# Patient Record
Sex: FEMALE | Race: WHITE | NOT HISPANIC OR LATINO | Employment: OTHER | URBAN - METROPOLITAN AREA
[De-identification: names, ages, dates, MRNs, and addresses within clinical notes are randomized per-mention and may not be internally consistent; named-entity substitution may affect disease eponyms.]

---

## 2017-02-08 ENCOUNTER — TELEPHONE (OUTPATIENT)
Dept: CARDIOLOGY | Facility: MEDICAL CENTER | Age: 67
End: 2017-02-08

## 2017-02-08 NOTE — TELEPHONE ENCOUNTER
Received clearance request from [facility] for pt to hold coumadin prior to colonoscopy scheduled 4/3. Pt has not seen JI since 11/2015. Advised pt that she will need to have a f/u with JI prior to clearance. Pt provided number to call and schedule an appt.  Clearance letter in JI's folder.     NEL cleared during office visit. Faxed back clearance to [facility] [phone]

## 2017-02-16 ENCOUNTER — ANTICOAGULATION VISIT (OUTPATIENT)
Dept: VASCULAR LAB | Facility: MEDICAL CENTER | Age: 67
End: 2017-02-16
Attending: INTERNAL MEDICINE
Payer: MEDICARE

## 2017-02-16 DIAGNOSIS — I48.91 ATRIAL FIBRILLATION, UNSPECIFIED TYPE (HCC): ICD-10-CM

## 2017-02-16 LAB — INR PPP: 2.9 (ref 2–3.5)

## 2017-02-16 PROCEDURE — 99211 OFF/OP EST MAY X REQ PHY/QHP: CPT

## 2017-02-16 PROCEDURE — 85610 PROTHROMBIN TIME: CPT

## 2017-02-16 NOTE — PROGRESS NOTES
Anticoagulation Summary as of 2/16/2017     INR goal 2.0-3.0   Selected INR 2.9 (2/16/2017)   Maintenance plan 5 mg (5 mg x 1) on Mon, Wed, Fri; 2.5 mg (5 mg x 0.5) all other days   Weekly total 25 mg   Plan last modified Brice Garcia, EBONI (10/21/2015)   Next INR check 5/11/2017   Target end date Indefinite    Indications   Atrial fibrillation (CMS-HCC) [I48.91]  Atrial fibrillation (CMS-HCC) [I48.91]         Anticoagulation Episode Summary     INR check location [facility]    Preferred lab     Send INR reminders to     Comments       Anticoagulation Care Providers     Provider Role Specialty Phone number    Florian Carnes M.D. Referring Cardiology [phone]    [facility] Responsible  [phone]        Anticoagulation Patient Findings      Rachelle Reina seen in clinic today  INR  therapeutic.    Denies signs/symptoms of bleeding and/or thrombosis.    Denies changes to diet or medications.   Follow up appointment in 12 week(s).      Continue weekly warfarin dose as noted    Brice Garcia, JANELD

## 2017-02-16 NOTE — MR AVS SNAPSHOT
Rachelle Reina   2017 9:30 AM   Anticoagulation Visit   MRN: [id number]    Department:  Vascular Medicine   Dept Phone:  [phone]    Description:  Female : 1950   Provider:  [facility] EXAM 4           Allergies as of 2017     Allergen Noted Reactions    Sulfa Drugs 2015   Rash    2015        You were diagnosed with     Atrial fibrillation, unspecified type (CMS-HCC)   [[id number]]         Vital Signs     Smoking Status                   Never Smoker            Basic Information     Date Of Birth Sex Race Ethnicity Preferred Language    1950 Female White Non- English      Your appointments     2017  2:20 PM   FOLLOW UP with Florian Carnes M.D.   [facility] (--)    [address], [facility] [address]  [place] [address]   [phone]            May 11, 2017  9:30 AM   Established Patient with [facility] EXAM 4   [facility]  (--)    [address]  [place] [address]   [phone]              Problem List              ICD-10-CM Priority Class Noted - Resolved    Benign essential hypertension (Chronic) I10 Low  2011 - Present    History of cardiac catheterization (Chronic) Z98.890   2010 - Present    Long term current use of anticoagulant therapy (Chronic) Z79.01 High  2011 - Present    Diastolic congestive heart failure (Chronic) I50.30 Medium  2011 - Present    History of echocardiogram (Chronic) Z92.89   2011 - Present    Hypercholesteremia (Chronic) E78.00 Low  2011 - Present    Morbid obesity (CMS-HCC) (Chronic) E66.01   10/28/2008 - Present    History of hypothyroidism (Chronic) Z86.39   2008 - Present    Atrial fibrillation (CMS-HCC) I48.91 High  10/25/2012 - Present    Atrial fibrillation (CMS-HCC) I48.91   2015 - Present    Chest pain R07.9   11/10/2015 - Present    Atypical chest pain R07.89 High  2015 - Present      Health  Maintenance        Date Due Completion Dates    DIABETES MONOFILAMENT / LE EXAM 1/8/1951 ---    RETINAL SCREENING 7/8/1968 ---    URINE ACR / MICROALBUMIN 7/8/1968 ---    IMM DTaP/Tdap/Td Vaccine (1 - Tdap) 7/8/1969 ---    PAP SMEAR 7/8/1971 ---    COLONOSCOPY 7/8/2000 ---    IMM ZOSTER VACCINE 7/8/2010 ---    MAMMOGRAM 7/17/2015 7/17/2014, 7/17/2014, 7/17/2014, 7/17/2014, 12/20/2012, 12/19/2011, 8/15/2006, 8/26/2005, 8/16/2005, 8/11/2004    A1C SCREENING 5/10/2016 11/10/2015    IMM INFLUENZA (1) 9/1/2016 ---    IMM PNEUMOCOCCAL 65+ (ADULT) LOW/MEDIUM RISK SERIES (2 of 2 - PPSV23) 11/10/2016 11/10/2015    FASTING LIPID PROFILE 11/11/2016 11/11/2015    SERUM CREATININE 11/11/2016 11/11/2015, 11/10/2015, 7/20/2015, 1/27/2009, 1/27/2009, 10/29/2008    BONE DENSITY 4/24/2017 4/24/2012            Results     POCT Protime      Component    INR    2.9                        Current Immunizations     13-VALENT PCV PREVNAR 11/10/2015  3:44 PM      Below and/or attached are the medications your provider expects you to take. Review all of your home medications and newly ordered medications with your provider and/or pharmacist. Follow medication instructions as directed by your provider and/or pharmacist. Please keep your medication list with you and share with your provider. Update the information when medications are discontinued, doses are changed, or new medications (including over-the-counter products) are added; and carry medication information at all times in the event of emergency situations     Allergies:  SULFA DRUGS - Rash               Medications  Valid as of: February 16, 2017 -  9:33 AM    Generic Name Brand Name Tablet Size Instructions for use    Citalopram Hydrobromide (Tab) CELEXA 20 MG Take 20 mg by mouth every day.        Ergocalciferol (Cap) DRISDOL 24886 UNITS Take 50,000 Units by mouth every 14 days. Wednesdays.        Glucose Blood (Strip) UNISTRIP1 GENERIC          Lancets (Misc) PHARMACIST CHOICE  LANCETS          Levothyroxine Sodium (Tab) SYNTHROID 25 MCG Take 25 mcg by mouth Every morning on an empty stomach. Combine with 200 mcg tabs for total 225 mcg daily.        Levothyroxine Sodium (Tab) SYNTHROID 200 MCG Take 200 mcg by mouth Every morning on an empty stomach. Combine with 25 mcg tabs for total 225 mcg daily        Loratadine (Tab) CLARITIN 10 MG Take 10 mg by mouth every day.        Losartan Potassium-HCTZ (Tab) HYZAAR 100-25 MG Take 1 Tab by mouth every day.          Meloxicam (Tab) MOBIC 7.5 MG Take 7.5 mg by mouth 2 Times a Day.        MetFORMIN HCl (Tab) GLUCOPHAGE 1000 MG Take 1,000 mg by mouth 2 times a day, with meals.        Metoprolol Tartrate (Tab) LOPRESSOR 50 MG Take 50 mg by mouth 2 times a day.        Nitroglycerin (SL Tab) NITROSTAT 0.4 MG Place 1 Tab under tongue as needed for Chest Pain (up to 3 doses (if SBP greater than 90 mmHg)).        Simvastatin (Tab) ZOCOR 40 MG Take 40 mg by mouth every evening.          Temazepam (Cap) RESTORIL 15 MG         TiZANidine HCl (Tab) ZANAFLEX 4 MG Take 1 Tab by mouth 3 times a day.        Warfarin Sodium (Tab) COUMADIN 5 MG Take 0.5 to 1 tab by mouth daily or as directed by coumadin clinic        .                 Medicines prescribed today were sent to:     [id number] - [place] - [address] [place] [address]    Phone: [phone] Fax: [phone]    Open 24 Hours?: No    [facility] - [place] [address]    Phone: [phone] Fax: [phone]    Open 24 Hours?: No    [facility] - [place] [address]    Phone: [phone] Fax: [phone]    Open 24 Hours?: No      Medication refill instructions:       If your prescription bottle indicates you have medication refills left, it is not necessary to call your provider’s office. Please contact your pharmacy and  they will refill your medication.    If your prescription bottle indicates you do not have any refills left, you may request refills at any time through one of the following ways: The online eBuddy system (except Urgent Care), by calling your provider’s office, or by asking your pharmacy to contact your provider’s office with a refill request. Medication refills are processed only during regular business hours and may not be available until the next business day. Your provider may request additional information or to have a follow-up visit with you prior to refilling your medication.   *Please Note: Medication refills are assigned a new Rx number when refilled electronically. Your pharmacy may indicate that no refills were authorized even though a new prescription for the same medication is available at the pharmacy. Please request the medicine by name with the pharmacy before contacting your provider for a refill.        Warfarin Dosing Calendar February 2017 Details    Sun Mon Tue Wed Thu Fri Sat        1               2               3               4                 5               6               7               8               9               10               11                 12               13               14               15               16   2.9   2.5 mg   See details      17      5 mg         18      2.5 mg           19      2.5 mg         20      5 mg         21      2.5 mg         22      5 mg         23      2.5 mg         24      5 mg         25      2.5 mg           26      2.5 mg         27      5 mg         28      2.5 mg              Date Details   02/16 This INR check   INR: 2.9               How to take your warfarin dose     To take:  2.5 mg Take 0.5 of a 5 mg tablet.    To take:  5 mg Take 1 of the 5 mg tablets.           Warfarin Dosing Calendar March 2017 Details    Sun Mon Tue Wed Thu Fri Sat        1      5 mg         2      2.5 mg         3      5 mg         4      2.5 mg              5      2.5 mg         6      5 mg         7      2.5 mg         8      5 mg         9      2.5 mg         10      5 mg         11      2.5 mg           12      2.5 mg         13      5 mg         14      2.5 mg         15      5 mg         16      2.5 mg         17      5 mg         18      2.5 mg           19      2.5 mg         20      5 mg         21      2.5 mg         22      5 mg         23      2.5 mg         24      5 mg         25      2.5 mg           26      2.5 mg         27      5 mg         28      2.5 mg         29      5 mg         30      2.5 mg         31      5 mg           Date Details   No additional details            How to take your warfarin dose     To take:  2.5 mg Take 0.5 of a 5 mg tablet.    To take:  5 mg Take 1 of the 5 mg tablets.           Warfarin Dosing Calendar   April 2017 Details    Sun Mon Tue Wed Thu Fri Sat           1      2.5 mg           2      2.5 mg         3      5 mg         4      2.5 mg         5      5 mg         6      2.5 mg         7      5 mg         8      2.5 mg           9      2.5 mg         10      5 mg         11      2.5 mg         12      5 mg         13      2.5 mg         14      5 mg         15      2.5 mg           16      2.5 mg         17      5 mg         18      2.5 mg         19      5 mg         20      2.5 mg         21      5 mg         22      2.5 mg           23      2.5 mg         24      5 mg         25      2.5 mg         26      5 mg         27      2.5 mg         28      5 mg         29      2.5 mg           30      2.5 mg                Date Details   No additional details            How to take your warfarin dose     To take:  2.5 mg Take 0.5 of a 5 mg tablet.    To take:  5 mg Take 1 of the 5 mg tablets.           Warfarin Dosing Calendar   May 2017 Details    Sun Mon Tue Wed Thu Fri Sat      1      5 mg         2      2.5 mg         3      5 mg         4      2.5 mg         5      5 mg         6      2.5 mg           7      2.5  mg         8      5 mg         9      2.5 mg         10      5 mg         11      2.5 mg         12               13                 14               15               16               17               18               19               20                 21               22               23               24               25               26               27                 28               29               30               31                   Date Details   No additional details    Date of next INR:  5/11/2017         How to take your warfarin dose     To take:  2.5 mg Take 0.5 of a 5 mg tablet.    To take:  5 mg Take 1 of the 5 mg tablets.              ShangPin Access Code: [email or web address]  Expires: 3/12/2017  3:10 PM    ShangPin  A secure, online tool to manage your health information     [employer]® is a secure, online tool that connects you to your personalized health information from the privacy of your home -- day or night - making it very easy for you to manage your healthcare. Once the activation process is completed, you can even access your medical information using the ShangPin jessica, which is available for free in the Apple Jessica store or Google Play store.     ShangPin provides the following levels of access (as shown below):   My Chart Features   [facility] Doctor [facility]  Urgent  Care Non-[facility]  Primary Care  Doctor   Email your healthcare team securely and privately 24/7 X X X    Manage appointments: schedule your next appointment; view details of past/upcoming appointments X      Request prescription refills. X      View recent personal medical records, including lab and immunizations X X X X   View health record, including health history, allergies, medications X X X X   Read reports about your outpatient visits, procedures, consult and ER notes X X X X   See your discharge summary, which is a recap of your hospital and/or ER visit that includes your diagnosis,  lab results, and care plan. X X       How to register for Son[employer]:  1. Go to  [email or web address].  2. Click on the Sign Up Now box, which takes you to the New Member Sign Up page. You will need to provide the following information:  a. Enter your Sonnedix Access Code exactly as it appears at the top of this page. (You will not need to use this code after you’ve completed the sign-up process. If you do not sign up before the expiration date, you must request a new code.)   b. Enter your date of birth.   c. Enter your home email address.   d. Click Submit, and follow the next screen’s instructions.  3. Create a [employer] ID. This will be your [employer] login ID and cannot be changed, so think of one that is secure and easy to remember.  4. Create a [employer] password. You can change your password at any time.  5. Enter your Password Reset Question and Answer. This can be used at a later time if you forget your password.   6. Enter your e-mail address. This allows you to receive e-mail notifications when new information is available in Sonnedix.  7. Click Sign Up. You can now view your health information.    For assistance activating your Sonnedix account, call [phone]

## 2017-02-17 LAB — INR BLD: 2.9 (ref 0.9–1.2)

## 2017-02-21 ENCOUNTER — OFFICE VISIT (OUTPATIENT)
Dept: CARDIOLOGY | Facility: MEDICAL CENTER | Age: 67
End: 2017-02-21
Payer: MEDICARE

## 2017-02-21 ENCOUNTER — TELEPHONE (OUTPATIENT)
Dept: CARDIOLOGY | Facility: MEDICAL CENTER | Age: 67
End: 2017-02-21

## 2017-02-21 VITALS
OXYGEN SATURATION: 92 % | SYSTOLIC BLOOD PRESSURE: 122 MMHG | DIASTOLIC BLOOD PRESSURE: 74 MMHG | HEIGHT: 65 IN | BODY MASS INDEX: 43.82 KG/M2 | HEART RATE: 94 BPM | WEIGHT: 263 LBS

## 2017-02-21 DIAGNOSIS — I10 BENIGN ESSENTIAL HYPERTENSION: Chronic | ICD-10-CM

## 2017-02-21 DIAGNOSIS — I50.30 DIASTOLIC CONGESTIVE HEART FAILURE, UNSPECIFIED CONGESTIVE HEART FAILURE CHRONICITY: Chronic | ICD-10-CM

## 2017-02-21 DIAGNOSIS — E78.5 DYSLIPIDEMIA: ICD-10-CM

## 2017-02-21 DIAGNOSIS — I48.91 ATRIAL FIBRILLATION, UNSPECIFIED TYPE (HCC): ICD-10-CM

## 2017-02-21 DIAGNOSIS — Z01.810 PREOP CARDIOVASCULAR EXAM: ICD-10-CM

## 2017-02-21 DIAGNOSIS — Z79.01 LONG TERM CURRENT USE OF ANTICOAGULANT THERAPY: Chronic | ICD-10-CM

## 2017-02-21 LAB — EKG IMPRESSION: NORMAL

## 2017-02-21 PROCEDURE — 99214 OFFICE O/P EST MOD 30 MIN: CPT | Performed by: INTERNAL MEDICINE

## 2017-02-21 PROCEDURE — 3017F COLORECTAL CA SCREEN DOC REV: CPT | Mod: 8P | Performed by: INTERNAL MEDICINE

## 2017-02-21 PROCEDURE — 1036F TOBACCO NON-USER: CPT | Performed by: INTERNAL MEDICINE

## 2017-02-21 PROCEDURE — 1101F PT FALLS ASSESS-DOCD LE1/YR: CPT | Mod: 8P | Performed by: INTERNAL MEDICINE

## 2017-02-21 PROCEDURE — 93000 ELECTROCARDIOGRAM COMPLETE: CPT | Performed by: INTERNAL MEDICINE

## 2017-02-21 PROCEDURE — G8419 CALC BMI OUT NRM PARAM NOF/U: HCPCS | Performed by: INTERNAL MEDICINE

## 2017-02-21 PROCEDURE — G8432 DEP SCR NOT DOC, RNG: HCPCS | Performed by: INTERNAL MEDICINE

## 2017-02-21 PROCEDURE — 4040F PNEUMOC VAC/ADMIN/RCVD: CPT | Performed by: INTERNAL MEDICINE

## 2017-02-21 PROCEDURE — G8484 FLU IMMUNIZE NO ADMIN: HCPCS | Performed by: INTERNAL MEDICINE

## 2017-02-21 PROCEDURE — 3014F SCREEN MAMMO DOC REV: CPT | Mod: 8P | Performed by: INTERNAL MEDICINE

## 2017-02-21 ASSESSMENT — ENCOUNTER SYMPTOMS
FEVER: 0
SPUTUM PRODUCTION: 1
PND: 0
SHORTNESS OF BREATH: 1
LOSS OF CONSCIOUSNESS: 0
HEADACHES: 0
MYALGIAS: 0
CHILLS: 0
BLURRED VISION: 0
ABDOMINAL PAIN: 0
DIZZINESS: 0
PALPITATIONS: 0
COUGH: 1
INSOMNIA: 0
ORTHOPNEA: 0
BRUISES/BLEEDS EASILY: 1

## 2017-02-21 NOTE — MR AVS SNAPSHOT
"        Rachelle Reina   2017 2:20 PM   Office Visit   MRN: [id number]    Department:  [facility]   Dept Phone:  [phone]    Description:  Female : 1950   Provider:  Florian Carnes M.D.           Reason for Visit     Follow-Up           Allergies as of 2017     Allergen Noted Reactions    Sulfa Drugs 2015   Rash    2015        You were diagnosed with     Preop cardiovascular exam   [250248]       Atrial fibrillation, unspecified type (CMS-HCC)   [3882911]       Long term current use of anticoagulant therapy   [4724930]       Diastolic congestive heart failure, unspecified congestive heart failure chronicity (CMS-HCC)   [3759530]       Benign essential hypertension   [379698]       Dyslipidemia   [794823]         Vital Signs     Blood Pressure Pulse Height Weight Body Mass Index Oxygen Saturation    122/74 mmHg 94 1.651 m (5' 5\") 119.296 kg (263 lb) 43.77 kg/m2 92%    Smoking Status                   Never Smoker            Basic Information     Date Of Birth Sex Race Ethnicity Preferred Language    1950 Female White Non- English      Your appointments     May 11, 2017  9:30 AM   Established Patient with [facility] EXAM 4   [facility]  (--)    [address] [place] [id number]   [phone]              Problem List              ICD-10-CM Priority Class Noted - Resolved    Benign essential hypertension (Chronic) I10 Low  2011 - Present    History of cardiac catheterization (Chronic) Z98.890   2010 - Present    Long term current use of anticoagulant therapy (Chronic) Z79.01 High  2011 - Present    Diastolic congestive heart failure (Chronic) I50.30 Medium  2011 - Present    History of echocardiogram (Chronic) Z92.89   2011 - Present    Morbid obesity (CMS-HCC) (Chronic) E66.01   10/28/2008 - Present    History of hypothyroidism (Chronic) Z86.39   2008 - Present    Atrial fibrillation " (CMS-HCC) I48.91 High  10/25/2012 - Present    Atrial fibrillation (CMS-HCC) I48.91   9/18/2015 - Present    Chest pain R07.9   11/10/2015 - Present    Atypical chest pain R07.89 High  11/17/2015 - Present    Preop cardiovascular exam Z01.810   2/21/2017 - Present    Dyslipidemia E78.5   2/21/2017 - Present      Health Maintenance        Date Due Completion Dates    DIABETES MONOFILAMENT / LE EXAM 1/8/1951 ---    RETINAL SCREENING 7/8/1968 ---    URINE ACR / MICROALBUMIN 7/8/1968 ---    IMM DTaP/Tdap/Td Vaccine (1 - Tdap) 7/8/1969 ---    PAP SMEAR 7/8/1971 ---    COLONOSCOPY 7/8/2000 ---    IMM ZOSTER VACCINE 7/8/2010 ---    MAMMOGRAM 7/17/2015 7/17/2014, 7/17/2014, 7/17/2014, 7/17/2014, 12/20/2012, 12/19/2011, 8/15/2006, 8/26/2005, 8/16/2005, 8/11/2004    A1C SCREENING 5/10/2016 11/10/2015    IMM INFLUENZA (1) 9/1/2016 ---    IMM PNEUMOCOCCAL 65+ (ADULT) LOW/MEDIUM RISK SERIES (2 of 2 - PPSV23) 11/10/2016 11/10/2015    FASTING LIPID PROFILE 11/11/2016 11/11/2015    SERUM CREATININE 11/11/2016 11/11/2015, 11/10/2015, 7/20/2015, 1/27/2009, 1/27/2009, 10/29/2008    BONE DENSITY 4/24/2017 4/24/2012            Results       Current Immunizations     13-VALENT PCV PREVNAR 11/10/2015  3:44 PM      Below and/or attached are the medications your provider expects you to take. Review all of your home medications and newly ordered medications with your provider and/or pharmacist. Follow medication instructions as directed by your provider and/or pharmacist. Please keep your medication list with you and share with your provider. Update the information when medications are discontinued, doses are changed, or new medications (including over-the-counter products) are added; and carry medication information at all times in the event of emergency situations     Allergies:  SULFA DRUGS - Rash               Medications  Valid as of: February 21, 2017 -  3:09 PM    Generic Name Brand Name Tablet Size Instructions for use     Cholecalciferol   Take  by mouth.        Citalopram Hydrobromide (Tab) CELEXA 20 MG Take 20 mg by mouth every day.        Cyanocobalamin   Take 5,000 mg by mouth. Every 5 days        Ergocalciferol (Cap) DRISDOL 95630 UNITS Take 50,000 Units by mouth every 14 days. Wednesdays.        Glucose Blood (Strip) UNISTRIP1 GENERIC          Lancets (Misc) PHARMACIST CHOICE LANCETS          Levothyroxine Sodium (Tab) SYNTHROID 25 MCG Take 25 mcg by mouth Every morning on an empty stomach. Combine with 200 mcg tabs for total 225 mcg daily.        Levothyroxine Sodium (Tab) SYNTHROID 200 MCG Take 200 mcg by mouth Every morning on an empty stomach. Combine with 25 mcg tabs for total 225 mcg daily        Loratadine (Tab) CLARITIN 10 MG Take 10 mg by mouth every day.        Losartan Potassium-HCTZ (Tab) HYZAAR 100-25 MG Take 1 Tab by mouth every day.          Meloxicam (Tab) MOBIC 7.5 MG Take 7.5 mg by mouth 2 Times a Day.        MetFORMIN HCl (Tab) GLUCOPHAGE 1000 MG Take 1,000 mg by mouth 2 times a day, with meals.        Metoprolol Tartrate (Tab) LOPRESSOR 50 MG Take 50 mg by mouth 3 times a day.        Nitroglycerin (SL Tab) NITROSTAT 0.4 MG Place 1 Tab under tongue as needed for Chest Pain (up to 3 doses (if SBP greater than 90 mmHg)).        Probiotic Product   Take  by mouth.        Simvastatin (Tab) ZOCOR 40 MG Take 40 mg by mouth every evening.          Temazepam (Cap) RESTORIL 15 MG         TiZANidine HCl (Tab) ZANAFLEX 4 MG Take 1 Tab by mouth 3 times a day.        Warfarin Sodium (Tab) COUMADIN 5 MG Take 0.5 to 1 tab by mouth daily or as directed by coumadin clinic        .                 Medicines prescribed today were sent to:     [id number] - [place] - [address] [facility] [address]    [address] [address]    Phone: [phone] Fax: [phone]    Open 24 Hours?: No    [facility] - [facility] [place] - [address] [facility]    [address] [place] [address]  [place] [address]    Phone: [phone] Fax: [phone]    Open 24 Hours?: No    [facility] - [place], [place] - [address] [facility]    [address] [place] [address]    Phone: [phone] Fax: [phone]    Open 24 Hours?: No      Medication refill instructions:       If your prescription bottle indicates you have medication refills left, it is not necessary to call your provider’s office. Please contact your pharmacy and they will refill your medication.    If your prescription bottle indicates you do not have any refills left, you may request refills at any time through one of the following ways: The online Wowo system (except Urgent Care), by calling your provider’s office, or by asking your pharmacy to contact your provider’s office with a refill request. Medication refills are processed only during regular business hours and may not be available until the next business day. Your provider may request additional information or to have a follow-up visit with you prior to refilling your medication.   *Please Note: Medication refills are assigned a new Rx number when refilled electronically. Your pharmacy may indicate that no refills were authorized even though a new prescription for the same medication is available at the pharmacy. Please request the medicine by name with the pharmacy before contacting your provider for a refill.           Wowo Access Code: [email or web address]  Expires: 3/12/2017  3:10 PM    Wowo  A secure, online tool to manage your health information     [employer]s Wowo® is a secure, online tool that connects you to your personalized health information from the privacy of your home -- day or night - making it very easy for you to manage your healthcare. Once the activation process is completed, you can even access your medical information using the Wowo jessica, which is available for free in the Apple Jessica store or Google Play store.     Wowo  provides the following levels of access (as shown below):   My Chart Features   [facility] Doctor [facility]  Specialists [facility]  Urgent  Care Non-[facility]  Primary Care  Doctor   Email your healthcare team securely and privately 24/7 X X X    Manage appointments: schedule your next appointment; view details of past/upcoming appointments X      Request prescription refills. X      View recent personal medical records, including lab and immunizations X X X X   View health record, including health history, allergies, medications X X X X   Read reports about your outpatient visits, procedures, consult and ER notes X X X X   See your discharge summary, which is a recap of your hospital and/or ER visit that includes your diagnosis, lab results, and care plan. X X       How to register for [employer]:  1. Go to  [email or web address].  2. Click on the Sign Up Now box, which takes you to the New Member Sign Up page. You will need to provide the following information:  a. Enter your [employer]imecraft Access Code exactly as it appears at the top of this page. (You will not need to use this code after you’ve completed the sign-up process. If you do not sign up before the expiration date, you must request a new code.)   b. Enter your date of birth.   c. Enter your home email address.   d. Click Submit, and follow the next screen’s instructions.  3. Create a Limecraft ID. This will be your Limecraft login ID and cannot be changed, so think of one that is secure and easy to remember.  4. Create a Limecraft password. You can change your password at any time.  5. Enter your Password Reset Question and Answer. This can be used at a later time if you forget your password.   6. Enter your e-mail address. This allows you to receive e-mail notifications when new information is available in Limecraft.  7. Click Sign Up. You can now view your health information.    For assistance activating your [employer]imecraft account, call [phone]

## 2017-02-21 NOTE — PROGRESS NOTES
Subjective:   Rachelle Reina is a 66 y.o. female who presents today for pre-operative cardiovascular examination.    Since the patient's last visit on 11/17/15, she has been doing well clinically. She denies chest pain, shortness of breath, palpitations, nausea/vomiting or diaphoresis. She is pending colonoscope. She has suffered with recurrent bronchitis.    Past Medical History   Diagnosis Date   • A-fib (CMS-HCC)    • Cancer (CMS-HCC)      breast   • Benign essential hypertension 7/1/2011   • History of cardiac catheterization 4/22/2010   • Long term (current) use of anticoagulants 7/1/2011   • Diastolic congestive heart failure (CMS-HCC) 7/1/2011   • History of echocardiogram 4/22/2011   • Hypercholesteremia 7/1/2011   • Morbid obesity (CMS-HCC) 10/28/2008   • History of hypothyroidism 8/29/2008   • Breast cancer (CMS-HCC)    • Diabetes (CMS-HCC)      Past Surgical History   Procedure Laterality Date   • Abdominal hysterectomy total       1987   • Mastectomy  partial   • Cholecystectomy     • Pr radiation therapy plan simple       Family History   Problem Relation Age of Onset   • Heart Disease Mother      HEART VALVE REPLACEMENT.   • Heart Disease Father      CORONARY ARTERY BYPASS GRAFTS.   • Cancer Maternal Aunt      History   Smoking status   • Never Smoker    Smokeless tobacco   • Never Used     Allergies   Allergen Reactions   • Sulfa Drugs Rash     8/2015         Medications reviewed.    Outpatient Encounter Prescriptions as of 2/21/2017   Medication Sig Dispense Refill   • Cholecalciferol (VITAMIN D PO) Take  by mouth.     • Cyanocobalamin (VITAMIN B 12 PO) Take 5,000 mg by mouth. Every 5 days     • Probiotic Product (PROBIOTIC DAILY PO) Take  by mouth.     • warfarin (COUMADIN) 5 MG Tab Take 0.5 to 1 tab by mouth daily or as directed by coumadin clinic 90 Tab 3   • loratadine (CLARITIN) 10 MG Tab Take 10 mg by mouth every day.     • levothyroxine (SYNTHROID) 200 MCG Tab Take 200 mcg by mouth  Every morning on an empty stomach. Combine with 25 mcg tabs for total 225 mcg daily     • metformin (GLUCOPHAGE) 1000 MG tablet Take 1,000 mg by mouth 2 times a day, with meals.     • metoprolol (LOPRESSOR) 50 MG TABS Take 50 mg by mouth 3 times a day.     • citalopram (CELEXA) 20 MG TABS Take 20 mg by mouth every day.     • meloxicam (MOBIC) 7.5 MG TABS Take 7.5 mg by mouth 2 Times a Day.     • losartan-hydrochlorothiazide (HYZAAR) 100-25 MG per tablet Take 1 Tab by mouth every day.       • simvastatin (ZOCOR) 40 MG TABS Take 40 mg by mouth every evening.       • UNISTRIP1 GENERIC strip      • PHARMACIST CHOICE LANCETS Misc      • temazepam (RESTORIL) 15 MG Cap      • tizanidine (ZANAFLEX) 4 MG Tab Take 1 Tab by mouth 3 times a day. 21 Tab 0   • nitroglycerin (NITROSTAT) 0.4 MG SL Tab Place 1 Tab under tongue as needed for Chest Pain (up to 3 doses (if SBP greater than 90 mmHg)). 15 Tab 0   • levothyroxine (SYNTHROID) 25 MCG Tab Take 25 mcg by mouth Every morning on an empty stomach. Combine with 200 mcg tabs for total 225 mcg daily.     • vitamin D, Ergocalciferol, (DRISDOL) 35643 UNIT CAPS capsule Take 50,000 Units by mouth every 14 days. Wednesdays.       No facility-administered encounter medications on file as of 2/21/2017.     Review of Systems   Constitutional: Negative for fever, chills and malaise/fatigue.   HENT: Negative for congestion.    Eyes: Negative for blurred vision.   Respiratory: Positive for cough, sputum production and shortness of breath.    Cardiovascular: Negative for chest pain, palpitations, orthopnea, leg swelling and PND.   Gastrointestinal: Negative for abdominal pain.   Genitourinary: Negative for dysuria.   Musculoskeletal: Negative for myalgias and joint pain.   Skin: Negative for rash.   Neurological: Negative for dizziness, loss of consciousness and headaches.   Endo/Heme/Allergies: Bruises/bleeds easily.   Psychiatric/Behavioral: The patient does not have insomnia.          "Objective:   /74 mmHg  Pulse 94  Ht 1.651 m (5' 5\")  Wt 119.296 kg (263 lb)  BMI 43.77 kg/m2  SpO2 92%    Physical Exam   Constitutional: She is oriented to person, place, and time. She appears well-developed and well-nourished.   HENT:   Head: Normocephalic and atraumatic.   Eyes: Conjunctivae are normal. Pupils are equal, round, and reactive to light.   Neck: Normal range of motion. Neck supple.   Cardiovascular: Normal rate.  An irregularly irregular rhythm present.   Pulmonary/Chest: Effort normal. She has wheezes.   Abdominal: Soft. Bowel sounds are normal.   Musculoskeletal: Normal range of motion. She exhibits edema.   Neurological: She is alert and oriented to person, place, and time.   Skin: Skin is warm and dry.   Psychiatric: She has a normal mood and affect.     CARDIAC STUDIES/PROCEDURES:    CARDIAC CATHETERIZATION (10/30/08)  Cardiac catheterization showing no angiographic evidence of coronary artery disease.    ECHOCARDIOGRAM CONCLUSIONS (11/10/15)  No significant valve disease or flow abnormalities.   Normal left ventricular systolic function.   Trace to small pericardial effusion without evidence of hemodynamic compromise.  No prior study is available for comparison.     ECHOCARDIOGRAM CONCLUSIONS (09/02/08)  Echocardiogram showing normal left ventricular systolic function and mild mitral regurgitation.    EKG was ordered for pre-operative cardiovascular examination, performed on (02/21/17) and reviewed: EKG shows atrial fibrillation.  EKG performed on (11/10/15) was reviewed: EKG shows atrial fibrillation.    Laboratory results of (11/11/15) were reviewed. Cholesterol profile of 118/165/39/46 noted.    MPI CONCLUSIONS (11/11/15)  No reversible defects that would indicate ischemia.     Assessment:     Patient Active Problem List    Diagnosis Date Noted   • Pre-operative cardiovascular examination  11/17/2015     Priority: High   • Atrial fibrillation 10/25/2012     Priority: High   • Long " term current use of anticoagulant therapy 07/01/2011     Priority: High   • Diastolic congestive heart failure 07/01/2011     Priority: Medium   • Benign essential hypertension 07/01/2011     Priority: Low   • Hypercholesteremia 07/01/2011     Priority: Low     Medical Decision Making:  Today's Assessment / Status / Plan:     1. Presurgical evaluation: She is pending colonoscope and is doing well from cardiac standpoint. She may proceed with surgery from cardiac standpoint with moderate risk on beta blockade therapy. She may have her warfarin held as needed.  2. Atrial fibrillation on chronic anticoagulation therapy (warfarin) and unseccessful electrical cardioversion on amiodarone on 01/27/09: She is doing well and the ventricular rate is well controlled.   3. History of diastolic congestive heart failure: The overall volume status is adequate.  4. Hypertension: Blood pressure is well controlled.  5. Hyperlipidemia (managed by Dr. Fernández): She is doing well on statin therapy without myalgia symptoms.  6. Emotional stress: She is undergoing significant emotional stress.    We will follow up the patient in one year.    CC Daniel Santiago

## 2017-02-21 NOTE — TELEPHONE ENCOUNTER
Received cardiac clearance letter from [facility] for pt to hold his warfarin for 5 days prior to colonoscopy. Pt has appt today w/ JI.     Please see prior encounter

## 2017-02-21 NOTE — Clinical Note
[facility]   [address], [facility] [address]  [place] [address]  Phone: [phone]  Fax: [phone]              Rachelle Reina  1950    Encounter Date: 2/21/2017    Florian Carnes M.D.          PROGRESS NOTE:  Subjective:   Rachelle Reina is a 66 y.o. female who presents today for pre-operative cardiovascular examination.    Since the patient's last visit on 11/17/15, she has been doing well clinically. She denies chest pain, shortness of breath, palpitations, nausea/vomiting or diaphoresis. She is pending colonoscope. She has suffered with recurrent bronchitis.    Past Medical History   Diagnosis Date   • A-fib (CMS-HCC)    • Cancer (CMS-HCC)      breast   • Benign essential hypertension 7/1/2011   • History of cardiac catheterization 4/22/2010   • Long term (current) use of anticoagulants 7/1/2011   • Diastolic congestive heart failure (CMS-HCC) 7/1/2011   • History of echocardiogram 4/22/2011   • Hypercholesteremia 7/1/2011   • Morbid obesity (CMS-HCC) 10/28/2008   • History of hypothyroidism 8/29/2008   • Breast cancer (CMS-HCC)    • Diabetes (CMS-HCC)      Past Surgical History   Procedure Laterality Date   • Abdominal hysterectomy total       1987   • Mastectomy  partial   • Cholecystectomy     • Pr radiation therapy plan simple       Family History   Problem Relation Age of Onset   • Heart Disease Mother      HEART VALVE REPLACEMENT.   • Heart Disease Father      CORONARY ARTERY BYPASS GRAFTS.   • Cancer Maternal Aunt      History   Smoking status   • Never Smoker    Smokeless tobacco   • Never Used     Allergies   Allergen Reactions   • Sulfa Drugs Rash     8/2015         Medications reviewed.    Outpatient Encounter Prescriptions as of 2/21/2017   Medication Sig Dispense Refill   • Cholecalciferol (VITAMIN D PO) Take  by mouth.     • Cyanocobalamin (VITAMIN B 12 PO) Take 5,000 mg by mouth. Every 5 days     • Probiotic Product (PROBIOTIC DAILY  PO) Take  by mouth.     • warfarin (COUMADIN) 5 MG Tab Take 0.5 to 1 tab by mouth daily or as directed by coumadin clinic 90 Tab 3   • loratadine (CLARITIN) 10 MG Tab Take 10 mg by mouth every day.     • levothyroxine (SYNTHROID) 200 MCG Tab Take 200 mcg by mouth Every morning on an empty stomach. Combine with 25 mcg tabs for total 225 mcg daily     • metformin (GLUCOPHAGE) 1000 MG tablet Take 1,000 mg by mouth 2 times a day, with meals.     • metoprolol (LOPRESSOR) 50 MG TABS Take 50 mg by mouth 3 times a day.     • citalopram (CELEXA) 20 MG TABS Take 20 mg by mouth every day.     • meloxicam (MOBIC) 7.5 MG TABS Take 7.5 mg by mouth 2 Times a Day.     • losartan-hydrochlorothiazide (HYZAAR) 100-25 MG per tablet Take 1 Tab by mouth every day.       • simvastatin (ZOCOR) 40 MG TABS Take 40 mg by mouth every evening.       • UNISTRIP1 GENERIC strip      • PHARMACIST CHOICE LANCETS Misc      • temazepam (RESTORIL) 15 MG Cap      • tizanidine (ZANAFLEX) 4 MG Tab Take 1 Tab by mouth 3 times a day. 21 Tab 0   • nitroglycerin (NITROSTAT) 0.4 MG SL Tab Place 1 Tab under tongue as needed for Chest Pain (up to 3 doses (if SBP greater than 90 mmHg)). 15 Tab 0   • levothyroxine (SYNTHROID) 25 MCG Tab Take 25 mcg by mouth Every morning on an empty stomach. Combine with 200 mcg tabs for total 225 mcg daily.     • vitamin D, Ergocalciferol, (DRISDOL) 19697 UNIT CAPS capsule Take 50,000 Units by mouth every 14 days. Wednesdays.       No facility-administered encounter medications on file as of 2/21/2017.     Review of Systems   Constitutional: Negative for fever, chills and malaise/fatigue.   HENT: Negative for congestion.    Eyes: Negative for blurred vision.   Respiratory: Positive for cough, sputum production and shortness of breath.    Cardiovascular: Negative for chest pain, palpitations, orthopnea, leg swelling and PND.   Gastrointestinal: Negative for abdominal pain.   Genitourinary: Negative for dysuria.   Musculoskeletal:  "Negative for myalgias and joint pain.   Skin: Negative for rash.   Neurological: Negative for dizziness, loss of consciousness and headaches.   Endo/Heme/Allergies: Bruises/bleeds easily.   Psychiatric/Behavioral: The patient does not have insomnia.         Objective:   /74 mmHg  Pulse 94  Ht 1.651 m (5' 5\")  Wt 119.296 kg (263 lb)  BMI 43.77 kg/m2  SpO2 92%    Physical Exam   Constitutional: She is oriented to person, place, and time. She appears well-developed and well-nourished.   HENT:   Head: Normocephalic and atraumatic.   Eyes: Conjunctivae are normal. Pupils are equal, round, and reactive to light.   Neck: Normal range of motion. Neck supple.   Cardiovascular: Normal rate.  An irregularly irregular rhythm present.   Pulmonary/Chest: Effort normal. She has wheezes.   Abdominal: Soft. Bowel sounds are normal.   Musculoskeletal: Normal range of motion. She exhibits edema.   Neurological: She is alert and oriented to person, place, and time.   Skin: Skin is warm and dry.   Psychiatric: She has a normal mood and affect.     CARDIAC STUDIES/PROCEDURES:    CARDIAC CATHETERIZATION (10/30/08)  Cardiac catheterization showing no angiographic evidence of coronary artery disease.    ECHOCARDIOGRAM CONCLUSIONS (11/10/15)  No significant valve disease or flow abnormalities.   Normal left ventricular systolic function.   Trace to small pericardial effusion without evidence of hemodynamic compromise.  No prior study is available for comparison.     ECHOCARDIOGRAM CONCLUSIONS (09/02/08)  Echocardiogram showing normal left ventricular systolic function and mild mitral regurgitation.    EKG was ordered for pre-operative cardiovascular examination, performed on (02/21/17) and reviewed: EKG shows atrial fibrillation.  EKG performed on (11/10/15) was reviewed: EKG shows atrial fibrillation.    Laboratory results of (11/11/15) were reviewed. Cholesterol profile of 118/165/39/46 noted.    MPI CONCLUSIONS (11/11/15)  No " reversible defects that would indicate ischemia.     Assessment:     Patient Active Problem List    Diagnosis Date Noted   • Pre-operative cardiovascular examination  11/17/2015     Priority: High   • Atrial fibrillation 10/25/2012     Priority: High   • Long term current use of anticoagulant therapy 07/01/2011     Priority: High   • Diastolic congestive heart failure 07/01/2011     Priority: Medium   • Benign essential hypertension 07/01/2011     Priority: Low   • Hypercholesteremia 07/01/2011     Priority: Low     Medical Decision Making:  Today's Assessment / Status / Plan:     1. Presurgical evaluation: She is pending colonoscope and is doing well from cardiac standpoint. She may proceed with surgery from cardiac standpoint with moderate risk on beta blockade therapy. She may have her warfarin held as needed.  2. Atrial fibrillation on chronic anticoagulation therapy (warfarin) and unseccessful electrical cardioversion on amiodarone on 01/27/09: She is doing well and the ventricular rate is well controlled.   3. History of diastolic congestive heart failure: The overall volume status is adequate.  4. Hypertension: Blood pressure is well controlled.  5. Hyperlipidemia (managed by Dr. Fernández): She is doing well on statin therapy without myalgia symptoms.  6. Emotional stress: She is undergoing significant emotional stress.    We will follow up the patient in one year.    CC Daniel Santiago

## 2017-05-11 ENCOUNTER — ANTICOAGULATION VISIT (OUTPATIENT)
Dept: VASCULAR LAB | Facility: MEDICAL CENTER | Age: 67
End: 2017-05-11
Attending: INTERNAL MEDICINE
Payer: MEDICARE

## 2017-05-11 DIAGNOSIS — I48.91 ATRIAL FIBRILLATION, UNSPECIFIED TYPE (HCC): ICD-10-CM

## 2017-05-11 LAB — INR PPP: 3.3 (ref 2–3.5)

## 2017-05-11 PROCEDURE — 85610 PROTHROMBIN TIME: CPT

## 2017-05-11 PROCEDURE — 99212 OFFICE O/P EST SF 10 MIN: CPT

## 2017-05-11 NOTE — MR AVS SNAPSHOT
Rachelle Reina   2017 4:30 PM   Anticoagulation Visit   MRN: [id number]    Department:  Vascular Medicine   Dept Phone:  [phone]    Description:  Female : 1950   Provider:  [facility] EXAM 5           Allergies as of 2017     Allergen Noted Reactions    Sulfa Drugs 2015   Rash    2015        Vital Signs     Smoking Status                   Never Smoker            Basic Information     Date Of Birth Sex Race Ethnicity Preferred Language    1950 Female White Non- English      Your appointments     May 11, 2017  4:30 PM   Established Patient with [facility] EXAM 5   [facility]  (--)    [address]  [place] [address]   [phone]            2017  4:30 PM   Established Patient with [facility] EXAM 4   [facility]  (--)    [address]  [place] [address]   [phone]              Problem List              ICD-10-CM Priority Class Noted - Resolved    Benign essential hypertension (Chronic) I10 Low  2011 - Present    History of cardiac catheterization (Chronic) Z98.890   2010 - Present    Long term current use of anticoagulant therapy (Chronic) Z79.01 High  2011 - Present    Diastolic congestive heart failure (Chronic) I50.30 Medium  2011 - Present    History of echocardiogram (Chronic) Z92.89   2011 - Present    Morbid obesity (CMS-HCC) (Chronic) E66.01   10/28/2008 - Present    History of hypothyroidism (Chronic) Z86.39   2008 - Present    Atrial fibrillation (CMS-HCC) I48.91 High  10/25/2012 - Present    Atrial fibrillation (CMS-[facility]) I48.91   2015 - Present    Chest pain R07.9   11/10/2015 - Present    Atypical chest pain R07.89 High  2015 - Present    Preop cardiovascular exam Z01.810   2017 - Present    Dyslipidemia E78.5   2017 - Present      Health Maintenance        Date Due Completion Dates    DIABETES  MONOFILAMENT / LE EXAM 1/8/1951 ---    RETINAL SCREENING 7/8/1968 ---    URINE ACR / MICROALBUMIN 7/8/1968 ---    IMM DTaP/Tdap/Td Vaccine (1 - Tdap) 7/8/1969 ---    PAP SMEAR 7/8/1971 ---    COLONOSCOPY 7/8/2000 ---    IMM ZOSTER VACCINE 7/8/2010 ---    MAMMOGRAM 7/17/2015 7/17/2014, 12/20/2012, 12/19/2011, 8/15/2006, 8/26/2005, 8/16/2005, 8/11/2004    A1C SCREENING 5/10/2016 11/10/2015    IMM PNEUMOCOCCAL 65+ (ADULT) LOW/MEDIUM RISK SERIES (2 of 2 - PPSV23) 11/10/2016 11/10/2015    FASTING LIPID PROFILE 11/11/2016 11/11/2015    SERUM CREATININE 11/11/2016 11/11/2015, 11/10/2015, 7/20/2015, 1/27/2009, 1/27/2009, 10/29/2008    BONE DENSITY 4/24/2017 4/24/2012            Results     POCT Protime      Component    INR    3.3                        Current Immunizations     13-VALENT PCV PREVNAR 11/10/2015  3:44 PM      Below and/or attached are the medications your provider expects you to take. Review all of your home medications and newly ordered medications with your provider and/or pharmacist. Follow medication instructions as directed by your provider and/or pharmacist. Please keep your medication list with you and share with your provider. Update the information when medications are discontinued, doses are changed, or new medications (including over-the-counter products) are added; and carry medication information at all times in the event of emergency situations     Allergies:  SULFA DRUGS - Rash               Medications  Valid as of: May 11, 2017 -  4:28 PM    Generic Name Brand Name Tablet Size Instructions for use    Cholecalciferol   Take  by mouth.        Citalopram Hydrobromide (Tab) CELEXA 20 MG Take 20 mg by mouth every day.        Cyanocobalamin   Take 5,000 mg by mouth. Every 5 days        Ergocalciferol (Cap) DRISDOL 72460 UNITS Take 50,000 Units by mouth every 14 days. Wednesdays.        Glucose Blood (Strip) UNISTRIP1 GENERIC          Lancets (Misc) PHARMACIST CHOICE LANCETS          Levothyroxine  Sodium (Tab) SYNTHROID 25 MCG Take 25 mcg by mouth Every morning on an empty stomach. Combine with 200 mcg tabs for total 225 mcg daily.        Levothyroxine Sodium (Tab) SYNTHROID 200 MCG Take 200 mcg by mouth Every morning on an empty stomach. Combine with 25 mcg tabs for total 225 mcg daily        Loratadine (Tab) CLARITIN 10 MG Take 10 mg by mouth every day.        Losartan Potassium-HCTZ (Tab) HYZAAR 100-25 MG Take 1 Tab by mouth every day.          Meloxicam (Tab) MOBIC 7.5 MG Take 7.5 mg by mouth 2 Times a Day.        MetFORMIN HCl (Tab) GLUCOPHAGE 1000 MG Take 1,000 mg by mouth 2 times a day, with meals.        Metoprolol Tartrate (Tab) LOPRESSOR 50 MG Take 50 mg by mouth 3 times a day.        Nitroglycerin (SL Tab) NITROSTAT 0.4 MG Place 1 Tab under tongue as needed for Chest Pain (up to 3 doses (if SBP greater than 90 mmHg)).        Probiotic Product   Take  by mouth.        Simvastatin (Tab) ZOCOR 40 MG Take 40 mg by mouth every evening.          Temazepam (Cap) RESTORIL 15 MG         TiZANidine HCl (Tab) ZANAFLEX 4 MG Take 1 Tab by mouth 3 times a day.        Warfarin Sodium (Tab) COUMADIN 5 MG Take 0.5 to 1 tab by mouth daily or as directed by coumadin clinic        .                 Medicines prescribed today were sent to:     [facility] [address] [place] [address] [place], [place] - [address] AT [facility]    [address] [address]    Phone: [phone] Fax: [phone]    Open 24 Hours?: No    [facility] - [place] [address]    Phone: [phone] Fax: [phone]    Open 24 Hours?: No    [facility] - [place] [address]    Phone: [phone] Fax: [phone]    Open 24 Hours?: No      Medication refill instructions:       If your prescription bottle indicates you have medication refills left, it is not necessary to  call your provider’s office. Please contact your pharmacy and they will refill your medication.    If your prescription bottle indicates you do not have any refills left, you may request refills at any time through one of the following ways: The online [employer] system (except Urgent Care), by calling your provider’s office, or by asking your pharmacy to contact your provider’s office with a refill request. Medication refills are processed only during regular business hours and may not be available until the next business day. Your provider may request additional information or to have a follow-up visit with you prior to refilling your medication.   *Please Note: Medication refills are assigned a new Rx number when refilled electronically. Your pharmacy may indicate that no refills were authorized even though a new prescription for the same medication is available at the pharmacy. Please request the medicine by name with the pharmacy before contacting your provider for a refill.        Warfarin Dosing Calendar   May 2017 Details    Sun Mon Tue Wed Thu Fri Sat      1               2               3               4               5               6                 7               8               9               10               11   3.3   Hold   See details      12      5 mg         13      2.5 mg           14      2.5 mg         15      5 mg         16      2.5 mg         17      5 mg         18      2.5 mg         19      5 mg         20      2.5 mg           21      2.5 mg         22      5 mg         23      2.5 mg         24      5 mg         25      2.5 mg         26      5 mg         27      2.5 mg           28      2.5 mg         29      5 mg         30      2.5 mg         31      5 mg             Date Details   05/11 This INR check   INR: 3.3               How to take your warfarin dose     To take:  2.5 mg Take 0.5 of a 5 mg tablet.    To take:  5 mg Take 1 of the 5 mg tablets.    Hold Do not take your warfarin  dose. See the Details table to the right for additional instructions.                Warfarin Dosing Calendar   June 2017 Details    Sun Mon Tue Wed Thu Fri Sat         1      2.5 mg         2      5 mg         3      2.5 mg           4      2.5 mg         5      5 mg         6      2.5 mg         7      5 mg         8      2.5 mg         9      5 mg         10      2.5 mg           11      2.5 mg         12      5 mg         13      2.5 mg         14      5 mg         15      2.5 mg         16      5 mg         17      2.5 mg           18      2.5 mg         19      5 mg         20      2.5 mg         21      5 mg         22      2.5 mg         23      5 mg         24      2.5 mg           25      2.5 mg         26      5 mg         27      2.5 mg         28      5 mg         29      2.5 mg         30      5 mg           Date Details   No additional details            How to take your warfarin dose     To take:  2.5 mg Take 0.5 of a 5 mg tablet.    To take:  5 mg Take 1 of the 5 mg tablets.           Warfarin Dosing Calendar   July 2017 Details    Sun Mon Tue Wed Thu Fri Sat           1      2.5 mg           2      2.5 mg         3      5 mg         4      2.5 mg         5      5 mg         6      2.5 mg         7      5 mg         8      2.5 mg           9      2.5 mg         10      5 mg         11      2.5 mg         12      5 mg         13      2.5 mg         14      5 mg         15      2.5 mg           16      2.5 mg         17      5 mg         18      2.5 mg         19      5 mg         20      2.5 mg         21      5 mg         22      2.5 mg           23      2.5 mg         24      5 mg         25      2.5 mg         26      5 mg         27      2.5 mg         28      5 mg         29      2.5 mg           30      2.5 mg         31      5 mg               Date Details   No additional details            How to take your warfarin dose     To take:  2.5 mg Take 0.5 of a 5 mg tablet.    To take:  5 mg Take 1  of the 5 mg tablets.           Warfarin Dosing Calendar   August 2017 Details    Sun Mon Tue Wed Thu Fri Sat       1      2.5 mg         2      5 mg         3      2.5 mg         4               5                 6               7               8               9               10               11               12                 13               14               15               16               17               18               19                 20               21               22               23               24               25               26                 27               28               29               30               31                  Date Details   No additional details    Date of next INR:  8/3/2017         How to take your warfarin dose     To take:  2.5 mg Take 0.5 of a 5 mg tablet.    To take:  5 mg Take 1 of the 5 mg tablets.              [employer] Access Code: [email or web address]  Expires: 6/10/2017  4:28 PM    [employer]  A secure, online tool to manage your health information     [employer]® is a secure, online tool that connects you to your personalized health information from the privacy of your home -- day or night - making it very easy for you to manage your healthcare. Once the activation process is completed, you can even access your medical information using the [employer] jessica, which is available for free in the Apple Jessica store or Google Play store.     [employer] provides the following levels of access (as shown below):   My Chart Features   [facility] Doctor [facility]  Specialists [facility]  Urgent  Care Non-[facility]  Primary Care  Doctor   Email your healthcare team securely and privately 24/7 X X X    Manage appointments: schedule your next appointment; view details of past/upcoming appointments X      Request prescription refills. X      View recent personal medical records, including lab and immunizations X X X X   View health record, including health history, allergies,  medications X X X X   Read reports about your outpatient visits, procedures, consult and ER notes X X X X   See your discharge summary, which is a recap of your hospital and/or ER visit that includes your diagnosis, lab results, and care plan. X X       How to register for [employer]:  1. Go to  [email or web address].  2. Click on the Sign Up Now box, which takes you to the New Member Sign Up page. You will need to provide the following information:  a. Enter your [employer] Access Code exactly as it appears at the top of this page. (You will not need to use this code after you’ve completed the sign-up process. If you do not sign up before the expiration date, you must request a new code.)   b. Enter your date of birth.   c. Enter your home email address.   d. Click Submit, and follow the next screen’s instructions.  3. Create a [employer] ID. This will be your [employer] login ID and cannot be changed, so think of one that is secure and easy to remember.  4. Create a [employer] password. You can change your password at any time.  5. Enter your Password Reset Question and Answer. This can be used at a later time if you forget your password.   6. Enter your e-mail address. This allows you to receive e-mail notifications when new information is available in [employer].  7. Click Sign Up. You can now view your health information.    For assistance activating your [employer] account, call [phone]

## 2017-05-11 NOTE — PROGRESS NOTES
Anticoagulation Summary as of 5/11/2017     INR goal 2.0-3.0   Selected INR 3.3! (5/11/2017)   Maintenance plan 5 mg (5 mg x 1) on Mon, Wed, Fri; 2.5 mg (5 mg x 0.5) all other days   Weekly total 25 mg   Plan last modified JANEL BrownD (10/21/2015)   Next INR check 6/8/2017   Target end date Indefinite    Indications   Atrial fibrillation (CMS-HCC) [I48.91]  Atrial fibrillation (CMS-HCC) [I48.91]         Anticoagulation Episode Summary     [facility]    Preferred lab     Send INR reminders to     Comments       Anticoagulation Care Providers     Provider Role Specialty Phone number    Florian Carnes M.D. Referring Cardiology [phone]    [facility] Responsible  [phone]        Anticoagulation Patient Findings    Patient's INR was SUPRA therapeutic.   Pt denies any unusual s/s of bleeding, bruising, clotting or any changes to medications.  States she hasn't felt well and appetite has decreased, likely just a phase.   Denies alcohol or cranberry use.  Hold today then Pt is to continue with current warfarin dosing regimen.    Follow up in 4 weeks then consider resuming 12 week interval if therapeutic.     Dennis Oakley, JANELD

## 2017-05-15 LAB — INR BLD: 3.3 (ref 0.9–1.2)

## 2017-06-08 ENCOUNTER — ANTICOAGULATION VISIT (OUTPATIENT)
Dept: VASCULAR LAB | Facility: MEDICAL CENTER | Age: 67
End: 2017-06-08
Attending: INTERNAL MEDICINE
Payer: MEDICARE

## 2017-06-08 DIAGNOSIS — I48.91 ATRIAL FIBRILLATION, UNSPECIFIED TYPE (HCC): ICD-10-CM

## 2017-06-08 LAB — INR PPP: 2.5 (ref 2–3.5)

## 2017-06-08 PROCEDURE — 85610 PROTHROMBIN TIME: CPT

## 2017-06-08 PROCEDURE — 99211 OFF/OP EST MAY X REQ PHY/QHP: CPT

## 2017-06-08 NOTE — PROGRESS NOTES
Anticoagulation Summary as of 6/8/2017     INR goal 2.0-3.0   Selected INR 2.5 (6/8/2017)   Maintenance plan 5 mg (5 mg x 1) on Mon, Wed, Fri; 2.5 mg (5 mg x 0.5) all other days   Weekly total 25 mg   Plan last modified JANEL BrownD (10/21/2015)   Next INR check 7/20/2017   Target end date Indefinite    Indications   Atrial fibrillation (CMS-HCC) [I48.91]  Atrial fibrillation (CMS-HCC) [I48.91]         Anticoagulation Episode Summary     [facility]    Preferred lab     Send INR reminders to     Comments       Anticoagulation Care Providers     Provider Role Specialty Phone number    Florian Carnes M.D. Referring Cardiology [phone]    [facility] Responsible  [phone]        Anticoagulation Patient Findings    Patient's INR was therapeutic today in clinic.    Pt denies any unusual s/s of bleeding, bruising, clotting or any changes to diet or medications.   Confirmed dosing regimen.  Pt is to continue with current warfarin dosing regimen.    Follow up in 6 weeks.    Dennis Oakley, PHARMD

## 2017-06-08 NOTE — MR AVS SNAPSHOT
Rachelle Reina   2017 4:30 PM   Anticoagulation Visit   MRN: [id number]    Department:  Vascular Medicine   Dept Phone:  [phone]    Description:  Female : 1950   Provider:  [facility] EXAM 4           Allergies as of 2017     Allergen Noted Reactions    Sulfa Drugs 2015   Rash    2015        Vital Signs     Smoking Status                   Never Smoker            Basic Information     Date Of Birth Sex Race Ethnicity Preferred Language    1950 Female White Non- English      Your appointments     2017  4:30 PM   Established Patient with IHV EXAM 4   [facility]  (--)    [address]  [place] [address]   [phone]            2017  1:30 PM   Established Patient with IHV EXAM 5   [facility]  (--)    [address]  [place] [address]   [phone]              Problem List              ICD-10-CM Priority Class Noted - Resolved    Benign essential hypertension (Chronic) I10 Low  2011 - Present    History of cardiac catheterization (Chronic) Z98.890   2010 - Present    Long term current use of anticoagulant therapy (Chronic) Z79.01 High  2011 - Present    Diastolic congestive heart failure (Chronic) I50.30 Medium  2011 - Present    History of echocardiogram (Chronic) Z92.89   2011 - Present    Morbid obesity (CMS-HCC) (Chronic) E66.01   10/28/2008 - Present    History of hypothyroidism (Chronic) Z86.39   2008 - Present    Atrial fibrillation (CMS-HCC) I48.91 High  10/25/2012 - Present    Atrial fibrillation (CMS-HCC) I48.91   2015 - Present    Chest pain R07.9   11/10/2015 - Present    Atypical chest pain R07.89 High  2015 - Present    Preop cardiovascular exam Z01.810   2017 - Present    Dyslipidemia E78.5   2017 - Present      Health Maintenance        Date Due Completion Dates    IMM  DTaP/Tdap/Td Vaccine (1 - Tdap) 7/8/1969 ---    PAP SMEAR 7/8/1971 ---    COLONOSCOPY 7/8/2000 ---    IMM ZOSTER VACCINE 7/8/2010 ---    MAMMOGRAM 7/17/2015 7/17/2014, 12/20/2012, 12/19/2011, 8/15/2006, 8/26/2005, 8/16/2005, 8/11/2004    IMM PNEUMOCOCCAL 65+ (ADULT) LOW/MEDIUM RISK SERIES (2 of 2 - PPSV23) 11/10/2016 11/10/2015    BONE DENSITY 4/24/2017 4/24/2012            Results     POCT Protime      Component    INR    2.5                        Current Immunizations     13-VALENT PCV PREVNAR 11/10/2015  3:44 PM      Below and/or attached are the medications your provider expects you to take. Review all of your home medications and newly ordered medications with your provider and/or pharmacist. Follow medication instructions as directed by your provider and/or pharmacist. Please keep your medication list with you and share with your provider. Update the information when medications are discontinued, doses are changed, or new medications (including over-the-counter products) are added; and carry medication information at all times in the event of emergency situations     Allergies:  SULFA DRUGS - Rash               Medications  Valid as of: June 08, 2017 -  4:23 PM    Generic Name Brand Name Tablet Size Instructions for use    Cholecalciferol   Take  by mouth.        Citalopram Hydrobromide (Tab) CELEXA 20 MG Take 20 mg by mouth every day.        Cyanocobalamin   Take 5,000 mg by mouth. Every 5 days        Ergocalciferol (Cap) DRISDOL 84449 UNITS Take 50,000 Units by mouth every 14 days. Wednesdays.        Glucose Blood (Strip) UNISTRIP1 GENERIC          Lancets (Misc) PHARMACIST CHOICE LANCETS          Levothyroxine Sodium (Tab) SYNTHROID 25 MCG Take 25 mcg by mouth Every morning on an empty stomach. Combine with 200 mcg tabs for total 225 mcg daily.        Levothyroxine Sodium (Tab) SYNTHROID 200 MCG Take 200 mcg by mouth Every morning on an empty stomach. Combine with 25 mcg tabs for total 225 mcg daily         Loratadine (Tab) CLARITIN 10 MG Take 10 mg by mouth every day.        Losartan Potassium-HCTZ (Tab) HYZAAR 100-25 MG Take 1 Tab by mouth every day.          Meloxicam (Tab) MOBIC 7.5 MG Take 7.5 mg by mouth 2 Times a Day.        MetFORMIN HCl (Tab) GLUCOPHAGE 1000 MG Take 1,000 mg by mouth 2 times a day, with meals.        Metoprolol Tartrate (Tab) LOPRESSOR 50 MG Take 50 mg by mouth 3 times a day.        Nitroglycerin (SL Tab) NITROSTAT 0.4 MG Place 1 Tab under tongue as needed for Chest Pain (up to 3 doses (if SBP greater than 90 mmHg)).        Probiotic Product   Take  by mouth.        Simvastatin (Tab) ZOCOR 40 MG Take 40 mg by mouth every evening.          Temazepam (Cap) RESTORIL 15 MG         TiZANidine HCl (Tab) ZANAFLEX 4 MG Take 1 Tab by mouth 3 times a day.        Warfarin Sodium (Tab) COUMADIN 5 MG Take 0.5 to 1 tab by mouth daily or as directed by coumadin clinic        .                 Medicines prescribed today were sent to:     "[facility] [address] [place] [address] [place], [place] - [address] AT [facility] [employer] [address]    [address] [place] [address]    Phone: [phone] Fax: [phone]    Open 24 Hours?: No    [facility] - [place] [address]    Phone: [phone] Fax: [phone]    Open 24 Hours?: No    [facility] - [place] [address]    Phone: [phone] Fax: [phone]    Open 24 Hours?: No      Medication refill instructions:       If your prescription bottle indicates you have medication refills left, it is not necessary to call your provider’s office. Please contact your pharmacy and they will refill your medication.    If your prescription bottle indicates you do not have any refills left, you may request refills at any time through one of the following ways: The online Bfly system (except Urgent Care), by calling  your provider’s office, or by asking your pharmacy to contact your provider’s office with a refill request. Medication refills are processed only during regular business hours and may not be available until the next business day. Your provider may request additional information or to have a follow-up visit with you prior to refilling your medication.   *Please Note: Medication refills are assigned a new Rx number when refilled electronically. Your pharmacy may indicate that no refills were authorized even though a new prescription for the same medication is available at the pharmacy. Please request the medicine by name with the pharmacy before contacting your provider for a refill.        Warfarin Dosing Calendar   June 2017 Details    Sun Mon Tue Wed Thu Fri Sat         1               2               3                 4               5               6               7               8   2.5   2.5 mg   See details      9      5 mg         10      2.5 mg           11      2.5 mg         12      5 mg         13      2.5 mg         14      5 mg         15      2.5 mg         16      5 mg         17      2.5 mg           18      2.5 mg         19      5 mg         20      2.5 mg         21      5 mg         22      2.5 mg         23      5 mg         24      2.5 mg           25      2.5 mg         26      5 mg         27      2.5 mg         28      5 mg         29      2.5 mg         30      5 mg           Date Details   06/08 This INR check   INR: 2.5               How to take your warfarin dose     To take:  2.5 mg Take 0.5 of a 5 mg tablet.    To take:  5 mg Take 1 of the 5 mg tablets.           Warfarin Dosing Calendar   July 2017 Details    Sun Mon Tue Wed Thu Fri Sat           1      2.5 mg           2      2.5 mg         3      5 mg         4      2.5 mg         5      5 mg         6      2.5 mg         7      5 mg         8      2.5 mg           9      2.5 mg         10      5 mg         11      2.5 mg         12       5 mg         13      2.5 mg         14      5 mg         15      2.5 mg           16      2.5 mg         17      5 mg         18      2.5 mg         19      5 mg         20      2.5 mg         21               22                 23               24               25               26               27               28               29                 30               31                     Date Details   No additional details    Date of next INR:  7/20/2017         How to take your warfarin dose     To take:  2.5 mg Take 0.5 of a 5 mg tablet.    To take:  5 mg Take 1 of the 5 mg tablets.              Zaask Access Code: [email or web address]  Expires: 6/10/2017  4:28 PM    Zaask  A secure, online tool to manage your health information     [employer]’s Zaask® is a secure, online tool that connects you to your personalized health information from the privacy of your home -- day or night - making it very easy for you to manage your healthcare. Once the activation process is completed, you can even access your medical information using the Zaask jessica, which is available for free in the Apple Jessica store or Google Play store.     Zaask provides the following levels of access (as shown below):   My Chart Features   [facility] Doctor [facility]  Specialists [facility]  Urgent  Care Non-[facility]  Primary Care  Doctor   Email your healthcare team securely and privately 24/7 X X X    Manage appointments: schedule your next appointment; view details of past/upcoming appointments X      Request prescription refills. X      View recent personal medical records, including lab and immunizations X X X X   View health record, including health history, allergies, medications X X X X   Read reports about your outpatient visits, procedures, consult and ER notes X X X X   See your discharge summary, which is a recap of your hospital and/or ER visit that includes your diagnosis, lab results, and care plan. X X       How to register  for Larosco:  1. Go to  [email or web address].  2. Click on the Sign Up Now box, which takes you to the New Member Sign Up page. You will need to provide the following information:  a. Enter your [place] Access Code exactly as it appears at the top of this page. (You will not need to use this code after you’ve completed the sign-up process. If you do not sign up before the expiration date, you must request a new code.)   b. Enter your date of birth.   c. Enter your home email address.   d. Click Submit, and follow the next screen’s instructions.  3. Create a [place] ID. This will be your Larosco login ID and cannot be changed, so think of one that is secure and easy to remember.  4. Create a [place] password. You can change your password at any time.  5. Enter your Password Reset Question and Answer. This can be used at a later time if you forget your password.   6. Enter your e-mail address. This allows you to receive e-mail notifications when new information is available in Larosco.  7. Click Sign Up. You can now view your health information.    For assistance activating your Larosco account, call [phone]

## 2017-06-09 LAB — INR BLD: 2.5 (ref 0.9–1.2)

## 2017-07-20 ENCOUNTER — ANTICOAGULATION VISIT (OUTPATIENT)
Dept: VASCULAR LAB | Facility: MEDICAL CENTER | Age: 67
End: 2017-07-20
Attending: INTERNAL MEDICINE
Payer: MEDICARE

## 2017-07-20 VITALS — SYSTOLIC BLOOD PRESSURE: 109 MMHG | DIASTOLIC BLOOD PRESSURE: 72 MMHG | HEART RATE: 70 BPM

## 2017-07-20 DIAGNOSIS — I48.91 ATRIAL FIBRILLATION, UNSPECIFIED TYPE (HCC): ICD-10-CM

## 2017-07-20 LAB — INR PPP: 4.7 (ref 2–3.5)

## 2017-07-20 PROCEDURE — 85610 PROTHROMBIN TIME: CPT

## 2017-07-20 PROCEDURE — 99212 OFFICE O/P EST SF 10 MIN: CPT

## 2017-07-20 RX ORDER — WARFARIN SODIUM 5 MG/1
TABLET ORAL
Qty: 90 TAB | Refills: 1 | Status: SHIPPED | OUTPATIENT
Start: 2017-07-20 | End: 2018-05-03 | Stop reason: SDUPTHER

## 2017-07-20 NOTE — PROGRESS NOTES
Anticoagulation Summary as of 7/20/2017     INR goal 2.0-3.0   Selected INR 4.7! (7/20/2017)   Maintenance plan 5 mg (5 mg x 1) on Mon, Wed, Fri; 2.5 mg (5 mg x 0.5) all other days   Weekly total 25 mg   Plan last modified JANEL BrownD (10/21/2015)   Next INR check 7/27/2017   Target end date Indefinite    Indications   Atrial fibrillation (CMS-HCC) [I48.91]  Atrial fibrillation (CMS-HCC) [I48.91]         Anticoagulation Episode Summary     [facility]    Preferred lab     Send INR reminders to     Comments       Anticoagulation Care Providers     Provider Role Specialty Phone number    Florian Carnes M.D. Referring Cardiology [phone]    [facility] Responsible  [phone]        Anticoagulation Patient Findings   Positives Antibiotic Use    Negatives Missed Doses, Extra Doses, Medication Changes, Diet Changes, Dental/Other Procedures, Hospitalization, Bleeding Gums, Nose Bleeds, Blood in Urine, Blood in Stool, Any Bruising, Other Complaints        Pt is supra therapeutic today.  She finished a ten day course of Cipro earlier this week, but neglected to tell us.  Will HOLD for two doses, thenr esume current dosing regimen .Pt denies any unusual s/s of bleeding, bruising, clotting or any changes to diet or medications.  Follow up in 1 weeks.    Patrizia Arias, PHARMD

## 2017-07-20 NOTE — MR AVS SNAPSHOT
Rachelle Reina   2017 1:30 PM   Anticoagulation Visit   MRN: [id number]    Department:  Vascular Medicine   Dept Phone:  [phone]    Description:  Female : 1950   Provider:  [facility] EXAM 5           Allergies as of 2017     Allergen Noted Reactions    Sulfa Drugs 2015   Rash    2015        You were diagnosed with     Atrial fibrillation, unspecified type (CMS-[facility])   [7170565]         Vital Signs     Blood Pressure Pulse Smoking Status             109/72 mmHg 70 Never Smoker          Basic Information     Date Of Birth Sex Race Ethnicity Preferred Language    1950 Female White Non- English      Your appointments     2017  2:15 PM   Established Patient with [facility] EXAM 5   [facility]  (--)    [facility] [facility] [address]   [phone]              Problem List              ICD-10-CM Priority Class Noted - Resolved    Benign essential hypertension (Chronic) I10 Low  2011 - Present    History of cardiac catheterization (Chronic) Z98.890   2010 - Present    Long term current use of anticoagulant therapy (Chronic) Z79.01 High  2011 - Present    Diastolic congestive heart failure (Chronic) I50.30 Medium  2011 - Present    History of echocardiogram (Chronic) Z92.89   2011 - Present    Morbid obesity (CMS-HCC) (Chronic) E66.01   10/28/2008 - Present    History of hypothyroidism (Chronic) Z86.39   2008 - Present    Atrial fibrillation (CMS-[facility]) I48.91 High  10/25/2012 - Present    Atrial fibrillation (CMS-[facility]) I48.91   2015 - Present    Chest pain R07.9   11/10/2015 - Present    Atypical chest pain R07.89 High  2015 - Present    Preop cardiovascular exam Z01.810   2017 - Present    Dyslipidemia E78.5   2017 - Present      Health Maintenance        Date Due Completion Dates    IMM DTaP/Tdap/Td Vaccine (1 - Tdap) 1969 ---    PAP SMEAR 1971 ---    COLONOSCOPY 7/8/2000 ---    IMM ZOSTER VACCINE 7/8/2010 ---    MAMMOGRAM 7/17/2015 7/17/2014, 12/20/2012, 12/19/2011, 8/15/2006, 8/26/2005, 8/16/2005, 8/11/2004    IMM PNEUMOCOCCAL 65+ (ADULT) LOW/MEDIUM RISK SERIES (2 of 2 - PPSV23) 11/10/2016 11/10/2015    BONE DENSITY 4/24/2017 4/24/2012    IMM INFLUENZA (1) 9/1/2017 ---            Results     POCT Protime      Component    INR    4.7                        Current Immunizations     13-VALENT PCV PREVNAR 11/10/2015  3:44 PM      Below and/or attached are the medications your provider expects you to take. Review all of your home medications and newly ordered medications with your provider and/or pharmacist. Follow medication instructions as directed by your provider and/or pharmacist. Please keep your medication list with you and share with your provider. Update the information when medications are discontinued, doses are changed, or new medications (including over-the-counter products) are added; and carry medication information at all times in the event of emergency situations     Allergies:  SULFA DRUGS - Rash               Medications  Valid as of: July 20, 2017 -  1:37 PM    Generic Name Brand Name Tablet Size Instructions for use    Cholecalciferol   Take  by mouth.        Citalopram Hydrobromide (Tab) CELEXA 20 MG Take 20 mg by mouth every day.        Cyanocobalamin   Take 5,000 mg by mouth. Every 5 days        Ergocalciferol (Cap) DRISDOL 43957 UNITS Take 50,000 Units by mouth every 14 days. Wednesdays.        Glucose Blood (Strip) UNISTRIP1 GENERIC          Lancets (Misc) PHARMACIST CHOICE LANCETS          Levothyroxine Sodium (Tab) SYNTHROID 25 MCG Take 25 mcg by mouth Every morning on an empty stomach. Combine with 200 mcg tabs for total 225 mcg daily.        Levothyroxine Sodium (Tab) SYNTHROID 200 MCG Take 200 mcg by mouth Every morning on an empty stomach. Combine with 25 mcg tabs for total 225 mcg daily        Loratadine (Tab) CLARITIN 10 MG Take 10  mg by mouth every day.        Losartan Potassium-HCTZ (Tab) HYZAAR 100-25 MG Take 1 Tab by mouth every day.          Meloxicam (Tab) MOBIC 7.5 MG Take 7.5 mg by mouth 2 Times a Day.        MetFORMIN HCl (Tab) GLUCOPHAGE 1000 MG Take 1,000 mg by mouth 2 times a day, with meals.        Metoprolol Tartrate (Tab) LOPRESSOR 50 MG Take 50 mg by mouth 3 times a day.        Nitroglycerin (SL Tab) NITROSTAT 0.4 MG Place 1 Tab under tongue as needed for Chest Pain (up to 3 doses (if SBP greater than 90 mmHg)).        Probiotic Product   Take  by mouth.        Simvastatin (Tab) ZOCOR 40 MG Take 40 mg by mouth every evening.          Temazepam (Cap) RESTORIL 15 MG         TiZANidine HCl (Tab) ZANAFLEX 4 MG Take 1 Tab by mouth 3 times a day.        Warfarin Sodium (Tab) COUMADIN 5 MG Take 0.5 to 1 tab by mouth daily or as directed by coumadin clinic        .                 Medicines prescribed today were sent to:     [facility] [address] [place] [address] [place] - [address] AT [facility] [employer] [address]    [address] [place] [address]    Phone: [phone] Fax: [phone]    Open 24 Hours?: No    [facility] - [place] [address]    Phone: [phone] Fax: [phone]    Open 24 Hours?: No    [facility] - [place] [address]    Phone: [phone] Fax: [phone]    Open 24 Hours?: No      Medication refill instructions:       If your prescription bottle indicates you have medication refills left, it is not necessary to call your provider’s office. Please contact your pharmacy and they will refill your medication.    If your prescription bottle indicates you do not have any refills left, you may request refills at any time through one of the following ways: The online [employer] system (except Urgent Care), by calling your provider’s office, or by asking your  pharmacy to contact your provider’s office with a refill request. Medication refills are processed only during regular business hours and may not be available until the next business day. Your provider may request additional information or to have a follow-up visit with you prior to refilling your medication.   *Please Note: Medication refills are assigned a new Rx number when refilled electronically. Your pharmacy may indicate that no refills were authorized even though a new prescription for the same medication is available at the pharmacy. Please request the medicine by name with the pharmacy before contacting your provider for a refill.        Warfarin Dosing Calendar   July 2017 Details    Sun Mon Tue Wed Thu Fri Sat           1                 2               3               4               5               6               7               8                 9               10               11               12               13               14               15                 16               17               18               19               20   4.7   Hold   See details      21      Hold         22      2.5 mg           23      2.5 mg         24      5 mg         25      2.5 mg         26      5 mg         27      2.5 mg         28               29                 30               31                     Date Details   07/20 This INR check   INR: 4.7       Date of next INR:  7/27/2017         How to take your warfarin dose     To take:  2.5 mg Take 0.5 of a 5 mg tablet.    To take:  5 mg Take 1 of the 5 mg tablets.    Hold Do not take your warfarin dose. See the Details table to the right for additional instructions.                   TerraSky Access Code: [email or web address]  Expires: 8/19/2017  1:37 PM    TerraSky  A secure, online tool to manage your health information     [employer] TerraSky® is a secure, online tool that connects you to your personalized health information from the privacy of your home --  day or night - making it very easy for you to manage your healthcare. Once the activation process is completed, you can even access your medical information using the TRAKLOK jessica, which is available for free in the Apple Jessica store or Google Play store.     TRAKLOK provides the following levels of access (as shown below):   My Chart Features   [facility] Doctor [facility]  Specialists [facility]  Urgent  Care Non-[facility]  Primary Care  Doctor   Email your healthcare team securely and privately 24/7 X X X    Manage appointments: schedule your next appointment; view details of past/upcoming appointments X      Request prescription refills. X      View recent personal medical records, including lab and immunizations X X X X   View health record, including health history, allergies, medications X X X X   Read reports about your outpatient visits, procedures, consult and ER notes X X X X   See your discharge summary, which is a recap of your hospital and/or ER visit that includes your diagnosis, lab results, and care plan. X X       How to register for TRAKLOK:  1. Go to  [email or web address].  2. Click on the Sign Up Now box, which takes you to the New Member Sign Up page. You will need to provide the following information:  a. Enter your TRAKLOK Access Code exactly as it appears at the top of this page. (You will not need to use this code after you’ve completed the sign-up process. If you do not sign up before the expiration date, you must request a new code.)   b. Enter your date of birth.   c. Enter your home email address.   d. Click Submit, and follow the next screen’s instructions.  3. Create a TRAKLOK ID. This will be your TRAKLOK login ID and cannot be changed, so think of one that is secure and easy to remember.  4. Create a TRAKLOK password. You can change your password at any time.  5. Enter your Password Reset Question and Answer. This can be used at a later time if you forget your password.   6. Enter  your e-mail address. This allows you to receive e-mail notifications when new information is available in Lucidux.  7. Click Sign Up. You can now view your health information.    For assistance activating your Lucidux account, call [phone]

## 2017-07-26 LAB — INR BLD: 4.7 (ref 0.9–1.2)

## 2017-07-27 ENCOUNTER — ANTICOAGULATION VISIT (OUTPATIENT)
Dept: VASCULAR LAB | Facility: MEDICAL CENTER | Age: 67
End: 2017-07-27
Attending: INTERNAL MEDICINE
Payer: MEDICARE

## 2017-07-27 DIAGNOSIS — I48.91 ATRIAL FIBRILLATION, UNSPECIFIED TYPE (HCC): ICD-10-CM

## 2017-07-27 LAB — INR PPP: 2.3 (ref 2–3.5)

## 2017-07-27 PROCEDURE — 99211 OFF/OP EST MAY X REQ PHY/QHP: CPT

## 2017-07-27 PROCEDURE — 85610 PROTHROMBIN TIME: CPT

## 2017-07-27 NOTE — PROGRESS NOTES
Anticoagulation Summary as of 7/27/2017     INR goal 2.0-3.0   Selected INR 2.3 (7/27/2017)   Maintenance plan 5 mg (5 mg x 1) on Mon, Wed, Fri; 2.5 mg (5 mg x 0.5) all other days   Weekly total 25 mg   Plan last modified Brice Garcia, PHARMD (10/21/2015)   Next INR check 9/7/2017   Target end date Indefinite    Indications   Atrial fibrillation (CMS-HCC) [I48.91]  Atrial fibrillation (CMS-HCC) [I48.91]         Anticoagulation Episode Summary     [facility]    Preferred lab     Send INR reminders to     Comments       Anticoagulation Care Providers     Provider Role Specialty Phone number    Florian Carnes M.D. Referring Cardiology [phone]    [facility] Responsible  [phone]        Anticoagulation Patient Findings     INR is therapeutic, return to f/u in 6 weeks. No changes to dosing regimen. No s/sx of abnormal bleeding. No other issues or questions reported.    Arthur Matos, PHARMD

## 2017-07-27 NOTE — MR AVS SNAPSHOT
Rachelle Reina   2017 2:15 PM   Anticoagulation Visit   MRN: [id number]    Department:  Vascular Medicine   Dept Phone:  [phone]    Description:  Female : 1950   Provider:  [facility] EXAM 5           Allergies as of 2017     Allergen Noted Reactions    Sulfa Drugs 2015   Rash    2015        You were diagnosed with     Atrial fibrillation, unspecified type (CMS-[facility])   [[id number]]         Vital Signs     Smoking Status                   Never Smoker            Basic Information     Date Of Birth Sex Race Ethnicity Preferred Language    1950 Female White Non- English      Your appointments     2017  2:15 PM   Established Patient with IHV EXAM 5   [facility]  (--)    [address]  [place] [address] [phone]            Sep 07, 2017  2:15 PM   Established Patient with IHV EXAM 5   [facility]  (--)    [address]  [place] [address] [phone]              Problem List              ICD-10-CM Priority Class Noted - Resolved    Benign essential hypertension (Chronic) I10 Low  2011 - Present    History of cardiac catheterization (Chronic) Z98.890   2010 - Present    Long term current use of anticoagulant therapy (Chronic) Z79.01 High  2011 - Present    Diastolic congestive heart failure (Chronic) I50.30 Medium  2011 - Present    History of echocardiogram (Chronic) Z92.89   2011 - Present    Morbid obesity (CMS-HCC) (Chronic) E66.01   10/28/2008 - Present    History of hypothyroidism (Chronic) Z86.39   2008 - Present    Atrial fibrillation (CMS-HCC) I48.91 High  10/25/2012 - Present    Atrial fibrillation (CMS-HCC) I48.91   2015 - Present    Chest pain R07.9   11/10/2015 - Present    Atypical chest pain R07.89 High  2015 - Present    Preop cardiovascular exam Z01.810   2017 - Present    Dyslipidemia  E78.5   2/21/2017 - Present      Health Maintenance        Date Due Completion Dates    IMM DTaP/Tdap/Td Vaccine (1 - Tdap) 7/8/1969 ---    PAP SMEAR 7/8/1971 ---    COLONOSCOPY 7/8/2000 ---    IMM ZOSTER VACCINE 7/8/2010 ---    MAMMOGRAM 7/17/2015 7/17/2014, 12/20/2012, 12/19/2011, 8/15/2006, 8/26/2005, 8/16/2005, 8/11/2004    IMM PNEUMOCOCCAL 65+ (ADULT) LOW/MEDIUM RISK SERIES (2 of 2 - PPSV23) 11/10/2016 11/10/2015    BONE DENSITY 4/24/2017 4/24/2012    IMM INFLUENZA (1) 9/1/2017 ---            Results     POCT Protime      Component    INR    2.3                        Current Immunizations     13-VALENT PCV PREVNAR 11/10/2015  3:44 PM      Below and/or attached are the medications your provider expects you to take. Review all of your home medications and newly ordered medications with your provider and/or pharmacist. Follow medication instructions as directed by your provider and/or pharmacist. Please keep your medication list with you and share with your provider. Update the information when medications are discontinued, doses are changed, or new medications (including over-the-counter products) are added; and carry medication information at all times in the event of emergency situations     Allergies:  SULFA DRUGS - Rash               Medications  Valid as of: July 27, 2017 -  2:07 PM    Generic Name Brand Name Tablet Size Instructions for use    Cholecalciferol   Take  by mouth.        Citalopram Hydrobromide (Tab) CELEXA 20 MG Take 20 mg by mouth every day.        Cyanocobalamin   Take 5,000 mg by mouth. Every 5 days        Ergocalciferol (Cap) DRISDOL 76195 UNITS Take 50,000 Units by mouth every 14 days. Wednesdays.        Glucose Blood (Strip) UNISTRIP1 GENERIC          Lancets (Misc) PHARMACIST CHOICE LANCETS          Levothyroxine Sodium (Tab) SYNTHROID 25 MCG Take 25 mcg by mouth Every morning on an empty stomach. Combine with 200 mcg tabs for total 225 mcg daily.        Levothyroxine Sodium (Tab)  SYNTHROID 200 MCG Take 200 mcg by mouth Every morning on an empty stomach. Combine with 25 mcg tabs for total 225 mcg daily        Loratadine (Tab) CLARITIN 10 MG Take 10 mg by mouth every day.        Losartan Potassium-HCTZ (Tab) HYZAAR 100-25 MG Take 1 Tab by mouth every day.          Meloxicam (Tab) MOBIC 7.5 MG Take 7.5 mg by mouth 2 Times a Day.        MetFORMIN HCl (Tab) GLUCOPHAGE 1000 MG Take 1,000 mg by mouth 2 times a day, with meals.        Metoprolol Tartrate (Tab) LOPRESSOR 50 MG Take 50 mg by mouth 3 times a day.        Nitroglycerin (SL Tab) NITROSTAT 0.4 MG Place 1 Tab under tongue as needed for Chest Pain (up to 3 doses (if SBP greater than 90 mmHg)).        Probiotic Product   Take  by mouth.        Simvastatin (Tab) ZOCOR 40 MG Take 40 mg by mouth every evening.          Temazepam (Cap) RESTORIL 15 MG         TiZANidine HCl (Tab) ZANAFLEX 4 MG Take 1 Tab by mouth 3 times a day.        Warfarin Sodium (Tab) COUMADIN 5 MG Take one-half to one (1/2-1) tablets daily as directed by [facility]        .                 Medicines prescribed today were sent to:     [email or web address] [address] [place] [address] [place] - [address] [facility] [address]    [address] [address]    Phone: [phone] Fax: [phone]    Open 24 Hours?: No    [facility] - [place] [address]    Phone: [phone] Fax: [phone]    Open 24 Hours?: No    [facility] - [place] [address]    Phone: [phone] Fax: [phone]    Open 24 Hours?: No      Medication refill instructions:       If your prescription bottle indicates you have medication refills left, it is not necessary to call your provider’s office. Please contact your pharmacy and they will refill your medication.    If your prescription bottle indicates you do not  have any refills left, you may request refills at any time through one of the following ways: The online Hotreader system (except Urgent Care), by calling your provider’s office, or by asking your pharmacy to contact your provider’s office with a refill request. Medication refills are processed only during regular business hours and may not be available until the next business day. Your provider may request additional information or to have a follow-up visit with you prior to refilling your medication.   *Please Note: Medication refills are assigned a new Rx number when refilled electronically. Your pharmacy may indicate that no refills were authorized even though a new prescription for the same medication is available at the pharmacy. Please request the medicine by name with the pharmacy before contacting your provider for a refill.        Warfarin Dosing Calendar   July 2017 Details    Sun Mon Tue Wed Thu Fri Sat           1                 2               3               4               5               6               7               8                 9               10               11               12               13               14               15                 16               17               18               19               20               21               22                 23               24               25               26               27   2.3   2.5 mg   See details      28      5 mg         29      2.5 mg           30      2.5 mg         31      5 mg               Date Details   07/27 This INR check   INR: 2.3               How to take your warfarin dose     To take:  2.5 mg Take 0.5 of a 5 mg tablet.    To take:  5 mg Take 1 of the 5 mg tablets.           Warfarin Dosing Calendar   August 2017 Details    Sun Mon Tue Wed Thu Fri Sat       1      2.5 mg         2      5 mg         3      2.5 mg         4      5 mg         5      2.5 mg           6      2.5 mg         7      5 mg         8         2.5 mg         9      5 mg         10      2.5 mg         11      5 mg         12      2.5 mg           13      2.5 mg         14      5 mg         15      2.5 mg         16      5 mg         17      2.5 mg         18      5 mg         19      2.5 mg           20      2.5 mg         21      5 mg         22      2.5 mg         23      5 mg         24      2.5 mg         25      5 mg         26      2.5 mg           27      2.5 mg         28      5 mg         29      2.5 mg         30      5 mg         31      2.5 mg            Date Details   No additional details            How to take your warfarin dose     To take:  2.5 mg Take 0.5 of a 5 mg tablet.    To take:  5 mg Take 1 of the 5 mg tablets.           Warfarin Dosing Calendar   September 2017 Details    Sun Mon Tue Wed Thu Fri Sat          1      5 mg         2      2.5 mg           3      2.5 mg         4      5 mg         5      2.5 mg         6      5 mg         7      2.5 mg         8               9                 10               11               12               13               14               15               16                 17               18               19               20               21               22               23                 24               25               26               27               28               29               30                Date Details   No additional details    Date of next INR:  9/7/2017         How to take your warfarin dose     To take:  2.5 mg Take 0.5 of a 5 mg tablet.    To take:  5 mg Take 1 of the 5 mg tablets.              SaySwap Access Code: [email or web address]  Expires: 8/19/2017  1:37 PM    SaySwap  A secure, online tool to manage your health information     [employer] SaySwap® is a secure, online tool that connects you to your personalized health information from the privacy of your home -- day or night - making it very easy for you to manage your healthcare. Once the activation process is  completed, you can even access your medical information using the Sellplex jessica, which is available for free in the Apple Jessica store or Google Play store.     Sellplex provides the following levels of access (as shown below):   My Chart Features   [facility] Primary [facility] Doctor [facility]  Specialists [facility]  Urgent  Care Non-[facility]  Primary Care  Doctor   Email your healthcare team securely and privately 24/7 X X X    Manage appointments: schedule your next appointment; view details of past/upcoming appointments X      Request prescription refills. X      View recent personal medical records, including lab and immunizations X X X X   View health record, including health history, allergies, medications X X X X   Read reports about your outpatient visits, procedures, consult and ER notes X X X X   See your discharge summary, which is a recap of your hospital and/or ER visit that includes your diagnosis, lab results, and care plan. X X       How to register for Sellplex:  1. Go to  [email or web address].  2. Click on the Sign Up Now box, which takes you to the New Member Sign Up page. You will need to provide the following information:  a. Enter your Sellplex Access Code exactly as it appears at the top of this page. (You will not need to use this code after you’ve completed the sign-up process. If you do not sign up before the expiration date, you must request a new code.)   b. Enter your date of birth.   c. Enter your home email address.   d. Click Submit, and follow the next screen’s instructions.  3. Create a Sellplex ID. This will be your Sellplex login ID and cannot be changed, so think of one that is secure and easy to remember.  4. Create a Sellplex password. You can change your password at any time.  5. Enter your Password Reset Question and Answer. This can be used at a later time if you forget your password.   6. Enter your e-mail address. This allows you to receive e-mail notifications when new information is  available in SocialVest.  7. Click Sign Up. You can now view your health information.    For assistance activating your SocialVest account, call [phone]

## 2017-07-28 LAB — INR BLD: 2.3 (ref 0.9–1.2)

## 2017-09-07 ENCOUNTER — ANTICOAGULATION VISIT (OUTPATIENT)
Dept: VASCULAR LAB | Facility: MEDICAL CENTER | Age: 67
End: 2017-09-07
Attending: INTERNAL MEDICINE
Payer: MEDICARE

## 2017-09-07 ENCOUNTER — RX ONLY (OUTPATIENT)
Age: 67
Setting detail: RX ONLY
End: 2017-09-07

## 2017-09-07 VITALS — DIASTOLIC BLOOD PRESSURE: 64 MMHG | HEART RATE: 88 BPM | SYSTOLIC BLOOD PRESSURE: 135 MMHG

## 2017-09-07 DIAGNOSIS — I48.91 ATRIAL FIBRILLATION, UNSPECIFIED TYPE (HCC): ICD-10-CM

## 2017-09-07 LAB — INR PPP: 3.9 (ref 2–3.5)

## 2017-09-07 PROCEDURE — 85610 PROTHROMBIN TIME: CPT

## 2017-09-07 PROCEDURE — 99212 OFFICE O/P EST SF 10 MIN: CPT

## 2017-09-07 NOTE — PROGRESS NOTES
Anticoagulation Summary  As of 9/7/2017    INR goal:   2.0-3.0   TTR:   69.9 % (2.2 y)   Today's INR:   3.9!   Maintenance plan:   5 mg (5 mg x 1) on Mon, Wed, Fri; 2.5 mg (5 mg x 0.5) all other days   Weekly total:   25 mg   Plan last modified:   Brice Garcia, PharmD (10/21/2015)   Next INR check:   9/28/2017   Target end date:   Indefinite    Indications    Atrial fibrillation (CMS-HCC) [I48.91]  Atrial fibrillation (CMS-HCC) [I48.91]             Anticoagulation Episode Summary     INR check location:   [facility]    Preferred lab:       Send INR reminders to:       Comments:         Anticoagulation Care Providers     Provider Role Specialty Phone number    Florian Carnes M.D. Referring Cardiology [phone]    [facility]tico[facility] Responsible  [phone]        Anticoagulation Patient Findings    Rachelle Reina seen in clinic today  INR  supra-therapeutic.    Denies signs/symptoms of bleeding and/or thrombosis.    Denies changes to diet or medications.   Follow up appointment in 3 week(s).    Hold today then continue weekly warfarin dose as noted     Brice Garcia, PharmD

## 2017-09-08 LAB — INR BLD: 3.9 (ref 0.9–1.2)

## 2017-09-13 ENCOUNTER — HOSPITAL ENCOUNTER (OUTPATIENT)
Facility: MEDICAL CENTER | Age: 67
End: 2017-09-13
Attending: UROLOGY
Payer: MEDICARE

## 2017-09-13 LAB
APPEARANCE UR: ABNORMAL
BACTERIA #/AREA URNS HPF: NEGATIVE /HPF
BILIRUB UR QL STRIP.AUTO: NEGATIVE
COLOR UR: YELLOW
EPI CELLS #/AREA URNS HPF: ABNORMAL /HPF
GLUCOSE UR STRIP.AUTO-MCNC: NEGATIVE MG/DL
HYALINE CASTS #/AREA URNS LPF: ABNORMAL /LPF
KETONES UR STRIP.AUTO-MCNC: NEGATIVE MG/DL
LEUKOCYTE ESTERASE UR QL STRIP.AUTO: ABNORMAL
MICRO URNS: ABNORMAL
NITRITE UR QL STRIP.AUTO: NEGATIVE
PH UR STRIP.AUTO: 5 [PH]
PROT UR QL STRIP: NEGATIVE MG/DL
RBC # URNS HPF: ABNORMAL /HPF
RBC UR QL AUTO: ABNORMAL
SP GR UR STRIP.AUTO: 1.02
UROBILINOGEN UR STRIP.AUTO-MCNC: 0.2 MG/DL
WBC #/AREA URNS HPF: ABNORMAL /HPF

## 2017-09-13 PROCEDURE — 81001 URINALYSIS AUTO W/SCOPE: CPT

## 2017-09-13 PROCEDURE — 87086 URINE CULTURE/COLONY COUNT: CPT

## 2017-09-15 LAB
BACTERIA UR CULT: NORMAL
SIGNIFICANT IND 70042: NORMAL
SITE SITE: NORMAL
SOURCE SOURCE: NORMAL

## 2017-09-28 ENCOUNTER — ANTICOAGULATION VISIT (OUTPATIENT)
Dept: VASCULAR LAB | Facility: MEDICAL CENTER | Age: 67
End: 2017-09-28
Attending: INTERNAL MEDICINE
Payer: MEDICARE

## 2017-09-28 VITALS — DIASTOLIC BLOOD PRESSURE: 60 MMHG | SYSTOLIC BLOOD PRESSURE: 112 MMHG | HEART RATE: 65 BPM

## 2017-09-28 DIAGNOSIS — I48.91 ATRIAL FIBRILLATION, UNSPECIFIED TYPE (HCC): ICD-10-CM

## 2017-09-28 LAB
INR BLD: 3.2 (ref 0.9–1.2)
INR PPP: 3.2 (ref 2–3.5)

## 2017-09-28 PROCEDURE — 85610 PROTHROMBIN TIME: CPT

## 2017-09-28 PROCEDURE — 99212 OFFICE O/P EST SF 10 MIN: CPT

## 2017-09-28 NOTE — PROGRESS NOTES
Anticoagulation Summary  As of 9/28/2017    INR goal:   2.0-3.0   TTR:   68.2 % (2.3 y)   Today's INR:   3.2!   Maintenance plan:   5 mg (5 mg x 1) on Mon, Fri; 2.5 mg (5 mg x 0.5) all other days   Weekly total:   22.5 mg   Plan last modified:   Brice Garcia PharmD (9/28/2017)   Next INR check:   10/19/2017   Target end date:   Indefinite    Indications    Atrial fibrillation (CMS-HCC) [I48.91]  Atrial fibrillation (CMS-HCC) [I48.91]             Anticoagulation Episode Summary     INR check location:   [facility]    Preferred lab:       Send INR reminders to:       Comments:         Anticoagulation Care Providers     Provider Role Specialty Phone number    Florian Carnes M.D. Referring Cardiology [phone]    [facility] Anticoagulation Services Responsible  [phone]        Anticoagulation Patient Findings      HPI:  Rachelle Reina seen in clinic today, on anticoagulation therapy with warfarin for afib  Changes to current medical/health status since last appt:   Denies signs/symptoms of bleeding and/or thrombosis since the last appt.    Denies any interval changes to diet  Denies any interval changes to medications since last appt.   Denies any complications or cost restrictions with current therapy.   BP recorded in vitals.    A/P   INR  supra-therapeutic.   Decrease weekly warfarin dose as noted      Follow up appointment in 3 week(s).     Brice Garcia, Cain

## 2017-10-19 ENCOUNTER — ANTICOAGULATION VISIT (OUTPATIENT)
Dept: MEDICAL GROUP | Facility: PHYSICIAN GROUP | Age: 67
End: 2017-10-19
Payer: MEDICARE

## 2017-10-19 VITALS — HEART RATE: 73 BPM | DIASTOLIC BLOOD PRESSURE: 71 MMHG | SYSTOLIC BLOOD PRESSURE: 141 MMHG

## 2017-10-19 DIAGNOSIS — I48.91 ATRIAL FIBRILLATION, UNSPECIFIED TYPE (HCC): ICD-10-CM

## 2017-10-19 LAB — INR PPP: 3.1 (ref 2–3.5)

## 2017-10-19 PROCEDURE — 99211 OFF/OP EST MAY X REQ PHY/QHP: CPT | Performed by: PHYSICIAN ASSISTANT

## 2017-10-19 PROCEDURE — 85610 PROTHROMBIN TIME: CPT | Performed by: PHYSICIAN ASSISTANT

## 2017-10-19 NOTE — PROGRESS NOTES
Anticoagulation Summary  As of 10/19/2017    INR goal:   2.0-3.0   TTR:   66.5 % (2.3 y)   Today's INR:   3.1!   Maintenance plan:   5 mg (5 mg x 1) on Mon; 2.5 mg (5 mg x 0.5) all other days   Weekly total:   20 mg   Plan last modified:   Brice Garcia PharmD (10/19/2017)   Next INR check:   11/9/2017   Target end date:   Indefinite    Indications    Atrial fibrillation (CMS-HCC) [I48.91]  Atrial fibrillation (CMS-HCC) [I48.91]             Anticoagulation Episode Summary     INR check location:   [facility]    Preferred lab:       Send INR reminders to:       Comments:         Anticoagulation Care Providers     Provider Role Specialty Phone number    Florian Carnes M.D. Referring Cardiology [phone]    [facility] Anticoagulation Services Responsible  [phone]        Anticoagulation Patient Findings      HPI:  Rachelle Reina seen in clinic today, on anticoagulation therapy with warfarin for afib  Changes to current medical/health status since last appt:   Denies signs/symptoms of bleeding and/or thrombosis since the last appt.    Denies any interval changes to diet  Denies any interval changes to medications since last appt.   Denies any complications or cost restrictions with current therapy.   BP recorded in vitals.    A/P   INR  supra-therapeutic.   Decrease weekly warfarin dose as noted    Follow up appointment in 3 week(s).     Brice Garcia, OrtegaD

## 2017-11-16 ENCOUNTER — ANTICOAGULATION VISIT (OUTPATIENT)
Dept: MEDICAL GROUP | Facility: PHYSICIAN GROUP | Age: 67
End: 2017-11-16
Payer: MEDICARE

## 2017-11-16 VITALS
DIASTOLIC BLOOD PRESSURE: 85 MMHG | WEIGHT: 240 LBS | BODY MASS INDEX: 39.94 KG/M2 | SYSTOLIC BLOOD PRESSURE: 126 MMHG | HEART RATE: 94 BPM

## 2017-11-16 DIAGNOSIS — I48.91 ATRIAL FIBRILLATION, UNSPECIFIED TYPE (HCC): ICD-10-CM

## 2017-11-16 LAB — INR PPP: 2.5 (ref 2–3.5)

## 2017-11-16 PROCEDURE — 99211 OFF/OP EST MAY X REQ PHY/QHP: CPT | Performed by: PHYSICIAN ASSISTANT

## 2017-11-16 PROCEDURE — 85610 PROTHROMBIN TIME: CPT | Performed by: PHYSICIAN ASSISTANT

## 2017-11-16 NOTE — PROGRESS NOTES
OP Anticoagulation Service Note    Date: 11/16/2017  Vitals:    11/16/17 1027   BP: 126/85   Pulse: 94   Weight: 108.9 kg (240 lb)       Anticoagulation Summary  As of 11/16/2017    INR goal:   2.0-3.0   TTR:   67.0 % (2.4 y)   Today's INR:   2.5   Maintenance plan:   5 mg (5 mg x 1) on Mon; 2.5 mg (5 mg x 0.5) all other days   Weekly total:   20 mg   Plan last modified:   Brice Garcia, PharmD (10/19/2017)   Next INR check:   12/28/2017   Target end date:   Indefinite    Indications    Atrial fibrillation (CMS-HCC) [I48.91]  Atrial fibrillation (CMS-HCC) [I48.91]             Anticoagulation Episode Summary     INR check location:   [facility]    Preferred lab:       Send INR reminders to:       Comments:         Anticoagulation Care Providers     Provider Role Specialty Phone number    Florian Carnes M.D. Referring Cardiology [phone]    [facility] Anticoagulation Services Responsible  [phone]        Anticoagulation Patient Findings      HPI:   Rachelle Middletonnandez seen in clinic today, they are here today for a INR check on anticoagulation therapy with warfarin because they have afib    The reason for today's visit is to prevent morbidity and mortality from a  stroke and to reduce the risk of bleeding while on a anticoagulant.     CHADS-VASC = 5    Stroke risk moderate, stroke risk at least 6.7% as seen below     Additional stroke risks: n/a    [id number]   SCORE PATIENTS (a=2380) ADJUSTED STROKE RATE (% year)   0 1 0%   1 422 1,3%   2 1230 2,2%   3 1730 3,2%   4 1718 4,0%   5 1159 6,7%   6 679 9,8%   7 294 9,6%   8 82 6,7%   9 14 15,2%       Previous INR was 3.1 on 10/19,     Confirmed warfarin dosing regimen  Changes with foods rich in vitamin K:No  Changes in ETOH:  No  Changes in smoking status: No  Changes in medication: No   Cost restriction: No  S/S of bleeding:  No   Bleed risk is: moderate,   (Hypertension)  Signs/symptoms  thrombosis since the last appt: No  3 vitals included with today's  appt:Yes  (BP, HR, weight, ht, RR)     A/P   INR  therapeutic.       Continue weekly warfarin dose as noted      Additional education provided today regarding reducing bleed risk and dietary constraints: Yes      Pt educated to contact our clinic with any changes in medications or s/s of bleeding or thrombosis    Follow up appointment in 6 week(s) per our collaborative practice policy as their last INR was 4 weeks ago    Justify length:    They have a TTR of 67.0 which is not at target (TTR target/goal is 100%) and requires close follow up to prevent a adverse event (the lower the TTR the higher risk of clots, strokes, or bleeding).     CHEST guidelines recommend frequent INR monitoring at regular intervals (a few days up to a max of 12 weeks) to ensure they are on proper dose of warfarin and not having any complications from therapy and tolerating being on warfarin.  INRs can dramatically change over a short time period due to diet, medications, and medical conditions.

## 2017-11-22 ENCOUNTER — TELEPHONE (OUTPATIENT)
Dept: VASCULAR LAB | Facility: MEDICAL CENTER | Age: 67
End: 2017-11-22

## 2017-11-22 ENCOUNTER — ANTICOAGULATION MONITORING (OUTPATIENT)
Dept: VASCULAR LAB | Facility: MEDICAL CENTER | Age: 67
End: 2017-11-22

## 2017-11-22 NOTE — PROGRESS NOTES
Anticoagulation Summary  As of 11/22/2017    INR goal:   2.0-3.0   TTR:   67.0 % (2.4 y)   Today's INR:      Maintenance plan:   5 mg (5 mg x 1) on Mon; 2.5 mg (5 mg x 0.5) all other days   Weekly total:   20 mg   Plan last modified:   Ortega BrownD (10/19/2017)   Next INR check:   11/30/2017   Target end date:   Indefinite    Indications    Atrial fibrillation (CMS-HCC) [I48.91]  Atrial fibrillation (CMS-HCC) [I48.91]             Anticoagulation Episode Summary     INR check location:   [facility]    Preferred lab:       Send INR reminders to:       Comments:         Anticoagulation Care Providers     Provider Role Specialty Phone number    Florian Carnes M.D. Referring Cardiology [phone]    [facility] Anticoagulation Services Responsible  [phone]        Anticoagulation Patient Findings      Spoke with patient.  Pt started on a course of Diflucan on Monday which will be completed on Friday.    Pt denies any s/s of bleeding, bruising, clotting or any changes to diet or medication.    Will have pt start a reduced warfarin dose of 2.5mg daily and then recheck INR in 1 week at [facility].      Cristal Lovell, OrtegaD

## 2017-11-30 ENCOUNTER — ANTICOAGULATION VISIT (OUTPATIENT)
Dept: MEDICAL GROUP | Facility: PHYSICIAN GROUP | Age: 67
End: 2017-11-30
Payer: MEDICARE

## 2017-11-30 VITALS
DIASTOLIC BLOOD PRESSURE: 62 MMHG | WEIGHT: 240 LBS | SYSTOLIC BLOOD PRESSURE: 105 MMHG | HEART RATE: 103 BPM | BODY MASS INDEX: 39.94 KG/M2

## 2017-11-30 DIAGNOSIS — I48.91 ATRIAL FIBRILLATION, UNSPECIFIED TYPE (HCC): ICD-10-CM

## 2017-11-30 LAB — INR PPP: 1.8 (ref 2–3.5)

## 2017-11-30 PROCEDURE — 85610 PROTHROMBIN TIME: CPT | Performed by: PHYSICIAN ASSISTANT

## 2017-11-30 PROCEDURE — 99211 OFF/OP EST MAY X REQ PHY/QHP: CPT | Performed by: PHYSICIAN ASSISTANT

## 2017-11-30 NOTE — PROGRESS NOTES
OP Anticoagulation Service Note    Date: 11/30/2017  Vitals:    11/30/17 1508   BP: 105/62   Pulse: (!) 103   Weight: 108.9 kg (240 lb)       Anticoagulation Summary  As of 11/30/2017    INR goal:   2.0-3.0   TTR:   67.1 % (2.4 y)   Today's INR:   1.8!   Maintenance plan:   5 mg (5 mg x 1) on Mon; 2.5 mg (5 mg x 0.5) all other days   Weekly total:   20 mg   Plan last modified:   Brice Garcia, PharmD (10/19/2017)   Next INR check:   12/28/2017   Target end date:   Indefinite    Indications    Atrial fibrillation (CMS-HCC) [I48.91]  Atrial fibrillation (CMS-HCC) [I48.91]             Anticoagulation Episode Summary     INR check location[facility]   [facility]    Preferred lab:       Send INR reminders to:       Comments:         Anticoagulation Care Providers     Provider Role Specialty Phone number    Florian Carnes M.D. Referring Cardiology [phone]    [facility]ticoagulation [facility] Responsible  [phone]        Anticoagulation Patient Findings  Patient Findings     Positives:   Other complaints    Negatives:   Signs/symptoms of thrombosis, Signs/symptoms of bleeding, Laboratory test error suspected, Change in health, Change in alcohol use, Change in activity, Upcoming invasive procedure, Emergency department visit, Upcoming dental procedure, Missed doses, Extra doses, Change in medications, Change in diet/appetite, Hospital admission, Bruising    Comments:   Keflex for 7 days, off now           HPI:   Rachelle Reina seen in clinic today, they are here today for a INR check on anticoagulation therapy with warfarin because they have afib    The reason for today's visit is to prevent morbidity and mortality from a  stroke and to reduce the risk of bleeding while on a anticoagulant.     CHADS-VASC = 3    Stroke risk low, as seen below        [id number]   SCORE PATIENTS (k=3142) ADJUSTED STROKE RATE (% year)   0 1 0%   1 422 1,3%   2 1230 2,2%   3 1730 3,2%   4 1718 4,0%   5 1159 6,7%   6 679 9,8%   7  294 9,6%   8 82 6,7%   9 14 15,2%       Confirmed warfarin dosing regimen  Interval Changes with foods rich in vitamin K:No  Interval Changes in ETOH:  No  Interval Changes in smoking status: No  Interval Changes in medication: Yes, was on Keflex for 7 days   Cost restriction: No    S/S of bleeding or bruising:  No  Signs/symptoms  thrombosis since the last appt: No  Bleed risk is: low,     3 vitals included with today's appt:Yes  (BP, HR, weight, ht, RR)     A/P   INR  sub-therapeutic.     Possible reason(s) INR is not in range today:   Missed dose due to ABX use     Intervention required today to prevent:    Risk of stroke    1 tab today then continue weekly warfarin dose as noted    Additional education provided today regarding reducing bleed risk and dietary constraints: No    Pt educated to contact our clinic with any changes in medications or s/s of bleeding or thrombosis    Follow up appointment in 4 week(s)    Follow up interval reason: per our collaborative practice policy as their last INR was 2.5  on 11/16    Justify length:   They are at a increased risk of stroke because INR is below goal.    They have a TTR of 67.1 which is not at target (TTR target/goal is 100%) and requires close follow up to prevent a adverse event (the lower the TTR the higher risk of clots, strokes, or bleeding).     CHEST guidelines recommend frequent INR monitoring at regular intervals (a few days up to a max of 12 weeks) to ensure they are on the proper dose of warfarin and not having any complications from therapy.  INRs can dramatically change over a short time period due to diet, medications, and medical conditions.

## 2017-12-28 ENCOUNTER — ANTICOAGULATION VISIT (OUTPATIENT)
Dept: MEDICAL GROUP | Facility: PHYSICIAN GROUP | Age: 67
End: 2017-12-28
Payer: MEDICARE

## 2017-12-28 VITALS
HEART RATE: 80 BPM | BODY MASS INDEX: 39.94 KG/M2 | WEIGHT: 240 LBS | DIASTOLIC BLOOD PRESSURE: 65 MMHG | SYSTOLIC BLOOD PRESSURE: 107 MMHG

## 2017-12-28 DIAGNOSIS — I48.91 ATRIAL FIBRILLATION, UNSPECIFIED TYPE (HCC): ICD-10-CM

## 2017-12-28 LAB — INR PPP: 2.2 (ref 2–3.5)

## 2017-12-28 PROCEDURE — 85610 PROTHROMBIN TIME: CPT | Performed by: PHYSICIAN ASSISTANT

## 2017-12-29 NOTE — PROGRESS NOTES
OP Anticoagulation Service Note    Date: 12/28/2017  Vitals:    12/28/17 1629   BP: 107/65   Pulse: 80   Weight: 108.9 kg (240 lb)       Anticoagulation Summary  As of 12/28/2017    INR goal:   2.0-3.0   TTR:   66.5 % (2.5 y)   Today's INR:   2.2   Maintenance plan:   5 mg (5 mg x 1) on Mon; 2.5 mg (5 mg x 0.5) all other days   Weekly total:   20 mg   Plan last modified:   Brcie Garcia, PharmD (10/19/2017)   Next INR check:   1/18/2018   Target end date:   Indefinite    Indications    Atrial fibrillation (CMS-HCC) [I48.91]  Atrial fibrillation (CMS-HCC) [I48.91]             Anticoagulation Episode Summary     [facility]R check location:   [facility]    Preferred lab:       Send INR reminders to:       Comments:         Anticoagulation Care Providers     Provider Role Specialty Phone number    Florian Carnes M.D. Referring Cardiology [phone]    [facility] Responsible  [phone]        Anticoagulation Patient Findings      HPI:   Rachelle Reina seen in clinic today, they are here today for a INR check on anticoagulation therapy with warfarin because they have a PMH/current  afib    The reason for today's visit is to prevent morbidity and mortality from a  stroke and to reduce the risk of bleeding while on a anticoagulant.     Reason for today's visit (per our collaborative practice policy) is because their last INR was 1.8 on 11/30.  Intervention at the last visit: increased the dose    Additional education provided today regarding reducing bleed risk and dietary constraints: No    Any upcoming  procedures: having a eye procedure, will not need to hold warfarin     Confirmed warfarin dosing regimen  Interval Changes with foods rich in vitamin K:No  Interval Changes in ETOH:  No  Interval Changes in smoking status: No  Interval Changes in medication: No   Cost restriction: No    S/S of bleeding or bruising:  No  Signs/symptoms  thrombosis since the last appt: No  Bleed risk is:  low,     3 vitals included with today's appt:Yes  (BP, HR, weight, ht, RR)     Assessment:   INR  therapeutic.   She will double check with the eye Dr and let me know if they need a INR near the procedure.     Intervention required today to prevent:    No change in dose needed today, they will need to continue with the same dose and diet to prevent adverse events while on a anticoagulant.        Plan:  Continue weekly warfarin dose as noted      Follow up:  Follow up appointment in 4 week(s) because :  They have a TTR of 66.5 which is not at target (TTR target/goal is 100%) and requires close follow up to prevent a adverse event (the lower the TTR the higher risk of clots, strokes, or bleeding).       Other info:  Pt educated to contact our clinic with any changes in medications or s/s of bleeding or thrombosis    CHEST guidelines recommend frequent INR monitoring at regular intervals (a few days up to a max of 12 weeks) to ensure they are on the proper dose of warfarin and not having any complications from therapy.  INRs can dramatically change over a short time period due to diet, medications, and medical conditions.

## 2018-01-05 ENCOUNTER — APPOINTMENT (OUTPATIENT)
Dept: ADMISSIONS | Facility: MEDICAL CENTER | Age: 68
End: 2018-01-05
Attending: OPHTHALMOLOGY
Payer: MEDICARE

## 2018-01-05 DIAGNOSIS — Z01.810 PRE-OPERATIVE CARDIOVASCULAR EXAMINATION: ICD-10-CM

## 2018-01-05 RX ORDER — GEMFIBROZIL 600 MG/1
600 TABLET, FILM COATED ORAL EVERY MORNING
COMMUNITY
End: 2020-08-12 | Stop reason: SDUPTHER

## 2018-01-05 RX ORDER — LEVOTHYROXINE SODIUM 175 UG/1
175 TABLET ORAL
Status: ON HOLD | COMMUNITY
End: 2022-01-01

## 2018-01-05 RX ORDER — SIMVASTATIN 20 MG
20 TABLET ORAL NIGHTLY
COMMUNITY
End: 2018-09-27

## 2018-01-08 ENCOUNTER — HOSPITAL ENCOUNTER (OUTPATIENT)
Facility: MEDICAL CENTER | Age: 68
End: 2018-01-08
Attending: OPHTHALMOLOGY | Admitting: OPHTHALMOLOGY
Payer: MEDICARE

## 2018-01-08 VITALS
BODY MASS INDEX: 39.41 KG/M2 | HEART RATE: 103 BPM | HEIGHT: 65 IN | WEIGHT: 236.55 LBS | TEMPERATURE: 98.2 F | RESPIRATION RATE: 16 BRPM | OXYGEN SATURATION: 96 % | DIASTOLIC BLOOD PRESSURE: 73 MMHG | SYSTOLIC BLOOD PRESSURE: 137 MMHG

## 2018-01-08 LAB — GLUCOSE BLD-MCNC: 120 MG/DL (ref 65–99)

## 2018-01-08 PROCEDURE — 160002 HCHG RECOVERY MINUTES (STAT): Performed by: OPHTHALMOLOGY

## 2018-01-08 PROCEDURE — 502000 HCHG MISC OR IMPLANTS RC 0278: Performed by: OPHTHALMOLOGY

## 2018-01-08 PROCEDURE — 160029 HCHG SURGERY MINUTES - 1ST 30 MINS LEVEL 4: Performed by: OPHTHALMOLOGY

## 2018-01-08 PROCEDURE — 700111 HCHG RX REV CODE 636 W/ 250 OVERRIDE (IP)

## 2018-01-08 PROCEDURE — 500855 HCHG NEEDLE, IRRIG CYSTOTOME 27G: Performed by: OPHTHALMOLOGY

## 2018-01-08 PROCEDURE — 700101 HCHG RX REV CODE 250

## 2018-01-08 PROCEDURE — 502240 HCHG MISC OR SUPPLY RC 0272: Performed by: OPHTHALMOLOGY

## 2018-01-08 PROCEDURE — 501836 HCHG SUTURE EYE: Performed by: OPHTHALMOLOGY

## 2018-01-08 PROCEDURE — 500792 HCHG KNIFE, SLIT 2.75 DUAL BEVEL ANGL: Performed by: OPHTHALMOLOGY

## 2018-01-08 PROCEDURE — 160048 HCHG OR STATISTICAL LEVEL 1-5: Performed by: OPHTHALMOLOGY

## 2018-01-08 PROCEDURE — 82962 GLUCOSE BLOOD TEST: CPT

## 2018-01-08 PROCEDURE — 160035 HCHG PACU - 1ST 60 MINS PHASE I: Performed by: OPHTHALMOLOGY

## 2018-01-08 PROCEDURE — 99152 MOD SED SAME PHYS/QHP 5/>YRS: CPT | Performed by: OPHTHALMOLOGY

## 2018-01-08 PROCEDURE — 501749 HCHG SHELL REV 276: Performed by: OPHTHALMOLOGY

## 2018-01-08 DEVICE — IMPLANTABLE DEVICE: Type: IMPLANTABLE DEVICE | Status: FUNCTIONAL

## 2018-01-08 RX ORDER — CYCLOPENTOLATE HYDROCHLORIDE 10 MG/ML
SOLUTION/ DROPS OPHTHALMIC
Status: COMPLETED
Start: 2018-01-08 | End: 2018-01-08

## 2018-01-08 RX ORDER — MOXIFLOXACIN 5 MG/ML
SOLUTION/ DROPS OPHTHALMIC
Status: DISCONTINUED | OUTPATIENT
Start: 2018-01-08 | End: 2018-01-08 | Stop reason: HOSPADM

## 2018-01-08 RX ORDER — PROPARACAINE HYDROCHLORIDE 5 MG/ML
SOLUTION/ DROPS OPHTHALMIC
Status: COMPLETED
Start: 2018-01-08 | End: 2018-01-08

## 2018-01-08 RX ORDER — PROPARACAINE HYDROCHLORIDE 5 MG/ML
SOLUTION/ DROPS OPHTHALMIC
Status: DISCONTINUED | OUTPATIENT
Start: 2018-01-08 | End: 2018-01-08 | Stop reason: HOSPADM

## 2018-01-08 RX ORDER — TETRACAINE HYDROCHLORIDE 5 MG/ML
SOLUTION OPHTHALMIC
Status: COMPLETED
Start: 2018-01-08 | End: 2018-01-08

## 2018-01-08 RX ORDER — SODIUM CHLORIDE, SODIUM LACTATE, POTASSIUM CHLORIDE, CALCIUM CHLORIDE 600; 310; 30; 20 MG/100ML; MG/100ML; MG/100ML; MG/100ML
INJECTION, SOLUTION INTRAVENOUS CONTINUOUS
Status: DISCONTINUED | OUTPATIENT
Start: 2018-01-08 | End: 2018-01-08 | Stop reason: HOSPADM

## 2018-01-08 RX ORDER — PHENYLEPHRINE HYDROCHLORIDE 25 MG/ML
SOLUTION/ DROPS OPHTHALMIC
Status: COMPLETED
Start: 2018-01-08 | End: 2018-01-08

## 2018-01-08 RX ORDER — LIDOCAINE HYDROCHLORIDE 20 MG/ML
INJECTION, SOLUTION EPIDURAL; INFILTRATION; INTRACAUDAL; PERINEURAL
Status: DISCONTINUED | OUTPATIENT
Start: 2018-01-08 | End: 2018-01-08 | Stop reason: HOSPADM

## 2018-01-08 RX ORDER — MOXIFLOXACIN 5 MG/ML
SOLUTION/ DROPS OPHTHALMIC
Status: DISCONTINUED
Start: 2018-01-08 | End: 2018-01-08 | Stop reason: HOSPADM

## 2018-01-08 RX ADMIN — PROPARACAINE HYDROCHLORIDE 1 DROP: 5 SOLUTION/ DROPS OPHTHALMIC at 09:05

## 2018-01-08 RX ADMIN — SODIUM CHLORIDE, SODIUM LACTATE, POTASSIUM CHLORIDE, CALCIUM CHLORIDE 1000 ML: 600; 310; 30; 20 INJECTION, SOLUTION INTRAVENOUS at 09:25

## 2018-01-08 RX ADMIN — TETRACAINE HYDROCHLORIDE 1 DROP: 5 SOLUTION OPHTHALMIC at 09:05

## 2018-01-08 RX ADMIN — CYCLOPENTOLATE HYDROCHLORIDE 1 DROP: 10 SOLUTION/ DROPS OPHTHALMIC at 09:05

## 2018-01-08 RX ADMIN — PHENYLEPHRINE HYDROCHLORIDE 1 DROP: 2.5 SOLUTION/ DROPS OPHTHALMIC at 09:05

## 2018-01-08 ASSESSMENT — PAIN SCALES - GENERAL
PAINLEVEL_OUTOF10: 0

## 2018-01-08 NOTE — DISCHARGE INSTRUCTIONS
ACTIVITY: Rest and take it easy for the first 24 hours.  A responsible adult is recommended to remain with you during that time.  It is normal to feel sleepy.  We encourage you to not do anything that requires balance, judgment or coordination.    MILD FLU-LIKE SYMPTOMS ARE NORMAL. YOU MAY EXPERIENCE GENERALIZED MUSCLE ACHES, THROAT IRRITATION, HEADACHE AND/OR SOME NAUSEA.    FOR 24 HOURS DO NOT:  Drive, operate machinery or run household appliances.  Drink beer or alcoholic beverages.   Make important decisions or sign legal documents.    SPECIAL INSTRUCTIONS: *See 's instructions. Do NOT rub eye. Take eye drops as directed. **    DIET: To avoid nausea, slowly advance diet as tolerated, avoiding spicy or greasy foods for the first day.  Add more substantial food to your diet according to your physician's instructions.  Babies can be fed formula or breast milk as soon as they are hungry.  INCREASE FLUIDS AND FIBER TO AVOID CONSTIPATION.    SURGICAL DRESSING/BATHING: *May shower tomorrow after follow up appointment**    FOLLOW-UP APPOINTMENT:  A follow-up appointment should be arranged with your doctor in **Tomorrow at 2PM*    You should CALL YOUR PHYSICIAN if you develop:  Fever greater than 101 degrees F.  Pain not relieved by medication, or persistent nausea or vomiting.  Excessive bleeding (blood soaking through dressing) or unexpected drainage from the wound.  Extreme redness or swelling around the incision site, drainage of pus or foul smelling drainage.  Inability to urinate or empty your bladder within 8 hours.  Problems with breathing or chest pain.    You should call 911 if you develop problems with breathing or chest pain.  If you are unable to contact your doctor or surgical center, you should go to the nearest emergency room or urgent care center.  Physician's telephone #: ** [phone]*    If any questions arise, call your doctor.  If your doctor is not available, please feel free to  call the Surgical Center at [phone].  The Center is open Monday through Friday from 7AM to 7PM.  You can also call the HEALTH HOTLINE open 24 hours/day, 7 days/week and speak to a nurse at [phone], or toll free at [phone].    A registered nurse may call you a few days after your surgery to see how you are doing after your procedure.    MEDICATIONS: Resume taking daily medication.  Take prescribed pain medication with food.  If no medication is prescribed, you may take non-aspirin pain medication if needed.  PAIN MEDICATION CAN BE VERY CONSTIPATING.  Take a stool softener or laxative such as senokot, pericolace, or milk of magnesia if needed.      If your physician has prescribed pain medication that includes Acetaminophen (Tylenol), do not take additional Acetaminophen (Tylenol) while taking the prescribed medication.    Depression / Suicide Risk    As you are discharged from this [facility] facility, it is important to learn how to keep safe from harming yourself.    Recognize the warning signs:  · Abrupt changes in personality, positive or negative- including increase in energy   · Giving away possessions  · Change in eating patterns- significant weight changes-  positive or negative  · Change in sleeping patterns- unable to sleep or sleeping all the time   · Unwillingness or inability to communicate  · Depression  · Unusual sadness, discouragement and loneliness  · Talk of wanting to die  · Neglect of personal appearance   · Rebelliousness- reckless behavior  · Withdrawal from people/activities they love  · Confusion- inability to concentrate     If you or a loved one observes any of these behaviors or has concerns about self-harm, here's what you can do:  · Talk about it- your feelings and reasons for harming yourself  · Remove any means that you might use to hurt yourself (examples: pills, rope, extension cords, firearm)  · Get professional help from the community (Mental Health,  Substance Abuse, psychological counseling)  · Do not be alone:Call your Safe Contact- someone whom you trust who will be there for you.  · Call your local CRISIS HOTLINE 124-0719 or 502-946-1676  · Call your local Children's Mobile Crisis Response Team [place] (351) 736-5782 or www.[employer]  · Call the toll free National Suicide Prevention Hotlines   · National Suicide Prevention Lifeline 508-869-KXGY (4342)  · National Hope Line Network 800-SUICIDE (895-7679)

## 2018-01-08 NOTE — OP REPORT
DATE OF SERVICE:  01/08/2018    PROCEDURE:  Phaco IOL, left eye.    PREOPERATIVE DIAGNOSIS:  Cataract, left eye.    POSTOPERATIVE DIAGNOSIS:  Cataract, left eye.    SURGEON:  Santosh Holden MD    ASSISTANT:  None.    COMPLICATIONS:  None.    ESTIMATED BLOOD LOSS:  None.    ANESTHESIA:  Topical with MAC.    ANESTHESIOLOGIST:  Bhavik Griffith MD    PROCEDURE IN DETAIL:  After appropriate consents were obtained, the patient   was brought to the operating room and then prepared and draped in the usual   sterile fashion for ophthalmology.  Lid speculum was placed in the left eye   after which a supersharp was used to make a stab incision at the 4 o'clock   position through which 2% preservative-free Xylocaine and Viscoat was injected   followed by a 2.75 mm stab incision at the 2 o'clock position through which   cystotomcindi and Utrata made a 360 degree capsulorrhexis followed by   hydrodissection and spinning of the nucleus with BSS and a blunt cannula.   Phaco removed the lens.  I and A of the cortex. An ZCB00 5.0 diopter lens was   placed into the posterior capsule with Provisc.  All the viscoelastics were   removed with I and A.  Lens was centered.  Lid speculum was removed under the   microscope after the wounds had been hydrated with BSS on a blunt cannula   noted to be free of leak and a lid speculum in the anterior chamber remained   soft and well formed.       ____________________________________     Santosh Holden MD    [facility] / [facility]    DD:  01/08/2018 10:27:11  DT:  01/08/2018 10:33:39    D#:  [id number]  Job#:  [id number]

## 2018-01-08 NOTE — OR NURSING
1019 Pt arrived from OR, rec'd report from anesthesia. Awake with left eye dilated. No bleeding or swelling noted. No pain or distress noted. Tolerating ginger-ana.   1040 Spouse to bedside. Pt expressed readiness for d/c. Discussed d/c instructions with pt and pt's spouse. Pt taken via wheelchair by volunteer accompanied by spouse.

## 2018-01-18 ENCOUNTER — ANTICOAGULATION VISIT (OUTPATIENT)
Dept: MEDICAL GROUP | Facility: PHYSICIAN GROUP | Age: 68
End: 2018-01-18
Payer: MEDICARE

## 2018-01-18 VITALS
HEART RATE: 102 BPM | WEIGHT: 236 LBS | SYSTOLIC BLOOD PRESSURE: 111 MMHG | DIASTOLIC BLOOD PRESSURE: 69 MMHG | BODY MASS INDEX: 39.27 KG/M2

## 2018-01-18 DIAGNOSIS — I48.91 ATRIAL FIBRILLATION, UNSPECIFIED TYPE (HCC): ICD-10-CM

## 2018-01-18 LAB — INR PPP: 3.3 (ref 2–3.5)

## 2018-01-18 PROCEDURE — 99211 OFF/OP EST MAY X REQ PHY/QHP: CPT | Performed by: PHYSICIAN ASSISTANT

## 2018-01-18 PROCEDURE — 85610 PROTHROMBIN TIME: CPT | Performed by: PHYSICIAN ASSISTANT

## 2018-01-18 NOTE — PROGRESS NOTES
OP Anticoagulation Service Note    Date: 1/18/2018  Vitals:    01/18/18 1126   BP: 111/69   Pulse: (!) 102   Weight: 107 kg (236 lb)       Anticoagulation Summary  As of 1/18/2018    INR goal:   2.0-3.0   TTR:   66.7 % (2.6 y)   Today's INR:   3.3!   Maintenance plan:   2.5 mg (5 mg x 0.5) every day   Weekly total:   17.5 mg   Plan last modified:   Brice Garcia, PharmD (1/18/2018)   Next INR check:   2/8/2018   Target end date:   Indefinite    Indications    Atrial fibrillation (CMS-HCC) [I48.91]  Atrial fibrillation (CMS-HCC) [I48.91]             Anticoagulation Episode Summary     [facility] check [facility]   [facility]    Preferred lab:       Send INR reminders to:       Comments:         Anticoagulation Care Providers     Provider Role Specialty Phone number    Florian Carnes M.D. Referring Cardiology [phone]    [facility] Responsible  [phone]        Anticoagulation Patient Findings      HPI:   Rachelle Royal Nuno seen in clinic today, they are here today for a INR check on anticoagulation therapy with warfarin because they have a PMH of afib    The reason for today's visit is to prevent morbidity and mortality from a  stroke and to reduce the risk of bleeding while on a anticoagulant.     Reason for today's visit (per our collaborative practice policy) is because their last INR was 2.2 on 12/28.  Intervention at the last visit:none needed     Additional education provided today regarding reducing bleed risk and dietary constraints: No    Any upcoming  Procedures: she might have a minor surgery on her eye. Date is not set yet.     Confirmed warfarin dosing regimen  Interval Changes with foods rich in vitamin K:No  Interval Changes in ETOH:  No  Interval Changes in smoking status: No  Interval Changes in medication: Yes, topical eye drops, with Prednisone, Ketoralac, and Ofloxacin,    Cost restriction: No    S/S of bleeding or bruising:  No  Signs/symptoms  thrombosis since the  last appt: No  Bleed risk is: moderate,     3 vitals included with today's appt:Yes  (BP, HR, weight, ht, RR)     Assessment:   INR  supra-therapeutic.     Possible reason(s) INR is not in range today:   Unknown etiology, the eye drops with prednisone might be making her INR increase some. Low risk of a drug interaction but she will continue with the drops until she has the eye surgery. She will get a INR prior to the surgery.     Intervention required today to prevent:    They are at a increased risk of bleeding because INR above goal.    They have a TTR of 66.7 which is not at target (TTR target/goal is 100%) and requires close follow up to prevent a adverse event (the lower the TTR the higher risk of clots, strokes, or bleeding).       Plan:  Decrease weekly warfarin dose as noted    Follow up:  Follow up appointment in 3 week(s)       Other info:  Pt educated to contact our clinic with any changes in medications or s/s of bleeding or thrombosis    CHEST guidelines recommend frequent INR monitoring at regular intervals (a few days up to a max of 12 weeks) to ensure they are on the proper dose of warfarin and not having any complications from therapy.  INRs can dramatically change over a short time period due to diet, medications, and medical conditions.

## 2018-01-22 ENCOUNTER — HOSPITAL ENCOUNTER (OUTPATIENT)
Facility: MEDICAL CENTER | Age: 68
End: 2018-01-22
Attending: OPHTHALMOLOGY | Admitting: OPHTHALMOLOGY
Payer: MEDICARE

## 2018-01-22 VITALS
WEIGHT: 236.77 LBS | HEART RATE: 104 BPM | RESPIRATION RATE: 16 BRPM | BODY MASS INDEX: 39.4 KG/M2 | OXYGEN SATURATION: 95 % | TEMPERATURE: 97.2 F

## 2018-01-22 LAB
EKG IMPRESSION: NORMAL
GLUCOSE BLD-MCNC: 122 MG/DL (ref 65–99)

## 2018-01-22 PROCEDURE — 501749 HCHG SHELL REV 276: Performed by: OPHTHALMOLOGY

## 2018-01-22 PROCEDURE — 700102 HCHG RX REV CODE 250 W/ 637 OVERRIDE(OP)

## 2018-01-22 PROCEDURE — 99152 MOD SED SAME PHYS/QHP 5/>YRS: CPT | Performed by: OPHTHALMOLOGY

## 2018-01-22 PROCEDURE — 500855 HCHG NEEDLE, IRRIG CYSTOTOME 27G: Performed by: OPHTHALMOLOGY

## 2018-01-22 PROCEDURE — 160002 HCHG RECOVERY MINUTES (STAT): Performed by: OPHTHALMOLOGY

## 2018-01-22 PROCEDURE — 82962 GLUCOSE BLOOD TEST: CPT

## 2018-01-22 PROCEDURE — A9270 NON-COVERED ITEM OR SERVICE: HCPCS

## 2018-01-22 PROCEDURE — 502240 HCHG MISC OR SUPPLY RC 0272: Performed by: OPHTHALMOLOGY

## 2018-01-22 PROCEDURE — 160029 HCHG SURGERY MINUTES - 1ST 30 MINS LEVEL 4: Performed by: OPHTHALMOLOGY

## 2018-01-22 PROCEDURE — 501836 HCHG SUTURE EYE: Performed by: OPHTHALMOLOGY

## 2018-01-22 PROCEDURE — 700101 HCHG RX REV CODE 250

## 2018-01-22 PROCEDURE — 700111 HCHG RX REV CODE 636 W/ 250 OVERRIDE (IP)

## 2018-01-22 PROCEDURE — 93010 ELECTROCARDIOGRAM REPORT: CPT | Performed by: INTERNAL MEDICINE

## 2018-01-22 PROCEDURE — 500792 HCHG KNIFE, SLIT 2.75 DUAL BEVEL ANGL: Performed by: OPHTHALMOLOGY

## 2018-01-22 PROCEDURE — 160035 HCHG PACU - 1ST 60 MINS PHASE I: Performed by: OPHTHALMOLOGY

## 2018-01-22 PROCEDURE — 160048 HCHG OR STATISTICAL LEVEL 1-5: Performed by: OPHTHALMOLOGY

## 2018-01-22 PROCEDURE — 93005 ELECTROCARDIOGRAM TRACING: CPT | Performed by: OPHTHALMOLOGY

## 2018-01-22 DEVICE — IMPLANTABLE DEVICE: Type: IMPLANTABLE DEVICE | Status: FUNCTIONAL

## 2018-01-22 RX ORDER — SODIUM CHLORIDE, SODIUM LACTATE, POTASSIUM CHLORIDE, CALCIUM CHLORIDE 600; 310; 30; 20 MG/100ML; MG/100ML; MG/100ML; MG/100ML
1000 INJECTION, SOLUTION INTRAVENOUS
Status: COMPLETED | OUTPATIENT
Start: 2018-01-22 | End: 2018-01-22

## 2018-01-22 RX ORDER — PROPARACAINE HYDROCHLORIDE 5 MG/ML
SOLUTION/ DROPS OPHTHALMIC
Status: COMPLETED
Start: 2018-01-22 | End: 2018-01-22

## 2018-01-22 RX ORDER — PHENYLEPHRINE HYDROCHLORIDE 25 MG/ML
SOLUTION/ DROPS OPHTHALMIC
Status: COMPLETED
Start: 2018-01-22 | End: 2018-01-22

## 2018-01-22 RX ORDER — CYCLOPENTOLATE HYDROCHLORIDE 10 MG/ML
SOLUTION/ DROPS OPHTHALMIC
Status: COMPLETED
Start: 2018-01-22 | End: 2018-01-22

## 2018-01-22 RX ORDER — PILOCARPINE HYDROCHLORIDE 20 MG/ML
SOLUTION/ DROPS OPHTHALMIC
Status: COMPLETED
Start: 2018-01-22 | End: 2018-01-22

## 2018-01-22 RX ADMIN — PHENYLEPHRINE HYDROCHLORIDE 1 DROP: 2.5 SOLUTION/ DROPS OPHTHALMIC at 08:30

## 2018-01-22 RX ADMIN — CYCLOPENTOLATE HYDROCHLORIDE 1 DROP: 10 SOLUTION/ DROPS OPHTHALMIC at 08:30

## 2018-01-22 RX ADMIN — SODIUM CHLORIDE, SODIUM LACTATE, POTASSIUM CHLORIDE, CALCIUM CHLORIDE 1000 ML: 600; 310; 30; 20 INJECTION, SOLUTION INTRAVENOUS at 09:12

## 2018-01-22 RX ADMIN — PROPARACAINE HYDROCHLORIDE 1 DROP: 5 SOLUTION/ DROPS OPHTHALMIC at 08:30

## 2018-01-22 ASSESSMENT — PAIN SCALES - GENERAL
PAINLEVEL_OUTOF10: 0
PAINLEVEL_OUTOF10: 0

## 2018-01-22 NOTE — DISCHARGE INSTRUCTIONS
HOME CARE INSTRUCTIONS FOR CATARACT SURGERY    ACTIVITY: Rest and take it easy for the first 24 hours. We strongly suggest that a responsible adult remain with you during that time. It is normal to feel sleepy. We encourage you to not do anything that requires balance, judgment or coordination. Be extra careful when walking (with a dilated eye, it is easier to trip and fall).     FOR 24 HOURS, DO NOT:       Drive, operate machinery or run household appliances.        Drink beer or alcoholic beverages.        Make important decisions or sign legal documents.     DIET: To avoid nausea, slowly advance diet as tolerated, avoiding spicy or greasy foods for the first meal.     MEDICATIONS: Resume taking daily medication. You may take Tylenol for mild discomfort, if needed.     SURGICAL DRESSING: Eye shield as instructed by your doctor. Dark glasses should be worn while in the sunlight.     Follow your Physician's instruction Sheet. Eye Kit Given. Eye shield in place at all times except when administering eye drops.  Administer eye drops 4 times a day on day of surgery.     A follow-up appointment is scheduled with your doctor tomorrow at _2:30 pm______pm.     You should call 911 if you develop problems with breathing or chest pain.  You should CALL YOUR PHYSICIAN if you develop: Sharp stabbing pain or sudden change in vision in your operative eye. If you are unable to contact your doctor or the surgical center, you should go to the nearest emergency room or urgent care center. Physician's telephone # ___[phone]____________________    If any questions arise, call your doctor. If your doctor is not available, please feel free to call Same Day Surgery at [phone]. You can also call the [employer] Hotline open 24 hours/day, 7 days/week and speak to a nurse at [phone] or [phone].     I acknowledge receipt and understanding of these Home Care Instructions.    ______________________________      _______________________________            Signature of Patient / Responsible Adult                                                       RN Signature    A registered nurse may call you a few days after your surgery to see how you are doing.   You may also receive a survey in the mail within the next two weeks and we ask that you take a few moments to complete and return the survey. Our goal is to provide you with very good care and we value your comments. Thank you for choosing [facility].

## 2018-01-22 NOTE — OR NURSING
1000 Pt arrived from OR with Dr. Jesus.  Pt VSS, PIV infusing without issue.  Pt denies any pain or nausea.    1005 Pt tolerating sips of juice well.   1008  brought to bedside.    1015 Discharge instructions with patient and family.  Answered all questions and concerns.   1020 Pt up to bathroom, tolerated well.  PIV removed.  Pt meets d/c criteria.    1023 Pt left via wheelchair to d/c home.

## 2018-01-27 NOTE — OP REPORT
Date of Service:                 1/22/18  Procedure:                          Phaco IOL, Right Eye  PreOperative Diagnosis:    Cataract Right Eye  PostOperative Diagnosis:  Cataract Right Eye  Surgeon:                             Santosh Holden MD  Assistant:                           None  Complications:                   None  Estimated Blood Loss:      None  Anesthesia:                        Topical with Mac  Anesthesiologist:                [facility]  Procedure in Detail:                After appropriate consents Patient was brought to the OR and prepared and draped in the usual sterile fashion for Ophthalmology.  A lid speculum was placed in the right eye after which a super sharp made a stab incision at the 10 oclock Limbus through which 0.2 cc of1% PF Lidocaine was injected into the anterior chamber followed by viscoat.  A 2.75 mm Keratome made a stab incision at the 8 oclock position through which a cystome made a 360 6mm capsulorrhexis followed by hydrodissection and spinning of the nucleus with bss on a blunt cannula. Phaco divided the lens into three parts and removed each followed by conversion to I/A which removed all remaining cortex.  The posterior capsule was filled with Provisc and a ZCBOO lens was placed followed by further I/A to remove all remaining cortex and Provisc.  All wounds were hydrated with BSS on a blunt cannula and noted to be free of leak with a Wexel.  The anterior chamber remained deep while the lid speculum was removed under the microscope.  Vigamox was placed in the eye and the Patient was followed to recovery where she was noted to be in good condition.

## 2018-02-08 ENCOUNTER — ANTICOAGULATION VISIT (OUTPATIENT)
Dept: MEDICAL GROUP | Facility: PHYSICIAN GROUP | Age: 68
End: 2018-02-08
Payer: MEDICARE

## 2018-02-08 VITALS
DIASTOLIC BLOOD PRESSURE: 70 MMHG | WEIGHT: 236 LBS | SYSTOLIC BLOOD PRESSURE: 112 MMHG | BODY MASS INDEX: 39.27 KG/M2 | HEART RATE: 104 BPM

## 2018-02-08 DIAGNOSIS — I48.91 ATRIAL FIBRILLATION, UNSPECIFIED TYPE (HCC): ICD-10-CM

## 2018-02-08 LAB — INR PPP: 1.6 (ref 2–3.5)

## 2018-02-08 PROCEDURE — 99999 PR NO CHARGE: CPT | Performed by: PHYSICIAN ASSISTANT

## 2018-02-08 PROCEDURE — 85610 PROTHROMBIN TIME: CPT | Performed by: PHYSICIAN ASSISTANT

## 2018-02-08 NOTE — PROGRESS NOTES
OP Anticoagulation Service Note    Date: 2/8/2018  Vitals:    02/08/18 1409   BP: 112/70   Pulse: (!) 104   Weight: 107 kg (236 lb)       Anticoagulation Summary  As of 2/8/2018    INR goal:   2.0-3.0   TTR:   66.5 % (2.6 y)   Today's INR:   1.6!   Maintenance plan:   5 mg (5 mg x 1) on Mon, Thu; 2.5 mg (5 mg x 0.5) all other days   Weekly total:   22.5 mg   Plan last modified:   Brice Garcia, PharmD (2/8/2018)   Next INR check:   2/15/2018   Target end date:   Indefinite    Indications    Atrial fibrillation (CMS-HCC) [I48.91]  Atrial fibrillation (CMS-HCC) [I48.91]             Anticoagulation Episode Summary     [facility]   [facility]    Preferred lab:       Send INR reminders to:       Comments:         Anticoagulation Care Providers     Provider Role Specialty Phone number    Florian Carnes M.D. Referring Cardiology [phone]    [facility] Responsible  [phone]        Anticoagulation Patient Findings      HPI:   Rachelle Reina seen in clinic today, they are here today for a INR check on anticoagulation therapy with warfarin because they have  Atrial fibrillation     The reason for today's visit is to prevent morbidity and mortality from a  stroke  and to reduce the risk of bleeding while on a anticoagulant.     Reason for today's visit (per our collaborative practice policy) is because their last INR was  3.3  on  1/18/2018.   Intervention at the last visit:  Decreased the dose     Additional education provided today regarding reducing bleed risk and dietary constraints:  Yes about diet and eating a consistent amount of greens. She also had questions about the amount of ETOH that she can drink as she has a trip planned     Any upcoming  procedures: none     Confirmed warfarin dosing regimen  Interval Changes with foods rich in vitamin K: No  Interval Changes in ETOH:   No  Interval Changes in smoking status:  No  Interval Changes in medication:  No   Cost  restriction:  No    S/S of bleeding or bruising:  No  Signs/symptoms  thrombosis since the last appt:  No  Bleed risk is:  moderate,     3 vitals included with today's appt :Yes  (BP, HR, weight, ht, RR)     Assessment:   INR  sub-therapeutic.     Possible reason(s) INR is not in range today:   not sure, they have not changed the amount of greens that they have been eating. No missed doses or medication changes    Intervention required today to prevent:    They are at a increased risk of clots because INR is below goal.    They have a TTR of 66.5  which is not at target (TTR target/goal is 100%) and requires close follow up to prevent a adverse event (the lower the TTR the higher risk of clots, strokes, or bleeding).       Plan:  Continue weekly warfarin dose as noted      Follow up:  Follow up appointment in 1 week(s)       Other info:  Pt educated to contact our clinic with any changes in medications or s/s of bleeding or thrombosis    CHEST guidelines recommend frequent INR monitoring at regular intervals (a few days up to a max of 12 weeks) to ensure they are on the proper dose of warfarin and not having any complications from therapy.  INRs can dramatically change over a short time period due to diet, medications, and medical conditions.

## 2018-02-15 ENCOUNTER — ANTICOAGULATION VISIT (OUTPATIENT)
Dept: MEDICAL GROUP | Facility: PHYSICIAN GROUP | Age: 68
End: 2018-02-15
Payer: MEDICARE

## 2018-02-15 DIAGNOSIS — I48.91 ATRIAL FIBRILLATION, UNSPECIFIED TYPE (HCC): ICD-10-CM

## 2018-02-15 LAB — INR PPP: 2.8 (ref 2–3.5)

## 2018-02-15 PROCEDURE — 85610 PROTHROMBIN TIME: CPT | Performed by: PHYSICIAN ASSISTANT

## 2018-02-15 PROCEDURE — 99999 PR NO CHARGE: CPT | Performed by: PHYSICIAN ASSISTANT

## 2018-02-15 NOTE — PROGRESS NOTES
OP Anticoagulation Service Note    Date: 2/15/2018  There were no vitals filed for this visit.    Anticoagulation Summary  As of 2/15/2018    INR goal:   2.0-3.0   TTR:   66.5 % (2.7 y)   Today's INR:   2.8   Maintenance plan:   5 mg (5 mg x 1) on Mon, Thu; 2.5 mg (5 mg x 0.5) all other days   Weekly total:   22.5 mg   Plan last modified:   Brice Garcia, PharmD (2/8/2018)   Next INR check:   3/1/2018   Target end date:   Indefinite    Indications    Atrial fibrillation (CMS-HCC) [I48.91]  Atrial fibrillation (CMS-HCC) [I48.91]             Anticoagulation Episode Summary     [facility]   [facility]    Preferred lab:       Send INR reminders to:       Comments:         Anticoagulation Care Providers     Provider Role Specialty Phone number    Florian Carnes M.D. Referring Cardiology [phone]    [facility] Responsible  [phone]        Anticoagulation Patient Findings      HPI:   Rachelle Reina seen in clinic today, they are here today for a INR check on anticoagulation therapy with warfarin because they have atrial fib    The reason for today's visit is to prevent morbidity and mortality from a stroke  and to reduce the risk of bleeding while on a anticoagulant.     Reason for today's visit (per our collaborative practice policy) is because their last INR was 1.6  on  2/8/2018.   Intervention at the last visit:  Increased the dose     Additional education provided today regarding reducing bleed risk and dietary constraints:  Yes about not drinking too much etoh on her trip    Any upcoming procedures:   none    Confirmed warfarin dosing regimen  Interval Changes with foods rich in vitamin K: No  Interval Changes in ETOH:   No  Interval Changes in smoking status:  No  Interval Changes in medication:  No   Cost restriction:  No    S/S of bleeding or bruising:  No  Signs/symptoms  thrombosis since the last appt:  No  Bleed risk is:  moderate,      3 vitals included  with today's appt :None today   (BP, HR, weight, ht, RR)     Assessment:   INR  therapeutic.     No change in dose needed today, they will need to continue with the same dose and diet to prevent adverse events while on a anticoagulant.     They have a TTR of 66.5  which is not at target (TTR target/goal is 100%) and requires close follow up to prevent a adverse event (the lower the TTR the higher risk of clots, strokes, or bleeding).     Plan:  Continue weekly warfarin dose as noted      Follow up:  Follow up appointment in 2 week(s)       Other info:  Pt educated to contact our clinic with any changes in medications or s/s of bleeding or thrombosis    CHEST guidelines recommend frequent INR monitoring at regular intervals (a few days up to a max of 12 weeks) to ensure they are on the proper dose of warfarin and not having any complications from therapy.  INRs can dramatically change over a short time period due to diet, medications, and medical conditions.

## 2018-03-01 ENCOUNTER — ANTICOAGULATION VISIT (OUTPATIENT)
Dept: MEDICAL GROUP | Facility: PHYSICIAN GROUP | Age: 68
End: 2018-03-01
Payer: MEDICARE

## 2018-03-01 VITALS
HEART RATE: 97 BPM | HEIGHT: 65 IN | DIASTOLIC BLOOD PRESSURE: 85 MMHG | SYSTOLIC BLOOD PRESSURE: 165 MMHG | WEIGHT: 236 LBS | BODY MASS INDEX: 39.32 KG/M2

## 2018-03-01 DIAGNOSIS — I48.91 ATRIAL FIBRILLATION, UNSPECIFIED TYPE (HCC): ICD-10-CM

## 2018-03-01 LAB — INR PPP: 3.4 (ref 2–3.5)

## 2018-03-01 PROCEDURE — 85610 PROTHROMBIN TIME: CPT | Performed by: INTERNAL MEDICINE

## 2018-03-01 PROCEDURE — 99211 OFF/OP EST MAY X REQ PHY/QHP: CPT | Performed by: INTERNAL MEDICINE

## 2018-03-01 NOTE — PROGRESS NOTES
Anticoagulation Summary  As of 3/1/2018    INR goal:   2.0-3.0   TTR:   66.1 % (2.7 y)   Today's INR:   3.4!   Maintenance plan:   5 mg (5 mg x 1) on Mon, Thu; 2.5 mg (5 mg x 0.5) all other days   Weekly total:   22.5 mg   Plan last modified:   Brice Garcia, PharmD (2/8/2018)   Next INR check:   3/15/2018   Target end date:   Indefinite    Indications    Atrial fibrillation (CMS-HCC) [I48.91]  Atrial fibrillation (CMS-HCC) [I48.91]             Anticoagulation Episode Summary     INR check location:   [facility]    Preferred lab:       Send INR reminders to:       Comments:         Anticoagulation Care Providers     Provider Role Specialty Phone number    Florian Carnes M.D. Referring Cardiology [phone]    [facility] Responsible  [phone]        Anticoagulation Patient Findings      HPI:  Rachellechente Reina seen in clinic today, on anticoagulation therapy with warfarin for AF.  Reason for today's visit (per our collaborative practice policy) is because their last INR was 2.8 on 2/15/18. Intervention at the last visit: None.   Changes to current medical/health status since last appt: none  No signs/symptoms of bleeding and/or thrombosis since the last appt.    No interval changes to diet or any interval changes to medications since last appt.   No complications or cost restrictions with current therapy.   BP recorded in vitals.  BP has been elevated, continue to monitor.   Pt forgot her BP medications today.   Confirmed dosing regimen.     A/P   INR  SUPRA-therapeutic.   Possible reason(s) INR is not in range today: Unknown.  Reduce today then Pt is to continue with current warfarin dosing regimen.     Follow up appointment in 2 weeks to reduce risk of adverse events related to this high risk medication, Warfarin.    Purpose of next visit:  They are at a increased risk of bleeding because INR above goal.      Other info:  Pt educated to contact our clinic with any changes in  medications or s/s of bleeding or thrombosis  CHEST guidelines recommend frequent INR monitoring at regular intervals (a few days up to a max of 12 weeks) to ensure they are on the proper dose of warfarin and not having any complications from therapy. INRs can dramatically change over a short time period due to diet, medications, and medical conditions.     Dennis Oakley, PharmD

## 2018-05-01 ENCOUNTER — TELEPHONE (OUTPATIENT)
Dept: CARDIOLOGY | Facility: MEDICAL CENTER | Age: 68
End: 2018-05-01

## 2018-05-01 NOTE — TELEPHONE ENCOUNTER
----- Message from Chacho Agee, Med Ass't sent at 5/1/2018  3:50 PM PDT -----  Regarding: Clearance from Dr. Oj Johnson is requesting (written clearance) for patient Rachelle Reina to discontinue Warfarin three days prior to her surgical extraction of tooth #3. Patient does have appointment scheduled.     Please fax to Romi LLOYD (Patient Coordinator) Fax [phone]

## 2018-05-03 ENCOUNTER — ANTICOAGULATION VISIT (OUTPATIENT)
Dept: MEDICAL GROUP | Facility: PHYSICIAN GROUP | Age: 68
End: 2018-05-03
Payer: MEDICARE

## 2018-05-03 DIAGNOSIS — Z79.01 CHRONIC ANTICOAGULATION: Primary | ICD-10-CM

## 2018-05-03 DIAGNOSIS — I48.91 ATRIAL FIBRILLATION, UNSPECIFIED TYPE (HCC): ICD-10-CM

## 2018-05-03 LAB — INR PPP: 3.9 (ref 2–3.5)

## 2018-05-03 PROCEDURE — 99211 OFF/OP EST MAY X REQ PHY/QHP: CPT | Performed by: INTERNAL MEDICINE

## 2018-05-03 PROCEDURE — 85610 PROTHROMBIN TIME: CPT | Performed by: INTERNAL MEDICINE

## 2018-05-03 PROCEDURE — 99999 PR NO CHARGE: CPT | Performed by: INTERNAL MEDICINE

## 2018-05-03 RX ORDER — WARFARIN SODIUM 5 MG/1
TABLET ORAL
Qty: 90 TAB | Refills: 1 | Status: SHIPPED | OUTPATIENT
Start: 2018-05-03 | End: 2018-09-27 | Stop reason: SDUPTHER

## 2018-05-03 NOTE — PROGRESS NOTES
OP Anticoagulation Service Note    Date: 5/3/2018  There were no vitals filed for this visit.    Anticoagulation Summary  As of 5/3/2018    INR goal:   2.0-3.0   TTR:   62.1 % (2.9 y)   Today's INR:   3.9!   Warfarin maintenance plan:   5 mg (5 mg x 1) on Mon, Thu; 2.5 mg (5 mg x 0.5) all other days   Weekly warfarin total:   22.5 mg   Plan last modified:   Brice Garcia, PharmD (2/8/2018)   Next INR check:   5/24/2018   Target end date:   Indefinite    Indications    Atrial fibrillation (HCC) [I48.91]  Atrial fibrillation (HCC) [I48.91]  Chronic anticoagulation [Z79.01]             Anticoagulation Episode Summary     INR check location:   [facility]    Preferred lab:       Send INR reminders to:       Comments:         Anticoagulation Care Providers     Provider Role Specialty Phone number    Florian Carnes M.D. Referring Cardiology [phone]    [facility] Responsible  [phone]        Anticoagulation Patient Findings      HPI:   Rachelle Paige Nuno seen in clinic today, they are here today for a INR check on anticoagulation therapy with warfarin because they have atrial fib    The reason for today's visit is to prevent morbidity and mortality from a stroke  and to reduce the risk of bleeding while on a anticoagulant.     Reason for today's visit (per our collaborative practice policy) is because their last INR was 3.4  on  3/1.   Intervention at the last visit:  Held a dose then resumed normal dose    Additional education provided today regarding reducing bleed risk and dietary constraints:  About diet    Any upcoming procedures:   none    Confirmed warfarin dosing regimen  Interval Changes with foods rich in vitamin K: No  Interval Changes in ETOH:   No  Interval Changes in smoking status:  No  Interval Changes in medication:  No   Cost restriction:  No    S/S of bleeding or bruising:  No  Signs/symptoms  thrombosis since the last appt:  No  Bleed risk is:  moderate,     3  vitals included with today's appt :No  (BP, HR, weight, ht, RR)     Assessment:   INR  supra-therapeutic.     Possible reason(s) for INR today:  Weekly dose might be too much, if elevated the next time we will decrease the weekly dose.     Intervention required today to prevent:    They are at a increased risk of bleeding because INR above goal.      They have a TTR of 62.1  which is not at target (TTR target/goal is 100%) and requires close follow up to prevent a adverse event (the lower the TTR the higher risk of clots, strokes, or bleeding).       Plan:  Hold today then continue weekly warfarin dose as noted      Follow up:  Follow up appointment in 3 week(s)       Other info:  Pt educated to contact our clinic with any changes in medications or s/s of bleeding or thrombosis    CHEST guidelines recommend frequent INR monitoring at regular intervals (a few days up to a max of 12 weeks) to ensure they are on the proper dose of warfarin and not having any complications from therapy.  INRs can dramatically change over a short time period due to diet, medications, and medical conditions.

## 2018-05-24 ENCOUNTER — ANTICOAGULATION VISIT (OUTPATIENT)
Dept: MEDICAL GROUP | Facility: PHYSICIAN GROUP | Age: 68
End: 2018-05-24
Payer: MEDICARE

## 2018-05-24 DIAGNOSIS — Z79.01 CHRONIC ANTICOAGULATION: ICD-10-CM

## 2018-05-24 LAB — INR PPP: 3.7 (ref 2–3.5)

## 2018-05-24 PROCEDURE — 85610 PROTHROMBIN TIME: CPT | Performed by: PHYSICIAN ASSISTANT

## 2018-05-24 PROCEDURE — 99211 OFF/OP EST MAY X REQ PHY/QHP: CPT | Performed by: PHYSICIAN ASSISTANT

## 2018-05-24 NOTE — PROGRESS NOTES
OP Anticoagulation Service Note    Date: 5/24/2018  There were no vitals filed for this visit.    Anticoagulation Summary  As of 5/24/2018    INR goal:   2.0-3.0   TTR:   60.9 % (2.9 y)   Today's INR:   3.7!   Warfarin maintenance plan:   5 mg (5 mg x 1) on Mon; 2.5 mg (5 mg x 0.5) all other days   Weekly warfarin total:   20 mg   Plan last modified:   Brice Garcia, PharmD (5/24/2018)   Next INR check:   6/14/2018   Target end date:   Indefinite    Indications    Atrial fibrillation (HCC) [I48.91]  Atrial fibrillation (HCC) [I48.91]  Chronic anticoagulation [Z79.01]             Anticoagulation Episode Summary     INR check location:   [facility]    Preferred lab:       Send INR reminders to:       Comments:         Anticoagulation Care Providers     Provider Role Specialty Phone number    Florian Carnes M.D. Referring Cardiology [phone]    [facility] Responsible  [phone]        Anticoagulation Patient Findings      HPI:   Rachelle Royal Nuno seen in clinic today, they are here today for a INR check on anticoagulation therapy with warfarin because they have atrial fib    The reason for today's visit is to prevent morbidity and mortality from a stroke  and to reduce the risk of bleeding while on a anticoagulant.     Reason for today's visit (per our collaborative practice policy) is because their last INR was 3.9  on  5/3/2018.   Intervention at the last visit:  Held a dose then resumed the normal dose     Additional education provided today regarding reducing bleed risk and dietary constraints:  About diet    Any upcoming procedures:   none    Confirmed warfarin dosing regimen  Interval Changes with foods rich in vitamin K: No  Interval Changes in ETOH:   No  Interval Changes in smoking status:  No  Interval Changes in medication:  No   Cost restriction:  No    S/S of bleeding or bruising:  No  Signs/symptoms  thrombosis since the last appt:  No  Bleed risk is:  moderate,      3 vitals included with today's appt :Yes  (BP, HR, weight, ht, RR)     Assessment:   INR  supra-therapeutic.     Possible reason(s) for INR today:   weekly dose is too high     Intervention required today to prevent:   They are at a increased risk of bleeding because INR above goal.      They have a TTR of 60.9  which is not at target (TTR target/goal is 100%) and requires close follow up to prevent a adverse event (the lower the TTR the higher risk of clots, strokes, or bleeding).       Plan:  Decrease weekly warfarin dose as noted        Follow up:  Follow up appointment in 3 week(s)       Other info:  Pt educated to contact our clinic with any changes in medications or s/s of bleeding or thrombosis    CHEST guidelines recommend frequent INR monitoring at regular intervals (a few days up to a max of 12 weeks) to ensure they are on the proper dose of warfarin and not having any complications from therapy.  INRs can dramatically change over a short time period due to diet, medications, and medical conditions.

## 2018-06-14 ENCOUNTER — ANTICOAGULATION VISIT (OUTPATIENT)
Dept: MEDICAL GROUP | Facility: PHYSICIAN GROUP | Age: 68
End: 2018-06-14
Payer: MEDICARE

## 2018-06-14 DIAGNOSIS — Z79.01 CHRONIC ANTICOAGULATION: ICD-10-CM

## 2018-06-14 LAB — INR PPP: 3.7 (ref 2–3.5)

## 2018-06-14 PROCEDURE — 99211 OFF/OP EST MAY X REQ PHY/QHP: CPT | Performed by: INTERNAL MEDICINE

## 2018-06-14 PROCEDURE — 85610 PROTHROMBIN TIME: CPT | Performed by: INTERNAL MEDICINE

## 2018-06-14 NOTE — PROGRESS NOTES
OP Anticoagulation Service Note    Date: 6/14/2018  There were no vitals filed for this visit.    Anticoagulation Summary  As of 6/14/2018    INR goal:   2.0-3.0   TTR:   59.7 % (3 y)   Today's INR:   3.7!   Warfarin maintenance plan:   2.5 mg (5 mg x 0.5) every day   Weekly warfarin total:   17.5 mg   Plan last modified:   Brice Garcia, PharmD (6/14/2018)   Next INR check:   7/5/2018   Target end date:   Indefinite    Indications    Atrial fibrillation (HCC) [I48.91]  Atrial fibrillation (HCC) [I48.91]  Chronic anticoagulation [Z79.01]             Anticoagulation Episode Summary     INR check location:   [facility]    Preferred lab:       Send INR reminders to:       Comments:         Anticoagulation Care Providers     Provider Role Specialty Phone number    Florian Carnes M.D. Referring Cardiology [phone]    [facility] Responsible  [phone]        Anticoagulation Patient Findings      HPI:   Rachelle Middletonnandez seen in clinic today, they are here today for a INR check on anticoagulation therapy with warfarin because they have atrial fib    The reason for today's visit is to prevent morbidity and mortality from a stroke  and to reduce the risk of bleeding while on a anticoagulant.     Reason for today's visit (per our collaborative practice policy) is because their last INR was 3.7  on  5/24.   Intervention at the last visit:  Decreased the dose     Additional education provided today regarding reducing bleed risk and dietary constraints:  About diet    Any upcoming procedures:   none    Confirmed warfarin dosing regimen  Interval Changes with foods rich in vitamin K: No  Interval Changes in ETOH:   No  Interval Changes in smoking status:  No  Interval Changes in medication:  No   Cost restriction:  No    S/S of bleeding or bruising:  No  Signs/symptoms  thrombosis since the last appt:  No  Bleed risk is:  moderate,     3 vitals included with today's appt :No  (BP, HR,  weight, ht, RR)     Assessment:   INR  supra-therapeutic.     Possible reason(s) for INR today:   weekly dose is too high     Intervention required today to prevent:    They are at a increased risk of bleeding because INR above goal.      They have a TTR of 59.7  which is not at target (TTR target/goal is 100%) and requires close follow up to prevent a adverse event (the lower the TTR the higher risk of clots, strokes, or bleeding).       Plan:  Hold today then decrease weekly warfarin dose as noted      Follow up:  Follow up appointment in 3 week(s)       Other info:  Pt educated to contact our clinic with any changes in medications or s/s of bleeding or thrombosis    CHEST guidelines recommend frequent INR monitoring at regular intervals (a few days up to a max of 12 weeks) to ensure they are on the proper dose of warfarin and not having any complications from therapy.  INRs can dramatically change over a short time period due to diet, medications, and medical conditions.

## 2018-06-29 ENCOUNTER — HOSPITAL ENCOUNTER (EMERGENCY)
Facility: MEDICAL CENTER | Age: 68
End: 2018-06-29
Attending: EMERGENCY MEDICINE
Payer: MEDICARE

## 2018-06-29 VITALS
TEMPERATURE: 98 F | OXYGEN SATURATION: 97 % | BODY MASS INDEX: 40.82 KG/M2 | DIASTOLIC BLOOD PRESSURE: 60 MMHG | WEIGHT: 245 LBS | HEIGHT: 65 IN | RESPIRATION RATE: 16 BRPM | SYSTOLIC BLOOD PRESSURE: 148 MMHG | HEART RATE: 88 BPM

## 2018-06-29 DIAGNOSIS — M72.2 PLANTAR FASCIITIS, BILATERAL: ICD-10-CM

## 2018-06-29 LAB — GLUCOSE BLD-MCNC: 174 MG/DL (ref 65–99)

## 2018-06-29 PROCEDURE — 82962 GLUCOSE BLOOD TEST: CPT

## 2018-06-29 PROCEDURE — 99283 EMERGENCY DEPT VISIT LOW MDM: CPT

## 2018-06-29 RX ORDER — HYDROCODONE BITARTRATE AND ACETAMINOPHEN 5; 325 MG/1; MG/1
1-2 TABLET ORAL EVERY 4 HOURS PRN
Qty: 20 TAB | Refills: 0 | Status: SHIPPED | OUTPATIENT
Start: 2018-06-29 | End: 2018-07-04

## 2018-06-29 NOTE — ED TRIAGE NOTES
".  Chief Complaint   Patient presents with   • Foot Pain     bilateral foot pain and since Tuesday     ./64   Pulse 90   Temp 36.4 °C (97.5 °F)   Resp 17   Ht 1.651 m (5' 5\")   Wt 111.1 kg (245 lb)   LMP 01/05/1987 (Approximate)   SpO2 96%   BMI 40.77 kg/m²     Patient to triage via wheelchair with above complaint, educated on triage process, placed in lobby, told to inform staff of any changes in condition.   "

## 2018-06-29 NOTE — ED PROVIDER NOTES
ED Provider Note    Scribed for Mgaan Rosenberg M.D. by Dennis Mcleod. 6/29/2018, 8:02 AM.    Primary care provider: Daniel Fernández M.D.  Means of arrival: Walk in  History obtained from: Patient  History limited by: None    CHIEF COMPLAINT  Chief Complaint   Patient presents with   • Foot Pain     bilateral foot pain and since Tuesday       HPI  Rachelle Reina is a 67 y.o. female who presents to the Emergency Department complaining of bilateral foot pain, right greater than left, onset 3 days ago. Patient states the pain is to the bottom of her feet. She denies any recent known traumatic injuries to her feet. She reports not wanting to stand due to exacerbation of the pain when standing. Patient mentions she did water exercises for 1 hour 4 days ago and is unsure if these exercises over-exerted or irritated her feet. Patient states she was suggested to take anti-inflammatory medication, however, she has been unable to due to taking warfarin. Patient states she wears shoes that provide good arch support. She otherwise does not report any other associated symptoms at this time. She does not report any recent fevers. Patient has a history of diabetes.       REVIEW OF SYSTEMS  Pertinent positives include bilateral foot pain (right greater than left).   Pertinent negatives include no recent fevers.    E     PAST MEDICAL HISTORY   has a past medical history of A-fib ([facility]); Arthritis; Benign essential hypertension (7/1/2011); Bowel habit changes; Breast cancer ([facility]); Bronchitis (2015); Cancer ([facility]) (1987, 2000); Cataract; Diabetes ([facility]); Diastolic congestive heart failure ([facility]) (7/1/2011); Disorder of thyroid; High cholesterol; History of cardiac catheterization (4/22/2010); History of echocardiogram (4/22/2011); History of hypothyroidism (8/29/2008); Hypercholesteremia (7/1/2011); Long term (current) use of anticoagulants (7/1/2011); Morbid obesity ([facility]) (10/28/2008); Sleep apnea; Snoring; and Urinary  incontinence.    SURGICAL HISTORY   has a past surgical history that includes abdominal hysterectomy total; mastectomy (partial); radiation therapy plan simple; cholecystectomy (2001); other orthopedic surgery (Right, 2013); cataract phaco with iol (Left, 1/8/2018); and cataract phaco with iol (Right, 1/22/2018).    SOCIAL HISTORY  Social History   Substance Use Topics   • Smoking status: Never Smoker   • Smokeless tobacco: Never Used   • Alcohol use Yes      Comment: 1 or 2 drinks a month      History   Drug Use No       FAMILY HISTORY  Family History   Problem Relation Age of Onset   • Heart Disease Mother      HEART VALVE REPLACEMENT.   • Heart Disease Father      CORONARY ARTERY BYPASS GRAFTS.   • Cancer Maternal Aunt        CURRENT MEDICATIONS  No current facility-administered medications on file prior to encounter.      Current Outpatient Prescriptions on File Prior to Encounter   Medication Sig Dispense Refill   • warfarin (COUMADIN) 5 MG Tab Take one-half to one (1/2-1) tablets daily as directed by [facility] 90 Tab 1   • levothyroxine (SYNTHROID) 175 MCG Tab Take 175 mcg by mouth Every morning on an empty stomach.     • simvastatin (ZOCOR) 20 MG Tab Take 20 mg by mouth every evening.     • gemfibrozil (LOPID) 600 MG Tab Take 600 mg by mouth 2 times a day.     • Cholecalciferol (VITAMIN D PO) Take 5,000 Units by mouth every day.     • Cyanocobalamin (VITAMIN B 12 PO) Take 5,000 mg by mouth. Every 5 days     • temazepam (RESTORIL) 15 MG Cap Take 15 mg by mouth at bedtime as needed.     • loratadine (CLARITIN) 10 MG Tab Take 10 mg by mouth every day.     • metformin (GLUCOPHAGE) 1000 MG tablet Take 1,000 mg by mouth 2 times a day, with meals.     • metoprolol (LOPRESSOR) 50 MG TABS Take 50 mg by mouth 3 times a day.     • citalopram (CELEXA) 20 MG TABS Take 20 mg by mouth every day.     • meloxicam (MOBIC) 7.5 MG TABS Take 7.5 mg by mouth 2 Times a Day.     • losartan-hydrochlorothiazide  "(HYZAAR) 100-25 MG per tablet Take 1 Tab by mouth every day.          ALLERGIES  Allergies   Allergen Reactions   • Sulfa Drugs Rash     8/2015         PHYSICAL EXAM  VITAL SIGNS: /64   Pulse 90   Temp 36.4 °C (97.5 °F)   Resp 17   Ht 1.651 m (5' 5\")   Wt 111.1 kg (245 lb)   LMP 01/05/1987 (Approximate)   SpO2 96%   BMI 40.77 kg/m²   Nursing note and vitals reviewed.  Constitutional: No distress. Chronically ill appearing.   HENT: Head is atraumatic. Oropharynx is moist.   Eyes: Conjunctivae are normal. Pupils are equal, round, and reactive to light.   Cardiovascular: Normal peripheral perfusion  Respiratory: No respiratory distress.   Musculoskeletal: Normal range of motion. Tenderness along the right plantar surface of bilateral feet, right greater than left, no erythema or evidence of infection.   Neurological: Alert. No focal deficits noted.    Skin: No rash.   Psych: Appropriate for clinical situation     DIAGNOSTIC STUDIES / PROCEDURES    LABS  Results for orders placed or performed during the hospital encounter of 06/29/18   ACCU-CHEK GLUCOSE   Result Value Ref Range    Glucose - Accu-Ck 174 (H) 65 - 99 mg/dL      All labs reviewed by me.      COURSE & MEDICAL DECISION MAKING  Nursing notes, VS, PMSFHx reviewed in chart.    Review of past medical records shows the patient was in the hospital on January 2018 for cataract surgery.      8:02 AM - Patient seen and examined at bedside. Informed patient I believe she has plantar fasciitis. Recommended for patient to go to [facility]. Will prescribe pain medication to help with the discomfort in the meantime. She understands and agrees to plan. Ordered accu-chek glucose to evaluate her symptoms.       No evidence of ischemia, infection, or DVT.  Pain is localized to the plantar surface of the foot only.    DISPOSITION:  Patient will be discharged home in stable condition.    FOLLOW UP:  [facility], Emergency Dept  [address] [facility] [id number] [phone]    If symptoms worsen    Chacho Carvajal D.P.M.  [address]  [place] [address]  [phone]    Schedule an appointment as soon as possible for a visit          Today  [facility]      OUTPATIENT MEDICATIONS:  New Prescriptions    HYDROCODONE-ACETAMINOPHEN (NORCO) 5-325 MG TAB PER TABLET    Take 1-2 Tabs by mouth every four hours as needed for up to 5 days.       The patient was discharged home with an information sheet on plantar fasciitis and told to return immediately for any signs or symptoms listed.  The patient agreed to the discharge precautions and follow-up plan which is documented in EPIC.    FINAL IMPRESSION  1. Plantar fasciitis, bilateral          Dennis CUEVAS (Scribe), am scribing for, and in the presence of, Magan Rosenberg M.D..    Electronically signed by: Dennis Mcleod (Scribe), 6/29/2018    Magan CUEVAS M.D. personally performed the services described in this documentation, as scribed by Dennis Mcleod in my presence, and it is both accurate and complete.    The note accurately reflects work and decisions made by me.  Magan Rosenberg  6/29/2018  11:38 AM

## 2018-06-29 NOTE — DISCHARGE INSTRUCTIONS
Plantar Fasciitis  Plantar fasciitis is a painful foot condition that affects the heel. It occurs when the band of tissue that connects the toes to the heel bone (plantar fascia) becomes irritated. This can happen after exercising too much or doing other repetitive activities (overuse injury). The pain from plantar fasciitis can range from mild irritation to severe pain that makes it difficult for you to walk or move. The pain is usually worse in the morning or after you have been sitting or lying down for a while.  CAUSES  This condition may be caused by:  · Standing for long periods of time.  · Wearing shoes that do not fit.  · Doing high-impact activities, including running, aerobics, and ballet.  · Being overweight.  · Having an abnormal way of walking (gait).  · Having tight calf muscles.  · Having high arches in your feet.  · Starting a new athletic activity.  SYMPTOMS  The main symptom of this condition is heel pain. Other symptoms include:  · Pain that gets worse after activity or exercise.  · Pain that is worse in the morning or after resting.  · Pain that goes away after you walk for a few minutes.  DIAGNOSIS  This condition may be diagnosed based on your signs and symptoms. Your health care provider will also do a physical exam to check for:  · A tender area on the bottom of your foot.  · A high arch in your foot.  · Pain when you move your foot.  · Difficulty moving your foot.  You may also need to have imaging studies to confirm the diagnosis. These can include:  · X-rays.  · Ultrasound.  · MRI.  TREATMENT   Treatment for plantar fasciitis depends on the severity of the condition. Your treatment may include:  · Rest, ice, and over-the-counter pain medicines to manage your pain.  · Exercises to stretch your calves and your plantar fascia.  · A splint that holds your foot in a stretched, upward position while you sleep (night splint).  · Physical therapy to relieve symptoms and prevent problems in the  future.  · Cortisone injections to relieve severe pain.  · Extracorporeal shock wave therapy (ESWT) to stimulate damaged plantar fascia with electrical impulses. It is often used as a last resort before surgery.  · Surgery, if other treatments have not worked after 12 months.  HOME CARE INSTRUCTIONS  · Take medicines only as directed by your health care provider.  · Avoid activities that cause pain.  · Roll the bottom of your foot over a bag of ice or a bottle of cold water. Do this for 20 minutes, 3-4 times a day.  · Perform simple stretches as directed by your health care provider.  · Try wearing athletic shoes with air-sole or gel-sole cushions or soft shoe inserts.  · Wear a night splint while sleeping, if directed by your health care provider.  · Keep all follow-up appointments with your health care provider.  PREVENTION   · Do not perform exercises or activities that cause heel pain.  · Consider finding low-impact activities if you continue to have problems.  · Lose weight if you need to.  The best way to prevent plantar fasciitis is to avoid the activities that aggravate your plantar fascia.  SEEK MEDICAL CARE IF:  · Your symptoms do not go away after treatment with home care measures.  · Your pain gets worse.  · Your pain affects your ability to move or do your daily activities.  This information is not intended to replace advice given to you by your health care provider. Make sure you discuss any questions you have with your health care provider.  Document Released: 09/12/2002 Document Revised: 04/10/2017 Document Reviewed: 10/28/2015  [employer] Patient Education © 2017 [employer]

## 2018-06-29 NOTE — ED NOTES
[place] fall assessment completed. Pt is high risk for fall.  Personal needs and safety assessed. Interventions completed. Pt educated about use of the call light and safe ambulation.     Pt placed in yellow non-slip socks, wrist band placed on pt, green sign on door. Bed locked in low position, call light in place. Personal possessions accessible.

## 2018-06-29 NOTE — ED NOTES
Discharge orders received from [facility]. New prescriptions received x 1, narcotic contract discussed and signed. Provided education and patient demonstrated understanding. Pt off unit by richa. All personal belonging with patient.

## 2018-07-05 ENCOUNTER — ANTICOAGULATION VISIT (OUTPATIENT)
Dept: MEDICAL GROUP | Facility: PHYSICIAN GROUP | Age: 68
End: 2018-07-05
Payer: MEDICARE

## 2018-07-05 DIAGNOSIS — Z79.01 CHRONIC ANTICOAGULATION: ICD-10-CM

## 2018-07-05 LAB — INR PPP: 2.4 (ref 2–3.5)

## 2018-07-05 PROCEDURE — 99999 PR NO CHARGE: CPT | Performed by: INTERNAL MEDICINE

## 2018-07-05 PROCEDURE — 85610 PROTHROMBIN TIME: CPT | Performed by: INTERNAL MEDICINE

## 2018-07-05 NOTE — PROGRESS NOTES
OP Anticoagulation Service Note    Date: 7/5/2018  There were no vitals filed for this visit.    Anticoagulation Summary  As of 7/5/2018    INR goal:   2.0-3.0   TTR:   59.4 % (3 y)   Today's INR:   2.4   Warfarin maintenance plan:   2.5 mg (5 mg x 0.5) every day   Weekly warfarin total:   17.5 mg   Plan last modified:   Brice Garcia, PharmD (6/14/2018)   Next INR check:   8/9/2018   Target end date:   Indefinite    Indications    Atrial fibrillation (HCC) [I48.91]  Atrial fibrillation (HCC) [I48.91]  Chronic anticoagulation [Z79.01]             Anticoagulation Episode Summary     INR check location:   [facility]    Preferred lab:       Send INR reminders to:       Comments:         Anticoagulation Care Providers     Provider Role Specialty Phone number    Florian Carnes M.D. Referring Cardiology [phone]    [facility] Responsible  [phone]        Anticoagulation Patient Findings      HPI:   Rachelle Middletonnandez seen in clinic today, they are here today for a INR check on anticoagulation therapy with warfarin because they have atrial fib    The reason for today's visit is to prevent morbidity and mortality from a stroke  and to reduce the risk of bleeding while on a anticoagulant.     Additional education provided today regarding reducing bleed risk and dietary constraints:  About how vitamin K and foods work with warfarin and the bleeding risk on a anticoagulant     Any upcoming procedures:   none    Confirmed warfarin dosing regimen  Interval Changes with foods rich in vitamin K: No  Interval Changes in ETOH:   No  Interval Changes in smoking status:  No  Interval Changes in medication:  No   Cost restriction:  No    S/S of bleeding or bruising:  No  Signs/symptoms  thrombosis since the last appt:  No  Bleed risk is:  moderate,     3 vitals included with today's appt :  (BP, HR, weight, ht, RR)     Assessment:   INR  therapeutic.        no change is needed today because INR  is in range.      They have a TTR of 59.4  which is not at target (TTR target/goal is 100%) and requires close follow up to prevent a adverse event (the lower the TTR the higher risk of clots, strokes, or bleeding).       Plan:  Continue weekly warfarin dose as noted      Follow up:  Follow up appointment in 5 week(s)       Other info:  Pt educated to contact our clinic with any changes in medications or s/s of bleeding or thrombosis    CHEST guidelines recommend frequent INR monitoring at regular intervals (a few days up to a max of 12 weeks) to ensure they are on the proper dose of warfarin and not having any complications from therapy.  INRs can dramatically change over a short time period due to diet, medications, and medical conditions.

## 2018-08-09 ENCOUNTER — ANTICOAGULATION VISIT (OUTPATIENT)
Dept: MEDICAL GROUP | Facility: PHYSICIAN GROUP | Age: 68
End: 2018-08-09
Payer: MEDICARE

## 2018-08-09 VITALS
DIASTOLIC BLOOD PRESSURE: 68 MMHG | BODY MASS INDEX: 40.77 KG/M2 | HEART RATE: 104 BPM | SYSTOLIC BLOOD PRESSURE: 116 MMHG | WEIGHT: 245 LBS

## 2018-08-09 DIAGNOSIS — Z79.01 CHRONIC ANTICOAGULATION: ICD-10-CM

## 2018-08-09 LAB — INR PPP: 2.5 (ref 2–3.5)

## 2018-08-09 PROCEDURE — 85610 PROTHROMBIN TIME: CPT | Performed by: FAMILY MEDICINE

## 2018-08-09 PROCEDURE — 99999 PR NO CHARGE: CPT | Performed by: FAMILY MEDICINE

## 2018-08-09 NOTE — PROGRESS NOTES
OP Anticoagulation Service Note    Date: 8/9/2018  Vitals:    08/09/18 0906   BP: 116/68   Pulse: (!) 104   Weight: 111.1 kg (245 lb)       Anticoagulation Summary  As of 8/9/2018    INR goal:   2.0-3.0   TTR:   60.7 % (3.1 y)   Today's INR:   2.5   Warfarin maintenance plan:   2.5 mg (5 mg x 0.5) every day   Weekly warfarin total:   17.5 mg   Plan last modified:   Brice Garcia, PharmD (6/14/2018)   Next INR check:   9/27/2018   Target end date:   Indefinite    Indications    Atrial fibrillation (HCC) [I48.91]  Atrial fibrillation (HCC) [I48.91]  Chronic anticoagulation [Z79.01]             Anticoagulation Episode Summary     INR check location:   [facility]    Preferred lab:       Send INR reminders to:       Comments:         Anticoagulation Care Providers     Provider Role Specialty Phone number    Florian Carnes M.D. Referring Cardiology [phone]    [facility] Responsible  [phone]        Anticoagulation Patient Findings      HPI:   Rachelle Middletonnandez seen in clinic today, they are here today for a INR check on anticoagulation therapy with warfarin because they have atrial fib    The reason for today's visit is to prevent morbidity and mortality from a stroke  and to reduce the risk of bleeding while on a anticoagulant.     Additional education provided today regarding reducing bleed risk and dietary constraints:  About how vitamin K and foods work with warfarin and the bleeding risk on a anticoagulant     Any upcoming procedures:   none    Confirmed warfarin dosing regimen  Interval Changes with foods rich in vitamin K: No  Interval Changes in ETOH:   No  Interval Changes in smoking status:  No  Interval Changes in medication:  Was on a medrol dp about two weeks ago  Cost restriction:  No    S/S of bleeding or bruising:  No  Signs/symptoms  thrombosis since the last appt:  No  Bleed risk is:  moderate,     3 vitals included with today's appt :  (BP, HR, weight, ht, RR)      Assessment:   INR  therapeutic.      no change is needed today because INR is in range.  Last week was therapeutic also    They have a TTR of 60.7  which is not at target (TTR target/goal is 100%) and requires close follow up to prevent a adverse event (the lower the TTR the higher risk of clots, strokes, or bleeding).     I sent in a new referral     Plan:  Continue weekly warfarin dose as noted      Follow up:  Follow up appointment in 7 week(s)       Other info:  Pt educated to contact our clinic with any changes in medications or s/s of bleeding or thrombosis    CHEST guidelines recommend frequent INR monitoring at regular intervals (a few days up to a max of 12 weeks) to ensure they are on the proper dose of warfarin and not having any complications from therapy.  INRs can dramatically change over a short time period due to diet, medications, and medical conditions.

## 2018-09-27 ENCOUNTER — OFFICE VISIT (OUTPATIENT)
Dept: CARDIOLOGY | Facility: MEDICAL CENTER | Age: 68
End: 2018-09-27
Payer: MEDICARE

## 2018-09-27 ENCOUNTER — TELEPHONE (OUTPATIENT)
Dept: CARDIOLOGY | Facility: MEDICAL CENTER | Age: 68
End: 2018-09-27

## 2018-09-27 ENCOUNTER — ANTICOAGULATION VISIT (OUTPATIENT)
Dept: MEDICAL GROUP | Facility: PHYSICIAN GROUP | Age: 68
End: 2018-09-27
Payer: MEDICARE

## 2018-09-27 VITALS
HEART RATE: 100 BPM | WEIGHT: 249.4 LBS | BODY MASS INDEX: 41.55 KG/M2 | DIASTOLIC BLOOD PRESSURE: 82 MMHG | SYSTOLIC BLOOD PRESSURE: 112 MMHG | OXYGEN SATURATION: 94 % | HEIGHT: 65 IN

## 2018-09-27 DIAGNOSIS — I10 BENIGN ESSENTIAL HYPERTENSION: Chronic | ICD-10-CM

## 2018-09-27 DIAGNOSIS — Z79.01 CHRONIC ANTICOAGULATION: ICD-10-CM

## 2018-09-27 DIAGNOSIS — E78.5 DYSLIPIDEMIA: ICD-10-CM

## 2018-09-27 DIAGNOSIS — I48.20 CHRONIC ATRIAL FIBRILLATION (HCC): ICD-10-CM

## 2018-09-27 DIAGNOSIS — I50.30 DIASTOLIC CONGESTIVE HEART FAILURE, UNSPECIFIED HF CHRONICITY (HCC): Chronic | ICD-10-CM

## 2018-09-27 DIAGNOSIS — I48.91 ATRIAL FIBRILLATION, UNSPECIFIED TYPE (HCC): ICD-10-CM

## 2018-09-27 LAB — INR PPP: 1.9 (ref 2–3.5)

## 2018-09-27 PROCEDURE — 99214 OFFICE O/P EST MOD 30 MIN: CPT | Performed by: INTERNAL MEDICINE

## 2018-09-27 PROCEDURE — 99211 OFF/OP EST MAY X REQ PHY/QHP: CPT | Performed by: INTERNAL MEDICINE

## 2018-09-27 PROCEDURE — 85610 PROTHROMBIN TIME: CPT | Performed by: INTERNAL MEDICINE

## 2018-09-27 RX ORDER — WARFARIN SODIUM 5 MG/1
TABLET ORAL
Qty: 90 TAB | Refills: 1 | Status: SHIPPED | OUTPATIENT
Start: 2018-09-27 | End: 2019-07-29 | Stop reason: SDUPTHER

## 2018-09-27 RX ORDER — GLIPIZIDE 5 MG/1
2.5 TABLET ORAL 2 TIMES DAILY
Refills: 1 | COMMUNITY
Start: 2018-08-13 | End: 2020-10-27

## 2018-09-27 RX ORDER — LEVOTHYROXINE SODIUM 200 MCG
200 TABLET ORAL DAILY
Refills: 2 | Status: ON HOLD | COMMUNITY
Start: 2018-09-10 | End: 2020-05-06

## 2018-09-27 RX ORDER — ROSUVASTATIN CALCIUM 20 MG/1
20 TABLET, COATED ORAL EVERY EVENING
Refills: 1 | COMMUNITY
Start: 2018-08-13 | End: 2020-08-12 | Stop reason: SDUPTHER

## 2018-09-27 ASSESSMENT — ENCOUNTER SYMPTOMS
PALPITATIONS: 0
MYALGIAS: 0
LOSS OF CONSCIOUSNESS: 0
SHORTNESS OF BREATH: 0
WEAKNESS: 0
PND: 0
BLURRED VISION: 0
CHILLS: 0
ABDOMINAL PAIN: 0
DIZZINESS: 0
FEVER: 0
INSOMNIA: 0
ORTHOPNEA: 0

## 2018-09-27 NOTE — LETTER
[facility]   [address] [place] [address]  [place] [address]  Phone: [phone]  Fax: [phone]              aRchelle Reina  1950    Encounter Date: 9/27/2018    Florian Carnes M.D.          PROGRESS NOTE:  Chief Complaint   Patient presents with   • Atrial Fibrillation     follow up        Subjective:   Rachelle Reina is a 68 y.o. female who presents today for annual follow up of atrial fibrillation on chronic anticoagulation therapy and congestive heart failure.    Since the patient's last visit on 02/21/17, she has been doing well clinically. She denies chest pain, shortness of breath, palpitations, nausea/vomiting or diaphoresis.    Past Medical History:   Diagnosis Date   • A-fib (HCC)    • Arthritis     knees   • Benign essential hypertension 7/1/2011   • Bowel habit changes     diarrhea   • Breast cancer (HCC)    • Bronchitis 2015    history of   • Cancer (HCC) 1987, 2000    breast   • Cataract    • Diabetes (HCC)     oral medication   • Diastolic congestive heart failure (HCC) 7/1/2011    patient denies   • Disorder of thyroid    • High cholesterol    • History of cardiac catheterization 4/22/2010   • History of echocardiogram 4/22/2011   • History of hypothyroidism 8/29/2008   • Hypercholesteremia 7/1/2011   • Long term (current) use of anticoagulants 7/1/2011   • Morbid obesity (HCC) 10/28/2008   • Sleep apnea     pt does not use cpap   • Snoring     sleep study done   • Urinary incontinence      Past Surgical History:   Procedure Laterality Date   • CATARACT PHACO WITH IOL Right 1/22/2018    Procedure: CATARACT PHACO WITH IOL;  Surgeon: Santosh Holden M.D.;  Location: SURGERY SAME DAY [facility];  Service: Ophthalmology   • CATARACT PHACO WITH IOL Left 1/8/2018    Procedure: CATARACT PHACO WITH IOL;  Surgeon: Santosh Holden M.D.;  Location: SURGERY SAME DAY [facility];  Service: Ophthalmology   • OTHER ORTHOPEDIC SURGERY Right 2013      hip arthroplasty   • CHOLECYSTECTOMY  2001   • ABDOMINAL HYSTERECTOMY TOTAL      1987   • MASTECTOMY  partial   • PB RADIATION THERAPY PLAN SIMPLE       Family History   Problem Relation Age of Onset   • Heart Disease Mother         HEART VALVE REPLACEMENT.   • Heart Disease Father         CORONARY ARTERY BYPASS GRAFTS.   • Cancer Maternal Aunt      Social History     Social History   • Marital status:      Spouse name: N/A   • Number of children: N/A   • Years of education: N/A     Occupational History   • Not on file.     Social History Main Topics   • Smoking status: Never Smoker   • Smokeless tobacco: Never Used   • Alcohol use Yes      Comment: 1 or 2 drinks a month   • Drug use: No   • Sexual activity: Not on file     Other Topics Concern   • Not on file     Social History Narrative   • No narrative on file     Allergies   Allergen Reactions   • Sulfa Drugs Rash     8/2015       Medications reviewed.    Outpatient Encounter Prescriptions as of 9/27/2018   Medication Sig Dispense Refill   • warfarin (COUMADIN) 5 MG Tab Take one-half to one (1/2-1) tablets daily as directed by [facility] 90 Tab 1   • Cholecalciferol (VITAMIN D PO) Take 5,000 Units by mouth every day.     • Cyanocobalamin (VITAMIN B 12 PO) Take 5,000 mg by mouth. Every 5 days     • citalopram (CELEXA) 20 MG TABS Take 20 mg by mouth every day.     • rosuvastatin (CRESTOR) 20 MG Tab TK 1 T PO ONCE D IN THE EVENING  1   • levothyroxine (SYNTHROID) 175 MCG Tab Take 175 mcg by mouth Every morning on an empty stomach.     • simvastatin (ZOCOR) 20 MG Tab Take 20 mg by mouth every evening.     • gemfibrozil (LOPID) 600 MG Tab Take 600 mg by mouth 2 times a day.     • temazepam (RESTORIL) 15 MG Cap Take 15 mg by mouth at bedtime as needed.     • loratadine (CLARITIN) 10 MG Tab Take 10 mg by mouth every day.     • metformin (GLUCOPHAGE) 1000 MG tablet Take 1,000 mg by mouth 2 times a day, with meals.     • metoprolol  "(LOPRESSOR) 50 MG TABS Take 50 mg by mouth 3 times a day.     • meloxicam (MOBIC) 7.5 MG TABS Take 7.5 mg by mouth 2 Times a Day.     • losartan-hydrochlorothiazide (HYZAAR) 100-25 MG per tablet Take 1 Tab by mouth every day.         No facility-administered encounter medications on file as of 9/27/2018.      Review of Systems   Constitutional: Negative for chills and fever.   HENT: Negative for congestion.    Eyes: Negative for blurred vision.   Respiratory: Negative for shortness of breath.    Cardiovascular: Negative for chest pain, palpitations, orthopnea, leg swelling and PND.   Gastrointestinal: Negative for abdominal pain.   Genitourinary: Negative for dysuria.   Musculoskeletal: Negative for joint pain and myalgias.   Skin: Negative for rash.   Neurological: Negative for dizziness and loss of consciousness.   Psychiatric/Behavioral: The patient does not have insomnia.         Objective:   /82 (BP Location: Other (Comment), Patient Position: Sitting, BP Cuff Size: Adult)   Pulse 100   Ht 1.65 m (5' 4.96\")   Wt 113.1 kg (249 lb 6.4 oz)   LMP 01/05/1987 (Approximate)   SpO2 94%   BMI 41.55 kg/m²      Physical Exam   Constitutional: She is oriented to person, place, and time. She appears well-developed and well-nourished.   HENT:   Head: Normocephalic and atraumatic.   Eyes: Pupils are equal, round, and reactive to light. Conjunctivae are normal.   Neck: Normal range of motion. Neck supple.   Cardiovascular: Normal rate.  An irregularly irregular rhythm present.   Pulmonary/Chest: Effort normal and breath sounds normal.   Abdominal: Soft. Bowel sounds are normal.   Musculoskeletal: Normal range of motion.   Neurological: She is alert and oriented to person, place, and time.   Skin: Skin is warm and dry.   Psychiatric: She has a normal mood and affect.     CARDIAC STUDIES/PROCEDURES:     CARDIAC CATHETERIZATION (10/30/08)  Cardiac catheterization showing no angiographic evidence of coronary artery " disease.     ECHOCARDIOGRAM CONCLUSIONS (11/10/15)  No significant valve disease or flow abnormalities.   Normal left ventricular systolic function.   Trace to small pericardial effusion without evidence of hemodynamic compromise.  No prior study is available for comparison.      ECHOCARDIOGRAM CONCLUSIONS (09/02/08)  Echocardiogram showing normal left ventricular systolic function and mild mitral regurgitation.     EKG performed on (01/02/22) was reviewed: EKG shows atrial fibrillation.  EKG performed on (02/21/17) EKG shows atrial fibrillation.  EKG performed on (11/10/15) EKG shows atrial fibrillation.    Laboratory results of (11/11/15) Cholesterol profile of 118/165/39/46 noted.     MPI CONCLUSIONS (11/11/15)  No reversible defects that would indicate ischemia.      Assessment:     1. Chronic atrial fibrillation (HCC)     2. Chronic anticoagulation     3. Diastolic congestive heart failure, unspecified HF chronicity (HCC)     4. Benign essential hypertension     5. Dyslipidemia         Medical Decision Making:  Today's Assessment / Status / Plan:     1. Presurgical evaluation: She is pending surgery and is doing well from cardiac standpoint.  2. Chronic atrial fibrillation on chronic anticoagulation therapy (warfarin) and unseccessful electrical cardioversion on amiodarone on 01/27/09: She is doing well and the ventricular rate is well controlled.   3. History of diastolic congestive heart failure: The overall volume status is adequate.  4. Hypertension: Blood pressure is well controlled.  5. Hyperlipidemia (managed by Dr. Fernández): She is doing well on statin therapy without myalgia symptoms.  6. Emotional stress: She is undergoing significant emotional stress.     We will follow up the patient in one year.     CC Daniel Santiago Recipients

## 2018-09-27 NOTE — TELEPHONE ENCOUNTER
Dr. Carnes cleared pt to have vein procedure with [facility] during office visit appt. Clearance form signed by NEL and faxed back to [facility] at [phone]. A copy was given to the patient as well.

## 2018-09-27 NOTE — PROGRESS NOTES
OP Anticoagulation Service Note    Date: 9/27/2018  There were no vitals filed for this visit.    Anticoagulation Summary  As of 9/27/2018    INR goal:   2.0-3.0   TTR:   61.6 % (3.3 y)   Today's INR:   1.9!   Warfarin maintenance plan:   2.5 mg (5 mg x 0.5) every day   Weekly warfarin total:   17.5 mg   Plan last modified:   Brice Garcia, PharmD (6/14/2018)   Next INR check:   11/8/2018   Target end date:   Indefinite    Indications    Atrial fibrillation (HCC) [I48.91]  Atrial fibrillation (HCC) (Resolved) [I48.91]  Chronic anticoagulation [Z79.01]             Anticoagulation Episode Summary     INR check location:   [facility]    Preferred lab:       Send INR reminders to:       Comments:         Anticoagulation Care Providers     Provider Role Specialty Phone number    Florian Carnes M.D. Referring Cardiology [phone]    [facility] Responsible  [phone]        Anticoagulation Patient Findings      HPI:   Rachelle Reina seen in clinic today, they are here today for a INR check on anticoagulation therapy with warfarin because they have atrial fib    The reason for today's visit is to prevent morbidity and mortality from a stroke  and to reduce the risk of bleeding while on a anticoagulant.     Additional education provided today regarding reducing bleed risk and dietary constraints:  About how vitamin K and foods work with warfarin and the bleeding risk on a anticoagulant     Any upcoming procedures:   none    Confirmed warfarin dosing regimen  Interval Changes with foods rich in vitamin K: No  Interval Changes in ETOH:   No  Interval Changes in smoking status:  No  Interval Changes in medication:  No   Cost restriction:  No    S/S of bleeding or bruising:  No  Signs/symptoms  thrombosis since the last appt:  No  Bleed risk is:  moderate,     3 vitals included with today's appt : declined she already got them today   (BP, HR, weight, ht, RR)     Assessment:   INR   sub-therapeutic.      a change is needed today because INR is out of  range.      They have a TTR of 60.7  which is not at target (TTR target/goal is 100%) and requires close follow up to prevent a adverse event (the lower the TTR the higher risk of clots, strokes, or bleeding).       Plan:  5mg today then continue weekly warfarin dose as noted      Follow up:  Follow up appointment in 6 week(s) per pt      Other info:  Pt educated to contact our clinic with any changes in medications or s/s of bleeding or thrombosis    CHEST guidelines recommend frequent INR monitoring at regular intervals (a few days up to a max of 12 weeks) to ensure they are on the proper dose of warfarin and not having any complications from therapy.  INRs can dramatically change over a short time period due to diet, medications, and medical conditions.

## 2018-09-27 NOTE — PROGRESS NOTES
Chief Complaint   Patient presents with   • Atrial Fibrillation     follow up        Subjective:   Rachelle Reina is a 68 y.o. female who presents today for annual follow up of atrial fibrillation on chronic anticoagulation therapy and congestive heart failure.    Since the patient's last visit on 02/21/17, she has been doing well clinically. She denies chest pain, shortness of breath, palpitations, nausea/vomiting or diaphoresis. She has been suffering with lower extremity neuropathy and she is pending vein stripping.    Past Medical History:   Diagnosis Date   • A-fib (HCC)    • Arthritis     knees   • Benign essential hypertension 7/1/2011   • Bowel habit changes     diarrhea   • Breast cancer (HCC)    • Bronchitis 2015    history of   • Cancer (HCC) 1987, 2000    breast   • Cataract    • Diabetes (HCC)     oral medication   • Diastolic congestive heart failure (HCC) 7/1/2011    patient denies   • Disorder of thyroid    • High cholesterol    • History of cardiac catheterization 4/22/2010   • History of echocardiogram 4/22/2011   • History of hypothyroidism 8/29/2008   • Hypercholesteremia 7/1/2011   • Long term (current) use of anticoagulants 7/1/2011   • Morbid obesity (HCC) 10/28/2008   • Sleep apnea     pt does not use cpap   • Snoring     sleep study done   • Urinary incontinence      Past Surgical History:   Procedure Laterality Date   • CATARACT PHACO WITH IOL Right 1/22/2018    Procedure: CATARACT PHACO WITH IOL;  Surgeon: Santosh Holden M.D.;  Location: SURGERY SAME DAY [facility];  Service: Ophthalmology   • CATARACT PHACO WITH IOL Left 1/8/2018    Procedure: CATARACT PHACO WITH IOL;  Surgeon: Santosh Holden M.D.;  Location: SURGERY SAME DAY [facility];  Service: Ophthalmology   • OTHER ORTHOPEDIC SURGERY Right 2013    hip arthroplasty   • CHOLECYSTECTOMY  2001   • ABDOMINAL HYSTERECTOMY TOTAL      1987   • MASTECTOMY  partial   • PB RADIATION THERAPY PLAN SIMPLE       Family History    Problem Relation Age of Onset   • Heart Disease Mother         HEART VALVE REPLACEMENT.   • Heart Disease Father         CORONARY ARTERY BYPASS GRAFTS.   • Cancer Maternal Aunt      Social History     Social History   • Marital status:      Spouse name: N/A   • Number of children: N/A   • Years of education: N/A     Occupational History   • Not on file.     Social History Main Topics   • Smoking status: Never Smoker   • Smokeless tobacco: Never Used   • Alcohol use Yes      Comment: 1 or 2 drinks a month   • Drug use: No   • Sexual activity: Not on file     Other Topics Concern   • Not on file     Social History Narrative   • No narrative on file     Allergies   Allergen Reactions   • Sulfa Drugs Rash     8/2015       Medications reviewed.    Outpatient Encounter Prescriptions as of 9/27/2018   Medication Sig Dispense Refill   • warfarin (COUMADIN) 5 MG Tab Take one-half to one (1/2-1) tablets daily as directed by [facility] 90 Tab 1   • Cholecalciferol (VITAMIN D PO) Take 5,000 Units by mouth every day.     • Cyanocobalamin (VITAMIN B 12 PO) Take 5,000 mg by mouth. Every 5 days     • citalopram (CELEXA) 20 MG TABS Take 20 mg by mouth every day.     • rosuvastatin (CRESTOR) 20 MG Tab TK 1 T PO ONCE D IN THE EVENING  1   • levothyroxine (SYNTHROID) 175 MCG Tab Take 175 mcg by mouth Every morning on an empty stomach.     • simvastatin (ZOCOR) 20 MG Tab Take 20 mg by mouth every evening.     • gemfibrozil (LOPID) 600 MG Tab Take 600 mg by mouth 2 times a day.     • temazepam (RESTORIL) 15 MG Cap Take 15 mg by mouth at bedtime as needed.     • loratadine (CLARITIN) 10 MG Tab Take 10 mg by mouth every day.     • metformin (GLUCOPHAGE) 1000 MG tablet Take 1,000 mg by mouth 2 times a day, with meals.     • metoprolol (LOPRESSOR) 50 MG TABS Take 50 mg by mouth 3 times a day.     • meloxicam (MOBIC) 7.5 MG TABS Take 7.5 mg by mouth 2 Times a Day.     • losartan-hydrochlorothiazide (HYZAAR)  "100-25 MG per tablet Take 1 Tab by mouth every day.         No facility-administered encounter medications on file as of 9/27/2018.      Review of Systems   Constitutional: Positive for malaise/fatigue. Negative for chills and fever.   HENT: Negative for congestion.    Eyes: Negative for blurred vision.   Respiratory: Negative for shortness of breath.    Cardiovascular: Negative for chest pain, palpitations, orthopnea, leg swelling and PND.   Gastrointestinal: Negative for abdominal pain.   Genitourinary: Positive for urgency. Negative for dysuria.   Musculoskeletal: Negative for joint pain and myalgias.   Skin: Negative for rash.   Neurological: Negative for dizziness, loss of consciousness and weakness.        Neuropathy of her bilateral feet.   Psychiatric/Behavioral: The patient does not have insomnia.         Objective:   /82 (BP Location: Other (Comment), Patient Position: Sitting, BP Cuff Size: Adult)   Pulse 100   Ht 1.65 m (5' 4.96\")   Wt 113.1 kg (249 lb 6.4 oz)   LMP 01/05/1987 (Approximate)   SpO2 94%   BMI 41.55 kg/m²     Physical Exam   Constitutional: She is oriented to person, place, and time. She appears well-developed and well-nourished.   HENT:   Head: Normocephalic and atraumatic.   Eyes: Pupils are equal, round, and reactive to light. Conjunctivae are normal.   Neck: Normal range of motion. Neck supple.   Cardiovascular: Normal rate.  An irregularly irregular rhythm present.   Pulmonary/Chest: Effort normal. She has wheezes.   Abdominal: Soft. Bowel sounds are normal.   Musculoskeletal: Normal range of motion.   Neurological: She is alert and oriented to person, place, and time.   Skin: Skin is warm and dry.   Psychiatric: She has a normal mood and affect.     CARDIAC STUDIES/PROCEDURES:     CARDIAC CATHETERIZATION (10/30/08)  Cardiac catheterization showing no angiographic evidence of coronary artery disease.     ECHOCARDIOGRAM CONCLUSIONS (11/10/15)  No significant valve disease or " flow abnormalities.   Normal left ventricular systolic function.   Trace to small pericardial effusion without evidence of hemodynamic compromise.  No prior study is available for comparison.      ECHOCARDIOGRAM CONCLUSIONS (09/02/08)  Echocardiogram showing normal left ventricular systolic function and mild mitral regurgitation.     EKG performed on (01/02/22) was reviewed: EKG shows atrial fibrillation.  EKG performed on (02/21/17) EKG shows atrial fibrillation.  EKG performed on (11/10/15) EKG shows atrial fibrillation.    Laboratory results of (11/11/15) Cholesterol profile of 118/165/39/46 noted.     MPI CONCLUSIONS (11/11/15)  No reversible defects that would indicate ischemia.      Assessment:     1. Chronic atrial fibrillation (HCC)     2. Chronic anticoagulation     3. Diastolic congestive heart failure, unspecified HF chronicity (HCC)     4. Benign essential hypertension     5. Dyslipidemia       Medical Decision Making:  Today's Assessment / Status / Plan:     1. Presurgical evaluation: She is pending vein stripping surgery this year and is doing well from cardiac standpoint.  2. Chronic atrial fibrillation on chronic anticoagulation therapy (warfarin) and unseccessful electrical cardioversion on amiodarone on 01/27/09: She is doing well and the ventricular rate is well controlled. We will continue with current medical therapy.   3. History of diastolic congestive heart failure: The overall volume status is adequate on mild HCTZ only.  4. Hypertension: Blood pressure is well controlled.  5. Hyperlipidemia (managed by Dr. Fernández): She is doing well on statin therapy without myalgia symptoms.     We will follow up the patient in one year.     CC Daniel Santiago

## 2018-11-08 ENCOUNTER — ANTICOAGULATION VISIT (OUTPATIENT)
Dept: MEDICAL GROUP | Facility: PHYSICIAN GROUP | Age: 68
End: 2018-11-08
Payer: MEDICARE

## 2018-11-08 VITALS
HEART RATE: 102 BPM | BODY MASS INDEX: 41.49 KG/M2 | SYSTOLIC BLOOD PRESSURE: 122 MMHG | WEIGHT: 249 LBS | DIASTOLIC BLOOD PRESSURE: 79 MMHG

## 2018-11-08 DIAGNOSIS — Z79.01 CHRONIC ANTICOAGULATION: ICD-10-CM

## 2018-11-08 LAB — INR PPP: 2.2 (ref 2–3.5)

## 2018-11-08 PROCEDURE — 99999 PR NO CHARGE: CPT | Performed by: INTERNAL MEDICINE

## 2018-11-08 PROCEDURE — 85610 PROTHROMBIN TIME: CPT | Performed by: INTERNAL MEDICINE

## 2018-11-08 NOTE — PROGRESS NOTES
OP Anticoagulation Service Note    Date: 11/8/2018  Vitals:    11/08/18 0909   BP: 122/79   Pulse: (!) 102   Weight: 112.9 kg (249 lb)       Anticoagulation Summary  As of 11/8/2018    INR goal:   2.0-3.0   TTR:   61.8 % (3.4 y)   Today's INR:   2.2   Warfarin maintenance plan:   2.5 mg (5 mg x 0.5) every day   Weekly warfarin total:   17.5 mg   Plan last modified:   Brice Garcia, PharmD (6/14/2018)   Next INR check:   1/3/2019   Target end date:   Indefinite    Indications    Atrial fibrillation (HCC) [I48.91]  Atrial fibrillation (HCC) (Resolved) [I48.91]  Chronic anticoagulation [Z79.01]             Anticoagulation Episode Summary     INR check location:   [facility]    Preferred lab:       Send INR reminders to:       Comments:         Anticoagulation Care Providers     Provider Role Specialty Phone number    Florian Carnes M.D. Referring Cardiology [phone]    [facility] Responsible  [phone]        Anticoagulation Patient Findings      HPI:   Rachelle Reina seen in clinic today, they are here today for a INR check on anticoagulation therapy with warfarin because they have afib    The reason for today's visit is to prevent morbidity and mortality from a stroke  and to reduce the risk of bleeding while on a anticoagulant.     Additional education provided today regarding reducing bleed risk and dietary constraints:  About how vitamin K and foods work with warfarin and the bleeding risk on a anticoagulant     Any upcoming procedures:   none    Confirmed warfarin dosing regimen  Interval Changes with foods rich in vitamin K: No  Interval Changes in ETOH:   No  Interval Changes in smoking status:  No  Interval Changes in medication:  No   Cost restriction:  No    S/S of bleeding or bruising:  No  Signs/symptoms  thrombosis since the last appt:  No  Bleed risk is:  moderate,     3 vitals included with today's appt :  (BP, HR, weight, ht, RR)     Assessment:   INR   therapeutic.        no change is needed today because INR is in range.      They have a TTR of 61.8  which is not at target (TTR target/goal is 100%) and requires close follow up to prevent a adverse event (the lower the TTR the higher risk of clots, strokes, or bleeding).       Plan:  Continue weekly warfarin dose as noted      Follow up:  Follow up appointment in 8 week(s)       Other info:  Pt educated to contact our clinic with any changes in medications or s/s of bleeding or thrombosis    CHEST guidelines recommend frequent INR monitoring at regular intervals (a few days up to a max of 12 weeks) to ensure they are on the proper dose of warfarin and not having any complications from therapy.  INRs can dramatically change over a short time period due to diet, medications, and medical conditions.

## 2018-12-19 ENCOUNTER — TELEPHONE (OUTPATIENT)
Dept: CARDIOLOGY | Facility: MEDICAL CENTER | Age: 68
End: 2018-12-19

## 2018-12-19 NOTE — TELEPHONE ENCOUNTER
CT score has mild disease in circumflex only. Her last stress test was in '15, recommend repeat stress imaging with cardiac PET for CAD obstructive risk. Continue statin for cholesterol management, no ASA with coumadin. FU as planned. SC

## 2018-12-20 NOTE — TELEPHONE ENCOUNTER
Discussed results and recommendations with pt. Her primary care provider, Chelsea Gamez with Dr. Fernández's office, ordered the test. She will discuss results and our recommendations with the ordering provider and go from there. She has a vein procedure coming up in 2 weeks (which she is already cleared for) and then may need a knee replacement sometime next year. Advised pt that she may need a stress test before clearance for this procedure and to keep this in mind. Pt appreciated recommendations.

## 2019-02-07 ENCOUNTER — TELEPHONE (OUTPATIENT)
Dept: CARDIOLOGY | Facility: MEDICAL CENTER | Age: 69
End: 2019-02-07

## 2019-02-08 NOTE — TELEPHONE ENCOUNTER
Abnormal Stress Imaging   Received: Today   Message Contents   NICOLE Wolff R.N.             Abnormal stress imaging in LAD region with prior CT scoring showing elevated score in circ. Concern for ischemia noted on stress imaging. Unless patient is clearly symptomatic, can wait until apt with NEL within 1 month please before surgery. SC        following up on a fax   Received: Today   Message Contents   Paulina Tay R.N.   Phone Number: [phone]             NEL/tito Sanchez from [facility] for attention JI on behalf of Chelsea TAYLOR.  Following up on EKG?  CLAUDIA was concerned with Stress test & wants pt to be called to make an appt.     Please call Laura at  and if she is gone, just leave a msg.  Laura leaves at 5:30pm      S/w Chelsea TAYLOR's office and informed them that we did receive the stress test results.  Explained to Laura that as long as pt is not symptomatic ok to just arrange follow up with Dr. Carnes to discuss plan of care. Pt is not symptomatic as far as she knew.     Called pt to discuss symptoms and arrange but unable to reach her. Left a detailed message and to call back to arrange follow up. NEL has availability within 1 week.

## 2019-02-11 NOTE — TELEPHONE ENCOUNTER
S/w pt about test results and arranging follow up.     Pt was aware of the results. She denies any symptoms: no chest discomfort, LENNON, nausea etc. She did mention that she just attended her younger brothers  service who just  of a sudden heart attack at the age of 66 so has significant family hx.     Pt arranged for follow up with Dr. Carnes this Friday, 2/15 to discuss plan of care. Pt advised to monitor her symptoms and to refrain from any strenuous activity at this time. ER precautions given.  Pt states understanding.

## 2019-02-19 ENCOUNTER — OFFICE VISIT (OUTPATIENT)
Dept: CARDIOLOGY | Facility: MEDICAL CENTER | Age: 69
End: 2019-02-19
Payer: MEDICARE

## 2019-02-19 ENCOUNTER — TELEPHONE (OUTPATIENT)
Dept: CARDIOLOGY | Facility: MEDICAL CENTER | Age: 69
End: 2019-02-19

## 2019-02-19 VITALS
SYSTOLIC BLOOD PRESSURE: 106 MMHG | WEIGHT: 254 LBS | BODY MASS INDEX: 42.32 KG/M2 | DIASTOLIC BLOOD PRESSURE: 62 MMHG | HEIGHT: 65 IN | HEART RATE: 80 BPM | OXYGEN SATURATION: 96 %

## 2019-02-19 DIAGNOSIS — I48.91 ATRIAL FIBRILLATION, UNSPECIFIED TYPE (HCC): ICD-10-CM

## 2019-02-19 DIAGNOSIS — Z79.01 CHRONIC ANTICOAGULATION: ICD-10-CM

## 2019-02-19 DIAGNOSIS — E78.5 DYSLIPIDEMIA: ICD-10-CM

## 2019-02-19 DIAGNOSIS — I10 BENIGN ESSENTIAL HYPERTENSION: Chronic | ICD-10-CM

## 2019-02-19 DIAGNOSIS — I48.20 CHRONIC ATRIAL FIBRILLATION (HCC): ICD-10-CM

## 2019-02-19 DIAGNOSIS — I50.32 CHRONIC DIASTOLIC CONGESTIVE HEART FAILURE (HCC): Chronic | ICD-10-CM

## 2019-02-19 DIAGNOSIS — R94.39 ABNORMAL MYOCARDIAL PERFUSION STUDY: ICD-10-CM

## 2019-02-19 DIAGNOSIS — Z01.810 PREOP CARDIOVASCULAR EXAM: ICD-10-CM

## 2019-02-19 DIAGNOSIS — I25.10 CORONARY ARTERY DISEASE INVOLVING NATIVE CORONARY ARTERY OF NATIVE HEART WITHOUT ANGINA PECTORIS: ICD-10-CM

## 2019-02-19 PROCEDURE — 99215 OFFICE O/P EST HI 40 MIN: CPT | Performed by: INTERNAL MEDICINE

## 2019-02-19 ASSESSMENT — ENCOUNTER SYMPTOMS
HEADACHES: 0
NAUSEA: 0
DIZZINESS: 0
WEIGHT LOSS: 0
FEVER: 0
BRUISES/BLEEDS EASILY: 0
BLURRED VISION: 0
MYALGIAS: 0
SHORTNESS OF BREATH: 0
DEPRESSION: 0
PSYCHIATRIC NEGATIVE: 1
FOCAL WEAKNESS: 0
WEAKNESS: 0
CLAUDICATION: 0
NEUROLOGICAL NEGATIVE: 1
CHILLS: 0
COUGH: 0
CONSTITUTIONAL NEGATIVE: 1
PALPITATIONS: 0
RESPIRATORY NEGATIVE: 1
GASTROINTESTINAL NEGATIVE: 1
ABDOMINAL PAIN: 0
EYES NEGATIVE: 1
VOMITING: 0
DOUBLE VISION: 0
NERVOUS/ANXIOUS: 0

## 2019-02-19 NOTE — LETTER
[facility]   [address], [address]  [place] [address]  Phone: [phone]  Fax: [phone]              Rachelle Reina  1950    Encounter Date: 2/19/2019    Florian Carnes M.D.          PROGRESS NOTE:  Chief Complaint   Patient presents with   • Atrial Fibrillation     FV       Subjective:   Rachelle Reina is a 68 y.o. female who presents today for follow up of preoperative cardiovascular examination and abnormal myocardial perfusion scan.    Since the patient's last visit on 09/02/18, she has been doing well clinically. She denies chest pain, shortness of breath, palpitations, nausea/vomiting or diaphoresis. She has been suffering with lower extremity neuropathy and she is pending vein stripping. She underwent cardiac studies ordered by her primary care physician that were abnormal .    Past Medical History:   Diagnosis Date   • A-fib (HCC)    • Arthritis     knees   • Atrial fibrillation (HCC)    • Benign essential hypertension 7/1/2011   • Bowel habit changes     diarrhea   • Breast cancer (HCC)    • Bronchitis 2015    history of   • Cancer (HCC) 1987, 2000    breast   • Cataract    • Diabetes (HCC)     oral medication   • Diastolic congestive heart failure (HCC) 7/1/2011    patient denies   • Disorder of thyroid    • High cholesterol    • History of cardiac catheterization 4/22/2010   • History of echocardiogram 4/22/2011   • History of hypothyroidism 8/29/2008   • Hypercholesteremia 7/1/2011   • Long term (current) use of anticoagulants 7/1/2011   • Morbid obesity (HCC) 10/28/2008   • Sleep apnea     pt does not use cpap   • Snoring     sleep study done   • Urinary incontinence      Past Surgical History:   Procedure Laterality Date   • CATARACT PHACO WITH IOL Right 1/22/2018    Procedure: CATARACT PHACO WITH IOL;  Surgeon: Santosh Holden M.D.;  Location: SURGERY SAME DAY [facility];  Service: Ophthalmology   • CATARACT PHACO WITH IOL  Left 1/8/2018    Procedure: CATARACT PHACO WITH IOL;  Surgeon: Santosh Holden M.D.;  Location: SURGERY SAME DAY [facility];  Service: Ophthalmology   • OTHER ORTHOPEDIC SURGERY Right 2013    hip arthroplasty   • CHOLECYSTECTOMY  2001   • ABDOMINAL HYSTERECTOMY TOTAL      1987   • MASTECTOMY  partial   • PB RADIATION THERAPY PLAN SIMPLE       Family History   Problem Relation Age of Onset   • Heart Disease Mother         HEART VALVE REPLACEMENT.   • Heart Disease Father         CORONARY ARTERY BYPASS GRAFTS.   • Cancer Maternal Aunt      Social History     Social History   • Marital status:      Spouse name: N/A   • Number of children: N/A   • Years of education: N/A     Occupational History   • Not on file.     Social History Main Topics   • Smoking status: Never Smoker   • Smokeless tobacco: Never Used   • Alcohol use Yes      Comment: 1 or 2 drinks a month   • Drug use: No   • Sexual activity: Not on file     Other Topics Concern   • Not on file     Social History Narrative   • No narrative on file     Allergies   Allergen Reactions   • Sulfa Drugs Rash     8/2015       Medications reviewed.    Outpatient Encounter Prescriptions as of 2/19/2019   Medication Sig Dispense Refill   • rosuvastatin (CRESTOR) 20 MG Tab TK 1 T PO ONCE D IN THE EVENING  1   • SYNTHROID 200 MCG Tab TK 1 T PO QOD  2   • glipiZIDE (GLUCOTROL) 5 MG Tab TK 1/2 T PO BID  1   • Insulin Detemir (LEVEMIR FLEXPEN SC) Inject 26 Units as instructed.     • warfarin (COUMADIN) 5 MG Tab Take one-half to one (1/2-1) tablets daily as directed by [facility] 90 Tab 1   • levothyroxine (SYNTHROID) 175 MCG Tab Take 175 mcg by mouth Every morning on an empty stomach.     • gemfibrozil (LOPID) 600 MG Tab Take 600 mg by mouth every day.     • Cholecalciferol (VITAMIN D PO) Take 5,000 Units by mouth every day.     • Cyanocobalamin (VITAMIN B 12 PO) Take 5,000 mg by mouth. Every 5 days     • loratadine (CLARITIN) 10 MG Tab Take 10 mg  "by mouth every day.     • metoprolol (LOPRESSOR) 50 MG TABS Take 50 mg by mouth 3 times a day.     • citalopram (CELEXA) 20 MG TABS Take 20 mg by mouth every day.     • losartan-hydrochlorothiazide (HYZAAR) 100-25 MG per tablet Take 1 Tab by mouth every day.       • meloxicam (MOBIC) 7.5 MG TABS Take 7.5 mg by mouth 2 Times a Day.       No facility-administered encounter medications on file as of 2/19/2019.      Review of Systems   Constitutional: Negative.  Negative for chills, fever, malaise/fatigue and weight loss.   HENT: Negative.  Negative for hearing loss.    Eyes: Negative.  Negative for blurred vision and double vision.   Respiratory: Negative.  Negative for cough and shortness of breath.    Cardiovascular: Negative.  Negative for chest pain, palpitations, claudication and leg swelling.   Gastrointestinal: Negative.  Negative for abdominal pain, nausea and vomiting.   Genitourinary: Negative.  Negative for dysuria and urgency.   Musculoskeletal: Negative.  Negative for joint pain and myalgias.   Skin: Negative.  Negative for itching and rash.   Neurological: Negative.  Negative for dizziness, focal weakness, weakness and headaches.   Endo/Heme/Allergies: Negative.  Does not bruise/bleed easily.   Psychiatric/Behavioral: Negative.  Negative for depression. The patient is not nervous/anxious.         Objective:   /62 (BP Location: Left arm, Patient Position: Sitting, BP Cuff Size: Adult)   Pulse 80   Ht 1.65 m (5' 4.96\")   Wt 115.2 kg (254 lb)   LMP 01/05/1987 (Approximate)   SpO2 96%   BMI 42.32 kg/m²      Physical Exam   Constitutional: She is oriented to person, place, and time. She appears well-developed and well-nourished.   HENT:   Head: Normocephalic and atraumatic.   Eyes: Pupils are equal, round, and reactive to light. Conjunctivae are normal.   Neck: Normal range of motion. Neck supple.   Cardiovascular: Normal rate and regular rhythm.    Pulmonary/Chest: Effort normal and breath sounds " normal.   Abdominal: Soft. Bowel sounds are normal.   Musculoskeletal: Normal range of motion.   Neurological: She is alert and oriented to person, place, and time.   Skin: Skin is warm and dry.   Psychiatric: She has a normal mood and affect.     CARDIAC STUDIES/PROCEDURES:     CARDIAC CATHETERIZATION (10/30/08)  Cardiac catheterization showing no angiographic evidence of coronary artery disease.    CALCIUM SCORING CT AT [facility] (12/18/18)  Mild evidence of plaque in the left circumflex artery.  (study result reviewed)     ECHOCARDIOGRAM CONCLUSIONS (11/10/15)  No significant valve disease or flow abnormalities.   Normal left ventricular systolic function.   Trace to small pericardial effusion without evidence of hemodynamic compromise.  No prior study is available for comparison.      ECHOCARDIOGRAM CONCLUSIONS (09/02/08)  Echocardiogram showing normal left ventricular systolic function and mild mitral regurgitation.     EKG performed on (01/02/22) was reviewed: EKG shows atrial fibrillation.  EKG performed on (02/21/17) EKG shows atrial fibrillation.  EKG performed on (11/10/15) EKG shows atrial fibrillation.     Laboratory results of (11/11/15) Cholesterol profile of 118/165/39/46 noted.    MYOCARDIAL PERFUSION STUDY CONCLUSIONS at [facility] (02/06/19)  Reversible defect of the anterior wall and apex. Finding concerning for left anterior descending artery ischemia.  (study result reviewed)     MPI CONCLUSIONS (11/11/15)  No reversible defects that would indicate ischemia.     Assessment:     1. Preop cardiovascular exam     2. Abnormal myocardial perfusion study     3. Coronary artery disease involving native coronary artery of native heart without angina pectoris     4. Chronic diastolic congestive heart failure (HCC)     5. Atrial fibrillation, unspecified type (HCC)     6. Chronic anticoagulation     7. Benign essential hypertension     8. Dyslipidemia         Medical Decision  Making:  Today's Assessment / Status / Plan:     1. Presurgical evaluation with abnormal myocardial perfusion scan and coronary calcium score: She is pending vein stripping surgery this year and is doing well from cardiac standpoint.  2. Chronic atrial fibrillation on chronic anticoagulation therapy (warfarin) and unseccessful electrical cardioversion on amiodarone on 01/27/09: She is doing well and the ventricular rate is well controlled. We will continue with current medical therapy.   3. History of diastolic congestive heart failure: The overall volume status is adequate on mild HCTZ only.  4. Hypertension: Blood pressure is well controlled.  5. Hyperlipidemia (managed by Dr. Fernández): She is doing well on statin therapy without myalgia symptoms.     We will follow up the patient in one year.     CC Daniel Santiago

## 2019-02-19 NOTE — LETTER
[facility]   [address], [facility] [address]  [place] [address]  Phone: [phone]  Fax: [phone]              Rachelle Reina  1950    Encounter Date: 2/19/2019    Florian Carnes M.D.          PROGRESS NOTE:  Chief Complaint   Patient presents with   • Atrial Fibrillation     FV       Subjective:   Rachelle Reina is a 68 y.o. female who presents today for follow up of preoperative cardiovascular examination and abnormal myocardial perfusion scan.    Since the patient's last visit on 09/02/18, she has been doing well clinically. She denies chest pain, shortness of breath, palpitations, nausea/vomiting or diaphoresis. She has been suffering with lower knee pain. She underwent cardiac studies ordered by her primary care physician that were abnormal .    Past Medical History:   Diagnosis Date   • A-fib (HCC)    • Arthritis     knees   • Atrial fibrillation (HCC)    • Benign essential hypertension 7/1/2011   • Bowel habit changes     diarrhea   • Breast cancer (HCC)    • Bronchitis 2015    history of   • Cancer (HCC) 1987, 2000    breast   • Cataract    • Diabetes (HCC)     oral medication   • Diastolic congestive heart failure (HCC) 7/1/2011    patient denies   • Disorder of thyroid    • High cholesterol    • History of cardiac catheterization 4/22/2010   • History of echocardiogram 4/22/2011   • History of hypothyroidism 8/29/2008   • Hypercholesteremia 7/1/2011   • Long term (current) use of anticoagulants 7/1/2011   • Morbid obesity (HCC) 10/28/2008   • Sleep apnea     pt does not use cpap   • Snoring     sleep study done   • Urinary incontinence      Past Surgical History:   Procedure Laterality Date   • CATARACT PHACO WITH IOL Right 1/22/2018    Procedure: CATARACT PHACO WITH IOL;  Surgeon: Santosh Holden M.D.;  Location: SURGERY SAME DAY [facility];  Service: Ophthalmology   • CATARACT PHACO WITH IOL Left 1/8/2018    Procedure: CATARACT PHACO  WITH IOL;  Surgeon: Santosh Holden M.D.;  Location: SURGERY SAME DAY [facility];  Service: Ophthalmology   • OTHER ORTHOPEDIC SURGERY Right 2013    hip arthroplasty   • CHOLECYSTECTOMY  2001   • ABDOMINAL HYSTERECTOMY TOTAL      1987   • MASTECTOMY  partial   • PB RADIATION THERAPY PLAN SIMPLE       Family History   Problem Relation Age of Onset   • Heart Disease Mother         HEART VALVE REPLACEMENT.   • Heart Disease Father         CORONARY ARTERY BYPASS GRAFTS.   • Cancer Maternal Aunt      Social History     Social History   • Marital status:      Spouse name: N/A   • Number of children: N/A   • Years of education: N/A     Occupational History   • Not on file.     Social History Main Topics   • Smoking status: Never Smoker   • Smokeless tobacco: Never Used   • Alcohol use Yes      Comment: 1 or 2 drinks a month   • Drug use: No   • Sexual activity: Not on file     Other Topics Concern   • Not on file     Social History Narrative   • No narrative on file     Allergies   Allergen Reactions   • Sulfa Drugs Rash     8/2015       Medications reviewed.    Outpatient Encounter Prescriptions as of 2/19/2019   Medication Sig Dispense Refill   • rosuvastatin (CRESTOR) 20 MG Tab TK 1 T PO ONCE D IN THE EVENING  1   • SYNTHROID 200 MCG Tab TK 1 T PO QOD  2   • glipiZIDE (GLUCOTROL) 5 MG Tab TK 1/2 T PO BID  1   • Insulin Detemir (LEVEMIR FLEXPEN SC) Inject 26 Units as instructed.     • warfarin (COUMADIN) 5 MG Tab Take one-half to one (1/2-1) tablets daily as directed by [facility] 90 Tab 1   • levothyroxine (SYNTHROID) 175 MCG Tab Take 175 mcg by mouth Every morning on an empty stomach.     • gemfibrozil (LOPID) 600 MG Tab Take 600 mg by mouth every day.     • Cholecalciferol (VITAMIN D PO) Take 5,000 Units by mouth every day.     • Cyanocobalamin (VITAMIN B 12 PO) Take 5,000 mg by mouth. Every 5 days     • loratadine (CLARITIN) 10 MG Tab Take 10 mg by mouth every day.     • metoprolol  "(LOPRESSOR) 50 MG TABS Take 50 mg by mouth 3 times a day.     • citalopram (CELEXA) 20 MG TABS Take 20 mg by mouth every day.     • losartan-hydrochlorothiazide (HYZAAR) 100-25 MG per tablet Take 1 Tab by mouth every day.       • meloxicam (MOBIC) 7.5 MG TABS Take 7.5 mg by mouth 2 Times a Day.       No facility-administered encounter medications on file as of 2/19/2019.      Review of Systems   Constitutional: Negative.  Negative for chills, fever, malaise/fatigue and weight loss.   HENT: Negative.  Negative for hearing loss.    Eyes: Negative.  Negative for blurred vision and double vision.   Respiratory: Negative.  Negative for cough and shortness of breath.    Cardiovascular: Positive for leg swelling. Negative for chest pain, palpitations and claudication.   Gastrointestinal: Negative.  Negative for abdominal pain, nausea and vomiting.   Genitourinary: Negative.  Negative for dysuria and urgency.   Musculoskeletal: Positive for joint pain. Negative for myalgias.   Skin: Negative.  Negative for itching and rash.   Neurological: Negative.  Negative for dizziness, focal weakness, weakness and headaches.   Endo/Heme/Allergies: Negative.  Does not bruise/bleed easily.   Psychiatric/Behavioral: Negative.  Negative for depression. The patient is not nervous/anxious.         Objective:   /62 (BP Location: Left arm, Patient Position: Sitting, BP Cuff Size: Adult)   Pulse 80   Ht 1.65 m (5' 4.96\")   Wt 115.2 kg (254 lb)   LMP 01/05/1987 (Approximate)   SpO2 96%   BMI 42.32 kg/m²      Physical Exam   Constitutional: She is oriented to person, place, and time. She appears well-developed and well-nourished.   HENT:   Head: Normocephalic and atraumatic.   Eyes: Pupils are equal, round, and reactive to light. Conjunctivae are normal.   Neck: Normal range of motion. Neck supple.   Cardiovascular: Normal rate.  An irregularly irregular rhythm present.   Pulmonary/Chest: Effort normal and breath sounds normal.   "   Abdominal: Soft. Bowel sounds are normal.   Musculoskeletal: Normal range of motion.   Neurological: She is alert and oriented to person, place, and time.   Skin: Skin is warm and dry.   Psychiatric: She has a normal mood and affect.     CARDIAC STUDIES/PROCEDURES:     CARDIAC CATHETERIZATION (10/30/08)  Cardiac catheterization showing no angiographic evidence of coronary artery disease.    CALCIUM SCORING CT AT [facility] (12/18/18)  Mild evidence of plaque in the left circumflex artery.  (study result reviewed)     ECHOCARDIOGRAM CONCLUSIONS (11/10/15)  No significant valve disease or flow abnormalities.   Normal left ventricular systolic function.   Trace to small pericardial effusion without evidence of hemodynamic compromise.  No prior study is available for comparison.      ECHOCARDIOGRAM CONCLUSIONS (09/02/08)  Echocardiogram showing normal left ventricular systolic function and mild mitral regurgitation.     EKG performed on (01/22/18) was reviewed: EKG shows atrial fibrillation.  EKG performed on (02/21/17) EKG shows atrial fibrillation.  EKG performed on (11/10/15) EKG shows atrial fibrillation.     Laboratory results of (11/11/15) Cholesterol profile of 118/165/39/46 noted.    MYOCARDIAL PERFUSION STUDY CONCLUSIONS at [facility] (02/06/19)  Reversible defect of the anterior wall and apex. Finding concerning for left anterior descending artery ischemia.  (study result reviewed)     MPI CONCLUSIONS (11/11/15)  No reversible defects that would indicate ischemia.     Assessment:     1. Preop cardiovascular exam     2. Abnormal myocardial perfusion study     3. Coronary artery disease involving native coronary artery of native heart without angina pectoris     4. Chronic diastolic congestive heart failure (HCC)     5. Atrial fibrillation, unspecified type (HCC)     6. Chronic anticoagulation     7. Benign essential hypertension     8. Dyslipidemia       Medical Decision Making:   Today's Assessment / Status / Plan:     1. Presurgical evaluation with abnormal myocardial perfusion scan and coronary calcium score: She is pending right knee surgery this year and is doing well from cardiac standpoint, however, she underwent abnormal tests as above. We will perform cardiac catheterization. She understands the risks and benefits and agrees with plan.  2. Chronic atrial fibrillation on chronic anticoagulation therapy (warfarin) and unseccessful electrical cardioversion on amiodarone on 01/27/09: She is doing well and the ventricular rate is well controlled. We will continue with current medical therapy.   3. History of diastolic congestive heart failure: The overall volume status is adequate on mild HCTZ only.  4. Hypertension: Blood pressure is well controlled.  5. Hyperlipidemia (managed by Dr. Fernández): She is doing well on statin therapy without myalgia symptoms.    The risks, benefits, and alternatives to coronary angiography with IV sedation were discussed in great detail. Specific risks mentioned include bleeding, infection, kidney damage, allergic reaction, cardiac perforation with possible tamponade requiring rohan-cardiocentesis or possible open heart surgery. In addition, we discussed that 10% of patients will experience small to moderate bruising at the side of the arterial puncture. Lastly the risks of heart attack, stroke, and death were discussed; the risks of major complications such as heart attack or stroke caused by the angiogram is less than 1%; the risk of death is approximately 1 in 1000. The patient verbalized understanding of these potential complications and wishes to proceed with this procedure.     We will follow up the patient in one year.     CC Daniel Santiago

## 2019-02-19 NOTE — PROGRESS NOTES
Chief Complaint   Patient presents with   • Atrial Fibrillation     FV       Subjective:   Rachelle Reina is a 68 y.o. female who presents today for follow up of preoperative cardiovascular examination and abnormal myocardial perfusion scan.    Since the patient's last visit on 09/02/18, she has been doing well clinically. She denies chest pain, shortness of breath, palpitations, nausea/vomiting or diaphoresis. She has been suffering with lower knee pain. She underwent cardiac studies ordered by her primary care physician that were abnormal .    Past Medical History:   Diagnosis Date   • A-fib (HCC)    • Arthritis     knees   • Atrial fibrillation (HCC)    • Benign essential hypertension 7/1/2011   • Bowel habit changes     diarrhea   • Breast cancer (HCC)    • Bronchitis 2015    history of   • Cancer (HCC) 1987, 2000    breast   • Cataract    • Diabetes (HCC)     oral medication   • Diastolic congestive heart failure (HCC) 7/1/2011    patient denies   • Disorder of thyroid    • High cholesterol    • History of cardiac catheterization 4/22/2010   • History of echocardiogram 4/22/2011   • History of hypothyroidism 8/29/2008   • Hypercholesteremia 7/1/2011   • Long term (current) use of anticoagulants 7/1/2011   • Morbid obesity (HCC) 10/28/2008   • Sleep apnea     pt does not use cpap   • Snoring     sleep study done   • Urinary incontinence      Past Surgical History:   Procedure Laterality Date   • CATARACT PHACO WITH IOL Right 1/22/2018    Procedure: CATARACT PHACO WITH IOL;  Surgeon: Santosh Holden M.D.;  Location: SURGERY SAME DAY [facility];  Service: Ophthalmology   • CATARACT PHACO WITH IOL Left 1/8/2018    Procedure: CATARACT PHACO WITH IOL;  Surgeon: Santosh Holden M.D.;  Location: SURGERY SAME DAY [facility];  Service: Ophthalmology   • OTHER ORTHOPEDIC SURGERY Right 2013    hip arthroplasty   • CHOLECYSTECTOMY  2001   • ABDOMINAL HYSTERECTOMY TOTAL      1987   • MASTECTOMY  partial   • PB  RADIATION THERAPY PLAN SIMPLE       Family History   Problem Relation Age of Onset   • Heart Disease Mother         HEART VALVE REPLACEMENT.   • Heart Disease Father         CORONARY ARTERY BYPASS GRAFTS.   • Cancer Maternal Aunt      Social History     Social History   • Marital status:      Spouse name: N/A   • Number of children: N/A   • Years of education: N/A     Occupational History   • Not on file.     Social History Main Topics   • Smoking status: Never Smoker   • Smokeless tobacco: Never Used   • Alcohol use Yes      Comment: 1 or 2 drinks a month   • Drug use: No   • Sexual activity: Not on file     Other Topics Concern   • Not on file     Social History Narrative   • No narrative on file     Allergies   Allergen Reactions   • Sulfa Drugs Rash     8/2015       Medications reviewed.    Outpatient Encounter Prescriptions as of 2/19/2019   Medication Sig Dispense Refill   • rosuvastatin (CRESTOR) 20 MG Tab TK 1 T PO ONCE D IN THE EVENING  1   • SYNTHROID 200 MCG Tab TK 1 T PO QOD  2   • glipiZIDE (GLUCOTROL) 5 MG Tab TK 1/2 T PO BID  1   • Insulin Detemir (LEVEMIR FLEXPEN SC) Inject 26 Units as instructed.     • warfarin (COUMADIN) 5 MG Tab Take one-half to one (1/2-1) tablets daily as directed by [facility] 90 Tab 1   • levothyroxine (SYNTHROID) 175 MCG Tab Take 175 mcg by mouth Every morning on an empty stomach.     • gemfibrozil (LOPID) 600 MG Tab Take 600 mg by mouth every day.     • Cholecalciferol (VITAMIN D PO) Take 5,000 Units by mouth every day.     • Cyanocobalamin (VITAMIN B 12 PO) Take 5,000 mg by mouth. Every 5 days     • loratadine (CLARITIN) 10 MG Tab Take 10 mg by mouth every day.     • metoprolol (LOPRESSOR) 50 MG TABS Take 50 mg by mouth 3 times a day.     • citalopram (CELEXA) 20 MG TABS Take 20 mg by mouth every day.     • losartan-hydrochlorothiazide (HYZAAR) 100-25 MG per tablet Take 1 Tab by mouth every day.       • meloxicam (MOBIC) 7.5 MG TABS Take 7.5 mg  "by mouth 2 Times a Day.       No facility-administered encounter medications on file as of 2/19/2019.      Review of Systems   Constitutional: Negative.  Negative for chills, fever, malaise/fatigue and weight loss.   HENT: Negative.  Negative for hearing loss.    Eyes: Negative.  Negative for blurred vision and double vision.   Respiratory: Negative.  Negative for cough and shortness of breath.    Cardiovascular: Positive for leg swelling. Negative for chest pain, palpitations and claudication.   Gastrointestinal: Negative.  Negative for abdominal pain, nausea and vomiting.   Genitourinary: Negative.  Negative for dysuria and urgency.   Musculoskeletal: Positive for joint pain. Negative for myalgias.   Skin: Negative.  Negative for itching and rash.   Neurological: Negative.  Negative for dizziness, focal weakness, weakness and headaches.   Endo/Heme/Allergies: Negative.  Does not bruise/bleed easily.   Psychiatric/Behavioral: Negative.  Negative for depression. The patient is not nervous/anxious.         Objective:   /62 (BP Location: Left arm, Patient Position: Sitting, BP Cuff Size: Adult)   Pulse 80   Ht 1.65 m (5' 4.96\")   Wt 115.2 kg (254 lb)   LMP 01/05/1987 (Approximate)   SpO2 96%   BMI 42.32 kg/m²     Physical Exam   Constitutional: She is oriented to person, place, and time. She appears well-developed and well-nourished.   HENT:   Head: Normocephalic and atraumatic.   Eyes: Pupils are equal, round, and reactive to light. Conjunctivae are normal.   Neck: Normal range of motion. Neck supple.   Cardiovascular: Normal rate.  An irregularly irregular rhythm present.   Pulmonary/Chest: Effort normal and breath sounds normal.   Abdominal: Soft. Bowel sounds are normal.   Musculoskeletal: Normal range of motion.   Neurological: She is alert and oriented to person, place, and time.   Skin: Skin is warm and dry.   Psychiatric: She has a normal mood and affect.     CARDIAC STUDIES/PROCEDURES:     CARDIAC " CATHETERIZATION (10/30/08)  Cardiac catheterization showing no angiographic evidence of coronary artery disease.    CALCIUM SCORING CT AT [facility] (12/18/18)  Mild evidence of plaque in the left circumflex artery.  (study result reviewed)     ECHOCARDIOGRAM CONCLUSIONS (11/10/15)  No significant valve disease or flow abnormalities.   Normal left ventricular systolic function.   Trace to small pericardial effusion without evidence of hemodynamic compromise.  No prior study is available for comparison.      ECHOCARDIOGRAM CONCLUSIONS (09/02/08)  Echocardiogram showing normal left ventricular systolic function and mild mitral regurgitation.     EKG performed on (01/22/18) was reviewed: EKG shows atrial fibrillation.  EKG performed on (02/21/17) EKG shows atrial fibrillation.  EKG performed on (11/10/15) EKG shows atrial fibrillation.     Laboratory results of (11/11/15) Cholesterol profile of 118/165/39/46 noted.    MYOCARDIAL PERFUSION STUDY CONCLUSIONS at [facility] (02/06/19)  Reversible defect of the anterior wall and apex. Finding concerning for left anterior descending artery ischemia.  (study result reviewed)     MPI CONCLUSIONS (11/11/15)  No reversible defects that would indicate ischemia.     Assessment:     1. Preop cardiovascular exam     2. Abnormal myocardial perfusion study     3. Coronary artery disease involving native coronary artery of native heart without angina pectoris     4. Chronic diastolic congestive heart failure (HCC)     5. Atrial fibrillation, unspecified type (HCC)     6. Chronic anticoagulation     7. Benign essential hypertension     8. Dyslipidemia       Medical Decision Making:  Today's Assessment / Status / Plan:     1. Presurgical evaluation with abnormal myocardial perfusion scan and coronary calcium score: She is pending right knee surgery this year and is doing well from cardiac standpoint, however, she underwent abnormal tests as above. We will perform  cardiac catheterization. She understands the risks and benefits and agrees with plan.  2. Chronic atrial fibrillation on chronic anticoagulation therapy (warfarin) and unseccessful electrical cardioversion on amiodarone on 01/27/09: She is doing well and the ventricular rate is well controlled. We will continue with current medical therapy.   3. History of diastolic congestive heart failure: The overall volume status is adequate on mild HCTZ only.  4. Hypertension: Blood pressure is well controlled.  5. Hyperlipidemia (managed by Dr. Fernández): She is doing well on statin therapy without myalgia symptoms.    The risks, benefits, and alternatives to coronary angiography with IV sedation were discussed in great detail. Specific risks mentioned include bleeding, infection, kidney damage, allergic reaction, cardiac perforation with possible tamponade requiring rohan-cardiocentesis or possible open heart surgery. In addition, we discussed that 10% of patients will experience small to moderate bruising at the side of the arterial puncture. Lastly the risks of heart attack, stroke, and death were discussed; the risks of major complications such as heart attack or stroke caused by the angiogram is less than 1%; the risk of death is approximately 1 in 1000. The patient verbalized understanding of these potential complications and wishes to proceed with this procedure.     We will follow up the patient in one year.     CC Daniel Santiago

## 2019-02-19 NOTE — TELEPHONE ENCOUNTER
Patient is scheduled on 2-27-19 for a [facility] w/poss with Dr. Carnes. Patient was told to hold coumadin for 5 days prior, to hold glipizide am day of procedure and to cut insuling in half night before and hold am day of procedure. Patient to check in at 9:30 for an 11:30 procedure. H&P was done on 2-19-19 by Dr. Carnes. Pre admit is scheduled on 2-26-19 at 12:45.

## 2019-02-21 ENCOUNTER — ANTICOAGULATION VISIT (OUTPATIENT)
Dept: MEDICAL GROUP | Facility: PHYSICIAN GROUP | Age: 69
End: 2019-02-21
Payer: MEDICARE

## 2019-02-21 DIAGNOSIS — Z79.01 CHRONIC ANTICOAGULATION: Primary | ICD-10-CM

## 2019-02-21 LAB — INR PPP: 1.5 (ref 2–3.5)

## 2019-02-21 PROCEDURE — 85610 PROTHROMBIN TIME: CPT | Performed by: INTERNAL MEDICINE

## 2019-02-21 PROCEDURE — 99211 OFF/OP EST MAY X REQ PHY/QHP: CPT | Performed by: INTERNAL MEDICINE

## 2019-02-21 NOTE — PROGRESS NOTES
Anticoagulation Summary  As of 2019    INR goal:   2.0-3.0   TTR:   59.2 % (3.7 y)   INR used for dosin.5! (2019)   Warfarin maintenance plan:   2.5 mg (5 mg x 0.5) every day   Weekly warfarin total:   17.5 mg   Plan last modified:   Brice Garcia, PharmD (2018)   Next INR check:   3/7/2019   Target end date:   Indefinite    Indications    Atrial fibrillation (HCC) (Resolved) [I48.91]  Atrial fibrillation (HCC) (Resolved) [I48.91]  Chronic anticoagulation [Z79.01]             Anticoagulation Episode Summary     INR check location:   [facility]    Preferred lab:       Send INR reminders to:       Comments:         Anticoagulation Care Providers     Provider Role Specialty Phone number    Florian Carnes M.D. Referring Cardiology [phone]    [facility] Responsible  [phone]        Anticoagulation Patient Findings      HPI:  Rachelle Reina seen in clinic today, on anticoagulation therapy with warfarin for Afib  Reason for today's visit (per our collaborative practice policy) is because their last INR was 2.2 on 18. Intervention at the last visit: continued with same warfarin dosing  Changes to current medical/health status since last appt: pt is going off warfarin tomorrow for cath procedure per Dr. Garvin  No - signs/symptoms of bleeding and/or thrombosis since the last appt.    No - interval changes to diet or any interval changes to medications since last appt.   No - complications or cost restrictions with current therapy.   BP declined    A/P   INR  is sub-therapeutic.   Possible reason(s) INR is not in range today: not clear  As pt is to start holding warfarin tomorrow in preperation for 19 cath procedure with Dr. Garvin, will have pt take her regular dose today (if she hasn't already removed it from her pillbox, she is unsure) and then HOLD warfarin -. Pt to resume warfarin as per Dr. Garvin, will resume with a boost dose of  5mg x1 and then resume her normal dosing of 2.5mg daily.     Follow up appointment in 1 week post resumption to reduce risk of adverse events related to this high risk medication, Warfarin.    Purpose of next visit:  They are at a increased risk of clots because INR is below goal.  They are at a increased risk of stroke because INR is below goal. .     Other info:  Pt educated to contact our clinic with any changes in medications or s/s of bleeding or thrombosis  CHEST guidelines recommend frequent INR monitoring at regular intervals (a few days up to a max of 12 weeks) to ensure they are on the proper dose of warfarin and not having any complications from therapy. INRs can dramatically change over a short time period due to diet, medications, and medical conditions.     Cristal Lovell, PharmD

## 2019-02-26 ENCOUNTER — APPOINTMENT (OUTPATIENT)
Dept: ADMISSIONS | Facility: MEDICAL CENTER | Age: 69
End: 2019-02-26
Attending: INTERNAL MEDICINE
Payer: MEDICARE

## 2019-02-26 DIAGNOSIS — Z01.812 PRE-OPERATIVE LABORATORY EXAMINATION: ICD-10-CM

## 2019-02-26 DIAGNOSIS — Z01.810 PRE-OPERATIVE CARDIOVASCULAR EXAMINATION: ICD-10-CM

## 2019-02-26 LAB
APTT PPP: 34.8 SEC (ref 24.7–36)
EKG IMPRESSION: NORMAL
ERYTHROCYTE [DISTWIDTH] IN BLOOD BY AUTOMATED COUNT: 54.2 FL (ref 35.9–50)
HCT VFR BLD AUTO: 39.1 % (ref 37–47)
HGB BLD-MCNC: 11.5 G/DL (ref 12–16)
INR PPP: 1.17 (ref 0.87–1.13)
MCH RBC QN AUTO: 24.1 PG (ref 27–33)
MCHC RBC AUTO-ENTMCNC: 29.4 G/DL (ref 33.6–35)
MCV RBC AUTO: 82 FL (ref 81.4–97.8)
PLATELET # BLD AUTO: 283 K/UL (ref 164–446)
PMV BLD AUTO: 10.1 FL (ref 9–12.9)
PROTHROMBIN TIME: 15.1 SEC (ref 12–14.6)
RBC # BLD AUTO: 4.77 M/UL (ref 4.2–5.4)
WBC # BLD AUTO: 6.7 K/UL (ref 4.8–10.8)

## 2019-02-26 PROCEDURE — 36415 COLL VENOUS BLD VENIPUNCTURE: CPT

## 2019-02-26 PROCEDURE — 85610 PROTHROMBIN TIME: CPT

## 2019-02-26 PROCEDURE — 85730 THROMBOPLASTIN TIME PARTIAL: CPT

## 2019-02-26 PROCEDURE — 85027 COMPLETE CBC AUTOMATED: CPT

## 2019-02-26 RX ORDER — PIOGLITAZONEHYDROCHLORIDE 15 MG/1
15 TABLET ORAL DAILY
Status: ON HOLD | COMMUNITY
End: 2020-01-09

## 2019-02-26 RX ORDER — ALLOPURINOL 300 MG/1
150 TABLET ORAL DAILY
COMMUNITY
End: 2019-11-13

## 2019-02-26 RX ORDER — OXYBUTYNIN CHLORIDE 10 MG/1
10 TABLET, EXTENDED RELEASE ORAL DAILY
COMMUNITY
End: 2019-12-31

## 2019-02-27 ENCOUNTER — HOSPITAL ENCOUNTER (OUTPATIENT)
Facility: MEDICAL CENTER | Age: 69
End: 2019-02-27
Attending: INTERNAL MEDICINE | Admitting: INTERNAL MEDICINE
Payer: MEDICARE

## 2019-02-27 VITALS
SYSTOLIC BLOOD PRESSURE: 117 MMHG | TEMPERATURE: 96.8 F | BODY MASS INDEX: 42.79 KG/M2 | DIASTOLIC BLOOD PRESSURE: 65 MMHG | OXYGEN SATURATION: 94 % | RESPIRATION RATE: 17 BRPM | HEART RATE: 65 BPM | HEIGHT: 65 IN | WEIGHT: 256.84 LBS

## 2019-02-27 LAB
ALBUMIN SERPL BCP-MCNC: 3.8 G/DL (ref 3.2–4.9)
ALBUMIN/GLOB SERPL: 1.2 G/DL
ALP SERPL-CCNC: 56 U/L (ref 30–99)
ALT SERPL-CCNC: 11 U/L (ref 2–50)
ANION GAP SERPL CALC-SCNC: 10 MMOL/L (ref 0–11.9)
APTT PPP: 30.4 SEC (ref 24.7–36)
AST SERPL-CCNC: 18 U/L (ref 12–45)
BILIRUB SERPL-MCNC: 0.6 MG/DL (ref 0.1–1.5)
BUN SERPL-MCNC: 32 MG/DL (ref 8–22)
CALCIUM SERPL-MCNC: 9.8 MG/DL (ref 8.5–10.5)
CHLORIDE SERPL-SCNC: 103 MMOL/L (ref 96–112)
CO2 SERPL-SCNC: 26 MMOL/L (ref 20–33)
CREAT SERPL-MCNC: 0.95 MG/DL (ref 0.5–1.4)
EKG IMPRESSION: NORMAL
ERYTHROCYTE [DISTWIDTH] IN BLOOD BY AUTOMATED COUNT: 54.3 FL (ref 35.9–50)
GLOBULIN SER CALC-MCNC: 3.1 G/DL (ref 1.9–3.5)
GLUCOSE SERPL-MCNC: 94 MG/DL (ref 65–99)
HCT VFR BLD AUTO: 41.1 % (ref 37–47)
HGB BLD-MCNC: 12.3 G/DL (ref 12–16)
INR PPP: 1.09 (ref 0.87–1.13)
MCH RBC QN AUTO: 24.4 PG (ref 27–33)
MCHC RBC AUTO-ENTMCNC: 29.9 G/DL (ref 33.6–35)
MCV RBC AUTO: 81.4 FL (ref 81.4–97.8)
PLATELET # BLD AUTO: 265 K/UL (ref 164–446)
PMV BLD AUTO: 9.9 FL (ref 9–12.9)
POTASSIUM SERPL-SCNC: 3.6 MMOL/L (ref 3.6–5.5)
PROT SERPL-MCNC: 6.9 G/DL (ref 6–8.2)
PROTHROMBIN TIME: 14.2 SEC (ref 12–14.6)
RBC # BLD AUTO: 5.05 M/UL (ref 4.2–5.4)
SODIUM SERPL-SCNC: 139 MMOL/L (ref 135–145)
WBC # BLD AUTO: 6.3 K/UL (ref 4.8–10.8)

## 2019-02-27 PROCEDURE — 93010 ELECTROCARDIOGRAM REPORT: CPT | Performed by: INTERNAL MEDICINE

## 2019-02-27 PROCEDURE — 99152 MOD SED SAME PHYS/QHP 5/>YRS: CPT | Performed by: INTERNAL MEDICINE

## 2019-02-27 PROCEDURE — 85610 PROTHROMBIN TIME: CPT

## 2019-02-27 PROCEDURE — 93458 L HRT ARTERY/VENTRICLE ANGIO: CPT | Mod: 26 | Performed by: INTERNAL MEDICINE

## 2019-02-27 PROCEDURE — C1894 INTRO/SHEATH, NON-LASER: HCPCS

## 2019-02-27 PROCEDURE — 700111 HCHG RX REV CODE 636 W/ 250 OVERRIDE (IP)

## 2019-02-27 PROCEDURE — C1769 GUIDE WIRE: HCPCS

## 2019-02-27 PROCEDURE — 700101 HCHG RX REV CODE 250

## 2019-02-27 PROCEDURE — 80053 COMPREHEN METABOLIC PANEL: CPT

## 2019-02-27 PROCEDURE — 360979 HCHG DIAGNOSTIC CATH

## 2019-02-27 PROCEDURE — 85027 COMPLETE CBC AUTOMATED: CPT

## 2019-02-27 PROCEDURE — 85730 THROMBOPLASTIN TIME PARTIAL: CPT

## 2019-02-27 PROCEDURE — 93005 ELECTROCARDIOGRAM TRACING: CPT | Performed by: INTERNAL MEDICINE

## 2019-02-27 PROCEDURE — 99153 MOD SED SAME PHYS/QHP EA: CPT

## 2019-02-27 PROCEDURE — 160002 HCHG RECOVERY MINUTES (STAT)

## 2019-02-27 PROCEDURE — 700117 HCHG RX CONTRAST REV CODE 255: Performed by: INTERNAL MEDICINE

## 2019-02-27 PROCEDURE — 99152 MOD SED SAME PHYS/QHP 5/>YRS: CPT

## 2019-02-27 PROCEDURE — 93458 L HRT ARTERY/VENTRICLE ANGIO: CPT

## 2019-02-27 PROCEDURE — 307093 HCHG TR BAND RADIAL

## 2019-02-27 RX ORDER — MIDAZOLAM HYDROCHLORIDE 1 MG/ML
INJECTION INTRAMUSCULAR; INTRAVENOUS
Status: COMPLETED
Start: 2019-02-27 | End: 2019-02-27

## 2019-02-27 RX ORDER — VERAPAMIL HYDROCHLORIDE 2.5 MG/ML
INJECTION, SOLUTION INTRAVENOUS
Status: COMPLETED
Start: 2019-02-27 | End: 2019-02-27

## 2019-02-27 RX ORDER — SODIUM CHLORIDE 9 MG/ML
INJECTION, SOLUTION INTRAVENOUS
Status: DISCONTINUED | OUTPATIENT
Start: 2019-02-27 | End: 2019-02-27 | Stop reason: HOSPADM

## 2019-02-27 RX ORDER — LIDOCAINE HYDROCHLORIDE 20 MG/ML
INJECTION, SOLUTION INFILTRATION; PERINEURAL
Status: COMPLETED
Start: 2019-02-27 | End: 2019-02-27

## 2019-02-27 RX ORDER — HEPARIN SODIUM,PORCINE 1000/ML
VIAL (ML) INJECTION
Status: COMPLETED
Start: 2019-02-27 | End: 2019-02-27

## 2019-02-27 RX ADMIN — NITROGLYCERIN 10 ML: 20 INJECTION INTRAVENOUS at 15:24

## 2019-02-27 RX ADMIN — SODIUM CHLORIDE: 9 INJECTION, SOLUTION INTRAVENOUS at 10:15

## 2019-02-27 RX ADMIN — LIDOCAINE HYDROCHLORIDE: 20 INJECTION, SOLUTION INFILTRATION; PERINEURAL at 15:23

## 2019-02-27 RX ADMIN — HEPARIN SODIUM: 1000 INJECTION, SOLUTION INTRAVENOUS; SUBCUTANEOUS at 15:24

## 2019-02-27 RX ADMIN — IOHEXOL 80 ML: 350 INJECTION, SOLUTION INTRAVENOUS at 15:30

## 2019-02-27 RX ADMIN — VERAPAMIL HYDROCHLORIDE 2.5 MG: 2.5 INJECTION, SOLUTION INTRAVENOUS at 15:24

## 2019-02-27 RX ADMIN — MIDAZOLAM HYDROCHLORIDE 1 MG: 1 INJECTION, SOLUTION INTRAMUSCULAR; INTRAVENOUS at 15:30

## 2019-02-27 RX ADMIN — MIDAZOLAM HYDROCHLORIDE 2 MG: 1 INJECTION, SOLUTION INTRAMUSCULAR; INTRAVENOUS at 15:25

## 2019-02-27 RX ADMIN — FENTANYL CITRATE 100 MCG: 50 INJECTION INTRAMUSCULAR; INTRAVENOUS at 15:25

## 2019-02-27 RX ADMIN — HEPARIN SODIUM 2000 UNITS: 1000 INJECTION, SOLUTION INTRAVENOUS; SUBCUTANEOUS at 15:00

## 2019-02-27 NOTE — PROCEDURES
DATE OF SERVICE:  02/27/2019    REFERRING PHYSICIAN:  Florian Carnes MD and Daniel Santiago    PROCEDURES:  1.  Left heart catheterization.  2.  Coronary angiography.  3.  Left ventriculogram.  4.  Monitor of conscious sedation.    PREPROCEDURE DIAGNOSIS:    1.  Abnormal myocardial perfusion study.    POSTPROCEDURE DIAGNOSES:  1.  No angiographic evidence of coronary artery disease.  2.  Normal left ventricular systolic function with ejection fraction of 70%.  3.  Elevated left ventricular end-diastolic pressure.    INDICATION:  Patient is a 68-year-old female with past medical history   significant for atrial fibrillation, on chronic anticoagulation therapy.  She   underwent an abnormal myocardial perfusion study for preoperative evaluation   and preoperative abnormal EKG.  The nuclear stress test showed anterior apical   ischemia.  She was scheduled for cardiac catheterization.    DESCRIPTION OF PROCEDURE:  After informed consent was signed by the patient,   the patient was brought to the cardiac catheterization laboratory.  She was   prepped and draped in usual sterile manner.  The right wrist area was   anesthetized with 2% Xylocaine.  A 6-English sheath was inserted into the right   radial artery using modified Seldinger technique.  Intra-arterial verapamil   and nitroglycerin were given.  IV heparin was given.  A 4-English pigtail   catheter was positioned into the left ventricle.  Left angiography was   performed.  This was exchanged for a 4-English JL4 catheter, which was   positioned into the left main coronary artery.  Coronary angiography was   performed.  This was exchanged for a JR4 catheter, which was positioned into   the right coronary artery.  Coronary angiography was performed.  The patient   tolerated the procedure well.  At the end of procedure, all catheters and   sheaths were removed.  Hemoband was placed in the right wrist.  She was then   transferred to PPU in stable condition.    HEMODYNAMIC  DATA:  Hemodynamic data shows aortic pressures of 140/70 with mean   of 100 mmHg and /10 with LVEDP of 30 mmHg.    AORTIC VALVE:  There was no significant gradient noted.    LEFT VENTRICULOGRAM:  A 10 mL of contrast was delivered for 3 seconds.    Ejection fraction was estimated to be 30%.  There was no segmental wall motion   abnormalities noted.    ANGIOGRAM:  Left main coronary artery:  Left main coronary artery is a short   moderate-caliber vessel free of disease.  Left anterior descending artery:  Left anterior descending artery is a long   moderate-caliber vessel, which tapers to a small caliber vessel.  There is   moderate tortuosity.  There are small caliber diagonal branches noted.  Left   anterior descending artery and its branches are free of disease.  Left circumflex artery:  Left circumflex artery is a nondominant   moderate-caliber vessel with moderate caliber tortuous first obtuse marginal   branch, moderate-to-large caliber bifurcating second obtuse marginal branch,   and a very small posterior lateral branch following a small AV groove.  The   left circumflex artery and its branches are free of disease.  Right coronary artery:  Right coronary artery is a dominant moderate-caliber   vessel with small posterior descending artery and posterior lateral branches.    Right coronary artery and its branches are free of disease.    IMPRESSION:  1.  No angiographic evidence of coronary artery disease.  2.  Normal left ventricular systolic function with ejection fraction of 70%.  3.  Elevated left ventricular end-diastolic pressure.    RECOMMENDATIONS:  Recommend to continue medical therapy and proceed with   surgery.    SEDATION: The patient's sedation was managed by myself with continuous   face to face time with the patient for 15 minutes from 02:56 to 03:11.       ____________________________________     MD REAGAN ALEJANDRE / JEFF    DD:  02/27/2019 15:34:48  DT:  02/27/2019 15:49:35    D#:   [id number]  Job#:  [id number]

## 2019-02-28 NOTE — DISCHARGE INSTRUCTIONS
ACTIVITY: Rest and take it easy for the first 24 hours.  A responsible adult is recommended to remain with you during that time.  It is normal to feel sleepy.  We encourage you to not do anything that requires balance, judgment or coordination.    MILD FLU-LIKE SYMPTOMS ARE NORMAL. YOU MAY EXPERIENCE GENERALIZED MUSCLE ACHES, THROAT IRRITATION, HEADACHE AND/OR SOME NAUSEA.    FOR 24 HOURS DO NOT:  Drive, operate machinery or run household appliances.  Drink beer or alcoholic beverages.   Make important decisions or sign legal documents.    DIET: To avoid nausea, slowly advance diet as tolerated, avoiding spicy or greasy foods for the first day.  Add more substantial food to your diet according to your physician's instructions.  Babies can be fed formula or breast milk as soon as they are hungry.  INCREASE FLUIDS AND FIBER TO AVOID CONSTIPATION.    SURGICAL DRESSING/BATHING: Keep dressing dry for 24 hours. May remove dressing and shower after 7pm on 2/28, do not need to replace dressing. Do not submerge in water or bath for 7 days.     FOLLOW-UP APPOINTMENT:  A follow-up appointment should be arranged with your doctor; call to schedule.    You should CALL YOUR PHYSICIAN if you develop:  Fever greater than 101 degrees F.  Pain not relieved by medication, or persistent nausea or vomiting.  Excessive bleeding (blood soaking through dressing) or unexpected drainage from the wound.  Extreme redness or swelling around the incision site, drainage of pus or foul smelling drainage.  Inability to urinate or empty your bladder within 8 hours.  Problems with breathing or chest pain.    You should call 911 if you develop problems with breathing or chest pain.  If you are unable to contact your doctor or surgical center, you should go to the nearest emergency room or urgent care center.  Physician's telephone #: [phone]    If any questions arise, call your doctor.  If your doctor is not available, please feel free to call the  Surgical Center at [phone].  The Center is open Monday through Friday from 7AM to 7PM.  You can also call the HEALTH HOTLINE open 24 hours/day, 7 days/week and speak to a nurse at [phone], or toll free at [phone].    A registered nurse may call you a few days after your surgery to see how you are doing after your procedure.    MEDICATIONS: Resume taking daily medication.  Take prescribed pain medication with food.  If no medication is prescribed, you may take non-aspirin pain medication if needed.  PAIN MEDICATION CAN BE VERY CONSTIPATING.  Take a stool softener or laxative such as senokot, pericolace, or milk of magnesia if needed.    If your physician has prescribed pain medication that includes Acetaminophen (Tylenol), do not take additional Acetaminophen (Tylenol) while taking the prescribed medication.    Depression / Suicide Risk    As you are discharged from this [facility] facility, it is important to learn how to keep safe from harming yourself.    Recognize the warning signs:  · Abrupt changes in personality, positive or negative- including increase in energy   · Giving away possessions  · Change in eating patterns- significant weight changes-  positive or negative  · Change in sleeping patterns- unable to sleep or sleeping all the time   · Unwillingness or inability to communicate  · Depression  · Unusual sadness, discouragement and loneliness  · Talk of wanting to die  · Neglect of personal appearance   · Rebelliousness- reckless behavior  · Withdrawal from people/activities they love  · Confusion- inability to concentrate     If you or a loved one observes any of these behaviors or has concerns about self-harm, here's what you can do:  · Talk about it- your feelings and reasons for harming yourself  · Remove any means that you might use to hurt yourself (examples: pills, rope, extension cords, firearm)  · Get professional help from the community (Mental Health, Substance Abuse,  psychological counseling)  · Do not be alone:Call your Safe Contact- someone whom you trust who will be there for you.  · Call your local CRISIS HOTLINE 113-4777 or 346-022-0917  · Call your local Children's Mobile Crisis Response Team [place] (773) 745-0465 or www.[place]  · Call the toll free National Suicide Prevention Hotlines   · National Suicide Prevention Lifeline 240-070-DERI (0825)  · [place] E-Sign Line Network 800-SUICIDE (385-4062)    Radial Catherization Discharge Instructions      · Do not subject hand/arm to any forceful movements for 24 hours    i.e. supporting weight when rising from the chair or bed.   · Do not drive a car for 24 hours  · You may remove the dressing tomorrow  · You may shower on the day following your procedure.  Do not take a tub bath or submerge the puncture site in water for 3 days following the procedure.  · No Lifting more than 3-5 pounds with affected wrist for 5 days  · Follow up with Dr. Carnes  2-4 weeks.  · Increase fluids for 2 days post procedure.  · Continue all previous medications unless otherwise instructed.    If bleeding should occur following discharge:  · Sit down and apply firm pressure to site with your fingers for 10 minutes  · If the bleeding stops, continue to sit quietly, keeping your wrist straight for 2 hours.  Notify physician as soon as possible ( [phone])  · If bleeding does not stop after 10 minutes, or if there is a large amount of bleeding or spurting, call 911 immediately.  Do not drive yourself to the hospital.

## 2019-02-28 NOTE — PROGRESS NOTES
4592   PATIENT RECEIVED FROM CATH LAB,  S/P CLEAN LEFT HEART CATH VIA RIGHT WRIST.  PATIENT IS A/A/OX4.  DENIES ANY PAIN.  TR BAND INTACT.  DR DESAI SPOKE TO .  REPORT GIVEN TO BLOSSOM MOREIRA WHO'S TAKING OVER PATIENT'S CARE @ THIS TIME.

## 2019-02-28 NOTE — OR NURSING
1600 Received report from LILLIE Martino. Pt AAOx4. VSS. Denies pain and nausea. Right TR Band CDI. CMS intact. Pt denies numbness/tingling. Belongings returned to pt bedside.  brought to bedside and given POC update.     1700 2cc air removed from TR band. CDI with no bleeding.   1715 2cc air removed from TR band. CDI with no bleeding.   1730 3cc air removed from TR band. CDI with no bleeding.   1745 3cc air removed from TR band. CDI with no bleeding.   1800 3cc air removed from TR band. CDI with no bleeding. TR band removed and replaced with tegaderm, gauze and coban wrap. CMS intact. Pt denies numbness/tinging.     1812 Pt given discharge instructions. Discussed diet, activity, follow-up, symptoms and management, and prescriptions provided. IV d/c'd. All questions answered.    1848 Pt discharged to family via wheelchair with PPU RN. All belongings with pt.

## 2019-03-07 ENCOUNTER — ANTICOAGULATION VISIT (OUTPATIENT)
Dept: MEDICAL GROUP | Facility: PHYSICIAN GROUP | Age: 69
End: 2019-03-07
Payer: MEDICARE

## 2019-03-07 DIAGNOSIS — Z79.01 CHRONIC ANTICOAGULATION: ICD-10-CM

## 2019-03-07 LAB — INR PPP: 1.4 (ref 2–3.5)

## 2019-03-07 PROCEDURE — 85610 PROTHROMBIN TIME: CPT | Performed by: PHYSICIAN ASSISTANT

## 2019-03-07 PROCEDURE — 99211 OFF/OP EST MAY X REQ PHY/QHP: CPT | Performed by: INTERNAL MEDICINE

## 2019-03-07 NOTE — PROGRESS NOTES
OP Anticoagulation Service Note    Date: 3/7/2019  There were no vitals filed for this visit.    Anticoagulation Summary  As of 3/7/2019    INR goal:   2.0-3.0   TTR:   58.5 % (3.7 y)   INR used for dosin.4! (3/7/2019)   Warfarin maintenance plan:   5 mg (5 mg x 1) every Mon, Thu; 2.5 mg (5 mg x 0.5) all other days   Weekly warfarin total:   22.5 mg   Plan last modified:   Brice Garcia, PharmD (3/7/2019)   Next INR check:   3/14/2019   Target end date:   Indefinite    Indications    Atrial fibrillation (HCC) (Resolved) [I48.91]  Atrial fibrillation (HCC) (Resolved) [I48.91]  Chronic anticoagulation [Z79.01]             Anticoagulation Episode Summary     [facility] check location[facility]   [facility]    Preferred lab:       Send INR reminders to:       Comments:         Anticoagulation Care Providers     Provider Role Specialty Phone number    Florian Carnes M.D. Referring Cardiology [phone]    [facility] Responsible  [phone]        Anticoagulation Patient Findings      HPI:   Rachelle Reina seen in clinic today, they are here today for a INR check on anticoagulation therapy with warfarin because they have afib    The reason for today's visit is to prevent morbidity and mortality from a stroke  and to reduce the risk of bleeding while on a anticoagulant.     Additional education provided today regarding reducing bleed risk and dietary constraints:  About how vitamin K and foods work with warfarin and the bleeding risk on a anticoagulant     Any upcoming procedures:   none    Confirmed warfarin dosing regimen  Interval Changes with foods rich in vitamin K: No  Interval Changes in ETOH:   No  Interval Changes in smoking status:  No  Interval Changes in medication: She has been on allopurinol and probenecid for the past few weeks generally this would increase INR if anything.  Cost restriction:  No    S/S of bleeding or bruising:  No  Signs/symptoms  thrombosis since the last  appt:  No  Bleed risk is:  moderate,     3 vitals included with today's appt :  (BP, HR, weight, ht, RR)     Assessment:   INR  sub-therapeutic.     a change is needed today because INR is out of range.      They have a TTR of 58.5  which is not at target (TTR target/goal is 100%) and requires close follow up to prevent a adverse event (the lower the TTR the higher risk of clots, strokes, or bleeding).       Plan:  Increase weekly warfarin dose as noted      Follow up:  Follow up appointment in 1 week(s)       Other info:  Pt educated to contact our clinic with any changes in medications or s/s of bleeding or thrombosis    CHEST guidelines recommend frequent INR monitoring at regular intervals (a few days up to a max of 12 weeks) to ensure they are on the proper dose of warfarin and not having any complications from therapy.  INRs can dramatically change over a short time period due to diet, medications, and medical conditions.

## 2019-03-14 ENCOUNTER — ANTICOAGULATION VISIT (OUTPATIENT)
Dept: MEDICAL GROUP | Facility: PHYSICIAN GROUP | Age: 69
End: 2019-03-14
Payer: MEDICARE

## 2019-03-14 DIAGNOSIS — Z79.01 CHRONIC ANTICOAGULATION: ICD-10-CM

## 2019-03-14 LAB — INR PPP: 1.9 (ref 2–3.5)

## 2019-03-14 PROCEDURE — 99211 OFF/OP EST MAY X REQ PHY/QHP: CPT | Performed by: INTERNAL MEDICINE

## 2019-03-14 PROCEDURE — 85610 PROTHROMBIN TIME: CPT | Performed by: INTERNAL MEDICINE

## 2019-03-14 NOTE — PROGRESS NOTES
OP Anticoagulation Service Note    Date: 3/14/2019  There were no vitals filed for this visit.    Anticoagulation Summary  As of 3/14/2019    INR goal:   2.0-3.0   TTR:   58.3 % (3.7 y)   INR used for dosin.9! (3/14/2019)   Warfarin maintenance plan:   5 mg (5 mg x 1) every Mon, Wed, Fri; 2.5 mg (5 mg x 0.5) all other days   Weekly warfarin total:   25 mg   Plan last modified:   Brice Garcia, PharmD (3/14/2019)   Next INR check:   3/21/2019   Target end date:   Indefinite    Indications    Atrial fibrillation (HCC) (Resolved) [I48.91]  Atrial fibrillation (HCC) (Resolved) [I48.91]  Chronic anticoagulation [Z79.01]             Anticoagulation Episode Summary     [facility]   [facility]    Preferred lab:       Send INR reminders to:       Comments:         Anticoagulation Care Providers     Provider Role Specialty Phone number    Florian Carnes M.D. Referring Cardiology [phone]    [facility] Responsible  [phone]        Anticoagulation Patient Findings      HPI:   Rachelle Reina seen in clinic today, they are here today for a INR check on anticoagulation therapy with warfarin because they have afib    The reason for today's visit is to prevent morbidity and mortality from a stroke  and to reduce the risk of bleeding while on a anticoagulant.     Additional education provided today regarding reducing bleed risk and dietary constraints:  About how vitamin K and foods work with warfarin and the bleeding risk on a anticoagulant     Any upcoming procedures:   none    Confirmed warfarin dosing regimen  Interval Changes with foods rich in vitamin K: No  Interval Changes in ETOH:   No  Interval Changes in smoking status:  No  Interval Changes in medication:  No   Cost restriction:  No    S/S of bleeding or bruising:  No  Signs/symptoms  thrombosis since the last appt:  No  Bleed risk is:  moderate,     3 vitals included with today's appt :  (BP, HR, weight, ht, RR)      Assessment:   INR  sub-therapeutic.      a change is needed today because INR is out of range.      They have a TTR of 58.3  which is not at target (TTR target/goal is 100%) and requires close follow up to prevent a adverse event (the lower the TTR the higher risk of clots, strokes, or bleeding).       Plan:  Increase weekly warfarin dose as noted      Follow up:  Follow up appointment in 1 week(s)       Other info:  Pt educated to contact our clinic with any changes in medications or s/s of bleeding or thrombosis    CHEST guidelines recommend frequent INR monitoring at regular intervals (a few days up to a max of 12 weeks) to ensure they are on the proper dose of warfarin and not having any complications from therapy.  INRs can dramatically change over a short time period due to diet, medications, and medical conditions.

## 2019-03-21 ENCOUNTER — ANTICOAGULATION VISIT (OUTPATIENT)
Dept: MEDICAL GROUP | Facility: PHYSICIAN GROUP | Age: 69
End: 2019-03-21
Payer: MEDICARE

## 2019-03-21 DIAGNOSIS — Z79.01 CHRONIC ANTICOAGULATION: Primary | ICD-10-CM

## 2019-03-21 LAB — INR PPP: 1.8 (ref 2–3.5)

## 2019-03-21 PROCEDURE — 85610 PROTHROMBIN TIME: CPT | Performed by: INTERNAL MEDICINE

## 2019-03-21 PROCEDURE — 99211 OFF/OP EST MAY X REQ PHY/QHP: CPT | Performed by: INTERNAL MEDICINE

## 2019-03-21 NOTE — PROGRESS NOTES
OP Anticoagulation Service Note    Date: 3/21/2019  There were no vitals filed for this visit.    Anticoagulation Summary  As of 3/21/2019    INR goal:   2.0-3.0   TTR:   58.0 % (3.8 y)   INR used for dosin.8! (3/21/2019)   Warfarin maintenance plan:   2.5 mg (5 mg x 0.5) every Mon, Wed, Fri; 5 mg (5 mg x 1) all other days   Weekly warfarin total:   27.5 mg   Plan last modified:   Brice Garcia, PharmD (3/21/2019)   Next INR check:   2019   Target end date:   Indefinite    Indications    Atrial fibrillation (HCC) (Resolved) [I48.91]  Atrial fibrillation (HCC) (Resolved) [I48.91]  Chronic anticoagulation [Z79.01]             Anticoagulation Episode Summary     [facility]   [facility]    Preferred lab:       Send INR reminders to:       Comments:         Anticoagulation Care Providers     Provider Role Specialty Phone number    Florian Carnes M.D. Referring Cardiology [phone]    [facility] Anticoagulation Services Responsible  [phone]        Anticoagulation Patient Findings      HPI:   Rachelle Reina seen in clinic today, they are here today for a INR check on anticoagulation therapy with warfarin because they have afib    The reason for today's visit is to prevent morbidity and mortality from a stroke  and to reduce the risk of bleeding while on a anticoagulant.     Additional education provided today regarding reducing bleed risk and dietary constraints:  About how vitamin K and foods work with warfarin and the bleeding risk on a anticoagulant     Any upcoming procedures:   none    Confirmed warfarin dosing regimen  Interval Changes with foods rich in vitamin K: No  Interval Changes in ETOH:   No  Interval Changes in smoking status:  No  Interval Changes in medication:  No   Cost restriction:  No    S/S of bleeding or bruising:  No  Signs/symptoms  thrombosis since the last appt:  No  Bleed risk is:  moderate,     3 vitals included with today's appt :  (BP, HR, weight, ht,  RR)     Assessment:   INR  sub-therapeutic.     a change is needed today because INR is out of range.      They have a TTR of 58  which is not at target (TTR target/goal is 100%) and requires close follow up to prevent a adverse event (the lower the TTR the higher risk of clots, strokes, or bleeding).       Plan:  Increase weekly warfarin dose as noted      Follow up:  Follow up appointment in 2 week(s) per pt      Other info:  Pt educated to contact our clinic with any changes in medications or s/s of bleeding or thrombosis    CHEST guidelines recommend frequent INR monitoring at regular intervals (a few days up to a max of 12 weeks) to ensure they are on the proper dose of warfarin and not having any complications from therapy.  INRs can dramatically change over a short time period due to diet, medications, and medical conditions.

## 2019-05-09 ENCOUNTER — ANTICOAGULATION VISIT (OUTPATIENT)
Dept: MEDICAL GROUP | Facility: PHYSICIAN GROUP | Age: 69
End: 2019-05-09
Payer: MEDICARE

## 2019-05-09 DIAGNOSIS — Z79.01 CHRONIC ANTICOAGULATION: ICD-10-CM

## 2019-05-09 LAB — INR PPP: 3 (ref 2–3.5)

## 2019-05-09 PROCEDURE — 93793 ANTICOAG MGMT PT WARFARIN: CPT | Performed by: INTERNAL MEDICINE

## 2019-05-09 PROCEDURE — 85610 PROTHROMBIN TIME: CPT | Performed by: INTERNAL MEDICINE

## 2019-05-09 RX ORDER — TRAZODONE HYDROCHLORIDE 100 MG/1
50 TABLET ORAL NIGHTLY
Status: ON HOLD | COMMUNITY
End: 2022-01-01

## 2019-05-09 NOTE — PROGRESS NOTES
OP Anticoagulation Service Note    Date: 5/9/2019  There were no vitals filed for this visit.    Anticoagulation Summary  As of 5/9/2019    INR goal:   2.0-3.0   TTR:   58.8 % (3.9 y)   INR used for dosing:   3.00 (5/9/2019)   Warfarin maintenance plan:   2.5 mg (5 mg x 0.5) every Mon, Wed, Fri; 5 mg (5 mg x 1) all other days   Weekly warfarin total:   27.5 mg   Plan last modified:   Brice Garcia, PharmD (3/21/2019)   Next INR check:   5/31/2019   Target end date:   Indefinite    Indications    Atrial fibrillation (HCC) (Resolved) [I48.91]  Atrial fibrillation (HCC) (Resolved) [I48.91]  Chronic anticoagulation [Z79.01]             Anticoagulation Episode Summary     [facility]   [facility]    Preferred lab:       Send INR reminders to:       Comments:         Anticoagulation Care Providers     Provider Role Specialty Phone number    Florian Carnes M.D. Referring Cardiology [phone]    [facility] Responsible  [phone]        Anticoagulation Patient Findings      HPI:   Rachelle Reina seen in clinic today, they are here today for a INR check on anticoagulation therapy with warfarin because they have afib    The reason for today's visit is to prevent morbidity and mortality from a stroke  and to reduce the risk of bleeding while on a anticoagulant.     Dose the patient in the medical group have a [facility] primary care provider and proper insurance:  Daniel Fernández M.D.  [address]  [place] [address]      Additional education provided today regarding reducing bleed risk and dietary constraints:  About how vitamin K and foods work with warfarin and the bleeding risk on a anticoagulant     Any upcoming procedures:   none    Confirmed warfarin dosing regimen  Interval Changes with foods rich in vitamin K: No  Interval Changes in ETOH:   No  Interval Changes in smoking status:  No  Interval Changes in medication:  No   Cost restriction:  No    S/S of  bleeding or bruising:  No  Signs/symptoms  thrombosis since the last appt:  No  Bleed risk is:  moderate,     3 vitals included with today's appt :  (BP, HR, weight, ht, RR)     Assessment:   INR  therapeutic.      no change is needed today because INR is in range.      They have a TTR of 58.8  which is not at target (TTR target/goal is 100%) and requires close follow up to prevent a adverse event (the lower the TTR the higher risk of clots, strokes, or bleeding).       Plan:  Continue weekly warfarin dose as noted      Follow up:  Follow up appointment in 4 week(s)       Other info:  Pt educated to contact our clinic with any changes in medications or s/s of bleeding or thrombosis    CHEST guidelines recommend frequent INR monitoring at regular intervals (a few days up to a max of 12 weeks) to ensure they are on the proper dose of warfarin and not having any complications from therapy.  INRs can dramatically change over a short time period due to diet, medications, and medical conditions.

## 2019-05-31 ENCOUNTER — ANTICOAGULATION VISIT (OUTPATIENT)
Dept: VASCULAR LAB | Facility: MEDICAL CENTER | Age: 69
End: 2019-05-31
Attending: INTERNAL MEDICINE
Payer: MEDICARE

## 2019-05-31 DIAGNOSIS — Z79.01 CHRONIC ANTICOAGULATION: ICD-10-CM

## 2019-05-31 LAB
INR BLD: 2.5 (ref 0.9–1.2)
INR PPP: 2.5 (ref 2–3.5)

## 2019-05-31 PROCEDURE — 99211 OFF/OP EST MAY X REQ PHY/QHP: CPT

## 2019-05-31 PROCEDURE — 85610 PROTHROMBIN TIME: CPT

## 2019-05-31 NOTE — PROGRESS NOTES
Anticoagulation Summary  As of 2019    INR goal:   2.0-3.0   TTR:   59.4 % (3.9 y)   INR used for dosin.50 (2019)   Warfarin maintenance plan:   2.5 mg (5 mg x 0.5) every Mon, Wed, Fri; 5 mg (5 mg x 1) all other days   Weekly warfarin total:   27.5 mg   No change documented:   Karen Jiménez   Plan last modified:   Brice Garcia, PharmD (3/21/2019)   Next INR check:   2019   Target end date:   Indefinite    Indications    Atrial fibrillation (HCC) (Resolved) [I48.91]  Atrial fibrillation (HCC) (Resolved) [I48.91]  Chronic anticoagulation [Z79.01]             Anticoagulation Episode Summary     INR check location:   [facility]    Preferred lab:       Send INR reminders to:       Comments:         Anticoagulation Care Providers     Provider Role Specialty Phone number    Florian Carnes M.D. Referring Cardiology [phone]    [facility] Responsible  [phone]        Anticoagulation Patient Findings  Patient Findings     Negatives:   Signs/symptoms of thrombosis, Signs/symptoms of bleeding, Laboratory test error suspected, Change in health, Change in alcohol use, Change in activity, Upcoming invasive procedure, Emergency department visit, Upcoming dental procedure, Missed doses, Extra doses, Change in medications, Change in diet/appetite, Hospital admission, Bruising, Other complaints          HPI:  Rachelle Middletonnandez seen in clinic today, on anticoagulation therapy with warfarin for AF.  Changes to current medical/health status since last appt: none  Denies signs/symptoms of bleeding and/or thrombosis since the last appt.    Denies any interval changes to diet  Denies any interval changes to medications since last appt.   Denies any complications or cost restrictions with current therapy.   BP declined today.  Confirmed current dosing regimen.     Patient's previous INR was therapeutic at 3.0 on 19, at which time patient was instructed to continue  with current warfarin regimen. She returns to clinic today to recheck INR to ensure it is therapeutic and thus preventing possible clotting and/or bleeding/bruising complications.    A/P   INR is therapeutic today at 2.5.  Patient instructed to continue with the current warfarin dosing regimen, and asked to follow up again in 4 weeks.    Next appt: Friday, June 28, 2019  11:45am    Lavern Jiménez PharmD

## 2019-06-05 ENCOUNTER — OFFICE VISIT (OUTPATIENT)
Dept: URGENT CARE | Facility: PHYSICIAN GROUP | Age: 69
End: 2019-06-05
Payer: MEDICARE

## 2019-06-05 VITALS
HEIGHT: 65 IN | WEIGHT: 254 LBS | HEART RATE: 94 BPM | OXYGEN SATURATION: 95 % | DIASTOLIC BLOOD PRESSURE: 68 MMHG | TEMPERATURE: 99 F | SYSTOLIC BLOOD PRESSURE: 142 MMHG | BODY MASS INDEX: 42.32 KG/M2

## 2019-06-05 DIAGNOSIS — L08.9 SKIN INFECTION: ICD-10-CM

## 2019-06-05 DIAGNOSIS — I87.2 VENOUS STASIS DERMATITIS OF LEFT LOWER EXTREMITY: ICD-10-CM

## 2019-06-05 PROCEDURE — 99214 OFFICE O/P EST MOD 30 MIN: CPT | Performed by: PHYSICIAN ASSISTANT

## 2019-06-05 RX ORDER — CEPHALEXIN 500 MG/1
500 CAPSULE ORAL 4 TIMES DAILY
Qty: 28 CAP | Refills: 0 | Status: SHIPPED | OUTPATIENT
Start: 2019-06-05 | End: 2019-06-12

## 2019-06-05 ASSESSMENT — ENCOUNTER SYMPTOMS
NEUROLOGICAL NEGATIVE: 1
MUSCULOSKELETAL NEGATIVE: 1
RESPIRATORY NEGATIVE: 1
LEG SWELLING: 1
NUMBNESS: 0
JOINT SWELLING: 0
ABDOMINAL PAIN: 0
PALPITATIONS: 0
FEVER: 0
FATIGUE: 0
CHILLS: 0
COUGH: 0
NAUSEA: 0
VOMITING: 0
WEAKNESS: 0

## 2019-06-05 NOTE — PROGRESS NOTES
Subjective:      Rachelle Reina is a 68 y.o. female who presents with Wound Infection (Possible infection on left shin.  Abrasion 3 weeks ago still oozing.)            Chronic leg swelling with venous stasis.  She is developed an ulcer on left lower leg with some surrounding erythema.  Oozing a clear fluid.  Denies fever, chills, chest pain, shortness of breath      Leg Swelling   This is a chronic problem. The current episode started in the past 7 days. The problem occurs constantly. The problem has been unchanged. Pertinent negatives include no abdominal pain, chest pain, chills, coughing, fatigue, fever, joint swelling, nausea, numbness, urinary symptoms, vomiting or weakness. Nothing aggravates the symptoms. She has tried nothing for the symptoms. The treatment provided no relief.         PMH:  has a past medical history of A-fib ([facility]); Arthritis; Atrial fibrillation ([facility]); Benign essential hypertension (7/1/2011); Bowel habit changes; Breast cancer ([facility]); Bronchitis (2015); CAD (coronary artery disease); Cancer ([facility]) (1987, 2000); Cataract; Diabetes ([facility]); Diastolic congestive heart failure ([facility]) (7/1/2011); Disorder of thyroid; High cholesterol; History of cardiac catheterization (4/22/2010); History of echocardiogram (4/22/2011); History of hypothyroidism (8/29/2008); Hypercholesteremia (7/1/2011); Long term (current) use of anticoagulants (7/1/2011); Morbid obesity ([facility]) (10/28/2008); Sleep apnea; Snoring; and Urinary incontinence.  MEDS:   Current Outpatient Prescriptions:   •  cephALEXin (KEFLEX) 500 MG Cap, Take 1 Cap by mouth 4 times a day for 7 days., Disp: 28 Cap, Rfl: 0  •  traZODone (DESYREL) 50 MG Tab, Take 50 mg by mouth every evening., Disp: , Rfl:   •  oxybutynin SR (DITROPAN-XL) 10 MG CR tablet, Take 5 mg by mouth every day., Disp: , Rfl:   •  allopurinol (ZYLOPRIM) 300 MG Tab, Take 300 mg by mouth every day., Disp: , Rfl:   •  Acetaminophen (TYLENOL ARTHRITIS PAIN PO), Take  by mouth as  needed., Disp: , Rfl:   •  pioglitazone (ACTOS) 15 MG Tab, Take 15 mg by mouth every day., Disp: , Rfl:   •  rosuvastatin (CRESTOR) 20 MG Tab, TK 1 T PO ONCE D IN THE EVENING, Disp: , Rfl: 1  •  SYNTHROID 200 MCG Tab, TK 1 T PO QOD, Disp: , Rfl: 2  •  glipiZIDE (GLUCOTROL) 5 MG Tab, TK 1/2 T PO BID, Disp: , Rfl: 1  •  Insulin Detemir (LEVEMIR FLEXPEN SC), Inject 29 Units as instructed., Disp: , Rfl:   •  warfarin (COUMADIN) 5 MG Tab, Take one-half to one (1/2-1) tablets daily as directed by [facility], Disp: 90 Tab, Rfl: 1  •  levothyroxine (SYNTHROID) 175 MCG Tab, Take 175 mcg by mouth every 48 hours., Disp: , Rfl:   •  gemfibrozil (LOPID) 600 MG Tab, Take 600 mg by mouth every day., Disp: , Rfl:   •  Cholecalciferol (VITAMIN D PO), Take 5,000 Units by mouth every 48 hours., Disp: , Rfl:   •  loratadine (CLARITIN) 10 MG Tab, Take 10 mg by mouth every day., Disp: , Rfl:   •  metoprolol (LOPRESSOR) 50 MG TABS, Take 50 mg by mouth 3 times a day., Disp: , Rfl:   •  citalopram (CELEXA) 20 MG TABS, Take 20 mg by mouth every day., Disp: , Rfl:   •  losartan-hydrochlorothiazide (HYZAAR) 100-25 MG per tablet, Take 1 Tab by mouth every day.  , Disp: , Rfl:   ALLERGIES:   Allergies   Allergen Reactions   • Sulfa Drugs Rash     8/2015       SURGHX:   Past Surgical History:   Procedure Laterality Date   • CATARACT PHACO WITH IOL Right 1/22/2018    Procedure: CATARACT PHACO WITH IOL;  Surgeon: Santosh Holden M.D.;  Location: SURGERY SAME DAY [facility];  Service: Ophthalmology   • CATARACT PHACO WITH IOL Left 1/8/2018    Procedure: CATARACT PHACO WITH IOL;  Surgeon: Santosh Holden M.D.;  Location: SURGERY SAME DAY [facility];  Service: Ophthalmology   • OTHER ORTHOPEDIC SURGERY Right 2013    hip arthroplasty   • CHOLECYSTECTOMY  2001   • ABDOMINAL HYSTERECTOMY TOTAL      1987   • MASTECTOMY  partial   • PB RADIATION THERAPY PLAN SIMPLE       SOCHX:  reports that she has never smoked. She has never used  "smokeless tobacco. She reports that she drinks alcohol. She reports that she does not use drugs.  FH: family history includes Cancer in her maternal aunt; Heart Disease in her father and mother.    Review of Systems   Constitutional: Negative for chills, fatigue and fever.   Respiratory: Negative.  Negative for cough.    Cardiovascular: Positive for leg swelling. Negative for chest pain and palpitations.   Gastrointestinal: Negative for abdominal pain, nausea and vomiting.   Musculoskeletal: Negative.  Negative for joint swelling.   Neurological: Negative.  Negative for weakness and numbness.       Medications, Allergies, and current problem list reviewed today in Epic     Objective:     /68   Pulse 94   Temp 37.2 °C (99 °F) (Temporal)   Ht 1.651 m (5' 5\")   Wt 115.2 kg (254 lb)   LMP 01/05/1987 (Approximate)   SpO2 95%   BMI 42.27 kg/m²      Physical Exam   Constitutional: She is oriented to person, place, and time. She appears well-developed and well-nourished. No distress.   HENT:   Head: Normocephalic and atraumatic.   Eyes: Conjunctivae and EOM are normal.   Neck: Normal range of motion. Neck supple.   Cardiovascular: Normal rate, regular rhythm and normal heart sounds.    Pulmonary/Chest: Effort normal and breath sounds normal. No respiratory distress. She has no wheezes.   Neurological: She is alert and oriented to person, place, and time.   Skin: Skin is warm and dry. She is not diaphoretic.        Chronic lower extremity pitting edema.  Venous stasis of both legs noted left worse than right.  She does have 2 small open lesions on the left lower extremity oozing a clear fluid.  Some surrounding erythema and tenderness.  Range of motion and surrounding joints at baseline   Psychiatric: She has a normal mood and affect. Her behavior is normal. Judgment and thought content normal.   Nursing note and vitals reviewed.              Assessment/Plan:     1. Venous stasis dermatitis of left lower " extremity     2. Skin infection  cephALEXin (KEFLEX) 500 MG Cap     Skin infection secondary to chronic conditions.  Follow-up with vascular medicine  OTC meds and conservative measures as discussed  Return to clinic or go to ED if symptoms worsen or persist. Indications for ED discussed at length. Patient voices understanding. Follow-up with your primary care provider in 3-5 days. Red flags discussed. All side effects of medication discussed including allergic response, GI upset, tendon injury, etc.    Please note that this dictation was created using voice recognition software. I have made every reasonable attempt to correct obvious errors, but I expect that there are errors of grammar and possibly content that I did not discover before finalizing the note.

## 2019-06-19 ENCOUNTER — OFFICE VISIT (OUTPATIENT)
Dept: CARDIOLOGY | Facility: MEDICAL CENTER | Age: 69
End: 2019-06-19
Payer: MEDICARE

## 2019-06-19 VITALS
OXYGEN SATURATION: 92 % | HEIGHT: 65 IN | DIASTOLIC BLOOD PRESSURE: 56 MMHG | HEART RATE: 78 BPM | WEIGHT: 285 LBS | SYSTOLIC BLOOD PRESSURE: 92 MMHG | BODY MASS INDEX: 47.48 KG/M2

## 2019-06-19 DIAGNOSIS — R60.0 LOCALIZED EDEMA: ICD-10-CM

## 2019-06-19 DIAGNOSIS — I87.2 CHRONIC VENOUS INSUFFICIENCY: ICD-10-CM

## 2019-06-19 DIAGNOSIS — I50.32 CHRONIC DIASTOLIC CONGESTIVE HEART FAILURE (HCC): Chronic | ICD-10-CM

## 2019-06-19 DIAGNOSIS — Z98.890 HISTORY OF CARDIAC CATHETERIZATION: Chronic | ICD-10-CM

## 2019-06-19 DIAGNOSIS — I48.20 CHRONIC ATRIAL FIBRILLATION (HCC): ICD-10-CM

## 2019-06-19 PROCEDURE — 99214 OFFICE O/P EST MOD 30 MIN: CPT | Performed by: NURSE PRACTITIONER

## 2019-06-19 RX ORDER — METOPROLOL TARTRATE 50 MG/1
50 TABLET, FILM COATED ORAL 2 TIMES DAILY
Qty: 180 TAB | Refills: 3 | Status: SHIPPED | OUTPATIENT
Start: 2019-06-19 | End: 2019-11-13

## 2019-06-19 ASSESSMENT — ENCOUNTER SYMPTOMS
COUGH: 0
MYALGIAS: 0
WEAKNESS: 0
DIZZINESS: 0
PND: 0
CLAUDICATION: 0
ORTHOPNEA: 0
ABDOMINAL PAIN: 0
SHORTNESS OF BREATH: 0
PALPITATIONS: 0

## 2019-06-19 NOTE — LETTER
[facility]   [address] [place] [address]  [place] [address]  Phone: [phone]  Fax: [phone]              Rachelle Reina  1950    Encounter Date: 6/19/2019    NICK Clarke          PROGRESS NOTE:  Chief Complaint   Patient presents with   • Edema     follow up angio results       Subjective:   Rachelle Reina is a 68 y.o. female who presents today with her , Antione, to follow-up on ankle edema and shortness of breath.    She complains of ankle edema that has been chronic.  What she is concerned about today is that there is some breaks in the skin and she has been using clear liquid.  She also has noticed that the left side of her abdomen is larger than the right.  She did not notice this previously.  She has been seen by [facility] for these problems and they are evaluating her with a CAT scan.    She complains that she feels like she cannot get air into the right side of her chest.  She denies any true shortness of breath but just feels like the air is not getting into her right upper lung.  She denies any chest tightness, heaviness or pressure.  No palpitations.  She is able to sleep with the bed flat.    She has a history of chronic diastolic heart failure but has not needed any diuretics with exception of hydrochlorothiazide in combination with her losartan.  She also has paroxysmal atrial fibrillation.  She is in need of a knee replacement but is unable to have the surgery until her BMI is less than 40.  She did undergo cardiac catheterization in February as a preop.  She had no obstructive coronary artery disease.      Past Medical History:   Diagnosis Date   • A-fib (HCC)    • Arthritis     knees   • Atrial fibrillation (HCC)    • Benign essential hypertension 7/1/2011   • Bowel habit changes     diarrhea   • Breast cancer (HCC)    • Bronchitis 2015    history of   • CAD (coronary artery disease)    • Cancer (HCC) 1987,  2000    breast   • Cataract    • Diabetes (HCC)     oral medication, insulin   • Diastolic congestive heart failure (HCC) 7/1/2011    patient denies   • Disorder of thyroid    • High cholesterol    • History of cardiac catheterization 4/22/2010   • History of echocardiogram 4/22/2011   • History of hypothyroidism 8/29/2008   • Hypercholesteremia 7/1/2011   • Long term (current) use of anticoagulants 7/1/2011   • Morbid obesity (HCC) 10/28/2008   • Sleep apnea     pt does not use cpap   • Snoring     sleep study done   • Urinary incontinence      Past Surgical History:   Procedure Laterality Date   • ZZZ CARDIAC CATH  02/27/2019    normal coronaries   • CATARACT PHACO WITH IOL Right 1/22/2018    Procedure: CATARACT PHACO WITH IOL;  Surgeon: Santosh Holden M.D.;  Location: SURGERY SAME DAY [facility];  Service: Ophthalmology   • CATARACT PHACO WITH IOL Left 1/8/2018    Procedure: CATARACT PHACO WITH IOL;  Surgeon: Santosh Holden M.D.;  Location: SURGERY SAME DAY [facility];  Service: Ophthalmology   • OTHER ORTHOPEDIC SURGERY Right 2013    hip arthroplasty   • CHOLECYSTECTOMY  2001   • ABDOMINAL HYSTERECTOMY TOTAL      1987   • MASTECTOMY  partial   • PB RADIATION THERAPY PLAN SIMPLE       Family History   Problem Relation Age of Onset   • Heart Disease Mother         HEART VALVE REPLACEMENT.   • Heart Disease Father         CORONARY ARTERY BYPASS GRAFTS.   • Cancer Maternal Aunt      Social History     Social History   • Marital status:      Spouse name: N/A   • Number of children: N/A   • Years of education: N/A     Occupational History   • Not on file.     Social History Main Topics   • Smoking status: Never Smoker   • Smokeless tobacco: Never Used   • Alcohol use Yes      Comment: 1 or 2 drinks a month   • Drug use: No   • Sexual activity: Not on file     Other Topics Concern   • Not on file     Social History Narrative   • No narrative on file     Allergies   Allergen Reactions   • Sulfa Drugs Rash        8/2015       Outpatient Encounter Prescriptions as of 6/19/2019   Medication Sig Dispense Refill   • metoprolol (LOPRESSOR) 50 MG Tab Take 1 Tab by mouth 2 times a day. 180 Tab 3   • traZODone (DESYREL) 50 MG Tab Take 50 mg by mouth every evening.     • oxybutynin SR (DITROPAN-XL) 10 MG CR tablet Take 10 mg by mouth every day.     • allopurinol (ZYLOPRIM) 300 MG Tab Take 150 mg by mouth every day.     • pioglitazone (ACTOS) 15 MG Tab Take 15 mg by mouth every day.     • rosuvastatin (CRESTOR) 20 MG Tab TK 1 T PO ONCE D IN THE EVENING  1   • SYNTHROID 200 MCG Tab TK 1 T PO QOD  2   • glipiZIDE (GLUCOTROL) 5 MG Tab TK 1/2 T PO BID  1   • Insulin Detemir (LEVEMIR FLEXPEN SC) Inject 16 Units as instructed.     • warfarin (COUMADIN) 5 MG Tab Take one-half to one (1/2-1) tablets daily as directed by [facility] 90 Tab 1   • levothyroxine (SYNTHROID) 175 MCG Tab Take 175 mcg by mouth every 48 hours.     • gemfibrozil (LOPID) 600 MG Tab Take 600 mg by mouth every day.     • Cholecalciferol (VITAMIN D PO) Take 5,000 Units by mouth every 48 hours.     • loratadine (CLARITIN) 10 MG Tab Take 10 mg by mouth every day.     • citalopram (CELEXA) 20 MG TABS Take 20 mg by mouth every day.     • losartan-hydrochlorothiazide (HYZAAR) 100-25 MG per tablet Take 1 Tab by mouth every day.       • Acetaminophen (TYLENOL ARTHRITIS PAIN PO) Take  by mouth as needed.     • [DISCONTINUED] metoprolol (LOPRESSOR) 50 MG TABS Take 50 mg by mouth 3 times a day.       No facility-administered encounter medications on file as of 6/19/2019.      Review of Systems   Constitutional: Negative for malaise/fatigue.   Respiratory: Negative for cough and shortness of breath.         Feeling of not getting air into the right upper lung.   Cardiovascular: Positive for leg swelling (Chronic with some oozing from her left ankle.). Negative for chest pain, palpitations, orthopnea, claudication and PND.   Gastrointestinal: Negative for  "abdominal pain.        Left side of her abdominal pannus larger than left.  No pain.   Musculoskeletal: Negative for myalgias.   Neurological: Negative for dizziness and weakness.        Objective:   BP (!) 92/56 (BP Location: Left arm, Patient Position: Sitting)   Pulse 78   Ht 1.651 m (5' 5\")   Wt (!) 129.3 kg (285 lb)   LMP 01/05/1987 (Approximate)   SpO2 92%   BMI 47.43 kg/m²      Physical Exam   Constitutional: She is oriented to person, place, and time. She appears well-developed and well-nourished.   Obese female in no acute distress.   HENT:   Head: Normocephalic.   Eyes: EOM are normal.   Neck: No JVD present.   Cardiovascular: Normal rate and normal heart sounds.  An irregularly irregular rhythm present.   Pulmonary/Chest: Effort normal and breath sounds normal.   Abdominal: Soft. Bowel sounds are normal.   Her abdominal pannus is larger on the left.  There is no pitting edema although it feels slightly more firm than the right side.   Musculoskeletal: She exhibits edema (1-2+ bilateral pitting edema to pretibial area.  There is some minor nicks in the skin of the left shin area that are oozing a small amount of clear fluid.).   Neurological: She is alert and oriented to person, place, and time.   Skin: Skin is warm and dry.   Psychiatric: She has a normal mood and affect.     February 27, 2019: Cardiac catheterization:  POSTPROCEDURE DIAGNOSES:  1.  No angiographic evidence of coronary artery disease.  2.  Normal left ventricular systolic function with ejection fraction of 70%.  3.  Elevated left ventricular end-diastolic pressure.    Results for RAUL RODRIGUES (MRN [id number])    Ref. Range 2/27/2019 10:28   Sodium Latest Ref Range: 135 - 145 mmol/L 139   Potassium Latest Ref Range: 3.6 - 5.5 mmol/L 3.6   Chloride Latest Ref Range: 96 - 112 mmol/L 103   Co2 Latest Ref Range: 20 - 33 mmol/L 26   Anion Gap Latest Ref Range: 0.0 - 11.9  10.0   Glucose Latest Ref Range: 65 - 99 mg/dL 94   Bun " Latest Ref Range: 8 - 22 mg/dL 32 (H)   Creatinine Latest Ref Range: 0.50 - 1.40 mg/dL 0.95   GFR If  Latest Ref Range: >60 mL/min/1.73 m 2 >60   GFR If Non  Latest Ref Range: >60 mL/min/1.73 m 2 58 (A)   Calcium Latest Ref Range: 8.5 - 10.5 mg/dL 9.8   AST(SGOT) Latest Ref Range: 12 - 45 U/L 18   ALT(SGPT) Latest Ref Range: 2 - 50 U/L 11   Alkaline Phosphatase Latest Ref Range: 30 - 99 U/L 56   Total Bilirubin Latest Ref Range: 0.1 - 1.5 mg/dL 0.6   Albumin Latest Ref Range: 3.2 - 4.9 g/dL 3.8   Total Protein Latest Ref Range: 6.0 - 8.2 g/dL 6.9   Globulin Latest Ref Range: 1.9 - 3.5 g/dL 3.1   A-G Ratio Latest Units: g/dL 1.2       Assessment:     1. Localized edema     2. Chronic venous insufficiency     3. Chronic diastolic congestive heart failure (HCC)     4. Chronic atrial fibrillation (HCC)  metoprolol (LOPRESSOR) 50 MG Tab   5. History of cardiac catheterization         Medical Decision Making:  Today's Assessment / Status / Plan:   Edema: She has bilateral ankle edema with some oozing of her left lower leg.  Her blood pressure is too low for me to give her a diuretic therefore I will have her reduce fluids and sodium.  Also would like her to elevate her ankles and wear compression stockings if possible.    Chronic venous insufficiency: She is being followed by [facility].  She clearly has some chronic problems as she has hemosiderin staining by    Congestive heart failure, diastolic: Her lungs are clear.  She does have lower extremity edema which I think is venous insufficiency.  She has the sensation of not getting air into her right lung without shortness of breath.  Not sure of the cause but her lungs are clear.  She is reassured.    Atrial fibrillation: Good rate control.  However her blood pressure is very low therefore I will reduce metoprolol to 50 mg twice a day instead of 3 times a day.    Cardiac catheterization: No coronary artery disease.  She may undergo her  knee surgery when approved by other providers.    She will follow-up with Dr Carnes in 1 year for her cardiac problems.  She will follow-up with other providers for her venous insufficiency and abdominal swelling.    Collaborating Provider: Dr. Ruiz.    Please note that this dictation was created using voice recognition software. I have made every reasonable attempt to correct obvious errors, but it is possible there are errors of grammar and possibly content that I did not discover before finalizing the note.        No Recipients

## 2019-06-19 NOTE — PROGRESS NOTES
Chief Complaint   Patient presents with   • Edema     follow up angio results       Subjective:   Rachelle Reina is a 68 y.o. female who presents today with her , Antione, to follow-up on ankle edema and shortness of breath.    She complains of ankle edema that has been chronic.  What she is concerned about today is that there is some breaks in the skin and she has been using clear liquid.  She also has noticed that the left side of her abdomen is larger than the right.  She did not notice this previously.  She has been seen by [facility] [place] for these problems and they are evaluating her with a CAT scan.    She complains that she feels like she cannot get air into the right side of her chest.  She denies any true shortness of breath but just feels like the air is not getting into her right upper lung.  She denies any chest tightness, heaviness or pressure.  No palpitations.  She is able to sleep with the bed flat.    She has a history of chronic diastolic heart failure but has not needed any diuretics with exception of hydrochlorothiazide in combination with her losartan.  She also has paroxysmal atrial fibrillation.  She is in need of a knee replacement but is unable to have the surgery until her BMI is less than 40.  She did undergo cardiac catheterization in February as a preop.  She had no obstructive coronary artery disease.      Past Medical History:   Diagnosis Date   • A-fib (HCC)    • Arthritis     knees   • Atrial fibrillation (HCC)    • Benign essential hypertension 7/1/2011   • Bowel habit changes     diarrhea   • Breast cancer (HCC)    • Bronchitis 2015    history of   • CAD (coronary artery disease)    • Cancer (HCC) 1987, 2000    breast   • Cataract    • Diabetes (HCC)     oral medication, insulin   • Diastolic congestive heart failure (HCC) 7/1/2011    patient denies   • Disorder of thyroid    • High cholesterol    • History of cardiac catheterization 4/22/2010   • History of echocardiogram  4/22/2011   • History of hypothyroidism 8/29/2008   • Hypercholesteremia 7/1/2011   • Long term (current) use of anticoagulants 7/1/2011   • Morbid obesity (HCC) 10/28/2008   • Sleep apnea     pt does not use cpap   • Snoring     sleep study done   • Urinary incontinence      Past Surgical History:   Procedure Laterality Date   • ZZZ CARDIAC CATH  02/27/2019    normal coronaries   • CATARACT PHACO WITH IOL Right 1/22/2018    Procedure: CATARACT PHACO WITH IOL;  Surgeon: Santosh Holden M.D.;  Location: SURGERY SAME DAY [facility];  Service: Ophthalmology   • CATARACT PHACO WITH IOL Left 1/8/2018    Procedure: CATARACT PHACO WITH IOL;  Surgeon: Santosh Holden M.D.;  Location: SURGERY SAME DAY [facility];  Service: Ophthalmology   • OTHER ORTHOPEDIC SURGERY Right 2013    hip arthroplasty   • CHOLECYSTECTOMY  2001   • ABDOMINAL HYSTERECTOMY TOTAL      1987   • MASTECTOMY  partial   • PB RADIATION THERAPY PLAN SIMPLE       Family History   Problem Relation Age of Onset   • Heart Disease Mother         HEART VALVE REPLACEMENT.   • Heart Disease Father         CORONARY ARTERY BYPASS GRAFTS.   • Cancer Maternal Aunt      Social History     Social History   • Marital status:      Spouse name: N/A   • Number of children: N/A   • Years of education: N/A     Occupational History   • Not on file.     Social History Main Topics   • Smoking status: Never Smoker   • Smokeless tobacco: Never Used   • Alcohol use Yes      Comment: 1 or 2 drinks a month   • Drug use: No   • Sexual activity: Not on file     Other Topics Concern   • Not on file     Social History Narrative   • No narrative on file     Allergies   Allergen Reactions   • Sulfa Drugs Rash     8/2015       Outpatient Encounter Prescriptions as of 6/19/2019   Medication Sig Dispense Refill   • metoprolol (LOPRESSOR) 50 MG Tab Take 1 Tab by mouth 2 times a day. 180 Tab 3   • traZODone (DESYREL) 50 MG Tab Take 50 mg by mouth every evening.     • oxybutynin SR  (DITROPAN-XL) 10 MG CR tablet Take 10 mg by mouth every day.     • allopurinol (ZYLOPRIM) 300 MG Tab Take 150 mg by mouth every day.     • pioglitazone (ACTOS) 15 MG Tab Take 15 mg by mouth every day.     • rosuvastatin (CRESTOR) 20 MG Tab TK 1 T PO ONCE D IN THE EVENING  1   • SYNTHROID 200 MCG Tab TK 1 T PO QOD  2   • glipiZIDE (GLUCOTROL) 5 MG Tab TK 1/2 T PO BID  1   • Insulin Detemir (LEVEMIR FLEXPEN SC) Inject 16 Units as instructed.     • warfarin (COUMADIN) 5 MG Tab Take one-half to one (1/2-1) tablets daily as directed by [facility] 90 Tab 1   • levothyroxine (SYNTHROID) 175 MCG Tab Take 175 mcg by mouth every 48 hours.     • gemfibrozil (LOPID) 600 MG Tab Take 600 mg by mouth every day.     • Cholecalciferol (VITAMIN D PO) Take 5,000 Units by mouth every 48 hours.     • loratadine (CLARITIN) 10 MG Tab Take 10 mg by mouth every day.     • citalopram (CELEXA) 20 MG TABS Take 20 mg by mouth every day.     • losartan-hydrochlorothiazide (HYZAAR) 100-25 MG per tablet Take 1 Tab by mouth every day.       • Acetaminophen (TYLENOL ARTHRITIS PAIN PO) Take  by mouth as needed.     • [DISCONTINUED] metoprolol (LOPRESSOR) 50 MG TABS Take 50 mg by mouth 3 times a day.       No facility-administered encounter medications on file as of 6/19/2019.      Review of Systems   Constitutional: Negative for malaise/fatigue.   Respiratory: Negative for cough and shortness of breath.         Feeling of not getting air into the right upper lung.   Cardiovascular: Positive for leg swelling (Chronic with some oozing from her left ankle.). Negative for chest pain, palpitations, orthopnea, claudication and PND.   Gastrointestinal: Negative for abdominal pain.        Left side of her abdominal pannus larger than left.  No pain.   Musculoskeletal: Negative for myalgias.   Neurological: Negative for dizziness and weakness.        Objective:   BP (!) 92/56 (BP Location: Left arm, Patient Position: Sitting)   Pulse 78   " Ht 1.651 m (5' 5\")   Wt (!) 129.3 kg (285 lb)   LMP 01/05/1987 (Approximate)   SpO2 92%   BMI 47.43 kg/m²     Physical Exam   Constitutional: She is oriented to person, place, and time. She appears well-developed and well-nourished.   Obese female in no acute distress.   HENT:   Head: Normocephalic.   Eyes: EOM are normal.   Neck: No JVD present.   Cardiovascular: Normal rate and normal heart sounds.  An irregularly irregular rhythm present.   Pulmonary/Chest: Effort normal and breath sounds normal.   Abdominal: Soft. Bowel sounds are normal.   Her abdominal pannus is larger on the left.  There is no pitting edema although it feels slightly more firm than the right side.   Musculoskeletal: She exhibits edema (1-2+ bilateral pitting edema to pretibial area.  There is some minor nicks in the skin of the left shin area that are oozing a small amount of clear fluid.).   Neurological: She is alert and oriented to person, place, and time.   Skin: Skin is warm and dry.   Psychiatric: She has a normal mood and affect.     February 27, 2019: Cardiac catheterization:  POSTPROCEDURE DIAGNOSES:  1.  No angiographic evidence of coronary artery disease.  2.  Normal left ventricular systolic function with ejection fraction of 70%.  3.  Elevated left ventricular end-diastolic pressure.    Results for RAUL RODRIGUES (MRN [id number])    Ref. Range 2/27/2019 10:28   Sodium Latest Ref Range: 135 - 145 mmol/L 139   Potassium Latest Ref Range: 3.6 - 5.5 mmol/L 3.6   Chloride Latest Ref Range: 96 - 112 mmol/L 103   Co2 Latest Ref Range: 20 - 33 mmol/L 26   Anion Gap Latest Ref Range: 0.0 - 11.9  10.0   Glucose Latest Ref Range: 65 - 99 mg/dL 94   Bun Latest Ref Range: 8 - 22 mg/dL 32 (H)   Creatinine Latest Ref Range: 0.50 - 1.40 mg/dL 0.95   GFR If  Latest Ref Range: >60 mL/min/1.73 m 2 >60   GFR If Non  Latest Ref Range: >60 mL/min/1.73 m 2 58 (A)   Calcium Latest Ref Range: 8.5 - 10.5 mg/dL " 9.8   AST(SGOT) Latest Ref Range: 12 - 45 U/L 18   ALT(SGPT) Latest Ref Range: 2 - 50 U/L 11   Alkaline Phosphatase Latest Ref Range: 30 - 99 U/L 56   Total Bilirubin Latest Ref Range: 0.1 - 1.5 mg/dL 0.6   Albumin Latest Ref Range: 3.2 - 4.9 g/dL 3.8   Total Protein Latest Ref Range: 6.0 - 8.2 g/dL 6.9   Globulin Latest Ref Range: 1.9 - 3.5 g/dL 3.1   A-G Ratio Latest Units: g/dL 1.2       Assessment:     1. Localized edema     2. Chronic venous insufficiency     3. Chronic diastolic congestive heart failure (HCC)     4. Chronic atrial fibrillation (HCC)  metoprolol (LOPRESSOR) 50 MG Tab   5. History of cardiac catheterization         Medical Decision Making:  Today's Assessment / Status / Plan:   Edema: She has bilateral ankle edema with some oozing of her left lower leg.  Her blood pressure is too low for me to give her a diuretic therefore I will have her reduce fluids and sodium.  Also would like her to elevate her ankles and wear compression stockings if possible.    Chronic venous insufficiency: She is being followed by [facility].  She clearly has some chronic problems as she has hemosiderin staining by    Congestive heart failure, diastolic: Her lungs are clear.  She does have lower extremity edema which I think is venous insufficiency.  She has the sensation of not getting air into her right lung without shortness of breath.  Not sure of the cause but her lungs are clear.  She is reassured.    Atrial fibrillation: Good rate control.  However her blood pressure is very low therefore I will reduce metoprolol to 50 mg twice a day instead of 3 times a day.    Cardiac catheterization: No coronary artery disease.  She may undergo her knee surgery when approved by other providers.    She will follow-up with Dr Carnes in 1 year for her cardiac problems.  She will follow-up with other providers for her venous insufficiency and abdominal swelling.    Collaborating Provider: Dr. Ruiz.    Please note that this  dictation was created using voice recognition software. I have made every reasonable attempt to correct obvious errors, but it is possible there are errors of grammar and possibly content that I did not discover before finalizing the note.

## 2019-06-28 ENCOUNTER — ANTICOAGULATION VISIT (OUTPATIENT)
Dept: VASCULAR LAB | Facility: MEDICAL CENTER | Age: 69
End: 2019-06-28
Attending: INTERNAL MEDICINE
Payer: MEDICARE

## 2019-06-28 VITALS — DIASTOLIC BLOOD PRESSURE: 62 MMHG | SYSTOLIC BLOOD PRESSURE: 118 MMHG | HEART RATE: 92 BPM

## 2019-06-28 DIAGNOSIS — Z79.01 CHRONIC ANTICOAGULATION: ICD-10-CM

## 2019-06-28 LAB
INR BLD: 3.1 (ref 0.9–1.2)
INR PPP: 3.1 (ref 2–3.5)

## 2019-06-28 PROCEDURE — 85610 PROTHROMBIN TIME: CPT

## 2019-06-28 PROCEDURE — 99212 OFFICE O/P EST SF 10 MIN: CPT

## 2019-06-28 NOTE — PROGRESS NOTES
Anticoagulation Summary  As of 6/28/2019    INR goal:   2.0-3.0   TTR:   59.9 % (4 y)   INR used for dosing:   3.10! (6/28/2019)   Warfarin maintenance plan:   2.5 mg (5 mg x 0.5) every Mon, Wed, Fri; 5 mg (5 mg x 1) all other days   Weekly warfarin total:   27.5 mg   Plan last modified:   Ortega BrownD (3/21/2019)   Next INR check:   7/26/2019   Target end date:   Indefinite    Indications    Atrial fibrillation (HCC) (Resolved) [I48.91]  Atrial fibrillation (HCC) (Resolved) [I48.91]  Chronic anticoagulation [Z79.01]             Anticoagulation Episode Summary     INR check location:   [facility]    Preferred lab:       Send INR reminders to:       Comments:         Anticoagulation Care Providers     Provider Role Specialty Phone number    Florian Carnes M.D. Referring Cardiology [phone]    [facility] Responsible  [phone]        Anticoagulation Patient Findings      HPI:  Rachelle Reina seen in clinic today for follow up on anticoagulation therapy in the presence of Afib.   Denies any changes to current medical/health status since last appointment.   Denies any medication or diet changes.   No current symptoms of bleeding or thrombosis reported.    A/P:   INR is supra-therapeutic at 3.1.   Continue current regimen, pt to increase servings of vit K with dinner tonight   BP recorded in vitals.    Follow up appointment in 4 week(s).    Next Appointment: 07/26/2019, at 11.30am     Ortega Osuna.D

## 2019-07-24 ENCOUNTER — HOSPITAL ENCOUNTER (OUTPATIENT)
Dept: LAB | Facility: MEDICAL CENTER | Age: 69
End: 2019-07-24
Attending: NURSE PRACTITIONER
Payer: MEDICARE

## 2019-07-24 LAB
BUN SERPL-MCNC: 33 MG/DL (ref 8–22)
CREAT SERPL-MCNC: 1.07 MG/DL (ref 0.5–1.4)

## 2019-07-24 PROCEDURE — 84520 ASSAY OF UREA NITROGEN: CPT

## 2019-07-24 PROCEDURE — 36415 COLL VENOUS BLD VENIPUNCTURE: CPT

## 2019-07-24 PROCEDURE — 82565 ASSAY OF CREATININE: CPT

## 2019-07-26 ENCOUNTER — APPOINTMENT (OUTPATIENT)
Dept: VASCULAR LAB | Facility: MEDICAL CENTER | Age: 69
End: 2019-07-26
Payer: MEDICARE

## 2019-07-29 ENCOUNTER — ANTICOAGULATION VISIT (OUTPATIENT)
Dept: VASCULAR LAB | Facility: MEDICAL CENTER | Age: 69
End: 2019-07-29
Attending: INTERNAL MEDICINE
Payer: MEDICARE

## 2019-07-29 ENCOUNTER — HOSPITAL ENCOUNTER (OUTPATIENT)
Dept: RADIOLOGY | Facility: MEDICAL CENTER | Age: 69
End: 2019-07-29
Attending: RADIOLOGY
Payer: MEDICARE

## 2019-07-29 DIAGNOSIS — Z79.01 CHRONIC ANTICOAGULATION: ICD-10-CM

## 2019-07-29 DIAGNOSIS — L97.221 VARICOSE VEINS OF LEFT LOWER EXTREMITY WITH ULCER OF CALF LIMITED TO BREAKDOWN OF SKIN (HCC): ICD-10-CM

## 2019-07-29 DIAGNOSIS — I48.91 ATRIAL FIBRILLATION, UNSPECIFIED TYPE (HCC): ICD-10-CM

## 2019-07-29 DIAGNOSIS — I83.022 VARICOSE VEINS OF LEFT LOWER EXTREMITY WITH ULCER OF CALF LIMITED TO BREAKDOWN OF SKIN (HCC): ICD-10-CM

## 2019-07-29 LAB
INR BLD: 3 (ref 0.9–1.2)
INR PPP: 3 (ref 2–3.5)

## 2019-07-29 PROCEDURE — 99211 OFF/OP EST MAY X REQ PHY/QHP: CPT | Mod: 25 | Performed by: INTERNAL MEDICINE

## 2019-07-29 PROCEDURE — 700117 HCHG RX CONTRAST REV CODE 255: Performed by: RADIOLOGY

## 2019-07-29 PROCEDURE — 85610 PROTHROMBIN TIME: CPT

## 2019-07-29 PROCEDURE — C8920 MRA W/O FOL W/CONT, PELVIS: HCPCS

## 2019-07-29 RX ORDER — WARFARIN SODIUM 5 MG/1
TABLET ORAL
Qty: 90 TAB | Refills: 1 | Status: SHIPPED | OUTPATIENT
Start: 2019-07-29 | End: 2019-11-13

## 2019-07-29 RX ADMIN — GADOTERIDOL 20 ML: 279.3 INJECTION, SOLUTION INTRAVENOUS at 16:46

## 2019-07-29 NOTE — PROGRESS NOTES
Anticoagulation Summary  As of 7/29/2019    INR goal:   2.0-3.0   TTR:   58.7 % (4.1 y)   INR used for dosing:   3.00 (7/29/2019)   Warfarin maintenance plan:   2.5 mg (5 mg x 0.5) every Mon, Wed, Fri; 5 mg (5 mg x 1) all other days   Weekly warfarin total:   27.5 mg   Plan last modified:   Brice Garcia, PharmD (3/21/2019)   Next INR check:   9/4/2019   Target end date:   Indefinite    Indications    Atrial fibrillation (HCC) (Resolved) [I48.91]  Atrial fibrillation (HCC) (Resolved) [I48.91]  Chronic anticoagulation [Z79.01]             Anticoagulation Episode Summary     INR check location:   [facility]    Preferred lab:       Send INR reminders to:       Comments:         Anticoagulation Care Providers     Provider Role Specialty Phone number    Florian Carnes M.D. Referring Cardiology [phone]    [facility] Responsible  [phone]          Anticoagulation Patient Findings  Patient Findings     Positives:   Change in medications (oxybutinin dose increased to 15 mg. KCl and furosemide added on 7/26/19)    Negatives:   Signs/symptoms of thrombosis, Signs/symptoms of bleeding, Laboratory test error suspected, Change in health, Change in alcohol use, Change in activity, Upcoming invasive procedure, Emergency department visit, Upcoming dental procedure, Missed doses, Extra doses, Change in diet/appetite, Hospital admission, Bruising, Other complaints          HPI:  Rachelle Reina seen in clinic today, on anticoagulation therapy with warfarin for atrial fibrilliation  Changes to current medical/health status since last appt: none  Denies signs/symptoms of bleeding and/or thrombosis since the last appt.    Denies any interval changes to diet  Oxybutinin dose increased to 15 mg, KCl, and furosemide added ib 7/26/19    Denies any complications or cost restrictions with current therapy.   BP denied by patient      A/P   INR is therapeutic at 3.0  No changes made to regimen.  Instructed patient to continue current regimen. Patient confirmed plan.     Follow up appointment in 5 weeks.    Ger Fitzpatrick, OrtegaD

## 2019-08-06 DIAGNOSIS — Z79.01 CHRONIC ANTICOAGULATION: ICD-10-CM

## 2019-09-03 ENCOUNTER — OFFICE VISIT (OUTPATIENT)
Dept: URGENT CARE | Facility: PHYSICIAN GROUP | Age: 69
End: 2019-09-03
Payer: MEDICARE

## 2019-09-03 VITALS
HEART RATE: 74 BPM | TEMPERATURE: 98.6 F | RESPIRATION RATE: 16 BRPM | DIASTOLIC BLOOD PRESSURE: 72 MMHG | SYSTOLIC BLOOD PRESSURE: 128 MMHG | OXYGEN SATURATION: 95 %

## 2019-09-03 DIAGNOSIS — H61.21 IMPACTED CERUMEN OF RIGHT EAR: ICD-10-CM

## 2019-09-03 PROCEDURE — 69209 REMOVE IMPACTED EAR WAX UNI: CPT | Performed by: PHYSICIAN ASSISTANT

## 2019-09-03 PROCEDURE — 99213 OFFICE O/P EST LOW 20 MIN: CPT | Mod: 25 | Performed by: PHYSICIAN ASSISTANT

## 2019-09-03 ASSESSMENT — ENCOUNTER SYMPTOMS
EYE DISCHARGE: 0
NAUSEA: 0
SORE THROAT: 0
HEADACHES: 0
EYE REDNESS: 0
COUGH: 0
SHORTNESS OF BREATH: 0
FEVER: 0
MYALGIAS: 0
VOMITING: 0

## 2019-09-03 NOTE — PROGRESS NOTES
Subjective:      Rachelle Reina is a 69 y.o. female who presents with Ear Fullness (Rt ear feels plugged, feels sore, X 1 week )        Ear Fullness   This is a new problem. Episode onset: x 1 week. The problem occurs constantly. The problem has been gradually improving. Pertinent negatives include no chest pain, congestion, coughing, fever, headaches, myalgias, nausea, rash, sore throat or vomiting. Nothing aggravates the symptoms. She has tried nothing for the symptoms.     The patient presents to clinic c/o ear fullness to her right ear x 1 week. The patient reports itchiness to her right ear canal last week. The patient states she was itching her ear when she may have scratched her ear canal with her finger nail. The patient reports associated bleeding from the ear canal, now resolved. The patient states she then developed fullness to her right ear. She also reports mild soreness to the right ear. No fever. No drainage from ear       PMH:  has a past medical history of A-fib ([facility]), Arthritis, Atrial fibrillation ([facility]), Benign essential hypertension (7/1/2011), Bowel habit changes, Breast cancer ([facility]), Bronchitis (2015), CAD (coronary artery disease), Cancer (HCC) (1987, 2000), Cataract, Diabetes ([facility]), Diastolic congestive heart failure ([facility]) (7/1/2011), Disorder of thyroid, High cholesterol, History of cardiac catheterization (4/22/2010), History of echocardiogram (4/22/2011), History of hypothyroidism (8/29/2008), Hypercholesteremia (7/1/2011), Long term (current) use of anticoagulants (7/1/2011), Morbid obesity (HCC) (10/28/2008), Sleep apnea, Snoring, and Urinary incontinence.  MEDS:   Current Outpatient Medications:   •  warfarin (COUMADIN) 5 MG Tab, Take one-half to one (1/2-1) tablets daily as directed by [facility], Disp: 90 Tab, Rfl: 1  •  metoprolol (LOPRESSOR) 50 MG Tab, Take 1 Tab by mouth 2 times a day., Disp: 180 Tab, Rfl: 3  •  traZODone (DESYREL) 50 MG Tab, Take 50 mg  by mouth every evening., Disp: , Rfl:   •  oxybutynin SR (DITROPAN-XL) 10 MG CR tablet, Take 10 mg by mouth every day., Disp: , Rfl:   •  allopurinol (ZYLOPRIM) 300 MG Tab, Take 150 mg by mouth every day., Disp: , Rfl:   •  Acetaminophen (TYLENOL ARTHRITIS PAIN PO), Take  by mouth as needed., Disp: , Rfl:   •  pioglitazone (ACTOS) 15 MG Tab, Take 15 mg by mouth every day., Disp: , Rfl:   •  rosuvastatin (CRESTOR) 20 MG Tab, TK 1 T PO ONCE D IN THE EVENING, Disp: , Rfl: 1  •  SYNTHROID 200 MCG Tab, TK 1 T PO QOD, Disp: , Rfl: 2  •  glipiZIDE (GLUCOTROL) 5 MG Tab, TK 1/2 T PO BID, Disp: , Rfl: 1  •  Insulin Detemir (LEVEMIR FLEXPEN SC), Inject 16 Units as instructed., Disp: , Rfl:   •  levothyroxine (SYNTHROID) 175 MCG Tab, Take 175 mcg by mouth every 48 hours., Disp: , Rfl:   •  gemfibrozil (LOPID) 600 MG Tab, Take 600 mg by mouth every day., Disp: , Rfl:   •  Cholecalciferol (VITAMIN D PO), Take 5,000 Units by mouth every 48 hours., Disp: , Rfl:   •  loratadine (CLARITIN) 10 MG Tab, Take 10 mg by mouth every day., Disp: , Rfl:   •  citalopram (CELEXA) 20 MG TABS, Take 20 mg by mouth every day., Disp: , Rfl:   •  losartan-hydrochlorothiazide (HYZAAR) 100-25 MG per tablet, Take 1 Tab by mouth every day.  , Disp: , Rfl:   ALLERGIES:   Allergies   Allergen Reactions   • Sulfa Drugs Rash     8/2015       SURGHX:   Past Surgical History:   Procedure Laterality Date   • ITZEL CARDIAC CATH  02/27/2019    normal coronaries   • CATARACT PHACO WITH IOL Right 1/22/2018    Procedure: CATARACT PHACO WITH IOL;  Surgeon: Santosh Holden M.D.;  Location: SURGERY SAME DAY [facility];  Service: Ophthalmology   • CATARACT PHACO WITH IOL Left 1/8/2018    Procedure: CATARACT PHACO WITH IOL;  Surgeon: Santosh Holden M.D.;  Location: SURGERY SAME DAY [facility];  Service: Ophthalmology   • OTHER ORTHOPEDIC SURGERY Right 2013    hip arthroplasty   • CHOLECYSTECTOMY  2001   • ABDOMINAL HYSTERECTOMY TOTAL      1987   • MASTECTOMY  partial    • PB RADIATION THERAPY PLAN SIMPLE       SOCHX:  reports that she has never smoked. She has never used smokeless tobacco. She reports that she drinks alcohol. She reports that she does not use drugs.  FH: Family history was reviewed, no pertinent findings to report      Review of Systems   Constitutional: Negative for fever.   HENT: Positive for ear pain (right ear). Negative for congestion and sore throat.    Eyes: Negative for discharge and redness.   Respiratory: Negative for cough and shortness of breath.    Cardiovascular: Negative for chest pain and leg swelling.   Gastrointestinal: Negative for nausea and vomiting.   Musculoskeletal: Negative for myalgias.   Skin: Negative for rash.   Neurological: Negative for headaches.          Objective:     /72   Pulse 74   Temp 37 °C (98.6 °F)   Resp 16   LMP 01/05/1987 (Approximate)   SpO2 95%      Physical Exam   Constitutional: She is oriented to person, place, and time. She appears well-developed and well-nourished. No distress.   HENT:   Head: Normocephalic and atraumatic.   Right Ear: External ear and ear canal normal.   Left Ear: Tympanic membrane, external ear and ear canal normal.   Nose: Nose normal.   Right TM:  Unable to visualize the right TM secondary to cerumen obstruction and/or dried blood.   Eyes: Conjunctivae and EOM are normal.   Neck: Normal range of motion. Neck supple.   Cardiovascular: Normal rate.   Pulmonary/Chest: Effort normal.   Musculoskeletal: Normal range of motion.   Moves all 4 extremities.   Neurological: She is alert and oriented to person, place, and time.   Skin: Skin is warm and dry.          Progress:  Ear Lavage:  Partial removal of cerumen via lavage from the patient's right ear canal. On re-examination, the patient has continued cerumen obstruction of the right ear canal. Unable to visualize the right TM. The patient reports improvement of her symptoms after partial removal of cerumen.       Assessment/Plan:     1.  Impacted cerumen of right ear    Differential diagnoses, supportive care, and indications for immediate follow-up discussed with patient.   Instructed to return to clinic or nearest emergency department for any change in condition, further concerns, or worsening of symptoms.    OTC Tylenol or Motrin for fever/discomfort.  OTC ear wax removal drops  Follow-up with ENT  Follow-up with PCP   Return to clinic or go to the ED if symptoms worsen or fail to improve, or if the patient should develop worsening/increasing right ear pain, drainage from the affected ear, ear fullness, cough, congestion, sore throat, shortness of breath, chest pain, fever/chills, and/or any concerning symptoms.     Discussed plan with the patient, and she agrees to the above.

## 2019-09-04 ENCOUNTER — ANTICOAGULATION VISIT (OUTPATIENT)
Dept: VASCULAR LAB | Facility: MEDICAL CENTER | Age: 69
End: 2019-09-04
Attending: INTERNAL MEDICINE
Payer: MEDICARE

## 2019-09-04 VITALS — HEART RATE: 99 BPM | DIASTOLIC BLOOD PRESSURE: 46 MMHG | SYSTOLIC BLOOD PRESSURE: 88 MMHG

## 2019-09-04 DIAGNOSIS — Z79.01 CHRONIC ANTICOAGULATION: ICD-10-CM

## 2019-09-04 LAB
INR BLD: 5 (ref 0.9–1.2)
INR PPP: 5 (ref 2–3.5)

## 2019-09-04 PROCEDURE — 99212 OFFICE O/P EST SF 10 MIN: CPT | Performed by: NURSE PRACTITIONER

## 2019-09-04 PROCEDURE — 85610 PROTHROMBIN TIME: CPT

## 2019-09-04 RX ORDER — LOSARTAN POTASSIUM 50 MG/1
50 TABLET ORAL DAILY
COMMUNITY
End: 2020-08-12 | Stop reason: SDUPTHER

## 2019-09-04 RX ORDER — HYDROCHLOROTHIAZIDE 25 MG/1
25 TABLET ORAL EVERY MORNING
COMMUNITY
End: 2020-03-11

## 2019-09-04 NOTE — PROGRESS NOTES
Anticoagulation Summary  As of 2019    INR goal:   2.0-3.0   TTR:   57.2 % (4.2 y)   INR used for dosin.00! (2019)   Warfarin maintenance plan:   5 mg (5 mg x 1) every Mon, Wed, Fri; 2.5 mg (5 mg x 0.5) all other days   Weekly warfarin total:   25 mg   Plan last modified:   Sherri Nieto A.P.NShanthi (2019)   Next INR check:   2019   Target end date:   Indefinite    Indications    Chronic anticoagulation [Z79.01]  Atrial fibrillation (HCC) (Resolved) [I48.91]             Anticoagulation Episode Summary     INR check location:   [facility]    Preferred lab:       Send INR reminders to:       Comments:         Anticoagulation Care Providers     Provider Role Specialty Phone number    Florian Carnes M.D. Referring Cardiology [phone]    [facility] Responsible  [phone]        Anticoagulation Patient Findings      HPI:  Rachelle Reina seen in clinic today for follow up on anticoagulation therapy in the presence of AF.   Denies any changes to current medical/health status since last appointment.   Losartan recently decreased for low BPs. Med rec updated.   She is eating less greens than normal.    No current symptoms of bleeding or thrombosis reported.    A/P:   INR supratherapeutic. Instructed to HOLD two doses then began reduced regimen. She knows to avoid falls and monitor closely for prolonged bleeding.  BP recorded in vitals. BP low in clinic. Feels slightly lightheaded. She will recheck BP today and follow up with MD. Storm going to the ER. Instructed to be extra cautious about falls as her BP is low and INR is high. She verbalizes understanding.    Follow up appointment in 5 days.    Next Appointment:  at 3:45 pm.    Sherri GARCIA

## 2019-09-09 ENCOUNTER — APPOINTMENT (OUTPATIENT)
Dept: VASCULAR LAB | Facility: MEDICAL CENTER | Age: 69
End: 2019-09-09
Attending: INTERNAL MEDICINE
Payer: MEDICARE

## 2019-09-12 ENCOUNTER — APPOINTMENT (OUTPATIENT)
Dept: VASCULAR LAB | Facility: MEDICAL CENTER | Age: 69
End: 2019-09-12
Attending: INTERNAL MEDICINE
Payer: MEDICARE

## 2019-09-13 ENCOUNTER — ANTICOAGULATION VISIT (OUTPATIENT)
Dept: VASCULAR LAB | Facility: MEDICAL CENTER | Age: 69
End: 2019-09-13
Attending: INTERNAL MEDICINE
Payer: MEDICARE

## 2019-09-13 DIAGNOSIS — Z79.01 CHRONIC ANTICOAGULATION: ICD-10-CM

## 2019-09-13 LAB — INR PPP: 4.7 (ref 2–3.5)

## 2019-09-13 PROCEDURE — 85610 PROTHROMBIN TIME: CPT

## 2019-09-13 PROCEDURE — 99212 OFFICE O/P EST SF 10 MIN: CPT

## 2019-09-13 NOTE — PROGRESS NOTES
Anticoagulation Summary  As of 2019    INR goal:   2.0-3.0   TTR:   56.9 % (4.2 y)   INR used for dosin.70! (2019)   Warfarin maintenance plan:   5 mg (5 mg x 1) every Fri; 2.5 mg (5 mg x 0.5) all other days   Weekly warfarin total:   20 mg   Plan last modified:   Nakita Benitez PharmD (2019)   Next INR check:   2019   Target end date:   Indefinite    Indications    Chronic anticoagulation [Z79.01]  Atrial fibrillation (HCC) (Resolved) [I48.91]             Anticoagulation Episode Summary     INR check location:   [facility] Clinic    Preferred lab:       Send INR reminders to:       Comments:         Anticoagulation Care Providers     Provider Role Specialty Phone number    Florian Carnes M.D. Referring Cardiology [phone]    [facility] Responsible  [phone]        Anticoagulation Patient Findings      HPI:  Rachelle Reina seen in clinic today for follow up on anticoagulation therapy in the presence of Afib.   Denies any changes to current medical/health status since last appointment.   Denies any medication or diet changes.   No current symptoms of bleeding or thrombosis reported.    A/P:   INR is supra-therapeutic at 4.7 despite recent dose reduction.   Pt admits to an increase in Levothyroxine    Pt to hold warfarin for 2 days, then begin 20% decreased regimen   Follow up appointment in 1 week(s).    Next Appointment: 2019    Ortega Osuna.D

## 2019-09-16 LAB — INR BLD: 4.7 (ref 0.9–1.2)

## 2019-09-20 ENCOUNTER — ANTICOAGULATION VISIT (OUTPATIENT)
Dept: VASCULAR LAB | Facility: MEDICAL CENTER | Age: 69
End: 2019-09-20
Attending: INTERNAL MEDICINE
Payer: MEDICARE

## 2019-09-20 DIAGNOSIS — Z79.01 CHRONIC ANTICOAGULATION: ICD-10-CM

## 2019-09-20 LAB
INR BLD: 2.6 (ref 0.9–1.2)
INR PPP: 2.6 (ref 2–3.5)

## 2019-09-20 PROCEDURE — 85610 PROTHROMBIN TIME: CPT

## 2019-09-20 PROCEDURE — 99211 OFF/OP EST MAY X REQ PHY/QHP: CPT

## 2019-09-20 NOTE — PROGRESS NOTES
Anticoagulation Summary  As of 9/20/2019    INR goal:   2.0-3.0   TTR:   56.9 % (4.2 y)   INR used for dosing:      Plan last modified:   Nakita Benitez, PharmD (9/13/2019)   Next INR check:      Target end date:   Indefinite    Indications    Chronic anticoagulation [Z79.01]  Atrial fibrillation (HCC) (Resolved) [I48.91]             Anticoagulation Episode Summary     INR check location:   Anticoagulation Clinic    Preferred lab:       Send INR reminders to:       Comments:         Anticoagulation Care Providers     Provider Role Specialty Phone number    Florian Carnes M.D. Referring Cardiology [phone]    [facility] Responsible  [phone]        Anticoagulation Patient Findings    History of Present Illness: follow up appointment for chronic anticoagulation with the high risk medication, warfarin for atrial fibrillation    Last INR was out of range, dosage adjusted: pt is now at goal. Continue current dosing regimen.    Follow up in 2 weeks, to reduce the risk of adverse events related to this high risk medication, warfarin.      Pt declines vitals today  Patrizia Arias Clinical Pharmacist

## 2019-10-04 ENCOUNTER — APPOINTMENT (OUTPATIENT)
Dept: VASCULAR LAB | Facility: MEDICAL CENTER | Age: 69
End: 2019-10-04
Payer: MEDICARE

## 2019-10-09 ENCOUNTER — APPOINTMENT (OUTPATIENT)
Dept: VASCULAR LAB | Facility: MEDICAL CENTER | Age: 69
End: 2019-10-09
Attending: INTERNAL MEDICINE
Payer: MEDICARE

## 2019-10-14 ENCOUNTER — ANTICOAGULATION VISIT (OUTPATIENT)
Dept: VASCULAR LAB | Facility: MEDICAL CENTER | Age: 69
End: 2019-10-14
Attending: INTERNAL MEDICINE
Payer: MEDICARE

## 2019-10-14 DIAGNOSIS — Z79.01 CHRONIC ANTICOAGULATION: ICD-10-CM

## 2019-10-14 LAB
INR BLD: 2.1 (ref 0.9–1.2)
INR PPP: 2.1 (ref 2–3.5)

## 2019-10-14 PROCEDURE — 85610 PROTHROMBIN TIME: CPT

## 2019-10-14 PROCEDURE — 99211 OFF/OP EST MAY X REQ PHY/QHP: CPT

## 2019-10-14 NOTE — PROGRESS NOTES
OP Anticoagulation Service Note    Date: 10/14/2019  There were no vitals filed for this visit.   pt declined vitals    Anticoagulation Summary  As of 10/14/2019    INR goal:   2.0-3.0   TTR:   57.4 % (4.3 y)   INR used for dosin.10 (10/14/2019)   Warfarin maintenance plan:   5 mg (5 mg x 1) every Fri; 2.5 mg (5 mg x 0.5) all other days   Weekly warfarin total:   20 mg   Plan last modified:   Patrizia Arias (2019)   Next INR check:   2019   Target end date:   Indefinite    Indications    Chronic anticoagulation [Z79.01]  Atrial fibrillation (HCC) (Resolved) [I48.91]             Anticoagulation Episode Summary     INR check location:   Anticoagulation Clinic    Preferred lab:       Send INR reminders to:       Comments:         Anticoagulation Care Providers     Provider Role Specialty Phone number    Florian Carnes M.D. Referring Cardiology [phone]    [facility] Responsible  [phone]        Anticoagulation Patient Findings      HPI:   Rachelle Reina seen in clinic today, on anticoagulation therapy with warfarin (a high risk medication) for atrial fibrillation      Pt is here today to evaluate anticoagulation therapy      Confirmed warfarin dosing regimen, denies missed or extra doses of coumadin.   Diet has been consistent with foods rich in vitamin K: Yes  Changes in ETOH:  No  Changes in smoking status: No  Changes in medication: No   Cost restriction: No  S/s of bleeding:  No  Signs/symptoms  thrombosis since the last appt: No    A/P   INR  therapeutic today  Continue current warfarin regimen        Pt educated to contact our clinic with any changes in medications or s/s of bleeding or thrombosis    Follow up appointment in 3 week(s) to reduce risk of adverse events from warfarin   Anita Alvarez, OrtegaD

## 2019-10-24 ENCOUNTER — HOSPITAL ENCOUNTER (OUTPATIENT)
Dept: LAB | Facility: MEDICAL CENTER | Age: 69
End: 2019-10-24
Attending: NURSE PRACTITIONER
Payer: MEDICARE

## 2019-10-24 LAB
25(OH)D3 SERPL-MCNC: 37 NG/ML (ref 30–100)
ALBUMIN SERPL BCP-MCNC: 3.8 G/DL (ref 3.2–4.9)
ALBUMIN/GLOB SERPL: 1.2 G/DL
ALP SERPL-CCNC: 57 U/L (ref 30–99)
ALT SERPL-CCNC: 11 U/L (ref 2–50)
ANION GAP SERPL CALC-SCNC: 10 MMOL/L (ref 0–11.9)
APPEARANCE UR: ABNORMAL
AST SERPL-CCNC: 15 U/L (ref 12–45)
BACTERIA #/AREA URNS HPF: ABNORMAL /HPF
BILIRUB SERPL-MCNC: 0.4 MG/DL (ref 0.1–1.5)
BILIRUB UR QL STRIP.AUTO: NEGATIVE
BUN SERPL-MCNC: 28 MG/DL (ref 8–22)
CALCIUM SERPL-MCNC: 9.5 MG/DL (ref 8.5–10.5)
CHLORIDE SERPL-SCNC: 103 MMOL/L (ref 96–112)
CHOLEST SERPL-MCNC: 110 MG/DL (ref 100–199)
CO2 SERPL-SCNC: 28 MMOL/L (ref 20–33)
COLOR UR: YELLOW
CREAT SERPL-MCNC: 1.12 MG/DL (ref 0.5–1.4)
EPI CELLS #/AREA URNS HPF: ABNORMAL /HPF
EST. AVERAGE GLUCOSE BLD GHB EST-MCNC: 134 MG/DL
FASTING STATUS PATIENT QL REPORTED: NORMAL
GLOBULIN SER CALC-MCNC: 3.1 G/DL (ref 1.9–3.5)
GLUCOSE SERPL-MCNC: 95 MG/DL (ref 65–99)
GLUCOSE UR STRIP.AUTO-MCNC: NEGATIVE MG/DL
HBA1C MFR BLD: 6.3 % (ref 0–5.6)
HDLC SERPL-MCNC: 47 MG/DL
HYALINE CASTS #/AREA URNS LPF: ABNORMAL /LPF
KETONES UR STRIP.AUTO-MCNC: NEGATIVE MG/DL
LDLC SERPL CALC-MCNC: 42 MG/DL
LEUKOCYTE ESTERASE UR QL STRIP.AUTO: ABNORMAL
MICRO URNS: ABNORMAL
NITRITE UR QL STRIP.AUTO: NEGATIVE
NT-PROBNP SERPL IA-MCNC: 1072 PG/ML (ref 0–125)
PH UR STRIP.AUTO: 5.5 [PH] (ref 5–8)
POTASSIUM SERPL-SCNC: 3.6 MMOL/L (ref 3.6–5.5)
PROT SERPL-MCNC: 6.9 G/DL (ref 6–8.2)
PROT UR QL STRIP: 30 MG/DL
RBC # URNS HPF: ABNORMAL /HPF
RBC UR QL AUTO: ABNORMAL
SODIUM SERPL-SCNC: 141 MMOL/L (ref 135–145)
SP GR UR STRIP.AUTO: 1.03
T3FREE SERPL-MCNC: 2.51 PG/ML (ref 2.4–4.2)
TRIGL SERPL-MCNC: 105 MG/DL (ref 0–149)
TSH SERPL DL<=0.005 MIU/L-ACNC: 3.75 UIU/ML (ref 0.38–5.33)
UROBILINOGEN UR STRIP.AUTO-MCNC: 0.2 MG/DL
WBC #/AREA URNS HPF: ABNORMAL /HPF

## 2019-10-24 PROCEDURE — 82306 VITAMIN D 25 HYDROXY: CPT

## 2019-10-24 PROCEDURE — 83036 HEMOGLOBIN GLYCOSYLATED A1C: CPT | Mod: GA

## 2019-10-24 PROCEDURE — 36415 COLL VENOUS BLD VENIPUNCTURE: CPT

## 2019-10-24 PROCEDURE — 80061 LIPID PANEL: CPT

## 2019-10-24 PROCEDURE — 83880 ASSAY OF NATRIURETIC PEPTIDE: CPT

## 2019-10-24 PROCEDURE — 84443 ASSAY THYROID STIM HORMONE: CPT

## 2019-10-24 PROCEDURE — 84481 FREE ASSAY (FT-3): CPT

## 2019-10-24 PROCEDURE — 84439 ASSAY OF FREE THYROXINE: CPT

## 2019-10-24 PROCEDURE — 87086 URINE CULTURE/COLONY COUNT: CPT

## 2019-10-24 PROCEDURE — 81001 URINALYSIS AUTO W/SCOPE: CPT

## 2019-10-24 PROCEDURE — 80053 COMPREHEN METABOLIC PANEL: CPT

## 2019-10-24 PROCEDURE — 82570 ASSAY OF URINE CREATININE: CPT

## 2019-10-24 PROCEDURE — 82043 UR ALBUMIN QUANTITATIVE: CPT

## 2019-10-26 LAB
BACTERIA UR CULT: NORMAL
CREAT UR-MCNC: 131.8 MG/DL
MICROALBUMIN UR-MCNC: 5.2 MG/DL
MICROALBUMIN/CREAT UR: 39 MG/G (ref 0–30)
SIGNIFICANT IND 70042: NORMAL
SITE SITE: NORMAL
SOURCE SOURCE: NORMAL

## 2019-10-28 LAB — T4 FREE SERPL DIALY-MCNC: 2 NG/DL (ref 1.1–2.4)

## 2019-11-04 ENCOUNTER — ANTICOAGULATION VISIT (OUTPATIENT)
Dept: VASCULAR LAB | Facility: MEDICAL CENTER | Age: 69
End: 2019-11-04
Attending: INTERNAL MEDICINE
Payer: MEDICARE

## 2019-11-04 DIAGNOSIS — Z79.01 CHRONIC ANTICOAGULATION: ICD-10-CM

## 2019-11-04 LAB
INR BLD: 1.5 (ref 0.9–1.2)
INR PPP: 1.5 (ref 2–3.5)

## 2019-11-04 PROCEDURE — 85610 PROTHROMBIN TIME: CPT

## 2019-11-04 PROCEDURE — 99212 OFFICE O/P EST SF 10 MIN: CPT

## 2019-11-04 NOTE — PROGRESS NOTES
Anticoagulation Summary  As of 2019    INR goal:   2.0-3.0   TTR:   56.9 % (4.4 y)   INR used for dosin.50! (2019)   Warfarin maintenance plan:   2.5 mg (5 mg x 0.5) every day   Weekly warfarin total:   17.5 mg   Plan last modified:   Ger Fitzpatrick, PharmD (2019)   Next INR check:   2019   Target end date:   Indefinite    Indications    Chronic anticoagulation [Z79.01]  Atrial fibrillation (HCC) (Resolved) [I48.91]             Anticoagulation Episode Summary     INR check location:   Anticoagulation Clinic    Preferred lab:       Send INR reminders to:       Comments:         Anticoagulation Care Providers     Provider Role Specialty Phone number    Florian Carnes M.D. Referring Cardiology [phone]    [facility] Responsible  [phone]                Anticoagulation Patient Findings  Patient Findings     Positives:   Change in medications (increase in total levothyroxine dose), Change in diet/appetite (significant increase in salads and green leafy vegetables)    Negatives:   Signs/symptoms of thrombosis, Signs/symptoms of bleeding, Laboratory test error suspected, Change in health, Change in alcohol use, Change in activity, Upcoming invasive procedure, Emergency department visit, Upcoming dental procedure, Missed doses, Extra doses, Hospital admission, Bruising, Other complaints          HPI:  Rachelle Reina seen in clinic today, on anticoagulation therapy with warfarin for atrial fibrillation.  Changes to current medical/health status since last appt: none  Denies signs/symptoms of bleeding and/or thrombosis since the last appt.    Denies any complications or cost restrictions with current therapy.   BP denied by patient  Patient confirmed current dosing regimen    A/P   INR is SUB-therapeutic at 1.5.   Patient reports an increase in levothyroxine dose and missed Saturday's dose  Pt reports that she has been taking 2.5 mg every day even though chart  has 5 mg on Friday and 2.5 mg on all other days.   Pt also reports a significant increase in salad and green leafy vegetable consumption to improve overall diet.      Instructed patient to load x 2 (5 mg) and continue with 2.5 mg daily. D/t diet change may need to increase overall regimen at next visit.    Follow up appointment in 1 week.    Ger Fitzpatrick, PharmD

## 2019-11-13 ENCOUNTER — APPOINTMENT (OUTPATIENT)
Dept: RADIOLOGY | Facility: MEDICAL CENTER | Age: 69
DRG: 292 | End: 2019-11-13
Attending: EMERGENCY MEDICINE
Payer: MEDICARE

## 2019-11-13 ENCOUNTER — TELEPHONE (OUTPATIENT)
Dept: VASCULAR LAB | Facility: MEDICAL CENTER | Age: 69
End: 2019-11-13

## 2019-11-13 ENCOUNTER — HOSPITAL ENCOUNTER (INPATIENT)
Facility: MEDICAL CENTER | Age: 69
LOS: 3 days | DRG: 292 | End: 2019-11-16
Attending: EMERGENCY MEDICINE | Admitting: INTERNAL MEDICINE
Payer: MEDICARE

## 2019-11-13 ENCOUNTER — APPOINTMENT (OUTPATIENT)
Dept: VASCULAR LAB | Facility: MEDICAL CENTER | Age: 69
End: 2019-11-13
Attending: INTERNAL MEDICINE
Payer: MEDICARE

## 2019-11-13 DIAGNOSIS — R06.02 SOB (SHORTNESS OF BREATH): ICD-10-CM

## 2019-11-13 DIAGNOSIS — R79.89 ELEVATED TROPONIN: ICD-10-CM

## 2019-11-13 PROBLEM — J81.0 ACUTE PULMONARY EDEMA (HCC): Status: ACTIVE | Noted: 2019-11-13

## 2019-11-13 LAB
ALBUMIN SERPL BCP-MCNC: 4.1 G/DL (ref 3.2–4.9)
ALBUMIN/GLOB SERPL: 1.6 G/DL
ALP SERPL-CCNC: 65 U/L (ref 30–99)
ALT SERPL-CCNC: 10 U/L (ref 2–50)
ANION GAP SERPL CALC-SCNC: 8 MMOL/L (ref 0–11.9)
ANISOCYTOSIS BLD QL SMEAR: ABNORMAL
AST SERPL-CCNC: 15 U/L (ref 12–45)
BASOPHILS # BLD AUTO: 0.9 % (ref 0–1.8)
BASOPHILS # BLD: 0.05 K/UL (ref 0–0.12)
BILIRUB SERPL-MCNC: 0.4 MG/DL (ref 0.1–1.5)
BUN SERPL-MCNC: 28 MG/DL (ref 8–22)
CALCIUM SERPL-MCNC: 9.6 MG/DL (ref 8.5–10.5)
CHLORIDE SERPL-SCNC: 107 MMOL/L (ref 96–112)
CO2 SERPL-SCNC: 27 MMOL/L (ref 20–33)
COMMENT 1642: NORMAL
CREAT SERPL-MCNC: 0.96 MG/DL (ref 0.5–1.4)
EKG IMPRESSION: NORMAL
EOSINOPHIL # BLD AUTO: 0.17 K/UL (ref 0–0.51)
EOSINOPHIL NFR BLD: 3.1 % (ref 0–6.9)
ERYTHROCYTE [DISTWIDTH] IN BLOOD BY AUTOMATED COUNT: 56 FL (ref 35.9–50)
GLOBULIN SER CALC-MCNC: 2.6 G/DL (ref 1.9–3.5)
GLUCOSE BLD-MCNC: 80 MG/DL (ref 65–99)
GLUCOSE SERPL-MCNC: 87 MG/DL (ref 65–99)
HCT VFR BLD AUTO: 39.9 % (ref 37–47)
HGB BLD-MCNC: 11.9 G/DL (ref 12–16)
IMM GRANULOCYTES # BLD AUTO: 0.02 K/UL (ref 0–0.11)
IMM GRANULOCYTES NFR BLD AUTO: 0.4 % (ref 0–0.9)
LYMPHOCYTES # BLD AUTO: 1.61 K/UL (ref 1–4.8)
LYMPHOCYTES NFR BLD: 28.9 % (ref 22–41)
MCH RBC QN AUTO: 25.1 PG (ref 27–33)
MCHC RBC AUTO-ENTMCNC: 29.8 G/DL (ref 33.6–35)
MCV RBC AUTO: 84 FL (ref 81.4–97.8)
MONOCYTES # BLD AUTO: 0.49 K/UL (ref 0–0.85)
MONOCYTES NFR BLD AUTO: 8.8 % (ref 0–13.4)
MORPHOLOGY BLD-IMP: NORMAL
NEUTROPHILS # BLD AUTO: 3.23 K/UL (ref 2–7.15)
NEUTROPHILS NFR BLD: 57.9 % (ref 44–72)
NRBC # BLD AUTO: 0 K/UL
NRBC BLD-RTO: 0 /100 WBC
NT-PROBNP SERPL IA-MCNC: 943 PG/ML (ref 0–125)
OVALOCYTES BLD QL SMEAR: NORMAL
PLATELET # BLD AUTO: 245 K/UL (ref 164–446)
PLATELET BLD QL SMEAR: NORMAL
PMV BLD AUTO: 9.7 FL (ref 9–12.9)
POIKILOCYTOSIS BLD QL SMEAR: NORMAL
POLYCHROMASIA BLD QL SMEAR: NORMAL
POTASSIUM SERPL-SCNC: 3.9 MMOL/L (ref 3.6–5.5)
PROT SERPL-MCNC: 6.7 G/DL (ref 6–8.2)
RBC # BLD AUTO: 4.75 M/UL (ref 4.2–5.4)
RBC BLD AUTO: PRESENT
SODIUM SERPL-SCNC: 142 MMOL/L (ref 135–145)
TROPONIN T SERPL-MCNC: 35 NG/L (ref 6–19)
WBC # BLD AUTO: 5.6 K/UL (ref 4.8–10.8)

## 2019-11-13 PROCEDURE — 93005 ELECTROCARDIOGRAM TRACING: CPT | Performed by: EMERGENCY MEDICINE

## 2019-11-13 PROCEDURE — A9270 NON-COVERED ITEM OR SERVICE: HCPCS | Performed by: INTERNAL MEDICINE

## 2019-11-13 PROCEDURE — 36415 COLL VENOUS BLD VENIPUNCTURE: CPT

## 2019-11-13 PROCEDURE — 80053 COMPREHEN METABOLIC PANEL: CPT

## 2019-11-13 PROCEDURE — 82962 GLUCOSE BLOOD TEST: CPT

## 2019-11-13 PROCEDURE — 93005 ELECTROCARDIOGRAM TRACING: CPT

## 2019-11-13 PROCEDURE — 71045 X-RAY EXAM CHEST 1 VIEW: CPT

## 2019-11-13 PROCEDURE — 96374 THER/PROPH/DIAG INJ IV PUSH: CPT

## 2019-11-13 PROCEDURE — 99285 EMERGENCY DEPT VISIT HI MDM: CPT

## 2019-11-13 PROCEDURE — 83880 ASSAY OF NATRIURETIC PEPTIDE: CPT

## 2019-11-13 PROCEDURE — 700102 HCHG RX REV CODE 250 W/ 637 OVERRIDE(OP): Performed by: INTERNAL MEDICINE

## 2019-11-13 PROCEDURE — 700111 HCHG RX REV CODE 636 W/ 250 OVERRIDE (IP): Performed by: EMERGENCY MEDICINE

## 2019-11-13 PROCEDURE — 85025 COMPLETE CBC W/AUTO DIFF WBC: CPT

## 2019-11-13 PROCEDURE — 770020 HCHG ROOM/CARE - TELE (206)

## 2019-11-13 PROCEDURE — 84484 ASSAY OF TROPONIN QUANT: CPT

## 2019-11-13 PROCEDURE — 99223 1ST HOSP IP/OBS HIGH 75: CPT | Mod: AI | Performed by: INTERNAL MEDICINE

## 2019-11-13 RX ORDER — OXYBUTYNIN CHLORIDE 10 MG/1
10 TABLET, EXTENDED RELEASE ORAL DAILY
Status: DISCONTINUED | OUTPATIENT
Start: 2019-11-14 | End: 2019-11-16 | Stop reason: HOSPADM

## 2019-11-13 RX ORDER — ACETAMINOPHEN 325 MG/1
650 TABLET ORAL EVERY 6 HOURS PRN
Status: DISCONTINUED | OUTPATIENT
Start: 2019-11-13 | End: 2019-11-16 | Stop reason: HOSPADM

## 2019-11-13 RX ORDER — HYDROCHLOROTHIAZIDE 25 MG/1
25 TABLET ORAL DAILY
Status: DISCONTINUED | OUTPATIENT
Start: 2019-11-14 | End: 2019-11-15

## 2019-11-13 RX ORDER — AMOXICILLIN 250 MG
2 CAPSULE ORAL 2 TIMES DAILY
Status: DISCONTINUED | OUTPATIENT
Start: 2019-11-13 | End: 2019-11-16 | Stop reason: HOSPADM

## 2019-11-13 RX ORDER — LOSARTAN POTASSIUM 50 MG/1
50 TABLET ORAL DAILY
Status: DISCONTINUED | OUTPATIENT
Start: 2019-11-14 | End: 2019-11-16 | Stop reason: HOSPADM

## 2019-11-13 RX ORDER — BISACODYL 10 MG
10 SUPPOSITORY, RECTAL RECTAL
Status: DISCONTINUED | OUTPATIENT
Start: 2019-11-13 | End: 2019-11-16 | Stop reason: HOSPADM

## 2019-11-13 RX ORDER — FUROSEMIDE 10 MG/ML
80 INJECTION INTRAMUSCULAR; INTRAVENOUS ONCE
Status: COMPLETED | OUTPATIENT
Start: 2019-11-13 | End: 2019-11-13

## 2019-11-13 RX ORDER — WARFARIN SODIUM 2.5 MG/1
2.5 TABLET ORAL
Status: COMPLETED | OUTPATIENT
Start: 2019-11-13 | End: 2019-11-13

## 2019-11-13 RX ORDER — METOPROLOL TARTRATE 50 MG/1
50 TABLET, FILM COATED ORAL 2 TIMES DAILY
COMMUNITY
End: 2020-07-02

## 2019-11-13 RX ORDER — SENNOSIDES 8.6 MG
1300 CAPSULE ORAL
COMMUNITY

## 2019-11-13 RX ORDER — GEMFIBROZIL 600 MG/1
600 TABLET, FILM COATED ORAL DAILY
Status: DISCONTINUED | OUTPATIENT
Start: 2019-11-14 | End: 2019-11-16 | Stop reason: HOSPADM

## 2019-11-13 RX ORDER — POTASSIUM CHLORIDE 20 MEQ/1
40 TABLET, EXTENDED RELEASE ORAL DAILY
Status: DISCONTINUED | OUTPATIENT
Start: 2019-11-13 | End: 2019-11-16 | Stop reason: HOSPADM

## 2019-11-13 RX ORDER — LORATADINE 10 MG/1
10 TABLET ORAL DAILY
Status: DISCONTINUED | OUTPATIENT
Start: 2019-11-14 | End: 2019-11-16 | Stop reason: HOSPADM

## 2019-11-13 RX ORDER — POLYETHYLENE GLYCOL 3350 17 G/17G
1 POWDER, FOR SOLUTION ORAL
Status: DISCONTINUED | OUTPATIENT
Start: 2019-11-13 | End: 2019-11-16 | Stop reason: HOSPADM

## 2019-11-13 RX ORDER — FUROSEMIDE 10 MG/ML
20 INJECTION INTRAMUSCULAR; INTRAVENOUS
Status: DISCONTINUED | OUTPATIENT
Start: 2019-11-14 | End: 2019-11-15

## 2019-11-13 RX ORDER — WARFARIN SODIUM 5 MG/1
2.5 TABLET ORAL EVERY EVENING
Status: ON HOLD | COMMUNITY
End: 2019-11-16 | Stop reason: SDUPTHER

## 2019-11-13 RX ORDER — CITALOPRAM 20 MG/1
20 TABLET ORAL DAILY
Status: DISCONTINUED | OUTPATIENT
Start: 2019-11-14 | End: 2019-11-16 | Stop reason: HOSPADM

## 2019-11-13 RX ORDER — TRAZODONE HYDROCHLORIDE 50 MG/1
50 TABLET ORAL NIGHTLY
Status: DISCONTINUED | OUTPATIENT
Start: 2019-11-13 | End: 2019-11-16 | Stop reason: HOSPADM

## 2019-11-13 RX ORDER — ALLOPURINOL 100 MG/1
100 TABLET ORAL
Refills: 2 | COMMUNITY
Start: 2019-10-31

## 2019-11-13 RX ORDER — ROSUVASTATIN CALCIUM 20 MG/1
40 TABLET, COATED ORAL EVERY EVENING
Status: DISCONTINUED | OUTPATIENT
Start: 2019-11-13 | End: 2019-11-16 | Stop reason: HOSPADM

## 2019-11-13 RX ORDER — ALLOPURINOL 100 MG/1
100 TABLET ORAL
Status: DISCONTINUED | OUTPATIENT
Start: 2019-11-13 | End: 2019-11-16 | Stop reason: HOSPADM

## 2019-11-13 RX ADMIN — FUROSEMIDE 80 MG: 10 INJECTION, SOLUTION INTRAMUSCULAR; INTRAVENOUS at 17:27

## 2019-11-13 RX ADMIN — METOPROLOL TARTRATE 25 MG: 25 TABLET, FILM COATED ORAL at 20:05

## 2019-11-13 RX ADMIN — WARFARIN SODIUM 2.5 MG: 2.5 TABLET ORAL at 21:40

## 2019-11-13 RX ADMIN — POTASSIUM CHLORIDE 40 MEQ: 1500 TABLET, EXTENDED RELEASE ORAL at 17:27

## 2019-11-13 RX ADMIN — ALLOPURINOL 100 MG: 100 TABLET ORAL at 20:05

## 2019-11-13 RX ADMIN — ROSUVASTATIN CALCIUM 40 MG: 20 TABLET, FILM COATED ORAL at 20:05

## 2019-11-13 RX ADMIN — TRAZODONE HYDROCHLORIDE 50 MG: 50 TABLET ORAL at 20:05

## 2019-11-13 ASSESSMENT — LIFESTYLE VARIABLES
TOTAL SCORE: 0
EVER FELT BAD OR GUILTY ABOUT YOUR DRINKING: NO
HOW MANY TIMES IN THE PAST YEAR HAVE YOU HAD 5 OR MORE DRINKS IN A DAY: 0
EVER_SMOKED: NEVER
HAVE YOU EVER FELT YOU SHOULD CUT DOWN ON YOUR DRINKING: NO
DOES PATIENT WANT TO STOP DRINKING: NO
ON A TYPICAL DAY WHEN YOU DRINK ALCOHOL HOW MANY DRINKS DO YOU HAVE: 1
CONSUMPTION TOTAL: NEGATIVE
HAVE PEOPLE ANNOYED YOU BY CRITICIZING YOUR DRINKING: NO
EVER HAD A DRINK FIRST THING IN THE MORNING TO STEADY YOUR NERVES TO GET RID OF A HANGOVER: NO
ALCOHOL_USE: YES
TOTAL SCORE: 0
TOTAL SCORE: 0
AVERAGE NUMBER OF DAYS PER WEEK YOU HAVE A DRINK CONTAINING ALCOHOL: 0

## 2019-11-13 ASSESSMENT — COGNITIVE AND FUNCTIONAL STATUS - GENERAL
HELP NEEDED FOR BATHING: A LITTLE
WALKING IN HOSPITAL ROOM: A LITTLE
MOBILITY SCORE: 20
SUGGESTED CMS G CODE MODIFIER DAILY ACTIVITY: CJ
CLIMB 3 TO 5 STEPS WITH RAILING: A LOT
DRESSING REGULAR UPPER BODY CLOTHING: A LITTLE
MOVING TO AND FROM BED TO CHAIR: A LITTLE
DRESSING REGULAR LOWER BODY CLOTHING: A LITTLE
CLIMB 3 TO 5 STEPS WITH RAILING: A LOT
DAILY ACTIVITIY SCORE: 21
MOBILITY SCORE: 21
WALKING IN HOSPITAL ROOM: A LITTLE
HELP NEEDED FOR BATHING: A LITTLE
SUGGESTED CMS G CODE MODIFIER MOBILITY: CJ
DRESSING REGULAR UPPER BODY CLOTHING: A LITTLE
SUGGESTED CMS G CODE MODIFIER MOBILITY: CJ
DRESSING REGULAR LOWER BODY CLOTHING: A LITTLE

## 2019-11-13 ASSESSMENT — ENCOUNTER SYMPTOMS
HEADACHES: 0
SPUTUM PRODUCTION: 0
MYALGIAS: 0
NECK PAIN: 0
ABDOMINAL PAIN: 0
CHILLS: 0
EYE DISCHARGE: 0
STRIDOR: 0
FOCAL WEAKNESS: 0
EYE REDNESS: 0
ORTHOPNEA: 0
HEARTBURN: 0
INSOMNIA: 0
BLURRED VISION: 0
WEIGHT LOSS: 0
DIZZINESS: 0
NAUSEA: 0
PALPITATIONS: 0
PND: 1
FEVER: 0
SEIZURES: 0
VOMITING: 0
DIARRHEA: 0
COUGH: 0
SHORTNESS OF BREATH: 1
EYE PAIN: 0
BACK PAIN: 0
NERVOUS/ANXIOUS: 0
DEPRESSION: 0

## 2019-11-13 ASSESSMENT — PATIENT HEALTH QUESTIONNAIRE - PHQ9
1. LITTLE INTEREST OR PLEASURE IN DOING THINGS: NOT AT ALL
SUM OF ALL RESPONSES TO PHQ9 QUESTIONS 1 AND 2: 0
2. FEELING DOWN, DEPRESSED, IRRITABLE, OR HOPELESS: NOT AT ALL

## 2019-11-13 ASSESSMENT — CHA2DS2 SCORE
VASCULAR DISEASE: NO
PRIOR STROKE OR TIA OR THROMBOEMBOLISM: NO
AGE 75 OR GREATER: NO
AGE 65 TO 74: YES
CHF OR LEFT VENTRICULAR DYSFUNCTION: YES
SEX: FEMALE
DIABETES: YES
HYPERTENSION: YES
CHA2DS2 VASC SCORE: 5

## 2019-11-13 NOTE — ED PROVIDER NOTES
ED Provider Note    CHIEF COMPLAINT  Chief Complaint   Patient presents with   • Shortness of Breath   • Dizziness       HPI  Rachelle Reina is a 69 y.o. female with a history of A. fib, CHF, diabetes, coronary artery disease, dyslipidemia, and hypertension who presents complaining of shortness of breath, dizziness, and leg swelling.    Patient reports her shortness of breath has gradually been getting worse over the last 2 months.  She states it increases with bending over and laying down.  She sleeps in a bed that she is able to adjust to the incline on.  Patient states she has been on diuretics for some time and has had some changes in her dosing including being on both HCTZ and furosemide.  She takes these intermittently as she has things to do and does not want to take them on days she is unable to be at home due to increased urination.  Patient denies chest pain, cough, fever, chills, history of PE/DVT, calf pain, recent car or plane rides, and current home O2 therapy.      ALLERGIES  Allergies   Allergen Reactions   • Sulfa Drugs Rash     8/2015         CURRENT MEDICATIONS  Home Medications     Reviewed by Cosmo Crawford (Pharmacy Tech) on 11/13/19 at 1625  Med List Status: Complete   Medication Last Dose Status   acetaminophen (TYLENOL 8 HOUR ARTHRITIS PAIN) 650 MG CR tablet 11/13/2019 Active   allopurinol (ZYLOPRIM) 100 MG Tab 11/12/2019 Active   Cholecalciferol (VITAMIN D-3) 5000 units Tab 11/13/2019 Active   citalopram (CELEXA) 20 MG TABS 11/13/2019 Active   gemfibrozil (LOPID) 600 MG Tab 11/13/2019 Active   glipiZIDE (GLUCOTROL) 5 MG Tab 11/12/2019 Active   hydroCHLOROthiazide (HYDRODIURIL) 25 MG Tab 11/13/2019 Active   Insulin Detemir (LEVEMIR FLEXPEN SC) 11/13/2019 Active   levothyroxine (SYNTHROID) 175 MCG Tab 11/10/2019 Active   loratadine (CLARITIN) 10 MG Tab 11/13/2019 Active   losartan (COZAAR) 50 MG Tab 11/13/2019 Active   metoprolol (LOPRESSOR) 50 MG Tab 11/13/2019 Active   Multiple  Vitamins-Minerals (MULTIVITAMIN PO) 11/13/2019 Active   oxybutynin SR (DITROPAN-XL) 10 MG CR tablet 11/13/2019 Active   pioglitazone (ACTOS) 15 MG Tab 11/13/2019 Active   rosuvastatin (CRESTOR) 20 MG Tab 11/12/2019 Active   SYNTHROID 200 MCG Tab 11/13/2019 Active   traZODone (DESYREL) 50 MG Tab 11/12/2019 Active   warfarin (COUMADIN) 5 MG Tab 11/12/2019 Active                PAST MEDICAL HISTORY   has a past medical history of A-fib (HCC), Arthritis, Atrial fibrillation (HCC), Benign essential hypertension (7/1/2011), Bowel habit changes, Breast cancer (HCC), Bronchitis (2015), CAD (coronary artery disease), Cancer (HCC) (1987, 2000), Cataract, Diabetes (HCC), Diastolic congestive heart failure (HCC) (7/1/2011), Disorder of thyroid, High cholesterol, History of cardiac catheterization (4/22/2010), History of echocardiogram (4/22/2011), History of hypothyroidism (8/29/2008), Hypercholesteremia (7/1/2011), Long term (current) use of anticoagulants (7/1/2011), Morbid obesity (HCC) (10/28/2008), Sleep apnea, Snoring, and Urinary incontinence.    SURGICAL HISTORY   has a past surgical history that includes abdominal hysterectomy total; mastectomy (partial); radiation therapy plan simple; cholecystectomy (2001); cataract phaco with iol (Left, 1/8/2018); cataract phaco with iol (Right, 1/22/2018); other orthopedic surgery (Right, 2013); and z cardiac cath (02/27/2019).    SOCIAL HISTORY  Social History     Tobacco Use   • Smoking status: Never Smoker   • Smokeless tobacco: Never Used   Substance and Sexual Activity   • Alcohol use: Yes     Comment: 1 or 2 drinks a month   • Drug use: No   • Sexual activity: Not on file     REVIEW OF SYSTEMS  See HPI for further details.  All other systems are negative except as above in HPI.    PHYSICAL EXAM  VITAL SIGNS: BP (!) 161/86   Pulse 82   Temp 36.8 °C (98.2 °F) (Temporal)   Resp (!) 37   Wt (!) 136.1 kg (300 lb)   LMP 01/05/1987 (Approximate)   SpO2 95%   BMI 49.92 kg/m²      General:  WD overweight, nontoxic appearing in NAD; A+Ox3; V/S as above; on 2 L of nasal cannula  Skin: warm and dry; pale color; no rash  HEENT: NCAT; EOMs intact; PERRL; no scleral icterus   Neck: FROM; soft, trachea midline  Cardiovascular: Regular heart rate and irregular rhythm.  No murmurs, rubs, or gallops; pulses 2+ bilaterally radially and DP areas  Lungs: Clear to auscultation with decreased air movement bilaterally.  No wheezes, rhonchi, or rales.   Abdomen: BS present; soft; NTND; no rebound, guarding, or rigidity.  No organomegaly or pulsatile mass  Extremities: ROSARIO x 4; no e/o trauma; bilateral pitting edema; neg Diomedes's  Neurologic: CNs III-XII grossly intact; speech clear; distal sensation intact; strength 5/5 UE/LEs  Psychiatric: Appropriate affect, normal mood    LABS  Results for orders placed or performed during the hospital encounter of 11/13/19   CBC with Differential   Result Value Ref Range    WBC 5.6 4.8 - 10.8 K/uL    RBC 4.75 4.20 - 5.40 M/uL    Hemoglobin 11.9 (L) 12.0 - 16.0 g/dL    Hematocrit 39.9 37.0 - 47.0 %    MCV 84.0 81.4 - 97.8 fL    MCH 25.1 (L) 27.0 - 33.0 pg    MCHC 29.8 (L) 33.6 - 35.0 g/dL    RDW 56.0 (H) 35.9 - 50.0 fL    Platelet Count 245 164 - 446 K/uL    MPV 9.7 9.0 - 12.9 fL    Neutrophils-Polys 57.90 44.00 - 72.00 %    Lymphocytes 28.90 22.00 - 41.00 %    Monocytes 8.80 0.00 - 13.40 %    Eosinophils 3.10 0.00 - 6.90 %    Basophils 0.90 0.00 - 1.80 %    Immature Granulocytes 0.40 0.00 - 0.90 %    Nucleated RBC 0.00 /100 WBC    Neutrophils (Absolute) 3.23 2.00 - 7.15 K/uL    Lymphs (Absolute) 1.61 1.00 - 4.80 K/uL    Monos (Absolute) 0.49 0.00 - 0.85 K/uL    Eos (Absolute) 0.17 0.00 - 0.51 K/uL    Baso (Absolute) 0.05 0.00 - 0.12 K/uL    Immature Granulocytes (abs) 0.02 0.00 - 0.11 K/uL    NRBC (Absolute) 0.00 K/uL    Anisocytosis 1+    Complete Metabolic Panel (CMP)   Result Value Ref Range    Sodium 142 135 - 145 mmol/L    Potassium 3.9 3.6 - 5.5 mmol/L     Chloride 107 96 - 112 mmol/L    Co2 27 20 - 33 mmol/L    Anion Gap 8.0 0.0 - 11.9    Glucose 87 65 - 99 mg/dL    Bun 28 (H) 8 - 22 mg/dL    Creatinine 0.96 0.50 - 1.40 mg/dL    Calcium 9.6 8.5 - 10.5 mg/dL    AST(SGOT) 15 12 - 45 U/L    ALT(SGPT) 10 2 - 50 U/L    Alkaline Phosphatase 65 30 - 99 U/L    Total Bilirubin 0.4 0.1 - 1.5 mg/dL    Albumin 4.1 3.2 - 4.9 g/dL    Total Protein 6.7 6.0 - 8.2 g/dL    Globulin 2.6 1.9 - 3.5 g/dL    A-G Ratio 1.6 g/dL   Troponin   Result Value Ref Range    Troponin T 35 (H) 6 - 19 ng/L   proBrain Natriuretic Peptide, NT   Result Value Ref Range    NT-proBNP 943 (H) 0 - 125 pg/mL   ESTIMATED GFR   Result Value Ref Range    GFR If African American >60 >60 mL/min/1.73 m 2    GFR If Non African American 58 (A) >60 mL/min/1.73 m 2   PERIPHERAL SMEAR REVIEW   Result Value Ref Range    Peripheral Smear Review see below    PLATELET ESTIMATE   Result Value Ref Range    Plt Estimation Normal    MORPHOLOGY   Result Value Ref Range    RBC Morphology Present     Polychromia 1+     Poikilocytosis 1+     Ovalocytes 1+    DIFFERENTIAL COMMENT   Result Value Ref Range    Comments-Diff see below    ACCU-CHEK GLUCOSE   Result Value Ref Range    Glucose - Accu-Ck 80 65 - 99 mg/dL   EKG (NOW)   Result Value Ref Range    Report       [facility] Emergency Dept.    Test Date:  2019  Pt Name:    RAUL RODRIGUES             Department: ER  MRN:        [id number]                      Room:  Gender:     Female                       Technician: [id number]  :        [id number]                   Requested By:ER TRIAGE PROTOCOL  Order #:    [id number]                    Reading MD: ELIDA VALENTINE MD    Measurements  Intervals                                Axis  Rate:       80                           P:  ME:                                      QRS:        99  QRSD:       94                           T:          22  QT:         362  QTc:        418    Interpretive Statements  Atrial  fibrillation  Right axis deviation  Low voltage, extremity and precordial leads  Probable anteroseptal infarct, old  Compared to ECG 02/27/2019 10:30:24  Right-axis deviation now present  Low QRS voltage now present  Electronically Signed On 11- 15:54:30 PST by JO CHUN MD           IMAGING  DX-CHEST-PORTABLE (1 VIEW)   Final Result      1.  Marked cardiac silhouette enlargement.   2.  Possible mild central vascular congestion.   3.  Study limitations as above.          MEDICAL RECORD  I have reviewed patient's medical record and pertinent results are listed below.      COURSE & MEDICAL DECISION MAKING  I have reviewed any medical record information, laboratory studies and radiographic results as noted.    Rachelle Reina is a 69 y.o. female who presents complaining of shortness of breath.  Symptoms appear to be consistent with CHF/pulmonary edema given patient's orthopnea.    EKG demonstrates A. fib without RVR.    Chest x-ray demonstrates market cardiac silhouette enlargement and possible congestion.    Patient's labs demonstrate a BUN of 28, normal creatinine, no anemia, BNP elevated at 943 and troponin elevated at 35.    4:19 PM  discussed with Napoleon from the hospitalist service who agrees to admit the patient.  He feels we can wait on echo to evaluate cardiomegaly.  Lasix 80 mg IV ordered.    FINAL IMPRESSION  1. SOB (shortness of breath)     2. Elevated troponin         Electronically signed by: Jo Chun, 11/13/2019 3:55 PM

## 2019-11-13 NOTE — ED TRIAGE NOTES
WC to triage w/ c/o sob, BLE swelling & dizziness x 2 months, getting progressively worse. Speaking full sentences . Denies pain.

## 2019-11-14 ENCOUNTER — PATIENT OUTREACH (OUTPATIENT)
Dept: HEALTH INFORMATION MANAGEMENT | Facility: OTHER | Age: 69
End: 2019-11-14

## 2019-11-14 ENCOUNTER — APPOINTMENT (OUTPATIENT)
Dept: CARDIOLOGY | Facility: MEDICAL CENTER | Age: 69
DRG: 292 | End: 2019-11-14
Attending: INTERNAL MEDICINE
Payer: MEDICARE

## 2019-11-14 LAB
ALBUMIN SERPL BCP-MCNC: 4 G/DL (ref 3.2–4.9)
ALBUMIN/GLOB SERPL: 1.5 G/DL
ALP SERPL-CCNC: 62 U/L (ref 30–99)
ALT SERPL-CCNC: 10 U/L (ref 2–50)
ANION GAP SERPL CALC-SCNC: 11 MMOL/L (ref 0–11.9)
AST SERPL-CCNC: 14 U/L (ref 12–45)
BASOPHILS # BLD AUTO: 1 % (ref 0–1.8)
BASOPHILS # BLD: 0.06 K/UL (ref 0–0.12)
BILIRUB SERPL-MCNC: 0.5 MG/DL (ref 0.1–1.5)
BUN SERPL-MCNC: 28 MG/DL (ref 8–22)
CALCIUM SERPL-MCNC: 9.8 MG/DL (ref 8.5–10.5)
CHLORIDE SERPL-SCNC: 104 MMOL/L (ref 96–112)
CO2 SERPL-SCNC: 28 MMOL/L (ref 20–33)
CREAT SERPL-MCNC: 0.89 MG/DL (ref 0.5–1.4)
EOSINOPHIL # BLD AUTO: 0.18 K/UL (ref 0–0.51)
EOSINOPHIL NFR BLD: 2.9 % (ref 0–6.9)
ERYTHROCYTE [DISTWIDTH] IN BLOOD BY AUTOMATED COUNT: 54.4 FL (ref 35.9–50)
GLOBULIN SER CALC-MCNC: 2.6 G/DL (ref 1.9–3.5)
GLUCOSE BLD-MCNC: 181 MG/DL (ref 65–99)
GLUCOSE BLD-MCNC: 98 MG/DL (ref 65–99)
GLUCOSE SERPL-MCNC: 97 MG/DL (ref 65–99)
HCT VFR BLD AUTO: 38.7 % (ref 37–47)
HGB BLD-MCNC: 11.7 G/DL (ref 12–16)
IMM GRANULOCYTES # BLD AUTO: 0.03 K/UL (ref 0–0.11)
IMM GRANULOCYTES NFR BLD AUTO: 0.5 % (ref 0–0.9)
INR PPP: 1.61 (ref 0.87–1.13)
LV EJECT FRACT  99904: 60
LYMPHOCYTES # BLD AUTO: 1.53 K/UL (ref 1–4.8)
LYMPHOCYTES NFR BLD: 24.6 % (ref 22–41)
MCH RBC QN AUTO: 25.1 PG (ref 27–33)
MCHC RBC AUTO-ENTMCNC: 30.2 G/DL (ref 33.6–35)
MCV RBC AUTO: 82.9 FL (ref 81.4–97.8)
MONOCYTES # BLD AUTO: 0.52 K/UL (ref 0–0.85)
MONOCYTES NFR BLD AUTO: 8.3 % (ref 0–13.4)
NEUTROPHILS # BLD AUTO: 3.91 K/UL (ref 2–7.15)
NEUTROPHILS NFR BLD: 62.7 % (ref 44–72)
NRBC # BLD AUTO: 0 K/UL
NRBC BLD-RTO: 0 /100 WBC
NT-PROBNP SERPL IA-MCNC: 938 PG/ML (ref 0–125)
PLATELET # BLD AUTO: 246 K/UL (ref 164–446)
PMV BLD AUTO: 9.8 FL (ref 9–12.9)
POTASSIUM SERPL-SCNC: 3.8 MMOL/L (ref 3.6–5.5)
PROT SERPL-MCNC: 6.6 G/DL (ref 6–8.2)
PROTHROMBIN TIME: 19.6 SEC (ref 12–14.6)
RBC # BLD AUTO: 4.67 M/UL (ref 4.2–5.4)
SODIUM SERPL-SCNC: 143 MMOL/L (ref 135–145)
WBC # BLD AUTO: 6.2 K/UL (ref 4.8–10.8)

## 2019-11-14 PROCEDURE — 85025 COMPLETE CBC W/AUTO DIFF WBC: CPT

## 2019-11-14 PROCEDURE — 83880 ASSAY OF NATRIURETIC PEPTIDE: CPT

## 2019-11-14 PROCEDURE — 93306 TTE W/DOPPLER COMPLETE: CPT

## 2019-11-14 PROCEDURE — A9270 NON-COVERED ITEM OR SERVICE: HCPCS | Performed by: INTERNAL MEDICINE

## 2019-11-14 PROCEDURE — 82962 GLUCOSE BLOOD TEST: CPT

## 2019-11-14 PROCEDURE — 85610 PROTHROMBIN TIME: CPT

## 2019-11-14 PROCEDURE — 93306 TTE W/DOPPLER COMPLETE: CPT | Mod: 26 | Performed by: INTERNAL MEDICINE

## 2019-11-14 PROCEDURE — 770020 HCHG ROOM/CARE - TELE (206)

## 2019-11-14 PROCEDURE — 700102 HCHG RX REV CODE 250 W/ 637 OVERRIDE(OP): Performed by: INTERNAL MEDICINE

## 2019-11-14 PROCEDURE — 700111 HCHG RX REV CODE 636 W/ 250 OVERRIDE (IP): Performed by: INTERNAL MEDICINE

## 2019-11-14 PROCEDURE — 36415 COLL VENOUS BLD VENIPUNCTURE: CPT

## 2019-11-14 PROCEDURE — 700117 HCHG RX CONTRAST REV CODE 255: Performed by: INTERNAL MEDICINE

## 2019-11-14 PROCEDURE — 99233 SBSQ HOSP IP/OBS HIGH 50: CPT | Performed by: INTERNAL MEDICINE

## 2019-11-14 PROCEDURE — 80053 COMPREHEN METABOLIC PANEL: CPT

## 2019-11-14 RX ORDER — WARFARIN SODIUM 5 MG/1
5 TABLET ORAL DAILY
Status: COMPLETED | OUTPATIENT
Start: 2019-11-14 | End: 2019-11-14

## 2019-11-14 RX ADMIN — FUROSEMIDE 20 MG: 10 INJECTION, SOLUTION INTRAMUSCULAR; INTRAVENOUS at 06:13

## 2019-11-14 RX ADMIN — HYDROCHLOROTHIAZIDE 25 MG: 25 TABLET ORAL at 06:12

## 2019-11-14 RX ADMIN — OXYBUTYNIN CHLORIDE 10 MG: 10 TABLET, EXTENDED RELEASE ORAL at 06:13

## 2019-11-14 RX ADMIN — GEMFIBROZIL 600 MG: 600 TABLET ORAL at 06:12

## 2019-11-14 RX ADMIN — ALLOPURINOL 100 MG: 100 TABLET ORAL at 21:21

## 2019-11-14 RX ADMIN — INSULIN HUMAN 2 UNITS: 100 INJECTION, SOLUTION PARENTERAL at 21:26

## 2019-11-14 RX ADMIN — FUROSEMIDE 20 MG: 10 INJECTION, SOLUTION INTRAMUSCULAR; INTRAVENOUS at 16:41

## 2019-11-14 RX ADMIN — WARFARIN SODIUM 5 MG: 5 TABLET ORAL at 17:12

## 2019-11-14 RX ADMIN — LOSARTAN POTASSIUM 50 MG: 50 TABLET, FILM COATED ORAL at 06:12

## 2019-11-14 RX ADMIN — METOPROLOL TARTRATE 25 MG: 25 TABLET, FILM COATED ORAL at 06:12

## 2019-11-14 RX ADMIN — POTASSIUM CHLORIDE 40 MEQ: 1500 TABLET, EXTENDED RELEASE ORAL at 06:12

## 2019-11-14 RX ADMIN — LORATADINE 10 MG: 10 TABLET ORAL at 06:12

## 2019-11-14 RX ADMIN — ACETAMINOPHEN 650 MG: 325 TABLET, FILM COATED ORAL at 21:21

## 2019-11-14 RX ADMIN — CITALOPRAM HYDROBROMIDE 20 MG: 20 TABLET ORAL at 06:13

## 2019-11-14 RX ADMIN — METOPROLOL TARTRATE 25 MG: 25 TABLET, FILM COATED ORAL at 17:11

## 2019-11-14 RX ADMIN — ACETAMINOPHEN 650 MG: 325 TABLET, FILM COATED ORAL at 00:47

## 2019-11-14 RX ADMIN — ROSUVASTATIN CALCIUM 40 MG: 20 TABLET, FILM COATED ORAL at 17:11

## 2019-11-14 RX ADMIN — HUMAN ALBUMIN MICROSPHERES AND PERFLUTREN 3 ML: 10; .22 INJECTION, SOLUTION INTRAVENOUS at 15:51

## 2019-11-14 RX ADMIN — TRAZODONE HYDROCHLORIDE 50 MG: 50 TABLET ORAL at 18:08

## 2019-11-14 ASSESSMENT — ENCOUNTER SYMPTOMS
FLANK PAIN: 0
SENSORY CHANGE: 0
FEVER: 0
MYALGIAS: 0
SPEECH CHANGE: 0
ABDOMINAL PAIN: 0
HEADACHES: 0
DIAPHORESIS: 0
CHILLS: 0
SHORTNESS OF BREATH: 1
PALPITATIONS: 0
DEPRESSION: 0
NAUSEA: 0
BACK PAIN: 0
DIZZINESS: 0
VOMITING: 0
NERVOUS/ANXIOUS: 0
COUGH: 0
MEMORY LOSS: 0
WEAKNESS: 1
FOCAL WEAKNESS: 0

## 2019-11-14 NOTE — TELEPHONE ENCOUNTER
Pt's  notified us that his wife is in the ER and will not make it to her anticoag visit. Will reschedule pt in 1 week with her .    Sherri GARCIA

## 2019-11-14 NOTE — PROGRESS NOTES
Inpatient Anticoagulation Service Note    Date: 2019    Reason for Anticoagulation: Atrial Fibrillation   Target INR: 2.0 to 3.0  [id number] VASc Score: 5  HAS-BLED Score: 3   Hemoglobin Value: (!) 11.7  Hematocrit Value: 38.7    INR from last 7 days     Date/Time INR Value    19 0207  (!) 1.61        Dose from last 7 days     Date/Time Dose (mg)    19 1306  5    19 1800  2.5        Average Dose (mg): (5 mg  and 2.5 mg all other days)  Significant Interactions: Statin, Thyroid Medications  Bridge Therapy: No    Reversal Agent Administered: Not Applicable  Comments: INR subtherapeutic, increase dose tonight to spur to therapeutic range. Historically about 60% TTR, may need weekly dose increase given recent subtherapeutic trend. Work up for CHF on going. Daily INR for now.     Plan:  5mg  Education Material Provided?: No  Pharmacist suggested discharge dosinmg Monday, Thursday; 2.5mg the rest of the week. INR within 5 days of discharge.      Arthur Matos, PharmD

## 2019-11-14 NOTE — PROGRESS NOTES
[facility] Medicine Daily Progress Note    Date of Service  11/14/2019    Chief Complaint  69 y.o. female admitted 11/13/2019 with history of chronic atrial fibrillation on Coumadin presents with subtherapeutic INR and increasing shortness of breath.  She has been admitted for acute pulmonary edema and has been started on Lasix diuresis.    Hospital Course    69 y.o. female admitted 11/13/2019 with history of chronic atrial fibrillation on Coumadin presents with subtherapeutic INR and increasing shortness of breath.  She has been admitted for acute pulmonary edema and has been started on Lasix diuresis.      Interval Problem Update  Reports improved shortness of breath, bilateral lower extremity edema  Feels better    Consultants/Specialty  None    Code Status  Full    Disposition  TBD    Review of Systems  Review of Systems   Constitutional: Negative for chills, diaphoresis, fever and malaise/fatigue.   HENT: Negative for congestion and hearing loss.    Respiratory: Positive for shortness of breath. Negative for cough.    Cardiovascular: Negative for chest pain, palpitations and leg swelling.   Gastrointestinal: Negative for abdominal pain, nausea and vomiting.   Genitourinary: Negative for dysuria and flank pain.   Musculoskeletal: Negative for back pain, joint pain and myalgias.   Neurological: Positive for weakness. Negative for dizziness, sensory change, speech change, focal weakness and headaches.   Psychiatric/Behavioral: Negative for depression and memory loss. The patient is not nervous/anxious.         Physical Exam  Temp:  [35.9 °C (96.7 °F)-36.8 °C (98.2 °F)] 36.1 °C (97 °F)  Pulse:  [] 101  Resp:  [18-48] 18  BP: (124-161)/(72-99) 137/94  SpO2:  [92 %-100 %] 96 %    Physical Exam  Vitals signs and nursing note reviewed.   Constitutional:       General: She is not in acute distress.     Appearance: She is not diaphoretic.      Comments: Morbid obesity   HENT:      Head: Normocephalic and atraumatic.       Nose: Nose normal. No congestion or rhinorrhea.   Eyes:      General:         Right eye: No discharge.         Left eye: No discharge.      Pupils: Pupils are equal, round, and reactive to light.   Neck:      Musculoskeletal: Neck supple.      Thyroid: No thyromegaly.      Vascular: No JVD.   Cardiovascular:      Rate and Rhythm: Normal rate.      Heart sounds: No murmur.   Pulmonary:      Effort: Pulmonary effort is normal. No respiratory distress.      Breath sounds: No stridor. Rales present. No wheezing or rhonchi.   Abdominal:      General: Bowel sounds are normal. There is no distension.      Palpations: Abdomen is soft.      Tenderness: There is no tenderness.   Musculoskeletal:         General: Swelling present. No tenderness or signs of injury.      Right lower leg: Edema present.      Left lower leg: Edema present.   Skin:     General: Skin is warm and dry.      Coloration: Skin is not pale.      Findings: No erythema or rash.   Neurological:      Mental Status: She is alert and oriented to person, place, and time.      Cranial Nerves: No cranial nerve deficit.   Psychiatric:         Behavior: Behavior normal.         Thought Content: Thought content normal.         Fluids    Intake/Output Summary (Last 24 hours) at 11/14/2019 1143  Last data filed at 11/14/2019 0400  Gross per 24 hour   Intake 240 ml   Output 3700 ml   Net -3460 ml       Laboratory  Recent Labs     11/13/19  1325 11/14/19  0207   WBC 5.6 6.2   RBC 4.75 4.67   HEMOGLOBIN 11.9* 11.7*   HEMATOCRIT 39.9 38.7   MCV 84.0 82.9   MCH 25.1* 25.1*   MCHC 29.8* 30.2*   RDW 56.0* 54.4*   PLATELETCT 245 246   MPV 9.7 9.8     Recent Labs     11/13/19  1325 11/14/19  0207   SODIUM 142 143   POTASSIUM 3.9 3.8   CHLORIDE 107 104   CO2 27 28   GLUCOSE 87 97   BUN 28* 28*   CREATININE 0.96 0.89   CALCIUM 9.6 9.8     Recent Labs     11/14/19  0207   INR 1.61*               Imaging  DX-CHEST-PORTABLE (1 VIEW)   Final Result      1.  Marked cardiac  silhouette enlargement.   2.  Possible mild central vascular congestion.   3.  Study limitations as above.      EC-ECHOCARDIOGRAM COMPLETE W/O CONT    (Results Pending)        Assessment/Plan  Acute pulmonary edema (HCC)- (present on admission)  Assessment & Plan  11/13 I started the patient on IV Lasix 20 mg twice daily  I ordered echocardiogram  She is already on losartan and metoprolol  Adjust medication as needed  2 g salt diet  Daily weight  Strict intake and output    11/14 continue Lasix diuresis, monitor ins and outs  Currently on room air, 96%  Follow-up echocardiogram  Chest x-ray consistent with pulmonary edema    Insulin dependent diabetes mellitus (HCC)- (present on admission)  Assessment & Plan  On sliding scale insulin    Chronic atrial fibrillation- (present on admission)  Assessment & Plan  Rate controlled, on metoprolol  Continue warfarin    Morbid obesity (HCC)- (present on admission)  Assessment & Plan  Diet and exercise education    Benign essential hypertension- (present on admission)  Assessment & Plan  Continue outpatient medications       VTE prophylaxis: Coumadin

## 2019-11-14 NOTE — DIETARY
NUTRITION SERVICES: BMI - Pt with BMI >40 (=Body mass index is 49.97 kg/m².), morbid obesity. Weight loss counseling not appropriate in acute care setting. RECOMMEND - Referral to outpatient nutrition services for weight management after D/C.

## 2019-11-14 NOTE — ASSESSMENT & PLAN NOTE
Improving with Lasix, echo shows mild left ear, mild pulmonary hypertension  I suspect due to KEN and OHS  Continue losartan and metoprolol  2 g salt diet  Daily weight  Strict intake and output  Needs outpatient sleep study

## 2019-11-14 NOTE — PROGRESS NOTES
Received bedside report from RN, pt care assumed, VSS, pt assessment complete. Pt AAOx4, chronic c/o  pain at this time. No signs of acute distress noted at this time. POC discussed with pt and verbalizes no questions. Pt denies any additional needs at this time. Bed in lowest position, bed alarm off, pt educated on fall risk and verbalized understanding, call light within reach, hourly rounding initiated. In an atrial fib.

## 2019-11-14 NOTE — ASSESSMENT & PLAN NOTE
Continue outpatient metoprolol, Cozaar  Hold HCTZ as some pressures have been low normal and she complains of dizziness

## 2019-11-14 NOTE — PROGRESS NOTES
Inpatient Anticoagulation Service Note    Date: 11/13/2019    Reason for Anticoagulation: Atrial Fibrillation   Target INR: 2.0 to 3.0  [id number] VASc Score: 5  HAS-BLED Score: 3   Hemoglobin Value: (!) 11.9  Hematocrit Value: 39.9    Last INR check was in the outpatient clinic at 1.5 on Monday 11/4    INR from last 7 days     None        Dose from last 7 days     Date/Time Dose (mg)    11/13/19 1800  2.5        Average Dose (mg): (5 mg Fridays and 2.5 mg all other days)  Significant Interactions: Statin, Thyroid Medications  Bridge Therapy: No     Reversal Agent Administered: Not Applicable    Comments: Patient presented to the ED with SOB and is to be continued on warfarin for atrial fibrillation. H/H stable and no documented overt bleeding.  Drug interactions noted, and no new interactions. Plan to restart home dosing and adjust as needed. Will CTM    Plan:  Warfarin 2.5 mg and check INR in the AM  Education Material Provided?: No, long term patient seen in the [facility] Anticoagulation clinic    Pharmacist suggested discharge dosing:   Reasonable to return to the patient's home dosing and follow up with the outpatient clinic within 72 hours upon discharge.  Warfarin 5 mg on Fridays  Warfarin 2.5 mg all other days     Chelsea Mancilla, PharmD

## 2019-11-14 NOTE — H&P
Hospital Medicine History & Physical Note    Date of Service  11/13/2019    Primary Care Physician  Daniel Fernández M.D.    Consultants  none    Code Status  full    Chief Complaint  SOB    History of Presenting Illness  69 y.o. female with a past medical history of essential hypertension, chronic atrial fibrillation on warfarin, insulin-dependent diabetes mellitus, dyslipidemia, morbid obesity who presented 11/13/2019 with worsening shortness of breath for the past 2 months.  She stated that she went on vacation for a few weeks during that time.  Associated with dyspnea on exertion, orthopnea and lower extremity edema.  She finally decided to come to ER.  In the ER she was found to have acute pulmonary edema with BNP over 900.  She was given IV Lasix in ER.  She will be admitted to telemetry floor for further evaluation and management.    Review of Systems  Review of Systems   Constitutional: Negative for chills, fever and weight loss.   HENT: Negative for congestion and nosebleeds.    Eyes: Negative for blurred vision, pain, discharge and redness.   Respiratory: Positive for shortness of breath. Negative for cough, sputum production and stridor.    Cardiovascular: Positive for leg swelling and PND. Negative for chest pain, palpitations and orthopnea.   Gastrointestinal: Negative for abdominal pain, diarrhea, heartburn, nausea and vomiting.   Genitourinary: Negative for dysuria, frequency and urgency.   Musculoskeletal: Negative for back pain, myalgias and neck pain.   Skin: Negative for itching and rash.   Neurological: Negative for dizziness, focal weakness, seizures and headaches.   Psychiatric/Behavioral: Negative for depression. The patient is not nervous/anxious and does not have insomnia.        Past Medical History   has a past medical history of A-fib (HCC), Arthritis, Atrial fibrillation (HCC), Benign essential hypertension (7/1/2011), Bowel habit changes, Breast cancer (HCC), Bronchitis (2015), CAD  (coronary artery disease), Cancer (HCC) (1987, 2000), Cataract, Diabetes (HCC), Diastolic congestive heart failure (HCC) (7/1/2011), Disorder of thyroid, High cholesterol, History of cardiac catheterization (4/22/2010), History of echocardiogram (4/22/2011), History of hypothyroidism (8/29/2008), Hypercholesteremia (7/1/2011), Long term (current) use of anticoagulants (7/1/2011), Morbid obesity (HCC) (10/28/2008), Sleep apnea, Snoring, and Urinary incontinence.    Surgical History   has a past surgical history that includes abdominal hysterectomy total; mastectomy (partial); pr radiation therapy plan simple; cholecystectomy (2001); cataract phaco with iol (Left, 1/8/2018); cataract phaco with iol (Right, 1/22/2018); other orthopedic surgery (Right, 2013); and zzz cardiac cath (02/27/2019).     Family History  family history includes Cancer in her maternal aunt; Heart Disease in her father and mother.     Social History   reports that she has never smoked. She has never used smokeless tobacco. She reports current alcohol use. She reports that she does not use drugs.    Allergies  Allergies   Allergen Reactions   • Sulfa Drugs Rash     8/2015         Medications  Prior to Admission Medications   Prescriptions Last Dose Informant Patient Reported? Taking?   Cholecalciferol (VITAMIN D-3) 5000 units Tab 11/13/2019 at 0830 Patient Yes Yes   Sig: Take 5,000 Units by mouth 3 times a week. Monday, Wednesday & Friday   Insulin Detemir (LEVEMIR FLEXPEN SC) 11/13/2019 at 0830 Patient Yes No   Sig: Inject 8-10 Units as instructed 2 Times a Day.   Multiple Vitamins-Minerals (MULTIVITAMIN PO) 11/13/2019 at 0830 Patient Yes Yes   Sig: Take 1 Tab by mouth every morning.   SYNTHROID 200 MCG Tab 11/13/2019 at 0830 Patient Yes No   Sig: Take 200 mcg by mouth every day. Monday thru Saturday   acetaminophen (TYLENOL 8 HOUR ARTHRITIS PAIN) 650 MG CR tablet 11/13/2019 at 0830 Patient Yes Yes   Sig: Take 1,300 mg by mouth 2 Times a Day.    allopurinol (ZYLOPRIM) 100 MG Tab 11/12/2019 at hs Patient Yes No   Sig: Take 100 mg by mouth every bedtime.   citalopram (CELEXA) 20 MG TABS 11/13/2019 at 0830 Patient Yes No   Sig: Take 20 mg by mouth every day.   gemfibrozil (LOPID) 600 MG Tab 11/13/2019 at 0830 Patient Yes No   Sig: Take 600 mg by mouth every day.   glipiZIDE (GLUCOTROL) 5 MG Tab 11/12/2019 at pm Patient Yes No   Sig: Take 10 mg by mouth every evening.   hydroCHLOROthiazide (HYDRODIURIL) 25 MG Tab 11/13/2019 at 0830 Patient Yes No   Sig: Take 25 mg by mouth every day.   levothyroxine (SYNTHROID) 175 MCG Tab 11/10/2019 at am Patient Yes No   Sig: Take 175 mcg by mouth every 7 days. Sundays  *Brand Only*   loratadine (CLARITIN) 10 MG Tab 11/13/2019 at 0830 Patient Yes No   Sig: Take 10 mg by mouth every day.   losartan (COZAAR) 50 MG Tab 11/13/2019 at 0830 Patient Yes No   Sig: Take 50 mg by mouth every day.   metoprolol (LOPRESSOR) 50 MG Tab 11/13/2019 at 0830 Patient Yes Yes   Sig: Take 50 mg by mouth 2 times a day.   oxybutynin SR (DITROPAN-XL) 10 MG CR tablet 11/13/2019 at 0830 Patient Yes No   Sig: Take 10 mg by mouth every day.   pioglitazone (ACTOS) 15 MG Tab 11/13/2019 at 0830 Patient Yes No   Sig: Take 15 mg by mouth every day.   rosuvastatin (CRESTOR) 20 MG Tab 11/12/2019 at pm Patient Yes No   Sig: Take 40 mg by mouth every evening.   traZODone (DESYREL) 50 MG Tab 11/12/2019 at pm Patient Yes No   Sig: Take 50 mg by mouth every evening.   warfarin (COUMADIN) 5 MG Tab 11/12/2019 at pm Patient Yes Yes   Sig: Take 2.5 mg by mouth every evening.      Facility-Administered Medications: None       Physical Exam  Temp:  [36.8 °C (98.2 °F)] 36.8 °C (98.2 °F)  Pulse:  [69-93] 73  Resp:  [18-48] 18  BP: (127-161)/(72-86) 127/86  SpO2:  [92 %-100 %] 92 %    Physical Exam  Constitutional:       General: She is not in acute distress.  HENT:      Head: Normocephalic and atraumatic.      Nose: No congestion or rhinorrhea.   Eyes:      Extraocular  Movements: Extraocular movements intact.      Pupils: Pupils are equal, round, and reactive to light.   Neck:      Musculoskeletal: Normal range of motion and neck supple.   Cardiovascular:      Rate and Rhythm: Normal rate and regular rhythm.      Pulses: Normal pulses.   Pulmonary:      Effort: Pulmonary effort is normal. No respiratory distress.      Breath sounds: Rales present.   Abdominal:      General: Bowel sounds are normal. There is no distension.      Palpations: Abdomen is soft.      Tenderness: There is no tenderness.   Musculoskeletal:         General: Swelling present. No tenderness.   Skin:     General: Skin is warm.      Findings: No erythema.   Neurological:      General: No focal deficit present.      Mental Status: She is alert and oriented to person, place, and time.         Laboratory:  Recent Labs     11/13/19  1325   WBC 5.6   RBC 4.75   HEMOGLOBIN 11.9*   HEMATOCRIT 39.9   MCV 84.0   MCH 25.1*   MCHC 29.8*   RDW 56.0*   PLATELETCT 245   MPV 9.7     Recent Labs     11/13/19  1325   SODIUM 142   POTASSIUM 3.9   CHLORIDE 107   CO2 27   GLUCOSE 87   BUN 28*   CREATININE 0.96   CALCIUM 9.6     Recent Labs     11/13/19  1325   ALTSGPT 10   ASTSGOT 15   ALKPHOSPHAT 65   TBILIRUBIN 0.4   GLUCOSE 87         Recent Labs     11/13/19  1325   NTPROBNP 943*         Recent Labs     11/13/19  1325   TROPONINT 35*       Urinalysis:    No results found     Imaging:  DX-CHEST-PORTABLE (1 VIEW)   Final Result      1.  Marked cardiac silhouette enlargement.   2.  Possible mild central vascular congestion.   3.  Study limitations as above.      EC-ECHOCARDIOGRAM COMPLETE W/O CONT    (Results Pending)         Assessment/Plan:  I anticipate this patient will require at least two midnights for appropriate medical management, necessitating inpatient admission.    Acute pulmonary edema (HCC)- (present on admission)  Assessment & Plan  I started the patient on IV Lasix 20 mg twice daily  I ordered echocardiogram  She  is already on losartan and metoprolol  Adjust medication as needed  2 g salt diet  Daily weight  Strict intake and output    Insulin dependent diabetes mellitus (HCC)- (present on admission)  Assessment & Plan  On sliding scale insulin    Chronic atrial fibrillation- (present on admission)  Assessment & Plan  Rate control on metoprolol  Continue warfarin    Morbid obesity (HCC)- (present on admission)  Assessment & Plan  Diet and exercise education    Benign essential hypertension- (present on admission)  Assessment & Plan  Continue outpatient medications      VTE prophylaxis: warfarin

## 2019-11-14 NOTE — ED NOTES
Medication reconciliation updated and complete per pt at bedside  Allergies have been verified and updated   No oral ABX within the last 14 days  Pt home [facility]

## 2019-11-15 ENCOUNTER — PATIENT OUTREACH (OUTPATIENT)
Dept: HEALTH INFORMATION MANAGEMENT | Facility: OTHER | Age: 69
End: 2019-11-15

## 2019-11-15 LAB
GLUCOSE BLD-MCNC: 104 MG/DL (ref 65–99)
GLUCOSE BLD-MCNC: 121 MG/DL (ref 65–99)
GLUCOSE BLD-MCNC: 129 MG/DL (ref 65–99)
GLUCOSE BLD-MCNC: 134 MG/DL (ref 65–99)
GLUCOSE BLD-MCNC: 162 MG/DL (ref 65–99)
INR PPP: 1.7 (ref 0.87–1.13)
PROTHROMBIN TIME: 20.5 SEC (ref 12–14.6)

## 2019-11-15 PROCEDURE — 85610 PROTHROMBIN TIME: CPT

## 2019-11-15 PROCEDURE — 700111 HCHG RX REV CODE 636 W/ 250 OVERRIDE (IP): Performed by: INTERNAL MEDICINE

## 2019-11-15 PROCEDURE — 99232 SBSQ HOSP IP/OBS MODERATE 35: CPT | Performed by: INTERNAL MEDICINE

## 2019-11-15 PROCEDURE — 770020 HCHG ROOM/CARE - TELE (206)

## 2019-11-15 PROCEDURE — 700102 HCHG RX REV CODE 250 W/ 637 OVERRIDE(OP): Performed by: INTERNAL MEDICINE

## 2019-11-15 PROCEDURE — A9270 NON-COVERED ITEM OR SERVICE: HCPCS | Performed by: INTERNAL MEDICINE

## 2019-11-15 PROCEDURE — 82962 GLUCOSE BLOOD TEST: CPT | Mod: 91

## 2019-11-15 PROCEDURE — 36415 COLL VENOUS BLD VENIPUNCTURE: CPT

## 2019-11-15 RX ORDER — WARFARIN SODIUM 4 MG/1
4 TABLET ORAL DAILY
Status: DISCONTINUED | OUTPATIENT
Start: 2019-11-15 | End: 2019-11-16 | Stop reason: HOSPADM

## 2019-11-15 RX ORDER — LEVOTHYROXINE SODIUM 0.1 MG/1
200 TABLET ORAL
Status: DISCONTINUED | OUTPATIENT
Start: 2019-11-16 | End: 2019-11-16 | Stop reason: HOSPADM

## 2019-11-15 RX ORDER — FUROSEMIDE 20 MG/1
20 TABLET ORAL
Status: DISCONTINUED | OUTPATIENT
Start: 2019-11-15 | End: 2019-11-16 | Stop reason: HOSPADM

## 2019-11-15 RX ADMIN — LOSARTAN POTASSIUM 50 MG: 50 TABLET, FILM COATED ORAL at 06:05

## 2019-11-15 RX ADMIN — CITALOPRAM HYDROBROMIDE 20 MG: 20 TABLET ORAL at 06:06

## 2019-11-15 RX ADMIN — ACETAMINOPHEN 650 MG: 325 TABLET, FILM COATED ORAL at 18:31

## 2019-11-15 RX ADMIN — LORATADINE 10 MG: 10 TABLET ORAL at 06:05

## 2019-11-15 RX ADMIN — METOPROLOL TARTRATE 25 MG: 25 TABLET, FILM COATED ORAL at 18:31

## 2019-11-15 RX ADMIN — METOPROLOL TARTRATE 25 MG: 25 TABLET, FILM COATED ORAL at 06:06

## 2019-11-15 RX ADMIN — OXYBUTYNIN CHLORIDE 10 MG: 10 TABLET, EXTENDED RELEASE ORAL at 06:05

## 2019-11-15 RX ADMIN — INSULIN HUMAN 2 UNITS: 100 INJECTION, SOLUTION PARENTERAL at 21:29

## 2019-11-15 RX ADMIN — WARFARIN SODIUM 4 MG: 4 TABLET ORAL at 18:31

## 2019-11-15 RX ADMIN — POTASSIUM CHLORIDE 40 MEQ: 1500 TABLET, EXTENDED RELEASE ORAL at 06:05

## 2019-11-15 RX ADMIN — FUROSEMIDE 20 MG: 10 INJECTION, SOLUTION INTRAMUSCULAR; INTRAVENOUS at 06:06

## 2019-11-15 RX ADMIN — TRAZODONE HYDROCHLORIDE 50 MG: 50 TABLET ORAL at 18:31

## 2019-11-15 RX ADMIN — HYDROCHLOROTHIAZIDE 25 MG: 25 TABLET ORAL at 06:06

## 2019-11-15 RX ADMIN — GEMFIBROZIL 600 MG: 600 TABLET ORAL at 06:06

## 2019-11-15 RX ADMIN — FUROSEMIDE 20 MG: 20 TABLET ORAL at 13:27

## 2019-11-15 RX ADMIN — ROSUVASTATIN CALCIUM 40 MG: 20 TABLET, FILM COATED ORAL at 18:32

## 2019-11-15 RX ADMIN — ALLOPURINOL 100 MG: 100 TABLET ORAL at 21:27

## 2019-11-15 RX ADMIN — ACETAMINOPHEN 650 MG: 325 TABLET, FILM COATED ORAL at 06:37

## 2019-11-15 ASSESSMENT — ENCOUNTER SYMPTOMS
NAUSEA: 0
FEVER: 0
COUGH: 0
HEARTBURN: 0
SHORTNESS OF BREATH: 1
HALLUCINATIONS: 0
BLOOD IN STOOL: 0
DIZZINESS: 1
WEAKNESS: 0
PALPITATIONS: 0
FOCAL WEAKNESS: 0
BACK PAIN: 0
DEPRESSION: 0
SORE THROAT: 0
ABDOMINAL PAIN: 0
DIARRHEA: 0
MYALGIAS: 0
CHILLS: 0
HEADACHES: 0
VOMITING: 0

## 2019-11-15 NOTE — PROGRESS NOTES
Bedside report received and patient care assumed. Pt is sitting at edge of bed, A&O4, with no complaints of pain, and is on RA. Tele box on. All fall precautions are in place, belongings at bedside table.  Pt was updated on POC, no questions or concerns. Pt educated on use of call light for assistance. Will continue to monitor.

## 2019-11-15 NOTE — PROGRESS NOTES
Hospital Medicine Daily Progress Note    Date of Service  11/15/2019    Chief Complaint  69 y.o. female admitted 11/13/2019 with dizziness, headache, shortness of breath    Hospital Course    History of morbid obesity, hypertension, atrial fibrillation on Coumadin admitted for shortness of breath found to have acute pulmonary edema.  Echocardiogram revealed normal EF, elevated RVSP at 30, mild AS      Interval Problem Update  11/15: Still with lower extremely swelling, improving with Lasix.  Blood pressure low normal HCTZ held.  Sugars are at goal    Consultants/Specialty  None    Code Status  Full    Disposition  Home, PT OT pending    Review of Systems  Review of Systems   Constitutional: Positive for malaise/fatigue. Negative for chills and fever.   HENT: Negative for sore throat.    Respiratory: Positive for shortness of breath (Improved). Negative for cough.    Cardiovascular: Positive for leg swelling. Negative for chest pain and palpitations.   Gastrointestinal: Negative for abdominal pain, blood in stool, diarrhea, heartburn, nausea and vomiting.   Genitourinary: Negative for dysuria and frequency.   Musculoskeletal: Negative for back pain and myalgias.   Neurological: Positive for dizziness. Negative for focal weakness, weakness and headaches.   Psychiatric/Behavioral: Negative for depression and hallucinations.   All other systems reviewed and are negative.       Physical Exam  Temp:  [36.2 °C (97.2 °F)-37.1 °C (98.7 °F)] 36.2 °C (97.2 °F)  Pulse:  [] 98  Resp:  [16-20] 20  BP: (104-127)/(58-86) 104/58  SpO2:  [92 %-97 %] 95 %    Physical Exam  Constitutional:       General: She is not in acute distress.     Appearance: She is obese.   HENT:      Nose: Nose normal.      Mouth/Throat:      Mouth: Mucous membranes are dry.   Neck:      Musculoskeletal: No muscular tenderness.   Cardiovascular:      Rate and Rhythm: Normal rate and regular rhythm.      Pulses: Normal pulses.      Comments: Distant heart  sounds  Pulmonary:      Effort: Pulmonary effort is normal.      Breath sounds: Normal breath sounds.   Abdominal:      General: There is no distension.      Palpations: Abdomen is soft.   Musculoskeletal:         General: Swelling present.   Lymphadenopathy:      Cervical: No cervical adenopathy.   Skin:     General: Skin is warm and dry.      Findings: No rash.   Neurological:      General: No focal deficit present.      Mental Status: She is alert and oriented to person, place, and time.      Motor: Weakness present.   Psychiatric:         Mood and Affect: Mood normal.         Thought Content: Thought content normal.         Fluids    Intake/Output Summary (Last 24 hours) at 11/15/2019 1245  Last data filed at 11/15/2019 0900  Gross per 24 hour   Intake 240 ml   Output 3490 ml   Net -3250 ml       Laboratory  Recent Labs     11/13/19  1325 11/14/19  0207   WBC 5.6 6.2   RBC 4.75 4.67   HEMOGLOBIN 11.9* 11.7*   HEMATOCRIT 39.9 38.7   MCV 84.0 82.9   MCH 25.1* 25.1*   MCHC 29.8* 30.2*   RDW 56.0* 54.4*   PLATELETCT 245 246   MPV 9.7 9.8     Recent Labs     11/13/19  1325 11/14/19  0207   SODIUM 142 143   POTASSIUM 3.9 3.8   CHLORIDE 107 104   CO2 27 28   GLUCOSE 87 97   BUN 28* 28*   CREATININE 0.96 0.89   CALCIUM 9.6 9.8     Recent Labs     11/14/19  0207 11/15/19  0348   INR 1.61* 1.70*               Imaging  EC-ECHOCARDIOGRAM COMPLETE W/ CONT   Final Result      DX-CHEST-PORTABLE (1 VIEW)   Final Result      1.  Marked cardiac silhouette enlargement.   2.  Possible mild central vascular congestion.   3.  Study limitations as above.           Assessment/Plan  Acute pulmonary edema (HCC)- (present on admission)  Assessment & Plan  Improving with Lasix, echo shows mild left ear, mild pulmonary hypertension  I suspect due to KEN and OHS  Continue losartan and metoprolol  2 g salt diet  Daily weight  Strict intake and output  Needs outpatient sleep study    Insulin dependent diabetes mellitus (HCC)- (present on  admission)  Assessment & Plan  On sliding scale insulin  Recent A1c was controlled    Chronic atrial fibrillation- (present on admission)  Assessment & Plan  rate controlled, on metoprolol  Continue warfarin  Subtherapeutic, adjust INR    Morbid obesity (HCC)- (present on admission)  Assessment & Plan  Diet and exercise education  She does not move around easily  Order PT and OT    Benign essential hypertension- (present on admission)  Assessment & Plan  Continue outpatient metoprolol, Cozaar  Hold HCTZ as some pressures have been low normal and she complains of dizziness         VTE prophylaxis: Coumadin

## 2019-11-15 NOTE — PROGRESS NOTES
Inpatient Anticoagulation Service Note    Date: 11/15/2019    Reason for Anticoagulation: Atrial Fibrillation   Target INR: 2.0 to 3.0  [id number] VASc Score: 5  HAS-BLED Score: 3   Hemoglobin Value: (!) 11.7  Hematocrit Value: 38.7    INR from last 7 days     Date/Time INR Value    11/15/19 0348  (!) 1.7    11/14/19 0207  (!) 1.61        Dose from last 7 days     Date/Time Dose (mg)    11/15/19 0800  4    11/14/19 1306  5    11/13/19 1800  2.5        Average Dose (mg): (5 mg Fridays and 2.5 mg all other days)  Significant Interactions: Statin, Thyroid Medications  Bridge Therapy: No     Reversal Agent Administered: Not Applicable    Comments:   Pt admitted on 11/13/19 for pulmonary edema. PMHx significant for AFib, HTN, CAD,CHF. Warfarin for Afib on lopressor, INR subtherapeutic on admit. DDI noted. H/H low , stable over interval. No documented signs of overt bleeding noted. INR currently below goal , increased over interval.    Plan:  Warfarin 4 mg po daily , INR in AM.   Education Material Provided?: No  Pharmacist suggested discharge dosing: Warfarin 3-4 mg po daily , follow up with anticoagulation clinic after discharge.      Nba Otero PharmD BCPS

## 2019-11-15 NOTE — CARE PLAN
Problem: Communication  Goal: The ability to communicate needs accurately and effectively will improve  Outcome: PROGRESSING AS EXPECTED     Problem: Safety  Goal: Will remain free from injury  Outcome: PROGRESSING AS EXPECTED  Goal: Will remain free from falls  Outcome: PROGRESSING AS EXPECTED     Problem: Infection  Goal: Will remain free from infection  Outcome: PROGRESSING AS EXPECTED     Problem: Venous Thromboembolism (VTW)/Deep Vein Thrombosis (DVT) Prevention:  Goal: Patient will participate in Venous Thrombosis (VTE)/Deep Vein Thrombosis (DVT)Prevention Measures  Outcome: PROGRESSING AS EXPECTED     Problem: Bowel/Gastric:  Goal: Normal bowel function is maintained or improved  Outcome: PROGRESSING AS EXPECTED  Goal: Will not experience complications related to bowel motility  Outcome: PROGRESSING AS EXPECTED     Problem: Knowledge Deficit  Goal: Knowledge of disease process/condition, treatment plan, diagnostic tests, and medications will improve  Outcome: PROGRESSING AS EXPECTED  Goal: Knowledge of the prescribed therapeutic regimen will improve  Outcome: PROGRESSING AS EXPECTED     Problem: Discharge Barriers/Planning  Goal: Patient's continuum of care needs will be met  Outcome: PROGRESSING AS EXPECTED     Problem: Respiratory:  Goal: Respiratory status will improve  Outcome: PROGRESSING AS EXPECTED     Problem: Fluid Volume:  Goal: Will maintain balanced intake and output  Outcome: PROGRESSING AS EXPECTED     Problem: Skin Integrity  Goal: Risk for impaired skin integrity will decrease  Outcome: PROGRESSING AS EXPECTED     Problem: Urinary Elimination:  Goal: Ability to reestablish a normal urinary elimination pattern will improve  Outcome: PROGRESSING AS EXPECTED     Problem: Pain Management  Goal: Pain level will decrease to patient's comfort goal  Outcome: PROGRESSING AS EXPECTED

## 2019-11-15 NOTE — CARE PLAN
Problem: Communication  Goal: The ability to communicate needs accurately and effectively will improve  Outcome: PROGRESSING AS EXPECTED  Note:   Pt actively participates in POC. Pt and board updated, all questions and concerns answered.       Problem: Safety  Goal: Will remain free from injury  Outcome: PROGRESSING AS EXPECTED  Note:   Safety and fall education given. All fall precautions are in place with belongings at bedside table. Needs are tended to. Educated to use call light for assistance. Pt verbalized understanding.       Problem: Venous Thromboembolism (VTW)/Deep Vein Thrombosis (DVT) Prevention:  Goal: Patient will participate in Venous Thrombosis (VTE)/Deep Vein Thrombosis (DVT)Prevention Measures  Outcome: PROGRESSING AS EXPECTED  Flowsheets (Taken 11/14/2019 2100)  Pharmacologic Prophylaxis Used: Warfarin (Coumadin)  Note:   Pt has history of chronic a.fib and is continuing with her warfarin.      Problem: Bowel/Gastric:  Goal: Normal bowel function is maintained or improved  Outcome: PROGRESSING AS EXPECTED  Note:   Pt reports frequency with urination/from taking Lasix.

## 2019-11-16 VITALS
TEMPERATURE: 96.9 F | HEIGHT: 65 IN | SYSTOLIC BLOOD PRESSURE: 119 MMHG | HEART RATE: 105 BPM | BODY MASS INDEX: 47.75 KG/M2 | WEIGHT: 286.6 LBS | DIASTOLIC BLOOD PRESSURE: 63 MMHG | OXYGEN SATURATION: 95 % | RESPIRATION RATE: 18 BRPM

## 2019-11-16 LAB
ALBUMIN SERPL BCP-MCNC: 3.9 G/DL (ref 3.2–4.9)
BUN SERPL-MCNC: 38 MG/DL (ref 8–22)
CALCIUM SERPL-MCNC: 9.6 MG/DL (ref 8.5–10.5)
CHLORIDE SERPL-SCNC: 94 MMOL/L (ref 96–112)
CO2 SERPL-SCNC: 31 MMOL/L (ref 20–33)
CREAT SERPL-MCNC: 1.22 MG/DL (ref 0.5–1.4)
ERYTHROCYTE [DISTWIDTH] IN BLOOD BY AUTOMATED COUNT: 53.1 FL (ref 35.9–50)
GLUCOSE BLD-MCNC: 119 MG/DL (ref 65–99)
GLUCOSE BLD-MCNC: 147 MG/DL (ref 65–99)
GLUCOSE SERPL-MCNC: 228 MG/DL (ref 65–99)
HCT VFR BLD AUTO: 41.4 % (ref 37–47)
HGB BLD-MCNC: 12.5 G/DL (ref 12–16)
INR PPP: 1.98 (ref 0.87–1.13)
MCH RBC QN AUTO: 24.7 PG (ref 27–33)
MCHC RBC AUTO-ENTMCNC: 30.2 G/DL (ref 33.6–35)
MCV RBC AUTO: 81.8 FL (ref 81.4–97.8)
PHOSPHATE SERPL-MCNC: 3.6 MG/DL (ref 2.5–4.5)
PLATELET # BLD AUTO: 280 K/UL (ref 164–446)
PMV BLD AUTO: 9.9 FL (ref 9–12.9)
POTASSIUM SERPL-SCNC: 3.3 MMOL/L (ref 3.6–5.5)
PROTHROMBIN TIME: 23.1 SEC (ref 12–14.6)
RBC # BLD AUTO: 5.06 M/UL (ref 4.2–5.4)
SODIUM SERPL-SCNC: 136 MMOL/L (ref 135–145)
WBC # BLD AUTO: 7.1 K/UL (ref 4.8–10.8)

## 2019-11-16 PROCEDURE — A9270 NON-COVERED ITEM OR SERVICE: HCPCS | Performed by: INTERNAL MEDICINE

## 2019-11-16 PROCEDURE — 700102 HCHG RX REV CODE 250 W/ 637 OVERRIDE(OP): Performed by: INTERNAL MEDICINE

## 2019-11-16 PROCEDURE — 99239 HOSP IP/OBS DSCHRG MGMT >30: CPT | Performed by: INTERNAL MEDICINE

## 2019-11-16 PROCEDURE — 82962 GLUCOSE BLOOD TEST: CPT

## 2019-11-16 PROCEDURE — 36415 COLL VENOUS BLD VENIPUNCTURE: CPT

## 2019-11-16 PROCEDURE — 85610 PROTHROMBIN TIME: CPT

## 2019-11-16 PROCEDURE — 85027 COMPLETE CBC AUTOMATED: CPT

## 2019-11-16 PROCEDURE — 80069 RENAL FUNCTION PANEL: CPT

## 2019-11-16 RX ORDER — DILTIAZEM HYDROCHLORIDE 5 MG/ML
20 INJECTION INTRAVENOUS
Status: DISCONTINUED | OUTPATIENT
Start: 2019-11-16 | End: 2019-11-16 | Stop reason: HOSPADM

## 2019-11-16 RX ORDER — POTASSIUM CHLORIDE 20 MEQ/1
40 TABLET, EXTENDED RELEASE ORAL DAILY
Qty: 60 TAB | Refills: 11 | Status: SHIPPED | OUTPATIENT
Start: 2019-11-17 | End: 2019-12-31

## 2019-11-16 RX ORDER — WARFARIN SODIUM 5 MG/1
2.5-5 TABLET ORAL EVERY EVENING
Qty: 30 TAB | Refills: 3 | Status: SHIPPED | OUTPATIENT
Start: 2019-11-16 | End: 2019-12-31

## 2019-11-16 RX ORDER — METOPROLOL TARTRATE 50 MG/1
50 TABLET, FILM COATED ORAL 2 TIMES DAILY
Status: DISCONTINUED | OUTPATIENT
Start: 2019-11-16 | End: 2019-11-16

## 2019-11-16 RX ORDER — FUROSEMIDE 20 MG/1
20 TABLET ORAL 2 TIMES DAILY
Qty: 60 TAB | Refills: 1 | Status: SHIPPED | OUTPATIENT
Start: 2019-11-16 | End: 2020-03-11

## 2019-11-16 RX ORDER — METOPROLOL TARTRATE 50 MG/1
50 TABLET, FILM COATED ORAL 2 TIMES DAILY
Status: DISCONTINUED | OUTPATIENT
Start: 2019-11-16 | End: 2019-11-16 | Stop reason: HOSPADM

## 2019-11-16 RX ADMIN — POTASSIUM CHLORIDE 40 MEQ: 1500 TABLET, EXTENDED RELEASE ORAL at 05:48

## 2019-11-16 RX ADMIN — CITALOPRAM HYDROBROMIDE 20 MG: 20 TABLET ORAL at 05:48

## 2019-11-16 RX ADMIN — ACETAMINOPHEN 650 MG: 325 TABLET, FILM COATED ORAL at 03:30

## 2019-11-16 RX ADMIN — FUROSEMIDE 20 MG: 20 TABLET ORAL at 05:48

## 2019-11-16 RX ADMIN — GEMFIBROZIL 600 MG: 600 TABLET ORAL at 05:46

## 2019-11-16 RX ADMIN — LORATADINE 10 MG: 10 TABLET ORAL at 05:48

## 2019-11-16 RX ADMIN — OXYBUTYNIN CHLORIDE 10 MG: 10 TABLET, EXTENDED RELEASE ORAL at 05:45

## 2019-11-16 RX ADMIN — LEVOTHYROXINE SODIUM 200 MCG: 100 TABLET ORAL at 05:47

## 2019-11-16 RX ADMIN — METOPROLOL TARTRATE 25 MG: 25 TABLET, FILM COATED ORAL at 05:47

## 2019-11-16 RX ADMIN — LOSARTAN POTASSIUM 50 MG: 50 TABLET, FILM COATED ORAL at 05:48

## 2019-11-16 NOTE — DISCHARGE SUMMARY
Discharge Summary    CHIEF COMPLAINT ON ADMISSION  Chief Complaint   Patient presents with   • Shortness of Breath   • Dizziness       Reason for Admission  Shortness of Breath     Admission Date  11/13/2019    CODE STATUS  Full Code    HPI & HOSPITAL COURSE  This is a 69 y.o. female with a history of of morbid obesity, hypertension, atrial fibrillation on Coumadin admitted for shortness of breath found to have acute pulmonary edema.  Echocardiogram revealed normal EF, elevated RVSP at 30, mild AS.  Due to her body habitus there was a strong suspicion for sleep apnea.  This was discussed with her in detail.  She was started on IV Lasix and slowly had improvement of her respiratory status.  She is weaned to room air and transition to oral Lasix.  She will continue this and potassium supplementation as an outpatient.  She was advised to get an outpatient sleep study and discussed the possibility of wearing a CPAP mask to control her sleep apnea.  Weight loss was also advised.    Of note, her INR was subtherapeutic upon her arrival.  Her Coumadin dose was increased during her hospital stay and on discharge she will alternate 2.5 and 5 mg dosing rather than taking only 2.5 mg daily.  She will follow-up with INR clinic as scheduled on Wednesday 11/20    Therefore, she is discharged in good and stable condition to home with close outpatient follow-up.    The patient met 2-midnight criteria for an inpatient stay at the time of discharge.    Discharge Date  11/16/2019    FOLLOW UP ITEMS POST DISCHARGE  Follow-up with outpatient PCP for sleep study  Get repeat labs in 1 week while on diuretic to ensure dose is appropriate  Follow-up with Coumadin clinic as scheduled    DISCHARGE DIAGNOSES  Active Problems:    Acute pulmonary edema (HCC) POA: Yes    Morbid obesity (HCC) (Chronic) POA: Yes    Chronic atrial fibrillation POA: Yes    Insulin dependent diabetes mellitus (HCC) POA: Yes    Benign essential hypertension (Chronic)  POA: Yes  Resolved Problems:    * No resolved hospital problems. *      FOLLOW UP  Future Appointments   Date Time Provider Department Center   11/20/2019  2:15 PM [facility] EXAM 5 VMED None     Daniel Fernández M.D.  [address]  [address]  [place] [address]  [phone]      [facility]  left a voicemail for the office to give you a call back to schedule your hospital follow up. If you do not hear anything, please call to schedule your appointment. Thank you.        MEDICATIONS ON DISCHARGE     Medication List      START taking these medications      Instructions   furosemide 20 MG Tabs  Commonly known as:  LASIX   Take 1 Tab by mouth 2 Times a Day.  Dose:  20 mg     potassium chloride SA 20 MEQ Tbcr  Start taking on:  November 17, 2019  Commonly known as:  Kdur   Take 2 Tabs by mouth every day.  Dose:  40 mEq        CHANGE how you take these medications      Instructions   warfarin 5 MG Tabs  What changed:  how much to take  Commonly known as:  COUMADIN   Doctor's comments:  Take 5mg daily for two days, then alternate 2.5 and 5 until seen by INR clinic for more adjustments  Take 0.5-1 Tabs by mouth every evening.  Dose:  2.5-5 mg        CONTINUE taking these medications      Instructions   allopurinol 100 MG Tabs  Commonly known as:  ZYLOPRIM   Take 100 mg by mouth every bedtime.  Dose:  100 mg     citalopram 20 MG Tabs  Commonly known as:  CELEXA   Take 20 mg by mouth every day.  Dose:  20 mg     gemfibrozil 600 MG Tabs  Commonly known as:  LOPID   Take 600 mg by mouth every day.  Dose:  600 mg     glipiZIDE 5 MG Tabs  Commonly known as:  GLUCOTROL   Take 10 mg by mouth every evening.  Dose:  10 mg     hydroCHLOROthiazide 25 MG Tabs  Commonly known as:  HYDRODIURIL   Take 25 mg by mouth every day.  Dose:  25 mg     LEVEMIR FLEXPEN SC   Inject 8-10 Units as instructed 2 Times a Day.  Dose:  8-10 Units     * levothyroxine 175 MCG Tabs  Commonly known as:  SYNTHROID   Take 175 mcg by mouth every 7 days.  Sundays  *Brand Only*  Dose:  175 mcg     * SYNTHROID 200 MCG Tabs  Generic drug:  levothyroxine   Take 200 mcg by mouth every day. Monday thru Saturday  Dose:  200 mcg     loratadine 10 MG Tabs  Commonly known as:  CLARITIN   Take 10 mg by mouth every day.  Dose:  10 mg     losartan 50 MG Tabs  Commonly known as:  COZAAR   Take 50 mg by mouth every day.  Dose:  50 mg     metoprolol 50 MG Tabs  Commonly known as:  LOPRESSOR   Take 50 mg by mouth 2 times a day.  Dose:  50 mg     MULTIVITAMIN PO   Take 1 Tab by mouth every morning.  Dose:  1 Tab     oxybutynin SR 10 MG CR tablet  Commonly known as:  DITROPAN-XL   Take 10 mg by mouth every day.  Dose:  10 mg     pioglitazone 15 MG Tabs  Commonly known as:  ACTOS   Take 15 mg by mouth every day.  Dose:  15 mg     rosuvastatin 20 MG Tabs  Commonly known as:  CRESTOR   Take 40 mg by mouth every evening.  Dose:  40 mg     traZODone 50 MG Tabs  Commonly known as:  DESYREL   Take 50 mg by mouth every evening.  Dose:  50 mg     TYLENOL 8 HOUR ARTHRITIS PAIN 650 MG CR tablet  Generic drug:  acetaminophen   Take 1,300 mg by mouth 2 Times a Day.  Dose:  1,300 mg     Vitamin D-3 5000 units Tabs   Take 5,000 Units by mouth 3 times a week. Monday, Wednesday & Friday  Dose:  5,000 Units         * This list has 2 medication(s) that are the same as other medications prescribed for you. Read the directions carefully, and ask your doctor or other care provider to review them with you.                Allergies  Allergies   Allergen Reactions   • Sulfa Drugs Rash     8/2015         DIET  Orders Placed This Encounter   Procedures   • Diet Order 2 Gram Sodium, Cardiac     Standing Status:   Standing     Number of Occurrences:   1     Order Specific Question:   Diet:     Answer:   2 Gram Sodium [7]     Order Specific Question:   Diet:     Answer:   Cardiac [6]       ACTIVITY  As tolerated.  Weight bearing as tolerated    CONSULTATIONS  None    PROCEDURES  None    LABORATORY  Lab Results    Component Value Date    SODIUM 143 11/14/2019    POTASSIUM 3.8 11/14/2019    CHLORIDE 104 11/14/2019    CO2 28 11/14/2019    GLUCOSE 97 11/14/2019    BUN 28 (H) 11/14/2019    CREATININE 0.89 11/14/2019    CREATININE 0.6 01/27/2009        Lab Results   Component Value Date    WBC 7.1 11/16/2019    HEMOGLOBIN 12.5 11/16/2019    HEMATOCRIT 41.4 11/16/2019    PLATELETCT 280 11/16/2019        Total time of the discharge process exceeds 38 minutes.

## 2019-11-16 NOTE — CARE PLAN
Problem: Communication  Goal: The ability to communicate needs accurately and effectively will improve  Outcome: PROGRESSING AS EXPECTED  Note:   Pt actively participates in POC. Pt and board updated, all questions and concerns answered.       Problem: Safety  Goal: Will remain free from injury  Outcome: PROGRESSING AS EXPECTED  Note:   Safety and fall education given. All fall precautions are in place with belongings at bedside table. Needs are tended to. Educated to use call light for assistance. Pt verbalized understanding.       Problem: Discharge Barriers/Planning  Goal: Patient's continuum of care needs will be met  Outcome: PROGRESSING AS EXPECTED

## 2019-11-16 NOTE — PROGRESS NOTES
Inpatient Anticoagulation Service Note    Date: 11/16/2019    Reason for Anticoagulation: Atrial Fibrillation   Target INR: 2.0 to 3.0  [id number] VASc Score: 5  HAS-BLED Score: 3   Hemoglobin Value: 12.5  Hematocrit Value: 41.4    INR from last 7 days     Date/Time INR Value    11/16/19 0300  (!) 1.98    11/15/19 0348  (!) 1.7    11/14/19 0207  (!) 1.61        Dose from last 7 days     Date/Time Dose (mg)    11/15/19 0800  4    11/14/19 1306  5    11/13/19 1800  2.5        Average Dose (mg): (5 mg Fridays and 2.5 mg all other days)  Significant Interactions: Statin, Thyroid Medications  Bridge Therapy: No     Reversal Agent Administered: Not Applicable    Comments:   Pt admitted on 11/13/19 for pulmonary edema. PMHx significant for AFib, HTN, CAD,CHF. Warfarin for Afib on lopressor, INR subtherapeutic on admit. DDI noted. H/H increased over interval. No documented signs of overt bleeding noted. INR currently below goal , increased over interval.     Plan:  Continue warfarin 4 mg po daily for now , INR in AM.   Education Material Provided?: No  Pharmacist suggested discharge dosing: Warfarin 3-4 mg po daily , follow up with anticoagulation clinic after discharge.      Nba VivasD BCPS

## 2019-11-16 NOTE — PROGRESS NOTES
MD Manzanares paged because patients rate has continued to increase over course of stay. Average rate over night in 130s, occasionally sustaining in the 160s, up to 180s. New orders received. Will continue to monitor.

## 2019-11-16 NOTE — DISCHARGE INSTRUCTIONS
Pulmonary Edema  Pulmonary edema is abnormal fluid buildup in the lungs that can make it hard to breathe.  Follow these instructions at home:  · Talk to your doctor about an exercise program.  · Eat a healthy diet:  ¨ Eat fresh fruits, vegetables, and lean meats.  ¨ Limit high fat and salty foods.  ¨ Avoid processed, canned, or fried foods.  ¨ Avoid fast food.  · Follow your doctor's advice about taking medicine and recording the medicine you take.  · Follow your doctor's advice about keeping a record of your weight.  · Talk to your doctor about keeping track of your blood pressure.  · Do not smoke.  · Do not use nicotine patches or nicotine gum.  · Make a follow-up appointment with your doctor.  · Ask your doctor for a copy of your latest heart tracing (ECG) and keep a copy with you at all times.  Get help right away if:  · You have chest pain. THIS IS AN EMERGENCY. Do not wait to see if the pain will go away. Call for local emergency medical help. Do not drive yourself to the hospital.  · You have sweating, feel sick to your stomach (nauseous), or are experiencing shortness of breath.  · Your weight increases more than your doctor tells you it should.  · You start to have shortness of breath.  · You notice more swelling in your hands, feet, ankles, or belly.  · You have dizziness, blurred vision, headache, or unsteadiness that does not go away.  · You cough up bloody spit.  · You have a cough that does not go away.  · You are unable to sleep because it is hard to breathe.  · You begin to feel a “jumping” or “fluttering” sensation (palpitations) in the chest that is unusual for you.  This information is not intended to replace advice given to you by your health care provider. Make sure you discuss any questions you have with your health care provider.  Document Released: 12/06/2010 Document Revised: 05/25/2017 Document Reviewed: 08/25/2014  [employer] Patient Education © 2017 [employer]  Discharge  Instructions    Discharged to home by car with relative. Discharged via wheelchair, hospital escort: Yes.  Special equipment needed: Not Applicable    Be sure to schedule a follow-up appointment with your primary care doctor or any specialists as instructed.     Discharge Plan:   Influenza Vaccine Indication: Not indicated: Previously immunized this influenza season and > 8 years of age    I understand that a diet low in cholesterol, fat, and sodium is recommended for good health. Unless I have been given specific instructions below for another diet, I accept this instruction as my diet prescription.   Other diet: 2 gram sodium    Special Instructions: none    · Is patient discharged on Warfarin / Coumadin?   Yes    You are receiving the drug warfarin. Please understand the importance of monitoring warfarin with scheduled PT/INR blood draws.  Follow-up with a call to your personal Doctor's office in 3 days to schedule a PT/INR. .    IMPORTANT: HOW TO USE THIS INFORMATION:  This is a summary and does NOT have all possible information about this product. This information does not assure that this product is safe, effective, or appropriate for you. This information is not individual medical advice and does not substitute for the advice of your health care professional. Always ask your health care professional for complete information about this product and your specific health needs.      WARFARIN - ORAL (WARF-uh-rin)      COMMON BRAND NAME(S): Coumadin      WARNING:  Warfarin can cause very serious (possibly fatal) bleeding. This is more likely to occur when you first start taking this medication or if you take too much warfarin. To decrease your risk for bleeding, your doctor or other health care provider will monitor you closely and check your lab results (INR test) to make sure you are not taking too much warfarin. Keep all medical and laboratory appointments. Tell your doctor right away if you notice any signs of  "serious bleeding. See also Side Effects section.      USES:  This medication is used to treat blood clots (such as in deep vein thrombosis-DVT or pulmonary embolus-PE) and/or to prevent new clots from forming in your body. Preventing harmful blood clots helps to reduce the risk of a stroke or heart attack. Conditions that increase your risk of developing blood clots include a certain type of irregular heart rhythm (atrial fibrillation), heart valve replacement, recent heart attack, and certain surgeries (such as hip/knee replacement). Warfarin is commonly called a \"blood thinner,\" but the more correct term is \"anticoagulant.\" It helps to keep blood flowing smoothly in your body by decreasing the amount of certain substances (clotting proteins) in your blood.      HOW TO USE:  Read the Medication Guide provided by your pharmacist before you start taking warfarin and each time you get a refill. If you have any questions, ask your doctor or pharmacist. Take this medication by mouth with or without food as directed by your doctor or other health care professional, usually once a day. It is very important to take it exactly as directed. Do not increase the dose, take it more frequently, or stop using it unless directed by your doctor. Dosage is based on your medical condition, laboratory tests (such as INR), and response to treatment. Your doctor or other health care provider will monitor you closely while you are taking this medication to determine the right dose for you. Use this medication regularly to get the most benefit from it. To help you remember, take it at the same time each day. It is important to eat a balanced, consistent diet while taking warfarin. Some foods can affect how warfarin works in your body and may affect your treatment and dose. Avoid sudden large increases or decreases in your intake of foods high in vitamin K (such as broccoli, cauliflower, cabbage, brussels sprouts, kale, spinach, and other " green leafy vegetables, liver, green tea, certain vitamin supplements). If you are trying to lose weight, check with your doctor before you try to go on a diet. Cranberry products may also affect how your warfarin works. Limit the amount of cranberry juice (16 ounces/480 milliliters a day) or other cranberry products you may drink or eat.      SIDE EFFECTS:  Nausea, loss of appetite, or stomach/abdominal pain may occur. If any of these effects persist or worsen, tell your doctor or pharmacist promptly. Remember that your doctor has prescribed this medication because he or she has judged that the benefit to you is greater than the risk of side effects. Many people using this medication do not have serious side effects. This medication can cause serious bleeding if it affects your blood clotting proteins too much (shown by unusually high INR lab results). Even if your doctor stops your medication, this risk of bleeding can continue for up to a week. Tell your doctor right away if you have any signs of serious bleeding, including: unusual pain/swelling/discomfort, unusual/easy bruising, prolonged bleeding from cuts or gums, persistent/frequent nosebleeds, unusually heavy/prolonged menstrual flow, pink/dark urine, coughing up blood, vomit that is bloody or looks like coffee grounds, severe headache, dizziness/fainting, unusual or persistent tiredness/weakness, bloody/black/tarry stools, chest pain, shortness of breath, difficulty swallowing. Tell your doctor right away if any of these unlikely but serious side effects occur: persistent nausea/vomiting, severe stomach/abdominal pain, yellowing eyes/skin. This drug rarely has caused very serious (possibly fatal) problems if its effects lead to small blood clots (usually at the beginning of treatment). This can lead to severe skin/tissue damage that may require surgery or amputation if left untreated. Patients with certain blood conditions (protein C or S deficiency) may  be at greater risk. Get medical help right away if any of these rare but serious side effects occur: painful/red/purplish patches on the skin (such as on the toe, breast, abdomen), change in the amount of urine, vision changes, confusion, slurred speech, weakness on one side of the body. A very serious allergic reaction to this drug is rare. However, get medical help right away if you notice any symptoms of a serious allergic reaction, including: rash, itching/swelling (especially of the face/tongue/throat), severe dizziness, trouble breathing. This is not a complete list of possible side effects. If you notice other effects not listed above, contact your doctor or pharmacist. In the [place] - Call your doctor for medical advice about side effects. You may report side effects to FDA at 8-331-UEO-8460. In [place] - Call your doctor for medical advice about side effects. You may report side effects to [facility] [place] at 1-471.320.4681.      PRECAUTIONS:  Before taking warfarin, tell your doctor or pharmacist if you are allergic to it; or if you have any other allergies. This product may contain inactive ingredients, which can cause allergic reactions or other problems. Talk to your pharmacist for more details. Before using this medication, tell your doctor or pharmacist your medical history, especially of: blood disorders (such as anemia, hemophilia), bleeding problems (such as bleeding of the stomach/intestines, bleeding in the brain), blood vessel disorders (such as aneurysms), recent major injury/surgery, liver disease, alcohol use, mental/mood disorders (including memory problems), frequent falls/injuries. It is important that all your doctors and dentists know that you take warfarin. Before having surgery or any medical/dental procedures, tell your doctor or dentist that you are taking this medication and about all the products you use (including prescription drugs, nonprescription drugs, and herbal products). Avoid  getting injections into the muscles. If you must have an injection into a muscle (for example, a flu shot), it should be given in the arm. This way, it will be easier to check for bleeding and/or apply pressure bandages. This medication may cause stomach bleeding. Daily use of alcohol while using this medicine will increase your risk for stomach bleeding and may also affect how this medication works. Limit or avoid alcoholic beverages. If you have not been eating well, if you have an illness or infection that causes fever, vomiting, or diarrhea for more than 2 days, or if you start using any antibiotic medications, contact your doctor or pharmacist immediately because these conditions can affect how warfarin works. This medication can cause heavy bleeding. To lower the chance of getting cut, bruised, or injured, use great caution with sharp objects like safety razors and nail cutters. Use an electric razor when shaving and a soft toothbrush when brushing your teeth. Avoid activities such as contact sports. If you fall or injure yourself, especially if you hit your head, call your doctor immediately. Your doctor may need to check you. The Food & Drug Administration has stated that generic warfarin products are interchangeable. However, consult your doctor or pharmacist before switching warfarin products. Be careful not to take more than one medication that contains warfarin unless specifically directed by the doctor or health care provider who is monitoring your warfarin treatment. Older adults may be at greater risk for bleeding while using this drug. This medication is not recommended for use during pregnancy because of serious (possibly fatal) harm to an unborn baby. Discuss the use of reliable forms of birth control with your doctor. If you become pregnant or think you may be pregnant, tell your doctor immediately. If you are planning pregnancy, discuss a plan for managing your condition with your doctor before  "you become pregnant. Your doctor may switch the type of medication you use during pregnancy. Very small amounts of this medication may pass into breast milk but is unlikely to harm a nursing infant. Consult your doctor before breast-feeding.      DRUG INTERACTIONS:  Drug interactions may change how your medications work or increase your risk for serious side effects. This document does not contain all possible drug interactions. Keep a list of all the products you use (including prescription/nonprescription drugs and herbal products) and share it with your doctor and pharmacist. Do not start, stop, or change the dosage of any medicines without your doctor's approval. Warfarin interacts with many prescription, nonprescription, vitamin, and herbal products. This includes medications that are applied to the skin or inside the vagina or rectum. The interactions with warfarin usually result in an increase or decrease in the \"blood-thinning\" (anticoagulant) effect. Your doctor or other health care professional should closely monitor you to prevent serious bleeding or clotting problems. While taking warfarin, it is very important to tell your doctor or pharmacist of any changes in medications, vitamins, or herbal products that you are taking. Some products that may interact with this drug include: capecitabine, imatinib, mifepristone. Aspirin, aspirin-like drugs (salicylates), and nonsteroidal anti-inflammatory drugs (NSAIDs such as ibuprofen, naproxen, celecoxib) may have effects similar to warfarin. These drugs may increase the risk of bleeding problems if taken during treatment with warfarin. Carefully check all prescription/nonprescription product labels (including drugs applied to the skin such as pain-relieving creams) since the products may contain NSAIDs or salicylates. Talk to your doctor about using a different medication (such as acetaminophen) to treat pain/fever. Low-dose aspirin and related drugs (such as " clopidogrel, ticlopidine) should be continued if prescribed by your doctor for specific medical reasons such as heart attack or stroke prevention. Consult your doctor or pharmacist for more details. Many herbal products interact with warfarin. Tell your doctor before taking any herbal products, especially bromelains, coenzyme Q10, cranberry, danshen, dong quai, fenugreek, garlic, ginkgo biloba, ginseng, and Lakesha's wort, among others. This medication may interfere with a certain laboratory test to measure theophylline levels, possibly causing false test results. Make sure laboratory personnel and all your doctors know you use this drug.      OVERDOSE:  If overdose is suspected, contact a poison control center or emergency room immediately. [place] residents can call the [place] National Poison Hotline at 1-128.739.8144. [place] residents can call a provincial poison control center. Symptoms of overdose may include: bloody/black/tarry stools, pink/dark urine, unusual/prolonged bleeding.      NOTES:  Do not share this medication with others. Laboratory and/or medical tests (such as INR, complete blood count) must be performed periodically to monitor your progress or check for side effects. Consult your doctor for more details.      MISSED DOSE:  For the best possible benefit, do not miss any doses. If you do miss a dose and remember on the same day, take it as soon as you remember. If you remember on the next day, skip the missed dose and resume your usual dosing schedule. Do not double the dose to catch up because this could increase your risk for bleeding. Keep a record of missed doses to give to your doctor or pharmacist. Contact your doctor or pharmacist if you miss 2 or more doses in a row.      STORAGE:  Store at room temperature away from light and moisture. Do not store in the bathroom. Keep all medications away from children and pets. Do not flush medications down the toilet or pour them into a drain unless  instructed to do so. Properly discard this product when it is  or no longer needed. Consult your pharmacist or local waste disposal company for more details about how to safely discard your product.      MEDICAL ALERT:  Your condition and medication can cause complications in a medical emergency. For information about enrolling in MedicAlert, call 1-118.993.3113 ([place]) or 1-771.448.1652 ([place]).      Information last revised 2010 Copyright(c)  [employer]             Depression / Suicide Risk    As you are discharged from this [facility] facility, it is important to learn how to keep safe from harming yourself.    Recognize the warning signs:  · Abrupt changes in personality, positive or negative- including increase in energy   · Giving away possessions  · Change in eating patterns- significant weight changes-  positive or negative  · Change in sleeping patterns- unable to sleep or sleeping all the time   · Unwillingness or inability to communicate  · Depression  · Unusual sadness, discouragement and loneliness  · Talk of wanting to die  · Neglect of personal appearance   · Rebelliousness- reckless behavior  · Withdrawal from people/activities they love  · Confusion- inability to concentrate     If you or a loved one observes any of these behaviors or has concerns about self-harm, here's what you can do:  · Talk about it- your feelings and reasons for harming yourself  · Remove any means that you might use to hurt yourself (examples: pills, rope, extension cords, firearm)  · Get professional help from the community (Mental Health, Substance Abuse, psychological counseling)  · Do not be alone:Call your Safe Contact- someone whom you trust who will be there for you.  · Call your local CRISIS HOTLINE 926-5582 or 179-256-9209  · Call your local Children's Mobile Crisis Response Team [place] (028) 362-1072 or www.[employer]  · Call the toll free National Suicide Prevention  Hotlines   · National Suicide Prevention Lifeline 979-398-MFNU (3273)  · National Hope Line Network 800-SUICIDE (885-7556)      Discharge Instructions per Mily May D.O.    Please see her primary care physician within 1 week and have metabolic panel checked at this appointment  Follow-up as scheduled with the INR clinic on Wednesday for Coumadin dosing adjustments as needed  Discussed with outpatient primary care physician about getting a sleep study  Take coumadin 5mg nightly [id number], then alternate 2.5 and 5mg until seen by INR clinic    DIET: Low-sodium, low calorie    ACTIVITY: As tolerated    DIAGNOSIS: Mild pulmonary hypertension, pulmonary edema, suspected sleep apnea    Return to ER if worsening shortness of breath, dizziness, loss of consciousness

## 2019-11-17 NOTE — PROGRESS NOTES
Patient with orders to discharge home. Patient aware of plan and in agreement. Discharge instructions given and patient verbalized understanding of all. Discharge teaching done on discharge meds including coumadin. Patient aware of appointment after discharge. Patient's IV removed. Telemetry removed. Patient awaiting transportation to home. She is alert and oriented and ambulatory.

## 2019-11-20 ENCOUNTER — ANTICOAGULATION VISIT (OUTPATIENT)
Dept: VASCULAR LAB | Facility: MEDICAL CENTER | Age: 69
End: 2019-11-20
Attending: INTERNAL MEDICINE
Payer: MEDICARE

## 2019-11-20 DIAGNOSIS — Z79.01 CHRONIC ANTICOAGULATION: ICD-10-CM

## 2019-11-20 LAB — INR PPP: 2.6 (ref 2–3.5)

## 2019-11-20 PROCEDURE — 85610 PROTHROMBIN TIME: CPT

## 2019-11-20 PROCEDURE — 99212 OFFICE O/P EST SF 10 MIN: CPT

## 2019-11-20 NOTE — PROGRESS NOTES
Anticoagulation Summary  As of 11/20/2019    INR goal:   2.0-3.0   TTR:   56.9 % (4.4 y)   INR used for dosing:      Warfarin maintenance plan:   2.5 mg (5 mg x 0.5) every day   Weekly warfarin total:   17.5 mg   Plan last modified:   Ger Fitzpatrick, PharmD (11/4/2019)   Next INR check:      Target end date:   Indefinite    Indications    Chronic anticoagulation [Z79.01]  Atrial fibrillation (HCC) (Resolved) [I48.91]             Anticoagulation Episode Summary     INR check location:   Anticoagulation Clinic    Preferred lab:       Send INR reminders to:       Comments:         Anticoagulation Care Providers     Provider Role Specialty Phone number    Florian Carnes M.D. Referring Cardiology [phone]    [facility] Responsible  [phone]        Anticoagulation Patient Findings          History of Present Illness: follow up appointment for chronic anticoagulation with the high risk medication, warfarin for atrial fibrillation    Last INR was out of range, dosage adjusted: pt is now at goal.   Pt was recently hospitalized for exacerbation of CHF.  Last week pt had 26mg of warfarin.  Pt reports no change in diet or appetite.  Pt lost ~12 lbs of water weight once she was diuresed.    Will increase weekly dose as above.  Follow up in 1 weeks, to reduce the risk of adverse events related to this high risk medication, warfarin.    Pt declines vitals today  Patrizia Arias, Clinical Pharmacist

## 2019-11-21 LAB — INR BLD: 2.6 (ref 0.9–1.2)

## 2019-11-26 NOTE — DOCUMENTATION QUERY
[facility]                                                                       Query Response Note      PATIENT:               RAUL RODRIGUES  ACCT #:                  [id number]  MRN:                     [id number]  :                      1950  ADMIT DATE:       2019 2:10 PM  DISCH DATE:        2019 6:45 PM  RESPONDING  PROVIDER #:        [id number]           QUERY TEXT:    Pt is admitted with Acute pulmonary edema with a history of Diastolic Congestive Heart Failure documented in the Medical Record. Please document the type and acuity (includes probable or suspected).     NOTE:  If an appropriate response is not listed below, please respond with a new note.    The patient's Clinical Indicators include:  H&P: Assessment & plan-Acute pulmonary edema. PMH- Diastolic CHF.    Lab results:   Treatment: Lasix, Echo, Losartan, Metoprolol, 2 g salt diet, Daily wt, Strict I&O  Risk Factors: Chronic atrial fibrillation, Morbid obesity, HTN, Diastolic CHF, Dyspnea on exertion, Othopnea, LE edema  Options provided:   -- Acute Diastolic heart failure   -- Chronic Diastolic heart failure   -- Acute on Chronic Diastolic heart failure   -- Unable to determine      Query created by: Félix Cuello on 2019 8:00 AM    RESPONSE TEXT:    Unable to determine          Electronically signed by:  GE TAYLOR 2019 10:29 AM

## 2019-12-06 ENCOUNTER — ANTICOAGULATION VISIT (OUTPATIENT)
Dept: VASCULAR LAB | Facility: MEDICAL CENTER | Age: 69
End: 2019-12-06
Attending: INTERNAL MEDICINE
Payer: MEDICARE

## 2019-12-06 DIAGNOSIS — Z79.01 CHRONIC ANTICOAGULATION: ICD-10-CM

## 2019-12-06 LAB
INR BLD: 3.3 (ref 0.9–1.2)
INR PPP: 3.1 (ref 2–3.5)

## 2019-12-06 PROCEDURE — 99211 OFF/OP EST MAY X REQ PHY/QHP: CPT

## 2019-12-06 PROCEDURE — 85610 PROTHROMBIN TIME: CPT

## 2019-12-07 NOTE — PROGRESS NOTES
Anticoagulation Summary  As of 12/6/2019    INR goal:   2.0-3.0   TTR:   57.1 % (4.4 y)   INR used for dosing:   3.10! (12/6/2019)   Warfarin maintenance plan:   5 mg (5 mg x 1) every Fri; 2.5 mg (5 mg x 0.5) all other days   Weekly warfarin total:   20 mg   Plan last modified:   Patrizia M Filter (11/20/2019)   Next INR check:      Target end date:   Indefinite    Indications    Chronic anticoagulation [Z79.01]  Atrial fibrillation (HCC) (Resolved) [I48.91]             Anticoagulation Episode Summary     INR check location:   Anticoagulation Clinic    Preferred lab:       Send INR reminders to:       Comments:         Anticoagulation Care Providers     Provider Role Specialty Phone number    Florian Carnes M.D. Referring Cardiology [phone]    [facility] Responsible  [phone]                Anticoagulation Patient Findings      HPI:  Rachelle Reina seen in clinic today, on anticoagulation therapy with warfarin for AFib  Changes to current medical/health status since last appt: none  Denies signs/symptoms of bleeding and/or thrombosis since the last appt.    Denies any interval changes to diet  Denies any interval changes to medications since last appt.   Denies any complications or cost restrictions with current therapy.   BP denied by patient       A/P   INR  supra-therapeutic.   INR slightly supratherapeutic, patient attributes this to the cranberries she had over the holidays. Patient has been stable before so patient is instructed to stay on the same dosing regimen and to follow up in a month.     Follow up appointment in 4 week(s).    Magan Snyder, PharmD

## 2019-12-31 ENCOUNTER — HOSPITAL ENCOUNTER (INPATIENT)
Facility: MEDICAL CENTER | Age: 69
LOS: 8 days | DRG: 291 | End: 2020-01-09
Attending: EMERGENCY MEDICINE | Admitting: INTERNAL MEDICINE
Payer: MEDICARE

## 2019-12-31 ENCOUNTER — APPOINTMENT (OUTPATIENT)
Dept: RADIOLOGY | Facility: MEDICAL CENTER | Age: 69
DRG: 291 | End: 2019-12-31
Attending: EMERGENCY MEDICINE
Payer: MEDICARE

## 2019-12-31 DIAGNOSIS — I50.33 ACUTE ON CHRONIC DIASTOLIC CONGESTIVE HEART FAILURE (HCC): ICD-10-CM

## 2019-12-31 DIAGNOSIS — H66.90 ACUTE OTITIS MEDIA, UNSPECIFIED OTITIS MEDIA TYPE: ICD-10-CM

## 2019-12-31 DIAGNOSIS — R79.89 ELEVATED TROPONIN: ICD-10-CM

## 2019-12-31 DIAGNOSIS — L03.116 CELLULITIS OF LEFT LOWER EXTREMITY: ICD-10-CM

## 2019-12-31 DIAGNOSIS — L03.116 LEFT LEG CELLULITIS: ICD-10-CM

## 2019-12-31 DIAGNOSIS — R06.02 SOB (SHORTNESS OF BREATH): ICD-10-CM

## 2019-12-31 DIAGNOSIS — E87.70 HYPERVOLEMIA, UNSPECIFIED HYPERVOLEMIA TYPE: ICD-10-CM

## 2019-12-31 PROBLEM — E87.6 HYPOKALEMIA: Status: ACTIVE | Noted: 2019-12-31

## 2019-12-31 PROBLEM — D64.9 ANEMIA: Status: ACTIVE | Noted: 2019-12-31

## 2019-12-31 PROBLEM — J96.01 ACUTE HYPOXEMIC RESPIRATORY FAILURE (HCC): Status: ACTIVE | Noted: 2019-12-31

## 2019-12-31 PROBLEM — I50.9 CHF EXACERBATION (HCC): Status: ACTIVE | Noted: 2019-12-31

## 2019-12-31 LAB
ALBUMIN SERPL BCP-MCNC: 3.7 G/DL (ref 3.2–4.9)
ALBUMIN/GLOB SERPL: 1.1 G/DL
ALP SERPL-CCNC: 54 U/L (ref 30–99)
ALT SERPL-CCNC: 11 U/L (ref 2–50)
ANION GAP SERPL CALC-SCNC: 10 MMOL/L (ref 0–11.9)
ANISOCYTOSIS BLD QL SMEAR: ABNORMAL
AST SERPL-CCNC: 17 U/L (ref 12–45)
BASO STIPL BLD QL SMEAR: NORMAL
BASOPHILS # BLD AUTO: 0.8 % (ref 0–1.8)
BASOPHILS # BLD: 0.05 K/UL (ref 0–0.12)
BILIRUB SERPL-MCNC: 0.3 MG/DL (ref 0.1–1.5)
BUN SERPL-MCNC: 31 MG/DL (ref 8–22)
CALCIUM SERPL-MCNC: 9.6 MG/DL (ref 8.5–10.5)
CHLORIDE SERPL-SCNC: 104 MMOL/L (ref 96–112)
CO2 SERPL-SCNC: 28 MMOL/L (ref 20–33)
COMMENT 1642: NORMAL
CREAT SERPL-MCNC: 0.95 MG/DL (ref 0.5–1.4)
EKG IMPRESSION: NORMAL
EOSINOPHIL # BLD AUTO: 0.15 K/UL (ref 0–0.51)
EOSINOPHIL NFR BLD: 2.5 % (ref 0–6.9)
ERYTHROCYTE [DISTWIDTH] IN BLOOD BY AUTOMATED COUNT: 57.1 FL (ref 35.9–50)
FERRITIN SERPL-MCNC: 23.1 NG/ML (ref 10–291)
FOLATE SERPL-MCNC: 17.3 NG/ML
GLOBULIN SER CALC-MCNC: 3.3 G/DL (ref 1.9–3.5)
GLUCOSE BLD-MCNC: 110 MG/DL (ref 65–99)
GLUCOSE BLD-MCNC: 119 MG/DL (ref 65–99)
GLUCOSE BLD-MCNC: 122 MG/DL (ref 65–99)
GLUCOSE BLD-MCNC: 205 MG/DL (ref 65–99)
GLUCOSE SERPL-MCNC: 132 MG/DL (ref 65–99)
HCT VFR BLD AUTO: 39.3 % (ref 37–47)
HGB BLD-MCNC: 11.5 G/DL (ref 12–16)
HGB RETIC QN AUTO: 25.5 PG/CELL (ref 29–35)
HYPOCHROMIA BLD QL SMEAR: ABNORMAL
IMM GRANULOCYTES # BLD AUTO: 0.06 K/UL (ref 0–0.11)
IMM GRANULOCYTES NFR BLD AUTO: 1 % (ref 0–0.9)
IMM RETICS NFR: 38.2 % (ref 9.3–17.4)
INR PPP: 1.77 (ref 0.87–1.13)
IRON SATN MFR SERPL: 7 % (ref 15–55)
IRON SERPL-MCNC: 30 UG/DL (ref 40–170)
LACTATE BLD-SCNC: 1.5 MMOL/L (ref 0.5–2)
LACTATE BLD-SCNC: 1.8 MMOL/L (ref 0.5–2)
LYMPHOCYTES # BLD AUTO: 1.51 K/UL (ref 1–4.8)
LYMPHOCYTES NFR BLD: 25.1 % (ref 22–41)
MACROCYTES BLD QL SMEAR: ABNORMAL
MCH RBC QN AUTO: 24.6 PG (ref 27–33)
MCHC RBC AUTO-ENTMCNC: 29.3 G/DL (ref 33.6–35)
MCV RBC AUTO: 84 FL (ref 81.4–97.8)
MICROCYTES BLD QL SMEAR: ABNORMAL
MONOCYTES # BLD AUTO: 0.43 K/UL (ref 0–0.85)
MONOCYTES NFR BLD AUTO: 7.2 % (ref 0–13.4)
MORPHOLOGY BLD-IMP: NORMAL
NEUTROPHILS # BLD AUTO: 3.81 K/UL (ref 2–7.15)
NEUTROPHILS NFR BLD: 63.4 % (ref 44–72)
NRBC # BLD AUTO: 0 K/UL
NRBC BLD-RTO: 0 /100 WBC
NT-PROBNP SERPL IA-MCNC: 1701 PG/ML (ref 0–125)
PLATELET # BLD AUTO: 266 K/UL (ref 164–446)
PLATELET BLD QL SMEAR: NORMAL
PMV BLD AUTO: 9.2 FL (ref 9–12.9)
POLYCHROMASIA BLD QL SMEAR: NORMAL
POTASSIUM SERPL-SCNC: 3.5 MMOL/L (ref 3.6–5.5)
PROCALCITONIN SERPL-MCNC: <0.05 NG/ML
PROT SERPL-MCNC: 7 G/DL (ref 6–8.2)
PROTHROMBIN TIME: 21.2 SEC (ref 12–14.6)
RBC # BLD AUTO: 4.68 M/UL (ref 4.2–5.4)
RBC BLD AUTO: PRESENT
RETICS # AUTO: 0.13 M/UL (ref 0.04–0.06)
RETICS/RBC NFR: 2.8 % (ref 0.8–2.1)
SODIUM SERPL-SCNC: 142 MMOL/L (ref 135–145)
T4 FREE SERPL-MCNC: 1.06 NG/DL (ref 0.53–1.43)
TIBC SERPL-MCNC: 445 UG/DL (ref 250–450)
TROPONIN T SERPL-MCNC: 37 NG/L (ref 6–19)
TSH SERPL DL<=0.005 MIU/L-ACNC: 6.7 UIU/ML (ref 0.38–5.33)
VIT B12 SERPL-MCNC: 336 PG/ML (ref 211–911)
WBC # BLD AUTO: 6 K/UL (ref 4.8–10.8)

## 2019-12-31 PROCEDURE — 700111 HCHG RX REV CODE 636 W/ 250 OVERRIDE (IP): Performed by: EMERGENCY MEDICINE

## 2019-12-31 PROCEDURE — 93005 ELECTROCARDIOGRAM TRACING: CPT | Performed by: EMERGENCY MEDICINE

## 2019-12-31 PROCEDURE — 96367 TX/PROPH/DG ADDL SEQ IV INF: CPT

## 2019-12-31 PROCEDURE — 80053 COMPREHEN METABOLIC PANEL: CPT

## 2019-12-31 PROCEDURE — 83880 ASSAY OF NATRIURETIC PEPTIDE: CPT

## 2019-12-31 PROCEDURE — 700101 HCHG RX REV CODE 250: Performed by: HOSPITALIST

## 2019-12-31 PROCEDURE — 82728 ASSAY OF FERRITIN: CPT

## 2019-12-31 PROCEDURE — 83540 ASSAY OF IRON: CPT

## 2019-12-31 PROCEDURE — 82607 VITAMIN B-12: CPT

## 2019-12-31 PROCEDURE — A9270 NON-COVERED ITEM OR SERVICE: HCPCS | Performed by: HOSPITALIST

## 2019-12-31 PROCEDURE — 84145 PROCALCITONIN (PCT): CPT

## 2019-12-31 PROCEDURE — G0378 HOSPITAL OBSERVATION PER HR: HCPCS

## 2019-12-31 PROCEDURE — 71045 X-RAY EXAM CHEST 1 VIEW: CPT

## 2019-12-31 PROCEDURE — 700102 HCHG RX REV CODE 250 W/ 637 OVERRIDE(OP): Performed by: HOSPITALIST

## 2019-12-31 PROCEDURE — 84439 ASSAY OF FREE THYROXINE: CPT

## 2019-12-31 PROCEDURE — 85025 COMPLETE CBC W/AUTO DIFF WBC: CPT

## 2019-12-31 PROCEDURE — 82962 GLUCOSE BLOOD TEST: CPT

## 2019-12-31 PROCEDURE — 96372 THER/PROPH/DIAG INJ SC/IM: CPT

## 2019-12-31 PROCEDURE — 83605 ASSAY OF LACTIC ACID: CPT

## 2019-12-31 PROCEDURE — 93005 ELECTROCARDIOGRAM TRACING: CPT

## 2019-12-31 PROCEDURE — 85046 RETICYTE/HGB CONCENTRATE: CPT

## 2019-12-31 PROCEDURE — 84443 ASSAY THYROID STIM HORMONE: CPT

## 2019-12-31 PROCEDURE — 700105 HCHG RX REV CODE 258: Performed by: HOSPITALIST

## 2019-12-31 PROCEDURE — 85610 PROTHROMBIN TIME: CPT

## 2019-12-31 PROCEDURE — 700111 HCHG RX REV CODE 636 W/ 250 OVERRIDE (IP): Performed by: HOSPITALIST

## 2019-12-31 PROCEDURE — 96365 THER/PROPH/DIAG IV INF INIT: CPT

## 2019-12-31 PROCEDURE — 96375 TX/PRO/DX INJ NEW DRUG ADDON: CPT

## 2019-12-31 PROCEDURE — 99285 EMERGENCY DEPT VISIT HI MDM: CPT

## 2019-12-31 PROCEDURE — 87040 BLOOD CULTURE FOR BACTERIA: CPT

## 2019-12-31 PROCEDURE — 83550 IRON BINDING TEST: CPT

## 2019-12-31 PROCEDURE — 82746 ASSAY OF FOLIC ACID SERUM: CPT

## 2019-12-31 PROCEDURE — 84484 ASSAY OF TROPONIN QUANT: CPT

## 2019-12-31 PROCEDURE — 99220 PR INITIAL OBSERVATION CARE,LEVL III: CPT | Performed by: HOSPITALIST

## 2019-12-31 RX ORDER — POTASSIUM CHLORIDE 20 MEQ/1
20 TABLET, EXTENDED RELEASE ORAL EVERY EVENING
COMMUNITY
End: 2020-08-12 | Stop reason: SDUPTHER

## 2019-12-31 RX ORDER — WARFARIN SODIUM 5 MG/1
2.5-5 TABLET ORAL EVERY EVENING
COMMUNITY
End: 2020-08-12 | Stop reason: SDUPTHER

## 2019-12-31 RX ORDER — TRAZODONE HYDROCHLORIDE 50 MG/1
50 TABLET ORAL NIGHTLY
Status: DISCONTINUED | OUTPATIENT
Start: 2019-12-31 | End: 2019-12-31

## 2019-12-31 RX ORDER — LEVOTHYROXINE SODIUM 175 UG/1
175 TABLET ORAL
Status: DISCONTINUED | OUTPATIENT
Start: 2019-12-31 | End: 2019-12-31

## 2019-12-31 RX ORDER — METOPROLOL TARTRATE 50 MG/1
50 TABLET, FILM COATED ORAL 2 TIMES DAILY
Status: DISCONTINUED | OUTPATIENT
Start: 2019-12-31 | End: 2020-01-09 | Stop reason: HOSPADM

## 2019-12-31 RX ORDER — OXYBUTYNIN CHLORIDE 15 MG/1
15 TABLET, EXTENDED RELEASE ORAL EVERY MORNING
Refills: 2 | Status: ON HOLD | COMMUNITY
Start: 2019-11-25 | End: 2022-01-01

## 2019-12-31 RX ORDER — ONDANSETRON 2 MG/ML
4 INJECTION INTRAMUSCULAR; INTRAVENOUS EVERY 4 HOURS PRN
Status: DISCONTINUED | OUTPATIENT
Start: 2019-12-31 | End: 2020-01-09 | Stop reason: HOSPADM

## 2019-12-31 RX ORDER — WARFARIN SODIUM 5 MG/1
5 TABLET ORAL
Status: COMPLETED | OUTPATIENT
Start: 2019-12-31 | End: 2019-12-31

## 2019-12-31 RX ORDER — ROSUVASTATIN CALCIUM 20 MG/1
40 TABLET, COATED ORAL EVERY EVENING
Status: DISCONTINUED | OUTPATIENT
Start: 2019-12-31 | End: 2020-01-09 | Stop reason: HOSPADM

## 2019-12-31 RX ORDER — BISACODYL 10 MG
10 SUPPOSITORY, RECTAL RECTAL
Status: DISCONTINUED | OUTPATIENT
Start: 2019-12-31 | End: 2020-01-09 | Stop reason: HOSPADM

## 2019-12-31 RX ORDER — ONDANSETRON 4 MG/1
4 TABLET, ORALLY DISINTEGRATING ORAL EVERY 4 HOURS PRN
Status: DISCONTINUED | OUTPATIENT
Start: 2019-12-31 | End: 2020-01-09 | Stop reason: HOSPADM

## 2019-12-31 RX ORDER — FUROSEMIDE 10 MG/ML
40 INJECTION INTRAMUSCULAR; INTRAVENOUS
Status: DISCONTINUED | OUTPATIENT
Start: 2019-12-31 | End: 2020-01-04

## 2019-12-31 RX ORDER — AMOXICILLIN 250 MG
2 CAPSULE ORAL 2 TIMES DAILY
Status: DISCONTINUED | OUTPATIENT
Start: 2019-12-31 | End: 2020-01-09 | Stop reason: HOSPADM

## 2019-12-31 RX ORDER — POLYETHYLENE GLYCOL 3350 17 G/17G
1 POWDER, FOR SOLUTION ORAL
Status: DISCONTINUED | OUTPATIENT
Start: 2019-12-31 | End: 2020-01-09 | Stop reason: HOSPADM

## 2019-12-31 RX ORDER — POTASSIUM CHLORIDE 20 MEQ/1
20 TABLET, EXTENDED RELEASE ORAL 2 TIMES DAILY
Status: DISCONTINUED | OUTPATIENT
Start: 2019-12-31 | End: 2020-01-03

## 2019-12-31 RX ORDER — HYDROCHLOROTHIAZIDE 25 MG/1
25 TABLET ORAL DAILY
Status: DISCONTINUED | OUTPATIENT
Start: 2019-12-31 | End: 2020-01-04

## 2019-12-31 RX ORDER — LOSARTAN POTASSIUM 50 MG/1
50 TABLET ORAL DAILY
Status: DISCONTINUED | OUTPATIENT
Start: 2019-12-31 | End: 2020-01-09 | Stop reason: HOSPADM

## 2019-12-31 RX ORDER — GEMFIBROZIL 600 MG/1
600 TABLET, FILM COATED ORAL DAILY
Status: DISCONTINUED | OUTPATIENT
Start: 2019-12-31 | End: 2020-01-09 | Stop reason: HOSPADM

## 2019-12-31 RX ORDER — CITALOPRAM 20 MG/1
20 TABLET ORAL DAILY
Status: DISCONTINUED | OUTPATIENT
Start: 2019-12-31 | End: 2020-01-09 | Stop reason: HOSPADM

## 2019-12-31 RX ORDER — LEVOTHYROXINE SODIUM 0.1 MG/1
200 TABLET ORAL
Status: DISCONTINUED | OUTPATIENT
Start: 2019-12-31 | End: 2020-01-09 | Stop reason: HOSPADM

## 2019-12-31 RX ORDER — DOXYCYCLINE 100 MG/1
100 TABLET ORAL EVERY 12 HOURS
Status: COMPLETED | OUTPATIENT
Start: 2019-12-31 | End: 2020-01-04

## 2019-12-31 RX ORDER — FUROSEMIDE 10 MG/ML
40 INJECTION INTRAMUSCULAR; INTRAVENOUS ONCE
Status: COMPLETED | OUTPATIENT
Start: 2019-12-31 | End: 2019-12-31

## 2019-12-31 RX ORDER — OXYBUTYNIN CHLORIDE 10 MG/1
10 TABLET, EXTENDED RELEASE ORAL DAILY
Status: DISCONTINUED | OUTPATIENT
Start: 2019-12-31 | End: 2019-12-31

## 2019-12-31 RX ADMIN — GEMFIBROZIL 600 MG: 600 TABLET ORAL at 08:12

## 2019-12-31 RX ADMIN — DOXYCYCLINE 100 MG: 100 TABLET, FILM COATED ORAL at 16:50

## 2019-12-31 RX ADMIN — DOXYCYCLINE 100 MG: 100 INJECTION, POWDER, LYOPHILIZED, FOR SOLUTION INTRAVENOUS at 08:13

## 2019-12-31 RX ADMIN — METOPROLOL TARTRATE 50 MG: 50 TABLET, FILM COATED ORAL at 05:55

## 2019-12-31 RX ADMIN — CEFTRIAXONE SODIUM 2 G: 2 INJECTION, POWDER, FOR SOLUTION INTRAMUSCULAR; INTRAVENOUS at 06:35

## 2019-12-31 RX ADMIN — HYDROCHLOROTHIAZIDE 25 MG: 25 TABLET ORAL at 06:00

## 2019-12-31 RX ADMIN — POTASSIUM CHLORIDE 20 MEQ: 1500 TABLET, EXTENDED RELEASE ORAL at 06:36

## 2019-12-31 RX ADMIN — LEVOTHYROXINE SODIUM 200 MCG: 100 TABLET ORAL at 06:36

## 2019-12-31 RX ADMIN — FUROSEMIDE 40 MG: 10 INJECTION, SOLUTION INTRAMUSCULAR; INTRAVENOUS at 16:50

## 2019-12-31 RX ADMIN — INSULIN HUMAN 3 UNITS: 100 INJECTION, SOLUTION PARENTERAL at 20:32

## 2019-12-31 RX ADMIN — FUROSEMIDE 40 MG: 10 INJECTION, SOLUTION INTRAMUSCULAR; INTRAVENOUS at 05:54

## 2019-12-31 RX ADMIN — ROSUVASTATIN CALCIUM 40 MG: 20 TABLET, FILM COATED ORAL at 16:50

## 2019-12-31 RX ADMIN — POTASSIUM CHLORIDE 20 MEQ: 1500 TABLET, EXTENDED RELEASE ORAL at 16:50

## 2019-12-31 RX ADMIN — METOPROLOL TARTRATE 50 MG: 50 TABLET, FILM COATED ORAL at 16:50

## 2019-12-31 RX ADMIN — WARFARIN SODIUM 5 MG: 5 TABLET ORAL at 16:51

## 2019-12-31 RX ADMIN — CITALOPRAM HYDROBROMIDE 20 MG: 20 TABLET ORAL at 06:37

## 2019-12-31 RX ADMIN — LOSARTAN POTASSIUM 50 MG: 50 TABLET, FILM COATED ORAL at 06:00

## 2019-12-31 ASSESSMENT — ENCOUNTER SYMPTOMS
COUGH: 0
SENSORY CHANGE: 0
VOMITING: 0
ORTHOPNEA: 1
ABDOMINAL PAIN: 0
BLURRED VISION: 0
WEAKNESS: 0
HALLUCINATIONS: 0
PALPITATIONS: 0
FEVER: 0
BRUISES/BLEEDS EASILY: 0
DOUBLE VISION: 0
DIZZINESS: 0
DEPRESSION: 0
SPEECH CHANGE: 0
EYE DISCHARGE: 0
FLANK PAIN: 0
HEMOPTYSIS: 0
HEARTBURN: 0
CHILLS: 0
FOCAL WEAKNESS: 0
NAUSEA: 0
MYALGIAS: 0
SHORTNESS OF BREATH: 1

## 2019-12-31 ASSESSMENT — LIFESTYLE VARIABLES
SUBSTANCE_ABUSE: 0
EVER_SMOKED: NEVER

## 2019-12-31 ASSESSMENT — COPD QUESTIONNAIRES
COPD SCREENING SCORE: 2
DO YOU EVER COUGH UP ANY MUCUS OR PHLEGM?: NO/ONLY WITH OCCASIONAL COLDS OR INFECTIONS
HAVE YOU SMOKED AT LEAST 100 CIGARETTES IN YOUR ENTIRE LIFE: NO/DON'T KNOW
DURING THE PAST 4 WEEKS HOW MUCH DID YOU FEEL SHORT OF BREATH: NONE/LITTLE OF THE TIME

## 2019-12-31 ASSESSMENT — CHA2DS2 SCORE
CHF OR LEFT VENTRICULAR DYSFUNCTION: YES
HYPERTENSION: YES
SEX: FEMALE
VASCULAR DISEASE: NO
AGE 75 OR GREATER: NO
PRIOR STROKE OR TIA OR THROMBOEMBOLISM: NO
AGE 65 TO 74: YES
DIABETES: NO
CHA2DS2 VASC SCORE: 4

## 2019-12-31 NOTE — PROGRESS NOTES
Attending Hospitalist today is Dr Samuels starting at 0700. Please call this Physician for orders, updates and questions.

## 2019-12-31 NOTE — ED TRIAGE NOTES
Chief Complaint   Patient presents with   • Shortness of Breath     Pt states her SOB restarted tongiht, currently does not use any oxygen at home.  Treated for pulmonary edema in 11/2019.       PT reports she has only been taking 20 mg lasix daily, although she is prescribed to take 20 mg PO BID.      Pt became very SOB with transfer from  to [facility] for EKG.  Noted to have oxygen saturation of 87% on RA.  Placed on 2 L NC for transport to room.

## 2019-12-31 NOTE — ED NOTES
Med Rec complete per Pt at bedside  Allergies Reviewed    Pt started courses of CEFDINIR and FLAGYL on 12/25  Pt received medications from [employer] she was on and is unsure of strengths.    Pt takes WARFARIN   5 mg every Friday  2.5 mg all other days of the week

## 2019-12-31 NOTE — H&P
Hospital Medicine History & Physical Note    Date of Service  12/31/2019    Primary Care Physician  Daniel Fernández M.D.    Consultants  none    Code Status  full    Chief Complaint  Shortness of breath     History of Presenting Illness  69 y.o. female who presented 12/31/2019 with past medical history of atrial fibrillation on anticoagulation, hypertension, hyperlipidemia, coronary artery disease, CHF, hypothyroidism who presents with shortness of breath.  This patient has had worsening shortness of breath over the last week.  She states that she has been on a cruise ship.  She has had poor diet at that time.  She noticed reduced urinary output as well and orthopnea as well as lower extremity edema.  No chest pain.  She does have open wound on the left shin that she was getting IV antibiotics on the cruciate 4.  She is currently taking cefdinir and Flagyl.  Otherwise no known alleviating or exacerbating factors to her symptoms.  She appears to be volume overloaded will be admitted to the hospital for IV Lasix and further management of her symptoms.    Review of Systems  Review of Systems   Constitutional: Positive for malaise/fatigue. Negative for chills and fever.   HENT: Negative for congestion, hearing loss and tinnitus.    Eyes: Negative for blurred vision, double vision and discharge.   Respiratory: Positive for shortness of breath. Negative for cough and hemoptysis.    Cardiovascular: Positive for orthopnea and leg swelling. Negative for chest pain and palpitations.   Gastrointestinal: Negative for abdominal pain, heartburn, nausea and vomiting.   Genitourinary: Negative for dysuria and flank pain.   Musculoskeletal: Negative for joint pain and myalgias.   Skin: Positive for rash.   Neurological: Negative for dizziness, sensory change, speech change, focal weakness and weakness.   Endo/Heme/Allergies: Negative for environmental allergies. Does not bruise/bleed easily.   Psychiatric/Behavioral: Negative for  depression, hallucinations and substance abuse.       Past Medical History   has a past medical history of A-fib (HCC), Arthritis, Atrial fibrillation (HCC), Benign essential hypertension (7/1/2011), Bowel habit changes, Breast cancer (HCC), Bronchitis (2015), CAD (coronary artery disease), Cancer (HCC) (1987, 2000), Cataract, Diabetes (HCC), Diastolic congestive heart failure (HCC) (7/1/2011), Disorder of thyroid, High cholesterol, History of cardiac catheterization (4/22/2010), History of echocardiogram (4/22/2011), History of hypothyroidism (8/29/2008), Hypercholesteremia (7/1/2011), Long term (current) use of anticoagulants (7/1/2011), Morbid obesity (HCC) (10/28/2008), Sleep apnea, Snoring, and Urinary incontinence.    Surgical History   has a past surgical history that includes abdominal hysterectomy total; mastectomy (partial); pr radiation therapy plan simple; cholecystectomy (2001); cataract phaco with iol (Left, 1/8/2018); cataract phaco with iol (Right, 1/22/2018); other orthopedic surgery (Right, 2013); and zzz cardiac cath (02/27/2019).     Family History  family history includes Cancer in her maternal aunt; Heart Disease in her father and mother.     Social History   reports that she has never smoked. She has never used smokeless tobacco. She reports current alcohol use. She reports that she does not use drugs.    Allergies  Allergies   Allergen Reactions   • Sulfa Drugs Rash     8/2015         Medications  Prior to Admission Medications   Prescriptions Last Dose Informant Patient Reported? Taking?   Cholecalciferol (VITAMIN D-3) 5000 units Tab  Patient Yes No   Sig: Take 5,000 Units by mouth 3 times a week. Monday, Wednesday & Friday   Insulin Detemir (LEVEMIR FLEXPEN SC)  Patient Yes No   Sig: Inject 8-10 Units as instructed 2 Times a Day.   Multiple Vitamins-Minerals (MULTIVITAMIN PO)  Patient Yes No   Sig: Take 1 Tab by mouth every morning.   SYNTHROID 200 MCG Tab  Patient Yes No   Sig: Take 200  mcg by mouth every day. Monday thru Saturday   acetaminophen (TYLENOL 8 HOUR ARTHRITIS PAIN) 650 MG CR tablet  Patient Yes No   Sig: Take 1,300 mg by mouth 2 Times a Day.   allopurinol (ZYLOPRIM) 100 MG Tab  Patient Yes No   Sig: Take 100 mg by mouth every bedtime.   citalopram (CELEXA) 20 MG TABS  Patient Yes No   Sig: Take 20 mg by mouth every day.   furosemide (LASIX) 20 MG Tab 12/30/2019 at am  No No   Sig: Take 1 Tab by mouth 2 Times a Day.   gemfibrozil (LOPID) 600 MG Tab  Patient Yes No   Sig: Take 600 mg by mouth every day.   glipiZIDE (GLUCOTROL) 5 MG Tab  Patient Yes No   Sig: Take 10 mg by mouth every evening.   hydroCHLOROthiazide (HYDRODIURIL) 25 MG Tab 12/30/2019 at Unknown time Patient Yes No   Sig: Take 25 mg by mouth every day.   levothyroxine (SYNTHROID) 175 MCG Tab  Patient Yes No   Sig: Take 175 mcg by mouth every 7 days. Sundays  *Brand Only*   loratadine (CLARITIN) 10 MG Tab  Patient Yes No   Sig: Take 10 mg by mouth every day.   losartan (COZAAR) 50 MG Tab  Patient Yes No   Sig: Take 50 mg by mouth every day.   metoprolol (LOPRESSOR) 50 MG Tab  Patient Yes No   Sig: Take 50 mg by mouth 2 times a day.   oxybutynin SR (DITROPAN-XL) 10 MG CR tablet  Patient Yes No   Sig: Take 10 mg by mouth every day.   pioglitazone (ACTOS) 15 MG Tab  Patient Yes No   Sig: Take 15 mg by mouth every day.   potassium chloride SA (KDUR) 20 MEQ Tab CR   No No   Sig: Take 2 Tabs by mouth every day.   rosuvastatin (CRESTOR) 20 MG Tab  Patient Yes No   Sig: Take 40 mg by mouth every evening.   traZODone (DESYREL) 50 MG Tab  Patient Yes No   Sig: Take 50 mg by mouth every evening.   warfarin (COUMADIN) 5 MG Tab   No No   Sig: Take 0.5-1 Tabs by mouth every evening.      Facility-Administered Medications: None       Physical Exam  Temp:  [36 °C (96.8 °F)] 36 °C (96.8 °F)  Pulse:  [74-88] 88  Resp:  [16-18] 16  BP: (132-140)/(63-68) 132/63  SpO2:  [94 %-99 %] 99 %    Physical Exam  Vitals signs reviewed.   Constitutional:        Appearance: Normal appearance. She is ill-appearing.   HENT:      Head: Normocephalic and atraumatic.      Nose: No congestion.   Eyes:      Extraocular Movements: Extraocular movements intact.      Pupils: Pupils are equal, round, and reactive to light.   Neck:      Musculoskeletal: Normal range of motion and neck supple. No muscular tenderness.   Cardiovascular:      Rate and Rhythm: Normal rate and regular rhythm.      Pulses: Normal pulses.      Heart sounds: Normal heart sounds. No murmur.   Pulmonary:      Effort: No respiratory distress.      Comments: Diminishes bilaterally   Abdominal:      General: Bowel sounds are normal. There is no distension.      Palpations: Abdomen is soft.      Tenderness: There is no tenderness.   Musculoskeletal:         General: Swelling present. No deformity.      Right lower leg: Edema present.      Left lower leg: Edema present.      Comments: Left leg open wound anterior shin with surrounding erythema., right leg with erythema but no wound    Skin:     General: Skin is warm and dry.      Capillary Refill: Capillary refill takes less than 2 seconds.      Findings: Erythema present.   Neurological:      General: No focal deficit present.      Mental Status: She is alert and oriented to person, place, and time.   Psychiatric:         Mood and Affect: Mood normal.         Behavior: Behavior normal.         Thought Content: Thought content normal.         Judgment: Judgment normal.         Laboratory:      Recent Labs     12/31/19  0330   SODIUM 142   POTASSIUM 3.5*   CHLORIDE 104   CO2 28   GLUCOSE 132*   BUN 31*   CREATININE 0.95   CALCIUM 9.6     Recent Labs     12/31/19  0330   ALTSGPT 11   ASTSGOT 17   ALKPHOSPHAT 54   TBILIRUBIN 0.3   GLUCOSE 132*     Recent Labs     12/31/19  0330   INR 1.77*     Recent Labs     12/31/19  0330   NTPROBNP 1701*         Recent Labs     12/31/19  0330   TROPONINT 37*       Urinalysis:    No results found     Imaging:  DX-CHEST-PORTABLE (1  VIEW)   Final Result      1.  Marked cardiac enlargement is not significantly changed.            Assessment/Plan:  I anticipate this patient is appropriate for observation status at this time.    * CHF exacerbation (HCC)  Assessment & Plan  Due to medication noncompliance as well as, poor diet   Cont with IV lasix 40 BID   Resume home arb, bb, hctz  Serial troponin   Daily weight   I/o   Cardiac diet   Wean 02 as tolerated    Anemia  Assessment & Plan  No evidence of bleeding   Lab workup ordered    Hypokalemia  Assessment & Plan  Replace and recheck labs    Elevated troponin  Assessment & Plan  Due to demand ischemia from volume overload  Serial torponin   Cardiac montioring     Acute hypoxemic respiratory failure (HCC)  Assessment & Plan  Due to volume overload   Wean 02 as tolerated       Left leg cellulitis  Assessment & Plan  Left leg, cont with empic IV abx   Check procal/esr/crp      Insulin dependent diabetes mellitus (HCC)- (present on admission)  Assessment & Plan  Hold home medications   SSI Ordered  accu checks    Chronic atrial fibrillation- (present on admission)  Assessment & Plan  Resume home BB and coumadin     History of hypothyroidism- (present on admission)  Assessment & Plan  Resume home levothyroxine, tsh mildly elevated consider increasing and rechecking in 4 weeks    Morbid obesity (HCC)- (present on admission)  Assessment & Plan  Encourage weight loss    Benign essential hypertension- (present on admission)  Assessment & Plan  Resume home anti hypertensive medications       VTE prophylaxis: coumadin

## 2019-12-31 NOTE — ASSESSMENT & PLAN NOTE
She says she received antibiotics while at the cruise   Started as a blister with purulent drainage  Improved and completed course of ceftriaxone and doxycycline  Has chronic venous stasis changes in BLE

## 2019-12-31 NOTE — ASSESSMENT & PLAN NOTE
Resume home BB and coumadin   Rate controlled  Monitor on telemetry  Metop PRN sustained heart rate greater than 130

## 2019-12-31 NOTE — ASSESSMENT & PLAN NOTE
Resume home levothyroxine, tsh mildly elevated consider increasing and rechecking in 4 weeks  T4 is normal  T3 is normal

## 2019-12-31 NOTE — PROGRESS NOTES
Inpatient Anticoagulation Service Note    Date: 12/31/2019    Reason for Anticoagulation: Atrial Fibrillation   Target INR: 2.0 to 3.0  [id number] VASc Score: 4  HAS-BLED Score: 1   Hemoglobin Value: (!) 11.5  Hematocrit Value: 39.3  Lab Platelet Value: 266    INR from last 7 days     Date/Time INR Value    12/31/19 0330  (!) 1.77        Dose from last 7 days     Date/Time Dose (mg)    12/31/19 0934  5        Average Dose (mg): (Home dose: 5mg Friday, 2.5mg AOD)  Significant Interactions: Antibiotics, Statin, Thyroid Medications, gemfibrozil  Bridge Therapy: No     Reversal Agent Administered: Not Applicable  Comments: Patient continues home warfarin for history of atrial firbillation. INR sub-therapeutic, last dose 12/29 per med rec. Hgb/Hct and plt stable, no active bleeding. DDI with antibiotics (ceftriaxone, doxycycline) and home thyroid, statin, and gemfibrozil. Cardiac diet ordered. Home warfarin dose of 5mg Friday, 2.5mg all other days. INR sub-therapeutic today. Will give 5mg x1 today, given missed dose last night and sub-therapeutic INR, and tentatively plan on resuming home dose tomorrow. INR tomorrow.     Plan:  5mg x1 today, then home dose of 5mg Friday, 2.5mg all other days  Education Material Provided?: No (warfarin PTA)  Pharmacist suggested discharge dosing: tentatively plan on home dose of 5mg Friday, 2.5mg AOD with follow-up INR within 48-72 hours.     Barb Maravilla, PharmD

## 2019-12-31 NOTE — ED PROVIDER NOTES
"ED Provider Note    Scribed for Mirza Oakley M.D. by Josefina Castano. 12/31/2019  3:36 AM    Primary care provider: Daniel Fernández M.D.  Means of arrival: Walk in   History obtained from: Patient   History limited by: None     CHIEF COMPLAINT  Chief Complaint   Patient presents with   • Shortness of Breath     Pt states her SOB restarted tongiht, currently does not use any oxygen at home.  Treated for pulmonary edema in 11/2019.         HPI  Rachelle Reina is a 69 y.o. female who presents to the Emergency Department for evaluation of worsening shortness of breath onset tonight. Patient reports she feels as though \"she can't feel the air.\" She states that she has been having shortness of breath issues since August. Her episodes last approximately 30 seconds. She was on a Cruise ship where she had an episode. She received CTX and Flagyl on the cruise ship. Patient was also treated on the Cruise Ship for an open wound on left shin, where it was drained.  Presents associated symptoms of bilateral leg swelling, hot flashes, chest tightness. Denies headache. Patient is currently taking Cefdinir and Flagyl. Patient not compliant with Lasix. Patient does not use oxygen at baseline. History of Pulmonary Edema 11/2019 and Diabetes    REVIEW OF SYSTEMS  Pertinent positives include: shortness of breath, bilateral leg swelling, hot flashes, chest tightness, open wound on left shin. Pertinent negatives include: headache. See history of present illness. All other systems are negative.     PAST MEDICAL HISTORY   has a past medical history of A-fib (HCC), Arthritis, Atrial fibrillation (HCC), Benign essential hypertension (7/1/2011), Bowel habit changes, Breast cancer (HCC), Bronchitis (2015), CAD (coronary artery disease), Cancer (HCC) (1987, 2000), Cataract, Diabetes (HCC), Diastolic congestive heart failure (HCC) (7/1/2011), Disorder of thyroid, High cholesterol, History of cardiac catheterization (4/22/2010), History " of echocardiogram (4/22/2011), History of hypothyroidism (8/29/2008), Hypercholesteremia (7/1/2011), Long term (current) use of anticoagulants (7/1/2011), Morbid obesity (HCC) (10/28/2008), Sleep apnea, Snoring, and Urinary incontinence.    SURGICAL HISTORY   has a past surgical history that includes abdominal hysterectomy total; mastectomy (partial); radiation therapy plan simple; cholecystectomy (2001); cataract phaco with iol (Left, 1/8/2018); cataract phaco with iol (Right, 1/22/2018); other orthopedic surgery (Right, 2013); and z cardiac cath (02/27/2019).    SOCIAL HISTORY  Social History     Tobacco Use   • Smoking status: Never Smoker   • Smokeless tobacco: Never Used   Substance Use Topics   • Alcohol use: Yes     Comment: 1 or 2 drinks a month   • Drug use: No      Social History     Substance and Sexual Activity   Drug Use No       FAMILY HISTORY  Family History   Problem Relation Age of Onset   • Heart Disease Mother         HEART VALVE REPLACEMENT.   • Heart Disease Father         CORONARY ARTERY BYPASS GRAFTS.   • Cancer Maternal Aunt        CURRENT MEDICATIONS  No current facility-administered medications on file prior to encounter.      Current Outpatient Medications on File Prior to Encounter   Medication Sig Dispense Refill   • warfarin (COUMADIN) 5 MG Tab Take 0.5-1 Tabs by mouth every evening. 30 Tab 3   • furosemide (LASIX) 20 MG Tab Take 1 Tab by mouth 2 Times a Day. 60 Tab 1   • potassium chloride SA (KDUR) 20 MEQ Tab CR Take 2 Tabs by mouth every day. 60 Tab 11   • allopurinol (ZYLOPRIM) 100 MG Tab Take 100 mg by mouth every bedtime.  2   • acetaminophen (TYLENOL 8 HOUR ARTHRITIS PAIN) 650 MG CR tablet Take 1,300 mg by mouth 2 Times a Day.     • Cholecalciferol (VITAMIN D-3) 5000 units Tab Take 5,000 Units by mouth 3 times a week. Monday, Wednesday & Friday     • metoprolol (LOPRESSOR) 50 MG Tab Take 50 mg by mouth 2 times a day.     • Multiple Vitamins-Minerals (MULTIVITAMIN PO) Take 1 Tab  "by mouth every morning.     • losartan (COZAAR) 50 MG Tab Take 50 mg by mouth every day.     • hydroCHLOROthiazide (HYDRODIURIL) 25 MG Tab Take 25 mg by mouth every day.     • traZODone (DESYREL) 50 MG Tab Take 50 mg by mouth every evening.     • oxybutynin SR (DITROPAN-XL) 10 MG CR tablet Take 10 mg by mouth every day.     • pioglitazone (ACTOS) 15 MG Tab Take 15 mg by mouth every day.     • rosuvastatin (CRESTOR) 20 MG Tab Take 40 mg by mouth every evening.  1   • SYNTHROID 200 MCG Tab Take 200 mcg by mouth every day. Monday thru Saturday  2   • glipiZIDE (GLUCOTROL) 5 MG Tab Take 10 mg by mouth every evening.  1   • Insulin Detemir (LEVEMIR FLEXPEN SC) Inject 8-10 Units as instructed 2 Times a Day.     • levothyroxine (SYNTHROID) 175 MCG Tab Take 175 mcg by mouth every 7 days. Sundays  *Brand Only*     • gemfibrozil (LOPID) 600 MG Tab Take 600 mg by mouth every day.     • loratadine (CLARITIN) 10 MG Tab Take 10 mg by mouth every day.     • citalopram (CELEXA) 20 MG TABS Take 20 mg by mouth every day.         ALLERGIES  Allergies   Allergen Reactions   • Sulfa Drugs Rash     8/2015         PHYSICAL EXAM  VITAL SIGNS: /68   Pulse 74   Temp 36 °C (96.8 °F) (Oral)   Resp 18   Ht 1.651 m (5' 5\")   Wt (!) 130 kg (286 lb 9.6 oz)   LMP 01/05/1987 (Approximate)   SpO2 94%   BMI 47.69 kg/m²     Constitutional: Alert in no apparent distress.  HENT: No signs of trauma, Bilateral external ears normal, Nose normal. Uvula midline. Opacified right TM  Eyes: Pupils are equal and reactive, Conjunctiva normal, Non-icteric.   Neck: Normal range of motion, No tenderness, Supple, No stridor.   Lymphatic: No lymphadenopathy noted.   Cardiovascular: Regular rate and rhythm, no murmurs.   Thorax & Lungs: Normal breath sounds, No respiratory distress, No wheezing, No chest tenderness.   Abdomen:  Soft, No tenderness, No peritoneal signs, No masses, No pulsatile masses.   Skin: Warm, Dry, No erythema, No rash.   Back: No bony " "tenderness, No CVA tenderness.   Extremities: 3+ pitting edema on bilateral extremities with erythema   Draining wound on left shin.   Musculoskeletal: Good range of motion in all major joints. No tenderness to palpation or major deformities noted.   Neurologic: Alert , Normal motor function, Normal sensory function, No focal deficits noted.   Psychiatric: Affect normal, Judgment normal, Mood normal.     DIAGNOSTIC STUDIES / PROCEDURES    LABS  Labs Reviewed   COMP METABOLIC PANEL - Abnormal; Notable for the following components:       Result Value    Potassium 3.5 (*)     Glucose 132 (*)     Bun 31 (*)     All other components within normal limits   PROBRAIN NATRIURETIC PEPTIDE, NT - Abnormal; Notable for the following components:    NT-proBNP 1701 (*)     All other components within normal limits   TROPONIN - Abnormal; Notable for the following components:    Troponin T 37 (*)     All other components within normal limits   PROTHROMBIN TIME - Abnormal; Notable for the following components:    PT 21.2 (*)     INR 1.77 (*)     All other components within normal limits    Narrative:     Indicate which anticoagulants the patient is on:->COUMADIN   ESTIMATED GFR - Abnormal; Notable for the following components:    GFR If Non  58 (*)     All other components within normal limits   CBC WITH DIFFERENTIAL   LACTIC ACID   TSH   BLOOD CULTURE    Narrative:     Per Hospital Policy: Only change Specimen Src: to \"Line\" if  specified by physician order.   BLOOD CULTURE    Narrative:     Per Hospital Policy: Only change Specimen Src: to \"Line\" if  specified by physician order.   FREE THYROXINE   LACTIC ACID      All labs reviewed by me.    EKG  12 Lead EKG interpreted by me to show:  Indication: Shortness of breath   Normal sinus rhythm  Rate 82  Axis: Normal  Intervals: Normal  Normal T waves  Normal ST segments  My impression of this EKG: No STEMI.       RADIOLOGY  DX-CHEST-PORTABLE (1 VIEW)   Final Result    "   1.  Marked cardiac enlargement is not significantly changed.        The radiologist's interpretation of all radiological studies have been reviewed by me.    COURSE & MEDICAL DECISION MAKING  Nursing notes, VS, PMSFHx reviewed in chart.    69 y.o. female p/w chief complaint of worsening shortness of breath onset tonight.    3:36 AM Patient seen and examined at bedside. Ordered DX-chest, Lactic acid, CBC without diff., CMP, troponin, INR, EKG for further evaluation. Discussed plans of admit. Patient understands and agrees with plan of care.     4:21 AM  Paged hospitalist.     The differential diagnoses include but are not limited to:     Hx and physical exam consistent with volume overload  Pt given initial dose of lasix in ED  Patient with elevated troponin likely in the setting of volume overload.  No STEMI on EKG.    No significant electrolyte abnormalities, besides those noted above  No significant anemia     Patient with history of cellulitis, likely secondary to volume overload in left lower extremity and has been being treated with cefdinir and Flagyl as outpatient would consider continuing as inpatient.    4:24 AM  - I discussed the patient's case and the above findings with Dr. Gomez (hospitalist) who agrees to admit patient to care.       DISPOSITION   Patient will be hospitalized to Dr. Gomez in guarded condition.      FINAL IMPRESSION  1. Elevated troponin    2. SOB (shortness of breath)    3. Hypervolemia, unspecified hypervolemia type          I, Josefina Castano (Scribe), am scribing for, and in the presence of, Mirza Oakley M.D..    Electronically signed by: Josefina Castano (Irineoibe), 12/31/2019    IMirza M.D. personally performed the services described in this documentation, as scribed by Josefina Castano in my presence, and it is both accurate and complete.    C    The note accurately reflects work and decisions made by me.  Mirza Oakley  12/31/2019  4:34 AM

## 2019-12-31 NOTE — ASSESSMENT & PLAN NOTE
EF 60% on recent TTE   due to medication noncompliance as well as, poor diet   Resume home arb, bb, hctz  Cardiac diet   Off O2 this am, doing fine.  Her edema and shortness of breath is improved, at baseline  Had rising creatinine with IV Lasix.  Transitioned to oral Lasix and creatinine is stable, continue.  Monitor BMP

## 2020-01-01 ENCOUNTER — APPOINTMENT (OUTPATIENT)
Dept: RADIOLOGY | Facility: MEDICAL CENTER | Age: 70
DRG: 291 | End: 2020-01-01
Attending: INTERNAL MEDICINE
Payer: MEDICARE

## 2020-01-01 PROBLEM — M25.562 LEFT KNEE PAIN: Status: ACTIVE | Noted: 2020-01-01

## 2020-01-01 LAB
ANION GAP SERPL CALC-SCNC: 9 MMOL/L (ref 0–11.9)
BASOPHILS # BLD AUTO: 0.8 % (ref 0–1.8)
BASOPHILS # BLD: 0.05 K/UL (ref 0–0.12)
BUN SERPL-MCNC: 31 MG/DL (ref 8–22)
CALCIUM SERPL-MCNC: 9 MG/DL (ref 8.5–10.5)
CHLORIDE SERPL-SCNC: 103 MMOL/L (ref 96–112)
CO2 SERPL-SCNC: 29 MMOL/L (ref 20–33)
CREAT SERPL-MCNC: 1.09 MG/DL (ref 0.5–1.4)
EOSINOPHIL # BLD AUTO: 0.21 K/UL (ref 0–0.51)
EOSINOPHIL NFR BLD: 3.4 % (ref 0–6.9)
ERYTHROCYTE [DISTWIDTH] IN BLOOD BY AUTOMATED COUNT: 57.9 FL (ref 35.9–50)
GLUCOSE BLD-MCNC: 140 MG/DL (ref 65–99)
GLUCOSE BLD-MCNC: 145 MG/DL (ref 65–99)
GLUCOSE BLD-MCNC: 150 MG/DL (ref 65–99)
GLUCOSE BLD-MCNC: 240 MG/DL (ref 65–99)
GLUCOSE SERPL-MCNC: 115 MG/DL (ref 65–99)
HCT VFR BLD AUTO: 37.7 % (ref 37–47)
HGB BLD-MCNC: 10.9 G/DL (ref 12–16)
IMM GRANULOCYTES # BLD AUTO: 0.04 K/UL (ref 0–0.11)
IMM GRANULOCYTES NFR BLD AUTO: 0.7 % (ref 0–0.9)
INR PPP: 1.93 (ref 0.87–1.13)
LYMPHOCYTES # BLD AUTO: 1.48 K/UL (ref 1–4.8)
LYMPHOCYTES NFR BLD: 24.2 % (ref 22–41)
MCH RBC QN AUTO: 24.7 PG (ref 27–33)
MCHC RBC AUTO-ENTMCNC: 28.9 G/DL (ref 33.6–35)
MCV RBC AUTO: 85.3 FL (ref 81.4–97.8)
MONOCYTES # BLD AUTO: 0.5 K/UL (ref 0–0.85)
MONOCYTES NFR BLD AUTO: 8.2 % (ref 0–13.4)
MORPHOLOGY BLD-IMP: NORMAL
NEUTROPHILS # BLD AUTO: 3.84 K/UL (ref 2–7.15)
NEUTROPHILS NFR BLD: 62.7 % (ref 44–72)
NRBC # BLD AUTO: 0 K/UL
NRBC BLD-RTO: 0 /100 WBC
NT-PROBNP SERPL IA-MCNC: 1287 PG/ML (ref 0–125)
PLATELET # BLD AUTO: 240 K/UL (ref 164–446)
PMV BLD AUTO: 9.4 FL (ref 9–12.9)
POTASSIUM SERPL-SCNC: 3.5 MMOL/L (ref 3.6–5.5)
PROTHROMBIN TIME: 22.7 SEC (ref 12–14.6)
RBC # BLD AUTO: 4.42 M/UL (ref 4.2–5.4)
SODIUM SERPL-SCNC: 141 MMOL/L (ref 135–145)
T3FREE SERPL-MCNC: 2.6 PG/ML (ref 2.4–4.2)
TROPONIN T SERPL-MCNC: 35 NG/L (ref 6–19)
WBC # BLD AUTO: 6.1 K/UL (ref 4.8–10.8)

## 2020-01-01 PROCEDURE — 84484 ASSAY OF TROPONIN QUANT: CPT

## 2020-01-01 PROCEDURE — 93971 EXTREMITY STUDY: CPT | Mod: 26,LT | Performed by: INTERNAL MEDICINE

## 2020-01-01 PROCEDURE — 36415 COLL VENOUS BLD VENIPUNCTURE: CPT

## 2020-01-01 PROCEDURE — 85025 COMPLETE CBC W/AUTO DIFF WBC: CPT

## 2020-01-01 PROCEDURE — A9270 NON-COVERED ITEM OR SERVICE: HCPCS | Performed by: HOSPITALIST

## 2020-01-01 PROCEDURE — 700102 HCHG RX REV CODE 250 W/ 637 OVERRIDE(OP): Performed by: INTERNAL MEDICINE

## 2020-01-01 PROCEDURE — 700105 HCHG RX REV CODE 258

## 2020-01-01 PROCEDURE — 700105 HCHG RX REV CODE 258: Performed by: HOSPITALIST

## 2020-01-01 PROCEDURE — 83880 ASSAY OF NATRIURETIC PEPTIDE: CPT

## 2020-01-01 PROCEDURE — 99233 SBSQ HOSP IP/OBS HIGH 50: CPT | Performed by: INTERNAL MEDICINE

## 2020-01-01 PROCEDURE — 700111 HCHG RX REV CODE 636 W/ 250 OVERRIDE (IP): Performed by: HOSPITALIST

## 2020-01-01 PROCEDURE — A9270 NON-COVERED ITEM OR SERVICE: HCPCS | Performed by: INTERNAL MEDICINE

## 2020-01-01 PROCEDURE — 700102 HCHG RX REV CODE 250 W/ 637 OVERRIDE(OP): Performed by: HOSPITALIST

## 2020-01-01 PROCEDURE — 80048 BASIC METABOLIC PNL TOTAL CA: CPT

## 2020-01-01 PROCEDURE — 85610 PROTHROMBIN TIME: CPT

## 2020-01-01 PROCEDURE — 700111 HCHG RX REV CODE 636 W/ 250 OVERRIDE (IP): Performed by: INTERNAL MEDICINE

## 2020-01-01 PROCEDURE — 93971 EXTREMITY STUDY: CPT | Mod: LT

## 2020-01-01 PROCEDURE — 82962 GLUCOSE BLOOD TEST: CPT

## 2020-01-01 PROCEDURE — 84481 FREE ASSAY (FT-3): CPT

## 2020-01-01 PROCEDURE — 96376 TX/PRO/DX INJ SAME DRUG ADON: CPT

## 2020-01-01 PROCEDURE — 770020 HCHG ROOM/CARE - TELE (206)

## 2020-01-01 RX ORDER — OXYCODONE HYDROCHLORIDE 5 MG/1
2.5-5 TABLET ORAL EVERY 4 HOURS PRN
Status: DISCONTINUED | OUTPATIENT
Start: 2020-01-01 | End: 2020-01-03

## 2020-01-01 RX ORDER — WARFARIN SODIUM 5 MG/1
5 TABLET ORAL
Status: DISCONTINUED | OUTPATIENT
Start: 2020-01-03 | End: 2020-01-08

## 2020-01-01 RX ORDER — WARFARIN SODIUM 2.5 MG/1
2.5 TABLET ORAL
Status: DISCONTINUED | OUTPATIENT
Start: 2020-01-01 | End: 2020-01-08

## 2020-01-01 RX ORDER — MORPHINE SULFATE 4 MG/ML
2 INJECTION, SOLUTION INTRAMUSCULAR; INTRAVENOUS ONCE
Status: COMPLETED | OUTPATIENT
Start: 2020-01-01 | End: 2020-01-01

## 2020-01-01 RX ORDER — SODIUM CHLORIDE 9 MG/ML
INJECTION, SOLUTION INTRAVENOUS
Status: COMPLETED
Start: 2020-01-01 | End: 2020-01-01

## 2020-01-01 RX ORDER — FERROUS SULFATE 325(65) MG
325 TABLET ORAL
Status: DISCONTINUED | OUTPATIENT
Start: 2020-01-02 | End: 2020-01-09 | Stop reason: HOSPADM

## 2020-01-01 RX ORDER — CYCLOBENZAPRINE HCL 10 MG
10 TABLET ORAL 3 TIMES DAILY PRN
Status: DISCONTINUED | OUTPATIENT
Start: 2020-01-01 | End: 2020-01-03

## 2020-01-01 RX ADMIN — POTASSIUM CHLORIDE 20 MEQ: 1500 TABLET, EXTENDED RELEASE ORAL at 05:07

## 2020-01-01 RX ADMIN — CEFTRIAXONE SODIUM 2 G: 2 INJECTION, POWDER, FOR SOLUTION INTRAMUSCULAR; INTRAVENOUS at 05:08

## 2020-01-01 RX ADMIN — ROSUVASTATIN CALCIUM 40 MG: 20 TABLET, FILM COATED ORAL at 17:08

## 2020-01-01 RX ADMIN — MORPHINE SULFATE 2 MG: 4 INJECTION INTRAVENOUS at 09:49

## 2020-01-01 RX ADMIN — SODIUM CHLORIDE 500 ML: 9 INJECTION, SOLUTION INTRAVENOUS at 05:08

## 2020-01-01 RX ADMIN — METOPROLOL TARTRATE 50 MG: 50 TABLET, FILM COATED ORAL at 17:08

## 2020-01-01 RX ADMIN — POTASSIUM CHLORIDE 20 MEQ: 1500 TABLET, EXTENDED RELEASE ORAL at 17:08

## 2020-01-01 RX ADMIN — DOXYCYCLINE 100 MG: 100 TABLET, FILM COATED ORAL at 17:08

## 2020-01-01 RX ADMIN — OXYCODONE HYDROCHLORIDE 5 MG: 5 TABLET ORAL at 17:07

## 2020-01-01 RX ADMIN — DOXYCYCLINE 100 MG: 100 TABLET, FILM COATED ORAL at 05:07

## 2020-01-01 RX ADMIN — WARFARIN SODIUM 2.5 MG: 2.5 TABLET ORAL at 17:07

## 2020-01-01 RX ADMIN — CYCLOBENZAPRINE 10 MG: 10 TABLET, FILM COATED ORAL at 23:07

## 2020-01-01 RX ADMIN — FUROSEMIDE 40 MG: 10 INJECTION, SOLUTION INTRAMUSCULAR; INTRAVENOUS at 05:07

## 2020-01-01 RX ADMIN — HYDROCHLOROTHIAZIDE 25 MG: 25 TABLET ORAL at 05:07

## 2020-01-01 RX ADMIN — METOPROLOL TARTRATE 50 MG: 50 TABLET, FILM COATED ORAL at 05:07

## 2020-01-01 RX ADMIN — LOSARTAN POTASSIUM 50 MG: 50 TABLET, FILM COATED ORAL at 05:07

## 2020-01-01 RX ADMIN — FUROSEMIDE 40 MG: 10 INJECTION, SOLUTION INTRAMUSCULAR; INTRAVENOUS at 17:09

## 2020-01-01 RX ADMIN — INSULIN HUMAN 3 UNITS: 100 INJECTION, SOLUTION PARENTERAL at 20:12

## 2020-01-01 RX ADMIN — LEVOTHYROXINE SODIUM 200 MCG: 100 TABLET ORAL at 05:07

## 2020-01-01 RX ADMIN — GEMFIBROZIL 600 MG: 600 TABLET ORAL at 05:06

## 2020-01-01 RX ADMIN — CITALOPRAM HYDROBROMIDE 20 MG: 20 TABLET ORAL at 05:07

## 2020-01-01 RX ADMIN — CYCLOBENZAPRINE 10 MG: 10 TABLET, FILM COATED ORAL at 12:03

## 2020-01-01 ASSESSMENT — ENCOUNTER SYMPTOMS
FEVER: 0
ORTHOPNEA: 1
DIZZINESS: 0
COUGH: 0
VOMITING: 0
ABDOMINAL PAIN: 0
DIARRHEA: 0
SHORTNESS OF BREATH: 1

## 2020-01-01 NOTE — CARE PLAN
Problem: Communication  Goal: The ability to communicate needs accurately and effectively will improve  Outcome: PROGRESSING AS EXPECTED     Problem: Safety  Goal: Will remain free from injury  Outcome: PROGRESSING AS EXPECTED  Goal: Will remain free from falls  Outcome: PROGRESSING AS EXPECTED     Problem: Knowledge Deficit  Goal: Knowledge of disease process/condition, treatment plan, diagnostic tests, and medications will improve  Outcome: PROGRESSING AS EXPECTED     Problem: Fluid Volume:  Goal: Will maintain balanced intake and output  Outcome: PROGRESSING AS EXPECTED   Patient is still receiving lasix for CHF exacerbation, but patient has good urine output.

## 2020-01-01 NOTE — PROGRESS NOTES
Handoff report received from day shift nurse. Pt care assumed. Pt is currently resting in bed. POC discussed with Pt and Pt verbalizes no questions at this time. Pt is AAOx4, on 2L NC, on Tele monitoring, and VSS. Call light and belongings within reach, bed in lowest and locked position, and Pt educated on use of call light. Will continue to monitor.

## 2020-01-01 NOTE — DIETARY
NUTRITION SERVICES: BMI - Pt with BMI >40 (=Body mass index is 51.36 kg/m².), morbid obesity. Weight loss counseling not appropriate in acute care setting. RECOMMEND - Referral to outpatient nutrition services for weight management after D/C.

## 2020-01-01 NOTE — PROGRESS NOTES
Admitted by colleague this morning.  We will continue IV Lasix diuresis.  Titrate O2 as tolerated.  Replete low K.  Monitor electrolytes, renal function, urine output.  Continue empiric antibiotics for leg cellulitis.  I assessed patient and discussed findings, plan of care.

## 2020-01-01 NOTE — PROGRESS NOTES
Hospital Medicine Daily Progress Note    Date of Service  1/1/2020    Chief Complaint  69 y.o. female admitted 12/31/2019 with SOB    Hospital Course    PMH A. fib, HTN, CAD, DM, CHF EF 60%, HLD, KEN who presented with shortness and orthopnea worse in the last week.  She had been lax on her diet while on a cruise and not taking Lasix.  BNP was elevated and chest x-ray showed cardiomegaly.  She was admitted for CHF exacerbation.       Interval Problem Update  A. fib,   UOP 2.6L  Patient says last night she was not able to move out of bed so her left knee started getting achy and numb and stiff.  She has a difficult time moving it today.  She continues to have orthopnea and shortness of breath.  She denies chest pain.    Consultants/Specialty  None    Code Status  Full    Disposition  Home pending diuresis    Review of Systems  Review of Systems   Constitutional: Positive for malaise/fatigue. Negative for fever.   HENT: Negative for congestion.    Respiratory: Positive for shortness of breath. Negative for cough.    Cardiovascular: Positive for orthopnea and leg swelling. Negative for chest pain.   Gastrointestinal: Negative for abdominal pain, diarrhea and vomiting.   Genitourinary: Positive for frequency. Negative for dysuria.   Musculoskeletal: Positive for joint pain.   Skin: Positive for rash. Negative for itching.   Neurological: Negative for dizziness.   All other systems reviewed and are negative.       Physical Exam  Temp:  [36 °C (96.8 °F)-36.5 °C (97.7 °F)] 36.5 °C (97.7 °F)  Pulse:  [] 101  Resp:  [14-20] 20  BP: (109-134)/(67-82) 132/75  SpO2:  [89 %-100 %] 96 %    Physical Exam  Vitals signs and nursing note reviewed.   Constitutional:       General: She is not in acute distress.     Appearance: She is obese. She is not toxic-appearing.      Comments: Appears in pain   HENT:      Head: Normocephalic.      Mouth/Throat:      Mouth: Mucous membranes are moist.   Eyes:      General:          Right eye: No discharge.         Left eye: No discharge.   Cardiovascular:      Rate and Rhythm: Normal rate.      Comments: Irregular  Pulmonary:      Effort: Pulmonary effort is normal.      Breath sounds: No wheezing or rales.   Abdominal:      General: There is distension.      Palpations: Abdomen is soft.      Tenderness: There is no tenderness. There is no guarding or rebound.   Musculoskeletal:         General: Swelling present.      Comments: Mild effusion to right and left knees, left knee is tender to palpation and with minimal range of motion   Skin:     General: Skin is warm and dry.      Comments: Left lower extremity with evidence of prior blistering, erythema, tenderness, edema.  No drainage currently.   Neurological:      Mental Status: She is alert and oriented to person, place, and time.         Fluids    Intake/Output Summary (Last 24 hours) at 1/1/2020 1130  Last data filed at 1/1/2020 1000  Gross per 24 hour   Intake 600 ml   Output 2425 ml   Net -1825 ml       Laboratory  Recent Labs     12/31/19  0330 01/01/20  0322   WBC 6.0 6.1   RBC 4.68 4.42   HEMOGLOBIN 11.5* 10.9*   HEMATOCRIT 39.3 37.7   MCV 84.0 85.3   MCH 24.6* 24.7*   MCHC 29.3* 28.9*   RDW 57.1* 57.9*   PLATELETCT 266 240   MPV 9.2 9.4     Recent Labs     12/31/19  0330 01/01/20  0322   SODIUM 142 141   POTASSIUM 3.5* 3.5*   CHLORIDE 104 103   CO2 28 29   GLUCOSE 132* 115*   BUN 31* 31*   CREATININE 0.95 1.09   CALCIUM 9.6 9.0     Recent Labs     12/31/19  0330   INR 1.77*               Imaging  DX-CHEST-PORTABLE (1 VIEW)   Final Result      1.  Marked cardiac enlargement is not significantly changed.      US-EXTREMITY VENOUS LOWER UNILAT LEFT    (Results Pending)        Assessment/Plan  * CHF exacerbation (HCC)  Assessment & Plan  EF 60% on recent TTE   due to medication noncompliance as well as, poor diet   Cont with IV lasix 40 BID.  Monitor daily weight and I's and O's.  Goal 1 to 2 L net negative daily  Resume home arb, bb,  hctz  Cardiac diet   Wean 02 as tolerated    Left knee pain  Assessment & Plan  History of arthritis and takes Tylenol 3 at home  Has increased pain from immobility  Ordered for multimodal pain control  Out of bed as much as tolerated with chair and ambulation  Doppler for DVT given her edema    Anemia  Assessment & Plan  No evidence of bleeding   Iron is low, supplement ordered    Hypokalemia  Assessment & Plan  Due to Lasix   replace and recheck labs    Elevated troponin  Assessment & Plan  Due to demand ischemia from volume overload  She denies chest pain  Trend troponin  Cardiac montioring     Acute hypoxemic respiratory failure (HCC)  Assessment & Plan  Due to volume overload   Continue diuresis  Wean 02 as tolerated       Left leg cellulitis  Assessment & Plan  She says she received antibiotics while at the cruise   Started as a blister with purulent drainage  Continue ceftriaxone and doxycycline      Insulin dependent diabetes mellitus (HCC)- (present on admission)  Assessment & Plan  Hold home medications   SSI Ordered  accu checks    Chronic atrial fibrillation- (present on admission)  Assessment & Plan  Resume home BB and coumadin   Rate controlled    History of hypothyroidism- (present on admission)  Assessment & Plan  Resume home levothyroxine, tsh mildly elevated consider increasing and rechecking in 4 weeks  T4 is normal  Check T3    Morbid obesity (HCC)- (present on admission)  Assessment & Plan  Encourage weight loss, follow-up outpatient    Benign essential hypertension- (present on admission)  Assessment & Plan  Blood pressures controlled on HCTZ, losartan, metoprolol, Lasix       VTE prophylaxis: warfarin

## 2020-01-01 NOTE — ASSESSMENT & PLAN NOTE
History of arthritis and takes Tylenol 3 at home  X-ray shows tricompartmental arthritis, small effusion  Has increased pain from immobility  Ordered for multimodal pain control with Tylenol, oxycodone, lidocaine, Robaxin, will give dose of Toradol  Out of bed as much as tolerated with chair and ambulation.  PT OT  Doppler negative for DVT  Improving

## 2020-01-02 PROBLEM — E83.42 HYPOMAGNESEMIA: Status: ACTIVE | Noted: 2020-01-02

## 2020-01-02 LAB
ANION GAP SERPL CALC-SCNC: 8 MMOL/L (ref 0–11.9)
BASOPHILS # BLD AUTO: 0.5 % (ref 0–1.8)
BASOPHILS # BLD: 0.04 K/UL (ref 0–0.12)
BUN SERPL-MCNC: 32 MG/DL (ref 8–22)
CALCIUM SERPL-MCNC: 9.1 MG/DL (ref 8.5–10.5)
CHLORIDE SERPL-SCNC: 97 MMOL/L (ref 96–112)
CO2 SERPL-SCNC: 33 MMOL/L (ref 20–33)
CREAT SERPL-MCNC: 1.03 MG/DL (ref 0.5–1.4)
EOSINOPHIL # BLD AUTO: 0.09 K/UL (ref 0–0.51)
EOSINOPHIL NFR BLD: 1.1 % (ref 0–6.9)
ERYTHROCYTE [DISTWIDTH] IN BLOOD BY AUTOMATED COUNT: 56.2 FL (ref 35.9–50)
GLUCOSE BLD-MCNC: 135 MG/DL (ref 65–99)
GLUCOSE BLD-MCNC: 141 MG/DL (ref 65–99)
GLUCOSE BLD-MCNC: 150 MG/DL (ref 65–99)
GLUCOSE BLD-MCNC: 185 MG/DL (ref 65–99)
GLUCOSE SERPL-MCNC: 143 MG/DL (ref 65–99)
HCT VFR BLD AUTO: 37.2 % (ref 37–47)
HGB BLD-MCNC: 10.9 G/DL (ref 12–16)
IMM GRANULOCYTES # BLD AUTO: 0.05 K/UL (ref 0–0.11)
IMM GRANULOCYTES NFR BLD AUTO: 0.6 % (ref 0–0.9)
INR PPP: 2.06 (ref 0.87–1.13)
LYMPHOCYTES # BLD AUTO: 1.91 K/UL (ref 1–4.8)
LYMPHOCYTES NFR BLD: 23.3 % (ref 22–41)
MAGNESIUM SERPL-MCNC: 1.3 MG/DL (ref 1.5–2.5)
MCH RBC QN AUTO: 24.4 PG (ref 27–33)
MCHC RBC AUTO-ENTMCNC: 29.3 G/DL (ref 33.6–35)
MCV RBC AUTO: 83.4 FL (ref 81.4–97.8)
MONOCYTES # BLD AUTO: 0.93 K/UL (ref 0–0.85)
MONOCYTES NFR BLD AUTO: 11.3 % (ref 0–13.4)
NEUTROPHILS # BLD AUTO: 5.19 K/UL (ref 2–7.15)
NEUTROPHILS NFR BLD: 63.2 % (ref 44–72)
NRBC # BLD AUTO: 0 K/UL
NRBC BLD-RTO: 0 /100 WBC
PLATELET # BLD AUTO: 227 K/UL (ref 164–446)
PMV BLD AUTO: 9.1 FL (ref 9–12.9)
POTASSIUM SERPL-SCNC: 3.3 MMOL/L (ref 3.6–5.5)
PROTHROMBIN TIME: 23.9 SEC (ref 12–14.6)
RBC # BLD AUTO: 4.46 M/UL (ref 4.2–5.4)
SODIUM SERPL-SCNC: 138 MMOL/L (ref 135–145)
WBC # BLD AUTO: 8.2 K/UL (ref 4.8–10.8)

## 2020-01-02 PROCEDURE — 700111 HCHG RX REV CODE 636 W/ 250 OVERRIDE (IP): Performed by: HOSPITALIST

## 2020-01-02 PROCEDURE — 82962 GLUCOSE BLOOD TEST: CPT | Mod: 91

## 2020-01-02 PROCEDURE — 700102 HCHG RX REV CODE 250 W/ 637 OVERRIDE(OP): Performed by: HOSPITALIST

## 2020-01-02 PROCEDURE — 99233 SBSQ HOSP IP/OBS HIGH 50: CPT | Performed by: INTERNAL MEDICINE

## 2020-01-02 PROCEDURE — 85610 PROTHROMBIN TIME: CPT

## 2020-01-02 PROCEDURE — 700105 HCHG RX REV CODE 258: Performed by: HOSPITALIST

## 2020-01-02 PROCEDURE — A9270 NON-COVERED ITEM OR SERVICE: HCPCS | Performed by: HOSPITALIST

## 2020-01-02 PROCEDURE — 700111 HCHG RX REV CODE 636 W/ 250 OVERRIDE (IP): Performed by: INTERNAL MEDICINE

## 2020-01-02 PROCEDURE — 85025 COMPLETE CBC W/AUTO DIFF WBC: CPT

## 2020-01-02 PROCEDURE — 83735 ASSAY OF MAGNESIUM: CPT

## 2020-01-02 PROCEDURE — 36415 COLL VENOUS BLD VENIPUNCTURE: CPT

## 2020-01-02 PROCEDURE — 80048 BASIC METABOLIC PNL TOTAL CA: CPT

## 2020-01-02 PROCEDURE — 700102 HCHG RX REV CODE 250 W/ 637 OVERRIDE(OP): Performed by: INTERNAL MEDICINE

## 2020-01-02 PROCEDURE — 770020 HCHG ROOM/CARE - TELE (206)

## 2020-01-02 PROCEDURE — A9270 NON-COVERED ITEM OR SERVICE: HCPCS | Performed by: INTERNAL MEDICINE

## 2020-01-02 RX ORDER — MAGNESIUM SULFATE HEPTAHYDRATE 40 MG/ML
2 INJECTION, SOLUTION INTRAVENOUS ONCE
Status: COMPLETED | OUTPATIENT
Start: 2020-01-02 | End: 2020-01-02

## 2020-01-02 RX ADMIN — GEMFIBROZIL 600 MG: 600 TABLET ORAL at 05:24

## 2020-01-02 RX ADMIN — CITALOPRAM HYDROBROMIDE 20 MG: 20 TABLET ORAL at 05:24

## 2020-01-02 RX ADMIN — OXYCODONE HYDROCHLORIDE 2.5 MG: 5 TABLET ORAL at 16:28

## 2020-01-02 RX ADMIN — FUROSEMIDE 40 MG: 10 INJECTION, SOLUTION INTRAMUSCULAR; INTRAVENOUS at 16:25

## 2020-01-02 RX ADMIN — FERROUS SULFATE TAB 325 MG (65 MG ELEMENTAL FE) 325 MG: 325 (65 FE) TAB at 08:53

## 2020-01-02 RX ADMIN — LOSARTAN POTASSIUM 50 MG: 50 TABLET, FILM COATED ORAL at 05:24

## 2020-01-02 RX ADMIN — DOXYCYCLINE 100 MG: 100 TABLET, FILM COATED ORAL at 16:25

## 2020-01-02 RX ADMIN — CYCLOBENZAPRINE 10 MG: 10 TABLET, FILM COATED ORAL at 08:53

## 2020-01-02 RX ADMIN — FUROSEMIDE 40 MG: 10 INJECTION, SOLUTION INTRAMUSCULAR; INTRAVENOUS at 05:24

## 2020-01-02 RX ADMIN — CEFTRIAXONE SODIUM 2 G: 2 INJECTION, POWDER, FOR SOLUTION INTRAMUSCULAR; INTRAVENOUS at 05:24

## 2020-01-02 RX ADMIN — HYDROCHLOROTHIAZIDE 25 MG: 25 TABLET ORAL at 05:24

## 2020-01-02 RX ADMIN — LEVOTHYROXINE SODIUM 200 MCG: 100 TABLET ORAL at 05:24

## 2020-01-02 RX ADMIN — ROSUVASTATIN CALCIUM 40 MG: 20 TABLET, FILM COATED ORAL at 16:26

## 2020-01-02 RX ADMIN — OXYCODONE HYDROCHLORIDE 5 MG: 5 TABLET ORAL at 08:53

## 2020-01-02 RX ADMIN — OXYCODONE HYDROCHLORIDE 5 MG: 5 TABLET ORAL at 21:32

## 2020-01-02 RX ADMIN — WARFARIN SODIUM 2.5 MG: 2.5 TABLET ORAL at 16:26

## 2020-01-02 RX ADMIN — DOXYCYCLINE 100 MG: 100 TABLET, FILM COATED ORAL at 05:24

## 2020-01-02 RX ADMIN — POTASSIUM CHLORIDE 20 MEQ: 1500 TABLET, EXTENDED RELEASE ORAL at 16:25

## 2020-01-02 RX ADMIN — METOPROLOL TARTRATE 50 MG: 50 TABLET, FILM COATED ORAL at 05:24

## 2020-01-02 RX ADMIN — METOPROLOL TARTRATE 50 MG: 50 TABLET, FILM COATED ORAL at 16:27

## 2020-01-02 RX ADMIN — CYCLOBENZAPRINE 10 MG: 10 TABLET, FILM COATED ORAL at 17:45

## 2020-01-02 RX ADMIN — POTASSIUM CHLORIDE 20 MEQ: 1500 TABLET, EXTENDED RELEASE ORAL at 05:24

## 2020-01-02 RX ADMIN — MAGNESIUM SULFATE 2 G: 2 INJECTION INTRAVENOUS at 13:15

## 2020-01-02 RX ADMIN — INSULIN HUMAN 2 UNITS: 100 INJECTION, SOLUTION PARENTERAL at 21:37

## 2020-01-02 ASSESSMENT — ENCOUNTER SYMPTOMS
DIZZINESS: 0
ABDOMINAL PAIN: 0
DIARRHEA: 0
ORTHOPNEA: 1
SHORTNESS OF BREATH: 1
COUGH: 0
VOMITING: 0

## 2020-01-02 ASSESSMENT — COGNITIVE AND FUNCTIONAL STATUS - GENERAL
EATING MEALS: A LITTLE
TOILETING: A LOT
HELP NEEDED FOR BATHING: A LOT
DRESSING REGULAR LOWER BODY CLOTHING: A LOT
MOVING TO AND FROM BED TO CHAIR: UNABLE
DRESSING REGULAR UPPER BODY CLOTHING: A LOT
TURNING FROM BACK TO SIDE WHILE IN FLAT BAD: A LOT
MOVING FROM LYING ON BACK TO SITTING ON SIDE OF FLAT BED: UNABLE
PERSONAL GROOMING: A LITTLE
STANDING UP FROM CHAIR USING ARMS: A LOT
SUGGESTED CMS G CODE MODIFIER MOBILITY: CL
MOBILITY SCORE: 10
CLIMB 3 TO 5 STEPS WITH RAILING: A LOT
DAILY ACTIVITIY SCORE: 14
SUGGESTED CMS G CODE MODIFIER DAILY ACTIVITY: CK
WALKING IN HOSPITAL ROOM: A LOT

## 2020-01-02 ASSESSMENT — LIFESTYLE VARIABLES
HOW MANY TIMES IN THE PAST YEAR HAVE YOU HAD 5 OR MORE DRINKS IN A DAY: 0
AVERAGE NUMBER OF DAYS PER WEEK YOU HAVE A DRINK CONTAINING ALCOHOL: 0
TOTAL SCORE: 0
EVER HAD A DRINK FIRST THING IN THE MORNING TO STEADY YOUR NERVES TO GET RID OF A HANGOVER: NO
ON A TYPICAL DAY WHEN YOU DRINK ALCOHOL HOW MANY DRINKS DO YOU HAVE: 0
HAVE YOU EVER FELT YOU SHOULD CUT DOWN ON YOUR DRINKING: NO
ALCOHOL_USE: NO
CONSUMPTION TOTAL: NEGATIVE
EVER FELT BAD OR GUILTY ABOUT YOUR DRINKING: NO
TOTAL SCORE: 0
TOTAL SCORE: 0
HAVE PEOPLE ANNOYED YOU BY CRITICIZING YOUR DRINKING: NO

## 2020-01-02 ASSESSMENT — PATIENT HEALTH QUESTIONNAIRE - PHQ9
1. LITTLE INTEREST OR PLEASURE IN DOING THINGS: NOT AT ALL
2. FEELING DOWN, DEPRESSED, IRRITABLE, OR HOPELESS: NOT AT ALL
SUM OF ALL RESPONSES TO PHQ9 QUESTIONS 1 AND 2: 0

## 2020-01-02 NOTE — PROGRESS NOTES
Bedside report received from LILLIE Coffey. Assumed care of pt. Pt awake, laying in bed. A/Ox4, VSS. No concerns, complaints or distress. Pt educated to call before getting out of bed. POC reviewed and white board updated. Tele box on. Afib 101 on the monitor. Call light in reach. Bed locked in lowest position with 2 upper bed rails up. Bed alarm on.

## 2020-01-02 NOTE — PROGRESS NOTES
Hospital Medicine Daily Progress Note    Date of Service  1/2/2020    Chief Complaint  69 y.o. female admitted 12/31/2019 with SOB    Hospital Course    PMH A. fib, HTN, CAD, DM, CHF EF 60%, HLD, KEN who presented with shortness and orthopnea worse in the last week.  She had been lax on her diet while on a cruise and not taking Lasix.  BNP was elevated and chest x-ray showed cardiomegaly.  She was admitted for CHF exacerbation. She also had LLE cellulitis and started on antibiotics. Doppler was neg for left DVT.       Interval Problem Update  A. Fib rate controlled  UOP 2.9L.  She says her shortness of breath is better.  She also says her legs feel less tight.  HypoK, continue repletion  She still has some knee pain but pain is better controlled.    Consultants/Specialty  None    Code Status  Full    Disposition  PT eval  Wean off oxygen    Review of Systems  Review of Systems   Constitutional: Positive for malaise/fatigue.   HENT: Negative for congestion.    Respiratory: Positive for shortness of breath. Negative for cough.    Cardiovascular: Positive for orthopnea and leg swelling. Negative for chest pain.   Gastrointestinal: Negative for abdominal pain, diarrhea and vomiting.   Genitourinary: Positive for frequency. Negative for dysuria.   Musculoskeletal: Positive for joint pain.   Skin: Positive for rash. Negative for itching.   Neurological: Negative for dizziness.   All other systems reviewed and are negative.       Physical Exam  Temp:  [35.9 °C (96.6 °F)-37.1 °C (98.8 °F)] 36.2 °C (97.2 °F)  Pulse:  [] 88  Resp:  [17-18] 17  BP: (106-122)/(69-77) 115/73  SpO2:  [94 %-96 %] 95 %    Physical Exam  Vitals signs and nursing note reviewed.   Constitutional:       General: She is not in acute distress.     Appearance: She is obese. She is not toxic-appearing.      Comments: Appears in pain   HENT:      Head: Normocephalic.      Mouth/Throat:      Mouth: Mucous membranes are moist.   Eyes:      General:          Right eye: No discharge.         Left eye: No discharge.   Cardiovascular:      Rate and Rhythm: Normal rate.      Comments: Irregular  Pulmonary:      Effort: Pulmonary effort is normal.      Breath sounds: No wheezing or rales.   Abdominal:      General: There is distension.      Palpations: Abdomen is soft.      Tenderness: There is no tenderness. There is no guarding or rebound.   Musculoskeletal:         General: Swelling (Decreased) present.      Comments: Mild effusion to right and left knees, left knee is tender to palpation and with minimal range of motion   Skin:     General: Skin is warm and dry.      Comments: Left lower extremity with evidence of prior blistering, erythema, tenderness, edema.  No drainage currently.   Neurological:      Mental Status: She is alert and oriented to person, place, and time.         Fluids    Intake/Output Summary (Last 24 hours) at 1/2/2020 1101  Last data filed at 1/2/2020 0900  Gross per 24 hour   Intake 840 ml   Output 3850 ml   Net -3010 ml       Laboratory  Recent Labs     12/31/19 0330 01/01/20 0322 01/02/20  0322   WBC 6.0 6.1 8.2   RBC 4.68 4.42 4.46   HEMOGLOBIN 11.5* 10.9* 10.9*   HEMATOCRIT 39.3 37.7 37.2   MCV 84.0 85.3 83.4   MCH 24.6* 24.7* 24.4*   MCHC 29.3* 28.9* 29.3*   RDW 57.1* 57.9* 56.2*   PLATELETCT 266 240 227   MPV 9.2 9.4 9.1     Recent Labs     12/31/19 0330 01/01/20 0322 01/02/20  0322   SODIUM 142 141 138   POTASSIUM 3.5* 3.5* 3.3*   CHLORIDE 104 103 97   CO2 28 29 33   GLUCOSE 132* 115* 143*   BUN 31* 31* 32*   CREATININE 0.95 1.09 1.03   CALCIUM 9.6 9.0 9.1     Recent Labs     12/31/19 0330 01/01/20  0322 01/02/20  0322   INR 1.77* 1.93* 2.06*               Imaging  US-EXTREMITY VENOUS LOWER UNILAT LEFT   Final Result      DX-CHEST-PORTABLE (1 VIEW)   Final Result      1.  Marked cardiac enlargement is not significantly changed.           Assessment/Plan  * CHF exacerbation (HCC)  Assessment & Plan  EF 60% on recent TTE   due to  medication noncompliance as well as, poor diet   Cont with IV lasix 40 BID.  Monitor daily weight and I's and O's.  Goal 1 to 2 L net negative daily  She says her previous weight was 272 lbs  Resume home arb, bb, hctz  Cardiac diet   Wean 02 as tolerated    Hypomagnesemia  Assessment & Plan  Due to diuresis and poor oral intake  Replete with IV mag and recheck tomorrow    Left knee pain  Assessment & Plan  History of arthritis and takes Tylenol 3 at home  Has increased pain from immobility  Ordered for multimodal pain control  Out of bed as much as tolerated with chair and ambulation.  PT evaluation.  Doppler negative for DVT    Anemia  Assessment & Plan  No evidence of bleeding   Iron is low, supplement ordered    Hypokalemia  Assessment & Plan  Due to Lasix   replace and recheck labs    Elevated troponin  Assessment & Plan  Due to demand ischemia from volume overload  She denies chest pain  Monitor on telemetry    Acute hypoxemic respiratory failure (HCC)  Assessment & Plan  Due to volume overload   Continue diuresis  Wean off 02 as tolerated       Left leg cellulitis  Assessment & Plan  She says she received antibiotics while at the cruise   Started as a blister with purulent drainage  Continue ceftriaxone and doxycycline      Insulin dependent diabetes mellitus (HCC)- (present on admission)  Assessment & Plan  Hold home medications   SSI Ordered  accu checks    Chronic atrial fibrillation- (present on admission)  Assessment & Plan  Resume home BB and coumadin   Rate controlled    History of hypothyroidism- (present on admission)  Assessment & Plan  Resume home levothyroxine, tsh mildly elevated consider increasing and rechecking in 4 weeks  T4 is normal  T3 is normal    Morbid obesity (HCC)- (present on admission)  Assessment & Plan  Encourage weight loss, follow-up outpatient    Benign essential hypertension- (present on admission)  Assessment & Plan  Blood pressures controlled on HCTZ, losartan, metoprolol,  Lasix       VTE prophylaxis: warfarin

## 2020-01-02 NOTE — PROGRESS NOTES
Inpatient Anticoagulation Service Note    Date: 2020    Reason for Anticoagulation: Atrial Fibrillation   Target INR: 2.0 to 3.0  [id number] VASc Score: 4  HAS-BLED Score: 1   Hemoglobin Value: (!) 10.9  Hematocrit Value: 37.2  Lab Platelet Value: 227    INR from last 7 days     Date/Time INR Value    20 0322  (!) 2.06    20 0322  (!) 1.93    19 0330  (!) 1.77        Dose from last 7 days     Date/Time Dose (mg)    20 0905  2.5    20 1603  2.5    19 0934  5        Average Dose (mg): Home dose: 5 mg Friday and 2.5 mg AOD  Significant Interactions: Antibiotics, Statin, Thyroid Medications  Bridge Therapy: No  Reversal Agent Administered: Not Applicable    Comments: INR now in therapeutic range. H/H remain low but stable from prior values with no bleeding noted. Abx are only new DDI since admission and wouldn't expect much effect on INR. Will continue with home dosing for now    Plan:  Warfarin 2.5 mg tonight and INR tomorrow AM  Education Material Provided?: No (Chronic warfarin therapy)  Pharmacist suggested discharge dosin mg on Fri, 2.5 mg all other days, and INR within 48-72 hours of discharge     Suzie Flood, OrtegaD, BCPS

## 2020-01-02 NOTE — PROGRESS NOTES
Handoff report received from day shift nurse. Pt care assumed. Pt is currently resting in bed. POC discussed with Pt and Pt verbalizes no questions at this time. Pt is AAOx4, on 1L Nc, on Tele monitoring, and VSS. Call light and belongings within reach, bed in lowest and locked position, and Pt educated on use of call light. Will continue to monitor.

## 2020-01-02 NOTE — RESPIRATORY CARE
COPD EDUCATION by COPD CLINICAL EDUCATOR  1/2/2020 at 6:28 AM by Juanjose Thorpe     Patient reviewed by COPD education team. Patient does not have a history or diagnosis of COPD and is a non-smoker, therefore does not qualify for the COPD program.

## 2020-01-02 NOTE — PROGRESS NOTES
Inpatient Anticoagulation Service Note    Date: 1/1/2020    Reason for Anticoagulation: Atrial Fibrillation   Target INR: 2.0 to 3.0  [id number] VASc Score: 4  HAS-BLED Score: 1   Hemoglobin Value: (!) 10.9  Hematocrit Value: 37.7  Lab Platelet Value: 240    INR from last 7 days     Date/Time INR Value    01/01/20 0322  (!) 1.93    12/31/19 0330  (!) 1.77        Dose from last 7 days     Date/Time Dose (mg)    01/01/20 1603  2.5    12/31/19 0934  5        Average Dose (mg): (Home dose: 5 mg Friday and 2.5 mg AOD)  Significant Interactions: Antibiotics, Statin, Thyroid Medications  Bridge Therapy: No    Comments: INR just below goal range, but uptrending. Will start home dose and trend INR for a couple more days while in house. No bleeding noted. No new DDI.    Education Material Provided?: No (Chronic warfarin therapy)  Pharmacist suggested discharge dosing: Would resume home dose of Warfarin 5 mg PO Fridays and  2.5 mg all other days of week with follow up INR within 96 hours of discharge.     Dao Small, PharmD, BCPS

## 2020-01-02 NOTE — CARE PLAN
Problem: Knowledge Deficit  Goal: Knowledge of disease process/condition, treatment plan, diagnostic tests, and medications will improve  Outcome: PROGRESSING AS EXPECTED   Pt updated on POC, including medications, treatment and discharge planning. Questions answered as needed, pt verbalized understanding. Encouraged to voice further questions/concerns.   Problem: Psychosocial Needs:  Goal: Level of anxiety will decrease  Outcome: PROGRESSING AS EXPECTED    Pt encouraged to verbalized her feelings. Patient is helped to identify factors for stress and anxiety. Patient is taught to deep breathe and other forms of stress/anxiety management. Patient verbalizes understanding. Will continue to monitor.

## 2020-01-02 NOTE — CARE PLAN
Problem: Communication  Goal: The ability to communicate needs accurately and effectively will improve  Outcome: PROGRESSING AS EXPECTED     Problem: Safety  Goal: Will remain free from injury  Outcome: PROGRESSING AS EXPECTED  Goal: Will remain free from falls  Outcome: PROGRESSING AS EXPECTED     Problem: Venous Thromboembolism (VTW)/Deep Vein Thrombosis (DVT) Prevention:  Goal: Patient will participate in Venous Thrombosis (VTE)/Deep Vein Thrombosis (DVT)Prevention Measures  Outcome: PROGRESSING SLOWER THAN EXPECTED  Patient is not able to wear SCDs due to patient having cellulitis and pain on lower legs

## 2020-01-03 ENCOUNTER — APPOINTMENT (OUTPATIENT)
Dept: RADIOLOGY | Facility: MEDICAL CENTER | Age: 70
DRG: 291 | End: 2020-01-03
Attending: INTERNAL MEDICINE
Payer: MEDICARE

## 2020-01-03 ENCOUNTER — TELEPHONE (OUTPATIENT)
Dept: VASCULAR LAB | Facility: MEDICAL CENTER | Age: 70
End: 2020-01-03

## 2020-01-03 ENCOUNTER — PATIENT OUTREACH (OUTPATIENT)
Dept: HEALTH INFORMATION MANAGEMENT | Facility: OTHER | Age: 70
End: 2020-01-03

## 2020-01-03 PROBLEM — M54.2 NECK PAIN: Status: ACTIVE | Noted: 2020-01-03

## 2020-01-03 PROBLEM — H66.90 ACUTE OTITIS MEDIA: Status: ACTIVE | Noted: 2020-01-03

## 2020-01-03 LAB
ANION GAP SERPL CALC-SCNC: 11 MMOL/L (ref 0–11.9)
BASOPHILS # BLD AUTO: 0.4 % (ref 0–1.8)
BASOPHILS # BLD: 0.04 K/UL (ref 0–0.12)
BUN SERPL-MCNC: 35 MG/DL (ref 8–22)
CALCIUM SERPL-MCNC: 9.2 MG/DL (ref 8.5–10.5)
CHLORIDE SERPL-SCNC: 93 MMOL/L (ref 96–112)
CO2 SERPL-SCNC: 33 MMOL/L (ref 20–33)
CREAT SERPL-MCNC: 0.99 MG/DL (ref 0.5–1.4)
EOSINOPHIL # BLD AUTO: 0.08 K/UL (ref 0–0.51)
EOSINOPHIL NFR BLD: 0.8 % (ref 0–6.9)
ERYTHROCYTE [DISTWIDTH] IN BLOOD BY AUTOMATED COUNT: 56 FL (ref 35.9–50)
GLUCOSE BLD-MCNC: 114 MG/DL (ref 65–99)
GLUCOSE BLD-MCNC: 149 MG/DL (ref 65–99)
GLUCOSE BLD-MCNC: 184 MG/DL (ref 65–99)
GLUCOSE SERPL-MCNC: 138 MG/DL (ref 65–99)
HCT VFR BLD AUTO: 37 % (ref 37–47)
HGB BLD-MCNC: 11.1 G/DL (ref 12–16)
IMM GRANULOCYTES # BLD AUTO: 0.05 K/UL (ref 0–0.11)
IMM GRANULOCYTES NFR BLD AUTO: 0.5 % (ref 0–0.9)
INR PPP: 1.96 (ref 0.87–1.13)
LYMPHOCYTES # BLD AUTO: 2.05 K/UL (ref 1–4.8)
LYMPHOCYTES NFR BLD: 21.3 % (ref 22–41)
MAGNESIUM SERPL-MCNC: 1.5 MG/DL (ref 1.5–2.5)
MCH RBC QN AUTO: 24.8 PG (ref 27–33)
MCHC RBC AUTO-ENTMCNC: 30 G/DL (ref 33.6–35)
MCV RBC AUTO: 82.6 FL (ref 81.4–97.8)
MONOCYTES # BLD AUTO: 1.15 K/UL (ref 0–0.85)
MONOCYTES NFR BLD AUTO: 11.9 % (ref 0–13.4)
NEUTROPHILS # BLD AUTO: 6.27 K/UL (ref 2–7.15)
NEUTROPHILS NFR BLD: 65.1 % (ref 44–72)
NRBC # BLD AUTO: 0 K/UL
NRBC BLD-RTO: 0 /100 WBC
PLATELET # BLD AUTO: 227 K/UL (ref 164–446)
PMV BLD AUTO: 9 FL (ref 9–12.9)
POTASSIUM SERPL-SCNC: 3.4 MMOL/L (ref 3.6–5.5)
PROTHROMBIN TIME: 22.9 SEC (ref 12–14.6)
RBC # BLD AUTO: 4.48 M/UL (ref 4.2–5.4)
SODIUM SERPL-SCNC: 137 MMOL/L (ref 135–145)
WBC # BLD AUTO: 9.6 K/UL (ref 4.8–10.8)

## 2020-01-03 PROCEDURE — 700102 HCHG RX REV CODE 250 W/ 637 OVERRIDE(OP): Performed by: HOSPITALIST

## 2020-01-03 PROCEDURE — 36415 COLL VENOUS BLD VENIPUNCTURE: CPT

## 2020-01-03 PROCEDURE — 97535 SELF CARE MNGMENT TRAINING: CPT

## 2020-01-03 PROCEDURE — 73560 X-RAY EXAM OF KNEE 1 OR 2: CPT | Mod: LT

## 2020-01-03 PROCEDURE — 700105 HCHG RX REV CODE 258: Performed by: HOSPITALIST

## 2020-01-03 PROCEDURE — A9270 NON-COVERED ITEM OR SERVICE: HCPCS | Performed by: INTERNAL MEDICINE

## 2020-01-03 PROCEDURE — 83735 ASSAY OF MAGNESIUM: CPT

## 2020-01-03 PROCEDURE — A9270 NON-COVERED ITEM OR SERVICE: HCPCS | Performed by: HOSPITALIST

## 2020-01-03 PROCEDURE — 700101 HCHG RX REV CODE 250: Performed by: INTERNAL MEDICINE

## 2020-01-03 PROCEDURE — 700111 HCHG RX REV CODE 636 W/ 250 OVERRIDE (IP): Performed by: INTERNAL MEDICINE

## 2020-01-03 PROCEDURE — 770020 HCHG ROOM/CARE - TELE (206)

## 2020-01-03 PROCEDURE — 97162 PT EVAL MOD COMPLEX 30 MIN: CPT

## 2020-01-03 PROCEDURE — 700111 HCHG RX REV CODE 636 W/ 250 OVERRIDE (IP): Performed by: HOSPITALIST

## 2020-01-03 PROCEDURE — 700102 HCHG RX REV CODE 250 W/ 637 OVERRIDE(OP): Performed by: INTERNAL MEDICINE

## 2020-01-03 PROCEDURE — 72040 X-RAY EXAM NECK SPINE 2-3 VW: CPT

## 2020-01-03 PROCEDURE — 85610 PROTHROMBIN TIME: CPT

## 2020-01-03 PROCEDURE — 85025 COMPLETE CBC W/AUTO DIFF WBC: CPT

## 2020-01-03 PROCEDURE — 99233 SBSQ HOSP IP/OBS HIGH 50: CPT | Performed by: INTERNAL MEDICINE

## 2020-01-03 PROCEDURE — 80048 BASIC METABOLIC PNL TOTAL CA: CPT

## 2020-01-03 PROCEDURE — 82962 GLUCOSE BLOOD TEST: CPT | Mod: 91

## 2020-01-03 RX ORDER — POTASSIUM CHLORIDE 20 MEQ/1
40 TABLET, EXTENDED RELEASE ORAL 2 TIMES DAILY
Status: DISCONTINUED | OUTPATIENT
Start: 2020-01-03 | End: 2020-01-04

## 2020-01-03 RX ORDER — FLUTICASONE PROPIONATE 50 MCG
2 SPRAY, SUSPENSION (ML) NASAL DAILY
Status: COMPLETED | OUTPATIENT
Start: 2020-01-03 | End: 2020-01-09

## 2020-01-03 RX ORDER — METHOCARBAMOL 500 MG/1
1000 TABLET, FILM COATED ORAL 3 TIMES DAILY
Status: COMPLETED | OUTPATIENT
Start: 2020-01-03 | End: 2020-01-05

## 2020-01-03 RX ORDER — KETOROLAC TROMETHAMINE 30 MG/ML
15 INJECTION, SOLUTION INTRAMUSCULAR; INTRAVENOUS ONCE
Status: COMPLETED | OUTPATIENT
Start: 2020-01-03 | End: 2020-01-03

## 2020-01-03 RX ORDER — MAGNESIUM SULFATE HEPTAHYDRATE 40 MG/ML
2 INJECTION, SOLUTION INTRAVENOUS ONCE
Status: COMPLETED | OUTPATIENT
Start: 2020-01-03 | End: 2020-01-03

## 2020-01-03 RX ORDER — METOPROLOL TARTRATE 1 MG/ML
5 INJECTION, SOLUTION INTRAVENOUS EVERY 6 HOURS PRN
Status: DISCONTINUED | OUTPATIENT
Start: 2020-01-03 | End: 2020-01-09 | Stop reason: HOSPADM

## 2020-01-03 RX ORDER — ACETAMINOPHEN 325 MG/1
650 TABLET ORAL EVERY 4 HOURS PRN
Status: DISCONTINUED | OUTPATIENT
Start: 2020-01-03 | End: 2020-01-04

## 2020-01-03 RX ORDER — OXYCODONE HYDROCHLORIDE 5 MG/1
5-10 TABLET ORAL EVERY 4 HOURS PRN
Status: DISCONTINUED | OUTPATIENT
Start: 2020-01-03 | End: 2020-01-09 | Stop reason: HOSPADM

## 2020-01-03 RX ORDER — LIDOCAINE AND PRILOCAINE 25; 25 MG/G; MG/G
CREAM TOPICAL 2 TIMES DAILY
Status: COMPLETED | OUTPATIENT
Start: 2020-01-03 | End: 2020-01-05

## 2020-01-03 RX ADMIN — WARFARIN SODIUM 5 MG: 5 TABLET ORAL at 18:25

## 2020-01-03 RX ADMIN — DOXYCYCLINE 100 MG: 100 TABLET, FILM COATED ORAL at 05:37

## 2020-01-03 RX ADMIN — MAGNESIUM SULFATE IN WATER 2 G: 40 INJECTION, SOLUTION INTRAVENOUS at 18:25

## 2020-01-03 RX ADMIN — GEMFIBROZIL 600 MG: 600 TABLET ORAL at 05:37

## 2020-01-03 RX ADMIN — ROSUVASTATIN CALCIUM 40 MG: 20 TABLET, FILM COATED ORAL at 18:25

## 2020-01-03 RX ADMIN — CEFTRIAXONE SODIUM 2 G: 2 INJECTION, POWDER, FOR SOLUTION INTRAMUSCULAR; INTRAVENOUS at 05:50

## 2020-01-03 RX ADMIN — METHOCARBAMOL TABLETS 1000 MG: 500 TABLET, COATED ORAL at 09:27

## 2020-01-03 RX ADMIN — HYDROCHLOROTHIAZIDE 25 MG: 25 TABLET ORAL at 05:36

## 2020-01-03 RX ADMIN — LEVOTHYROXINE SODIUM 200 MCG: 100 TABLET ORAL at 05:37

## 2020-01-03 RX ADMIN — POTASSIUM CHLORIDE 20 MEQ: 1500 TABLET, EXTENDED RELEASE ORAL at 05:37

## 2020-01-03 RX ADMIN — OXYCODONE HYDROCHLORIDE 5 MG: 5 TABLET ORAL at 13:19

## 2020-01-03 RX ADMIN — KETOROLAC TROMETHAMINE 15 MG: 30 INJECTION, SOLUTION INTRAMUSCULAR at 09:28

## 2020-01-03 RX ADMIN — FERROUS SULFATE TAB 325 MG (65 MG ELEMENTAL FE) 325 MG: 325 (65 FE) TAB at 09:27

## 2020-01-03 RX ADMIN — FUROSEMIDE 40 MG: 10 INJECTION, SOLUTION INTRAMUSCULAR; INTRAVENOUS at 06:00

## 2020-01-03 RX ADMIN — FLUTICASONE PROPIONATE 100 MCG: 50 SPRAY, METERED NASAL at 13:20

## 2020-01-03 RX ADMIN — SENNOSIDES AND DOCUSATE SODIUM 2 TABLET: 8.6; 5 TABLET ORAL at 05:37

## 2020-01-03 RX ADMIN — METOPROLOL TARTRATE 50 MG: 50 TABLET, FILM COATED ORAL at 05:36

## 2020-01-03 RX ADMIN — CITALOPRAM HYDROBROMIDE 20 MG: 20 TABLET ORAL at 05:36

## 2020-01-03 RX ADMIN — LOSARTAN POTASSIUM 50 MG: 50 TABLET, FILM COATED ORAL at 05:37

## 2020-01-03 RX ADMIN — LIDOCAINE AND PRILOCAINE 5 G: 25; 25 CREAM TOPICAL at 20:54

## 2020-01-03 RX ADMIN — METHOCARBAMOL TABLETS 1000 MG: 500 TABLET, COATED ORAL at 13:19

## 2020-01-03 RX ADMIN — POTASSIUM CHLORIDE 40 MEQ: 1500 TABLET, EXTENDED RELEASE ORAL at 18:25

## 2020-01-03 RX ADMIN — INSULIN HUMAN 2 UNITS: 100 INJECTION, SOLUTION PARENTERAL at 20:55

## 2020-01-03 RX ADMIN — METHOCARBAMOL TABLETS 1000 MG: 500 TABLET, COATED ORAL at 18:25

## 2020-01-03 RX ADMIN — SENNOSIDES AND DOCUSATE SODIUM 2 TABLET: 8.6; 5 TABLET ORAL at 18:25

## 2020-01-03 RX ADMIN — METOPROLOL TARTRATE 50 MG: 50 TABLET, FILM COATED ORAL at 18:25

## 2020-01-03 RX ADMIN — FUROSEMIDE 40 MG: 10 INJECTION, SOLUTION INTRAMUSCULAR; INTRAVENOUS at 18:24

## 2020-01-03 RX ADMIN — OXYCODONE HYDROCHLORIDE 5 MG: 5 TABLET ORAL at 05:50

## 2020-01-03 RX ADMIN — CYCLOBENZAPRINE 10 MG: 10 TABLET, FILM COATED ORAL at 05:36

## 2020-01-03 RX ADMIN — DOXYCYCLINE 100 MG: 100 TABLET, FILM COATED ORAL at 18:25

## 2020-01-03 ASSESSMENT — COGNITIVE AND FUNCTIONAL STATUS - GENERAL
MOBILITY SCORE: 7
STANDING UP FROM CHAIR USING ARMS: A LOT
TURNING FROM BACK TO SIDE WHILE IN FLAT BAD: UNABLE
SUGGESTED CMS G CODE MODIFIER MOBILITY: CM
MOVING TO AND FROM BED TO CHAIR: UNABLE
WALKING IN HOSPITAL ROOM: TOTAL
CLIMB 3 TO 5 STEPS WITH RAILING: TOTAL
MOVING FROM LYING ON BACK TO SITTING ON SIDE OF FLAT BED: UNABLE

## 2020-01-03 ASSESSMENT — ENCOUNTER SYMPTOMS
SHORTNESS OF BREATH: 0
ABDOMINAL PAIN: 0
VOMITING: 0
DIZZINESS: 0
DIARRHEA: 0
NECK PAIN: 1
COUGH: 0
ORTHOPNEA: 1

## 2020-01-03 ASSESSMENT — GAIT ASSESSMENTS
ASSISTIVE DEVICE: FRONT WHEEL WALKER
GAIT LEVEL OF ASSIST: UNABLE TO PARTICIPATE

## 2020-01-03 NOTE — THERAPY
"Physical Therapy Evaluation completed.   Bed Mobility:   Maximum assistance  Transfers:  Maximum assistance  Gait: Unable to participate  with Front-Wheel Walker       Plan of Care: Will benefit from Physical Therapy 4 times per week  Discharge Recommendations: Equipment: Will Continue to Assess for Equipment Needs. Post-acute therapy Recommend post-acute placement for continued physical therapy services prior to discharge home.     Ms. Reina is a 70 y/o female who presents to acute secondary to CHF exacerbation and L knee pain. Upon entry to room patient states \"I can't use my neck or my legs!\" Pt's anxiety and behaviors are her primary deficits and limiting factor. Claiming to not be able to move when therapist attempting to cue through movement. Brought patient to EOB and she was able to sit without assist. Able to move B LEs against gravity. Educated pt that in fact both of her legs worked. Very high anxiety, continually screaming \"I'm fat I can't! I'm going to scream!\" and then apologizing for behavior. Pt atively resisted first stand. Prolonged education regarding importance of mobilizing and that patient cannot remain bed bound or her debility and pain will progress.  Limited participation when coming to stand, however once up was able to maintain position without assist, no buckling. Began crying again when brought up ambulation and refused (after eval CNA reported he did ambulate patient despite her reports she can't). Pt reports her spouse cannot assist physically. Based on her current demonstration of mobility not safe to discharge home. recommend post acute placement. Acute PT to continue to follow while in house. Pt is to at minimum eat all meals on edge of bed as she is quite capable and her prolonged mobility is furthering her pain. Pt's participation with mobility with ALL STAFF will dictate how quickly she progress.     Strongly recommend OT SHUN    See \"Rehab Therapy-Acute\" Patient Summary " Report for complete documentation.

## 2020-01-03 NOTE — PROGRESS NOTES
Hospital Medicine Daily Progress Note    Date of Service  1/3/2020    Chief Complaint  69 y.o. female admitted 12/31/2019 with SOB    Hospital Course    PMH A. fib, HTN, CAD, DM, CHF EF 60%, HLD, KEN who presented with shortness and orthopnea worse in the last week.  She had been lax on her diet while on a cruise and not taking Lasix.  BNP was elevated and chest x-ray showed cardiomegaly.  She was admitted for CHF exacerbation. She also had LLE cellulitis and started on antibiotics. Doppler was neg for left DVT.       Interval Problem Update  UOP 2L  Complains of right earache, neck stiffness.  Her left knee still hurts.  He says her breathing has been fine, denies shortness of breath    Consultants/Specialty  None    Code Status  Full    Disposition  PT OT  Wean off oxygen    Review of Systems  Review of Systems   Constitutional: Positive for malaise/fatigue.   HENT: Positive for congestion and ear pain. Negative for ear discharge.    Respiratory: Negative for cough and shortness of breath.    Cardiovascular: Positive for orthopnea and leg swelling. Negative for chest pain.   Gastrointestinal: Negative for abdominal pain, diarrhea and vomiting.   Genitourinary: Positive for frequency. Negative for dysuria.   Musculoskeletal: Positive for joint pain and neck pain.   Skin: Positive for rash. Negative for itching.   Neurological: Negative for dizziness.   All other systems reviewed and are negative.       Physical Exam  Temp:  [36.1 °C (97 °F)-36.8 °C (98.3 °F)] 36.8 °C (98.3 °F)  Pulse:  [] 107  Resp:  [18-19] 18  BP: (103-116)/(63-70) 104/63  SpO2:  [94 %-97 %] 97 %    Physical Exam  Vitals signs and nursing note reviewed.   Constitutional:       General: She is not in acute distress.     Appearance: She is obese. She is not toxic-appearing.      Comments: Appears in pain   HENT:      Head: Normocephalic.      Left Ear: Tympanic membrane normal.      Ears:      Comments: Right TM is opaque and bulging      Mouth/Throat:      Mouth: Mucous membranes are moist.      Pharynx: No oropharyngeal exudate or posterior oropharyngeal erythema.   Eyes:      General:         Right eye: No discharge.         Left eye: No discharge.   Neck:      Comments: Range of motion limited by pain  Cardiovascular:      Rate and Rhythm: Normal rate.      Comments: Irregular  Pulmonary:      Effort: Pulmonary effort is normal.      Breath sounds: No wheezing or rales.   Abdominal:      General: There is distension.      Palpations: Abdomen is soft.      Tenderness: There is no tenderness. There is no guarding or rebound.   Musculoskeletal:         General: Swelling (Decreased) present.      Comments: Mild effusion to right and left knees, both knees are tender to palpation and range of motion limited by pain.  No calf or thigh tenderness   Lymphadenopathy:      Cervical: No cervical adenopathy.   Skin:     General: Skin is warm and dry.      Comments: Left lower extremity with evidence of prior blistering, erythema is improving, no drainage   Neurological:      Mental Status: She is alert and oriented to person, place, and time.         Fluids    Intake/Output Summary (Last 24 hours) at 1/3/2020 1606  Last data filed at 1/3/2020 0900  Gross per 24 hour   Intake 600 ml   Output 1150 ml   Net -550 ml       Laboratory  Recent Labs     01/01/20 0322 01/02/20 0322 01/03/20  0233   WBC 6.1 8.2 9.6   RBC 4.42 4.46 4.48   HEMOGLOBIN 10.9* 10.9* 11.1*   HEMATOCRIT 37.7 37.2 37.0   MCV 85.3 83.4 82.6   MCH 24.7* 24.4* 24.8*   MCHC 28.9* 29.3* 30.0*   RDW 57.9* 56.2* 56.0*   PLATELETCT 240 227 227   MPV 9.4 9.1 9.0     Recent Labs     01/01/20 0322 01/02/20  0322 01/03/20  0233   SODIUM 141 138 137   POTASSIUM 3.5* 3.3* 3.4*   CHLORIDE 103 97 93*   CO2 29 33 33   GLUCOSE 115* 143* 138*   BUN 31* 32* 35*   CREATININE 1.09 1.03 0.99   CALCIUM 9.0 9.1 9.2     Recent Labs     01/01/20  0322 01/02/20  0322 01/03/20  0233   INR 1.93* 2.06* 1.96*                Imaging  DX-KNEE 2- LEFT   Final Result      1.  There is a small joint effusion.   2.  There is severe tricompartmental osteoarthritis.      DX-CERVICAL SPINE-2 OR 3 VIEWS   Final Result      1.  There is degenerative disc disease and arthropathy throughout the mid and lower cervical spine.   2.  There is a trace of degenerative anterolisthesis at the 4 level.      US-EXTREMITY VENOUS LOWER UNILAT LEFT   Final Result      DX-CHEST-PORTABLE (1 VIEW)   Final Result      1.  Marked cardiac enlargement is not significantly changed.           Assessment/Plan  * CHF exacerbation (HCC)  Assessment & Plan  EF 60% on recent TTE   due to medication noncompliance as well as, poor diet   Cont with IV lasix 40 BID.  Monitor daily weight and I's and O's.  Goal 1 to 2 L net negative daily  She says her previous weight was 272 lbs  Resume home arb, bb, hctz  Cardiac diet   Wean 02 as tolerated    Neck pain  Assessment & Plan  Became stiff and painful overnight  Cervical x-ray showed degenerative disease  Multimodal pain control with Tylenol, oxycodone, lidocaine, Robaxin    Acute otitis media  Assessment & Plan  Left ear  On antibiotics    Hypomagnesemia  Assessment & Plan  Due to diuresis and poor oral intake  Replete with IV mag and recheck tomorrow    Left knee pain  Assessment & Plan  History of arthritis and takes Tylenol 3 at home  X-ray shows tricompartmental arthritis, small effusion  Has increased pain from immobility  Ordered for multimodal pain control with Tylenol, oxycodone, lidocaine, Robaxin, will give dose of Toradol  Out of bed as much as tolerated with chair and ambulation.  PT OT  Doppler negative for DVT    Anemia  Assessment & Plan  No evidence of bleeding   Iron is low, supplement ordered    Hypokalemia  Assessment & Plan  Due to Lasix   replace and recheck labs    Elevated troponin  Assessment & Plan  Due to demand ischemia from volume overload  She denies chest pain  Monitor on telemetry    Acute  hypoxemic respiratory failure (HCC)  Assessment & Plan  Due to volume overload   Continue diuresis  Wean off 02 as tolerated       Left leg cellulitis  Assessment & Plan  She says she received antibiotics while at the cruise   Started as a blister with purulent drainage  Continue ceftriaxone and doxycycline      Insulin dependent diabetes mellitus (HCC)- (present on admission)  Assessment & Plan  Hold home medications   SSI Ordered  accu checks    Chronic atrial fibrillation- (present on admission)  Assessment & Plan  Resume home BB and coumadin   Occasionally tachycardic  Monitor on telemetry  Metop PRN sustained heart rate greater than 130    History of hypothyroidism- (present on admission)  Assessment & Plan  Resume home levothyroxine, tsh mildly elevated consider increasing and rechecking in 4 weeks  T4 is normal  T3 is normal    Morbid obesity (HCC)- (present on admission)  Assessment & Plan  Encourage weight loss, follow-up outpatient    Benign essential hypertension- (present on admission)  Assessment & Plan  Blood pressures controlled on HCTZ, losartan, metoprolol, Lasix       VTE prophylaxis: warfarin

## 2020-01-03 NOTE — CARE PLAN
Problem: Infection  Goal: Will remain free from infection  Outcome: PROGRESSING AS EXPECTED      Standard precautions in place. Hand hygiene performed before and after pt contact.      Problem: Knowledge Deficit  Goal: Knowledge of disease process/condition, treatment plan, diagnostic tests, and medications will improve  Outcome: PROGRESSING AS EXPECTED  Encourage pt to voice feelings and concerns regarding treatment plan, diagnostic tests/results, procedures, medications and discharge planning. Answer accordingly.

## 2020-01-03 NOTE — PROGRESS NOTES
Handoff report received. Assumed patient care. Patient resting in bed. Patient not in distress, AAOX 4 and on 1L NC O2. Safety precautions in place. Call light and personal belongings within reach. Bed is locked and in lowest position. Educated to call for assistance if needed. Pt verbalized understanding.

## 2020-01-03 NOTE — TELEPHONE ENCOUNTER
[facility]    Pt did not make their appointment today, will forward to our MA to contact the pt to reschedule.    Dennis Oakley, PharmD    CC  Alona Hanson MA

## 2020-01-03 NOTE — DISCHARGE PLANNING
CM reviewed chart and met with pt at bedside. Pt states that she feels she will need SNF at d/c. CM provided pt with SNF choice form and she will review today and then make a decision this weekend.   Plan: F/U for SNF choice.   Care Transition Team Assessment    Information Source  Orientation : Oriented x 4  Information Given By: Patient  Informant's Name: Rachelle  Who is responsible for making decisions for patient? : Patient         Elopement Risk  Legal Hold: No  Ambulatory or Self Mobile in Wheelchair: No-Not an Elopement Risk  Disoriented: No  Psychiatric Symptoms: None  History of Wandering: No  Elopement this Admit: No  Vocalizing Wanting to Leave: No  Displays Behaviors, Body Language Wanting to Leave: No-Not at Risk for Elopement  Elopement Risk: Not at Risk for Elopement    Interdisciplinary Discharge Planning  Primary Care Physician: Dr. Fernández  Lives with - Patient's Self Care Capacity: Spouse  Patient or legal guardian wants to designate a caregiver (see row info): No  Support Systems: Spouse / Significant Other  Housing / Facility: 1 Story House  Able to Return to Previous ADL's: Future Time w/Therapy  Mobility Issues: Yes  Prior Services: None  Patient Expects to be Discharged to:: SNF  Assistance Needed: Yes  Durable Medical Equipment: Walker, Other - Specify(Cane)    Discharge Preparedness  What is your plan after discharge?: Skilled nursing facility  What are your discharge supports?: Spouse  Prior Functional Level: Ambulatory, Independent with Activities of Daily Living, Independent with Medication Management, Uses Walker    Functional Assesment  Prior Functional Level: Ambulatory, Independent with Activities of Daily Living, Independent with Medication Management, Uses Walker    Finances  Financial Barriers to Discharge: No  Prescription Coverage: Yes         Values / Beliefs / Concerns  Values / Beliefs Concerns : No         Domestic Abuse  Have you ever been the victim of abuse or violence?:  No  Physical Abuse or Sexual Abuse: No  Verbal Abuse or Emotional Abuse: No  Possible Abuse Reported to:: Not Applicable              Anticipated Discharge Information  Anticipated discharge disposition: SNF

## 2020-01-04 PROBLEM — R21 RASH: Status: ACTIVE | Noted: 2020-01-04

## 2020-01-04 LAB
ANION GAP SERPL CALC-SCNC: 14 MMOL/L (ref 0–11.9)
BASOPHILS # BLD AUTO: 0.7 % (ref 0–1.8)
BASOPHILS # BLD: 0.06 K/UL (ref 0–0.12)
BUN SERPL-MCNC: 46 MG/DL (ref 8–22)
CALCIUM SERPL-MCNC: 9.1 MG/DL (ref 8.5–10.5)
CHLORIDE SERPL-SCNC: 93 MMOL/L (ref 96–112)
CO2 SERPL-SCNC: 32 MMOL/L (ref 20–33)
CREAT SERPL-MCNC: 1.32 MG/DL (ref 0.5–1.4)
EOSINOPHIL # BLD AUTO: 0.12 K/UL (ref 0–0.51)
EOSINOPHIL NFR BLD: 1.4 % (ref 0–6.9)
ERYTHROCYTE [DISTWIDTH] IN BLOOD BY AUTOMATED COUNT: 55.1 FL (ref 35.9–50)
GLUCOSE BLD-MCNC: 115 MG/DL (ref 65–99)
GLUCOSE BLD-MCNC: 130 MG/DL (ref 65–99)
GLUCOSE BLD-MCNC: 145 MG/DL (ref 65–99)
GLUCOSE BLD-MCNC: 182 MG/DL (ref 65–99)
GLUCOSE BLD-MCNC: 97 MG/DL (ref 65–99)
GLUCOSE SERPL-MCNC: 132 MG/DL (ref 65–99)
HCT VFR BLD AUTO: 35.3 % (ref 37–47)
HGB BLD-MCNC: 10.4 G/DL (ref 12–16)
IMM GRANULOCYTES # BLD AUTO: 0.06 K/UL (ref 0–0.11)
IMM GRANULOCYTES NFR BLD AUTO: 0.7 % (ref 0–0.9)
INR PPP: 2.12 (ref 0.87–1.13)
LYMPHOCYTES # BLD AUTO: 1.58 K/UL (ref 1–4.8)
LYMPHOCYTES NFR BLD: 19 % (ref 22–41)
MAGNESIUM SERPL-MCNC: 2 MG/DL (ref 1.5–2.5)
MCH RBC QN AUTO: 24.6 PG (ref 27–33)
MCHC RBC AUTO-ENTMCNC: 29.5 G/DL (ref 33.6–35)
MCV RBC AUTO: 83.5 FL (ref 81.4–97.8)
MONOCYTES # BLD AUTO: 0.87 K/UL (ref 0–0.85)
MONOCYTES NFR BLD AUTO: 10.5 % (ref 0–13.4)
NEUTROPHILS # BLD AUTO: 5.63 K/UL (ref 2–7.15)
NEUTROPHILS NFR BLD: 67.7 % (ref 44–72)
NRBC # BLD AUTO: 0 K/UL
NRBC BLD-RTO: 0 /100 WBC
PLATELET # BLD AUTO: 231 K/UL (ref 164–446)
PMV BLD AUTO: 9.2 FL (ref 9–12.9)
POTASSIUM SERPL-SCNC: 3.6 MMOL/L (ref 3.6–5.5)
PROTHROMBIN TIME: 24.4 SEC (ref 12–14.6)
RBC # BLD AUTO: 4.23 M/UL (ref 4.2–5.4)
SODIUM SERPL-SCNC: 139 MMOL/L (ref 135–145)
WBC # BLD AUTO: 8.3 K/UL (ref 4.8–10.8)

## 2020-01-04 PROCEDURE — 80048 BASIC METABOLIC PNL TOTAL CA: CPT

## 2020-01-04 PROCEDURE — 700105 HCHG RX REV CODE 258: Performed by: HOSPITALIST

## 2020-01-04 PROCEDURE — 770020 HCHG ROOM/CARE - TELE (206)

## 2020-01-04 PROCEDURE — 83735 ASSAY OF MAGNESIUM: CPT

## 2020-01-04 PROCEDURE — 700102 HCHG RX REV CODE 250 W/ 637 OVERRIDE(OP): Performed by: INTERNAL MEDICINE

## 2020-01-04 PROCEDURE — 82962 GLUCOSE BLOOD TEST: CPT | Mod: 91

## 2020-01-04 PROCEDURE — 36415 COLL VENOUS BLD VENIPUNCTURE: CPT

## 2020-01-04 PROCEDURE — 85610 PROTHROMBIN TIME: CPT

## 2020-01-04 PROCEDURE — 700102 HCHG RX REV CODE 250 W/ 637 OVERRIDE(OP): Performed by: HOSPITALIST

## 2020-01-04 PROCEDURE — 85025 COMPLETE CBC W/AUTO DIFF WBC: CPT

## 2020-01-04 PROCEDURE — 700111 HCHG RX REV CODE 636 W/ 250 OVERRIDE (IP): Performed by: HOSPITALIST

## 2020-01-04 PROCEDURE — A9270 NON-COVERED ITEM OR SERVICE: HCPCS | Performed by: INTERNAL MEDICINE

## 2020-01-04 PROCEDURE — A9270 NON-COVERED ITEM OR SERVICE: HCPCS | Performed by: HOSPITALIST

## 2020-01-04 PROCEDURE — 99232 SBSQ HOSP IP/OBS MODERATE 35: CPT | Performed by: INTERNAL MEDICINE

## 2020-01-04 RX ORDER — ACETAMINOPHEN 500 MG
1000 TABLET ORAL 3 TIMES DAILY
Status: DISPENSED | OUTPATIENT
Start: 2020-01-04 | End: 2020-01-07

## 2020-01-04 RX ORDER — NYSTATIN 100000 [USP'U]/G
POWDER TOPICAL 2 TIMES DAILY
Status: DISCONTINUED | OUTPATIENT
Start: 2020-01-04 | End: 2020-01-09 | Stop reason: HOSPADM

## 2020-01-04 RX ADMIN — METOPROLOL TARTRATE 50 MG: 50 TABLET, FILM COATED ORAL at 17:41

## 2020-01-04 RX ADMIN — DOXYCYCLINE 100 MG: 100 TABLET, FILM COATED ORAL at 06:14

## 2020-01-04 RX ADMIN — ACETAMINOPHEN 1000 MG: 500 TABLET ORAL at 17:29

## 2020-01-04 RX ADMIN — FLUTICASONE PROPIONATE 100 MCG: 50 SPRAY, METERED NASAL at 06:13

## 2020-01-04 RX ADMIN — HYDROCHLOROTHIAZIDE 25 MG: 25 TABLET ORAL at 06:14

## 2020-01-04 RX ADMIN — CEFTRIAXONE SODIUM 2 G: 2 INJECTION, POWDER, FOR SOLUTION INTRAMUSCULAR; INTRAVENOUS at 06:14

## 2020-01-04 RX ADMIN — GEMFIBROZIL 600 MG: 600 TABLET ORAL at 06:14

## 2020-01-04 RX ADMIN — DOXYCYCLINE 100 MG: 100 TABLET, FILM COATED ORAL at 17:29

## 2020-01-04 RX ADMIN — LIDOCAINE AND PRILOCAINE 2.5 %: 25; 25 CREAM TOPICAL at 17:30

## 2020-01-04 RX ADMIN — FERROUS SULFATE TAB 325 MG (65 MG ELEMENTAL FE) 325 MG: 325 (65 FE) TAB at 08:41

## 2020-01-04 RX ADMIN — METOPROLOL TARTRATE 50 MG: 50 TABLET, FILM COATED ORAL at 06:14

## 2020-01-04 RX ADMIN — FUROSEMIDE 40 MG: 10 INJECTION, SOLUTION INTRAMUSCULAR; INTRAVENOUS at 06:13

## 2020-01-04 RX ADMIN — METHOCARBAMOL TABLETS 1000 MG: 500 TABLET, COATED ORAL at 17:29

## 2020-01-04 RX ADMIN — SENNOSIDES AND DOCUSATE SODIUM 2 TABLET: 8.6; 5 TABLET ORAL at 06:14

## 2020-01-04 RX ADMIN — ACETAMINOPHEN 1000 MG: 500 TABLET ORAL at 08:41

## 2020-01-04 RX ADMIN — NYSTATIN: 100000 POWDER TOPICAL at 17:29

## 2020-01-04 RX ADMIN — CITALOPRAM HYDROBROMIDE 20 MG: 20 TABLET ORAL at 06:14

## 2020-01-04 RX ADMIN — ACETAMINOPHEN 1000 MG: 500 TABLET ORAL at 13:34

## 2020-01-04 RX ADMIN — LEVOTHYROXINE SODIUM 200 MCG: 100 TABLET ORAL at 06:14

## 2020-01-04 RX ADMIN — WARFARIN SODIUM 2.5 MG: 2.5 TABLET ORAL at 17:29

## 2020-01-04 RX ADMIN — METHOCARBAMOL TABLETS 1000 MG: 500 TABLET, COATED ORAL at 06:14

## 2020-01-04 RX ADMIN — POTASSIUM CHLORIDE 40 MEQ: 1500 TABLET, EXTENDED RELEASE ORAL at 06:14

## 2020-01-04 RX ADMIN — OXYCODONE HYDROCHLORIDE 5 MG: 5 TABLET ORAL at 17:33

## 2020-01-04 RX ADMIN — LIDOCAINE AND PRILOCAINE 5 MG: 25; 25 CREAM TOPICAL at 06:14

## 2020-01-04 RX ADMIN — INSULIN HUMAN 2 UNITS: 100 INJECTION, SOLUTION PARENTERAL at 22:00

## 2020-01-04 RX ADMIN — LOSARTAN POTASSIUM 50 MG: 50 TABLET, FILM COATED ORAL at 06:14

## 2020-01-04 RX ADMIN — METHOCARBAMOL TABLETS 1000 MG: 500 TABLET, COATED ORAL at 13:35

## 2020-01-04 RX ADMIN — ROSUVASTATIN CALCIUM 40 MG: 20 TABLET, FILM COATED ORAL at 17:29

## 2020-01-04 ASSESSMENT — ENCOUNTER SYMPTOMS
ORTHOPNEA: 1
VOMITING: 0
COUGH: 0
SHORTNESS OF BREATH: 0
CONSTIPATION: 0
ABDOMINAL PAIN: 0
DIZZINESS: 0
NECK PAIN: 1
DIARRHEA: 0

## 2020-01-04 NOTE — CARE PLAN
Problem: Safety  Goal: Will remain free from injury  Outcome: PROGRESSING AS EXPECTED   Safety precautions and fall prevention interventions in place. Fall prevention education provided, pt verbalized understanding. Bed in low/locked position, treaded socks on pt, call bell is within reach. Appropriate devices provided with ambulation assistance. Pt calls appropriately for help.      Problem: Knowledge Deficit  Goal: Knowledge of disease process/condition, treatment plan, diagnostic tests, and medications will improve  Outcome: PROGRESSING AS EXPECTED  Pt and family updated on POC, including medications, treatment and discharge planning. Questions answered as needed, pt and family verbalized understanding. Encouraged to voice further questions/concerns.

## 2020-01-04 NOTE — PROGRESS NOTES
"Inpatient Anticoagulation Service Note    Date: 1/3/2020    Reason for Anticoagulation: Atrial Fibrillation   Target INR: 2.0 to 3.0  [id number] VASc Score: 4  HAS-BLED Score: 1   Hemoglobin Value: (!) 11.1  Hematocrit Value: 37  Lab Platelet Value: 227    INR from last 7 days     Date/Time INR Value    20 0233  (!) 1.96    20 0322  (!) 2.06    20 0322  (!) 1.93    19 0330  (!) 1.77        Dose from last 7 days     Date/Time Dose (mg)    20 1625  5    20 0905  2.5    20 1603  2.5    19 0934  5        Average Dose (mg): Home dose: 5 mg Friday and 2.5 mg AOD  Significant Interactions: Antibiotics, Statin, Thyroid Medications  Bridge Therapy: No   Reversal Agent Administered: Not Applicable    Comments: INR dropped to slightly below therapeutic range today. H/H are low but stable with no bleeding noted. Will continue with home regimen, as tonight pt should get her weekly \"bolus\" dose    Plan:  Warfarin 5 mg tonight and INR tomorrow AM  Education Material Provided?: No (Chronic warfarin therapy)  Pharmacist suggested discharge dosin mg on Fri, 2.5 mg all other days, and INR within 48-72 hours of discharge     Suzie Flood, PharmD, BCPS  "

## 2020-01-04 NOTE — CARE PLAN
Problem: Pain Management  Goal: Pain level will decrease to patient's comfort goal  Outcome: PROGRESSING AS EXPECTED   Patient educated on pain rating scale, pain control management, nonpharmacologic methods of pain management and reaching a tolerable level. Pain medication provided PRN in addition to nonpharmacologic methods. Patient verbalizes understanding of teaching and has no additional questions at this time.     Problem: Safety  Goal: Will remain free from injury  1/4/2020 0939 by Janice Card R.N.  Outcome: PROGRESSING AS EXPECTED  Pt educated on safety precautions and precautions in place. Treaded socks on, bed locked and in lowest position, belongings and call light within reach.

## 2020-01-04 NOTE — PROGRESS NOTES
Inpatient Anticoagulation Service Note    Date: 2020    Reason for Anticoagulation: Atrial Fibrillation   Target INR: 2.0 to 3.0  [id number] VASc Score: 4  HAS-BLED Score: 1   Hemoglobin Value: (!) 10.4  Hematocrit Value: (!) 35.3  Lab Platelet Value: 231    INR from last 7 days     Date/Time INR Value    20 0441  (!) 2.12    20 0233  (!) 1.96    20 0322  (!) 2.06    20 0322  (!) 1.93    19 0330  (!) 1.77        Dose from last 7 days     Date/Time Dose (mg)    20 0847  2.5    20 1625  5    20 0905  2.5    20 1603  2.5    19 0934  5        Average Dose (mg): Home dose: 5 mg Friday and 2.5 mg AOD  Significant Interactions: Antibiotics, Statin, Thyroid Medications  Bridge Therapy: No   Reversal Agent Administered: Not Applicable    Comments: INR back in therapeutic range today. H/H remain low but stable. No bleeding noted overnight. Will continue with home regimen    Plan:  Warfarin 2.5 mg tonight and INR tomorrow AM   Education Material Provided?: No (chronic warfarin pt)  Pharmacist suggested discharge dosin mg on Fri, 2.5 mg all other days, and INR within 48-72 hours of discharge     Suzie Flood, OrtegaD, BCPS

## 2020-01-04 NOTE — ASSESSMENT & PLAN NOTE
Cervical x-ray showed degenerative disease  Multimodal pain control with Tylenol, oxycodone, lidocaine, Robaxin  Pain is improved

## 2020-01-04 NOTE — ASSESSMENT & PLAN NOTE
Macular erythematous rash with slight itching  She denies history of shingles.  Exam today shows no e/o shingles.

## 2020-01-04 NOTE — PROGRESS NOTES
Bedside report received from LILLIE Headley. Assumed care of pt. Pt awake, laying in bed. A/Ox4, VSS. No concerns, complaints or distress. Pt educated to call before getting out of bed. POC reviewed and white board updated. Tele box on. Afib 93 on the monitor. Call light in reach. Bed locked in lowest position with 2 upper bed rails up. Bed alarm on.

## 2020-01-04 NOTE — PROGRESS NOTES
Hospital Medicine Daily Progress Note    Date of Service  1/4/2020    Chief Complaint  69 y.o. female admitted 12/31/2019 with SOB    Hospital Course    PMH A. fib, HTN, CAD, DM, CHF EF 60%, HLD, KEN who presented with shortness and orthopnea worse in the last week.  She had been lax on her diet while on a cruise and not taking Lasix.  BNP was elevated and chest x-ray showed cardiomegaly.  She was admitted for CHF exacerbation. She also had LLE cellulitis and started on antibiotics. Doppler was neg for left DVT.       Interval Problem Update  Creatinine increased, hold Lasix  She says her edema feels about the same  She says her knee pain is improved, her neck pain is slowly getting better  She is no longer constipated, had a BM    Consultants/Specialty  None    Code Status  Full    Disposition  PT OT  Wean off oxygen    Review of Systems  Review of Systems   Constitutional: Positive for malaise/fatigue.   HENT: Positive for congestion and ear pain. Negative for ear discharge.    Respiratory: Negative for cough and shortness of breath.    Cardiovascular: Positive for orthopnea and leg swelling. Negative for chest pain.   Gastrointestinal: Negative for abdominal pain, constipation, diarrhea and vomiting.   Genitourinary: Positive for frequency. Negative for dysuria.   Musculoskeletal: Positive for joint pain (Improved) and neck pain.   Skin: Positive for rash. Negative for itching.   Neurological: Negative for dizziness.   All other systems reviewed and are negative.       Physical Exam  Temp:  [35.8 °C (96.5 °F)-36.8 °C (98.3 °F)] 35.8 °C (96.5 °F)  Pulse:  [] 80  Resp:  [16-19] 16  BP: ()/(52-71) 107/59  SpO2:  [93 %-96 %] 96 %    Physical Exam  Vitals signs and nursing note reviewed.   Constitutional:       General: She is not in acute distress.     Appearance: She is obese. She is not ill-appearing, toxic-appearing or diaphoretic.   HENT:      Head: Normocephalic.      Mouth/Throat:      Mouth: Mucous  membranes are moist.      Pharynx: No oropharyngeal exudate or posterior oropharyngeal erythema.   Eyes:      General:         Right eye: No discharge.         Left eye: No discharge.   Neck:      Comments: Range of motion limited by pain  Cardiovascular:      Rate and Rhythm: Normal rate.      Comments: Irregular  Pulmonary:      Effort: Pulmonary effort is normal.      Breath sounds: No wheezing or rales.   Abdominal:      General: There is distension.      Palpations: Abdomen is soft.      Tenderness: There is no tenderness. There is no guarding or rebound.   Musculoskeletal:         General: Swelling (Decreased) present.      Comments: Mild effusion to right and left knees, both knees are tender to palpation and range of motion limited by pain.  No calf or thigh tenderness   Lymphadenopathy:      Cervical: No cervical adenopathy.   Skin:     General: Skin is warm and dry.      Comments: Left lower extremity with evidence of prior blistering, erythema is improving, no drainage  Right lateral buttock with erythematous macules with very small blistering, no scaling, no tenderness   Neurological:      Mental Status: She is alert and oriented to person, place, and time.         Fluids    Intake/Output Summary (Last 24 hours) at 1/4/2020 1445  Last data filed at 1/4/2020 0900  Gross per 24 hour   Intake 440 ml   Output 1750 ml   Net -1310 ml       Laboratory  Recent Labs     01/02/20  0322 01/03/20  0233 01/04/20  0441   WBC 8.2 9.6 8.3   RBC 4.46 4.48 4.23   HEMOGLOBIN 10.9* 11.1* 10.4*   HEMATOCRIT 37.2 37.0 35.3*   MCV 83.4 82.6 83.5   MCH 24.4* 24.8* 24.6*   MCHC 29.3* 30.0* 29.5*   RDW 56.2* 56.0* 55.1*   PLATELETCT 227 227 231   MPV 9.1 9.0 9.2     Recent Labs     01/02/20  0322 01/03/20  0233 01/04/20  0441   SODIUM 138 137 139   POTASSIUM 3.3* 3.4* 3.6   CHLORIDE 97 93* 93*   CO2 33 33 32   GLUCOSE 143* 138* 132*   BUN 32* 35* 46*   CREATININE 1.03 0.99 1.32   CALCIUM 9.1 9.2 9.1     Recent Labs      01/02/20  0322 01/03/20  0233 01/04/20  0441   INR 2.06* 1.96* 2.12*               Imaging  DX-KNEE 2- LEFT   Final Result      1.  There is a small joint effusion.   2.  There is severe tricompartmental osteoarthritis.      DX-CERVICAL SPINE-2 OR 3 VIEWS   Final Result      1.  There is degenerative disc disease and arthropathy throughout the mid and lower cervical spine.   2.  There is a trace of degenerative anterolisthesis at the 4 level.      US-EXTREMITY VENOUS LOWER UNILAT LEFT   Final Result      DX-CHEST-PORTABLE (1 VIEW)   Final Result      1.  Marked cardiac enlargement is not significantly changed.           Assessment/Plan  * CHF exacerbation (HCC)  Assessment & Plan  EF 60% on recent TTE   due to medication noncompliance as well as, poor diet   She says her previous weight was 272 lbs  Resume home arb, bb, hctz  Cardiac diet   Wean 02 as tolerated  Hold Lasix today because of rising creatinine, reassess tomorrow    Rash  Assessment & Plan  Macular erythematous rash with early signs of blistering to right butt cheek.  Itching, patient denies pain.  She denies history of shingles.  We will monitor with serial exams, if she develops signs of shingles can start on antiviral    Neck pain  Assessment & Plan  Became stiff and painful overnight  Cervical x-ray showed degenerative disease  Multimodal pain control with Tylenol, oxycodone, lidocaine, Robaxin    Acute otitis media  Assessment & Plan  Left ear  On antibiotics    Hypomagnesemia  Assessment & Plan  Due to diuresis and poor oral intake  Replete with IV mag and recheck tomorrow    Left knee pain  Assessment & Plan  History of arthritis and takes Tylenol 3 at home  X-ray shows tricompartmental arthritis, small effusion  Has increased pain from immobility  Ordered for multimodal pain control with Tylenol, oxycodone, lidocaine, Robaxin, will give dose of Toradol  Out of bed as much as tolerated with chair and ambulation.  PT OT  Doppler negative for  DVT    Anemia  Assessment & Plan  No evidence of bleeding   Iron is low, supplement ordered    Hypokalemia  Assessment & Plan  Due to Lasix   replace and recheck labs    Elevated troponin  Assessment & Plan  Due to demand ischemia from volume overload  She denies chest pain  Monitor on telemetry    Acute hypoxemic respiratory failure (HCC)  Assessment & Plan  Due to volume overload   Wean off 02 as tolerated       Left leg cellulitis  Assessment & Plan  She says she received antibiotics while at the cruise   Started as a blister with purulent drainage  Continue ceftriaxone and doxycycline      Insulin dependent diabetes mellitus (HCC)- (present on admission)  Assessment & Plan  Hold home medications   SSI Ordered  accu checks    Chronic atrial fibrillation- (present on admission)  Assessment & Plan  Resume home BB and coumadin   Occasionally tachycardic  Monitor on telemetry  Metop PRN sustained heart rate greater than 130    History of hypothyroidism- (present on admission)  Assessment & Plan  Resume home levothyroxine, tsh mildly elevated consider increasing and rechecking in 4 weeks  T4 is normal  T3 is normal    Morbid obesity (HCC)- (present on admission)  Assessment & Plan  Encourage weight loss, follow-up outpatient    Benign essential hypertension- (present on admission)  Assessment & Plan  Blood pressures controlled on HCTZ, losartan, metoprolol, Lasix       VTE prophylaxis: warfarin

## 2020-01-04 NOTE — PROGRESS NOTES
Bedside report received from LILLIE Headley. Assumed care of pt. Pt awake, laying in bed. A/Ox4, VSS. No concerns, complaints or distress. Pt educated to call before getting out of bed. POC reviewed and white board updated. Tele box on. Afib 103 on the monitor. Call light in reach. Bed locked in lowest position with 2 upper bed rails up. Bed alarm on.

## 2020-01-04 NOTE — CARE PLAN
Problem: Infection  Goal: Will remain free from infection  Outcome: PROGRESSING AS EXPECTED     Standard precautions in place. Hand hygiene performed before and after pt contact.       Problem: Knowledge Deficit  Goal: Knowledge of disease process/condition, treatment plan, diagnostic tests, and medications will improve  Outcome: PROGRESSING AS EXPECTED  Encourage pt to voice feelings and concerns regarding treatment plan, diagnostic tests, medications and procedures. Answer accordingly.

## 2020-01-05 LAB
ANION GAP SERPL CALC-SCNC: 9 MMOL/L (ref 0–11.9)
BACTERIA BLD CULT: NORMAL
BACTERIA BLD CULT: NORMAL
BUN SERPL-MCNC: 52 MG/DL (ref 8–22)
CALCIUM SERPL-MCNC: 9.3 MG/DL (ref 8.5–10.5)
CHLORIDE SERPL-SCNC: 93 MMOL/L (ref 96–112)
CO2 SERPL-SCNC: 33 MMOL/L (ref 20–33)
CREAT SERPL-MCNC: 1.1 MG/DL (ref 0.5–1.4)
GLUCOSE BLD-MCNC: 122 MG/DL (ref 65–99)
GLUCOSE BLD-MCNC: 126 MG/DL (ref 65–99)
GLUCOSE BLD-MCNC: 128 MG/DL (ref 65–99)
GLUCOSE BLD-MCNC: 168 MG/DL (ref 65–99)
GLUCOSE SERPL-MCNC: 127 MG/DL (ref 65–99)
INR PPP: 2.98 (ref 0.87–1.13)
MAGNESIUM SERPL-MCNC: 1.9 MG/DL (ref 1.5–2.5)
POTASSIUM SERPL-SCNC: 3.5 MMOL/L (ref 3.6–5.5)
PROTHROMBIN TIME: 32.1 SEC (ref 12–14.6)
SIGNIFICANT IND 70042: NORMAL
SIGNIFICANT IND 70042: NORMAL
SITE SITE: NORMAL
SITE SITE: NORMAL
SODIUM SERPL-SCNC: 135 MMOL/L (ref 135–145)
SOURCE SOURCE: NORMAL
SOURCE SOURCE: NORMAL

## 2020-01-05 PROCEDURE — 700102 HCHG RX REV CODE 250 W/ 637 OVERRIDE(OP): Performed by: HOSPITALIST

## 2020-01-05 PROCEDURE — 83735 ASSAY OF MAGNESIUM: CPT

## 2020-01-05 PROCEDURE — 80048 BASIC METABOLIC PNL TOTAL CA: CPT

## 2020-01-05 PROCEDURE — 85610 PROTHROMBIN TIME: CPT

## 2020-01-05 PROCEDURE — 36415 COLL VENOUS BLD VENIPUNCTURE: CPT

## 2020-01-05 PROCEDURE — A9270 NON-COVERED ITEM OR SERVICE: HCPCS | Performed by: INTERNAL MEDICINE

## 2020-01-05 PROCEDURE — 82962 GLUCOSE BLOOD TEST: CPT

## 2020-01-05 PROCEDURE — 99232 SBSQ HOSP IP/OBS MODERATE 35: CPT | Performed by: INTERNAL MEDICINE

## 2020-01-05 PROCEDURE — 700102 HCHG RX REV CODE 250 W/ 637 OVERRIDE(OP): Performed by: INTERNAL MEDICINE

## 2020-01-05 PROCEDURE — A9270 NON-COVERED ITEM OR SERVICE: HCPCS | Performed by: HOSPITALIST

## 2020-01-05 PROCEDURE — 770020 HCHG ROOM/CARE - TELE (206)

## 2020-01-05 PROCEDURE — 97166 OT EVAL MOD COMPLEX 45 MIN: CPT

## 2020-01-05 RX ORDER — FUROSEMIDE 20 MG/1
20 TABLET ORAL ONCE
Status: COMPLETED | OUTPATIENT
Start: 2020-01-05 | End: 2020-01-05

## 2020-01-05 RX ORDER — POTASSIUM CHLORIDE 20 MEQ/1
40 TABLET, EXTENDED RELEASE ORAL ONCE
Status: COMPLETED | OUTPATIENT
Start: 2020-01-05 | End: 2020-01-05

## 2020-01-05 RX ORDER — AMOXICILLIN AND CLAVULANATE POTASSIUM 875; 125 MG/1; MG/1
1 TABLET, FILM COATED ORAL EVERY 12 HOURS
Status: COMPLETED | OUTPATIENT
Start: 2020-01-05 | End: 2020-01-07

## 2020-01-05 RX ORDER — LORATADINE 10 MG/1
10 TABLET ORAL DAILY
Status: DISCONTINUED | OUTPATIENT
Start: 2020-01-05 | End: 2020-01-09 | Stop reason: HOSPADM

## 2020-01-05 RX ADMIN — AMOXICILLIN AND CLAVULANATE POTASSIUM 1 TABLET: 875; 125 TABLET, FILM COATED ORAL at 13:06

## 2020-01-05 RX ADMIN — POTASSIUM CHLORIDE 40 MEQ: 1500 TABLET, EXTENDED RELEASE ORAL at 08:31

## 2020-01-05 RX ADMIN — CITALOPRAM HYDROBROMIDE 20 MG: 20 TABLET ORAL at 06:17

## 2020-01-05 RX ADMIN — AMOXICILLIN AND CLAVULANATE POTASSIUM 1 TABLET: 875; 125 TABLET, FILM COATED ORAL at 17:51

## 2020-01-05 RX ADMIN — FUROSEMIDE 20 MG: 20 TABLET ORAL at 13:09

## 2020-01-05 RX ADMIN — LORATADINE 10 MG: 10 TABLET ORAL at 18:00

## 2020-01-05 RX ADMIN — FLUTICASONE PROPIONATE 100 MCG: 50 SPRAY, METERED NASAL at 06:17

## 2020-01-05 RX ADMIN — METHOCARBAMOL TABLETS 1000 MG: 500 TABLET, COATED ORAL at 06:16

## 2020-01-05 RX ADMIN — METHOCARBAMOL TABLETS 1000 MG: 500 TABLET, COATED ORAL at 13:06

## 2020-01-05 RX ADMIN — METHOCARBAMOL TABLETS 1000 MG: 500 TABLET, COATED ORAL at 17:52

## 2020-01-05 RX ADMIN — METOPROLOL TARTRATE 50 MG: 50 TABLET, FILM COATED ORAL at 17:53

## 2020-01-05 RX ADMIN — ACETAMINOPHEN 1000 MG: 500 TABLET ORAL at 06:17

## 2020-01-05 RX ADMIN — OXYCODONE HYDROCHLORIDE 5 MG: 5 TABLET ORAL at 18:00

## 2020-01-05 RX ADMIN — METOPROLOL TARTRATE 50 MG: 50 TABLET, FILM COATED ORAL at 06:17

## 2020-01-05 RX ADMIN — OXYCODONE HYDROCHLORIDE 5 MG: 5 TABLET ORAL at 23:49

## 2020-01-05 RX ADMIN — GEMFIBROZIL 600 MG: 600 TABLET ORAL at 06:16

## 2020-01-05 RX ADMIN — NYSTATIN: 100000 POWDER TOPICAL at 06:15

## 2020-01-05 RX ADMIN — LOSARTAN POTASSIUM 50 MG: 50 TABLET, FILM COATED ORAL at 06:17

## 2020-01-05 RX ADMIN — ROSUVASTATIN CALCIUM 40 MG: 20 TABLET, FILM COATED ORAL at 17:51

## 2020-01-05 RX ADMIN — WARFARIN SODIUM 2.5 MG: 2.5 TABLET ORAL at 17:53

## 2020-01-05 RX ADMIN — LIDOCAINE AND PRILOCAINE 5 MG: 25; 25 CREAM TOPICAL at 06:16

## 2020-01-05 RX ADMIN — INSULIN HUMAN 2 UNITS: 100 INJECTION, SOLUTION PARENTERAL at 22:01

## 2020-01-05 RX ADMIN — FERROUS SULFATE TAB 325 MG (65 MG ELEMENTAL FE) 325 MG: 325 (65 FE) TAB at 08:31

## 2020-01-05 RX ADMIN — OXYCODONE HYDROCHLORIDE 5 MG: 5 TABLET ORAL at 00:56

## 2020-01-05 ASSESSMENT — ENCOUNTER SYMPTOMS
NECK PAIN: 1
ORTHOPNEA: 1
CONSTIPATION: 0
COUGH: 0
DIARRHEA: 0
SHORTNESS OF BREATH: 0
DIZZINESS: 0
ABDOMINAL PAIN: 0
VOMITING: 0

## 2020-01-05 ASSESSMENT — COGNITIVE AND FUNCTIONAL STATUS - GENERAL
DRESSING REGULAR LOWER BODY CLOTHING: A LOT
HELP NEEDED FOR BATHING: A LITTLE
PERSONAL GROOMING: A LITTLE
SUGGESTED CMS G CODE MODIFIER DAILY ACTIVITY: CK
TOILETING: A LOT
DRESSING REGULAR UPPER BODY CLOTHING: A LITTLE
DAILY ACTIVITIY SCORE: 17

## 2020-01-05 ASSESSMENT — ACTIVITIES OF DAILY LIVING (ADL): TOILETING: INDEPENDENT

## 2020-01-05 NOTE — PROGRESS NOTES
Received report from Adria MOREIRA. Assumed care of patient at 0700. Patient A&Ox4, speaking in full sentences, follows commands and responds appropriately to questions. On 1L NC, no signs of respiratory distress. Respirations are even and unlabored. Patient denies pain at this time, requesting to rest more. POC discussed and agreed upon with patient. Call light and belongings within reach. Bed in lowest locked position. Upper side rails raised. Bed alarm on. Fall risk precautions in place. Hourly rounding. Will continue to monitor.

## 2020-01-05 NOTE — PROGRESS NOTES
Hospital Medicine Daily Progress Note    Date of Service  1/5/2020    Chief Complaint  69 y.o. female admitted 12/31/2019 with SOB    Hospital Course    PMH A. fib, HTN, CAD, DM, CHF EF 60%, HLD, KEN who presented with shortness and orthopnea worse in the last week.  She had been lax on her diet while on a cruise and not taking Lasix.  BNP was elevated and chest x-ray showed cardiomegaly.  She was admitted for CHF exacerbation. She also had LLE cellulitis and started on antibiotics. Doppler was neg for left DVT.       Interval Problem Update  UOP1.1L  Creat decreased  She says her shortness of breath is resolved and her edema is better.  Her knee pain and neck pain is better  She has a mild itch to her buttock  She complains of ongoing right ear pain and congestion    Consultants/Specialty  None    Code Status  Full    Disposition  PT OT  Wean off oxygen    Review of Systems  Review of Systems   Constitutional: Positive for malaise/fatigue.   HENT: Positive for congestion and ear pain. Negative for ear discharge.    Respiratory: Negative for cough and shortness of breath.    Cardiovascular: Positive for orthopnea and leg swelling. Negative for chest pain.   Gastrointestinal: Negative for abdominal pain, constipation, diarrhea and vomiting.   Genitourinary: Positive for frequency. Negative for dysuria.   Musculoskeletal: Positive for joint pain (Improved) and neck pain (Improved).   Skin: Positive for rash. Negative for itching.   Neurological: Negative for dizziness.   All other systems reviewed and are negative.       Physical Exam  Temp:  [36 °C (96.8 °F)-37.2 °C (99 °F)] 36.7 °C (98 °F)  Pulse:  [] 100  Resp:  [17-20] 18  BP: (100-133)/(45-93) 119/45  SpO2:  [94 %-96 %] 96 %    Physical Exam  Vitals signs and nursing note reviewed.   Constitutional:       General: She is not in acute distress.     Appearance: She is obese. She is not ill-appearing, toxic-appearing or diaphoretic.   HENT:      Head:  Normocephalic.      Left Ear: Tympanic membrane normal.      Ears:      Comments: Right TM cloudy but intact with small amount of blood in ear canal     Mouth/Throat:      Mouth: Mucous membranes are moist.      Pharynx: No oropharyngeal exudate or posterior oropharyngeal erythema.   Eyes:      General:         Right eye: No discharge.         Left eye: No discharge.   Neck:      Comments: Range of motion limited by pain  Cardiovascular:      Rate and Rhythm: Normal rate.      Comments: Irregular  Pulmonary:      Effort: Pulmonary effort is normal.      Breath sounds: No wheezing or rales.   Abdominal:      General: There is distension.      Palpations: Abdomen is soft.      Tenderness: There is no tenderness. There is no guarding or rebound.   Musculoskeletal:         General: Swelling (Decreased) present.      Comments: Mild effusion to right and left knees, both knees are tender to palpation and range of motion limited by pain.  No calf or thigh tenderness   Lymphadenopathy:      Cervical: Cervical adenopathy (Right side, tender) present.   Skin:     General: Skin is warm and dry.      Comments: Left lower extremity erythema is improved, no drainage  Right lateral buttock with erythematous macules, no blistering or scaling or tenderness or edema   Neurological:      Mental Status: She is alert and oriented to person, place, and time.         Fluids    Intake/Output Summary (Last 24 hours) at 1/5/2020 1324  Last data filed at 1/5/2020 0530  Gross per 24 hour   Intake 200 ml   Output 1100 ml   Net -900 ml       Laboratory  Recent Labs     01/03/20 0233 01/04/20  0441   WBC 9.6 8.3   RBC 4.48 4.23   HEMOGLOBIN 11.1* 10.4*   HEMATOCRIT 37.0 35.3*   MCV 82.6 83.5   MCH 24.8* 24.6*   MCHC 30.0* 29.5*   RDW 56.0* 55.1*   PLATELETCT 227 231   MPV 9.0 9.2     Recent Labs     01/03/20  0233 01/04/20  0441 01/05/20  0313   SODIUM 137 139 135   POTASSIUM 3.4* 3.6 3.5*   CHLORIDE 93* 93* 93*   CO2 33 32 33   GLUCOSE 138*  132* 127*   BUN 35* 46* 52*   CREATININE 0.99 1.32 1.10   CALCIUM 9.2 9.1 9.3     Recent Labs     01/03/20  0233 01/04/20  0441 01/05/20  0313   INR 1.96* 2.12* 2.98*               Imaging  DX-KNEE 2- LEFT   Final Result      1.  There is a small joint effusion.   2.  There is severe tricompartmental osteoarthritis.      DX-CERVICAL SPINE-2 OR 3 VIEWS   Final Result      1.  There is degenerative disc disease and arthropathy throughout the mid and lower cervical spine.   2.  There is a trace of degenerative anterolisthesis at the 4 level.      US-EXTREMITY VENOUS LOWER UNILAT LEFT   Final Result      DX-CHEST-PORTABLE (1 VIEW)   Final Result      1.  Marked cardiac enlargement is not significantly changed.           Assessment/Plan  * CHF exacerbation (HCC)  Assessment & Plan  EF 60% on recent TTE   due to medication noncompliance as well as, poor diet   She says her previous weight was 272 lbs  Resume home arb, bb, hctz  Cardiac diet   Wean 02 as tolerated  Her edema and shortness of breath is improved but still remains edematous and dependent on oxygen  1 dose of p.o. Lasix today    Rash  Assessment & Plan  Macular erythematous rash with slight itching  She denies history of shingles.  We will monitor with serial exams, if she develops signs of shingles can start on antiviral    Neck pain  Assessment & Plan  Cervical x-ray showed degenerative disease  Multimodal pain control with Tylenol, oxycodone, lidocaine, Robaxin  Pain is improved    Acute otitis media  Assessment & Plan  Left ear  Continue on Augmentin  Ordered for Claritin, Flomax    Hypomagnesemia  Assessment & Plan  Due to diuresis and poor oral intake  Replete with IV mag and recheck tomorrow    Left knee pain  Assessment & Plan  History of arthritis and takes Tylenol 3 at home  X-ray shows tricompartmental arthritis, small effusion  Has increased pain from immobility  Ordered for multimodal pain control with Tylenol, oxycodone, lidocaine, Robaxin, will  give dose of Toradol  Out of bed as much as tolerated with chair and ambulation.  PT OT  Doppler negative for DVT  Improving    Anemia  Assessment & Plan  No evidence of bleeding   Iron is low, supplement ordered    Hypokalemia  Assessment & Plan  Due to Lasix   replace and recheck labs    Elevated troponin  Assessment & Plan  Due to demand ischemia from volume overload  She denies chest pain  Monitor on telemetry    Acute hypoxemic respiratory failure (HCC)  Assessment & Plan  Due to volume overload   Mobilize and encourage I-S  Wean off 02 as tolerated       Left leg cellulitis  Assessment & Plan  She says she received antibiotics while at the cruise   Started as a blister with purulent drainage  Improved and completed course of ceftriaxone and doxycycline      Insulin dependent diabetes mellitus (HCC)- (present on admission)  Assessment & Plan  Hold home medications   SSI Ordered  accu checks    Chronic atrial fibrillation- (present on admission)  Assessment & Plan  Resume home BB and coumadin   Occasionally tachycardic  Monitor on telemetry  Metop PRN sustained heart rate greater than 130    History of hypothyroidism- (present on admission)  Assessment & Plan  Resume home levothyroxine, tsh mildly elevated consider increasing and rechecking in 4 weeks  T4 is normal  T3 is normal    Morbid obesity (HCC)- (present on admission)  Assessment & Plan  Encourage weight loss, follow-up outpatient    Benign essential hypertension- (present on admission)  Assessment & Plan  Blood pressures controlled on HCTZ, losartan, metoprolol, Lasix       VTE prophylaxis: warfarin

## 2020-01-05 NOTE — CARE PLAN
Problem: Safety  Goal: Will remain free from falls  Outcome: PROGRESSING AS EXPECTED   Educate pt to call for assistance if needed. Pt verbalizes understanding. Safety precautions in place: call light within reach, bed is locked and in lowest position, and hourly rounds in place.       Problem: Infection  Goal: Will remain free from infection  Outcome: PROGRESSING AS EXPECTED  Standard precautions in place. Hand hygiene performed before and after pt contact.

## 2020-01-05 NOTE — PROGRESS NOTES
Handoff report received. Assumed patient care. Patient resting in bed. Patient not in distress, AAOX 4 and on 1L NC O2. Safety precautions in place. Call light and personal belongings within reach. Bed is locked and in lowest position. Educated to call for assistance if needed. Pt verbalizes understanding.

## 2020-01-05 NOTE — CARE PLAN
Problem: Infection  Goal: Will remain free from infection  Intervention: Implement standard precautions and perform hand washing before and after patient contact  Note:   Patient educated on hand hygiene and importance of cleanliness. Patient verbalizes understanding. RN performs appropriate hand hygiene and maintains aseptic technique / standard precautions when in contact with patient.      Problem: Knowledge Deficit  Goal: Knowledge of disease process/condition, treatment plan, diagnostic tests, and medications will improve  Intervention: Explain information regarding disease process/condition, treatment plan, diagnostic tests, and medications and document in education  Note:   Patient educated on POC, treatment plan, diagnostics tests, medications, diet, safety, activity, and encouraged to ask questions regarding care. Patient verbalizes understanding, and participates in care. Reoriented to call light for assistance. Hourly rounding in place.

## 2020-01-05 NOTE — PROGRESS NOTES
Inpatient Anticoagulation Service Note    Date: 2020    Reason for Anticoagulation: Atrial Fibrillation   Target INR: 2.0 to 3.0  [id number] VASc Score: 4  HAS-BLED Score: 1   Hemoglobin Value: (!) 10.4  Hematocrit Value: (!) 35.3  Lab Platelet Value: 231    INR from last 7 days     Date/Time INR Value    20 0313  (!) 2.98    20 0441  (!) 2.12    20 0233  (!) 1.96    20 0322  (!) 2.06    20 0322  (!) 1.93    19 0330  (!) 1.77        Dose from last 7 days     Date/Time Dose (mg)    20 1508  2.5    20 0847  2.5    20 1625  5    20 0905  2.5    20 1603  2.5    19 0934  5        Average Dose (mg): Home dose: 5 mg Friday and 2.5 mg AOD  Significant Interactions: Antibiotics, Statin, Thyroid Medications  Bridge Therapy: No   Reversal Agent Administered: Not Applicable    Comments: INR remains in therapeutic range today, although at the high end - certainly a result of the 5 mg bolus given 2 days ago. No new CBC, but no bleeding noted overnight. Will continue home regimen    Plan:  Warfarin 2.5 mg tonight and INR tomorrow AM  Education Material Provided?: No (chronic warfarin pt)  Pharmacist suggested discharge dosin mg on Fri, 2.5 mg all other days, and INR within 48-72 hours of discharge     Suzie Flood, OrtegaD, BCPS

## 2020-01-06 PROBLEM — R53.1 WEAKNESS: Status: ACTIVE | Noted: 2020-01-06

## 2020-01-06 LAB
ANION GAP SERPL CALC-SCNC: 6 MMOL/L (ref 0–11.9)
ANISOCYTOSIS BLD QL SMEAR: ABNORMAL
BASOPHILS # BLD AUTO: 0.7 % (ref 0–1.8)
BASOPHILS # BLD: 0.05 K/UL (ref 0–0.12)
BUN SERPL-MCNC: 49 MG/DL (ref 8–22)
CALCIUM SERPL-MCNC: 9.3 MG/DL (ref 8.5–10.5)
CHLORIDE SERPL-SCNC: 94 MMOL/L (ref 96–112)
CO2 SERPL-SCNC: 36 MMOL/L (ref 20–33)
COMMENT 1642: NORMAL
CREAT SERPL-MCNC: 0.97 MG/DL (ref 0.5–1.4)
EOSINOPHIL # BLD AUTO: 0.3 K/UL (ref 0–0.51)
EOSINOPHIL NFR BLD: 4.5 % (ref 0–6.9)
ERYTHROCYTE [DISTWIDTH] IN BLOOD BY AUTOMATED COUNT: 55 FL (ref 35.9–50)
GLUCOSE BLD-MCNC: 110 MG/DL (ref 65–99)
GLUCOSE BLD-MCNC: 111 MG/DL (ref 65–99)
GLUCOSE BLD-MCNC: 141 MG/DL (ref 65–99)
GLUCOSE BLD-MCNC: 156 MG/DL (ref 65–99)
GLUCOSE SERPL-MCNC: 117 MG/DL (ref 65–99)
HCT VFR BLD AUTO: 35.3 % (ref 37–47)
HGB BLD-MCNC: 10.4 G/DL (ref 12–16)
HYPOCHROMIA BLD QL SMEAR: ABNORMAL
IMM GRANULOCYTES # BLD AUTO: 0.04 K/UL (ref 0–0.11)
IMM GRANULOCYTES NFR BLD AUTO: 0.6 % (ref 0–0.9)
INR PPP: 2.48 (ref 0.87–1.13)
LYMPHOCYTES # BLD AUTO: 1.7 K/UL (ref 1–4.8)
LYMPHOCYTES NFR BLD: 25.4 % (ref 22–41)
MCH RBC QN AUTO: 24.5 PG (ref 27–33)
MCHC RBC AUTO-ENTMCNC: 29.5 G/DL (ref 33.6–35)
MCV RBC AUTO: 83.1 FL (ref 81.4–97.8)
MONOCYTES # BLD AUTO: 0.69 K/UL (ref 0–0.85)
MONOCYTES NFR BLD AUTO: 10.3 % (ref 0–13.4)
MORPHOLOGY BLD-IMP: NORMAL
NEUTROPHILS # BLD AUTO: 3.91 K/UL (ref 2–7.15)
NEUTROPHILS NFR BLD: 58.5 % (ref 44–72)
NRBC # BLD AUTO: 0 K/UL
NRBC BLD-RTO: 0 /100 WBC
PLATELET # BLD AUTO: 259 K/UL (ref 164–446)
PLATELET BLD QL SMEAR: NORMAL
PMV BLD AUTO: 9.3 FL (ref 9–12.9)
POLYCHROMASIA BLD QL SMEAR: NORMAL
POTASSIUM SERPL-SCNC: 3.8 MMOL/L (ref 3.6–5.5)
PROTHROMBIN TIME: 27.7 SEC (ref 12–14.6)
RBC # BLD AUTO: 4.25 M/UL (ref 4.2–5.4)
RBC BLD AUTO: PRESENT
SODIUM SERPL-SCNC: 136 MMOL/L (ref 135–145)
WBC # BLD AUTO: 6.7 K/UL (ref 4.8–10.8)

## 2020-01-06 PROCEDURE — A9270 NON-COVERED ITEM OR SERVICE: HCPCS | Performed by: HOSPITALIST

## 2020-01-06 PROCEDURE — 85610 PROTHROMBIN TIME: CPT

## 2020-01-06 PROCEDURE — 97530 THERAPEUTIC ACTIVITIES: CPT

## 2020-01-06 PROCEDURE — 97116 GAIT TRAINING THERAPY: CPT

## 2020-01-06 PROCEDURE — 99232 SBSQ HOSP IP/OBS MODERATE 35: CPT | Performed by: INTERNAL MEDICINE

## 2020-01-06 PROCEDURE — 85025 COMPLETE CBC W/AUTO DIFF WBC: CPT

## 2020-01-06 PROCEDURE — 36415 COLL VENOUS BLD VENIPUNCTURE: CPT

## 2020-01-06 PROCEDURE — 770020 HCHG ROOM/CARE - TELE (206)

## 2020-01-06 PROCEDURE — 700102 HCHG RX REV CODE 250 W/ 637 OVERRIDE(OP): Performed by: INTERNAL MEDICINE

## 2020-01-06 PROCEDURE — 700102 HCHG RX REV CODE 250 W/ 637 OVERRIDE(OP): Performed by: HOSPITALIST

## 2020-01-06 PROCEDURE — A9270 NON-COVERED ITEM OR SERVICE: HCPCS | Performed by: INTERNAL MEDICINE

## 2020-01-06 PROCEDURE — 80048 BASIC METABOLIC PNL TOTAL CA: CPT

## 2020-01-06 PROCEDURE — 82962 GLUCOSE BLOOD TEST: CPT | Mod: 91

## 2020-01-06 RX ORDER — FUROSEMIDE 20 MG/1
20 TABLET ORAL 2 TIMES DAILY
Status: DISCONTINUED | OUTPATIENT
Start: 2020-01-06 | End: 2020-01-09 | Stop reason: HOSPADM

## 2020-01-06 RX ADMIN — WARFARIN SODIUM 2.5 MG: 2.5 TABLET ORAL at 16:59

## 2020-01-06 RX ADMIN — FLUTICASONE PROPIONATE 100 MCG: 50 SPRAY, METERED NASAL at 05:20

## 2020-01-06 RX ADMIN — ACETAMINOPHEN 1000 MG: 500 TABLET ORAL at 05:18

## 2020-01-06 RX ADMIN — LOSARTAN POTASSIUM 50 MG: 50 TABLET, FILM COATED ORAL at 05:19

## 2020-01-06 RX ADMIN — FERROUS SULFATE TAB 325 MG (65 MG ELEMENTAL FE) 325 MG: 325 (65 FE) TAB at 05:19

## 2020-01-06 RX ADMIN — OXYCODONE HYDROCHLORIDE 5 MG: 5 TABLET ORAL at 22:26

## 2020-01-06 RX ADMIN — AMOXICILLIN AND CLAVULANATE POTASSIUM 1 TABLET: 875; 125 TABLET, FILM COATED ORAL at 17:00

## 2020-01-06 RX ADMIN — NYSTATIN: 100000 POWDER TOPICAL at 06:00

## 2020-01-06 RX ADMIN — ACETAMINOPHEN 1000 MG: 500 TABLET ORAL at 17:00

## 2020-01-06 RX ADMIN — CITALOPRAM HYDROBROMIDE 20 MG: 20 TABLET ORAL at 05:19

## 2020-01-06 RX ADMIN — FUROSEMIDE 20 MG: 20 TABLET ORAL at 08:57

## 2020-01-06 RX ADMIN — METOPROLOL TARTRATE 50 MG: 50 TABLET, FILM COATED ORAL at 16:59

## 2020-01-06 RX ADMIN — FUROSEMIDE 20 MG: 20 TABLET ORAL at 17:00

## 2020-01-06 RX ADMIN — GEMFIBROZIL 600 MG: 600 TABLET ORAL at 05:19

## 2020-01-06 RX ADMIN — AMOXICILLIN AND CLAVULANATE POTASSIUM 1 TABLET: 875; 125 TABLET, FILM COATED ORAL at 05:19

## 2020-01-06 RX ADMIN — ROSUVASTATIN CALCIUM 40 MG: 20 TABLET, FILM COATED ORAL at 17:00

## 2020-01-06 RX ADMIN — METOPROLOL TARTRATE 50 MG: 50 TABLET, FILM COATED ORAL at 05:20

## 2020-01-06 RX ADMIN — ACETAMINOPHEN 1000 MG: 500 TABLET ORAL at 11:09

## 2020-01-06 RX ADMIN — LEVOTHYROXINE SODIUM 200 MCG: 100 TABLET ORAL at 05:19

## 2020-01-06 RX ADMIN — LORATADINE 10 MG: 10 TABLET ORAL at 05:19

## 2020-01-06 RX ADMIN — NYSTATIN: 100000 POWDER TOPICAL at 17:02

## 2020-01-06 ASSESSMENT — ENCOUNTER SYMPTOMS
SHORTNESS OF BREATH: 0
DIARRHEA: 0
COUGH: 0
ORTHOPNEA: 1
NECK PAIN: 1
ABDOMINAL PAIN: 0
CONSTIPATION: 0
VOMITING: 0
DIZZINESS: 0

## 2020-01-06 ASSESSMENT — GAIT ASSESSMENTS
GAIT LEVEL OF ASSIST: MINIMAL ASSIST
ASSISTIVE DEVICE: FRONT WHEEL WALKER
DISTANCE (FEET): 14
DEVIATION: SHUFFLED GAIT;BRADYKINETIC

## 2020-01-06 ASSESSMENT — COGNITIVE AND FUNCTIONAL STATUS - GENERAL
CLIMB 3 TO 5 STEPS WITH RAILING: TOTAL
WALKING IN HOSPITAL ROOM: A LOT
MOVING TO AND FROM BED TO CHAIR: A LITTLE
MOBILITY SCORE: 13
SUGGESTED CMS G CODE MODIFIER MOBILITY: CL
MOVING FROM LYING ON BACK TO SITTING ON SIDE OF FLAT BED: UNABLE
TURNING FROM BACK TO SIDE WHILE IN FLAT BAD: A LITTLE
STANDING UP FROM CHAIR USING ARMS: A LITTLE

## 2020-01-06 NOTE — DISCHARGE PLANNING
Received Choice form at 1400  Agency/Facility Name: [facility]  Referral sent per Choice form @ 1400

## 2020-01-06 NOTE — DISCHARGE PLANNING
Anticipated Discharge Disposition: SNF    Action: CM followed up with pt for SNF choice. She states that she would like to choose [facility]. CM completed SNF choice form and sent to [facility].     Barriers to Discharge:     Plan: Monitor for acceptance to SNF.

## 2020-01-06 NOTE — PROGRESS NOTES
Hospital Medicine Daily Progress Note    Date of Service  1/6/2020    Chief Complaint  69 y.o. female admitted 12/31/2019 with SOB    Hospital Course    PMH A. fib, HTN, CAD, DM, CHF EF 60%, HLD, KEN who presented with shortness and orthopnea worse in the last week.  She had been lax on her diet while on a cruise and not taking Lasix.  BNP was elevated and chest x-ray showed cardiomegaly.  She was admitted for CHF exacerbation. She also had LLE cellulitis and received a course of ceftriaxone and doxycycline. Doppler was neg for left DVT.  She is also treated for right otitis media.  She diuresed well and was transitioned to oral Lasix.  She was evaluated by PT OT who recommended SNF.       Interval Problem Update  Creat normal, restart on home oral Lasix  She says her joint pains are well controlled, her ear pain is better.  Discussed SNF with her, she is agreeable    Consultants/Specialty  None    Code Status  Full    Disposition  SNF pending    Review of Systems  Review of Systems   Constitutional: Positive for malaise/fatigue.   HENT: Positive for congestion (Improved) and ear pain (Improved). Negative for ear discharge.    Respiratory: Negative for cough and shortness of breath.    Cardiovascular: Positive for orthopnea and leg swelling (Improved). Negative for chest pain.   Gastrointestinal: Negative for abdominal pain, constipation, diarrhea and vomiting.   Genitourinary: Positive for frequency. Negative for dysuria.   Musculoskeletal: Positive for joint pain (Improved) and neck pain (Improved).   Skin: Positive for rash (Buttock). Negative for itching.   Neurological: Negative for dizziness.   All other systems reviewed and are negative.       Physical Exam  Temp:  [36 °C (96.8 °F)-36.7 °C (98 °F)] 36.4 °C (97.5 °F)  Pulse:  [] 100  Resp:  [16-18] 16  BP: (107-125)/(45-85) 119/45  SpO2:  [95 %-99 %] 99 %    Physical Exam  Vitals signs and nursing note reviewed.   Constitutional:       General: She is  not in acute distress.     Appearance: She is obese. She is not ill-appearing, toxic-appearing or diaphoretic.   HENT:      Head: Normocephalic.      Left Ear: Tympanic membrane normal.      Ears:      Comments: Right TM cloudy but intact with small amount of blood in ear canal     Mouth/Throat:      Mouth: Mucous membranes are moist.      Pharynx: No oropharyngeal exudate or posterior oropharyngeal erythema.   Eyes:      General:         Right eye: No discharge.         Left eye: No discharge.   Neck:      Musculoskeletal: Neck supple.   Cardiovascular:      Rate and Rhythm: Normal rate.      Comments: Irregular  Pulmonary:      Effort: Pulmonary effort is normal.      Breath sounds: No wheezing or rales.   Abdominal:      General: There is distension.      Palpations: Abdomen is soft.      Tenderness: There is no tenderness. There is no guarding or rebound.   Musculoskeletal:         General: Swelling (Decreased) present.   Lymphadenopathy:      Cervical: Cervical adenopathy (Right side decreased) present.   Skin:     General: Skin is warm and dry.      Comments: Left lower extremity erythema is improved, no drainage  Right lateral buttock with erythematous macules, no blistering or scaling or tenderness or edema   Neurological:      Mental Status: She is alert and oriented to person, place, and time.         Fluids    Intake/Output Summary (Last 24 hours) at 1/6/2020 1147  Last data filed at 1/6/2020 1000  Gross per 24 hour   Intake 480 ml   Output 1000 ml   Net -520 ml       Laboratory  Recent Labs     01/04/20  0441 01/06/20  0326   WBC 8.3 6.7   RBC 4.23 4.25   HEMOGLOBIN 10.4* 10.4*   HEMATOCRIT 35.3* 35.3*   MCV 83.5 83.1   MCH 24.6* 24.5*   MCHC 29.5* 29.5*   RDW 55.1* 55.0*   PLATELETCT 231 259   MPV 9.2 9.3     Recent Labs     01/04/20  0441 01/05/20  0313 01/06/20  0326   SODIUM 139 135 136   POTASSIUM 3.6 3.5* 3.8   CHLORIDE 93* 93* 94*   CO2 32 33 36*   GLUCOSE 132* 127* 117*   BUN 46* 52* 49*    CREATININE 1.32 1.10 0.97   CALCIUM 9.1 9.3 9.3     Recent Labs     01/04/20  0441 01/05/20  0313 01/06/20  0326   INR 2.12* 2.98* 2.48*               Imaging  DX-KNEE 2- LEFT   Final Result      1.  There is a small joint effusion.   2.  There is severe tricompartmental osteoarthritis.      DX-CERVICAL SPINE-2 OR 3 VIEWS   Final Result      1.  There is degenerative disc disease and arthropathy throughout the mid and lower cervical spine.   2.  There is a trace of degenerative anterolisthesis at the 4 level.      US-EXTREMITY VENOUS LOWER UNILAT LEFT   Final Result      DX-CHEST-PORTABLE (1 VIEW)   Final Result      1.  Marked cardiac enlargement is not significantly changed.           Assessment/Plan  * CHF exacerbation (HCC)  Assessment & Plan  EF 60% on recent TTE   due to medication noncompliance as well as, poor diet   Resume home arb, bb, hctz  Cardiac diet   Wean 02 as tolerated  Her edema and shortness of breath is improved but still remains edematous and dependent on oxygen  Had rising creatinine with IV Lasix.  Transitioned to oral Lasix and creatinine is stable, continue.  Monitor BMP    Weakness  Assessment & Plan  PT OT recommends SNF, she is agreeable    Rash  Assessment & Plan  Macular erythematous rash with slight itching  She denies history of shingles.  We will monitor with serial exams, if she develops signs of shingles can start on antiviral    Neck pain  Assessment & Plan  Cervical x-ray showed degenerative disease  Multimodal pain control with Tylenol, oxycodone, lidocaine, Robaxin  Pain is improved    Acute otitis media  Assessment & Plan  Right ear  Continue on Augmentin  Ordered for Claritin, Flonase    Hypomagnesemia  Assessment & Plan  Replete and monitor    Left knee pain  Assessment & Plan  History of arthritis and takes Tylenol 3 at home  X-ray shows tricompartmental arthritis, small effusion  Has increased pain from immobility  Ordered for multimodal pain control with Tylenol,  oxycodone, lidocaine, Robaxin, will give dose of Toradol  Out of bed as much as tolerated with chair and ambulation.  PT OT  Doppler negative for DVT  Improving    Anemia  Assessment & Plan  No evidence of bleeding   Iron is low, supplement ordered    Hypokalemia  Assessment & Plan  Due to Lasix   replace and recheck labs    Elevated troponin  Assessment & Plan  Due to demand ischemia from volume overload  She denies chest pain  Monitor on telemetry    Acute hypoxemic respiratory failure (HCC)  Assessment & Plan  Due to volume overload   Mobilize and encourage I-S  Wean off 02 as tolerated       Left leg cellulitis  Assessment & Plan  She says she received antibiotics while at the cruise   Started as a blister with purulent drainage  Improved and completed course of ceftriaxone and doxycycline      Insulin dependent diabetes mellitus (HCC)- (present on admission)  Assessment & Plan  Hold home medications   SSI Ordered  accu checks    Chronic atrial fibrillation- (present on admission)  Assessment & Plan  Resume home BB and coumadin   Occasionally tachycardic  Monitor on telemetry  Metop PRN sustained heart rate greater than 130    History of hypothyroidism- (present on admission)  Assessment & Plan  Resume home levothyroxine, tsh mildly elevated consider increasing and rechecking in 4 weeks  T4 is normal  T3 is normal    Morbid obesity (HCC)- (present on admission)  Assessment & Plan  Encourage weight loss, follow-up outpatient    Benign essential hypertension- (present on admission)  Assessment & Plan  Blood pressures controlled on HCTZ, losartan, metoprolol, Lasix       VTE prophylaxis: warfarin

## 2020-01-06 NOTE — PROGRESS NOTES
Inpatient Anticoagulation Service Note    Date: 2020    Reason for Anticoagulation: Atrial Fibrillation   Target INR: 2.0 to 3.0  [id number] VASc Score: 4  HAS-BLED Score: 1   Hemoglobin Value: (!) 10.4  Hematocrit Value: (!) 35.3  Lab Platelet Value: 259    INR from last 7 days     Date/Time INR Value    20 0326  (!) 2.48    20 0313  (!) 2.98    20 0441  (!) 2.12    20 0233  (!) 1.96    20 0322  (!) 2.06    20 0322  (!) 1.93    19 0330  (!) 1.77        Dose from last 7 days     Date/Time Dose (mg)    20 0911  2.5    20 1508  2.5    20 0847  2.5    20 1625  5    20 0905  2.5    20 1603  2.5    19 0934  5        Average Dose (mg): Home dose: 5 mg Friday and 2.5 mg AOD  Significant Interactions: Antibiotics, Statin, Thyroid Medications  Bridge Therapy: No  Reversal Agent Administered: Not Applicable    Comments: INR therapeutic again today. H/H are low but stable from prior with no bleeding noted. Continue home regimen and check INR qMon/Wed/Fri since pt has been stable    Plan:  Warfarin 2.5 mg tonight. INR on   Education Material Provided?: No (chronic warfarin pt)  Pharmacist suggested discharge dosin mg on Fri, 2.5 mg all other days, and INR within 1 wk of discharge     Suzie Flood, PharmD, BCPS

## 2020-01-06 NOTE — THERAPY
"Occupational Therapy Evaluation completed.   Functional Status:  Min A supine to sit w/HOB raised.  Max A to don socks.  Pt stood with min A and was able to take a few steps with FWW with min A.  Pt requesting to stay EOB at end of session.  Plan of Care: Will benefit from Occupational Therapy 3 times per week  Discharge Recommendations:  Equipment: Will Continue to Assess for Equipment Needs. .Recommend post-acute placement for additional occupational therapy services prior to discharge home. Patient can tolerate post-acute therapies at a 5x/week frequency.    Pt is 69 y/o F seen for OT evaluation.  Pt admitted with CHF exacerbation and LLE cellulitis.  Pt with hx of afib, HTN, and CAD.  Pt motivated for OOB activity today.  Pt continues to have limited activity tolerance and strength; impacting self-care and functional mobility.  Pt will continue to benefit from acute OT services.  Pt would also benefit from post acute placement.     See \"Rehab Therapy-Acute\" Patient Summary Report for complete documentation.    "

## 2020-01-06 NOTE — HEART FAILURE PROGRAM
"Acutely decompensated HFpEF. Precipitant of exacerbation was patient was on a cruise and was \"lax\" with her diet and didn't take her furosemide. Patient should be educated that with every hospital admission for HF decompensation her mortality risk increases.    Unfortunately, this is also a 30 day heart failure readmission.     Please see below for measures that must be addressed prior to discharge.     Daily Nurse: please begin to fill out the HF checklist (pink sheet in hard chart) and use it to guide your daily care.    Discharge Nurse: please ensure completeness of the HF checklist (pink sheet in hard chart) and have it co-signed by the charge RN before the patient leaves the hospital.    Thank you, Britta, Cardiovascular Nurse Navigator, RN, [facility] [phone]    HF Measures:  1. Documentation of LV systolic function (echo or cath) PTA, during this hospitalization, or plan to assess post discharge or reason for not assessing documented.  2. ACE-I, ARNI or ARB prescribed on discharge for LVEF <40%  3. For HF patients with LVEF less than or equal to 40% evidence based beta blocker must be prescribed upon discharge one of the following: carvedilol, bisoprolol, Toprol XL  4. For EF less than or equal to 35% aldosterone blockade prescribed upon discharge  5. Nutrition consult for diet education  6. HF education documented daily  7. Screening for and administering immunizations as long as no contraindications: Pneumonia and Influenza  8. Written discharge instructions include:  ? Daily weights  ? Record weight on tracker  ? Bring tracker to appointments  ? Call MD for weight gain of 3lb /day or 5lb/week  ? HF medication teaching  ? Low sodium diet  ? Follow up appointment within seven calendar days of d/c must include: date, time and location  ? Activity  ? Worsening symptoms  What if any of the above HF measures are contraindicated?  ? Request that the discharging provider document the medication/intervention and the " contraindication specifically in a progress note  ? For example: “no CHF meds due to hypotension” is not enough. It needs to say: “No ACE-I, ARNI, ARB due to hypotension”; “No Beta Blockade due to bradycardia”…

## 2020-01-06 NOTE — CARE PLAN
Problem: Safety  Goal: Will remain free from injury  Note:   Patient educated on importance of calling for assistance before ambulating, patient verbalized understanding. Call light within patient's reach. Hourly rounding in place. Bed alarm on. Bed in low/locked position.      Problem: Knowledge Deficit  Goal: Knowledge of disease process/condition, treatment plan, diagnostic tests, and medications will improve  Note:   Patient aware of plan of care and unit routines.

## 2020-01-07 PROBLEM — J96.01 ACUTE HYPOXEMIC RESPIRATORY FAILURE (HCC): Status: RESOLVED | Noted: 2019-12-31 | Resolved: 2020-01-07

## 2020-01-07 PROBLEM — E83.42 HYPOMAGNESEMIA: Status: RESOLVED | Noted: 2020-01-02 | Resolved: 2020-01-07

## 2020-01-07 PROBLEM — E87.6 HYPOKALEMIA: Status: RESOLVED | Noted: 2019-12-31 | Resolved: 2020-01-07

## 2020-01-07 PROBLEM — M54.2 NECK PAIN: Status: RESOLVED | Noted: 2020-01-03 | Resolved: 2020-01-07

## 2020-01-07 LAB
ANION GAP SERPL CALC-SCNC: 10 MMOL/L (ref 0–11.9)
BUN SERPL-MCNC: 37 MG/DL (ref 8–22)
CALCIUM SERPL-MCNC: 9.2 MG/DL (ref 8.5–10.5)
CHLORIDE SERPL-SCNC: 95 MMOL/L (ref 96–112)
CO2 SERPL-SCNC: 35 MMOL/L (ref 20–33)
CREAT SERPL-MCNC: 0.86 MG/DL (ref 0.5–1.4)
GLUCOSE BLD-MCNC: 110 MG/DL (ref 65–99)
GLUCOSE BLD-MCNC: 112 MG/DL (ref 65–99)
GLUCOSE BLD-MCNC: 149 MG/DL (ref 65–99)
GLUCOSE SERPL-MCNC: 116 MG/DL (ref 65–99)
MAGNESIUM SERPL-MCNC: 1.8 MG/DL (ref 1.5–2.5)
POTASSIUM SERPL-SCNC: 3.8 MMOL/L (ref 3.6–5.5)
SODIUM SERPL-SCNC: 140 MMOL/L (ref 135–145)

## 2020-01-07 PROCEDURE — 80048 BASIC METABOLIC PNL TOTAL CA: CPT

## 2020-01-07 PROCEDURE — A9270 NON-COVERED ITEM OR SERVICE: HCPCS | Performed by: HOSPITALIST

## 2020-01-07 PROCEDURE — 700102 HCHG RX REV CODE 250 W/ 637 OVERRIDE(OP): Performed by: INTERNAL MEDICINE

## 2020-01-07 PROCEDURE — 82962 GLUCOSE BLOOD TEST: CPT | Mod: 91

## 2020-01-07 PROCEDURE — 700102 HCHG RX REV CODE 250 W/ 637 OVERRIDE(OP): Performed by: HOSPITALIST

## 2020-01-07 PROCEDURE — 36415 COLL VENOUS BLD VENIPUNCTURE: CPT

## 2020-01-07 PROCEDURE — A9270 NON-COVERED ITEM OR SERVICE: HCPCS | Performed by: INTERNAL MEDICINE

## 2020-01-07 PROCEDURE — 83735 ASSAY OF MAGNESIUM: CPT

## 2020-01-07 PROCEDURE — 770020 HCHG ROOM/CARE - TELE (206)

## 2020-01-07 PROCEDURE — 99233 SBSQ HOSP IP/OBS HIGH 50: CPT | Performed by: INTERNAL MEDICINE

## 2020-01-07 RX ADMIN — LORATADINE 10 MG: 10 TABLET ORAL at 05:38

## 2020-01-07 RX ADMIN — LEVOTHYROXINE SODIUM 200 MCG: 100 TABLET ORAL at 05:38

## 2020-01-07 RX ADMIN — NYSTATIN: 100000 POWDER TOPICAL at 05:39

## 2020-01-07 RX ADMIN — OXYCODONE HYDROCHLORIDE 5 MG: 5 TABLET ORAL at 23:25

## 2020-01-07 RX ADMIN — LOSARTAN POTASSIUM 50 MG: 50 TABLET, FILM COATED ORAL at 05:38

## 2020-01-07 RX ADMIN — ROSUVASTATIN CALCIUM 40 MG: 20 TABLET, FILM COATED ORAL at 17:42

## 2020-01-07 RX ADMIN — AMOXICILLIN AND CLAVULANATE POTASSIUM 1 TABLET: 875; 125 TABLET, FILM COATED ORAL at 17:44

## 2020-01-07 RX ADMIN — INSULIN HUMAN 2 UNITS: 100 INJECTION, SOLUTION PARENTERAL at 20:42

## 2020-01-07 RX ADMIN — FLUTICASONE PROPIONATE 100 MCG: 50 SPRAY, METERED NASAL at 05:37

## 2020-01-07 RX ADMIN — AMOXICILLIN AND CLAVULANATE POTASSIUM 1 TABLET: 875; 125 TABLET, FILM COATED ORAL at 05:37

## 2020-01-07 RX ADMIN — METOPROLOL TARTRATE 50 MG: 50 TABLET, FILM COATED ORAL at 05:38

## 2020-01-07 RX ADMIN — WARFARIN SODIUM 2.5 MG: 2.5 TABLET ORAL at 17:45

## 2020-01-07 RX ADMIN — NYSTATIN: 100000 POWDER TOPICAL at 17:45

## 2020-01-07 RX ADMIN — CITALOPRAM HYDROBROMIDE 20 MG: 20 TABLET ORAL at 05:38

## 2020-01-07 RX ADMIN — FUROSEMIDE 20 MG: 20 TABLET ORAL at 05:38

## 2020-01-07 RX ADMIN — METOPROLOL TARTRATE 50 MG: 50 TABLET, FILM COATED ORAL at 17:44

## 2020-01-07 RX ADMIN — FUROSEMIDE 20 MG: 20 TABLET ORAL at 17:44

## 2020-01-07 RX ADMIN — FERROUS SULFATE TAB 325 MG (65 MG ELEMENTAL FE) 325 MG: 325 (65 FE) TAB at 07:28

## 2020-01-07 RX ADMIN — GEMFIBROZIL 600 MG: 600 TABLET ORAL at 05:37

## 2020-01-07 ASSESSMENT — ENCOUNTER SYMPTOMS
DIARRHEA: 0
SHORTNESS OF BREATH: 0
COUGH: 0
VOMITING: 0
NECK PAIN: 1
CONSTIPATION: 0
ABDOMINAL PAIN: 0
DIZZINESS: 0
ORTHOPNEA: 0

## 2020-01-07 NOTE — PROGRESS NOTES
Bedside report received, pt care assumed, tele box on.  Pt denies any additional needs at this time. Bed in lowest position, bed alarm on, pt educated on fall risk and verbalized understanding, call light within reach.

## 2020-01-07 NOTE — CARE PLAN
Problem: Communication  Goal: The ability to communicate needs accurately and effectively will improve  Intervention: Educate patient and significant other/support system about the plan of care, procedures, treatments, medications and allow for questions  Note:   Patient educated on plan of care and education provided on current disease process.  Patient demonstrates understanding.  Will continue to re-inforce education when required.       Problem: Safety  Goal: Will remain free from falls  Intervention: Implement fall precautions  Note:   Bed in lowest position, call light in reach, bed locked, alarm activated.  Hourly rounding on patient to ensure safety is maintained.  Patient educated on safety concerns at this time.

## 2020-01-07 NOTE — DISCHARGE PLANNING
@1115  Agency/Facility Name: [facility]  Spoke To: Rut  Outcome: Reviewing referral.    @1000  Agency/Facility Name: [facility]  Spoke To: Izabella  Outcome: Accepted pending bed.  SW informed.    @0940  Agency/Facility Name: [facility]  Outcome: Left message, awaiting call back.    Agency/Facility Name: [facility]  Outcome: left message, awaiting call back.

## 2020-01-07 NOTE — THERAPY
"Physical Therapy Treatment completed.   Bed Mobility:  Supine to Sit: Supervised  Transfers: Sit to Stand: Minimal Assist  Gait: Level Of Assist: Minimal Assist with Front-Wheel Walker       Plan of Care: Will benefit from Physical Therapy 4 times per week  Discharge Recommendations: Equipment: Will Continue to Assess for Equipment Needs. Post-acute therapy Recommend post-acute placement for continued physical therapy services prior to discharge home. Patient can tolerate post-acute therapies at a 5x/week frequency.       Pt very motivated to participate, states she is feeling much better than last week. Significant improvement in mobility, able to get self to EOB without assist. Remains very fearful of falling, utilized therapist plus tech with wheelchair follow to calm patient. Able to ambulate to door and back but fearful of going further. Only mild steadying assist provided. Encouraged pt to sit up in bariatric w/c to increase out of bed time. Practiced sit to stand transfer from chair as it is lower surface. Again only steadying assist required. Pt very motivated and eager to improve. Acute PT to continue to follow, continue to recommend post acute placement as pt's spouse cannot physically assist.     See \"Rehab Therapy-Acute\" Patient Summary Report for complete documentation.       "

## 2020-01-07 NOTE — PROGRESS NOTES
Hospital Medicine Daily Progress Note    Date of Service  1/7/2020    Chief Complaint  69 y.o. female admitted 12/31/2019 with SOB    Hospital Course    PMH A. fib, HTN, CAD, DM, CHF EF 60%, HLD, KEN who presented with shortness and orthopnea worse in the last week.  She had been lax on her diet while on a cruise and not taking Lasix.  BNP was elevated and chest x-ray showed cardiomegaly.  She was admitted for CHF exacerbation. She also had LLE cellulitis and received a course of ceftriaxone and doxycycline. Doppler was neg for left DVT.  She is also treated for right otitis media.  She diuresed well and was transitioned to oral Lasix.  She was evaluated by PT OT who recommended SNF.       Interval Problem Update  Creat normal, restart on home oral Lasix  She says her joint pains are well controlled, her ear pain is better.  Discussed SNF with her, she is agreeable    Consultants/Specialty  None    Code Status  Full    Disposition  SNF pending    Review of Systems  Review of Systems   Constitutional: Positive for malaise/fatigue.   HENT: Positive for congestion (Improved) and ear pain (Improved). Negative for ear discharge.    Respiratory: Negative for cough and shortness of breath.    Cardiovascular: Positive for leg swelling (Improved). Negative for chest pain and orthopnea.   Gastrointestinal: Negative for abdominal pain, constipation, diarrhea and vomiting.   Genitourinary: Negative for dysuria and frequency.   Musculoskeletal: Positive for joint pain (Improved) and neck pain (Improved).   Skin: Positive for rash (Buttock). Negative for itching.   Neurological: Negative for dizziness.   All other systems reviewed and are negative.       Physical Exam  Temp:  [36.1 °C (97 °F)-37.2 °C (99 °F)] 36.1 °C (97 °F)  Pulse:  [76-99] 76  Resp:  [18] 18  BP: (107-146)/(45-87) 107/73  SpO2:  [94 %-98 %] 98 %    Physical Exam  Vitals signs and nursing note reviewed.   Constitutional:       General: She is not in acute  distress.     Appearance: She is obese. She is not ill-appearing, toxic-appearing or diaphoretic.   HENT:      Head: Normocephalic.      Left Ear: Tympanic membrane normal.      Ears:      Comments: Right TM cloudy but intact with small amount of blood in ear canal     Mouth/Throat:      Mouth: Mucous membranes are moist.      Pharynx: No oropharyngeal exudate or posterior oropharyngeal erythema.   Eyes:      General:         Right eye: No discharge.         Left eye: No discharge.      Extraocular Movements: Extraocular movements intact.   Neck:      Musculoskeletal: Neck supple.   Cardiovascular:      Rate and Rhythm: Normal rate. Rhythm irregular.      Comments: Irregular  Pulmonary:      Effort: Pulmonary effort is normal.      Breath sounds: Normal breath sounds. No wheezing or rales.   Abdominal:      General: There is no distension.      Palpations: Abdomen is soft.      Tenderness: There is no tenderness. There is no guarding or rebound.   Musculoskeletal:         General: Swelling (Decreased) present.      Right lower leg: Edema present.      Left lower leg: Edema present.      Comments: 2+ edema ble, improved per patient   Lymphadenopathy:      Cervical: Cervical adenopathy (Right side decreased) present.   Skin:     General: Skin is warm and dry.      Comments: Left lower extremity erythema is improved, no drainage  Right lateral buttock with erythematous macules, no blistering or scaling or tenderness or edema   Neurological:      General: No focal deficit present.      Mental Status: She is alert and oriented to person, place, and time.         Fluids    Intake/Output Summary (Last 24 hours) at 1/7/2020 1011  Last data filed at 1/7/2020 0800  Gross per 24 hour   Intake 200 ml   Output 900 ml   Net -700 ml       Laboratory  Recent Labs     01/06/20  0326   WBC 6.7   RBC 4.25   HEMOGLOBIN 10.4*   HEMATOCRIT 35.3*   MCV 83.1   MCH 24.5*   MCHC 29.5*   RDW 55.0*   PLATELETCT 259   MPV 9.3     Recent Labs      01/05/20  0313 01/06/20  0326 01/07/20  0400   SODIUM 135 136 140   POTASSIUM 3.5* 3.8 3.8   CHLORIDE 93* 94* 95*   CO2 33 36* 35*   GLUCOSE 127* 117* 116*   BUN 52* 49* 37*   CREATININE 1.10 0.97 0.86   CALCIUM 9.3 9.3 9.2     Recent Labs     01/05/20  0313 01/06/20  0326   INR 2.98* 2.48*               Imaging  DX-KNEE 2- LEFT   Final Result      1.  There is a small joint effusion.   2.  There is severe tricompartmental osteoarthritis.      DX-CERVICAL SPINE-2 OR 3 VIEWS   Final Result      1.  There is degenerative disc disease and arthropathy throughout the mid and lower cervical spine.   2.  There is a trace of degenerative anterolisthesis at the 4 level.      US-EXTREMITY VENOUS LOWER UNILAT LEFT   Final Result      DX-CHEST-PORTABLE (1 VIEW)   Final Result      1.  Marked cardiac enlargement is not significantly changed.           Assessment/Plan  * CHF exacerbation (HCC)- (present on admission)  Assessment & Plan  EF 60% on recent TTE   due to medication noncompliance as well as, poor diet   Resume home arb, bb, hctz  Cardiac diet   Wean 02 as tolerated  Her edema and shortness of breath is improved, near baseline today  Had rising creatinine with IV Lasix.  Transitioned to oral Lasix and creatinine is stable, continue.  Monitor BMP    Elevated troponin- (present on admission)  Assessment & Plan  Due to demand ischemia from volume overload  She denies chest pain  Monitor on telemetry    Left leg cellulitis- (present on admission)  Assessment & Plan  She says she received antibiotics while at the cruise   Started as a blister with purulent drainage  Improved and completed course of ceftriaxone and doxycycline      Insulin dependent diabetes mellitus (HCC)- (present on admission)  Assessment & Plan  Hold home medications; stable glucose   SSI Ordered  accu checks    Chronic atrial fibrillation- (present on admission)  Assessment & Plan  Resume home BB and coumadin   Rate controlled  Monitor on  telemetry  Metop PRN sustained heart rate greater than 130    Weakness- (present on admission)  Assessment & Plan  PT OT recommends SNF, she is agreeable, await acceptance    Rash- (present on admission)  Assessment & Plan  Macular erythematous rash with slight itching  She denies history of shingles.  We will monitor with serial exams, if she develops signs of shingles can start on antiviral    Acute otitis media- (present on admission)  Assessment & Plan  Right ear, improving but not resolved sx yet.  Continue on Augmentin  Ordered for Claritin, Flonase    Left knee pain- (present on admission)  Assessment & Plan  History of arthritis and takes Tylenol 3 at home  X-ray shows tricompartmental arthritis, small effusion  Has increased pain from immobility  Ordered for multimodal pain control with Tylenol, oxycodone, lidocaine, Robaxin, will give dose of Toradol  Out of bed as much as tolerated with chair and ambulation.  PT OT  Doppler negative for DVT  Improving    Anemia- (present on admission)  Assessment & Plan  No evidence of bleeding   Iron is low, supplement ordered    History of hypothyroidism- (present on admission)  Assessment & Plan  Resume home levothyroxine, tsh mildly elevated consider increasing and rechecking in 4 weeks  T4 is normal  T3 is normal    Morbid obesity (HCC)- (present on admission)  Assessment & Plan  Encourage weight loss, follow-up outpatient    Benign essential hypertension- (present on admission)  Assessment & Plan  Blood pressures controlled on HCTZ, losartan, metoprolol, Lasix       VTE prophylaxis: warfarin

## 2020-01-07 NOTE — CARE PLAN
Problem: Communication  Goal: The ability to communicate needs accurately and effectively will improve  Outcome: PROGRESSING AS EXPECTED   Patient educated to utilize call light. Patient oriented to hospital room. Patient encouraged to ask questions about plan of care. Patient effectively uses call light and is involved in POC.    Problem: Safety  Goal: Will remain free from injury  Outcome: PROGRESSING AS EXPECTED   Patient's risk for injury and falls assessed. Appropriate safety precautions in place. Patient educated to utilize call light for needs. Patient verbalizes understanding.

## 2020-01-08 LAB
ANION GAP SERPL CALC-SCNC: 11 MMOL/L (ref 0–11.9)
BASOPHILS # BLD AUTO: 0.7 % (ref 0–1.8)
BASOPHILS # BLD: 0.04 K/UL (ref 0–0.12)
BUN SERPL-MCNC: 30 MG/DL (ref 8–22)
CALCIUM SERPL-MCNC: 9.1 MG/DL (ref 8.5–10.5)
CHLORIDE SERPL-SCNC: 96 MMOL/L (ref 96–112)
CO2 SERPL-SCNC: 33 MMOL/L (ref 20–33)
CREAT SERPL-MCNC: 0.79 MG/DL (ref 0.5–1.4)
EOSINOPHIL # BLD AUTO: 0.28 K/UL (ref 0–0.51)
EOSINOPHIL NFR BLD: 4.9 % (ref 0–6.9)
ERYTHROCYTE [DISTWIDTH] IN BLOOD BY AUTOMATED COUNT: 54.6 FL (ref 35.9–50)
GLUCOSE BLD-MCNC: 106 MG/DL (ref 65–99)
GLUCOSE BLD-MCNC: 113 MG/DL (ref 65–99)
GLUCOSE BLD-MCNC: 123 MG/DL (ref 65–99)
GLUCOSE BLD-MCNC: 155 MG/DL (ref 65–99)
GLUCOSE BLD-MCNC: 186 MG/DL (ref 65–99)
GLUCOSE SERPL-MCNC: 114 MG/DL (ref 65–99)
HCT VFR BLD AUTO: 37.7 % (ref 37–47)
HGB BLD-MCNC: 11 G/DL (ref 12–16)
IMM GRANULOCYTES # BLD AUTO: 0.05 K/UL (ref 0–0.11)
IMM GRANULOCYTES NFR BLD AUTO: 0.9 % (ref 0–0.9)
INR PPP: 1.74 (ref 0.87–1.13)
LYMPHOCYTES # BLD AUTO: 1.84 K/UL (ref 1–4.8)
LYMPHOCYTES NFR BLD: 32.2 % (ref 22–41)
MCH RBC QN AUTO: 24.5 PG (ref 27–33)
MCHC RBC AUTO-ENTMCNC: 29.2 G/DL (ref 33.6–35)
MCV RBC AUTO: 84 FL (ref 81.4–97.8)
MONOCYTES # BLD AUTO: 0.47 K/UL (ref 0–0.85)
MONOCYTES NFR BLD AUTO: 8.2 % (ref 0–13.4)
NEUTROPHILS # BLD AUTO: 3.03 K/UL (ref 2–7.15)
NEUTROPHILS NFR BLD: 53.1 % (ref 44–72)
NRBC # BLD AUTO: 0 K/UL
NRBC BLD-RTO: 0 /100 WBC
PLATELET # BLD AUTO: 264 K/UL (ref 164–446)
PMV BLD AUTO: 9 FL (ref 9–12.9)
POTASSIUM SERPL-SCNC: 3.5 MMOL/L (ref 3.6–5.5)
PROTHROMBIN TIME: 20.8 SEC (ref 12–14.6)
RBC # BLD AUTO: 4.49 M/UL (ref 4.2–5.4)
SODIUM SERPL-SCNC: 140 MMOL/L (ref 135–145)
WBC # BLD AUTO: 5.7 K/UL (ref 4.8–10.8)

## 2020-01-08 PROCEDURE — A9270 NON-COVERED ITEM OR SERVICE: HCPCS | Performed by: HOSPITALIST

## 2020-01-08 PROCEDURE — 85025 COMPLETE CBC W/AUTO DIFF WBC: CPT

## 2020-01-08 PROCEDURE — A9270 NON-COVERED ITEM OR SERVICE: HCPCS | Performed by: INTERNAL MEDICINE

## 2020-01-08 PROCEDURE — 700102 HCHG RX REV CODE 250 W/ 637 OVERRIDE(OP): Performed by: INTERNAL MEDICINE

## 2020-01-08 PROCEDURE — 99232 SBSQ HOSP IP/OBS MODERATE 35: CPT | Performed by: INTERNAL MEDICINE

## 2020-01-08 PROCEDURE — 770020 HCHG ROOM/CARE - TELE (206)

## 2020-01-08 PROCEDURE — 85610 PROTHROMBIN TIME: CPT

## 2020-01-08 PROCEDURE — 97535 SELF CARE MNGMENT TRAINING: CPT

## 2020-01-08 PROCEDURE — 700102 HCHG RX REV CODE 250 W/ 637 OVERRIDE(OP): Performed by: HOSPITALIST

## 2020-01-08 PROCEDURE — 80048 BASIC METABOLIC PNL TOTAL CA: CPT

## 2020-01-08 PROCEDURE — 82962 GLUCOSE BLOOD TEST: CPT

## 2020-01-08 PROCEDURE — 36415 COLL VENOUS BLD VENIPUNCTURE: CPT

## 2020-01-08 RX ORDER — POTASSIUM CHLORIDE 20 MEQ/1
20 TABLET, EXTENDED RELEASE ORAL DAILY
Status: DISCONTINUED | OUTPATIENT
Start: 2020-01-08 | End: 2020-01-09 | Stop reason: HOSPADM

## 2020-01-08 RX ORDER — WARFARIN SODIUM 5 MG/1
5 TABLET ORAL
Status: COMPLETED | OUTPATIENT
Start: 2020-01-08 | End: 2020-01-08

## 2020-01-08 RX ADMIN — LEVOTHYROXINE SODIUM 200 MCG: 100 TABLET ORAL at 05:11

## 2020-01-08 RX ADMIN — INSULIN HUMAN 2 UNITS: 100 INJECTION, SOLUTION PARENTERAL at 21:01

## 2020-01-08 RX ADMIN — LORATADINE 10 MG: 10 TABLET ORAL at 05:11

## 2020-01-08 RX ADMIN — METOPROLOL TARTRATE 50 MG: 50 TABLET, FILM COATED ORAL at 17:54

## 2020-01-08 RX ADMIN — FUROSEMIDE 20 MG: 20 TABLET ORAL at 05:14

## 2020-01-08 RX ADMIN — OXYCODONE HYDROCHLORIDE 10 MG: 5 TABLET ORAL at 09:03

## 2020-01-08 RX ADMIN — POTASSIUM CHLORIDE 20 MEQ: 1500 TABLET, EXTENDED RELEASE ORAL at 09:02

## 2020-01-08 RX ADMIN — FERROUS SULFATE TAB 325 MG (65 MG ELEMENTAL FE) 325 MG: 325 (65 FE) TAB at 09:02

## 2020-01-08 RX ADMIN — ROSUVASTATIN CALCIUM 40 MG: 20 TABLET, FILM COATED ORAL at 17:54

## 2020-01-08 RX ADMIN — NYSTATIN: 100000 POWDER TOPICAL at 05:13

## 2020-01-08 RX ADMIN — GEMFIBROZIL 600 MG: 600 TABLET ORAL at 05:11

## 2020-01-08 RX ADMIN — METOPROLOL TARTRATE 50 MG: 50 TABLET, FILM COATED ORAL at 05:11

## 2020-01-08 RX ADMIN — CITALOPRAM HYDROBROMIDE 20 MG: 20 TABLET ORAL at 05:13

## 2020-01-08 RX ADMIN — LOSARTAN POTASSIUM 50 MG: 50 TABLET, FILM COATED ORAL at 05:13

## 2020-01-08 RX ADMIN — WARFARIN SODIUM 5 MG: 5 TABLET ORAL at 18:30

## 2020-01-08 RX ADMIN — FLUTICASONE PROPIONATE 100 MCG: 50 SPRAY, METERED NASAL at 05:13

## 2020-01-08 RX ADMIN — FUROSEMIDE 20 MG: 20 TABLET ORAL at 17:54

## 2020-01-08 ASSESSMENT — ENCOUNTER SYMPTOMS
SHORTNESS OF BREATH: 0
NECK PAIN: 1
ORTHOPNEA: 0
VOMITING: 0
COUGH: 0
DIZZINESS: 0
CONSTIPATION: 0
ABDOMINAL PAIN: 0
DIARRHEA: 0

## 2020-01-08 ASSESSMENT — COGNITIVE AND FUNCTIONAL STATUS - GENERAL
TOILETING: A LOT
SUGGESTED CMS G CODE MODIFIER DAILY ACTIVITY: CK
DRESSING REGULAR UPPER BODY CLOTHING: A LITTLE
DRESSING REGULAR LOWER BODY CLOTHING: A LOT
HELP NEEDED FOR BATHING: A LOT
DAILY ACTIVITIY SCORE: 17

## 2020-01-08 NOTE — PROGRESS NOTES
Inpatient Anticoagulation Service Note    Date: 2020    Reason for Anticoagulation: Atrial Fibrillation   Target INR: 2.0 to 3.0  [id number] VASc Score: 4  HAS-BLED Score: 1   Hemoglobin Value: (!) 10.4  Hematocrit Value: (!) 35.3  Lab Platelet Value: 259    INR from last 7 days     Date/Time INR Value    20 0326  (!) 2.48    20 0313  (!) 2.98    20 0441  (!) 2.12    20 0233  (!) 1.96    20 0322  (!) 2.06    20 0322  (!) 1.93        Dose from last 7 days     Date/Time Dose (mg)    20 1705  2.5    20 0911  2.5    20 1508  2.5    20 0847  2.5    20 1625  5    20 0905  2.5    20 1603  2.5        Average Dose (mg): Home dose: 5 mg Friday and 2.5 mg AOD  Significant Interactions: Antibiotics, Statin, Thyroid Medications  Bridge Therapy: No  Reversal Agent Administered: Not Applicable    Comments: No new labs to evaluate today. No bleeding noted overnight. Will continue with patient's home regimen and check INR qMon/Wed/Fri    Plan:  Warfarin 2.5 mg tonight and INR tomorrow AM  Education Material Provided?: No (chronic warfarin pt)  Pharmacist suggested discharge dosin mg on Fri, 2.5 mg all other days, and INR within 1 wk of discharge     Suzie Flood, OrtegaD, BCPS

## 2020-01-08 NOTE — THERAPY
"Occupational Therapy Evaluation completed.   Functional Status:  Spv w/use of bed rails for bed mobility, spv sit>stand, mod A w/LB dressing, walked in room w/fww and min A, initially stood at sink for grooming, but d/t fatigue had to complete seated. After rest break walked to [facility] in room, max A w/rohan care, then BTB w/spv. Pleasant and cooperative   Plan of Care: Will benefit from Occupational Therapy 3 times per week  Discharge Recommendations:  Equipment: Will Continue to Assess for Equipment Needs. Post-acute therapy Recommend post-acute placement for additional occupational therapy services prior to discharge home. Patient can tolerate post-acute therapies at a 5x/week frequency.      See \"Rehab Therapy-Acute\" Patient Summary Report for complete documentation.      Pt seen for OT tx pt is making steady progress, however remains w/poor activity tolerance requiring frequent rest breaks and decreased ADL participation. Continue to recommend post acute placement prior to d/c home   "

## 2020-01-08 NOTE — PROGRESS NOTES
Assumed care this am from night shift RN. Patient is sitting up in chair next to bed  . Call bell and personal items within reach, bed locked in low position. Bed exit alarm off. Hourly rounding in place.

## 2020-01-08 NOTE — WOUND TEAM
Wound consult placed on 1/4/0220 regarding multiple areas of concern. This RN discussed with bedside RN, Linda. This RN in to pt room T706/00. Pt up in wheelchair at bedside, agreeable to assessment. Dr. Teresa in to also assess skin. Pt was assisted to a standing position with FWW. Sacrococcygeal area and backside fully assessed. Pt has a small red keron to her right lower back that is blanching. Skin folds are slightly pink/red but intact. Bilateral lower extremities with hemosiderin staining and some edema. Left anterior lower leg has a small scabbed wound that was left open to air. No further advanced wound care indicated. No further follow up unless consulted.

## 2020-01-08 NOTE — DISCHARGE PLANNING
[facility] Name[facility]  Spoke To: Izabella  Outcome: No beds today, maybe tomorrow.

## 2020-01-08 NOTE — PROGRESS NOTES
Received bedside report from RN, pt care assumed, VSS, pt assessment complete. Pt AAOx4, no c/o of pain at this time. No signs of acute distress noted at this time. POC discussed with pt and verbalizes no questions. Pt denies any additional needs at this time. Bed in lowest position, bed alarm on, pt educated on fall risk and verbalized understanding, call light within reach, hourly rounding initiated.

## 2020-01-08 NOTE — CARE PLAN
Problem: Communication  Goal: The ability to communicate needs accurately and effectively will improve  Outcome: PROGRESSING AS EXPECTED   Patient educated to utilize call light. Patient and family oriented to hospital room. Patient encouraged to ask questions about plan of care. Patient effectively uses call light and is involved in POC.    Problem: Safety  Goal: Will remain free from injury  Outcome: PROGRESSING AS EXPECTED   Patient educated to use call light for assistance. Fall precautions in place. Staff will assist with mobilization. Hourly rounding in place.

## 2020-01-08 NOTE — CARE PLAN
Problem: Safety  Goal: Will remain free from injury  Outcome: PROGRESSING AS EXPECTED  Goal: Will remain free from falls  Outcome: PROGRESSING AS EXPECTED  Pt educated to use call light.  Pt. Is in room close to nursing station.  Hourly rounding in place.  Pt. Is encouraged to trasnfer on stronger side.  PT worked with pt earlier this day.  Pt. Given medication to help aleviate pain so that she may perform ambulation as tolerated.

## 2020-01-08 NOTE — PROGRESS NOTES
Hospital Medicine Daily Progress Note    Date of Service  1/8/2020    Chief Complaint  69 y.o. female admitted 12/31/2019 with SOB    Hospital Course    PMH A. fib, HTN, CAD, DM, CHF EF 60%, HLD, KEN who presented with shortness and orthopnea worse in the last week.  She had been lax on her diet while on a cruise and not taking Lasix.  BNP was elevated and chest x-ray showed cardiomegaly.  She was admitted for CHF exacerbation. She also had LLE cellulitis and received a course of ceftriaxone and doxycycline. Doppler was neg for left DVT.  She is also treated for right otitis media.  She diuresed well and was transitioned to oral Lasix.  She was evaluated by PT OT who recommended SNF.       Interval Problem Update  Breathing at baseline, now on RA; C/o mild sinus pain/HA but R ear feels better; has gotten up with assist to BR.  Consultants/Specialty  None    Code Status  Full    Disposition  SNF pending    Review of Systems  Review of Systems   Constitutional: Positive for malaise/fatigue.   HENT: Positive for congestion (Improved). Negative for ear discharge and ear pain (Improved).    Respiratory: Negative for cough and shortness of breath.    Cardiovascular: Positive for leg swelling (Improved). Negative for chest pain and orthopnea.   Gastrointestinal: Negative for abdominal pain, constipation, diarrhea and vomiting.   Genitourinary: Negative for dysuria and frequency.   Musculoskeletal: Positive for joint pain (Improved) and neck pain (Improved).   Skin: Positive for rash (Buttock). Negative for itching.   Neurological: Negative for dizziness.   All other systems reviewed and are negative.       Physical Exam  Temp:  [36 °C (96.8 °F)-37.1 °C (98.7 °F)] 36 °C (96.8 °F)  Pulse:  [] 91  Resp:  [17-18] 18  BP: (119-131)/(65-88) 121/65  SpO2:  [91 %-97 %] 91 %    Physical Exam  Vitals signs and nursing note reviewed.   Constitutional:       General: She is not in acute distress.     Appearance: She is obese.  She is not ill-appearing, toxic-appearing or diaphoretic.   HENT:      Head: Normocephalic.      Left Ear: Tympanic membrane normal.      Ears:      Comments: Right TM cloudy but intact with small amount of blood in ear canal     Mouth/Throat:      Mouth: Mucous membranes are moist.      Pharynx: No oropharyngeal exudate or posterior oropharyngeal erythema.   Eyes:      General:         Right eye: No discharge.         Left eye: No discharge.      Extraocular Movements: Extraocular movements intact.   Neck:      Musculoskeletal: Neck supple.   Cardiovascular:      Rate and Rhythm: Normal rate. Rhythm irregular.      Comments: Irregular  Pulmonary:      Effort: Pulmonary effort is normal.      Breath sounds: Normal breath sounds. No wheezing or rales.   Abdominal:      General: Bowel sounds are normal. There is no distension.      Palpations: Abdomen is soft.      Tenderness: There is no tenderness. There is no guarding or rebound.   Musculoskeletal:         General: Swelling (Decreased) present.      Right lower leg: Edema present.      Left lower leg: Edema present.      Comments: 2+ edema ble, improved per patient   Lymphadenopathy:      Cervical: Cervical adenopathy (Right side decreased) present.   Skin:     General: Skin is warm and dry.      Comments: Left lower extremity erythema is improved, no drainage  Right lateral buttock with erythematous macules, no blistering or scaling or tenderness or edema   Neurological:      General: No focal deficit present.      Mental Status: She is alert and oriented to person, place, and time.         Fluids    Intake/Output Summary (Last 24 hours) at 1/8/2020 0852  Last data filed at 1/8/2020 0600  Gross per 24 hour   Intake 1550 ml   Output 1150 ml   Net 400 ml       Laboratory  Recent Labs     01/06/20  0326 01/08/20  0311   WBC 6.7 5.7   RBC 4.25 4.49   HEMOGLOBIN 10.4* 11.0*   HEMATOCRIT 35.3* 37.7   MCV 83.1 84.0   MCH 24.5* 24.5*   MCHC 29.5* 29.2*   RDW 55.0* 54.6*    PLATELETCT 259 264   MPV 9.3 9.0     Recent Labs     01/06/20  0326 01/07/20  0400 01/08/20  0311   SODIUM 136 140 140   POTASSIUM 3.8 3.8 3.5*   CHLORIDE 94* 95* 96   CO2 36* 35* 33   GLUCOSE 117* 116* 114*   BUN 49* 37* 30*   CREATININE 0.97 0.86 0.79   CALCIUM 9.3 9.2 9.1     Recent Labs     01/06/20  0326 01/08/20  0311   INR 2.48* 1.74*               Imaging  DX-KNEE 2- LEFT   Final Result      1.  There is a small joint effusion.   2.  There is severe tricompartmental osteoarthritis.      DX-CERVICAL SPINE-2 OR 3 VIEWS   Final Result      1.  There is degenerative disc disease and arthropathy throughout the mid and lower cervical spine.   2.  There is a trace of degenerative anterolisthesis at the 4 level.      US-EXTREMITY VENOUS LOWER UNILAT LEFT   Final Result      DX-CHEST-PORTABLE (1 VIEW)   Final Result      1.  Marked cardiac enlargement is not significantly changed.           Assessment/Plan  * CHF exacerbation (HCC)- (present on admission)  Assessment & Plan  EF 60% on recent TTE   due to medication noncompliance as well as, poor diet   Resume home arb, bb, hctz  Cardiac diet   Off O2 this am, doing fine.  Her edema and shortness of breath is improved, at baseline  Had rising creatinine with IV Lasix.  Transitioned to oral Lasix and creatinine is stable, continue.  Monitor BMP    Elevated troponin- (present on admission)  Assessment & Plan  Due to demand ischemia from volume overload  She denies chest pain  Monitor on telemetry    Left leg cellulitis- (present on admission)  Assessment & Plan  She says she received antibiotics while at the cruise   Started as a blister with purulent drainage  Improved and completed course of ceftriaxone and doxycycline  Has chronic venous stasis changes in BLE      Insulin dependent diabetes mellitus (HCC)- (present on admission)  Assessment & Plan  Hold home medications; stable glucose   SSI Ordered  accu checks    Chronic atrial fibrillation- (present on  admission)  Assessment & Plan  Resume home BB and coumadin   Rate controlled  Monitor on telemetry  Metop PRN sustained heart rate greater than 130    Weakness- (present on admission)  Assessment & Plan  PT OT recommends SNF, she is agreeable, await acceptance    Rash- (present on admission)  Assessment & Plan  Macular erythematous rash with slight itching  She denies history of shingles.  Exam today shows no e/o shingles.     Acute otitis media- (present on admission)  Assessment & Plan  Right ear, improving..  Continue on Augmentin  Ordered for Claritin, Flonase    Left knee pain- (present on admission)  Assessment & Plan  History of arthritis and takes Tylenol 3 at home  X-ray shows tricompartmental arthritis, small effusion  Has increased pain from immobility  Ordered for multimodal pain control with Tylenol, oxycodone, lidocaine, Robaxin, will give dose of Toradol  Out of bed as much as tolerated with chair and ambulation.  PT OT  Doppler negative for DVT  Improving    Anemia- (present on admission)  Assessment & Plan  No evidence of bleeding   Iron is low, supplement ordered    History of hypothyroidism- (present on admission)  Assessment & Plan  Resume home levothyroxine, tsh mildly elevated consider increasing and rechecking in 4 weeks  T4 is normal  T3 is normal    Morbid obesity (HCC)- (present on admission)  Assessment & Plan  Encourage weight loss, follow-up outpatient    Benign essential hypertension- (present on admission)  Assessment & Plan  Blood pressures controlled on HCTZ, losartan, metoprolol, Lasix       VTE prophylaxis: warfarin

## 2020-01-09 VITALS
HEIGHT: 65 IN | SYSTOLIC BLOOD PRESSURE: 133 MMHG | RESPIRATION RATE: 16 BRPM | DIASTOLIC BLOOD PRESSURE: 80 MMHG | OXYGEN SATURATION: 95 % | BODY MASS INDEX: 46.76 KG/M2 | WEIGHT: 280.65 LBS | HEART RATE: 98 BPM | TEMPERATURE: 96.5 F

## 2020-01-09 LAB
GLUCOSE BLD-MCNC: 120 MG/DL (ref 65–99)
GLUCOSE BLD-MCNC: 126 MG/DL (ref 65–99)
INR PPP: 1.51 (ref 0.87–1.13)
PROTHROMBIN TIME: 18.7 SEC (ref 12–14.6)

## 2020-01-09 PROCEDURE — 82962 GLUCOSE BLOOD TEST: CPT | Mod: 91

## 2020-01-09 PROCEDURE — 700102 HCHG RX REV CODE 250 W/ 637 OVERRIDE(OP): Performed by: INTERNAL MEDICINE

## 2020-01-09 PROCEDURE — A9270 NON-COVERED ITEM OR SERVICE: HCPCS | Performed by: INTERNAL MEDICINE

## 2020-01-09 PROCEDURE — A9270 NON-COVERED ITEM OR SERVICE: HCPCS | Performed by: HOSPITALIST

## 2020-01-09 PROCEDURE — 36415 COLL VENOUS BLD VENIPUNCTURE: CPT

## 2020-01-09 PROCEDURE — 700102 HCHG RX REV CODE 250 W/ 637 OVERRIDE(OP): Performed by: HOSPITALIST

## 2020-01-09 PROCEDURE — 99239 HOSP IP/OBS DSCHRG MGMT >30: CPT | Performed by: INTERNAL MEDICINE

## 2020-01-09 PROCEDURE — 85610 PROTHROMBIN TIME: CPT

## 2020-01-09 RX ORDER — AMOXICILLIN 250 MG
2 CAPSULE ORAL 2 TIMES DAILY
Qty: 30 TAB | Refills: 0 | Status: SHIPPED | OUTPATIENT
Start: 2020-01-09 | End: 2020-04-02

## 2020-01-09 RX ORDER — NYSTATIN 100000 [USP'U]/G
POWDER TOPICAL
Qty: 15 G | Status: ON HOLD
Start: 2020-01-09 | End: 2020-05-06

## 2020-01-09 RX ORDER — FERROUS SULFATE 325(65) MG
325 TABLET ORAL
Qty: 30 TAB
Start: 2020-01-10 | End: 2020-04-02

## 2020-01-09 RX ORDER — WARFARIN SODIUM 5 MG/1
5 TABLET ORAL
Status: DISCONTINUED | OUTPATIENT
Start: 2020-01-09 | End: 2020-01-09 | Stop reason: HOSPADM

## 2020-01-09 RX ADMIN — LOSARTAN POTASSIUM 50 MG: 50 TABLET, FILM COATED ORAL at 05:38

## 2020-01-09 RX ADMIN — LEVOTHYROXINE SODIUM 200 MCG: 100 TABLET ORAL at 05:38

## 2020-01-09 RX ADMIN — LORATADINE 10 MG: 10 TABLET ORAL at 05:38

## 2020-01-09 RX ADMIN — NYSTATIN: 100000 POWDER TOPICAL at 05:43

## 2020-01-09 RX ADMIN — METOPROLOL TARTRATE 50 MG: 50 TABLET, FILM COATED ORAL at 05:39

## 2020-01-09 RX ADMIN — FUROSEMIDE 20 MG: 20 TABLET ORAL at 05:39

## 2020-01-09 RX ADMIN — CITALOPRAM HYDROBROMIDE 20 MG: 20 TABLET ORAL at 05:38

## 2020-01-09 RX ADMIN — POTASSIUM CHLORIDE 20 MEQ: 1500 TABLET, EXTENDED RELEASE ORAL at 05:38

## 2020-01-09 RX ADMIN — FERROUS SULFATE TAB 325 MG (65 MG ELEMENTAL FE) 325 MG: 325 (65 FE) TAB at 08:51

## 2020-01-09 RX ADMIN — FLUTICASONE PROPIONATE 100 MCG: 50 SPRAY, METERED NASAL at 05:38

## 2020-01-09 RX ADMIN — GEMFIBROZIL 600 MG: 600 TABLET ORAL at 05:39

## 2020-01-09 NOTE — PROGRESS NOTES
Assumed care this am from night shift RN. Patient is asleep and laying in bed. Call bell and personal items within reach, bed locked in low position. Bed exit alarm on. Hourly rounding in place.

## 2020-01-09 NOTE — PROGRESS NOTES
Inpatient Anticoagulation Service Note    Date: 2020    Reason for Anticoagulation: Atrial Fibrillation   Target INR: 2.0 to 3.0  [id number] VASc Score: 4  HAS-BLED Score: 1   Hemoglobin Value: (!) 11  Hematocrit Value: 37.7  Lab Platelet Value: 264    INR from last 7 days     Date/Time INR Value    20 0311  (!) 1.74    20 0326  (!) 2.48    20 0313  (!) 2.98    20 0441  (!) 2.12    20 0233  (!) 1.96    20 0322  (!) 2.06        Dose from last 7 days     Date/Time Dose (mg)    20 1613  5    20 1705  2.5    20 0911  2.5    20 1508  2.5    20 0847  2.5    20 1625  5    20 0905  2.5        Average Dose (mg): (Home dose: 5 mg Friday and 2.5 mg AOD)  Significant Interactions: Antibiotics, Statin, Thyroid Medications  Bridge Therapy: No     Reversal Agent Administered: Not Applicable  Comments: INR subtherapeutic, trending down. Increase dose today, likely needs 5mg three times per week. Continue home regiment tomorrow. Daily INR for now.     Plan:  5mg  Education Material Provided?: No(chronic warfarin pt)  Pharmacist suggested discharge dosin.5mg four times per week and 5 mg three times per week. INR within 3-5 days of discharge.      Arthur Matos, PharmD

## 2020-01-09 NOTE — PROGRESS NOTES
Inpatient Anticoagulation Service Note    Date: 2020    Reason for Anticoagulation: Atrial Fibrillation   Target INR: 2.0 to 3.0  [id number] VASc Score: 4  HAS-BLED Score: 1   Hemoglobin Value: (!) 11  Hematocrit Value: 37.7  Lab Platelet Value: 264    INR from last 7 days     Date/Time INR Value    20 0951  (!) 1.51    20 0311  (!) 1.74    20 0326  (!) 2.48    20 0313  (!) 2.98    20 0441  (!) 2.12    20 0233  (!) 1.96        Dose from last 7 days     Date/Time Dose (mg)    20 1128  5    20 1613  5    20 1705  2.5    20 0911  2.5    20 1508  2.5    20 0847  2.5    20 1625  5        Average Dose (mg): (Home dose: 5 mg Friday and 2.5 mg AOD)  Significant Interactions: Statin, Thyroid Medications  Bridge Therapy: No    Reversal Agent Administered: Not Applicable  Comments: INR trending down, subtherapeutic. Continue 5mg dose today until trending up. No bridge indicated currently. No bleeding currently. Daily INR for now.     Plan:  5mg  Education Material Provided?: No(chronic warfarin pt)  Pharmacist suggested discharge dosinmg MWF and 2.5mg T/Th/Sat/[place]. INR within 3-5 days of discharge.      Arthur Matos, PharmD

## 2020-01-09 NOTE — PROGRESS NOTES
Received bedside report from LILLIE Mixon, pt care assumed, VSS, pt assessment complete. Pt AAOx4, no c/o pain at this time. No signs of acute distress noted at this time. POC discussed with pt and verbalizes no questions. Pt denies any additional needs at this time. Bed in lowest position, bed alarm refused, patient calls appropriately, pt educated on fall risk and verbalized understanding, call light within reach, hourly rounding initiated.

## 2020-01-09 NOTE — DISCHARGE INSTRUCTIONS
Discharge Instructions    Discharged to [facility] home by medical transportation with escort. Discharged via wheelchair, hospital escort: Yes.  Special equipment needed: Oxygen    Be sure to schedule a follow-up appointment with your primary care doctor or any specialists as instructed.     Discharge Plan:   Diet Plan: Discussed  Activity Level: Discussed  Confirmed Follow up Appointment: Appointment Scheduled  Confirmed Symptoms Management: Discussed  Medication Reconciliation Updated: Yes  Influenza Vaccine Indication: Not indicated: Previously immunized this influenza season and > 8 years of age    I understand that a diet low in cholesterol, fat, and sodium is recommended for good health. Unless I have been given specific instructions below for another diet, I accept this instruction as my diet prescription.   Other diet: cardiac/ diabetic    Special Instructions:   HF Patient Discharge Instructions  Monitor your weight daily, and maintain a weight chart, to track your weight changes.   Activity as tolerated, unless your Doctor has ordered otherwise. Other activity order: .  Follow a low fat, low cholesterol, low salt diet unless instructed otherwise by your Doctor. Read the labels on the back of food products and track your intake of fat, cholesterol and salt.   Fluid Restriction No. If a Fluid Restriction has been ordered by your Doctor, measure fluids with a measuring cup to ensure that you are not exceeding the restriction.   No smoking.  Oxygen Yes. If your Doctor has ordered that you wear Oxygen at home, it is important to wear it as ordered.  Did you receive an explanation from staff on the importance of taking each of your medications and why it is necessary to keep taking them unless your doctor says to stop? Yes  Were all of your questions answered about how to manage your heart failure and what to do if you have increased signs and symptoms after you go home? Yes  Do you feel like your heart failure care  team involved you in the care treatment plan and allowed you to make decisions regarding your care while in the hospital and addressed any discharge needs you might have? Yes    See the educational handout provided at discharge for more information on monitoring your daily weight, activity and diet. This also explains more about Heart Failure, symptoms of a flare-up and some of the tests that you have undergone.     Warning Signs of a Flare-Up include:  Swelling in the ankles or lower legs.  Shortness of breath, while at rest, or while doing normal activities.   Shortness of breath at night when in bed, or coughing in bed.   Requiring more pillows to sleep at night, or needing to sit up at night to sleep.  Feeling weak, dizzy or fatigued.     When to call your Doctor:  Call [facility] [employer] seven days a week from 8:00 a.m. to 8:00 p.m. for medical questions [phone].  Call your Primary Care Physician or Cardiologist if:   You experience any pain radiating to your jaw or neck.  You have any difficulty breathing.  You experience weight gain of 3 lbs in a day or 5 lbs in a week.   You feel any palpitations or irregular heartbeats.  You become dizzy or lose consciousness.   If you have had an angiogram or had a pacemaker or AICD placed, and experience:  Bleeding, drainage or swelling at the surgical / puncture site.  Fever greater than 100.0 F  Shock from internal defibrillator.  Cool and / or numb extremities.      Is patient discharged on Warfarin / Coumadin?   Yes    You are receiving the drug warfarin. Please understand the importance of monitoring warfarin with scheduled PT/INR blood draws.  Follow-up with the [facility] in one week for INR lab..    IMPORTANT: HOW TO USE THIS INFORMATION:  This is a summary and does NOT have all possible information about this product. This information does not assure that this product is safe, effective, or appropriate for you. This information is not individual  "medical advice and does not substitute for the advice of your health care professional. Always ask your health care professional for complete information about this product and your specific health needs.      WARFARIN - ORAL (WARF-uh-rin)      COMMON BRAND NAME(S): Coumadin      WARNING:  Warfarin can cause very serious (possibly fatal) bleeding. This is more likely to occur when you first start taking this medication or if you take too much warfarin. To decrease your risk for bleeding, your doctor or other health care provider will monitor you closely and check your lab results (INR test) to make sure you are not taking too much warfarin. Keep all medical and laboratory appointments. Tell your doctor right away if you notice any signs of serious bleeding. See also Side Effects section.      USES:  This medication is used to treat blood clots (such as in deep vein thrombosis-DVT or pulmonary embolus-PE) and/or to prevent new clots from forming in your body. Preventing harmful blood clots helps to reduce the risk of a stroke or heart attack. Conditions that increase your risk of developing blood clots include a certain type of irregular heart rhythm (atrial fibrillation), heart valve replacement, recent heart attack, and certain surgeries (such as hip/knee replacement). Warfarin is commonly called a \"blood thinner,\" but the more correct term is \"anticoagulant.\" It helps to keep blood flowing smoothly in your body by decreasing the amount of certain substances (clotting proteins) in your blood.      HOW TO USE:  Read the Medication Guide provided by your pharmacist before you start taking warfarin and each time you get a refill. If you have any questions, ask your doctor or pharmacist. Take this medication by mouth with or without food as directed by your doctor or other health care professional, usually once a day. It is very important to take it exactly as directed. Do not increase the dose, take it more " frequently, or stop using it unless directed by your doctor. Dosage is based on your medical condition, laboratory tests (such as INR), and response to treatment. Your doctor or other health care provider will monitor you closely while you are taking this medication to determine the right dose for you. Use this medication regularly to get the most benefit from it. To help you remember, take it at the same time each day. It is important to eat a balanced, consistent diet while taking warfarin. Some foods can affect how warfarin works in your body and may affect your treatment and dose. Avoid sudden large increases or decreases in your intake of foods high in vitamin K (such as broccoli, cauliflower, cabbage, brussels sprouts, kale, spinach, and other green leafy vegetables, liver, green tea, certain vitamin supplements). If you are trying to lose weight, check with your doctor before you try to go on a diet. Cranberry products may also affect how your warfarin works. Limit the amount of cranberry juice (16 ounces/480 milliliters a day) or other cranberry products you may drink or eat.      SIDE EFFECTS:  Nausea, loss of appetite, or stomach/abdominal pain may occur. If any of these effects persist or worsen, tell your doctor or pharmacist promptly. Remember that your doctor has prescribed this medication because he or she has judged that the benefit to you is greater than the risk of side effects. Many people using this medication do not have serious side effects. This medication can cause serious bleeding if it affects your blood clotting proteins too much (shown by unusually high INR lab results). Even if your doctor stops your medication, this risk of bleeding can continue for up to a week. Tell your doctor right away if you have any signs of serious bleeding, including: unusual pain/swelling/discomfort, unusual/easy bruising, prolonged bleeding from cuts or gums, persistent/frequent nosebleeds, unusually  heavy/prolonged menstrual flow, pink/dark urine, coughing up blood, vomit that is bloody or looks like coffee grounds, severe headache, dizziness/fainting, unusual or persistent tiredness/weakness, bloody/black/tarry stools, chest pain, shortness of breath, difficulty swallowing. Tell your doctor right away if any of these unlikely but serious side effects occur: persistent nausea/vomiting, severe stomach/abdominal pain, yellowing eyes/skin. This drug rarely has caused very serious (possibly fatal) problems if its effects lead to small blood clots (usually at the beginning of treatment). This can lead to severe skin/tissue damage that may require surgery or amputation if left untreated. Patients with certain blood conditions (protein C or S deficiency) may be at greater risk. Get medical help right away if any of these rare but serious side effects occur: painful/red/purplish patches on the skin (such as on the toe, breast, abdomen), change in the amount of urine, vision changes, confusion, slurred speech, weakness on one side of the body. A very serious allergic reaction to this drug is rare. However, get medical help right away if you notice any symptoms of a serious allergic reaction, including: rash, itching/swelling (especially of the face/tongue/throat), severe dizziness, trouble breathing. This is not a complete list of possible side effects. If you notice other effects not listed above, contact your doctor or pharmacist. In the [place] - Call your doctor for medical advice about side effects. You may report side effects to FDA at 7-118-HFK-4830. In [place] - Call your doctor for medical advice about side effects. You may report side effects to [facility] [place] at 1-426.520.3039.      PRECAUTIONS:  Before taking warfarin, tell your doctor or pharmacist if you are allergic to it; or if you have any other allergies. This product may contain inactive ingredients, which can cause allergic reactions or other problems. Talk  to your pharmacist for more details. Before using this medication, tell your doctor or pharmacist your medical history, especially of: blood disorders (such as anemia, hemophilia), bleeding problems (such as bleeding of the stomach/intestines, bleeding in the brain), blood vessel disorders (such as aneurysms), recent major injury/surgery, liver disease, alcohol use, mental/mood disorders (including memory problems), frequent falls/injuries. It is important that all your doctors and dentists know that you take warfarin. Before having surgery or any medical/dental procedures, tell your doctor or dentist that you are taking this medication and about all the products you use (including prescription drugs, nonprescription drugs, and herbal products). Avoid getting injections into the muscles. If you must have an injection into a muscle (for example, a flu shot), it should be given in the arm. This way, it will be easier to check for bleeding and/or apply pressure bandages. This medication may cause stomach bleeding. Daily use of alcohol while using this medicine will increase your risk for stomach bleeding and may also affect how this medication works. Limit or avoid alcoholic beverages. If you have not been eating well, if you have an illness or infection that causes fever, vomiting, or diarrhea for more than 2 days, or if you start using any antibiotic medications, contact your doctor or pharmacist immediately because these conditions can affect how warfarin works. This medication can cause heavy bleeding. To lower the chance of getting cut, bruised, or injured, use great caution with sharp objects like safety razors and nail cutters. Use an electric razor when shaving and a soft toothbrush when brushing your teeth. Avoid activities such as contact sports. If you fall or injure yourself, especially if you hit your head, call your doctor immediately. Your doctor may need to check you. The Food & Drug Administration has  "stated that generic warfarin products are interchangeable. However, consult your doctor or pharmacist before switching warfarin products. Be careful not to take more than one medication that contains warfarin unless specifically directed by the doctor or health care provider who is monitoring your warfarin treatment. Older adults may be at greater risk for bleeding while using this drug. This medication is not recommended for use during pregnancy because of serious (possibly fatal) harm to an unborn baby. Discuss the use of reliable forms of birth control with your doctor. If you become pregnant or think you may be pregnant, tell your doctor immediately. If you are planning pregnancy, discuss a plan for managing your condition with your doctor before you become pregnant. Your doctor may switch the type of medication you use during pregnancy. Very small amounts of this medication may pass into breast milk but is unlikely to harm a nursing infant. Consult your doctor before breast-feeding.      DRUG INTERACTIONS:  Drug interactions may change how your medications work or increase your risk for serious side effects. This document does not contain all possible drug interactions. Keep a list of all the products you use (including prescription/nonprescription drugs and herbal products) and share it with your doctor and pharmacist. Do not start, stop, or change the dosage of any medicines without your doctor's approval. Warfarin interacts with many prescription, nonprescription, vitamin, and herbal products. This includes medications that are applied to the skin or inside the vagina or rectum. The interactions with warfarin usually result in an increase or decrease in the \"blood-thinning\" (anticoagulant) effect. Your doctor or other health care professional should closely monitor you to prevent serious bleeding or clotting problems. While taking warfarin, it is very important to tell your doctor or pharmacist of any " changes in medications, vitamins, or herbal products that you are taking. Some products that may interact with this drug include: capecitabine, imatinib, mifepristone. Aspirin, aspirin-like drugs (salicylates), and nonsteroidal anti-inflammatory drugs (NSAIDs such as ibuprofen, naproxen, celecoxib) may have effects similar to warfarin. These drugs may increase the risk of bleeding problems if taken during treatment with warfarin. Carefully check all prescription/nonprescription product labels (including drugs applied to the skin such as pain-relieving creams) since the products may contain NSAIDs or salicylates. Talk to your doctor about using a different medication (such as acetaminophen) to treat pain/fever. Low-dose aspirin and related drugs (such as clopidogrel, ticlopidine) should be continued if prescribed by your doctor for specific medical reasons such as heart attack or stroke prevention. Consult your doctor or pharmacist for more details. Many herbal products interact with warfarin. Tell your doctor before taking any herbal products, especially bromelains, coenzyme Q10, cranberry, danshen, dong quai, fenugreek, garlic, ginkgo biloba, ginseng, and Lakesha's wort, among others. This medication may interfere with a certain laboratory test to measure theophylline levels, possibly causing false test results. Make sure laboratory personnel and all your doctors know you use this drug.      OVERDOSE:  If overdose is suspected, contact a poison control center or emergency room immediately. [place] residents can call the [place] National Poison Hotline at 1-348.359.1862. [place] residents can call a provincial poison control center. Symptoms of overdose may include: bloody/black/tarry stools, pink/dark urine, unusual/prolonged bleeding.      NOTES:  Do not share this medication with others. Laboratory and/or medical tests (such as INR, complete blood count) must be performed periodically to monitor your progress or check for  side effects. Consult your doctor for more details.      MISSED DOSE:  For the best possible benefit, do not miss any doses. If you do miss a dose and remember on the same day, take it as soon as you remember. If you remember on the next day, skip the missed dose and resume your usual dosing schedule. Do not double the dose to catch up because this could increase your risk for bleeding. Keep a record of missed doses to give to your doctor or pharmacist. Contact your doctor or pharmacist if you miss 2 or more doses in a row.      STORAGE:  Store at room temperature away from light and moisture. Do not store in the bathroom. Keep all medications away from children and pets. Do not flush medications down the toilet or pour them into a drain unless instructed to do so. Properly discard this product when it is  or no longer needed. Consult your pharmacist or local waste disposal company for more details about how to safely discard your product.      MEDICAL ALERT:  Your condition and medication can cause complications in a medical emergency. For information about enrolling in MedicAlert, call 1-896.994.8813 ([place]) or 1-942.558.5357 ([place]).      Information last revised 2010 Copyright(c) 2010 [employer]             Depression / Suicide Risk    As you are discharged from this [facility] facility, it is important to learn how to keep safe from harming yourself.    Recognize the warning signs:  Abrupt changes in personality, positive or negative- including increase in energy   Giving away possessions  Change in eating patterns- significant weight changes-  positive or negative  Change in sleeping patterns- unable to sleep or sleeping all the time   Unwillingness or inability to communicate  Depression  Unusual sadness, discouragement and loneliness  Talk of wanting to die  Neglect of personal appearance   Rebelliousness- reckless behavior  Withdrawal from people/activities they love  Confusion-  inability to concentrate     If you or a loved one observes any of these behaviors or has concerns about self-harm, here's what you can do:  Talk about it- your feelings and reasons for harming yourself  Remove any means that you might use to hurt yourself (examples: pills, rope, extension cords, firearm)  Get professional help from the community (Mental Health, Substance Abuse, psychological counseling)  Do not be alone:Call your Safe Contact- someone whom you trust who will be there for you.  Call your local CRISIS HOTLINE 710-5932 or 762-680-2194  Call your local Children's Mobile Crisis Response Team [place] (582) 164-0719 or www.[address]  Call the toll free National Suicide Prevention Hotlines   National Suicide Prevention Lifeline 036-230-FWRI (0041)  [place] [employer] Line Network 800-SUICIDE (698-0936)    Atrial Fibrillation  Introduction  Atrial fibrillation is a type of heartbeat that is irregular or fast (rapid). If you have this condition, your heart keeps quivering in a weird (chaotic) way. This condition can make it so your heart cannot pump blood normally. Having this condition gives a person more risk for stroke, heart failure, and other heart problems. There are different types of atrial fibrillation. Talk with your doctor to learn about the type that you have.  Follow these instructions at home:  · Take over-the-counter and prescription medicines only as told by your doctor.  · If your doctor prescribed a blood-thinning medicine, take it exactly as told. Taking too much of it can cause bleeding. If you do not take enough of it, you will not have the protection that you need against stroke and other problems.  · Do not use any tobacco products. These include cigarettes, chewing tobacco, and e-cigarettes. If you need help quitting, ask your doctor.  · If you have apnea (obstructive sleep apnea), manage it as told by your doctor.  · Do not drink alcohol.  · Do not drink beverages that have  caffeine. These include coffee, soda, and tea.  · Maintain a healthy weight. Do not use diet pills unless your doctor says they are safe for you. Diet pills may make heart problems worse.  · Follow diet instructions as told by your doctor.  · Exercise regularly as told by your doctor.  · Keep all follow-up visits as told by your doctor. This is important.  Contact a doctor if:  · You notice a change in the speed, rhythm, or strength of your heartbeat.  · You are taking a blood-thinning medicine and you notice more bruising.  · You get tired more easily when you move or exercise.  Get help right away if:  · You have pain in your chest or your belly (abdomen).  · You have sweating or weakness.  · You feel sick to your stomach (nauseous).  · You notice blood in your throw up (vomit), poop (stool), or pee (urine).  · You are short of breath.  · You suddenly have swollen feet and ankles.  · You feel dizzy.  · Your suddenly get weak or numb in your face, arms, or legs, especially if it happens on one side of your body.  · You have trouble talking, trouble understanding, or both.  · Your face or your eyelid droops on one side.  These symptoms may be an emergency. Do not wait to see if the symptoms will go away. Get medical help right away. Call your local emergency services (911 in the [place] Do not drive yourself to the hospital.   This information is not intended to replace advice given to you by your health care provider. Make sure you discuss any questions you have with your health care provider.  Document Released: 09/26/2009 Document Revised: 05/25/2017 Document Reviewed: 04/13/2016  © 2017 [employer]

## 2020-01-09 NOTE — DISCHARGE SUMMARY
Discharge Summary    CHIEF COMPLAINT ON ADMISSION  Chief Complaint   Patient presents with   • Shortness of Breath     Pt states her SOB restarted tongiht, currently does not use any oxygen at home.  Treated for pulmonary edema in 11/2019.         Reason for Admission  Shortness of breath      Admission Date  12/31/2019    CODE STATUS  Full Code    HPI & HOSPITAL COURSE  This is a 69 y.o. female here with     PMH A. fib, HTN, CAD, DM, CHF EF 60%, HLD, KEN who presented with shortness and orthopnea worse in the last week.  She had been lax on her diet while on a cruise and not taking Lasix.  BNP was elevated and chest x-ray showed cardiomegaly.  She was admitted for CHF exacerbation. She also had LLE cellulitis and received a course of ceftriaxone and doxycycline. Doppler was neg for left DVT.  She is also treated for right otitis media.  She diuresed well and was transitioned to oral Lasix.  She was evaluated by PT OT who recommended SNF.     Patient remains stable at time of transfer with compensated CHF and weaned off O2.  Her leg cellulitis has resolved although she has chronic venous stasis skin changes in legs.  Patient will transfer to SNF for PT/OT due to debility.    Therefore, she is discharged in good and stable condition to skilled nursing facility.    The patient met 2-midnight criteria for an inpatient stay at the time of discharge.    Discharge Date  1/9/20    FOLLOW UP ITEMS POST DISCHARGE  F/u with pcp post snf discharge    DISCHARGE DIAGNOSES  Principal Problem:    CHF exacerbation (HCC) POA: Yes  Active Problems:    Chronic atrial fibrillation POA: Yes    Insulin dependent diabetes mellitus (HCC) POA: Yes    Left leg cellulitis POA: Yes    Elevated troponin POA: Yes    Morbid obesity (HCC) (Chronic) POA: Yes    History of hypothyroidism (Chronic) POA: Yes    Anemia POA: Yes    Left knee pain POA: Yes    Acute otitis media POA: Yes    Rash POA: Yes    Weakness POA: Yes    Benign essential  hypertension (Chronic) POA: Yes  Resolved Problems:    Acute hypoxemic respiratory failure (HCC) POA: Unknown    Hypokalemia POA: Unknown    Hypomagnesemia POA: Unknown    Neck pain POA: Unknown      FOLLOW UP  Future Appointments   Date Time Provider Department Center   2/7/2020 10:45 AM Ash Jeffrey M.D. [facility] None     Daniel Fernández M.D.  [address]  [address]  [place] [address]  [phone]      [facility]  left a voicemail for the office to give you a call to schedule your hospital follow up. If you do not hear from them within two business days, please call to schedule your appointment. Thank you.    [facility]  [address] [place] [address]  [phone]        Daniel Fernández M.D.  [address]  [address]  [place] [address]  [phone]    In 2 weeks        MEDICATIONS ON DISCHARGE     Medication List      START taking these medications      Instructions   ferrous sulfate 325 (65 Fe) MG tablet  Start taking on:  January 10, 2020   Take 1 Tab by mouth every morning with breakfast.  Dose:  325 mg     insulin regular 100 Unit/mL Soln  Commonly known as:  HUMULIN R   Inject 2-9 Units as instructed 4 Times a Day,Before Meals and at Bedtime.  Dose:  2-9 Units     nystatin powder  Commonly known as:  MYCOSTATIN   Apply to lower abdomen/groin rash until resolution     senna-docusate 8.6-50 MG Tabs  Commonly known as:  PERICOLACE or SENOKOT S   Take 2 Tabs by mouth 2 Times a Day.  Dose:  2 Tab        CONTINUE taking these medications      Instructions   allopurinol 100 MG Tabs  Commonly known as:  ZYLOPRIM   Take 100 mg by mouth every bedtime.  Dose:  100 mg     citalopram 20 MG Tabs  Commonly known as:  CELEXA   Take 20 mg by mouth every day.  Dose:  20 mg     furosemide 20 MG Tabs  Commonly known as:  LASIX   Take 1 Tab by mouth 2 Times a Day.  Dose:  20 mg     gemfibrozil 600 MG Tabs  Commonly known as:  LOPID   Take 600 mg by mouth every morning.  Dose:   600 mg     glipiZIDE 5 MG Tabs  Commonly known as:  GLUCOTROL   Take 2.5 mg by mouth 2 times a day.  Dose:  2.5 mg     hydroCHLOROthiazide 25 MG Tabs  Commonly known as:  HYDRODIURIL   Take 25 mg by mouth every morning.  Dose:  25 mg     * levothyroxine 175 MCG Tabs  Commonly known as:  SYNTHROID   Take 175 mcg by mouth every Sunday. Sundays  *Brand Only*  Dose:  175 mcg     * SYNTHROID 200 MCG Tabs  Generic drug:  levothyroxine   Take 200 mcg by mouth every day. Monday thru Saturday  Dose:  200 mcg     loratadine 10 MG Tabs  Commonly known as:  CLARITIN   Take 10 mg by mouth every day.  Dose:  10 mg     losartan 50 MG Tabs  Commonly known as:  COZAAR   Take 50 mg by mouth every day.  Dose:  50 mg     metoprolol 50 MG Tabs  Commonly known as:  LOPRESSOR   Take 50 mg by mouth 2 times a day.  Dose:  50 mg     MULTIVITAMIN PO   Take 1 Tab by mouth every morning.  Dose:  1 Tab     oxybutynin 15 MG CR tablet  Commonly known as:  DITROPAN XL   Take 15 mg by mouth every morning.  Dose:  15 mg     potassium chloride SA 20 MEQ Tbcr  Commonly known as:  Kdur   Take 20 mEq by mouth every morning.  Dose:  20 mEq     rosuvastatin 20 MG Tabs  Commonly known as:  CRESTOR   Take 20 mg by mouth every evening.  Dose:  20 mg     traZODone 100 MG Tabs  Commonly known as:  DESYREL   Take 100 mg by mouth every evening.  Dose:  100 mg     TYLENOL 8 HOUR ARTHRITIS PAIN 650 MG CR tablet  Generic drug:  acetaminophen   Take 1,300 mg by mouth 2 Times a Day.  Dose:  1,300 mg     Vitamin D-3 5000 units Tabs   Take 5,000 Units by mouth 3 times a week. Monday, Wednesday & Friday  Dose:  5,000 Units     warfarin 5 MG Tabs  Commonly known as:  COUMADIN   Take 2.5-5 mg by mouth every evening. 5 mg every Friday  2.5 mg all other days of the week  Dose:  2.5-5 mg         * This list has 2 medication(s) that are the same as other medications prescribed for you. Read the directions carefully, and ask your doctor or other care provider to review them  with you.            STOP taking these medications    LEVEMIR FLEXPEN SC     pioglitazone 15 MG Tabs  Commonly known as:  ACTOS            Allergies  Allergies   Allergen Reactions   • Sulfa Drugs Rash     8/2015         DIET  Orders Placed This Encounter   Procedures   • Diet Order Cardiac     Standing Status:   Standing     Number of Occurrences:   1     Order Specific Question:   Diet:     Answer:   Cardiac [6]     Order Specific Question:   Consistency/Fluid modifications:     Answer:   2000 ml Fluid Restriction [11]       ACTIVITY  As tolerated.  Weight bearing as tolerated    CONSULTATIONS  none    PROCEDURES  none    LABORATORY  Lab Results   Component Value Date    SODIUM 140 01/08/2020    POTASSIUM 3.5 (L) 01/08/2020    CHLORIDE 96 01/08/2020    CO2 33 01/08/2020    GLUCOSE 114 (H) 01/08/2020    BUN 30 (H) 01/08/2020    CREATININE 0.79 01/08/2020    CREATININE 0.6 01/27/2009        Lab Results   Component Value Date    WBC 5.7 01/08/2020    HEMOGLOBIN 11.0 (L) 01/08/2020    HEMATOCRIT 37.7 01/08/2020    PLATELETCT 264 01/08/2020        Total time of the discharge process exceeds 35 minutes.

## 2020-01-09 NOTE — PROGRESS NOTES
Handoff report received. Assumed patient care. Patient resting in bed. Patient not in distress, AAOX 4 and on O2, 0.5L NC. Safety precautions in place. Call light and personal belongings within reach. Bed is locked and in lowest position. Educated to call for assistance if needed. Pt verbalized understanding.

## 2020-01-09 NOTE — CARE PLAN
Problem: Communication  Goal: The ability to communicate needs accurately and effectively will improve  Outcome: PROGRESSING AS EXPECTED     Problem: Infection  Goal: Will remain free from infection  Outcome: PROGRESSING AS EXPECTED  Note:   Proper hand hygiene before and after patient care to ensure patient will remain free from infection.    Intervention: Implement standard precautions and perform hand washing before and after patient contact  Note:   Proper hand hygiene before and after patient care to ensure patient will remain free from infection.       Problem: Safety  Goal: Will remain free from falls  Note:   Patient educated to use call light for assistance. Fall precautions in place. Staff will assist with mobilization. Hourly rounding in place.

## 2020-01-09 NOTE — DISCHARGE PLANNING
Agency/Facility Name: [facility]  Spoke To: Chantelle  Outcome: Informed of transport time.    Agency/Facility Name: [facility]  Spoke To: Transport  Outcome: Transport set up for 1600.  RN CM informed.    @1345  Agency/Facility Name: [facility]  Spoke To: Chantelle  Outcome: Accepted, need [facility] to set up transport and needs [facility].  RN CM informed.    Received Choice form at 1315  Agency/Facility Name: [facility]  Referral sent per Choice form @ 1315     @1050  [facility] Name: [facility]  Spoke To: Izabella  Outcome: No beds today.    @0940  Agency/Facility Name: [facility]  Outcome: Left message, awaiting call back.

## 2020-01-09 NOTE — CARE PLAN
Problem: Communication  Goal: The ability to communicate needs accurately and effectively will improve  Outcome: PROGRESSING AS EXPECTED     Problem: Discharge Barriers/Planning  Goal: Patient's continuum of care needs will be met  Outcome: PROGRESSING AS EXPECTED     Problem: Communication  Goal: The ability to communicate needs accurately and effectively will improve  Outcome: PROGRESSING AS EXPECTED     Problem: Discharge Barriers/Planning  Goal: Patient's continuum of care needs will be met  Outcome: PROGRESSING AS EXPECTED   Pt. Encouragd to voice feelings.  Explaied the plan of care for the day.  Asked pt. If she had any questions regarding plan for the day.  Pt. States she understands the plan moving forward.  Will keep pt. Up to date on any status change or change in care.

## 2020-01-10 NOTE — PROGRESS NOTES
Discharge Instructions    Discharged to [facility] home by medical transportation with escort. Discharged via wheelchair, hospital escort: Yes.  Special equipment needed: Oxygen    Be sure to schedule a follow-up appointment with your primary care doctor or any specialists as instructed.     Discharge Plan:   Diet Plan: Discussed  Activity Level: Discussed  Confirmed Follow up Appointment: Appointment Scheduled  Confirmed Symptoms Management: Discussed  Medication Reconciliation Updated: Yes  Influenza Vaccine Indication: Not indicated: Previously immunized this influenza season and > 8 years of age    I understand that a diet low in cholesterol, fat, and sodium is recommended for good health. Unless I have been given specific instructions below for another diet, I accept this instruction as my diet prescription.   Other diet: cardiac/diabetic  Special Instructions:   HF Patient Discharge Instructions  · Monitor your weight daily, and maintain a weight chart, to track your weight changes.   · Activity as tolerated, unless your Doctor has ordered otherwise. Other activity order: .  · Follow a low fat, low cholesterol, low salt diet unless instructed otherwise by your Doctor. Read the labels on the back of food products and track your intake of fat, cholesterol and salt.   · Fluid Restriction No. If a Fluid Restriction has been ordered by your Doctor, measure fluids with a measuring cup to ensure that you are not exceeding the restriction.   · No smoking.  · Oxygen Yes. If your Doctor has ordered that you wear Oxygen at home, it is important to wear it as ordered.  · Did you receive an explanation from staff on the importance of taking each of your medications and why it is necessary to keep taking them unless your doctor says to stop? Yes  · Were all of your questions answered about how to manage your heart failure and what to do if you have increased signs and symptoms after you go home? Yes  · Do you feel like your  heart failure care team involved you in the care treatment plan and allowed you to make decisions regarding your care while in the hospital and addressed any discharge needs you might have? Yes    See the educational handout provided at discharge for more information on monitoring your daily weight, activity and diet. This also explains more about Heart Failure, symptoms of a flare-up and some of the tests that you have undergone.     Warning Signs of a Flare-Up include:  · Swelling in the ankles or lower legs.  · Shortness of breath, while at rest, or while doing normal activities.   · Shortness of breath at night when in bed, or coughing in bed.   · Requiring more pillows to sleep at night, or needing to sit up at night to sleep.  · Feeling weak, dizzy or fatigued.     When to call your Doctor:  · Call [facility] seven days a week from 8:00 a.m. to 8:00 p.m. for medical questions [phone].  · Call your Primary Care Physician or Cardiologist if:   1. You experience any pain radiating to your jaw or neck.  2. You have any difficulty breathing.  3. You experience weight gain of 3 lbs in a day or 5 lbs in a week.   4. You feel any palpitations or irregular heartbeats.  5. You become dizzy or lose consciousness.   If you have had an angiogram or had a pacemaker or AICD placed, and experience:  1. Bleeding, drainage or swelling at the surgical / puncture site.  2. Fever greater than 100.0 F  3. Shock from internal defibrillator.  4. Cool and / or numb extremities.      · Is patient discharged on Warfarin / Coumadin?   Yes    You are receiving the drug warfarin. Please understand the importance of monitoring warfarin with scheduled PT/INR blood draws.  Follow-up with the Coumadin Clinic in one week for INR lab..    IMPORTANT: HOW TO USE THIS INFORMATION:  This is a summary and does NOT have all possible information about this product. This information does not assure that this product is safe, effective,  "or appropriate for you. This information is not individual medical advice and does not substitute for the advice of your health care professional. Always ask your health care professional for complete information about this product and your specific health needs.      WARFARIN - ORAL (WARF-uh-rin)      COMMON BRAND NAME(S): Coumadin      WARNING:  Warfarin can cause very serious (possibly fatal) bleeding. This is more likely to occur when you first start taking this medication or if you take too much warfarin. To decrease your risk for bleeding, your doctor or other health care provider will monitor you closely and check your lab results (INR test) to make sure you are not taking too much warfarin. Keep all medical and laboratory appointments. Tell your doctor right away if you notice any signs of serious bleeding. See also Side Effects section.      USES:  This medication is used to treat blood clots (such as in deep vein thrombosis-DVT or pulmonary embolus-PE) and/or to prevent new clots from forming in your body. Preventing harmful blood clots helps to reduce the risk of a stroke or heart attack. Conditions that increase your risk of developing blood clots include a certain type of irregular heart rhythm (atrial fibrillation), heart valve replacement, recent heart attack, and certain surgeries (such as hip/knee replacement). Warfarin is commonly called a \"blood thinner,\" but the more correct term is \"anticoagulant.\" It helps to keep blood flowing smoothly in your body by decreasing the amount of certain substances (clotting proteins) in your blood.      HOW TO USE:  Read the Medication Guide provided by your pharmacist before you start taking warfarin and each time you get a refill. If you have any questions, ask your doctor or pharmacist. Take this medication by mouth with or without food as directed by your doctor or other health care professional, usually once a day. It is very important to take it exactly as " directed. Do not increase the dose, take it more frequently, or stop using it unless directed by your doctor. Dosage is based on your medical condition, laboratory tests (such as INR), and response to treatment. Your doctor or other health care provider will monitor you closely while you are taking this medication to determine the right dose for you. Use this medication regularly to get the most benefit from it. To help you remember, take it at the same time each day. It is important to eat a balanced, consistent diet while taking warfarin. Some foods can affect how warfarin works in your body and may affect your treatment and dose. Avoid sudden large increases or decreases in your intake of foods high in vitamin K (such as broccoli, cauliflower, cabbage, brussels sprouts, kale, spinach, and other green leafy vegetables, liver, green tea, certain vitamin supplements). If you are trying to lose weight, check with your doctor before you try to go on a diet. Cranberry products may also affect how your warfarin works. Limit the amount of cranberry juice (16 ounces/480 milliliters a day) or other cranberry products you may drink or eat.      SIDE EFFECTS:  Nausea, loss of appetite, or stomach/abdominal pain may occur. If any of these effects persist or worsen, tell your doctor or pharmacist promptly. Remember that your doctor has prescribed this medication because he or she has judged that the benefit to you is greater than the risk of side effects. Many people using this medication do not have serious side effects. This medication can cause serious bleeding if it affects your blood clotting proteins too much (shown by unusually high INR lab results). Even if your doctor stops your medication, this risk of bleeding can continue for up to a week. Tell your doctor right away if you have any signs of serious bleeding, including: unusual pain/swelling/discomfort, unusual/easy bruising, prolonged bleeding from cuts or gums,  persistent/frequent nosebleeds, unusually heavy/prolonged menstrual flow, pink/dark urine, coughing up blood, vomit that is bloody or looks like coffee grounds, severe headache, dizziness/fainting, unusual or persistent tiredness/weakness, bloody/black/tarry stools, chest pain, shortness of breath, difficulty swallowing. Tell your doctor right away if any of these unlikely but serious side effects occur: persistent nausea/vomiting, severe stomach/abdominal pain, yellowing eyes/skin. This drug rarely has caused very serious (possibly fatal) problems if its effects lead to small blood clots (usually at the beginning of treatment). This can lead to severe skin/tissue damage that may require surgery or amputation if left untreated. Patients with certain blood conditions (protein C or S deficiency) may be at greater risk. Get medical help right away if any of these rare but serious side effects occur: painful/red/purplish patches on the skin (such as on the toe, breast, abdomen), change in the amount of urine, vision changes, confusion, slurred speech, weakness on one side of the body. A very serious allergic reaction to this drug is rare. However, get medical help right away if you notice any symptoms of a serious allergic reaction, including: rash, itching/swelling (especially of the face/tongue/throat), severe dizziness, trouble breathing. This is not a complete list of possible side effects. If you notice other effects not listed above, contact your doctor or pharmacist. In the [place] - Call your doctor for medical advice about side effects. You may report side effects to FDA at 7-324-XBU-3327. In [place] - Call your doctor for medical advice about side effects. You may report side effects to [facility] [place] at 1-162.634.4876.      PRECAUTIONS:  Before taking warfarin, tell your doctor or pharmacist if you are allergic to it; or if you have any other allergies. This product may contain inactive ingredients, which can cause  allergic reactions or other problems. Talk to your pharmacist for more details. Before using this medication, tell your doctor or pharmacist your medical history, especially of: blood disorders (such as anemia, hemophilia), bleeding problems (such as bleeding of the stomach/intestines, bleeding in the brain), blood vessel disorders (such as aneurysms), recent major injury/surgery, liver disease, alcohol use, mental/mood disorders (including memory problems), frequent falls/injuries. It is important that all your doctors and dentists know that you take warfarin. Before having surgery or any medical/dental procedures, tell your doctor or dentist that you are taking this medication and about all the products you use (including prescription drugs, nonprescription drugs, and herbal products). Avoid getting injections into the muscles. If you must have an injection into a muscle (for example, a flu shot), it should be given in the arm. This way, it will be easier to check for bleeding and/or apply pressure bandages. This medication may cause stomach bleeding. Daily use of alcohol while using this medicine will increase your risk for stomach bleeding and may also affect how this medication works. Limit or avoid alcoholic beverages. If you have not been eating well, if you have an illness or infection that causes fever, vomiting, or diarrhea for more than 2 days, or if you start using any antibiotic medications, contact your doctor or pharmacist immediately because these conditions can affect how warfarin works. This medication can cause heavy bleeding. To lower the chance of getting cut, bruised, or injured, use great caution with sharp objects like safety razors and nail cutters. Use an electric razor when shaving and a soft toothbrush when brushing your teeth. Avoid activities such as contact sports. If you fall or injure yourself, especially if you hit your head, call your doctor immediately. Your doctor may need to  "check you. The Food & Drug Administration has stated that generic warfarin products are interchangeable. However, consult your doctor or pharmacist before switching warfarin products. Be careful not to take more than one medication that contains warfarin unless specifically directed by the doctor or health care provider who is monitoring your warfarin treatment. Older adults may be at greater risk for bleeding while using this drug. This medication is not recommended for use during pregnancy because of serious (possibly fatal) harm to an unborn baby. Discuss the use of reliable forms of birth control with your doctor. If you become pregnant or think you may be pregnant, tell your doctor immediately. If you are planning pregnancy, discuss a plan for managing your condition with your doctor before you become pregnant. Your doctor may switch the type of medication you use during pregnancy. Very small amounts of this medication may pass into breast milk but is unlikely to harm a nursing infant. Consult your doctor before breast-feeding.      DRUG INTERACTIONS:  Drug interactions may change how your medications work or increase your risk for serious side effects. This document does not contain all possible drug interactions. Keep a list of all the products you use (including prescription/nonprescription drugs and herbal products) and share it with your doctor and pharmacist. Do not start, stop, or change the dosage of any medicines without your doctor's approval. Warfarin interacts with many prescription, nonprescription, vitamin, and herbal products. This includes medications that are applied to the skin or inside the vagina or rectum. The interactions with warfarin usually result in an increase or decrease in the \"blood-thinning\" (anticoagulant) effect. Your doctor or other health care professional should closely monitor you to prevent serious bleeding or clotting problems. While taking warfarin, it is very important " to tell your doctor or pharmacist of any changes in medications, vitamins, or herbal products that you are taking. Some products that may interact with this drug include: capecitabine, imatinib, mifepristone. Aspirin, aspirin-like drugs (salicylates), and nonsteroidal anti-inflammatory drugs (NSAIDs such as ibuprofen, naproxen, celecoxib) may have effects similar to warfarin. These drugs may increase the risk of bleeding problems if taken during treatment with warfarin. Carefully check all prescription/nonprescription product labels (including drugs applied to the skin such as pain-relieving creams) since the products may contain NSAIDs or salicylates. Talk to your doctor about using a different medication (such as acetaminophen) to treat pain/fever. Low-dose aspirin and related drugs (such as clopidogrel, ticlopidine) should be continued if prescribed by your doctor for specific medical reasons such as heart attack or stroke prevention. Consult your doctor or pharmacist for more details. Many herbal products interact with warfarin. Tell your doctor before taking any herbal products, especially bromelains, coenzyme Q10, cranberry, danshen, dong quai, fenugreek, garlic, ginkgo biloba, ginseng, and Lakesha's wort, among others. This medication may interfere with a certain laboratory test to measure theophylline levels, possibly causing false test results. Make sure laboratory personnel and all your doctors know you use this drug.      OVERDOSE:  If overdose is suspected, contact a poison control center or emergency room immediately. [place] residents can call the [place] National Poison Hotline at 1-778.179.7364. [place] residents can call a provincial poison control center. Symptoms of overdose may include: bloody/black/tarry stools, pink/dark urine, unusual/prolonged bleeding.      NOTES:  Do not share this medication with others. Laboratory and/or medical tests (such as INR, complete blood count) must be performed  periodically to monitor your progress or check for side effects. Consult your doctor for more details.      MISSED DOSE:  For the best possible benefit, do not miss any doses. If you do miss a dose and remember on the same day, take it as soon as you remember. If you remember on the next day, skip the missed dose and resume your usual dosing schedule. Do not double the dose to catch up because this could increase your risk for bleeding. Keep a record of missed doses to give to your doctor or pharmacist. Contact your doctor or pharmacist if you miss 2 or more doses in a row.      STORAGE:  Store at room temperature away from light and moisture. Do not store in the bathroom. Keep all medications away from children and pets. Do not flush medications down the toilet or pour them into a drain unless instructed to do so. Properly discard this product when it is  or no longer needed. Consult your pharmacist or local waste disposal company for more details about how to safely discard your product.      MEDICAL ALERT:  Your condition and medication can cause complications in a medical emergency. For information about enrolling in MedicAlert, call 1-371.287.7947 ([place]) or 1-709.725.3958 ([place]).      Information last revised 2010 Copyright(c) 2010 [employer]             Depression / Suicide Risk    As you are discharged from this [facility] facility, it is important to learn how to keep safe from harming yourself.    Recognize the warning signs:  · Abrupt changes in personality, positive or negative- including increase in energy   · Giving away possessions  · Change in eating patterns- significant weight changes-  positive or negative  · Change in sleeping patterns- unable to sleep or sleeping all the time   · Unwillingness or inability to communicate  · Depression  · Unusual sadness, discouragement and loneliness  · Talk of wanting to die  · Neglect of personal appearance   · Rebelliousness-  reckless behavior  · Withdrawal from people/activities they love  · Confusion- inability to concentrate     If you or a loved one observes any of these behaviors or has concerns about self-harm, here's what you can do:  · Talk about it- your feelings and reasons for harming yourself  · Remove any means that you might use to hurt yourself (examples: pills, rope, extension cords, firearm)  · Get professional help from the community (Mental Health, Substance Abuse, psychological counseling)  · Do not be alone:Call your Safe Contact- someone whom you trust who will be there for you.  · Call your local CRISIS HOTLINE 744-6747 or 669-365-0350  · Call your local Children's Mobile Crisis Response Team [place] (085) 604-6454 or www.[employer]  · Call the toll free National Suicide Prevention Hotlines   · National Suicide Prevention Lifeline 309-135-TVQF (9552)  · National Hope Line Network 800-SUICIDE (035-2467)

## 2020-01-10 NOTE — PROGRESS NOTES
Pt. Discharged.  Monitor room notified.  Report given to LILLIE Nicholas at [facility].

## 2020-01-14 ENCOUNTER — ANTICOAGULATION MONITORING (OUTPATIENT)
Dept: VASCULAR LAB | Facility: MEDICAL CENTER | Age: 70
End: 2020-01-14

## 2020-01-14 DIAGNOSIS — Z79.01 CHRONIC ANTICOAGULATION: ICD-10-CM

## 2020-01-14 NOTE — PROGRESS NOTES
Anticoagulation clinic    Reminder voice message for patient regarding getting INR done ASAP for anticoagulation clinic.     Brice Garcia, PharmD

## 2020-01-23 ENCOUNTER — DOCUMENTATION (OUTPATIENT)
Dept: VASCULAR LAB | Facility: MEDICAL CENTER | Age: 70
End: 2020-01-23

## 2020-01-23 NOTE — PROGRESS NOTES
Spoke with pt. She was discharged from rehab yesterday. She is scheduled for 1/29/20 for INR. She states her INRs were stable at rehab and hs been taking her usual regimen.    Will f/u with her on Wednesday.    Sherri GARCIA

## 2020-01-27 ENCOUNTER — HOME HEALTH ADMISSION (OUTPATIENT)
Dept: HOME HEALTH SERVICES | Facility: HOME HEALTHCARE | Age: 70
End: 2020-01-27
Payer: MEDICARE

## 2020-01-29 ENCOUNTER — ANTICOAGULATION VISIT (OUTPATIENT)
Dept: VASCULAR LAB | Facility: MEDICAL CENTER | Age: 70
End: 2020-01-29
Attending: INTERNAL MEDICINE
Payer: MEDICARE

## 2020-01-29 ENCOUNTER — HOME CARE VISIT (OUTPATIENT)
Dept: HOME HEALTH SERVICES | Facility: HOME HEALTHCARE | Age: 70
End: 2020-01-29

## 2020-01-29 VITALS — DIASTOLIC BLOOD PRESSURE: 54 MMHG | SYSTOLIC BLOOD PRESSURE: 88 MMHG | HEART RATE: 97 BPM

## 2020-01-29 DIAGNOSIS — Z79.01 CHRONIC ANTICOAGULATION: ICD-10-CM

## 2020-01-29 LAB — INR PPP: 3.8 (ref 2–3.5)

## 2020-01-29 PROCEDURE — 99212 OFFICE O/P EST SF 10 MIN: CPT | Performed by: NURSE PRACTITIONER

## 2020-01-29 PROCEDURE — 85610 PROTHROMBIN TIME: CPT

## 2020-01-30 LAB — INR BLD: 3.8 (ref 0.9–1.2)

## 2020-02-12 ENCOUNTER — ANTICOAGULATION VISIT (OUTPATIENT)
Dept: VASCULAR LAB | Facility: MEDICAL CENTER | Age: 70
End: 2020-02-12
Attending: INTERNAL MEDICINE
Payer: MEDICARE

## 2020-02-12 VITALS — DIASTOLIC BLOOD PRESSURE: 51 MMHG | HEART RATE: 100 BPM | SYSTOLIC BLOOD PRESSURE: 104 MMHG

## 2020-02-12 DIAGNOSIS — Z79.01 CHRONIC ANTICOAGULATION: ICD-10-CM

## 2020-02-12 LAB — INR PPP: 2.8 (ref 2–3.5)

## 2020-02-12 PROCEDURE — 99212 OFFICE O/P EST SF 10 MIN: CPT | Performed by: NURSE PRACTITIONER

## 2020-02-12 PROCEDURE — 85610 PROTHROMBIN TIME: CPT

## 2020-02-12 NOTE — PROGRESS NOTES
Anticoagulation Summary  As of 2020    INR goal:   2.0-3.0   TTR:   57.0 % (4.5 y)   INR used for dosin.80 (2020)   Warfarin maintenance plan:   2.5 mg (5 mg x 0.5) every day   Weekly warfarin total:   17.5 mg   Plan last modified:   NICK Oneal (2020)   Next INR check:   2020   Target end date:   Indefinite    Indications    Chronic anticoagulation [Z79.01]  Atrial fibrillation (HCC) (Resolved) [I48.91]             Anticoagulation Episode Summary     INR check location:   Anticoagulation Clinic    Preferred lab:       Send INR reminders to:       Comments:         Anticoagulation Care Providers     Provider Role Specialty Phone number    Florian Carnes M.D. Referring Cardiology [phone]    [facility] Responsible  [phone]        Anticoagulation Patient Findings      HPI:  Rachelle Reina seen in clinic today for follow up on anticoagulation therapy in the presence of AF.   Denies any changes to current medical/health status since last appointment.   Has a decreased appetite. Denies any medication changes.   No current symptoms of bleeding or thrombosis reported.  Missed her dose on Friday by mistake.    A/P:   INR therapeutic. INR may be therapeutic due to missed dose. Considering appetite decreased, will have pt hold warfarin 1 day a week and continue 2.5 mg daily all other days. Will consider lower tab strength next visit.    BP recorded in vitals.    Follow up appointment in 2 week(s).    Next Appointment: Wednesday, 2020 at 2:15 pm.    Sherri GARCIA

## 2020-02-13 LAB — INR BLD: 2.8 (ref 0.9–1.2)

## 2020-02-26 ENCOUNTER — TELEPHONE (OUTPATIENT)
Dept: VASCULAR LAB | Facility: MEDICAL CENTER | Age: 70
End: 2020-02-26

## 2020-02-26 ENCOUNTER — ANTICOAGULATION VISIT (OUTPATIENT)
Dept: VASCULAR LAB | Facility: MEDICAL CENTER | Age: 70
End: 2020-02-26
Attending: INTERNAL MEDICINE
Payer: MEDICARE

## 2020-02-26 DIAGNOSIS — Z79.01 CHRONIC ANTICOAGULATION: ICD-10-CM

## 2020-02-26 LAB
INR BLD: 1.4 (ref 0.9–1.2)
INR PPP: 1.4 (ref 2–3.5)

## 2020-02-26 PROCEDURE — 85610 PROTHROMBIN TIME: CPT

## 2020-02-26 PROCEDURE — 99212 OFFICE O/P EST SF 10 MIN: CPT | Performed by: NURSE PRACTITIONER

## 2020-02-26 NOTE — TELEPHONE ENCOUNTER
----- Message from Brice Garcia PharmD sent at 2/25/2020  4:46 PM PST -----  Regarding: RE: INR Results  I attempted to call their office, they are currently not open.  We will need to call back between the hours of 9 and 4.  ----- Message -----  From: Richa Alonzo  Sent: 2/25/2020   4:16 PM PST  To: [facility] Antico[facility]  Subject: INR Results                                      Astrid Johnson t  request a call back regarding patients INR Ph: [phone]

## 2020-02-26 NOTE — PROGRESS NOTES
Anticoagulation Summary  As of 2020    INR goal:   2.0-3.0   TTR:   57.0 % (4.6 y)   INR used for dosin.40! (2020)   Warfarin maintenance plan:   2.5 mg (5 mg x 0.5) every day   Weekly warfarin total:   17.5 mg   Plan last modified:   NICK Oneal (2020)   Next INR check:   3/11/2020   Target end date:   Indefinite    Indications    Chronic anticoagulation [Z79.01]  Atrial fibrillation (HCC) (Resolved) [I48.91]             Anticoagulation Episode Summary     INR check location:   Anticoagulation Clinic    Preferred lab:       Send INR reminders to:       Comments:         Anticoagulation Care Providers     Provider Role Specialty Phone number    Florian Carnes M.D. Referring Cardiology [phone]    [facility] Responsible  [phone]        Anticoagulation Patient Findings      HPI:  Rachelle Royal Reina seen in clinic today for follow up on anticoagulation therapy in the presence of AF.   Pt scheduled to have a tooth pulled tomorrow. INR needs to be 2-2.5. She held three doses of warfarin just in case.  Denies any medication or diet changes.   No current symptoms of bleeding or thrombosis reported.    A/P:   INR 1.4. CHADS 2 score = 3 (chf, htn, dm). No hx of strokes. Will have pt resume her usual regimen. INR faxed to Dr Johnson's office.  BP declined.    Follow up appointment in 2 week(s).    Next Appointment:  at 3:45 pm.    Sherri GARCIA

## 2020-02-26 NOTE — TELEPHONE ENCOUNTER
Spoke with Astrid,   They would like most recent INR results faxed to their office. Advised that pt has 4p appt today, so can fax those results.   Fax [phone]    Cristal Lovell, OrtegaD

## 2020-03-11 ENCOUNTER — OFFICE VISIT (OUTPATIENT)
Dept: CARDIOLOGY | Facility: MEDICAL CENTER | Age: 70
End: 2020-03-11
Payer: MEDICARE

## 2020-03-11 VITALS
DIASTOLIC BLOOD PRESSURE: 72 MMHG | WEIGHT: 262 LBS | OXYGEN SATURATION: 96 % | SYSTOLIC BLOOD PRESSURE: 118 MMHG | HEIGHT: 65 IN | BODY MASS INDEX: 43.65 KG/M2 | RESPIRATION RATE: 16 BRPM | HEART RATE: 82 BPM

## 2020-03-11 DIAGNOSIS — I10 HTN (HYPERTENSION), MALIGNANT: ICD-10-CM

## 2020-03-11 DIAGNOSIS — E66.01 CLASS 3 SEVERE OBESITY DUE TO EXCESS CALORIES WITH SERIOUS COMORBIDITY AND BODY MASS INDEX (BMI) OF 40.0 TO 44.9 IN ADULT (HCC): ICD-10-CM

## 2020-03-11 DIAGNOSIS — I51.89 LEFT VENTRICULAR DIASTOLIC DYSFUNCTION, NYHA CLASS 3: ICD-10-CM

## 2020-03-11 DIAGNOSIS — G47.33 OSA (OBSTRUCTIVE SLEEP APNEA): ICD-10-CM

## 2020-03-11 DIAGNOSIS — Z79.899 HIGH RISK MEDICATION USE: ICD-10-CM

## 2020-03-11 DIAGNOSIS — I87.2 CHRONIC VENOUS INSUFFICIENCY: ICD-10-CM

## 2020-03-11 DIAGNOSIS — I48.11 LONGSTANDING PERSISTENT ATRIAL FIBRILLATION (HCC): ICD-10-CM

## 2020-03-11 DIAGNOSIS — I10 BENIGN ESSENTIAL HYPERTENSION: Primary | Chronic | ICD-10-CM

## 2020-03-11 DIAGNOSIS — I50.30 ACC/AHA STAGE C HEART FAILURE WITH PRESERVED EJECTION FRACTION (HCC): ICD-10-CM

## 2020-03-11 DIAGNOSIS — R06.09 DYSPNEA ON EXERTION: ICD-10-CM

## 2020-03-11 PROCEDURE — 99215 OFFICE O/P EST HI 40 MIN: CPT | Mod: 25 | Performed by: INTERNAL MEDICINE

## 2020-03-11 PROCEDURE — 94618 PULMONARY STRESS TESTING: CPT | Performed by: INTERNAL MEDICINE

## 2020-03-11 RX ORDER — INSULIN DETEMIR 100 [IU]/ML
10 INJECTION, SOLUTION SUBCUTANEOUS
Status: ON HOLD | COMMUNITY
Start: 2020-02-22 | End: 2022-01-01

## 2020-03-11 RX ORDER — TORSEMIDE 20 MG/1
20 TABLET ORAL 2 TIMES DAILY
Qty: 60 TAB | Refills: 11 | Status: SHIPPED | OUTPATIENT
Start: 2020-03-11 | End: 2020-03-11

## 2020-03-11 ASSESSMENT — ENCOUNTER SYMPTOMS
DIAPHORESIS: 0
DIZZINESS: 0
SENSORY CHANGE: 0
FEVER: 0
MYALGIAS: 0
COUGH: 0
HEADACHES: 0
DEPRESSION: 0
DOUBLE VISION: 0
BLURRED VISION: 0
BRUISES/BLEEDS EASILY: 0
ABDOMINAL PAIN: 0
FALLS: 0
SHORTNESS OF BREATH: 1
MEMORY LOSS: 0
PALPITATIONS: 0

## 2020-03-11 ASSESSMENT — MINNESOTA LIVING WITH HEART FAILURE QUESTIONNAIRE (MLHF)
DIFFICULTY SOCIALIZING WITH FAMILY OR FRIENDS: 0
DIFFICULTY WORKING TO EARN A LIVING: 0
MAKING YOU FEEL DEPRESSED: 1
DIFFICULTY SLEEPING WELL AT NIGHT: 0
LOSS OF SELF CONTROL IN YOUR LIFE: 2
MAKING YOU SHORT OF BREATH: 3
DIFFICULTY GOING AWAY FROM HOME: 0
DIFFICULTY WITH RECREATIONAL PASTIMES, SPORTS, HOBBIES: 0
HAVING TO SIT OR LIE DOWN DURING THE DAY: 1
TIRED, FATIGUED OR LOW ON ENERGY: 2
WALKING ABOUT OR CLIMBING STAIRS DIFFICULT: 0
MAKING YOU WORRY: 2
EATING LESS FOODS YOU LIKE: 3
COSTING YOU MONEY FOR MEDICAL CARE: 0
GIVING YOU SIDE EFFECTS FROM TREATMENTS: 0
DIFFICULTY WITH SEXUAL ACTIVITIES: 0
DIFFICULTY TO CONCENTRATE OR REMEMBERING THINGS: 1
TOTAL_SCORE: 19
WORKING AROUND THE HOUSE OR YARD DIFFICULT: 0
MAKING YOU STAY IN A HOSPITAL: 0
FEELING LIKE A BURDEN TO FAMILY AND FRIENDS: 3
SWELLING IN ANKLES OR LEGS: 1

## 2020-03-11 ASSESSMENT — FIBROSIS 4 INDEX: FIB4 SCORE: 1.34

## 2020-03-11 ASSESSMENT — LIFESTYLE VARIABLES: ALCOHOL_AMOUNT: 2

## 2020-03-11 ASSESSMENT — 6 MINUTE WALK TEST (6MWT): TOTAL DISTANCE WALKED (METERS): 24.4

## 2020-03-11 ASSESSMENT — NEW YORK HEART ASSOCIATION (NYHA) CLASSIFICATION: NYHA FUNCTIONAL CLASS: CLASS III

## 2020-03-11 NOTE — PROGRESS NOTES
"Education Narrative  Reviewed anatomy and physiology of heart failure with patient and . Went over heart failure worksheet and patient's individual HF diagnosis, EF, risk factors, general medication classes and indications, as well as personal goals.  Goals: Patient's primary goal is to be doing water aerobics again by April     Discussed daily weights, sodium restriction, worsening signs and symptoms to report to physician, heart medications, and importance of adherence to medication regimen. Emphasized recommendation from AHA/AAHFN to keep daily sodium intake between 1500mg-2000mg.      Reviewed dietary handouts, advanced care planning classes, and advance directive planning handout. Discussed dietary considerations and reviewed Seven Day Heart Healthy Meal Plan by [employer].     Invited patient and family members/friends to HF support group and encouraged patient to call Heart Failure clinic during normal business hours with any questions.  Heart Failure program card with number given to patient.           Patient states full understanding of all information given.       OP Heart Failure  Vitals     Weight: 118.8 kg (262 lb)        Height: 165.1 cm (5' 5\")  BMI (Calculated): 43.6  Blood Pressure : 118/72  Pulse: 82    System Assessment  NYHA Functional Class Assessment: Class III  ACC/AHA HF Stage: C    Smoking Hx  Have you Ever Smoked: Never                Alcohol Hx  Do you Drink?: Yes  How Many Alcoholic Drinks Do You Have: 2  Per Day / Week / Month: Month       Illicit Drug Hx  Illicit Drug History: No    Social Hx  Social History: Lives with Spouse  Level of Support: Good  Advance Directives: None    Education  Symptoms: Verbalizes understanding  Weighing: Verbalizes Understanding  Weight Gain Response: Verbalizes Understanding   Recording Data: Verbalizes understanding  Teach Back Failures: Teach Back Successful  Compliance: Changes in Diet       Medications  Medication Reconciliation : " Complete  Medication Counseling Provided: Verbalizes Understanding  Able to Accurately Identify Medication Indications: Yes  Medication Discrepancies: None  Is Patient on an Evidence Based Beta Blocker: Yes  Is Patient on ACE-1 or ARB: Yes    Dietary Assessment  Food Labels: Verbalizes Understanding  Foods High in Sodium: Verbalizes Understanding  Daily Sodium Intake: Verbalizes Understanding  Diet: Verbalizes Understanding  Food Preparation: Verbalizes Understanding  Eating Out Plan: Verbalizes understanding  Healthier Options: Verbalizes Understanding  Fluid Restriction: Not Applicable    MN Living with Heart Failure  Swelling in Ankles or Legs: 1  Having to Sit or Lie Down During the Day: 1  Walking About or Climbing Stairs Difficult: 0  Working Around the House or Yard Difficult: 0  Difficulty Going Away from Home: 0  Difficulty Sleeping Well at Night: 0  Difficulty Socializing with Family or Friends: 0  Difficulty Working to Earn a Livin  Difficulty with Recreational Pastimes, Sports, Hobbies: 0  Difficulty with Sexual Activities: 0  Eating Less Foods You Like: 3  Making you Short of Breath: 3  Tired, Fatigued or Low on Energy: 2  Making you Stay in a Hospital: 0  Costing you Money for Medical Care?: 0  Giving you Side Effects from Treatments: 0  Feeling like a [place] to Family and Friends: 3  Loss of Self Control in your Life: 2  Making You Worry: 2  Difficulty to Concentrate or Remembering Things: 1  Making you Feel Depressed: 1  MLHF Total Score : 19    6 Minute Walk Test  Baseline to end of test: 1:38  Total meters walked: 24.4       [facility]      Possible candidate- information sent to research nurse, patient very interested

## 2020-03-11 NOTE — PROGRESS NOTES
No chief complaint on file.      Subjective:   Rachelle Reina is a 69 y.o. female who presents today for cardiac care and management of recent hospitalization due to volume overloaded state.  Patient has a history of atrial fibrillation along with morbid obesity, hypertension.    She had a transthoracic echocardiogram done in November 2019 which showed normal LV function, mild aortic stenosis, RVSP of 30 mmHg.  I personally interpreted images of her transthoracic echocardiogram.    Was not able to tolerate KEN sleep test.    Patient still gets winded with daily living activities and exertion. No symptoms at rest.    No obstructive CAD on coronary angiogram in 02/2019.    Past Medical History:   Diagnosis Date   • A-fib (Formerly [facility])    • Arthritis     knees   • Atrial fibrillation (HCC)    • Benign essential hypertension 7/1/2011   • Bowel habit changes     diarrhea   • Breast cancer (Formerly [facility])    • Bronchitis 2015    history of   • CAD (coronary artery disease)    • Cancer (HCC) 1987, 2000    breast   • Cataract    • Diabetes ([facility])     oral medication, insulin   • Diastolic congestive heart failure ([facility]) 7/1/2011    patient denies   • Disorder of thyroid    • High cholesterol    • History of cardiac catheterization 4/22/2010   • History of echocardiogram 4/22/2011   • History of hypothyroidism 8/29/2008   • Hypercholesteremia 7/1/2011   • Long term (current) use of anticoagulants 7/1/2011   • Morbid obesity ([facility]) 10/28/2008   • Sleep apnea     pt does not use cpap   • Snoring     sleep study done   • Urinary incontinence      Past Surgical History:   Procedure Laterality Date   • ZZZ CARDIAC CATH  02/27/2019    normal coronaries   • CATARACT PHACO WITH IOL Right 1/22/2018    Procedure: CATARACT PHACO WITH IOL;  Surgeon: Santosh Holden M.D.;  Location: SURGERY SAME DAY [facility];  Service: Ophthalmology   • CATARACT PHACO WITH IOL Left 1/8/2018    Procedure: CATARACT PHACO WITH IOL;  Surgeon: Santosh Holden M.D.;   Location: SURGERY SAME DAY [facility];  Service: Ophthalmology   • OTHER ORTHOPEDIC SURGERY Right 2013    hip arthroplasty   • CHOLECYSTECTOMY  2001   • ABDOMINAL HYSTERECTOMY TOTAL      1987   • MASTECTOMY  partial   • PB RADIATION THERAPY PLAN SIMPLE       Family History   Problem Relation Age of Onset   • Heart Disease Mother         HEART VALVE REPLACEMENT.   • Heart Disease Father         CORONARY ARTERY BYPASS GRAFTS.   • Cancer Maternal Aunt      Social History     Socioeconomic History   • Marital status:      Spouse name: Not on file   • Number of children: Not on file   • Years of education: Not on file   • Highest education level: Not on file   Occupational History   • Not on file   Social Needs   • Financial resource strain: Not on file   • Food insecurity     Worry: Not on file     Inability: Not on file   • Transportation needs     Medical: Not on file     Non-medical: Not on file   Tobacco Use   • Smoking status: Never Smoker   • Smokeless tobacco: Never Used   Substance and Sexual Activity   • Alcohol use: Yes     Comment: 1 or 2 drinks a month   • Drug use: No   • Sexual activity: Not on file   Lifestyle   • Physical activity     Days per week: Not on file     Minutes per session: Not on file   • Stress: Not on file   Relationships   • Social connections     Talks on phone: Not on file     Gets together: Not on file     Attends Bahai service: Not on file     Active member of club or organization: Not on file     Attends meetings of clubs or organizations: Not on file     Relationship status: Not on file   • Intimate partner violence     Fear of current or ex partner: Not on file     Emotionally abused: Not on file     Physically abused: Not on file     Forced sexual activity: Not on file   Other Topics Concern   • Not on file   Social History Narrative   • Not on file     Allergies   Allergen Reactions   • Sulfa Drugs Rash     8/2015       Outpatient Encounter Medications as of  3/11/2020   Medication Sig Dispense Refill   • LEVEMIR FLEXTOUCH 100 UNIT/ML injection PEN Inject 14 Units as instructed 2 times a day.     • torsemide (DEMADEX) 20 MG Tab Take 1 Tab by mouth 2 times a day. 60 Tab 11   • nystatin (MYCOSTATIN) powder Apply to lower abdomen/groin rash until resolution 15 g    • oxybutynin (DITROPAN XL) 15 MG CR tablet Take 15 mg by mouth every morning.  2   • warfarin (COUMADIN) 5 MG Tab Take 2.5-5 mg by mouth every evening. 5 mg every Friday  2.5 mg all other days of the week     • potassium chloride SA (KDUR) 20 MEQ Tab CR Take 20 mEq by mouth every morning.     • allopurinol (ZYLOPRIM) 100 MG Tab Take 100 mg by mouth every bedtime.  2   • acetaminophen (TYLENOL 8 HOUR ARTHRITIS PAIN) 650 MG CR tablet Take 1,300 mg by mouth 2 Times a Day.     • Cholecalciferol (VITAMIN D-3) 5000 units Tab Take 5,000 Units by mouth 3 times a week. Monday, Wednesday & Friday     • metoprolol (LOPRESSOR) 50 MG Tab Take 50 mg by mouth 2 times a day.     • Multiple Vitamins-Minerals (MULTIVITAMIN PO) Take 1 Tab by mouth every morning.     • losartan (COZAAR) 50 MG Tab Take 50 mg by mouth every day.     • traZODone (DESYREL) 100 MG Tab Take 100 mg by mouth every evening.     • rosuvastatin (CRESTOR) 20 MG Tab Take 20 mg by mouth every evening.  1   • SYNTHROID 200 MCG Tab Take 200 mcg by mouth every day. Monday thru Saturday  2   • glipiZIDE (GLUCOTROL) 5 MG Tab Take 2.5 mg by mouth 2 times a day.  1   • levothyroxine (SYNTHROID) 175 MCG Tab Take 175 mcg by mouth every Sunday. Sundays  *Brand Only*     • gemfibrozil (LOPID) 600 MG Tab Take 600 mg by mouth every morning.     • loratadine (CLARITIN) 10 MG Tab Take 10 mg by mouth every day.     • citalopram (CELEXA) 20 MG TABS Take 20 mg by mouth every day.     • ferrous sulfate 325 (65 Fe) MG tablet Take 1 Tab by mouth every morning with breakfast. (Patient not taking: Reported on 3/11/2020) 30 Tab    • insulin regular (HUMULIN R) 100 Unit/mL Solution  "Inject 2-9 Units as instructed 4 Times a Day,Before Meals and at Bedtime. (Patient not taking: Reported on 3/11/2020) 10 mL    • senna-docusate (PERICOLACE OR SENOKOT S) 8.6-50 MG Tab Take 2 Tabs by mouth 2 Times a Day. (Patient not taking: Reported on 3/11/2020) 30 Tab 0   • [DISCONTINUED] furosemide (LASIX) 20 MG Tab Take 1 Tab by mouth 2 Times a Day. 60 Tab 1   • [DISCONTINUED] hydroCHLOROthiazide (HYDRODIURIL) 25 MG Tab Take 25 mg by mouth every morning.       No facility-administered encounter medications on file as of 3/11/2020.      Review of Systems   Constitutional: Negative for diaphoresis and fever.   HENT: Negative for nosebleeds.    Eyes: Negative for blurred vision and double vision.   Respiratory: Positive for shortness of breath. Negative for cough.    Cardiovascular: Negative for chest pain and palpitations.   Gastrointestinal: Negative for abdominal pain.   Genitourinary: Negative for dysuria and frequency.   Musculoskeletal: Negative for falls and myalgias.   Skin: Negative for rash.   Neurological: Negative for dizziness, sensory change and headaches.   Endo/Heme/Allergies: Does not bruise/bleed easily.   Psychiatric/Behavioral: Negative for depression and memory loss.        Objective:   /72 (BP Location: Right arm, Patient Position: Sitting, BP Cuff Size: Large adult)   Pulse 82   Resp 16   Ht 1.651 m (5' 5\")   Wt 118.8 kg (262 lb)   LMP 01/05/1987 (Approximate)   SpO2 96%   BMI 43.60 kg/m²     Physical Exam   Constitutional: She is oriented to person, place, and time. No distress.   HENT:   Head: Normocephalic and atraumatic.   Right Ear: External ear normal.   Left Ear: External ear normal.   Eyes: Right eye exhibits no discharge. Left eye exhibits no discharge.   Neck: No JVD present. No thyromegaly present.   Cardiovascular: Normal rate, normal heart sounds and intact distal pulses. Exam reveals no gallop and no friction rub.   No murmur heard.  There is presence of an " irregularly irregular heartbeats.     Pulmonary/Chest: Breath sounds normal. No respiratory distress.   Abdominal: Bowel sounds are normal. She exhibits no distension. There is no abdominal tenderness.   Musculoskeletal:         General: No tenderness or edema.   Neurological: She is alert and oriented to person, place, and time. No cranial nerve deficit.   Skin: Skin is warm and dry. She is not diaphoretic.   Psychiatric: She has a normal mood and affect. Her behavior is normal.   Nursing note and vitals reviewed.      Assessment:     1. Longstanding persistent atrial fibrillation  Basic Metabolic Panel   2. Class 3 severe obesity due to excess calories with serious comorbidity and body mass index (BMI) of 40.0 to 44.9 in adult ([facility])  Basic Metabolic Panel   3. HTN (hypertension), malignant  Basic Metabolic Panel   4. Chronic venous insufficiency     5. KEN (obstructive sleep apnea)     6. ACC/AHA stage C heart failure with preserved ejection fraction (HCC)  Basic Metabolic Panel    REFERRAL TO SLEEP STUDIES   7. Left ventricular diastolic dysfunction, NYHA class 3  Basic Metabolic Panel    REFERRAL TO SLEEP STUDIES   8. Dyspnea on exertion     9. High risk medication use         Medical Decision Making:  Today's Assessment / Status / Plan:   Today, based on physical examination findings, patient is euvolemic. No JVD, lungs are clear to auscultation, no pitting edema in bilateral lower extremities, no ascites.    Dry weight is 262 lbs.    I will refer patient to have sleep studies for obstructive sleep apnea.    Optimize diuresis. Change Furosemide, will change to Torsemide 20 mg bid.    Optimize blood pressure control. Continue Losartan 50 mg po daily, Metoprolol 50 bid.    Stop HCTZ.    Rate control for her atrial fibrillation. Continue Metoprolol 50 mg bid, anticoagulation with warfarin.    Continue rosuvastatin and gemfibrozil for mixed hyperlipidemia and hypertriglyceridemia.    Based on the overall clinical  history and profile, patient is a good candidate for Cardiomems implantation system to remotely monitor intracardiac pressures. she will benefit from reduced recurrent hospitalization for heart failure exacerbation and also quality of life improvement. Patient also has a good support system and has shown compliance to medical therapy. she is motivated and will be a successful candidate.

## 2020-03-12 ENCOUNTER — ANTICOAGULATION VISIT (OUTPATIENT)
Dept: VASCULAR LAB | Facility: MEDICAL CENTER | Age: 70
End: 2020-03-12
Attending: INTERNAL MEDICINE
Payer: MEDICARE

## 2020-03-12 DIAGNOSIS — Z79.01 CHRONIC ANTICOAGULATION: ICD-10-CM

## 2020-03-12 LAB
INR BLD: 1.5 (ref 0.9–1.2)
INR PPP: 1.5 (ref 2–3.5)

## 2020-03-12 PROCEDURE — 85610 PROTHROMBIN TIME: CPT | Mod: QW

## 2020-03-12 PROCEDURE — 99212 OFFICE O/P EST SF 10 MIN: CPT | Performed by: NURSE PRACTITIONER

## 2020-03-12 RX ORDER — TORSEMIDE 20 MG/1
TABLET ORAL
Qty: 180 TAB | Refills: 3 | Status: SHIPPED | OUTPATIENT
Start: 2020-03-12 | End: 2020-07-31

## 2020-03-12 NOTE — PROGRESS NOTES
Anticoagulation Summary  As of 3/12/2020    INR goal:   2.0-3.0   TTR:   56.5 % (4.6 y)   INR used for dosin.50! (3/12/2020)   Warfarin maintenance plan:   5 mg (5 mg x 1) every Fri; 2.5 mg (5 mg x 0.5) all other days   Weekly warfarin total:   20 mg   Plan last modified:   Sherri Nieto, A.P.NShanthi (3/12/2020)   Next INR check:   3/25/2020   Target end date:   Indefinite    Indications    Chronic anticoagulation [Z79.01]  Atrial fibrillation (HCC) (Resolved) [I48.91]             Anticoagulation Episode Summary     INR check location:   Anticoagulation Clinic    Preferred lab:       Send INR reminders to:       Comments:         Anticoagulation Care Providers     Provider Role Specialty Phone number    Florian Carnes M.D. Referring Cardiology [phone]    [facility] Responsible  [phone]        Anticoagulation Patient Findings      HPI:  Rachelle Reina seen in clinic today for follow up on anticoagulation therapy in the presence of AF.   Denies any changes to current medical/health status since last appointment.   Recently completed a week of amoxicillin. Had a tooth extracted on 20 and was off warfarin 2 days. Denies any diet changes.   No current symptoms of bleeding or thrombosis reported.  Confirmed dose. No missed doses.     A/P:   INR subtherapeutic. CHADS 2 score = 3. No hx of CVA.  Will increase regimen.   BP recorded in vitals.    Follow up appointment in 2 week(s).    Next Appointment: 2020 at 3:45 pm.    Sherri GARCIA

## 2020-03-24 ENCOUNTER — TELEPHONE (OUTPATIENT)
Dept: CARDIOLOGY | Facility: MEDICAL CENTER | Age: 70
End: 2020-03-24

## 2020-03-24 NOTE — TELEPHONE ENCOUNTER
Spoke with patient regarding her labs that  had order on her last visit.Pateint stated that she will have labs  Done tomorrow or Thursday but will get them  done prior to her apt.

## 2020-03-25 ENCOUNTER — APPOINTMENT (OUTPATIENT)
Dept: VASCULAR LAB | Facility: MEDICAL CENTER | Age: 70
End: 2020-03-25
Attending: INTERNAL MEDICINE
Payer: MEDICARE

## 2020-03-26 ENCOUNTER — HOSPITAL ENCOUNTER (OUTPATIENT)
Dept: LAB | Facility: MEDICAL CENTER | Age: 70
End: 2020-03-26
Attending: NURSE PRACTITIONER
Payer: MEDICARE

## 2020-03-26 LAB
ALBUMIN SERPL BCP-MCNC: 4.1 G/DL (ref 3.2–4.9)
ALBUMIN/GLOB SERPL: 1.3 G/DL
ALP SERPL-CCNC: 64 U/L (ref 30–99)
ALT SERPL-CCNC: 9 U/L (ref 2–50)
ANION GAP SERPL CALC-SCNC: 17 MMOL/L (ref 7–16)
APPEARANCE UR: CLEAR
AST SERPL-CCNC: 13 U/L (ref 12–45)
BACTERIA #/AREA URNS HPF: ABNORMAL /HPF
BASOPHILS # BLD AUTO: 0.8 % (ref 0–1.8)
BASOPHILS # BLD: 0.05 K/UL (ref 0–0.12)
BILIRUB SERPL-MCNC: 0.4 MG/DL (ref 0.1–1.5)
BILIRUB UR QL STRIP.AUTO: NEGATIVE
BUN SERPL-MCNC: 39 MG/DL (ref 8–22)
CALCIUM SERPL-MCNC: 9.9 MG/DL (ref 8.5–10.5)
CHLORIDE SERPL-SCNC: 98 MMOL/L (ref 96–112)
CHOLEST SERPL-MCNC: 134 MG/DL (ref 100–199)
CO2 SERPL-SCNC: 25 MMOL/L (ref 20–33)
COLOR UR: YELLOW
CREAT SERPL-MCNC: 1.24 MG/DL (ref 0.5–1.4)
CREAT UR-MCNC: 67.42 MG/DL
EOSINOPHIL # BLD AUTO: 0.24 K/UL (ref 0–0.51)
EOSINOPHIL NFR BLD: 3.7 % (ref 0–6.9)
EPI CELLS #/AREA URNS HPF: ABNORMAL /HPF
ERYTHROCYTE [DISTWIDTH] IN BLOOD BY AUTOMATED COUNT: 58.4 FL (ref 35.9–50)
EST. AVERAGE GLUCOSE BLD GHB EST-MCNC: 146 MG/DL
FASTING STATUS PATIENT QL REPORTED: NORMAL
FERRITIN SERPL-MCNC: 53.4 NG/ML (ref 10–291)
FOLATE SERPL-MCNC: 10.1 NG/ML
GLOBULIN SER CALC-MCNC: 3.1 G/DL (ref 1.9–3.5)
GLUCOSE SERPL-MCNC: 88 MG/DL (ref 65–99)
GLUCOSE UR STRIP.AUTO-MCNC: NEGATIVE MG/DL
HBA1C MFR BLD: 6.7 % (ref 0–5.6)
HCT VFR BLD AUTO: 42.4 % (ref 37–47)
HDLC SERPL-MCNC: 43 MG/DL
HGB BLD-MCNC: 12.5 G/DL (ref 12–16)
HYALINE CASTS #/AREA URNS LPF: ABNORMAL /LPF
IMM GRANULOCYTES # BLD AUTO: 0.04 K/UL (ref 0–0.11)
IMM GRANULOCYTES NFR BLD AUTO: 0.6 % (ref 0–0.9)
IRON SATN MFR SERPL: 14 % (ref 15–55)
IRON SERPL-MCNC: 55 UG/DL (ref 40–170)
KETONES UR STRIP.AUTO-MCNC: NEGATIVE MG/DL
LDLC SERPL CALC-MCNC: 64 MG/DL
LEUKOCYTE ESTERASE UR QL STRIP.AUTO: ABNORMAL
LYMPHOCYTES # BLD AUTO: 2.38 K/UL (ref 1–4.8)
LYMPHOCYTES NFR BLD: 37 % (ref 22–41)
MCH RBC QN AUTO: 25.3 PG (ref 27–33)
MCHC RBC AUTO-ENTMCNC: 29.5 G/DL (ref 33.6–35)
MCV RBC AUTO: 85.8 FL (ref 81.4–97.8)
MICRO URNS: ABNORMAL
MICROALBUMIN UR-MCNC: <1.2 MG/DL
MICROALBUMIN/CREAT UR: NORMAL MG/G (ref 0–30)
MONOCYTES # BLD AUTO: 0.47 K/UL (ref 0–0.85)
MONOCYTES NFR BLD AUTO: 7.3 % (ref 0–13.4)
NEUTROPHILS # BLD AUTO: 3.25 K/UL (ref 2–7.15)
NEUTROPHILS NFR BLD: 50.6 % (ref 44–72)
NITRITE UR QL STRIP.AUTO: NEGATIVE
NRBC # BLD AUTO: 0 K/UL
NRBC BLD-RTO: 0 /100 WBC
PH UR STRIP.AUTO: 7 [PH] (ref 5–8)
PLATELET # BLD AUTO: 286 K/UL (ref 164–446)
PMV BLD AUTO: 10 FL (ref 9–12.9)
POTASSIUM SERPL-SCNC: 3.1 MMOL/L (ref 3.6–5.5)
PROT SERPL-MCNC: 7.2 G/DL (ref 6–8.2)
PROT UR QL STRIP: NEGATIVE MG/DL
RBC # BLD AUTO: 4.94 M/UL (ref 4.2–5.4)
RBC # URNS HPF: ABNORMAL /HPF
RBC UR QL AUTO: ABNORMAL
SODIUM SERPL-SCNC: 140 MMOL/L (ref 135–145)
SP GR UR STRIP.AUTO: 1.01
T3FREE SERPL-MCNC: 2.43 PG/ML (ref 2.4–4.2)
TIBC SERPL-MCNC: 398 UG/DL (ref 250–450)
TRANSFERRIN SERPL-MCNC: 323 MG/DL (ref 200–370)
TRIGL SERPL-MCNC: 134 MG/DL (ref 0–149)
TSH SERPL DL<=0.005 MIU/L-ACNC: 1.01 UIU/ML (ref 0.38–5.33)
UIBC SERPL-MCNC: 343 UG/DL (ref 110–370)
UROBILINOGEN UR STRIP.AUTO-MCNC: 0.2 MG/DL
VIT B12 SERPL-MCNC: 466 PG/ML (ref 211–911)
WBC # BLD AUTO: 6.4 K/UL (ref 4.8–10.8)
WBC #/AREA URNS HPF: ABNORMAL /HPF

## 2020-03-26 PROCEDURE — 82043 UR ALBUMIN QUANTITATIVE: CPT

## 2020-03-26 PROCEDURE — 82746 ASSAY OF FOLIC ACID SERUM: CPT

## 2020-03-26 PROCEDURE — 83036 HEMOGLOBIN GLYCOSYLATED A1C: CPT | Mod: GA

## 2020-03-26 PROCEDURE — 82652 VIT D 1 25-DIHYDROXY: CPT

## 2020-03-26 PROCEDURE — 83550 IRON BINDING TEST: CPT

## 2020-03-26 PROCEDURE — 84481 FREE ASSAY (FT-3): CPT

## 2020-03-26 PROCEDURE — 82607 VITAMIN B-12: CPT

## 2020-03-26 PROCEDURE — 85025 COMPLETE CBC W/AUTO DIFF WBC: CPT

## 2020-03-26 PROCEDURE — 84207 ASSAY OF VITAMIN B-6: CPT

## 2020-03-26 PROCEDURE — 80061 LIPID PANEL: CPT

## 2020-03-26 PROCEDURE — 84439 ASSAY OF FREE THYROXINE: CPT

## 2020-03-26 PROCEDURE — 82570 ASSAY OF URINE CREATININE: CPT

## 2020-03-26 PROCEDURE — 84425 ASSAY OF VITAMIN B-1: CPT

## 2020-03-26 PROCEDURE — 82728 ASSAY OF FERRITIN: CPT

## 2020-03-26 PROCEDURE — 36415 COLL VENOUS BLD VENIPUNCTURE: CPT

## 2020-03-26 PROCEDURE — 83540 ASSAY OF IRON: CPT

## 2020-03-26 PROCEDURE — 84443 ASSAY THYROID STIM HORMONE: CPT

## 2020-03-26 PROCEDURE — 80053 COMPREHEN METABOLIC PANEL: CPT

## 2020-03-26 PROCEDURE — 84466 ASSAY OF TRANSFERRIN: CPT

## 2020-03-26 PROCEDURE — 81001 URINALYSIS AUTO W/SCOPE: CPT

## 2020-03-28 LAB — 1,25(OH)2D3 SERPL-MCNC: 30.2 PG/ML (ref 19.9–79.3)

## 2020-03-29 LAB
VIT B1 BLD-MCNC: 52 NMOL/L (ref 70–180)
VIT B6 SERPL-MCNC: 8.6 NMOL/L (ref 20–125)

## 2020-04-01 ENCOUNTER — APPOINTMENT (OUTPATIENT)
Dept: VASCULAR LAB | Facility: MEDICAL CENTER | Age: 70
End: 2020-04-01
Attending: INTERNAL MEDICINE
Payer: MEDICARE

## 2020-04-02 ENCOUNTER — OFFICE VISIT (OUTPATIENT)
Dept: CARDIOLOGY | Facility: MEDICAL CENTER | Age: 70
End: 2020-04-02
Payer: MEDICARE

## 2020-04-02 VITALS
HEART RATE: 92 BPM | HEIGHT: 65 IN | SYSTOLIC BLOOD PRESSURE: 101 MMHG | BODY MASS INDEX: 44.02 KG/M2 | WEIGHT: 264.2 LBS | DIASTOLIC BLOOD PRESSURE: 72 MMHG

## 2020-04-02 DIAGNOSIS — I10 HTN (HYPERTENSION), MALIGNANT: ICD-10-CM

## 2020-04-02 DIAGNOSIS — I50.30 ACC/AHA STAGE C HEART FAILURE WITH PRESERVED EJECTION FRACTION (HCC): ICD-10-CM

## 2020-04-02 DIAGNOSIS — E66.01 CLASS 3 SEVERE OBESITY DUE TO EXCESS CALORIES WITH SERIOUS COMORBIDITY AND BODY MASS INDEX (BMI) OF 40.0 TO 44.9 IN ADULT (HCC): ICD-10-CM

## 2020-04-02 DIAGNOSIS — Z79.01 CHRONIC ANTICOAGULATION: ICD-10-CM

## 2020-04-02 DIAGNOSIS — G47.33 OSA (OBSTRUCTIVE SLEEP APNEA): ICD-10-CM

## 2020-04-02 DIAGNOSIS — Z79.899 HIGH RISK MEDICATION USE: ICD-10-CM

## 2020-04-02 DIAGNOSIS — I48.11 LONGSTANDING PERSISTENT ATRIAL FIBRILLATION (HCC): ICD-10-CM

## 2020-04-02 DIAGNOSIS — E78.5 DYSLIPIDEMIA: ICD-10-CM

## 2020-04-02 DIAGNOSIS — I50.9 HEART FAILURE, NYHA CLASS 2 (HCC): ICD-10-CM

## 2020-04-02 PROBLEM — Z01.810 PRE-OPERATIVE CARDIOVASCULAR EXAMINATION: Status: RESOLVED | Noted: 2017-02-21 | Resolved: 2020-04-02

## 2020-04-02 PROBLEM — H66.90 ACUTE OTITIS MEDIA: Status: RESOLVED | Noted: 2020-01-03 | Resolved: 2020-04-02

## 2020-04-02 PROBLEM — R21 RASH: Status: RESOLVED | Noted: 2020-01-04 | Resolved: 2020-04-02

## 2020-04-02 PROBLEM — R53.1 WEAKNESS: Status: RESOLVED | Noted: 2020-01-06 | Resolved: 2020-04-02

## 2020-04-02 PROBLEM — L03.116 LEFT LEG CELLULITIS: Status: RESOLVED | Noted: 2019-12-31 | Resolved: 2020-04-02

## 2020-04-02 PROBLEM — R94.39 ABNORMAL MYOCARDIAL PERFUSION STUDY: Status: RESOLVED | Noted: 2019-02-19 | Resolved: 2020-04-02

## 2020-04-02 PROBLEM — J81.0 ACUTE PULMONARY EDEMA (HCC): Status: RESOLVED | Noted: 2019-11-13 | Resolved: 2020-04-02

## 2020-04-02 PROCEDURE — 99214 OFFICE O/P EST MOD 30 MIN: CPT | Mod: 95,CR | Performed by: NURSE PRACTITIONER

## 2020-04-02 RX ORDER — THIAMINE MONONITRATE (VIT B1) 100 MG
100 TABLET ORAL DAILY
COMMUNITY
End: 2020-10-27

## 2020-04-02 RX ORDER — MULTIVIT-MIN/IRON/FOLIC ACID/K 18-600-40
500 CAPSULE ORAL DAILY
COMMUNITY
End: 2021-04-13

## 2020-04-02 ASSESSMENT — ENCOUNTER SYMPTOMS
MYALGIAS: 0
COUGH: 0
DIZZINESS: 0
FEVER: 0
SHORTNESS OF BREATH: 1
ABDOMINAL PAIN: 0
PALPITATIONS: 0
CLAUDICATION: 0
ORTHOPNEA: 0
PND: 0

## 2020-04-02 ASSESSMENT — FIBROSIS 4 INDEX: FIB4 SCORE: 1.05

## 2020-04-02 NOTE — PATIENT INSTRUCTIONS
Increase Torsemide to 40 mg twice a day for 3 days.   Continue Potassium 20 mEq twice a day for the rest of the week  Labs in 2 weeks

## 2020-04-02 NOTE — PROGRESS NOTES
Chief Complaint   Patient presents with   • Atrial Fibrillation       Subjective:   This encounter was conducted via Zoom .   Verbal consent was obtained. Patient's identity was verified.    Rachelle Reina is a 69 y.o. female who is following up on her heart failure.      Patient of Dr. Carnes and Dr. Jeffrey in the heart failure clinic.  She was last seen in clinic on 3/11/2020.  During that visit, her furosemide was changed over to torsemide 20 mg twice a day, hydrochlorothiazide was discontinued.  She was also sent for lab testing and referred over for sleep study.    Patient had her labs done.  Her potassium level was slightly low.  Patient admits that she has not been consistent with her potassium.  She did see her PCP this week and recommended patient to increase her potassium to 20 mEq twice a day for the week and then consistently take her potassium at 20 mEq daily.  Patient reports she will be more compliant with her potassium.    Patient also states she did not schedule her sleep study, states she wanted to wait for the COVID-19 to past.    For her symptoms, she has been reporting some lower extremity edema, shortness of breath with exertion and fatigue.  She states she has not been very consistent about monitoring her sodium intake and believes that is what is causing her increased swelling.    She denies chest pain, palpitations, orthopnea, PND or dizziness/lightheadedness.    Her home weights have been ranging from 260-264 pounds.    Patient's does not exert much, but has been trying to walk up her street 2 times a day.  Patient reports her endurance has not been good in her knees due to pain.    Patient was also told by her PCP that she has borderline low iron level, and is planning on increasing her iron rich foods before adding a supplement.    Additonally, patient has the following medical problems:    -Atrial fibrillation: Taking warfarin, followed by anticoagulation clinic    -No coronary  disease on angiogram in 2019    -Insulin-dependent diabetic    -Hyperlipidemia    -Gout    -Depression    -Thyroid disease: Taking Synthroid    -History of ovarian and breast cancer    -Chronic venous insufficiency    Past Medical History:   Diagnosis Date   • A-fib (HCC)    • Arthritis     knees   • Atrial fibrillation (HCC)    • Benign essential hypertension 7/1/2011   • Bowel habit changes     diarrhea   • Breast cancer (HCC)    • Bronchitis 2015    history of   • CAD (coronary artery disease)    • Cancer (HCC) 1987, 2000    breast   • Cataract    • Diabetes (HCC)     oral medication, insulin   • Diastolic congestive heart failure (HCC) 7/1/2011    patient denies   • Disorder of thyroid    • High cholesterol    • History of cardiac catheterization 4/22/2010   • History of echocardiogram 4/22/2011   • History of hypothyroidism 8/29/2008   • Hypercholesteremia 7/1/2011   • Long term (current) use of anticoagulants 7/1/2011   • Morbid obesity (HCC) 10/28/2008   • Ovarian cancer (HCC)    • Sleep apnea     pt does not use cpap   • Snoring     sleep study done   • Urinary incontinence      Past Surgical History:   Procedure Laterality Date   • [facility] CARDIAC CATH  02/27/2019    normal coronaries   • CATARACT PHACO WITH IOL Right 1/22/2018    Procedure: CATARACT PHACO WITH IOL;  Surgeon: Santosh Holden M.D.;  Location: SURGERY SAME DAY [facility] ORS;  Service: Ophthalmology   • CATARACT PHACO WITH IOL Left 1/8/2018    Procedure: CATARACT PHACO WITH IOL;  Surgeon: Santosh Holden M.D.;  Location: SURGERY SAME DAY [facility];  Service: Ophthalmology   • OTHER ORTHOPEDIC SURGERY Right 2013    hip arthroplasty   • CHOLECYSTECTOMY  2001   • ABDOMINAL HYSTERECTOMY TOTAL      1987   • MASTECTOMY  partial   • PB RADIATION THERAPY PLAN SIMPLE       Family History   Problem Relation Age of Onset   • Heart Disease Mother         HEART VALVE REPLACEMENT.   • Heart Disease Father         CORONARY ARTERY BYPASS GRAFTS   • Cancer  Maternal Aunt    • Diabetes Brother    • Thyroid Brother    • Heart Attack Brother    • Heart Disease Brother      Social History     Socioeconomic History   • Marital status:      Spouse name: Not on file   • Number of children: Not on file   • Years of education: Not on file   • Highest education level: Not on file   Occupational History   • Not on file   Social Needs   • Financial resource strain: Not on file   • Food insecurity     Worry: Not on file     Inability: Not on file   • Transportation needs     Medical: Not on file     Non-medical: Not on file   Tobacco Use   • Smoking status: Never Smoker   • Smokeless tobacco: Never Used   Substance and Sexual Activity   • Alcohol use: Yes     Comment: 1 or 2 drinks a month   • Drug use: No   • Sexual activity: Not on file   Lifestyle   • Physical activity     Days per week: Not on file     Minutes per session: Not on file   • Stress: Not on file   Relationships   • Social connections     Talks on phone: Not on file     Gets together: Not on file     Attends Christianity service: Not on file     Active member of club or organization: Not on file     Attends meetings of clubs or organizations: Not on file     Relationship status: Not on file   • Intimate partner violence     Fear of current or ex partner: Not on file     Emotionally abused: Not on file     Physically abused: Not on file     Forced sexual activity: Not on file   Other Topics Concern   • Not on file   Social History Narrative   • Not on file     Allergies   Allergen Reactions   • Sulfa Drugs Rash     8/2015       Outpatient Encounter Medications as of 4/2/2020   Medication Sig Dispense Refill   • thiamine (THIAMINE) 100 MG Tab Take 100 mg by mouth every day.     • Ascorbic Acid (VITAMIN C) 100 MG Tab Take  by mouth.     • torsemide (DEMADEX) 20 MG Tab TAKE 1 TABLET BY MOUTH TWICE DAILY 180 Tab 3   • LEVEMIR FLEXTOUCH 100 UNIT/ML injection PEN Inject 14 Units as instructed 2 times a day.     •  nystatin (MYCOSTATIN) powder Apply to lower abdomen/groin rash until resolution 15 g    • oxybutynin (DITROPAN XL) 15 MG CR tablet Take 15 mg by mouth every morning.  2   • warfarin (COUMADIN) 5 MG Tab Take 2.5-5 mg by mouth every evening. 5 mg every Friday  2.5 mg all other days of the week     • potassium chloride SA (KDUR) 20 MEQ Tab CR Take 20 mEq by mouth every morning.     • allopurinol (ZYLOPRIM) 100 MG Tab Take 100 mg by mouth every bedtime.  2   • acetaminophen (TYLENOL 8 HOUR ARTHRITIS PAIN) 650 MG CR tablet Take 1,300 mg by mouth 2 Times a Day.     • Cholecalciferol (VITAMIN D-3) 5000 units Tab Take 5,000 Units by mouth 3 times a week. Monday, Wednesday & Friday     • metoprolol (LOPRESSOR) 50 MG Tab Take 50 mg by mouth 2 times a day.     • losartan (COZAAR) 50 MG Tab Take 50 mg by mouth every day.     • traZODone (DESYREL) 100 MG Tab Take 100 mg by mouth every evening.     • rosuvastatin (CRESTOR) 20 MG Tab Take 20 mg by mouth every evening.  1   • SYNTHROID 200 MCG Tab Take 200 mcg by mouth every day. Monday thru Saturday  2   • glipiZIDE (GLUCOTROL) 5 MG Tab Take 2.5 mg by mouth 2 times a day.  1   • levothyroxine (SYNTHROID) 175 MCG Tab Take 175 mcg by mouth every Sunday. Sundays  *Brand Only*     • gemfibrozil (LOPID) 600 MG Tab Take 600 mg by mouth every morning.     • loratadine (CLARITIN) 10 MG Tab Take 10 mg by mouth every day.     • citalopram (CELEXA) 20 MG TABS Take 20 mg by mouth every day.     • Pyridoxine HCl (VITAMIN B-6 PO) Take  by mouth.     • [DISCONTINUED] ferrous sulfate 325 (65 Fe) MG tablet Take 1 Tab by mouth every morning with breakfast. (Patient not taking: Reported on 3/11/2020) 30 Tab    • [DISCONTINUED] insulin regular (HUMULIN R) 100 Unit/mL Solution Inject 2-9 Units as instructed 4 Times a Day,Before Meals and at Bedtime. (Patient not taking: Reported on 3/11/2020) 10 mL    • [DISCONTINUED] senna-docusate (PERICOLACE OR SENOKOT S) 8.6-50 MG Tab Take 2 Tabs by mouth 2 Times  "a Day. (Patient not taking: Reported on 3/11/2020) 30 Tab 0   • [DISCONTINUED] Multiple Vitamins-Minerals (MULTIVITAMIN PO) Take 1 Tab by mouth every morning.       No facility-administered encounter medications on file as of 4/2/2020.      Review of Systems   Constitutional: Positive for malaise/fatigue. Negative for fever.   Respiratory: Positive for shortness of breath. Negative for cough.    Cardiovascular: Positive for leg swelling. Negative for chest pain, palpitations, orthopnea, claudication and PND.   Gastrointestinal: Negative for abdominal pain.   Musculoskeletal: Negative for myalgias.   Neurological: Negative for dizziness.   All other systems reviewed and are negative.       Objective:   /72 (BP Location: Right arm, Patient Position: Sitting, BP Cuff Size: Adult)   Pulse 92   Ht 1.651 m (5' 5\")   Wt 119.8 kg (264 lb 3.2 oz)   LMP 01/05/1987 (Approximate)   BMI 43.97 kg/m²     Physical Exam   Constitutional: She is oriented to person, place, and time. She appears well-developed and well-nourished.   BMI 43.97   HENT:   Head: Normocephalic and atraumatic.   Eyes: EOM and lids are normal.   Neck: Normal range of motion.   Pulmonary/Chest: Effort normal.   Musculoskeletal:         General: Edema (with patient pressing on her shin she appears to have about 1+ LE edema) present.   Neurological: She is oriented to person, place, and time.   Skin: Skin is intact.   Psychiatric: She has a normal mood and affect. Her speech is normal and behavior is normal. Judgment and thought content normal. Cognition and memory are normal.          Lab Results   Component Value Date/Time    CHOLSTRLTOT 134 03/26/2020 10:55 AM    LDL 64 03/26/2020 10:55 AM    HDL 43 03/26/2020 10:55 AM    TRIGLYCERIDE 134 03/26/2020 10:55 AM       Lab Results   Component Value Date/Time    SODIUM 140 03/26/2020 10:55 AM    POTASSIUM 3.1 (L) 03/26/2020 10:55 AM    CHLORIDE 98 03/26/2020 10:55 AM    CO2 25 03/26/2020 10:55 AM    " GLUCOSE 88 03/26/2020 10:55 AM    BUN 39 (H) 03/26/2020 10:55 AM    CREATININE 1.24 03/26/2020 10:55 AM    CREATININE 0.6 01/27/2009 01:10 PM     Lab Results   Component Value Date/Time    ALKPHOSPHAT 64 03/26/2020 10:55 AM    ASTSGOT 13 03/26/2020 10:55 AM    ALTSGPT 9 03/26/2020 10:55 AM    TBILIRUBIN 0.4 03/26/2020 10:55 AM      Transthoracic Echo Report 11/10/2015  Difficult study.  No significant valve disease or flow abnormalities.   Normal left ventricular systolic function.   Trace to Small pericardial effusion without evidence of hemodynamic   compromise.  No prior study is available for comparison.      Myocardial Perfusion Report 11/11/2015   IMPRESSIONS   No reversible defects that would indicate ischemia.      TREADMILL STRESS EKG AND MYOCARDIAL PERFUSION SCAN 11/11/2015     DESCRIPTION: The patient received IV lexiscan. The infusion was associated with no symptoms. The baseline electrocardiogram is abnormal Atrial fibrillation. During the infusion, there were no electrocardiographic changes diagnostic of ischemia.     Myocardial perfusion images are to be reported in separate report.     Cath 2/27/2019  POSTPROCEDURE DIAGNOSES:  1.  No angiographic evidence of coronary artery disease.  2.  Normal left ventricular systolic function with ejection fraction of 70%.  3.  Elevated left ventricular end-diastolic pressure.       Transthoracic Echo Report 11/14/2019  Compared to the images of the study done 11/10/15 - there has been no significant change.  Left ventricular ejection fraction is visually estimated to be 60%.  Aortic valve area calculated from the continuity equation is 1.1 cm2.  Mild aortic stenosis.  Right ventricular systolic pressure is estimated to be 30 mmHg.        Assessment:     1. ACC/AHA stage C heart failure with preserved ejection fraction (HCC)  Basic Metabolic Panel   2. Heart failure, NYHA class 2 (HCC)     3. High risk medication use  Basic Metabolic Panel   4. HTN (hypertension),  malignant     5. Dyslipidemia     6. Longstanding persistent atrial fibrillation (HCC)     7. Class 3 severe obesity due to excess calories with serious comorbidity and body mass index (BMI) of 40.0 to 44.9 in adult (HCC)     8. KEN (obstructive sleep apnea)     9. Chronic anticoagulation         Medical Decision Making:  Today's Assessment / Status / Plan:   1. HFpEF, Stage C, Class 2-3, LVEF 60%: pt appears to have LE edema  -Encourage patient to monitor salt intake in the importance of 2000 mg sodium diet per day  -Increase torsemide to 40 mg twice a day for 3 days then resume 20 mg twice a day  -Continue potassium as instructed by PCP- 20 mEq twice a day for the rest of the week then continue 20 mEq daily  -Obtain a BMP in 2 weeks  -Encourage patient to call for sleep study appointment now as there would likely be a delay in scheduling for a few months.  -Reinforced s/sx of worsening heart failure with patient and weight monitoring. Pt verbalizes understanding. Pt to call office or RTC if present.   -pt does have a history of chronic venous insuffiiciency    2.  Hypertension: Stable  -Continue losartan 50 mg daily  -Continue metoprolol 50 mg twice a day    3.  Atrial fibrillation, hs unsuccessful cardioversion and prior amiodarone use:  -Continue warfarin, followed by the anticoagulation clinic  -Continue metoprolol 50 mg twice a day    4.  Dyslipidemia: Last LDL 64 on 3/26/2020  -Continue rosuvastatin 20 mg daily  -Continue gemfibrozil 600 mg daily    5. Obesity: BMI 43.97  -Encouraged weight loss    FU in clinic in 6 weeks with labs in 2 weeks. Sooner if needed.    Patient verbalizes understanding and agrees with the plan of care.     Collaborating MD: Florian Carnes MD

## 2020-04-03 LAB — T4 FREE SERPL DIALY-MCNC: 3.4 NG/DL (ref 1.1–2.4)

## 2020-04-08 ENCOUNTER — ANTICOAGULATION VISIT (OUTPATIENT)
Dept: VASCULAR LAB | Facility: MEDICAL CENTER | Age: 70
End: 2020-04-08
Attending: INTERNAL MEDICINE
Payer: MEDICARE

## 2020-04-08 VITALS — DIASTOLIC BLOOD PRESSURE: 60 MMHG | SYSTOLIC BLOOD PRESSURE: 91 MMHG | HEART RATE: 101 BPM

## 2020-04-08 DIAGNOSIS — Z79.01 CHRONIC ANTICOAGULATION: ICD-10-CM

## 2020-04-08 LAB
INR BLD: 2.6 (ref 0.9–1.2)
INR PPP: 2.6 (ref 2–3.5)

## 2020-04-08 PROCEDURE — 85610 PROTHROMBIN TIME: CPT

## 2020-04-08 PROCEDURE — 99211 OFF/OP EST MAY X REQ PHY/QHP: CPT | Performed by: NURSE PRACTITIONER

## 2020-04-08 NOTE — PROGRESS NOTES
Anticoagulation Summary  As of 2020    INR goal:   2.0-3.0   TTR:   56.5 % (4.7 y)   INR used for dosin.60 (2020)   Warfarin maintenance plan:   5 mg (5 mg x 1) every Fri; 2.5 mg (5 mg x 0.5) all other days   Weekly warfarin total:   20 mg   Plan last modified:   Sherri Nieto AShanthiPROE (3/12/2020)   Next INR check:   2020   Target end date:   Indefinite    Indications    Chronic anticoagulation [Z79.01]  Atrial fibrillation (HCC) (Resolved) [I48.91]             Anticoagulation Episode Summary     INR check location:   Anticoagulation Clinic    Preferred lab:       Send INR reminders to:       Comments:         Anticoagulation Care Providers     Provider Role Specialty Phone number    Florian Carnes M.D. Referring Cardiology [phone]    [facility] Responsible  [phone]        Anticoagulation Patient Findings      HPI:  Rachelle Reina seen in clinic today for follow up on anticoagulation therapy in the presence of AF.   Denies any changes to current medical/health status since last appointment.   Denies any medication or diet changes.   No current symptoms of bleeding or thrombosis reported.    A/P:   INR therapeutic.   Continue current regimen.   BP recorded in vitals. BP 91/60. Denies any dizziness or lightheadedness. She will recheck at home. States her BP was normal this AM.    Follow up appointment in 4 week(s).    Next Appointment: Wednesday, May 6, 2020 at 1:15 pm.    Sherri GARCIA

## 2020-04-17 ENCOUNTER — TELEMEDICINE (OUTPATIENT)
Dept: SLEEP MEDICINE | Facility: MEDICAL CENTER | Age: 70
End: 2020-04-17
Payer: MEDICARE

## 2020-04-17 ENCOUNTER — TELEPHONE (OUTPATIENT)
Dept: SLEEP MEDICINE | Facility: MEDICAL CENTER | Age: 70
End: 2020-04-17

## 2020-04-17 DIAGNOSIS — I48.20 CHRONIC ATRIAL FIBRILLATION (HCC): ICD-10-CM

## 2020-04-17 DIAGNOSIS — G47.30 SLEEP DISORDER BREATHING: ICD-10-CM

## 2020-04-17 PROCEDURE — 99204 OFFICE O/P NEW MOD 45 MIN: CPT | Mod: 95,CR | Performed by: FAMILY MEDICINE

## 2020-04-17 RX ORDER — ZOLPIDEM TARTRATE 5 MG/1
5 TABLET ORAL NIGHTLY PRN
Qty: 2 TAB | Refills: 0 | Status: SHIPPED | OUTPATIENT
Start: 2020-04-17 | End: 2020-04-18

## 2020-04-17 NOTE — PROGRESS NOTES
Telemedicine Visit: New Patient     This encounter was conducted via Zoom . Verbal consent was obtained. Patient's identity was verified.         [facility]  Consult Note     Date: 4/17/2020 / Time: 11:08 AM    Patient ID:   Name:             Rachelle Reina   YOB: 1950  Age:                 69 y.o.  female   MRN:               [id number]      Thank you for requesting a sleep medicine consultation on Rachelle Reina at the sleep center. She presents today with the chief complaints of snoring an occasional gasping. She is referred by Dr. Jeffrey for evaluation and treatment of sleep disorder breathing. She had a SS about 5 years ago and was diagnosed with KEN however    HISTORY OF PRESENT ILLNESS:       At night,  Rachelle Reina goes to bed around 11 pm-12 am on weekdays and on the weekends. She gets out of bed at 9:30 am on weekdays and on the weekends.  She averages about 7 hrs of sleep on a good night and 5 hrs on a bad night. Pt has bad nights about couple nights per week. She falls asleep within few minutes. She is currently on trazodone 100 mg.She wakes up about 3-4 times during the night due to bathroom use due to diuretics and on average It takes her few min  however on some nights, it take 30-45 min to fall back asleep.     She is aware of snoring/breathing pauses/gasping or choking in sleep.  She  Occasionally has symptoms of restless legs syndrome where she has urge to move her  legs in the evening or bedtime. However denies issues with sleep initiation. She  denies any symptoms of narcolepsy such as sleep paralysis or cataplexy, or any symptoms to suggest parasomnias such as sleep walking or acting out of dreams. She  has not used any medications for the sleep problem.    Overall, she does not finds her sleep refreshing. In terms of  excessive daytime sleepiness, she denies of sleepiness while  at work, while reading or watching TV or while driving.She occasionaly  takes a nap. The naps are usually 2 hr long.    REVIEW OF SYSTEMS:       Constitutional: Denies fevers, Denies weight changes  Eyes: Denies changes in vision, no eye pain  Ears/Nose/Throat/Mouth: Denies nasal congestion or sore throat   Cardiovascular: Denies chest pain or palpitations   Respiratory: Denies shortness of breath , Denies cough  Gastrointestinal/Hepatic: Denies abdominal pain, nausea, vomiting, diarrhea, constipation or GI bleeding   Genitourinary: Denies bladder dysfunction, dysuria or frequency  Musculoskeletal/Rheum: Denies  joint pain and swelling   Skin/Breast: Denies rash  Neurological: Denies headache, confusion, memory loss or focal weakness/parasthesias  Psychiatric: denies mood disorder     Comprehensive review of systems form is reviewed with the patient and is attached in the EMR.     PMH:  has a past medical history of A-fib ([facility]), Arthritis, Atrial fibrillation (HCC), Benign essential hypertension (7/1/2011), Bowel habit changes, Breast cancer ([facility]), Bronchitis (2015), CAD (coronary artery disease), Cancer (HCC) (1987, 2000), Cataract, Diabetes ([facility]), Diastolic congestive heart failure (HCC) (7/1/2011), Disorder of thyroid, High cholesterol, History of cardiac catheterization (4/22/2010), History of echocardiogram (4/22/2011), History of hypothyroidism (8/29/2008), Hypercholesteremia (7/1/2011), Long term (current) use of anticoagulants (7/1/2011), Morbid obesity (HCC) (10/28/2008), Ovarian cancer ([facility]), Sleep apnea, Snoring, and Urinary incontinence.  MEDS:   Current Outpatient Medications:   •  Ascorbic Acid (VITAMIN C) 100 MG Tab, Take 500 mg by mouth., Disp: , Rfl:   •  Pyridoxine HCl (VITAMIN B-6 PO), Take  by mouth., Disp: , Rfl:   •  torsemide (DEMADEX) 20 MG Tab, TAKE 1 TABLET BY MOUTH TWICE DAILY, Disp: 180 Tab, Rfl: 3  •  LEVEMIR FLEXTOUCH 100 UNIT/ML injection PEN, Inject 14 Units as instructed 2 times a day., Disp: , Rfl:   •  oxybutynin (DITROPAN XL) 15 MG CR tablet, Take 15  mg by mouth every morning., Disp: , Rfl: 2  •  warfarin (COUMADIN) 5 MG Tab, Take 2.5-5 mg by mouth every evening. 5 mg every Friday 2.5 mg all other days of the week, Disp: , Rfl:   •  potassium chloride SA (KDUR) 20 MEQ Tab CR, Take 20 mEq by mouth every morning., Disp: , Rfl:   •  allopurinol (ZYLOPRIM) 100 MG Tab, Take 100 mg by mouth every bedtime., Disp: , Rfl: 2  •  acetaminophen (TYLENOL 8 HOUR ARTHRITIS PAIN) 650 MG CR tablet, Take 1,300 mg by mouth 2 Times a Day., Disp: , Rfl:   •  Cholecalciferol (VITAMIN D-3) 5000 units Tab, Take 5,000 Units by mouth 3 times a week. Monday, Wednesday & Friday, Disp: , Rfl:   •  metoprolol (LOPRESSOR) 50 MG Tab, Take 50 mg by mouth 2 times a day., Disp: , Rfl:   •  losartan (COZAAR) 50 MG Tab, Take 50 mg by mouth every day., Disp: , Rfl:   •  traZODone (DESYREL) 100 MG Tab, Take 100 mg by mouth every evening., Disp: , Rfl:   •  rosuvastatin (CRESTOR) 20 MG Tab, Take 20 mg by mouth every evening., Disp: , Rfl: 1  •  SYNTHROID 200 MCG Tab, Take 200 mcg by mouth every day. Monday thru Saturday, Disp: , Rfl: 2  •  glipiZIDE (GLUCOTROL) 5 MG Tab, Take 2.5 mg by mouth 2 times a day., Disp: , Rfl: 1  •  levothyroxine (SYNTHROID) 175 MCG Tab, Take 175 mcg by mouth every Sunday. Sundays *Brand Only*, Disp: , Rfl:   •  gemfibrozil (LOPID) 600 MG Tab, Take 600 mg by mouth every morning., Disp: , Rfl:   •  loratadine (CLARITIN) 10 MG Tab, Take 10 mg by mouth every day., Disp: , Rfl:   •  citalopram (CELEXA) 20 MG TABS, Take 20 mg by mouth every day., Disp: , Rfl:   •  thiamine (THIAMINE) 100 MG Tab, Take 100 mg by mouth every day., Disp: , Rfl:   •  nystatin (MYCOSTATIN) powder, Apply to lower abdomen/groin rash until resolution, Disp: 15 g, Rfl:   ALLERGIES:   Allergies   Allergen Reactions   • Sulfa Drugs Rash     8/2015       SURGHX:   Past Surgical History:   Procedure Laterality Date   • [facility] CARDIAC CATH  02/27/2019    normal coronaries   • CATARACT PHACO WITH IOL Right  1/22/2018    Procedure: CATARACT PHACO WITH IOL;  Surgeon: Santosh Holden M.D.;  Location: SURGERY SAME DAY [facility];  Service: Ophthalmology   • CATARACT PHACO WITH IOL Left 1/8/2018    Procedure: CATARACT PHACO WITH IOL;  Surgeon: Santosh Holden M.D.;  Location: SURGERY SAME DAY [facility];  Service: Ophthalmology   • OTHER ORTHOPEDIC SURGERY Right 2013    hip arthroplasty   • CHOLECYSTECTOMY  2001   • ABDOMINAL HYSTERECTOMY TOTAL      1987   • MASTECTOMY  partial   • PB RADIATION THERAPY PLAN SIMPLE       SOCHX:  reports that she has never smoked. She has never used smokeless tobacco. She reports current alcohol use. She reports that she does not use drugs.. Pt works as .   FH:   Family History   Problem Relation Age of Onset   • Heart Disease Mother         HEART VALVE REPLACEMENT.   • Heart Disease Father         CORONARY ARTERY BYPASS GRAFTS   • Cancer Maternal Aunt    • Diabetes Brother    • Thyroid Brother    • Heart Attack Brother    • Heart Disease Brother        Physical Exam:  Vitals/ General Appearance:   Weight/BMI: There is no height or weight on file to calculate BMI.  There were no vitals taken for this visit.  There were no vitals filed for this visit.    unable to obtain vitals due to virtual visit in light of COVID-19 pandemic.     Constitutional: Alert, no distress, well-groomed.  Skin: No rashes in visible areas.  Eye: Round. Conjunctiva clear, lids normal. No icterus.   ENMT: Lips pink without lesions, good dentition, moist mucous membranes. Phonation normal.  Neck: No masses, no thyromegaly. Moves freely without pain.  CV: Pulse as reported by patient  Respiratory: Unlabored respiratory effort, no cough or audible wheeze  Psych: Alert and oriented x3, normal affect and mood.   INVESTIGATIONS:       ASSESSMENT AND PLAN     1. She  has symptoms of Obstructive Sleep Apnea (KEN)    The pathophysiology of KEN and the increased risk of cardiovascular morbidity from untreated KEN is  discussed in detail with the patient. She  also has HTN ad CHF which can be worsened by her KEN.     We have discussed diagnostic options including in-laboratory, attended polysomnography and home sleep testing. We have also discussed treatment options including airway pressurization, reconstructive otolaryngologic surgery, dental appliances and weight management.       Subsequently,treatment options will be discussed based on the diagnostic study. Meanwhile, She is urged to avoid supine sleep, weight gain and alcoholic beverages since all of these can worsen KEN. She is cautioned against drowsy driving. If She feels sleepy while driving, She must pull over for a break/nap, rather than persist on the road, in the interest of She own safety and that of others on the road.    Plan  -  She  will be scheduled for an overnight PSG to assess sleep related  breathing disorder.  - Ambien is prescribed only for the night of the SS. I have obtained and reviewed patient utilization report from [facility] prior to writing prescription for controlled substance II, III or IV. Based on the report and my clinical assessment the prescription is medically necessary. Pt was advised to use Ambien on as needed basis. Do not drive or use fast moving machinery after taking the medication for at least 6-8 hrs. Side affects were discussed in detail.        2. Afib: it is stable and currently asymptomatic. She is currently on metoprolol, losartan and chronic anticoagulation with warfarin. Her last ECHO was on 11/13/19 which showed EF of 60%. Mild aortic stenosis.Right ventricular systolic pressure is estimated to be 30 mmHg    3. Regarding treatment of other past medical problems and general health maintenance,  She is urged to follow up with PCP.

## 2020-04-17 NOTE — TELEPHONE ENCOUNTER
RX for Ambien 5mg has been called into   [id number] - [place], [place] - [address] AT [facility] OF [address] & [address]  [address] [address]  Phone: [phone] Fax: [phone]    Spoke with Pharmacist Jackie

## 2020-04-29 ENCOUNTER — TELEPHONE (OUTPATIENT)
Dept: CARDIOLOGY | Facility: MEDICAL CENTER | Age: 70
End: 2020-04-29

## 2020-04-29 DIAGNOSIS — I50.30 ACC/AHA STAGE C HEART FAILURE WITH PRESERVED EJECTION FRACTION (HCC): ICD-10-CM

## 2020-04-29 NOTE — TELEPHONE ENCOUNTER
Pt is scheduled on 5-6-2020 for a RHC w/cardiomems with Dr. Jeffrey. Patient was told to hold coumadin for 5 days prior and to notify coumadin clinic she is stopping for procedure, pt was also told to hold Glipizide and actos AM day of procedure and to check in at 6:00am for a 8:00 procedure. Updated H&P to be done on admit by NP.Pre admit to call patient. Addison Cui notified pt stoppind coumadin on 4-.

## 2020-05-01 ENCOUNTER — HOSPITAL ENCOUNTER (OUTPATIENT)
Facility: MEDICAL CENTER | Age: 70
End: 2020-05-01
Attending: INTERNAL MEDICINE
Payer: MEDICARE

## 2020-05-01 ENCOUNTER — APPOINTMENT (OUTPATIENT)
Dept: VASCULAR LAB | Facility: MEDICAL CENTER | Age: 70
End: 2020-05-01
Attending: INTERNAL MEDICINE
Payer: MEDICARE

## 2020-05-01 ENCOUNTER — DOCUMENTATION (OUTPATIENT)
Dept: CARDIOLOGY | Facility: MEDICAL CENTER | Age: 70
End: 2020-05-01

## 2020-05-01 PROBLEM — Z00.6 RESEARCH STUDY PATIENT: Status: ACTIVE | Noted: 2020-05-01

## 2020-05-01 NOTE — PROGRESS NOTES
GUIDE HF         NCT [id number]  Protocol Title: Hemodynamic-GUIDEd Management of Heart Failure (GUIDE-HF)  Single Arm     Screening/Baseline visit:  Rachelle here today to discuss participation in the GUIDE HFClinical trial. She was given the study consent with ample time to review. I informed her that the clinical trial is voluntary and she may withdraw consent at any time for any reason by notifying the study team. All aspects of the study purpose and procedures were discussed per consent form. Dr. Jeffrey present and all questions answered to her satisfaction. Subject signed consent without coercion and undue influence and was given a copy of the signed consent. No research specific procedures took place prior to consenting and all assessments were conducted per protocol. Subject completed the EQ-5D-5L & KCCQ-12 questionnaires prior to completing the 6MWT and obtaining vitals.       BP 112/82 mmHg  Weight 272 lbs  BMI 45 kg/m2  HR 64 bpm     6MWT- Patient only able to complete 2 minutes of 6MWT due to dizziness and leg weakness. Distance of 37 Meters. She thought her blood sugar was low and felt better after eating an apple.     Medications and medical history were reviewed. Study team contact information was given for him to call with any more questions.     CardioMEMS pre-implant teaching videos were watched and questions answered.     Implant procedure scheduled for May 6th.

## 2020-05-05 ENCOUNTER — APPOINTMENT (OUTPATIENT)
Dept: ADMISSIONS | Facility: MEDICAL CENTER | Age: 70
End: 2020-05-05
Payer: MEDICARE

## 2020-05-05 RX ORDER — PIOGLITAZONEHYDROCHLORIDE 15 MG/1
15 TABLET ORAL EVERY MORNING
Status: ON HOLD | COMMUNITY
End: 2022-01-01

## 2020-05-05 NOTE — OR NURSING
1. Caller Name: Rachelle Reina                        Call Back Number: [phone]  [facility] PCP or Specialty Provider: Yes         2.  Does patient have any active symptoms of respiratory illness? No.    3.  Does patient have any comoribidities? DM    4.  Has the patient traveled in the last 14 days OR had any known contact with someone who is suspected or confirmed to have COVID-19?  No.    5. Disposition: Cleared by RN Triage as potential is low for COVID-19; OK to keep/schedule appointment    Note routed to [facility] Provider: FYI only.

## 2020-05-06 ENCOUNTER — HOSPITAL ENCOUNTER (OUTPATIENT)
Facility: MEDICAL CENTER | Age: 70
End: 2020-05-06
Attending: INTERNAL MEDICINE | Admitting: INTERNAL MEDICINE
Payer: MEDICARE

## 2020-05-06 ENCOUNTER — HOSPITAL ENCOUNTER (OUTPATIENT)
Dept: CARDIOLOGY | Facility: MEDICAL CENTER | Age: 70
End: 2020-05-06
Attending: INTERNAL MEDICINE
Payer: MEDICARE

## 2020-05-06 VITALS
HEIGHT: 65 IN | SYSTOLIC BLOOD PRESSURE: 116 MMHG | OXYGEN SATURATION: 95 % | WEIGHT: 270.95 LBS | TEMPERATURE: 99.1 F | BODY MASS INDEX: 45.14 KG/M2 | HEART RATE: 97 BPM | DIASTOLIC BLOOD PRESSURE: 68 MMHG | RESPIRATION RATE: 18 BRPM

## 2020-05-06 DIAGNOSIS — I50.30 ACC/AHA STAGE C HEART FAILURE WITH PRESERVED EJECTION FRACTION (HCC): ICD-10-CM

## 2020-05-06 DIAGNOSIS — Z79.01 CHRONIC ANTICOAGULATION: ICD-10-CM

## 2020-05-06 DIAGNOSIS — M25.511 ACUTE PAIN OF RIGHT SHOULDER: ICD-10-CM

## 2020-05-06 LAB
ACTION RANGE TRIGGERED IACRT: YES
ALBUMIN SERPL BCP-MCNC: 3.3 G/DL (ref 3.2–4.9)
ALBUMIN/GLOB SERPL: 1 G/DL
ALP SERPL-CCNC: 70 U/L (ref 30–99)
ALT SERPL-CCNC: 8 U/L (ref 2–50)
ANION GAP SERPL CALC-SCNC: 16 MMOL/L (ref 7–16)
APTT PPP: 32.1 SEC (ref 24.7–36)
AST SERPL-CCNC: 17 U/L (ref 12–45)
BASE EXCESS BLDV CALC-SCNC: 1 MMOL/L (ref -4–3)
BILIRUB SERPL-MCNC: 0.4 MG/DL (ref 0.1–1.5)
BODY TEMPERATURE: ABNORMAL DEGREES
BUN SERPL-MCNC: 31 MG/DL (ref 8–22)
CA-I BLD ISE-SCNC: 1.32 MMOL/L (ref 1.1–1.3)
CALCIUM SERPL-MCNC: 9.8 MG/DL (ref 8.5–10.5)
CHLORIDE SERPL-SCNC: 101 MMOL/L (ref 96–112)
CO2 BLDV-SCNC: 30 MMOL/L (ref 20–33)
CO2 SERPL-SCNC: 22 MMOL/L (ref 20–33)
CREAT SERPL-MCNC: 1.17 MG/DL (ref 0.5–1.4)
EKG IMPRESSION: NORMAL
ERYTHROCYTE [DISTWIDTH] IN BLOOD BY AUTOMATED COUNT: 57.1 FL (ref 35.9–50)
GLOBULIN SER CALC-MCNC: 3.2 G/DL (ref 1.9–3.5)
GLUCOSE BLD-MCNC: 98 MG/DL (ref 65–99)
GLUCOSE SERPL-MCNC: 94 MG/DL (ref 65–99)
HCO3 BLDV-SCNC: 28.2 MMOL/L (ref 24–28)
HCT VFR BLD AUTO: 40.1 % (ref 37–47)
HCT VFR BLD CALC: 36 % (ref 37–47)
HGB BLD-MCNC: 11.9 G/DL (ref 12–16)
HGB BLD-MCNC: 12.2 G/DL (ref 12–16)
INR PPP: 1.21 (ref 0.87–1.13)
INST. QUALIFIED PATIENT IIQPT: YES
MAGNESIUM SERPL-MCNC: 1.9 MG/DL (ref 1.5–2.5)
MCH RBC QN AUTO: 25.6 PG (ref 27–33)
MCHC RBC AUTO-ENTMCNC: 29.7 G/DL (ref 33.6–35)
MCV RBC AUTO: 86.2 FL (ref 81.4–97.8)
NT-PROBNP SERPL IA-MCNC: 952 PG/ML (ref 0–125)
PCO2 BLDV: 56.1 MMHG (ref 41–51)
PH BLDV: 7.31 [PH] (ref 7.31–7.45)
PLATELET # BLD AUTO: 252 K/UL (ref 164–446)
PMV BLD AUTO: 9.7 FL (ref 9–12.9)
PO2 BLDV: 36 MMHG (ref 25–40)
POTASSIUM BLD-SCNC: 3.7 MMOL/L (ref 3.6–5.5)
POTASSIUM SERPL-SCNC: 4 MMOL/L (ref 3.6–5.5)
PROT SERPL-MCNC: 6.5 G/DL (ref 6–8.2)
PROTHROMBIN TIME: 15.6 SEC (ref 12–14.6)
RBC # BLD AUTO: 4.65 M/UL (ref 4.2–5.4)
SAO2 % BLDV: 63 %
SODIUM BLD-SCNC: 139 MMOL/L (ref 135–145)
SODIUM SERPL-SCNC: 139 MMOL/L (ref 135–145)
SPECIMEN DRAWN FROM PATIENT: ABNORMAL
WBC # BLD AUTO: 6.5 K/UL (ref 4.8–10.8)

## 2020-05-06 PROCEDURE — 160002 HCHG RECOVERY MINUTES (STAT)

## 2020-05-06 PROCEDURE — 85610 PROTHROMBIN TIME: CPT

## 2020-05-06 PROCEDURE — 85027 COMPLETE CBC AUTOMATED: CPT

## 2020-05-06 PROCEDURE — 99152 MOD SED SAME PHYS/QHP 5/>YRS: CPT | Mod: Q1 | Performed by: INTERNAL MEDICINE

## 2020-05-06 PROCEDURE — 80053 COMPREHEN METABOLIC PANEL: CPT

## 2020-05-06 PROCEDURE — 700105 HCHG RX REV CODE 258: Performed by: INTERNAL MEDICINE

## 2020-05-06 PROCEDURE — 82803 BLOOD GASES ANY COMBINATION: CPT

## 2020-05-06 PROCEDURE — 700102 HCHG RX REV CODE 250 W/ 637 OVERRIDE(OP): Performed by: INTERNAL MEDICINE

## 2020-05-06 PROCEDURE — A9270 NON-COVERED ITEM OR SERVICE: HCPCS | Performed by: INTERNAL MEDICINE

## 2020-05-06 PROCEDURE — 33289 TCAT IMPL WRLS P-ART PRS SNR: CPT | Mod: Q1 | Performed by: INTERNAL MEDICINE

## 2020-05-06 PROCEDURE — 85730 THROMBOPLASTIN TIME PARTIAL: CPT

## 2020-05-06 PROCEDURE — 85014 HEMATOCRIT: CPT | Mod: XU

## 2020-05-06 PROCEDURE — 82947 ASSAY GLUCOSE BLOOD QUANT: CPT | Mod: XU

## 2020-05-06 PROCEDURE — 93005 ELECTROCARDIOGRAM TRACING: CPT | Performed by: INTERNAL MEDICINE

## 2020-05-06 PROCEDURE — 83735 ASSAY OF MAGNESIUM: CPT

## 2020-05-06 PROCEDURE — 84295 ASSAY OF SERUM SODIUM: CPT | Mod: XU

## 2020-05-06 PROCEDURE — 84132 ASSAY OF SERUM POTASSIUM: CPT | Mod: XU

## 2020-05-06 PROCEDURE — 82330 ASSAY OF CALCIUM: CPT

## 2020-05-06 PROCEDURE — 93010 ELECTROCARDIOGRAM REPORT: CPT | Performed by: INTERNAL MEDICINE

## 2020-05-06 PROCEDURE — 700117 HCHG RX CONTRAST REV CODE 255: Performed by: INTERNAL MEDICINE

## 2020-05-06 PROCEDURE — 99153 MOD SED SAME PHYS/QHP EA: CPT

## 2020-05-06 PROCEDURE — 83880 ASSAY OF NATRIURETIC PEPTIDE: CPT

## 2020-05-06 RX ORDER — HEPARIN SODIUM 200 [USP'U]/100ML
INJECTION, SOLUTION INTRAVENOUS
Status: DISCONTINUED
Start: 2020-05-06 | End: 2020-05-06 | Stop reason: HOSPADM

## 2020-05-06 RX ORDER — LIDOCAINE HYDROCHLORIDE 20 MG/ML
INJECTION, SOLUTION INFILTRATION; PERINEURAL
Status: DISCONTINUED
Start: 2020-05-06 | End: 2020-05-06 | Stop reason: HOSPADM

## 2020-05-06 RX ORDER — SODIUM CHLORIDE 9 MG/ML
INJECTION, SOLUTION INTRAVENOUS
Status: DISCONTINUED | OUTPATIENT
Start: 2020-05-06 | End: 2020-05-06 | Stop reason: HOSPADM

## 2020-05-06 RX ORDER — SODIUM CHLORIDE 9 MG/ML
500 INJECTION, SOLUTION INTRAVENOUS ONCE
Status: COMPLETED | OUTPATIENT
Start: 2020-05-06 | End: 2020-05-06

## 2020-05-06 RX ORDER — MIDAZOLAM HYDROCHLORIDE 1 MG/ML
INJECTION INTRAMUSCULAR; INTRAVENOUS
Status: DISCONTINUED
Start: 2020-05-06 | End: 2020-05-06 | Stop reason: HOSPADM

## 2020-05-06 RX ORDER — LEVOTHYROXINE SODIUM 0.2 MG/1
200 TABLET ORAL SEE ADMIN INSTRUCTIONS
COMMUNITY

## 2020-05-06 RX ORDER — MORPHINE SULFATE 4 MG/ML
4 INJECTION, SOLUTION INTRAMUSCULAR; INTRAVENOUS ONCE
Status: DISCONTINUED | OUTPATIENT
Start: 2020-05-06 | End: 2020-05-06

## 2020-05-06 RX ORDER — OXYCODONE HYDROCHLORIDE 5 MG/1
5 TABLET ORAL EVERY 4 HOURS PRN
Status: DISCONTINUED | OUTPATIENT
Start: 2020-05-06 | End: 2020-05-06 | Stop reason: HOSPADM

## 2020-05-06 RX ORDER — HEPARIN SODIUM,PORCINE 1000/ML
VIAL (ML) INJECTION
Status: DISCONTINUED
Start: 2020-05-06 | End: 2020-05-06 | Stop reason: HOSPADM

## 2020-05-06 RX ADMIN — OXYCODONE HYDROCHLORIDE 5 MG: 5 TABLET ORAL at 09:59

## 2020-05-06 RX ADMIN — SODIUM CHLORIDE 500 ML: 9 INJECTION, SOLUTION INTRAVENOUS at 12:09

## 2020-05-06 RX ADMIN — SODIUM CHLORIDE: 9 INJECTION, SOLUTION INTRAVENOUS at 07:29

## 2020-05-06 RX ADMIN — IOHEXOL 5 ML: 350 INJECTION, SOLUTION INTRAVENOUS at 09:19

## 2020-05-06 ASSESSMENT — ENCOUNTER SYMPTOMS
WHEEZING: 0
NERVOUS/ANXIOUS: 0
FALLS: 0
COUGH: 0
ORTHOPNEA: 0
PALPITATIONS: 0
DIZZINESS: 0
DOUBLE VISION: 0
HEADACHES: 0
BLURRED VISION: 0
LOSS OF CONSCIOUSNESS: 0
WEAKNESS: 0
SHORTNESS OF BREATH: 1
FOCAL WEAKNESS: 0

## 2020-05-06 ASSESSMENT — FIBROSIS 4 INDEX: FIB4 SCORE: 1.05

## 2020-05-06 NOTE — H&P
History:  Primary Diagnosis: elective right heart catheterization with cardiomems.     HPI:  69 years old female patient of Dr. Jeffrey with significant history of heart failure (no coronary disease on angiogram in 2019), atrial fibrillation on warfarin, diabetes, hyperlipidemia, gout, depression, thyroid disease, history of ovarian and breast cancer, and chronic venous insufficiency.     Recent cardiac imaging showed LVEF 60%, mild aortic stenosis, and RVSP of 30mmHg .     Currently patient denies chest pain or palpitations. Patient does complain of symptoms of fatigue, sob and leg swelling.     Medical history:   Past Medical History:   Diagnosis Date   • A-fib (HCC)    • Arthritis 05/05/2020    knees and fingers   • Atrial fibrillation (HCC)    • Benign essential hypertension 7/1/2011   • Bowel habit changes     diarrhea   • Breast cancer (HCC)    • Bronchitis 2015    history of   • CAD (coronary artery disease)    • Cancer (HCC) 1987, 2000    breast and ovary   • Cataract 2018    IOL bilat   • Diabetes ([facility])     oral medication, insulin   • Diastolic congestive heart failure (HCC) 2019   • Disorder of thyroid    • High cholesterol    • History of cardiac catheterization 4/22/2010   • History of echocardiogram 4/22/2011   • History of hypothyroidism 8/29/2008   • Hypercholesteremia 7/1/2011   • Long term (current) use of anticoagulants 7/1/2011   • Morbid obesity (HCC) 10/28/2008   • Ovarian cancer (HCC)    • Pain     knees   • Sleep apnea     pt does not use cpap   • Snoring     sleep study done   • Urinary incontinence        Surgical history:   Past Surgical History:   Procedure Laterality Date   • [facility] CARDIAC CATH  02/27/2019    normal coronaries   • CATARACT PHACO WITH IOL Right 1/22/2018    Procedure: CATARACT PHACO WITH IOL;  Surgeon: Santosh Holden M.D.;  Location: SURGERY SAME DAY [facility];  Service: Ophthalmology   • CATARACT PHACO WITH IOL Left 1/8/2018    Procedure: CATARACT PHACO WITH IOL;  Surgeon:  Santosh Holden M.D.;  Location: SURGERY SAME DAY [facility];  Service: Ophthalmology   • OTHER ORTHOPEDIC SURGERY Right 2013    hip arthroplasty   • CHOLECYSTECTOMY  2001   • ABDOMINAL HYSTERECTOMY TOTAL      1987   • MASTECTOMY  partial   • PB RADIATION THERAPY PLAN SIMPLE         Social history:   Social History     Tobacco Use   Smoking Status Never Smoker   Smokeless Tobacco Never Used      Social History     Substance and Sexual Activity   Alcohol Use Yes    Comment: 1 or 2 drinks a month      Social History     Substance and Sexual Activity   Drug Use No        Family history:   Family History   Problem Relation Age of Onset   • Heart Disease Mother         HEART VALVE REPLACEMENT.   • Heart Disease Father         CORONARY ARTERY BYPASS GRAFTS   • Cancer Maternal Aunt    • Diabetes Brother    • Thyroid Brother    • Heart Attack Brother    • Heart Disease Brother        Allergies:   Allergies   Allergen Reactions   • Sulfa Drugs Rash     8/2015 rash and hives         Home medications:   Current Facility-Administered Medications:   •  NS infusion, , Intravenous, PPU Continuous, Griselda LIBERTAD Zimmerman    Review of Systems:  Review of Systems   Constitutional: Negative for malaise/fatigue.   Eyes: Negative for blurred vision and double vision.   Respiratory: Positive for shortness of breath. Negative for cough and wheezing.    Cardiovascular: Positive for leg swelling. Negative for chest pain, palpitations and orthopnea.   Musculoskeletal: Negative for falls.   Skin: Positive for rash (on LE ).   Neurological: Negative for dizziness, focal weakness, loss of consciousness, weakness and headaches.   Psychiatric/Behavioral: The patient is not nervous/anxious.    All other systems reviewed and are negative.      Physical Examination:  Physical Exam   Constitutional: She is oriented to person, place, and time. She appears well-developed and well-nourished. No distress.   Cardiovascular: Normal rate and regular rhythm.    No murmur heard.  Pulmonary/Chest: Effort normal and breath sounds normal. No respiratory distress.   Abdominal: She exhibits no distension.   Musculoskeletal:         General: Edema (1-2+ pitting edema bilateral LE) present.   Neurological: She is alert and oriented to person, place, and time.   Skin: She is not diaphoretic. There is erythema (bilateral LE).   Psychiatric: She has a normal mood and affect. Her behavior is normal. Judgment and thought content normal.       Impression:  Heart failure with preserved ef     Plan:  elective right heart catheterization with cardiomems.

## 2020-05-06 NOTE — DISCHARGE INSTRUCTIONS
ACTIVITY: Rest and take it easy for the first 24 hours.  A responsible adult is recommended to remain with you during that time.  It is normal to feel sleepy.  We encourage you to not do anything that requires balance, judgment or coordination.    MILD FLU-LIKE SYMPTOMS ARE NORMAL. YOU MAY EXPERIENCE GENERALIZED MUSCLE ACHES, THROAT IRRITATION, HEADACHE AND/OR SOME NAUSEA.    FOR 24 HOURS DO NOT:  Drive, operate machinery or run household appliances.  Drink beer or alcoholic beverages.   Make important decisions or sign legal documents.    SPECIAL INSTRUCTIONS:  Discharge Instructions      · Do not subject neck to any forceful movements for 24 hours    i.e. supporting weight when rising from the chair or bed.   · Do not drive a car for 24 hours  · You may remove the dressing tomorrow  · You may shower on the day following your procedure.  Do not take a tub bath or submerge the puncture site in water for 7 days following the procedure.  · No Lifting more than 5 pounds with for 5 days  · Follow up with  To  2-4 weeks.  · Increase fluids for 2 days post procedure.  · Continue all previous medications unless otherwise instructed.    If bleeding should occur following discharge:  · Sit down and apply firm pressure to site with your fingers for 10 minutes  · If the bleeding stops, continue to sit quietly, keeping your neck straight for 2 hours.  Notify physician as soon as possible ([phone])  · If bleeding does not stop after 10 minutes, or if there is a large amount of bleeding or spurting, call 911 immediately.  Do not drive yourself to the hospital.    DIET: To avoid nausea, slowly advance diet as tolerated, avoiding spicy or greasy foods for the first day.  Add more substantial food to your diet according to your physician's instructions.  Babies can be fed formula or breast milk as soon as they are hungry.  INCREASE FLUIDS AND FIBER TO AVOID CONSTIPATION.    SURGICAL DRESSING/BATHING: Keep dressing and  incision dry and intact for 24 hours, may remove dressing and shower after 2 PM on 5/7, do not need to replace.  Do not submerge site in water for 7 days.    FOLLOW-UP APPOINTMENT:  A follow-up appointment should be arranged with your Dr. To [phone]; call to schedule.    You should CALL YOUR PHYSICIAN if you develop:  Fever greater than 101 degrees F.  Pain not relieved by medication, or persistent nausea or vomiting.  Excessive bleeding (blood soaking through dressing) or unexpected drainage from the wound.  Extreme redness or swelling around the incision site, drainage of pus or foul smelling drainage.  Inability to urinate or empty your bladder within 8 hours.  Problems with breathing or chest pain.    You should call 911 if you develop problems with breathing or chest pain.  If you are unable to contact your doctor or surgical center, you should go to the nearest emergency room or urgent care center.  Physician's telephone #: [phone]    If any questions arise, call your doctor.  If your doctor is not available, please feel free to call the Surgical Center at [phone].  The Center is open Monday through Friday from 7AM to 7PM.  You can also call the [facility] HOTLINE open 24 hours/day, 7 days/week and speak to a nurse at [phone], or toll free at [phone].    A registered nurse may call you a few days after your surgery to see how you are doing after your procedure.    MEDICATIONS: Resume taking daily medication.  Take prescribed pain medication with food.  If no medication is prescribed, you may take non-aspirin pain medication if needed.  PAIN MEDICATION CAN BE VERY CONSTIPATING.  Take a stool softener or laxative such as senokot, pericolace, or milk of magnesia if needed.    If your physician has prescribed pain medication that includes Acetaminophen (Tylenol), do not take additional Acetaminophen (Tylenol) while taking the prescribed medication.    Depression / Suicide Risk    As you are  discharged from this [facility] facility, it is important to learn how to keep safe from harming yourself.    Recognize the warning signs:  · Abrupt changes in personality, positive or negative- including increase in energy   · Giving away possessions  · Change in eating patterns- significant weight changes-  positive or negative  · Change in sleeping patterns- unable to sleep or sleeping all the time   · Unwillingness or inability to communicate  · Depression  · Unusual sadness, discouragement and loneliness  · Talk of wanting to die  · Neglect of personal appearance   · Rebelliousness- reckless behavior  · Withdrawal from people/activities they love  · Confusion- inability to concentrate     If you or a loved one observes any of these behaviors or has concerns about self-harm, here's what you can do:  · Talk about it- your feelings and reasons for harming yourself  · Remove any means that you might use to hurt yourself (examples: pills, rope, extension cords, firearm)  · Get professional help from the community (Mental Health, Substance Abuse, psychological counseling)  · Do not be alone:Call your Safe Contact- someone whom you trust who will be there for you.  · Call your local CRISIS HOTLINE 161-8845 or 537-547-2577  · Call your local Children's Mobile Crisis Response Team [place] (940) 433-9763 or www.Creditable  · Call the toll free National Suicide Prevention Hotlines   · National Suicide Prevention Lifeline 691-829-YWNS (0181)  · National Hope Line Network 800-SUICIDE (107-4540)

## 2020-05-06 NOTE — CATH LAB
Please see [facility] med section. Spoke with elif in pharmacy re: med select, unable to scan drugs.

## 2020-05-06 NOTE — OR NURSING
"0947 Patient arrived to unit, awake and alert.  Right IJ site clean, dry and soft.  Patient c/o being \"stiff and uncomfortable,\" repositioned for comfort.  Updated on plan of care, verbalized understanding.  0959 Medicated per MAR for pain.  Patient states her  is on her way to a doctor's appointment and she will text him.  1030 SBP 70's-80's, checked in bilateral arm and RLE.  Patient arouses easily to voice, denies discomfort.  1045 Patient SBP remains low.  Patient arouses to voice states she is \"very relaxed\" and \"has no pain.\"  Dr. Jeffrey paged for update, awaiting call back.  1130 Call back received from Dr. Jeffrey, updated on patient status, orders received.  1140 Patient ambulated to bathroom as SBA with walker.  Patient tolerated well, back to [facility].  1245 RN attempted to call patient's , message left.  1305 Patient called  who states he is downstairs, will get patient ready for discharge.  1310 Patient up to edge of [facility] denies discomfort, /68.  Right IJ site clean, dry and soft.   All lines and monitors discontinued.   1330 Patient taken out via wheelchair to meet  RN.  Reviewed discharge paperwork with pt and Antione. Patient sent home with "[employer]" device.  Discussed diet, activity, medications, follow up care and worsening symptoms. No questions at this time.  Pt discharged home with Antione.  "

## 2020-05-06 NOTE — PROCEDURES
Moderate sedation was used by me (Ash Jeffrey MD).    Agents: Fentanyl and Versed via intravenous injection (please see media tab for details of dosage).    Start time: 08:15 AM.    Stop time: 09:30 AM.    Complications: none.    Ash Jeffrey M.D.

## 2020-05-06 NOTE — HEART FAILURE PROGRAM
CardioMEMS GUIDE-HF single arm patient.     Lead Clinical Research Coordinator and this RN met with patient after procedure in [place] PACU. CardioMEMS home monitor reviewed. Education on equipment completed, daily reading procedure reviewed, and all questions answered.  Temporary implant card and contact card for research team and HF program previously given. All equipment left with patient. Follow up scheduled.    For questions or concerns please contact:    Lena Griffith  Lead Clinical Research Coordinator  [phone]    HF CardioMEMS team    Lilian Vázquez RN  HF   [phone]    Erica Li, RN  HF Clinic RN  [phone]    Britta Rosales, LILLIE  HF Nurse Navigator  [phone]

## 2020-05-06 NOTE — PROCEDURES
.Cardiomems implantation documentation    Indication for procedure:    This is a 69 years old patient with prior history of NYHA class 3 along with recent hospitalization for heart failure exacerbation and ongoing exertional dyspnea. Patient is indicated to undergo Cardiomems implantation to reduce repeated heart failure hospitalization and also to improve overall quality of life. Patient meets criteria to undergo such procedure. Patient has been managed in our heart failure clinic.    Procedures performed:  1) Ultrasound guided right IJ venous access.  2) Right heart catheterization.  3) Pulmonary angiogram.  4) Wiring of the pulmonary arterial system.  5) Implantation of Cardiomems device.  6) Calibration between intracardiac pressures via pulmonary arterial catheter along with Cardiomems readings.    Of note, assess at the right femoral vein was tried but not successful due to body habitus. Therefore, procedure was converted to right IJ assess and right PA placement of Cardiomems.    Procedures:  Patient's right internal jugular (IJ) venous area was prepped per protocol. It was sterilized per protocol. Based on patient's body habitus, venous assess through conventional means was difficult to perform safely without significant risk of retroperitoneal bleeding. Ultrasound was used to successfully obtain right IJ venous access after 2% lidocaine was given for local anesthesia. The right IJ ein was then dilated with sequential dilator to target goal of 12 Yemeni. Through that, a 12 Yemeni sheath was then inserted and locked in place. The pulmonary arterial Hammond-Kenan catheter was then advanced into the 12 Yemeni sheath, superior vena cava, right atrium, right ventricle and main pulmonary artery. The pulmonary arterial pressure was then obtained. Cardiac index and output was obtained via thermodilution and Jess's method. Pulmonary arterial wedge pressure was also successfully obtained. After that, the Hammond-Kenan  catheter was then placed within the right pulmonary arterial system. Through that, a pulmonary angiogram was successfully obtained. This was done to delineate the appropriate palmar arterial branch for successful deployment of Cardiomems device. Through the Springfield-Kenan catheter, a steel core wire was advanced into the appropriate branch of the right pulmonary arterial tree. The Springfield-Kenan catheter was then removed over the wire fashion. After which, the Cardiomems system was advanced over the wire into the appropriate branch of the pulmonary arterial tree. Measurement was made and the device was placed successfully without complications. After which, the catheter was removed and the steel core wire was left in place within the main pulmonary arterial system. The Springfield-Kenan catheter was then reintroduced back into the main pulmonary artery. The wire was then removed. After that was done successfully, the Springfield-Kenan catheter was then pulled back into the right ventricle and then the right atrium for which appropriate measures of pressures were obtained. The Springfield-Kenan catheter was then completely removed from the body and the sheath was removed with hemostasis obtained through direct pressure.    Calibration between the main pulmonary artery readings and the device readings were made to ensure consistency post procedure.    Hemodynamics:  1) Pulmonary arterial pressure: Systolic of 50 mm Hg, diastolic of 26 mm Hg, mean of 35 mm Hg.  2) Pulmonary arterial wedge pressure with mean of 13 mm Hg.  3) Cardiac output of 5.3 and Cardiac index of 2.6 through thermodilution method.  4) Cardiac output of 4 and Cardiac index of 1.8 through Jess's method.  5) Right ventricular pressure: Systolic of 44 mm Hg, end diastolic pressure of 9 mm Hg.  6) Right atrial pressure: A wave of 10 mm Hg, V wave of 11 mm Hg, mean of 9 mm Hg.  7) Pulmonary vascular resistance of 5 Wood Units.    Conclusions:  1) Successful implantation of Cardiomems  device for remote monitor of intracardiac pressures.  2) Patient will be monitored as part of our protocol in our heart failure program to further reduce repeated heart failure hospitalization and also to improve overall quality of life.      Ash Jeffrey MD.   [facility].

## 2020-05-14 ENCOUNTER — TELEMEDICINE (OUTPATIENT)
Dept: CARDIOLOGY | Facility: MEDICAL CENTER | Age: 70
End: 2020-05-14
Payer: MEDICARE

## 2020-05-14 VITALS
WEIGHT: 267 LBS | SYSTOLIC BLOOD PRESSURE: 113 MMHG | HEIGHT: 65 IN | HEART RATE: 99 BPM | DIASTOLIC BLOOD PRESSURE: 75 MMHG | BODY MASS INDEX: 44.48 KG/M2

## 2020-05-14 DIAGNOSIS — I48.11 LONGSTANDING PERSISTENT ATRIAL FIBRILLATION (HCC): ICD-10-CM

## 2020-05-14 DIAGNOSIS — I50.30 ACC/AHA STAGE C HEART FAILURE WITH PRESERVED EJECTION FRACTION (HCC): ICD-10-CM

## 2020-05-14 DIAGNOSIS — I50.9 HEART FAILURE, NYHA CLASS 3 (HCC): ICD-10-CM

## 2020-05-14 DIAGNOSIS — Z79.01 CHRONIC ANTICOAGULATION: ICD-10-CM

## 2020-05-14 DIAGNOSIS — I10 HTN (HYPERTENSION), MALIGNANT: ICD-10-CM

## 2020-05-14 DIAGNOSIS — I87.2 CHRONIC VENOUS INSUFFICIENCY: ICD-10-CM

## 2020-05-14 DIAGNOSIS — E66.01 CLASS 3 SEVERE OBESITY DUE TO EXCESS CALORIES WITH SERIOUS COMORBIDITY AND BODY MASS INDEX (BMI) OF 40.0 TO 44.9 IN ADULT (HCC): ICD-10-CM

## 2020-05-14 DIAGNOSIS — E78.5 DYSLIPIDEMIA: ICD-10-CM

## 2020-05-14 DIAGNOSIS — Z79.899 HIGH RISK MEDICATION USE: ICD-10-CM

## 2020-05-14 DIAGNOSIS — G47.33 OSA (OBSTRUCTIVE SLEEP APNEA): ICD-10-CM

## 2020-05-14 PROCEDURE — 99214 OFFICE O/P EST MOD 30 MIN: CPT | Mod: 95,CR | Performed by: NURSE PRACTITIONER

## 2020-05-14 ASSESSMENT — ENCOUNTER SYMPTOMS
SHORTNESS OF BREATH: 1
COUGH: 0
CLAUDICATION: 0
MYALGIAS: 0
ABDOMINAL PAIN: 0
PALPITATIONS: 0
ORTHOPNEA: 0
PND: 0
FEVER: 0
DIZZINESS: 0

## 2020-05-14 ASSESSMENT — FIBROSIS 4 INDEX: FIB4 SCORE: 1.65

## 2020-05-14 NOTE — PROGRESS NOTES
Chief Complaint   Patient presents with   • Congestive Heart Failure       Subjective:   This encounter was conducted via Zoom .   Verbal consent was obtained. Patient's identity was verified.    Rachelle Reina is a 69 y.o. female who is following up on her heart failure.      Patient of Dr. Carnes and Dr. Jeffrey in the heart failure clinic.  She was last seen in clinic on 4/2/2020 via ZOOM.  During that visit, her torsemide was adjusted for edema for a few doses .    Pt was able to get her CardioMEMs, intracardiac pressure monitoring device implanted as previously recommended by Dr. Jeffrey. She had her implant on 5/6/2020 with Dr. Jeffrey.    For her symptoms, she feels fairly well. She states her swelling is better but continues to be present. Her swelling seems to improve overnight and worsen in her calves through the day. She also continue to report mild LENNON with some fatigue. She denies chest pain, shortness of breath at rest or with ADLs, palpitations, dizziness/lightheadedness, orthopnea, PND.    Her weights have been stable. She is trying to lose weight.    She is being scheduled for her sleep study soon.     Additonally, patient has the following medical problems:    -Atrial fibrillation: Taking warfarin, followed by anticoagulation clinic    -No coronary disease on angiogram in 2019    -Insulin-dependent diabetic    -Hyperlipidemia    -Gout    -Depression    -Thyroid disease: Taking Synthroid    -History of ovarian and breast cancer    -Chronic venous insufficiency    Past Medical History:   Diagnosis Date   • A-fib (HCC)    • Arthritis 05/05/2020    knees and fingers   • Atrial fibrillation (HCC)    • Benign essential hypertension 7/1/2011   • Bowel habit changes     diarrhea   • Breast cancer (HCC)    • Bronchitis 2015    history of   • CAD (coronary artery disease)    • Cancer (HCC) 1987, 2000    breast and ovary   • Cataract 2018    IOL bilat   • Diabetes (HCC)     oral medication, insulin   • Diastolic  congestive heart failure (HCC) 2019   • Disorder of thyroid    • High cholesterol    • History of cardiac catheterization 4/22/2010   • History of echocardiogram 4/22/2011   • History of hypothyroidism 8/29/2008   • Hypercholesteremia 7/1/2011   • Long term (current) use of anticoagulants 7/1/2011   • Morbid obesity (HCC) 10/28/2008   • Ovarian cancer (HCC)    • Pain     knees   • Sleep apnea     pt does not use cpap   • Snoring     sleep study done   • Urinary incontinence      Past Surgical History:   Procedure Laterality Date   • ZZZ CARDIAC CATH  02/27/2019    normal coronaries   • CATARACT PHACO WITH IOL Right 1/22/2018    Procedure: CATARACT PHACO WITH IOL;  Surgeon: Santosh Holden M.D.;  Location: [facility];  Service: Ophthalmology   • CATARACT PHACO WITH IOL Left 1/8/2018    Procedure: CATARACT PHACO WITH IOL;  Surgeon: Santosh Holden M.D.;  Location: [facility];  Service: Ophthalmology   • OTHER ORTHOPEDIC SURGERY Right 2013    hip arthroplasty   • CHOLECYSTECTOMY  2001   • ABDOMINAL HYSTERECTOMY TOTAL      1987   • MASTECTOMY  partial   • PB RADIATION THERAPY PLAN SIMPLE       Family History   Problem Relation Age of Onset   • Heart Disease Mother         HEART VALVE REPLACEMENT.   • Heart Disease Father         CORONARY ARTERY BYPASS GRAFTS   • Cancer Maternal Aunt    • Diabetes Brother    • Thyroid Brother    • Heart Attack Brother    • Heart Disease Brother      Social History     Socioeconomic History   • Marital status:      Spouse name: Not on file   • Number of children: Not on file   • Years of education: Not on file   • Highest education level: Not on file   Occupational History   • Not on file   Social Needs   • Financial resource strain: Not on file   • Food insecurity     Worry: Not on file     Inability: Not on file   • Transportation needs     Medical: Not on file     Non-medical: Not on file   Tobacco Use   • Smoking status: Never Smoker   •  Smokeless tobacco: Never Used   Substance and Sexual Activity   • Alcohol use: Yes     Comment: 1 or 2 drinks a month   • Drug use: No   • Sexual activity: Not on file   Lifestyle   • Physical activity     Days per week: Not on file     Minutes per session: Not on file   • Stress: Not on file   Relationships   • Social connections     Talks on phone: Not on file     Gets together: Not on file     Attends Advent service: Not on file     Active member of club or organization: Not on file     Attends meetings of clubs or organizations: Not on file     Relationship status: Not on file   • Intimate partner violence     Fear of current or ex partner: Not on file     Emotionally abused: Not on file     Physically abused: Not on file     Forced sexual activity: Not on file   Other Topics Concern   • Not on file   Social History Narrative   • Not on file     Allergies   Allergen Reactions   • Sulfa Drugs Rash     8/2015 rash and hives       Outpatient Encounter Medications as of 5/14/2020   Medication Sig Dispense Refill   • levothyroxine (SYNTHROID) 200 MCG Tab Take 200 mcg by mouth See Admin Instructions. All days except Sunday  Brand Name     • pioglitazone (ACTOS) 15 MG Tab Take 15 mg by mouth every day.     • thiamine (THIAMINE) 100 MG Tab Take 100 mg by mouth every day.     • Ascorbic Acid (VITAMIN C) 500 MG Cap Take 500 mg by mouth every day.     • Pyridoxine HCl (VITAMIN B-6 PO) Take 1 Tab by mouth every day.     • torsemide (DEMADEX) 20 MG Tab TAKE 1 TABLET BY MOUTH TWICE DAILY 180 Tab 3   • LEVEMIR FLEXTOUCH 100 UNIT/ML injection PEN Inject 14 Units as instructed 2 times a day.     • oxybutynin (DITROPAN XL) 15 MG CR tablet Take 15 mg by mouth every morning.  2   • warfarin (COUMADIN) 5 MG Tab Take 2.5-5 mg by mouth every evening. 5 mg every Friday  2.5 mg all other days of the week     • potassium chloride SA (KDUR) 20 MEQ Tab CR Take 20 mEq by mouth every evening.     • allopurinol (ZYLOPRIM) 100 MG Tab Take  "100 mg by mouth every bedtime.  2   • acetaminophen (TYLENOL 8 HOUR ARTHRITIS PAIN) 650 MG CR tablet Take 1,300 mg by mouth 2 Times a Day.     • Cholecalciferol (VITAMIN D-3) 5000 units Tab Take 5,000 Units by mouth every day.     • metoprolol (LOPRESSOR) 50 MG Tab Take 50 mg by mouth 2 times a day.     • losartan (COZAAR) 50 MG Tab Take 50 mg by mouth every day.     • traZODone (DESYREL) 100 MG Tab Take 100 mg by mouth every evening.     • rosuvastatin (CRESTOR) 20 MG Tab Take 20 mg by mouth every evening.  1   • glipiZIDE (GLUCOTROL) 5 MG Tab Take 2.5 mg by mouth 2 times a day.  1   • levothyroxine (SYNTHROID) 175 MCG Tab Take 175 mcg by mouth every Sunday. Brand Name     • gemfibrozil (LOPID) 600 MG Tab Take 600 mg by mouth every morning.     • loratadine (CLARITIN) 10 MG Tab Take 10 mg by mouth every day.     • citalopram (CELEXA) 20 MG TABS Take 20 mg by mouth every day.       No facility-administered encounter medications on file as of 5/14/2020.      Review of Systems   Constitutional: Positive for malaise/fatigue. Negative for fever.   Respiratory: Positive for shortness of breath. Negative for cough.    Cardiovascular: Positive for leg swelling. Negative for chest pain, palpitations, orthopnea, claudication and PND.   Gastrointestinal: Negative for abdominal pain.   Musculoskeletal: Negative for myalgias.   Neurological: Negative for dizziness.   All other systems reviewed and are negative.       Objective:   /75 (BP Location: Right arm, Patient Position: Sitting, BP Cuff Size: Adult)   Pulse 99   Ht 1.651 m (5' 5\")   Wt 121.1 kg (267 lb)   LMP 01/05/1987 (Approximate)   BMI 44.43 kg/m²     Physical Exam   Constitutional: She is oriented to person, place, and time. She appears well-developed and well-nourished.   BMI 44.43   HENT:   Head: Normocephalic and atraumatic.   Eyes: EOM and lids are normal.   Neck: Normal range of motion.   Pulmonary/Chest: Effort normal.   Musculoskeletal:         " General: Edema (R>L 1+ LE edema) present.   Neurological: She is oriented to person, place, and time.   Skin: Skin is intact.   Right next cath site with small bruise, no erosion, or hematoma present.    Psychiatric: She has a normal mood and affect. Her speech is normal and behavior is normal. Judgment and thought content normal. Cognition and memory are normal.        Lab Results   Component Value Date/Time    CHOLSTRLTOT 134 03/26/2020 10:55 AM    LDL 64 03/26/2020 10:55 AM    HDL 43 03/26/2020 10:55 AM    TRIGLYCERIDE 134 03/26/2020 10:55 AM       Lab Results   Component Value Date/Time    SODIUM 139 05/06/2020 07:05 AM    POTASSIUM 4.0 05/06/2020 07:05 AM    CHLORIDE 101 05/06/2020 07:05 AM    CO2 22 05/06/2020 07:05 AM    GLUCOSE 94 05/06/2020 07:05 AM    BUN 31 (H) 05/06/2020 07:05 AM    CREATININE 1.17 05/06/2020 07:05 AM    CREATININE 0.6 01/27/2009 01:10 PM     Lab Results   Component Value Date/Time    ALKPHOSPHAT 70 05/06/2020 07:05 AM    ASTSGOT 17 05/06/2020 07:05 AM    ALTSGPT 8 05/06/2020 07:05 AM    TBILIRUBIN 0.4 05/06/2020 07:05 AM      Transthoracic Echo Report 11/10/2015  Difficult study.  No significant valve disease or flow abnormalities.   Normal left ventricular systolic function.   Trace to Small pericardial effusion without evidence of hemodynamic   compromise.  No prior study is available for comparison.      Myocardial Perfusion Report 11/11/2015   IMPRESSIONS   No reversible defects that would indicate ischemia.      TREADMILL STRESS EKG AND MYOCARDIAL PERFUSION SCAN 11/11/2015     DESCRIPTION: The patient received IV lexiscan. The infusion was associated with no symptoms. The baseline electrocardiogram is abnormal Atrial fibrillation. During the infusion, there were no electrocardiographic changes diagnostic of ischemia.     Myocardial perfusion images are to be reported in separate report.     Cath 2/27/2019  POSTPROCEDURE DIAGNOSES:  1.  No angiographic evidence of coronary artery  disease.  2.  Normal left ventricular systolic function with ejection fraction of 70%.  3.  Elevated left ventricular end-diastolic pressure.       Transthoracic Echo Report 11/14/2019  Compared to the images of the study done 11/10/15 - there has been no significant change.  Left ventricular ejection fraction is visually estimated to be 60%.  Aortic valve area calculated from the continuity equation is 1.1 cm2.  Mild aortic stenosis.  Right ventricular systolic pressure is estimated to be 30 mmHg.    Right Heart Cath and CardioMEMs implantations 5/6/2020  Hemodynamics:  1) Pulmonary arterial pressure: Systolic of 50 mm Hg, diastolic of 26 mm Hg, mean of 35 mm Hg.  2) Pulmonary arterial wedge pressure with mean of 13 mm Hg.  3) Cardiac output of 5.3 and Cardiac index of 2.6 through thermodilution method.  4) Cardiac output of 4 and Cardiac index of 1.8 through Jess's method.  5) Right ventricular pressure: Systolic of 44 mm Hg, end diastolic pressure of 9 mm Hg.  6) Right atrial pressure: A wave of 10 mm Hg, V wave of 11 mm Hg, mean of 9 mm Hg.  7) Pulmonary vascular resistance of 5 Wood Units.     Conclusions:  1) Successful implantation of Cardiomems device for remote monitor of intracardiac pressures.  2) Patient will be monitored as part of our protocol in our heart failure program to further reduce repeated heart failure hospitalization and also to improve overall quality of life.     Assessment:     1. ACC/AHA stage C heart failure with preserved ejection fraction (HCC)  Basic Metabolic Panel   2. High risk medication use  Basic Metabolic Panel   3. Heart failure, NYHA class 3 (HCC)     4. HTN (hypertension), malignant     5. Chronic anticoagulation     6. Longstanding persistent atrial fibrillation (HCC)     7. Class 3 severe obesity due to excess calories with serious comorbidity and body mass index (BMI) of 40.0 to 44.9 in adult ([facility])     8. KEN (obstructive sleep apnea)     9. Dyslipidemia     10. Chronic  venous insufficiency         Medical Decision Making:  Today's Assessment / Status / Plan:   1. HFpEF, Stage C, Class 2-3, LVEF 60%: pt does have some LE edema  -She is a Guide-HF study patient in the single arm. Her pressures are PaD 25 mmHg today.  -Continue   -Continue torsemide 20 mg twice a day  -Continue potassium 20 mEq daily  -Obtain a BMP in 3 months  -Pending sleep study   -Reinforced s/sx of worsening heart failure with patient and weight monitoring. Pt verbalizes understanding. Pt to call office or RTC if present.  -Reinforced education to Maintain adequate hydration and 2 gram sodium diet.   -pt does have a history of chronic venous insuffiiciency    2.  Hypertension: Stable  -Continue losartan 50 mg daily  -Continue metoprolol 50 mg twice a day    3.  Atrial fibrillation, hs unsuccessful cardioversion and prior amiodarone use:  -Continue warfarin, followed by the anticoagulation clinic, will contact anticoagulation clinic to have pt seen sooner to review INRs  -Continue metoprolol 50 mg twice a day    4.  Dyslipidemia: Last LDL 64 on 3/26/2020  -Continue rosuvastatin 20 mg daily  -Continue gemfibrozil 600 mg daily    5. Obesity: BMI 44.43  -Encouraged weight loss    FU in clinic in 3 months with Labs. Sooner if needed.    Patient verbalizes understanding and agrees with the plan of care.     Collaborating MD: Darien York MD

## 2020-05-27 ENCOUNTER — ANTICOAGULATION VISIT (OUTPATIENT)
Dept: VASCULAR LAB | Facility: MEDICAL CENTER | Age: 70
End: 2020-05-27
Attending: INTERNAL MEDICINE
Payer: MEDICARE

## 2020-05-27 DIAGNOSIS — Z79.01 CHRONIC ANTICOAGULATION: ICD-10-CM

## 2020-05-27 LAB — INR PPP: 2.2 (ref 2–3.5)

## 2020-05-27 PROCEDURE — 99211 OFF/OP EST MAY X REQ PHY/QHP: CPT | Performed by: NURSE PRACTITIONER

## 2020-05-27 PROCEDURE — 85610 PROTHROMBIN TIME: CPT

## 2020-05-27 NOTE — PROGRESS NOTES
Anticoagulation Summary  As of 2020    INR goal:   2.0-3.0   TTR:   55.8 % (4.8 y)   INR used for dosin.20 (2020)   Warfarin maintenance plan:   5 mg (5 mg x 1) every Fri; 2.5 mg (5 mg x 0.5) all other days   Weekly warfarin total:   20 mg   Plan last modified:   Sherri Nieto AShanthiPROE (3/12/2020)   Next INR check:   2020   Target end date:   Indefinite    Indications    Chronic anticoagulation [Z79.01]  Atrial fibrillation (HCC) (Resolved) [I48.91]             Anticoagulation Episode Summary     INR check location:   Anticoagulation Clinic    Preferred lab:       Send INR reminders to:       Comments:         Anticoagulation Care Providers     Provider Role Specialty Phone number    Florian Carnes M.D. Referring Cardiology [phone]    [facility] Respons[facility]  [phone]        Anticoagulation Patient Findings      HPI:  Rachelle Reina seen in clinic today for follow up on anticoagulation therapy in the presence of AF.   Denies any changes to current medical/health status since last appointment.   Denies any medication or diet changes.   No current symptoms of bleeding or thrombosis reported.    A/P:   INR therapeutic.   Continue current regimen.     Follow up appointment in 3 week(s).    Next Appointment: Wednesday, 2020 at 1:45 pm.    Sherri GARCIA

## 2020-06-01 ENCOUNTER — OFFICE VISIT (OUTPATIENT)
Dept: URGENT CARE | Facility: PHYSICIAN GROUP | Age: 70
End: 2020-06-01
Payer: MEDICARE

## 2020-06-01 ENCOUNTER — SUPERVISING PHYSICIAN REVIEW (OUTPATIENT)
Dept: URGENT CARE | Facility: PHYSICIAN GROUP | Age: 70
End: 2020-06-01

## 2020-06-01 VITALS
HEART RATE: 86 BPM | DIASTOLIC BLOOD PRESSURE: 82 MMHG | HEIGHT: 65 IN | SYSTOLIC BLOOD PRESSURE: 118 MMHG | OXYGEN SATURATION: 97 % | TEMPERATURE: 97.6 F | WEIGHT: 268 LBS | RESPIRATION RATE: 18 BRPM | BODY MASS INDEX: 44.65 KG/M2

## 2020-06-01 DIAGNOSIS — L03.116 CELLULITIS OF LEFT LOWER EXTREMITY: ICD-10-CM

## 2020-06-01 DIAGNOSIS — I87.2 VENOUS STASIS DERMATITIS OF BOTH LOWER EXTREMITIES: ICD-10-CM

## 2020-06-01 PROCEDURE — 99214 OFFICE O/P EST MOD 30 MIN: CPT | Performed by: PHYSICIAN ASSISTANT

## 2020-06-01 RX ORDER — CEPHALEXIN 500 MG/1
500 CAPSULE ORAL 4 TIMES DAILY
Qty: 20 CAP | Refills: 0 | Status: SHIPPED | OUTPATIENT
Start: 2020-06-01 | End: 2020-06-06

## 2020-06-01 ASSESSMENT — ENCOUNTER SYMPTOMS
ROS SKIN COMMENTS: + WOUND
ABDOMINAL PAIN: 0
DIZZINESS: 0
VOMITING: 0
CHILLS: 0
FEVER: 0
DIARRHEA: 0
NAUSEA: 0
MUSCULOSKELETAL NEGATIVE: 1
SHORTNESS OF BREATH: 0

## 2020-06-01 ASSESSMENT — FIBROSIS 4 INDEX: FIB4 SCORE: 1.65

## 2020-06-01 NOTE — PROGRESS NOTES
"Subjective:      Rachelle Reina is a 69 y.o. female who presents with Skin Ulcer (Skin ulcers on both shins that are weeping.)            HPI   Patient presents to urgent care reporting redness on both legs with wounds that developed on the left shin that have been weeping and bleeding intermittently since last night. She's had similar problems in the past. No fevers, chills, body aches, chest pain, palpitations, or SOB. She does take warfarin daily. PMH is significant for CHF and she reports swelling in the legs has been increased recently.     Review of Systems   Constitutional: Negative for chills and fever.   HENT: Negative for congestion.    Respiratory: Negative for shortness of breath.    Cardiovascular: Negative for chest pain.   Gastrointestinal: Negative for abdominal pain, diarrhea, nausea and vomiting.   Genitourinary: Negative.    Musculoskeletal: Negative.    Skin: Positive for itching and rash.        + wound   Neurological: Negative for dizziness.        Objective:     /82   Pulse 86   Temp 36.4 °C (97.6 °F) (Temporal)   Resp 18   Ht 1.651 m (5' 5\")   Wt 121.6 kg (268 lb)   LMP 01/05/1987 (Approximate)   SpO2 97%   BMI 44.60 kg/m²      Physical Exam  Vitals signs and nursing note reviewed.   Constitutional:       General: She is not in acute distress.     Appearance: Normal appearance. She is well-developed. She is not diaphoretic.   HENT:      Head: Normocephalic and atraumatic.      Nose: Nose normal.      Mouth/Throat:      Mouth: Mucous membranes are moist.   Eyes:      Pupils: Pupils are equal, round, and reactive to light.   Neck:      Musculoskeletal: Normal range of motion.   Cardiovascular:      Rate and Rhythm: Normal rate.   Pulmonary:      Effort: Pulmonary effort is normal.   Musculoskeletal: Normal range of motion.      Right lower leg: Edema present.      Left lower leg: Edema present.      Comments: 1+ pitting edema of bilateral lower extremities    Skin:     " General: Skin is warm and dry.             Comments: + erythema of bilateral lower extremities. Left lower extremity with 2 superficial ulcerations, minimal weeping of clear fluid present with surrounding erythema and warmth to touch.   No induration, fluctuance, or purulent discharge present.    Neurological:      Mental Status: She is alert and oriented to person, place, and time.   Psychiatric:         Behavior: Behavior normal.          PMH:  has a past medical history of A-fib ([facility]), Arthritis (05/05/2020), Atrial fibrillation ([facility]), Benign essential hypertension (7/1/2011), Bowel habit changes, Breast cancer ([facility]), Bronchitis (2015), CAD (coronary artery disease), Cancer ([facility]) (1987, 2000), Cataract (2018), Diabetes ([facility]), Diastolic congestive heart failure ([facility]) (2019), Disorder of thyroid, High cholesterol, History of cardiac catheterization (4/22/2010), History of echocardiogram (4/22/2011), History of hypothyroidism (8/29/2008), Hypercholesteremia (7/1/2011), Long term (current) use of anticoagulants (7/1/2011), Morbid obesity ([facility]) (10/28/2008), Ovarian cancer ([facility]), Pain, Sleep apnea, Snoring, and Urinary incontinence.  MEDS:   Current Outpatient Medications:   •  cephALEXin (KEFLEX) 500 MG Cap, Take 1 Cap by mouth 4 times a day for 5 days., Disp: 20 Cap, Rfl: 0  •  levothyroxine (SYNTHROID) 200 MCG Tab, Take 200 mcg by mouth See Admin Instructions. All days except Sunday Brand Name, Disp: , Rfl:   •  pioglitazone (ACTOS) 15 MG Tab, Take 15 mg by mouth every day., Disp: , Rfl:   •  thiamine (THIAMINE) 100 MG Tab, Take 100 mg by mouth every day., Disp: , Rfl:   •  Ascorbic Acid (VITAMIN C) 500 MG Cap, Take 500 mg by mouth every day., Disp: , Rfl:   •  Pyridoxine HCl (VITAMIN B-6 PO), Take 1 Tab by mouth every day., Disp: , Rfl:   •  torsemide (DEMADEX) 20 MG Tab, TAKE 1 TABLET BY MOUTH TWICE DAILY, Disp: 180 Tab, Rfl: 3  •  LEVEMIR FLEXTOUCH 100 UNIT/ML injection PEN, Inject 14 Units as instructed 2 times  a day., Disp: , Rfl:   •  oxybutynin (DITROPAN XL) 15 MG CR tablet, Take 15 mg by mouth every morning., Disp: , Rfl: 2  •  warfarin (COUMADIN) 5 MG Tab, Take 2.5-5 mg by mouth every evening. 5 mg every Friday 2.5 mg all other days of the week, Disp: , Rfl:   •  potassium chloride SA (KDUR) 20 MEQ Tab CR, Take 20 mEq by mouth every evening., Disp: , Rfl:   •  allopurinol (ZYLOPRIM) 100 MG Tab, Take 100 mg by mouth every bedtime., Disp: , Rfl: 2  •  acetaminophen (TYLENOL 8 HOUR ARTHRITIS PAIN) 650 MG CR tablet, Take 1,300 mg by mouth 2 Times a Day., Disp: , Rfl:   •  Cholecalciferol (VITAMIN D-3) 5000 units Tab, Take 5,000 Units by mouth every day., Disp: , Rfl:   •  metoprolol (LOPRESSOR) 50 MG Tab, Take 50 mg by mouth 2 times a day., Disp: , Rfl:   •  losartan (COZAAR) 50 MG Tab, Take 50 mg by mouth every day., Disp: , Rfl:   •  traZODone (DESYREL) 100 MG Tab, Take 100 mg by mouth every evening., Disp: , Rfl:   •  rosuvastatin (CRESTOR) 20 MG Tab, Take 20 mg by mouth every evening., Disp: , Rfl: 1  •  glipiZIDE (GLUCOTROL) 5 MG Tab, Take 2.5 mg by mouth 2 times a day., Disp: , Rfl: 1  •  levothyroxine (SYNTHROID) 175 MCG Tab, Take 175 mcg by mouth every Sunday. Brand Name, Disp: , Rfl:   •  gemfibrozil (LOPID) 600 MG Tab, Take 600 mg by mouth every morning., Disp: , Rfl:   •  loratadine (CLARITIN) 10 MG Tab, Take 10 mg by mouth every day., Disp: , Rfl:   •  citalopram (CELEXA) 20 MG TABS, Take 20 mg by mouth every day., Disp: , Rfl:   ALLERGIES:   Allergies   Allergen Reactions   • Sulfa Drugs Rash     8/2015 rash and hives       SURGHX:   Past Surgical History:   Procedure Laterality Date   • [facility] CARDIAC CATH  02/27/2019    normal coronaries   • CATARACT PHACO WITH IOL Right 1/22/2018    Procedure: CATARACT PHACO WITH IOL;  Surgeon: Santosh Holden M.D.;  Location: SURGERY SAME DAY [facility];  Service: Ophthalmology   • CATARACT PHACO WITH IOL Left 1/8/2018    Procedure: CATARACT PHACO WITH IOL;  Surgeon: Santosh  SANDRA Holden M.D.;  Location: SURGERY SAME DAY [facility];  Service: Ophthalmology   • OTHER ORTHOPEDIC SURGERY Right 2013    hip arthroplasty   • CHOLECYSTECTOMY  2001   • ABDOMINAL HYSTERECTOMY TOTAL      1987   • MASTECTOMY  partial   • PB RADIATION THERAPY PLAN SIMPLE       SOCHX:  reports that she has never smoked. She has never used smokeless tobacco. She reports current alcohol use. She reports that she does not use drugs.  FH: family history includes Cancer in her maternal aunt; Diabetes in her brother; Heart Attack in her brother; Heart Disease in her brother, father, and mother; Thyroid in her brother.       Assessment/Plan:       1. Venous stasis dermatitis of both lower extremities    2. Cellulitis of left lower extremity    - cephALEXin (KEFLEX) 500 MG Cap; Take 1 Cap by mouth 4 times a day for 5 days.  Dispense: 20 Cap; Refill: 0   - Complete full course of antibiotics as prescribed     Wounds of left lower extremity dressed with nonadherent pad and coban. Encouraged to keep the area clean, cool, and dry. Leave open to air for 2-3 hours daily. Encouraged elevation of the legs, increased exercise, salt restriction, and use of compressive stockings. Monitor closely and RTC or follow up with primary care if symptoms persist/worsen. The patient demonstrated a good understanding and agreed with the treatment plan.

## 2020-06-11 ENCOUNTER — TELEPHONE (OUTPATIENT)
Dept: CARDIOLOGY | Facility: MEDICAL CENTER | Age: 70
End: 2020-06-11

## 2020-06-11 NOTE — TELEPHONE ENCOUNTER
Called patient regarding diuretics and taking them around her schedule, Patient complains of urinating too much when she has errands to run in the morning. Confirmed with MD and Patient is ok to adjust the schedule that she takes her diuretics based on her activity for the day up to and including taking the entire 40mg at one time. Patient verbalized understanding and states she will try to be better with taking her entire ordered dose of diuretic each day.   ----- Message from Lena Griffith sent at 6/11/2020 12:12 PM PDT -----  Regarding: PA Pressure Readings  PA numbers have been up lately and Audrey wanted to increase her torsemide.  However, I have a VM from Rachelle that she has NOT been taking her diuretics as directed because they make her urinate too frequently and she is unable to leave the house.  Can you guys call her and instruct her how to take the meds in a way that she is still able to be active?

## 2020-06-17 ENCOUNTER — ANTICOAGULATION VISIT (OUTPATIENT)
Dept: VASCULAR LAB | Facility: MEDICAL CENTER | Age: 70
End: 2020-06-17
Attending: INTERNAL MEDICINE
Payer: MEDICARE

## 2020-06-17 DIAGNOSIS — Z79.01 CHRONIC ANTICOAGULATION: ICD-10-CM

## 2020-06-17 LAB — INR PPP: 2.4 (ref 2–3.5)

## 2020-06-17 PROCEDURE — 99211 OFF/OP EST MAY X REQ PHY/QHP: CPT | Performed by: PHARMACIST

## 2020-06-17 PROCEDURE — 85610 PROTHROMBIN TIME: CPT

## 2020-06-17 NOTE — PROGRESS NOTES
Anticoagulation Summary  As of 2020    INR goal:   2.0-3.0   TTR:   56.4 % (4.9 y)   INR used for dosin.40 (2020)   Warfarin maintenance plan:   5 mg (5 mg x 1) every Fri; 2.5 mg (5 mg x 0.5) all other days   Weekly warfarin total:   20 mg   Plan last modified:   NICK Oneal (3/12/2020)   Next INR check:   7/10/2020   Target end date:   Indefinite    Indications    Chronic anticoagulation [Z79.01]  Atrial fibrillation (HCC) (Resolved) [I48.91]             Anticoagulation Episode Summary     INR check location:   Anticoagulation Clinic    Preferred lab:       Send INR reminders to:       Comments:         Anticoagulation Care Providers     Provider Role Specialty Phone number    Florian Carnes M.D. Referring Cardiology [phone]    [facility] Responsible  [phone]                Anticoagulation Patient Findings      HPI:  Rachelle Reina seen in clinic today, on anticoagulation therapy with warfarin for Afib  Changes to current medical/health status since last appt: denies  Denies signs/symptoms of bleeding and/or thrombosis since the last appt.    Denies any interval changes to diet  Denies any interval changes to medications since last appt.   Denies any complications or cost restrictions with current therapy.   Verified current warfarin dosing schedule.       A/P   INR  is-therapeutic.   Will continue with the same warfarin dosing.     Follow up appointment in 3 week(s).    Cristal Lovell, PharmD

## 2020-07-02 DIAGNOSIS — I10 BENIGN ESSENTIAL HYPERTENSION: Primary | Chronic | ICD-10-CM

## 2020-07-02 RX ORDER — METOPROLOL TARTRATE 50 MG/1
TABLET, FILM COATED ORAL
Qty: 180 TAB | Refills: 2 | Status: SHIPPED | OUTPATIENT
Start: 2020-07-02 | End: 2020-08-12 | Stop reason: SDUPTHER

## 2020-07-09 ENCOUNTER — TELEPHONE (OUTPATIENT)
Dept: CARDIOLOGY | Facility: MEDICAL CENTER | Age: 70
End: 2020-07-09

## 2020-07-09 NOTE — TELEPHONE ENCOUNTER
CardioMEMS reading remains elevated, reading today PAD 31 mmHg.  Please have patient take 40 mg twice a day for a day and continue readings.  Please review sodium intake and symptoms with patient.

## 2020-07-09 NOTE — TELEPHONE ENCOUNTER
Called patient regarding elevated cardiomems pressures, admitted to increased sodium intake, family came over and they had Pizza Tuesday and creamed spinach  And steaks last night, denies increased swelling, weight unchanged, no SOB will take 40mg torsemide BID for this afternoons dose and tomorrow mornings dose.

## 2020-07-10 ENCOUNTER — ANTICOAGULATION VISIT (OUTPATIENT)
Dept: VASCULAR LAB | Facility: MEDICAL CENTER | Age: 70
End: 2020-07-10
Attending: INTERNAL MEDICINE
Payer: MEDICARE

## 2020-07-10 DIAGNOSIS — Z79.01 CHRONIC ANTICOAGULATION: ICD-10-CM

## 2020-07-10 LAB
INR BLD: 2.6 (ref 0.9–1.2)
INR PPP: 2.6 (ref 2–3.5)

## 2020-07-10 PROCEDURE — 99211 OFF/OP EST MAY X REQ PHY/QHP: CPT

## 2020-07-10 PROCEDURE — 85610 PROTHROMBIN TIME: CPT

## 2020-07-10 NOTE — PROGRESS NOTES
Anticoagulation Summary  As of 7/10/2020    INR goal:   2.0-3.0   TTR:   56.9 % (4.9 y)   INR used for dosin.60 (7/10/2020)   Warfarin maintenance plan:   5 mg (5 mg x 1) every Fri; 2.5 mg (5 mg x 0.5) all other days   Weekly warfarin total:   20 mg   Plan last modified:   NICK Oneal (3/12/2020)   Next INR check:   2020   Target end date:   Indefinite    Indications    Chronic anticoagulation [Z79.01]  Atrial fibrillation (HCC) (Resolved) [I48.91]             Anticoagulation Episode Summary     INR check location:   Anticoagulation Clinic    Preferred lab:       Send INR reminders to:       Comments:         Anticoagulation Care Providers     Provider Role Specialty Phone number    Florian Carnes M.D. Referring Cardiology [phone]    [facility] Responsible  [phone]        Anticoagulation Patient Findings      HPI:  Rachelle Reina seen in clinic today for follow up on anticoagulation therapy in the presence of Afib.   Denies any changes to current medical/health status since last appointment.   Denies any medication or diet changes.   No current symptoms of bleeding or thrombosis reported.    A/P:   INR is therapeutic.   Continue current regimen.     Follow up appointment in 3 week(s). Will like to see Sherri Benitez, Pharm.D

## 2020-07-16 ENCOUNTER — TELEPHONE (OUTPATIENT)
Dept: CARDIOLOGY | Facility: MEDICAL CENTER | Age: 70
End: 2020-07-16

## 2020-07-16 NOTE — TELEPHONE ENCOUNTER
Guide HF Single arm study patient:    CardioMEMS pressures increasing.  Today patient was PAD 32 mmHg, Tuesday she was 35 mmHg.  Please review diet and any symptoms, please have her increase torsemide to 40 mg twice a day for the next 3 days then resume torsemide 20 mg twice a day.  Also increase potassium to 40 mEq daily while she is taking increase torsemide.  No additional lab testing at this time.

## 2020-07-16 NOTE — TELEPHONE ENCOUNTER
Patient had increased swelling last night after a long day,  is now in the hospital, and only took 1 dose yesterday but took her 2 today.Patient instructed to increase her torsemide to 40mg BID for Friday, Saturday and Sunday and to increase potassium to 40meq  For the 3 days of increased torsemide. Patient verbalized understanding

## 2020-07-20 ENCOUNTER — TELEPHONE (OUTPATIENT)
Dept: CARDIOLOGY | Facility: MEDICAL CENTER | Age: 70
End: 2020-07-20

## 2020-07-20 NOTE — TELEPHONE ENCOUNTER
Called patient regarding elevated cardiomems pressures, Patient took 40mg BID or torsemide over the weekend and 20meq BID of potassium, Friday, Saturday and Sunday as instructed. Patient instructed to increase diuretic for 1 week to 40mg BID and 20meq BID of potassium. Patient will take another reading today and call back. Patient stated she has had very low sodium all weekend, stated she had salad for dinner last night with lots of vegetables and olive oil and vinegar dressing. Will follow up after new reading. Will get labs completed at the end of the week of increased diuretic.

## 2020-07-20 NOTE — TELEPHONE ENCOUNTER
Please call patient and see how she is feeling.  Her CardioMEMS reading yesterday was PaD 34 mmHg.  Is she taking torsemide 20 mg twice a day?  Please have her increase torsemide to 40 mg twice a day for 1 week and potassium to 20 mEq twice a day.  Then she can reduce torsemide down to 20 mg twice a day and potassium back to 20 mEq daily.  Please remind her of low-sodium diet.  Can she repeat a BMP next week?  Looks like there is an order in her chart.

## 2020-07-23 ENCOUNTER — TELEPHONE (OUTPATIENT)
Dept: CARDIOLOGY | Facility: MEDICAL CENTER | Age: 70
End: 2020-07-23

## 2020-07-23 NOTE — TELEPHONE ENCOUNTER
Called patient regarding continued increased cardiomems pressures, patient reports no increased swelling, she has been losing approximately 1 lb per day, no SOB. Patient will continue increased torsemide at 40mg BID and 20meq of Potassium BID. Patient will make an appointment to get labs completed for Audrey early this coming week.

## 2020-07-23 NOTE — TELEPHONE ENCOUNTER
Is patient still taking torsemide 40 mg twice a day with potassium 20 mEq twice a day.  Her pressures continue to be high.  Please continue torsemide 40 mg twice a day with potassium.  Get labs done please.

## 2020-07-28 ENCOUNTER — HOSPITAL ENCOUNTER (OUTPATIENT)
Dept: LAB | Facility: MEDICAL CENTER | Age: 70
End: 2020-07-28
Attending: NURSE PRACTITIONER
Payer: MEDICARE

## 2020-07-28 DIAGNOSIS — I50.30 ACC/AHA STAGE C HEART FAILURE WITH PRESERVED EJECTION FRACTION (HCC): ICD-10-CM

## 2020-07-28 DIAGNOSIS — Z79.899 HIGH RISK MEDICATION USE: ICD-10-CM

## 2020-07-28 LAB
ANION GAP SERPL CALC-SCNC: 11 MMOL/L (ref 7–16)
BUN SERPL-MCNC: 44 MG/DL (ref 8–22)
CALCIUM SERPL-MCNC: 10.1 MG/DL (ref 8.5–10.5)
CHLORIDE SERPL-SCNC: 94 MMOL/L (ref 96–112)
CO2 SERPL-SCNC: 27 MMOL/L (ref 20–33)
CREAT SERPL-MCNC: 1.38 MG/DL (ref 0.5–1.4)
GLUCOSE SERPL-MCNC: 71 MG/DL (ref 65–99)
POTASSIUM SERPL-SCNC: 3.8 MMOL/L (ref 3.6–5.5)
SODIUM SERPL-SCNC: 132 MMOL/L (ref 135–145)

## 2020-07-28 PROCEDURE — 80048 BASIC METABOLIC PNL TOTAL CA: CPT

## 2020-07-28 PROCEDURE — 36415 COLL VENOUS BLD VENIPUNCTURE: CPT

## 2020-07-30 DIAGNOSIS — Z79.01 CHRONIC ANTICOAGULATION: ICD-10-CM

## 2020-07-31 ENCOUNTER — TELEPHONE (OUTPATIENT)
Dept: CARDIOLOGY | Facility: MEDICAL CENTER | Age: 70
End: 2020-07-31

## 2020-07-31 DIAGNOSIS — Z79.899 HIGH RISK MEDICATION USE: ICD-10-CM

## 2020-07-31 DIAGNOSIS — I50.30 ACC/AHA STAGE C HEART FAILURE WITH PRESERVED EJECTION FRACTION (HCC): ICD-10-CM

## 2020-07-31 DIAGNOSIS — I10 BENIGN ESSENTIAL HYPERTENSION: Chronic | ICD-10-CM

## 2020-07-31 RX ORDER — TORSEMIDE 20 MG/1
40 TABLET ORAL 2 TIMES DAILY
Qty: 360 TAB | Refills: 3 | Status: SHIPPED | OUTPATIENT
Start: 2020-07-31 | End: 2020-08-12 | Stop reason: SDUPTHER

## 2020-07-31 NOTE — TELEPHONE ENCOUNTER
Patient returned my call, She verbalized understanding that she is to take 2 tablets of her torsemide (40mg BID) , twice daily of torsemide continuously unless told otherwise along with 20meq potassium BID, She will get labs completed before seeing Dr. Jeffrey on the 12th

## 2020-07-31 NOTE — TELEPHONE ENCOUNTER
Please call patient and let her know that her CardioMEMS reading is elevated at PaD 30 mmHg.  Sounds like patient only took torsemide 40 mg twice a day for couple of days, please have her increase her torsemide back up to 40 mg twice a day.  She is to continue that dose until we tell her to stop.  Please encourage hydration, low-sodium diet and have her repeat her labs in 1 to 2 weeks please.    Torsemide reordered at 40 mg twice a day dose and lab testing ordered.

## 2020-07-31 NOTE — TELEPHONE ENCOUNTER
Called patient to follow up on elevated cardiomems pressures, Patient did not answer, LVM to call back and to increase torsemide to 40mg BID until we tell her to discontinue, instructed to get labs done in 1-2 weeks and to call me to verify this message.

## 2020-08-06 ENCOUNTER — TELEPHONE (OUTPATIENT)
Dept: CARDIOLOGY | Facility: MEDICAL CENTER | Age: 70
End: 2020-08-06

## 2020-08-06 DIAGNOSIS — I10 BENIGN ESSENTIAL HYPERTENSION: ICD-10-CM

## 2020-08-06 DIAGNOSIS — I50.30 ACC/AHA STAGE C HEART FAILURE WITH PRESERVED EJECTION FRACTION (HCC): ICD-10-CM

## 2020-08-06 DIAGNOSIS — Z79.899 HIGH RISK MEDICATION USE: ICD-10-CM

## 2020-08-06 NOTE — TELEPHONE ENCOUNTER
Readings above thresholds, please make sure she is taking her diuretics as instructed-torsemide 40 mg twice a day, if she is taking consistently, please have her increase torsemide to 60 mg twice a day, potassium to 20 mEq twice a day, and repeat a BMP in 1 week.    How are her symptoms?  Her weights?

## 2020-08-06 NOTE — TELEPHONE ENCOUNTER
Called patient regarding increased cardiomems pressures. Weight down to 257 lbs, ankles are not swollen at all, no SOB, patient has been very busy running around and her  was in the hospital again recently. (I suspect patient is eating out frequently while running around). Patient had not been taking her torsemide 40mg BID consistently, patient states she probably only had 20mg in the evening about 3 times last week, emphasized the importance of taking her medications consistently as prescribed otherwise she does risk going back into the hospital. Patient expressed interest in going into the hospital for a couple days to have them take care of her. Patient has an appointment to have labs completed on Tuesday and an appointment with Dr. Jeffrey on Wednesday. Emphasized again the importance of patient taking her torsemide 40mg BID as prescribed.

## 2020-08-12 ENCOUNTER — OFFICE VISIT (OUTPATIENT)
Dept: CARDIOLOGY | Facility: MEDICAL CENTER | Age: 70
End: 2020-08-12
Payer: MEDICARE

## 2020-08-12 ENCOUNTER — ANTICOAGULATION VISIT (OUTPATIENT)
Dept: VASCULAR LAB | Facility: MEDICAL CENTER | Age: 70
End: 2020-08-12
Attending: INTERNAL MEDICINE
Payer: MEDICARE

## 2020-08-12 VITALS
WEIGHT: 262 LBS | HEART RATE: 72 BPM | DIASTOLIC BLOOD PRESSURE: 52 MMHG | HEIGHT: 65 IN | BODY MASS INDEX: 43.65 KG/M2 | OXYGEN SATURATION: 96 % | SYSTOLIC BLOOD PRESSURE: 110 MMHG

## 2020-08-12 DIAGNOSIS — Z79.01 CHRONIC ANTICOAGULATION: ICD-10-CM

## 2020-08-12 DIAGNOSIS — I48.11 LONGSTANDING PERSISTENT ATRIAL FIBRILLATION (HCC): ICD-10-CM

## 2020-08-12 DIAGNOSIS — E78.5 DYSLIPIDEMIA: ICD-10-CM

## 2020-08-12 DIAGNOSIS — Z79.899 HIGH RISK MEDICATION USE: ICD-10-CM

## 2020-08-12 DIAGNOSIS — I10 BENIGN ESSENTIAL HYPERTENSION: Chronic | ICD-10-CM

## 2020-08-12 DIAGNOSIS — I50.9 HEART FAILURE, NYHA CLASS 3 (HCC): ICD-10-CM

## 2020-08-12 DIAGNOSIS — E66.01 CLASS 3 SEVERE OBESITY DUE TO EXCESS CALORIES WITH SERIOUS COMORBIDITY AND BODY MASS INDEX (BMI) OF 40.0 TO 44.9 IN ADULT (HCC): ICD-10-CM

## 2020-08-12 DIAGNOSIS — I87.2 CHRONIC VENOUS INSUFFICIENCY: ICD-10-CM

## 2020-08-12 DIAGNOSIS — I10 HTN (HYPERTENSION), MALIGNANT: ICD-10-CM

## 2020-08-12 DIAGNOSIS — I50.30 ACC/AHA STAGE C HEART FAILURE WITH PRESERVED EJECTION FRACTION (HCC): ICD-10-CM

## 2020-08-12 DIAGNOSIS — G47.33 OSA (OBSTRUCTIVE SLEEP APNEA): ICD-10-CM

## 2020-08-12 LAB — INR PPP: 3.4 (ref 2–3.5)

## 2020-08-12 PROCEDURE — 85610 PROTHROMBIN TIME: CPT

## 2020-08-12 PROCEDURE — 99214 OFFICE O/P EST MOD 30 MIN: CPT | Performed by: INTERNAL MEDICINE

## 2020-08-12 PROCEDURE — 99212 OFFICE O/P EST SF 10 MIN: CPT | Performed by: NURSE PRACTITIONER

## 2020-08-12 RX ORDER — METOPROLOL TARTRATE 50 MG/1
TABLET, FILM COATED ORAL
Qty: 180 TAB | Refills: 4 | Status: ON HOLD | OUTPATIENT
Start: 2020-08-12 | End: 2022-01-01

## 2020-08-12 RX ORDER — TORSEMIDE 20 MG/1
40 TABLET ORAL 2 TIMES DAILY
Qty: 360 TAB | Refills: 3 | Status: SHIPPED | OUTPATIENT
Start: 2020-08-12 | End: 2021-01-06

## 2020-08-12 RX ORDER — ROSUVASTATIN CALCIUM 20 MG/1
20 TABLET, COATED ORAL EVERY EVENING
Qty: 90 TAB | Refills: 4 | Status: SHIPPED | OUTPATIENT
Start: 2020-08-12

## 2020-08-12 RX ORDER — POTASSIUM CHLORIDE 20 MEQ/1
20 TABLET, EXTENDED RELEASE ORAL 2 TIMES DAILY
Qty: 180 TAB | Refills: 4 | Status: SHIPPED | OUTPATIENT
Start: 2020-08-12 | End: 2020-09-10

## 2020-08-12 RX ORDER — LOSARTAN POTASSIUM 50 MG/1
50 TABLET ORAL DAILY
Qty: 100 TAB | Refills: 4 | Status: SHIPPED | OUTPATIENT
Start: 2020-08-12 | End: 2022-01-01

## 2020-08-12 RX ORDER — WARFARIN SODIUM 5 MG/1
TABLET ORAL
Qty: 90 TAB | Refills: 1 | Status: SHIPPED | OUTPATIENT
Start: 2020-08-12 | End: 2021-09-24 | Stop reason: SDUPTHER

## 2020-08-12 RX ORDER — GEMFIBROZIL 600 MG/1
600 TABLET, FILM COATED ORAL EVERY MORNING
Qty: 90 TAB | Refills: 4 | Status: SHIPPED | OUTPATIENT
Start: 2020-08-12

## 2020-08-12 ASSESSMENT — ENCOUNTER SYMPTOMS
DIZZINESS: 0
FEVER: 0
BRUISES/BLEEDS EASILY: 0
MYALGIAS: 0
SENSORY CHANGE: 0
ABDOMINAL PAIN: 0
DOUBLE VISION: 0
HEADACHES: 0
DEPRESSION: 0
COUGH: 0
BLURRED VISION: 0
DIAPHORESIS: 0
MEMORY LOSS: 0
FALLS: 0
SHORTNESS OF BREATH: 1
PALPITATIONS: 0

## 2020-08-12 ASSESSMENT — FIBROSIS 4 INDEX: FIB4 SCORE: 1.669557677801570543

## 2020-08-12 NOTE — PROGRESS NOTES
Chief Complaint   Patient presents with   • Congestive Heart Failure       Subjective:   Racehlle Reina is a 69 y.o. female who presents today for cardiac care and management of HFpEF.  Patient has a history of atrial fibrillation along with morbid obesity, hypertension.    She had a transthoracic echocardiogram done in November 2019 which showed normal LV function, mild aortic stenosis, RVSP of 30 mmHg.  I personally interpreted images of her transthoracic echocardiogram.    Was not able to tolerate KEN sleep test.    Patient still gets winded with daily living activities and exertion. No symptoms at rest.    No obstructive CAD on coronary angiogram in 02/2019.    I have independently interpreted and reviewed blood tests results with patient in clinic which shows GFR of 38.    Past Medical History:   Diagnosis Date   • A-fib (Formerly [facility])    • Arthritis 05/05/2020    knees and fingers   • Atrial fibrillation (Formerly [facility])    • Benign essential hypertension 7/1/2011   • Bowel habit changes     diarrhea   • Breast cancer (Formerly [facility])    • Bronchitis 2015    history of   • CAD (coronary artery disease)    • Cancer (Formerly [facility]) 1987, 2000    breast and ovary   • Cataract 2018    IOL bilat   • Diabetes (Formerly [facility])     oral medication, insulin   • Diastolic congestive heart failure (Formerly [facility]) 2019   • Disorder of thyroid    • High cholesterol    • History of cardiac catheterization 4/22/2010   • History of echocardiogram 4/22/2011   • History of hypothyroidism 8/29/2008   • Hypercholesteremia 7/1/2011   • Long term (current) use of anticoagulants 7/1/2011   • Morbid obesity (Formerly [facility]) 10/28/2008   • Ovarian cancer (Formerly [facility])    • Pain     knees   • Sleep apnea     pt does not use cpap   • Snoring     sleep study done   • Urinary incontinence      Past Surgical History:   Procedure Laterality Date   • [facility] CARDIAC CATH  02/27/2019    normal coronaries   • CATARACT PHACO WITH IOL Right 1/22/2018    Procedure: CATARACT PHACO WITH IOL;  Surgeon: Santosh Holden M.D.;   Location: SURGERY SAME DAY [facility] ORS;  Service: Ophthalmology   • CATARACT PHACO WITH IOL Left 1/8/2018    Procedure: CATARACT PHACO WITH IOL;  Surgeon: Santosh Holden M.D.;  Location: SURGERY SAME DAY [facility] ORS;  Service: Ophthalmology   • OTHER ORTHOPEDIC SURGERY Right 2013    hip arthroplasty   • CHOLECYSTECTOMY  2001   • ABDOMINAL HYSTERECTOMY TOTAL      1987   • MASTECTOMY  partial   • PB RADIATION THERAPY PLAN SIMPLE       Family History   Problem Relation Age of Onset   • Heart Disease Mother         HEART VALVE REPLACEMENT.   • Heart Disease Father         CORONARY ARTERY BYPASS GRAFTS   • Cancer Maternal Aunt    • Diabetes Brother    • Thyroid Brother    • Heart Attack Brother    • Heart Disease Brother      Social History     Socioeconomic History   • Marital status:      Spouse name: Not on file   • Number of children: Not on file   • Years of education: Not on file   • Highest education level: Not on file   Occupational History   • Not on file   Social Needs   • Financial resource strain: Not on file   • Food insecurity     Worry: Not on file     Inability: Not on file   • Transportation needs     Medical: Not on file     Non-medical: Not on file   Tobacco Use   • Smoking status: Never Smoker   • Smokeless tobacco: Never Used   Substance and Sexual Activity   • Alcohol use: Yes     Comment: 1 or 2 drinks a month   • Drug use: No   • Sexual activity: Not on file   Lifestyle   • Physical activity     Days per week: Not on file     Minutes per session: Not on file   • Stress: Not on file   Relationships   • Social connections     Talks on phone: Not on file     Gets together: Not on file     Attends [employer] service: Not on file     Active member of club or organization: Not on file     Attends meetings of clubs or organizations: Not on file     Relationship status: Not on file   • Intimate partner violence     Fear of current or ex partner: Not on file     Emotionally abused: Not on file      Physically abused: Not on file     Forced sexual activity: Not on file   Other Topics Concern   • Not on file   Social History Narrative   • Not on file     Allergies   Allergen Reactions   • Sulfa Drugs Rash     8/2015 rash and hives       Outpatient Encounter Medications as of 8/12/2020   Medication Sig Dispense Refill   • torsemide (DEMADEX) 20 MG Tab Take 2 Tabs by mouth 2 times a day. 360 Tab 3   • metoprolol (LOPRESSOR) 50 MG Tab TAKE 1 TABLET BY MOUTH TWICE DAILY 180 Tab 2   • levothyroxine (SYNTHROID) 200 MCG Tab Take 200 mcg by mouth See Admin Instructions. All days except Sunday  Brand Name     • pioglitazone (ACTOS) 15 MG Tab Take 15 mg by mouth every day.     • thiamine (THIAMINE) 100 MG Tab Take 100 mg by mouth every day.     • Ascorbic Acid (VITAMIN C) 500 MG Cap Take 500 mg by mouth every day.     • Pyridoxine HCl (VITAMIN B-6 PO) Take 1 Tab by mouth every day.     • LEVEMIR FLEXTOUCH 100 UNIT/ML injection PEN Inject 14 Units as instructed 2 times a day.     • oxybutynin (DITROPAN XL) 15 MG CR tablet Take 15 mg by mouth every morning.  2   • warfarin (COUMADIN) 5 MG Tab Take 2.5-5 mg by mouth every evening. 5 mg every Friday  2.5 mg all other days of the week     • potassium chloride SA (KDUR) 20 MEQ Tab CR Take 20 mEq by mouth every evening.     • allopurinol (ZYLOPRIM) 100 MG Tab Take 100 mg by mouth every bedtime.  2   • acetaminophen (TYLENOL 8 HOUR ARTHRITIS PAIN) 650 MG CR tablet Take 1,300 mg by mouth 2 Times a Day.     • Cholecalciferol (VITAMIN D-3) 5000 units Tab Take 5,000 Units by mouth every day.     • losartan (COZAAR) 50 MG Tab Take 50 mg by mouth every day.     • traZODone (DESYREL) 100 MG Tab Take 100 mg by mouth every evening.     • rosuvastatin (CRESTOR) 20 MG Tab Take 20 mg by mouth every evening.  1   • glipiZIDE (GLUCOTROL) 5 MG Tab Take 2.5 mg by mouth 2 times a day.  1   • levothyroxine (SYNTHROID) 175 MCG Tab Take 175 mcg by mouth every Sunday. Brand Name     • gemfibrozil  "(LOPID) 600 MG Tab Take 600 mg by mouth every morning.     • loratadine (CLARITIN) 10 MG Tab Take 10 mg by mouth every day.     • citalopram (CELEXA) 20 MG TABS Take 20 mg by mouth every day.       No facility-administered encounter medications on file as of 8/12/2020.      Review of Systems   Constitutional: Negative for diaphoresis and fever.   HENT: Negative for nosebleeds.    Eyes: Negative for blurred vision and double vision.   Respiratory: Positive for shortness of breath. Negative for cough.    Cardiovascular: Negative for chest pain and palpitations.   Gastrointestinal: Negative for abdominal pain.   Genitourinary: Negative for dysuria and frequency.   Musculoskeletal: Negative for falls and myalgias.   Skin: Negative for rash.   Neurological: Negative for dizziness, sensory change and headaches.   Endo/Heme/Allergies: Does not bruise/bleed easily.   Psychiatric/Behavioral: Negative for depression and memory loss.        Objective:   /52 (BP Location: Left arm, Patient Position: Sitting, BP Cuff Size: Adult)   Pulse 72   Ht 1.651 m (5' 5\")   Wt 118.8 kg (262 lb)   LMP 01/05/1987 (Approximate)   SpO2 96%   BMI 43.60 kg/m²     Physical Exam   Constitutional: She is oriented to person, place, and time. No distress.   HENT:   Head: Normocephalic and atraumatic.   Right Ear: External ear normal.   Left Ear: External ear normal.   Eyes: Right eye exhibits no discharge. Left eye exhibits no discharge.   Neck: No JVD present. No thyromegaly present.   Cardiovascular: Normal rate, normal heart sounds and intact distal pulses. Exam reveals no gallop and no friction rub.   No murmur heard.  There is presence of an irregularly irregular heartbeats.     Pulmonary/Chest: Breath sounds normal. No respiratory distress.   Abdominal: Bowel sounds are normal. She exhibits no distension. There is no abdominal tenderness.   Musculoskeletal:         General: No tenderness or edema.   Neurological: She is alert and " oriented to person, place, and time. No cranial nerve deficit.   Skin: Skin is warm and dry. She is not diaphoretic.   Psychiatric: She has a normal mood and affect. Her behavior is normal.   Nursing note and vitals reviewed.      Assessment:     1. ACC/AHA stage C heart failure with preserved ejection fraction (HCC)     2. Heart failure, NYHA class 3 ([facility])     3. HTN (hypertension), malignant     4. Longstanding persistent atrial fibrillation (HCC)     5. Chronic anticoagulation     6. Class 3 severe obesity due to excess calories with serious comorbidity and body mass index (BMI) of 40.0 to 44.9 in adult ([facility])     7. KEN (obstructive sleep apnea)     8. Dyslipidemia     9. Chronic venous insufficiency     10. High risk medication use         Medical Decision Making:  Today's Assessment / Status / Plan:   Today, based on physical examination findings, patient is euvolemic. No JVD, lungs are clear to auscultation, no pitting edema in bilateral lower extremities, no ascites.    Dry weight is 262 lbs.    Waiting to have sleep studies for obstructive sleep apnea.    Optimize diuresis. ContinueTorsemide 40 mg bid.    Optimize blood pressure control. Continue Losartan 50 mg po daily, Metoprolol 50 bid.    Stop HCTZ.    Rate control for her atrial fibrillation. Continue Metoprolol 50 mg bid, anticoagulation with warfarin.    Continue rosuvastatin and gemfibrozil for mixed hyperlipidemia and hypertriglyceridemia.    Cardiomems monitor via remote protocol.

## 2020-08-12 NOTE — PROGRESS NOTES
Anticoagulation Summary  As of 8/12/2020    INR goal:  2.0-3.0   TTR:  56.8 % (5 y)   INR used for dosing:  3.40 (8/12/2020)   Warfarin maintenance plan:  5 mg (5 mg x 1) every Fri; 2.5 mg (5 mg x 0.5) all other days   Weekly warfarin total:  20 mg   Plan last modified:  LUIS OnealPROE (3/12/2020)   Next INR check:  9/9/2020   Target end date:  Indefinite    Indications    Chronic anticoagulation [Z79.01]  Atrial fibrillation (HCC) (Resolved) [I48.91]             Anticoagulation Episode Summary     INR check location:  Anticoagulation Clinic    Preferred lab:      Send INR reminders to:      Comments:        Anticoagulation Care Providers     Provider Role Specialty Phone number    Florian Carnes M.D. Referring Cardiology [phone]    [facility] Responsible  [phone]        Anticoagulation Patient Findings      HPI:  Rachelle Reina seen in clinic today for follow up on anticoagulation therapy in the presence of AF.   Denies any changes to current medical/health status since last appointment.   Denies any medication or diet changes.   No current symptoms of bleeding or thrombosis reported.    A/P:   INR supratherapeutic.   Will decrease one dose then continue current regimen.   BP recorded in vitals.    Follow up appointment in 4 week(s) per pt's preference.    Next Appointment: Wednesday, Sept 9, 2020 at 2:30 pm.    Sherri GARCIA

## 2020-08-13 LAB — INR BLD: 3.4 (ref 0.9–1.2)

## 2020-08-27 ENCOUNTER — TELEPHONE (OUTPATIENT)
Dept: CARDIOLOGY | Facility: MEDICAL CENTER | Age: 70
End: 2020-08-27

## 2020-08-27 NOTE — TELEPHONE ENCOUNTER
Called patient regarding increasing cardiomems readings, patient stated that she is taking torsemide as prescribed at 40mg BID, per APRN instructed to increase torsemide to 60/40mg for 2 days, patient will take increased dose today and tomorrow. Patient instructed to get labs done ASAP. Patient verbalized understanding.

## 2020-08-27 NOTE — TELEPHONE ENCOUNTER
Called patient regarding cardiomems readings. Sunday, machine kept saying good position and restarting 10-20 times, did reading twice Wednesday, today pillow kept saying there was metal near by but patient states there was nothing nearby. No SOB, feet and legs are really swollen, blisters popping up, today weight was 267, thinks last week she was 259. Requested patient to call technical support and take one more reading today as her readings today where not good waveforms.

## 2020-09-03 ENCOUNTER — TELEPHONE (OUTPATIENT)
Dept: CARDIOLOGY | Facility: MEDICAL CENTER | Age: 70
End: 2020-09-03

## 2020-09-03 NOTE — TELEPHONE ENCOUNTER
Called patient regarding continuing increased cardiomems pressures- Per patient-Swelling down quite a bit, down 3 lbs to 264, sodium intake is down but patient states it is difficult to decrease sodium,  normal  Baseline SOB. Patient is scheduled to get her labs done on 9/8 at 1pm as patient stated nothing was available sooner. Will call patient back if APRN wishes to make further changes or if 9/8 is not soon enough for labs.

## 2020-09-03 NOTE — TELEPHONE ENCOUNTER
Guide HF singlearm study patient:    Patient's CardioMEMS pressures continue to be above her threshold.  Her PAD today was 31 mmHg, threshold is 25 mmHg.    Patient has not gotten any labs done per her chart.    Is patient taking her torsemide 40 mg twice a day?    Please assess her compliance and any barriers to why she is not getting her lab work done and/or compliant with her medications?    Any sodium issues?    Does she have any symptoms?

## 2020-09-08 ENCOUNTER — OFFICE VISIT (OUTPATIENT)
Dept: URGENT CARE | Facility: PHYSICIAN GROUP | Age: 70
End: 2020-09-08
Payer: MEDICARE

## 2020-09-08 ENCOUNTER — HOSPITAL ENCOUNTER (OUTPATIENT)
Dept: LAB | Facility: MEDICAL CENTER | Age: 70
End: 2020-09-08
Attending: NURSE PRACTITIONER
Payer: MEDICARE

## 2020-09-08 VITALS
OXYGEN SATURATION: 99 % | WEIGHT: 268.2 LBS | HEART RATE: 89 BPM | SYSTOLIC BLOOD PRESSURE: 128 MMHG | TEMPERATURE: 97.1 F | HEIGHT: 65 IN | DIASTOLIC BLOOD PRESSURE: 62 MMHG | RESPIRATION RATE: 16 BRPM | BODY MASS INDEX: 44.68 KG/M2

## 2020-09-08 DIAGNOSIS — S80.821A BLISTER OF RIGHT LOWER EXTREMITY WITHOUT INFECTION, INITIAL ENCOUNTER: ICD-10-CM

## 2020-09-08 DIAGNOSIS — I10 BENIGN ESSENTIAL HYPERTENSION: ICD-10-CM

## 2020-09-08 DIAGNOSIS — I50.30 ACC/AHA STAGE C HEART FAILURE WITH PRESERVED EJECTION FRACTION (HCC): ICD-10-CM

## 2020-09-08 DIAGNOSIS — Z79.899 HIGH RISK MEDICATION USE: ICD-10-CM

## 2020-09-08 LAB
ANION GAP SERPL CALC-SCNC: 13 MMOL/L (ref 7–16)
BUN SERPL-MCNC: 38 MG/DL (ref 8–22)
CALCIUM SERPL-MCNC: 10 MG/DL (ref 8.5–10.5)
CHLORIDE SERPL-SCNC: 101 MMOL/L (ref 96–112)
CO2 SERPL-SCNC: 26 MMOL/L (ref 20–33)
CREAT SERPL-MCNC: 1.22 MG/DL (ref 0.5–1.4)
GLUCOSE SERPL-MCNC: 102 MG/DL (ref 65–99)
POTASSIUM SERPL-SCNC: 3.1 MMOL/L (ref 3.6–5.5)
SODIUM SERPL-SCNC: 140 MMOL/L (ref 135–145)

## 2020-09-08 PROCEDURE — 36415 COLL VENOUS BLD VENIPUNCTURE: CPT

## 2020-09-08 PROCEDURE — 99214 OFFICE O/P EST MOD 30 MIN: CPT | Performed by: PHYSICIAN ASSISTANT

## 2020-09-08 PROCEDURE — 80048 BASIC METABOLIC PNL TOTAL CA: CPT

## 2020-09-08 ASSESSMENT — ENCOUNTER SYMPTOMS
SHORTNESS OF BREATH: 1
FEVER: 0
SORE THROAT: 0
TINGLING: 0
CHILLS: 0
VOMITING: 0
SENSORY CHANGE: 0
PALPITATIONS: 0
BLURRED VISION: 0
NAUSEA: 0

## 2020-09-08 ASSESSMENT — FIBROSIS 4 INDEX: FIB4 SCORE: 1.669557677801570543

## 2020-09-08 NOTE — PROGRESS NOTES
Subjective:      Rachelle Reina is a 70 y.o. female who presents with Wound Infection (open wound blister popped, sting, swelling,  x4 days )      HPI:  This is a new problem. Rachelle Reina is a 70 y.o. female who presents today for evaluation of possible infection of her right lower extremity.  Patient has congestive heart failure, longstanding A. fib on chronic anticoagulation with Coumadin, chronic venous insufficiency, and obesity.  Lately she has had problems with fluid retention in her legs.  States that some of her medications were recently adjusted and the swelling started to go down but it got so bad a little over a week ago that she had listers pop up on her lower extremities bilaterally.  States that she had a rather large blister on her right shin that popped about 4 days ago.  She says that the skin underlying the blister looks very red and has been oozing clear/yellow fluid.  It is not painful.  There is been no streaking from the wound.  No fever/chills or numbness/tingling.  Leg swelling continues to improve each day.      Review of Systems   Constitutional: Negative for chills, fever and malaise/fatigue.   HENT: Negative for sore throat.    Eyes: Negative for blurred vision.   Respiratory: Positive for shortness of breath (at baseline).    Cardiovascular: Positive for leg swelling. Negative for chest pain and palpitations.   Gastrointestinal: Negative for nausea and vomiting.   Musculoskeletal: Negative for joint pain.   Skin:        Open wound on right shin   Neurological: Negative for tingling and sensory change.       PMH:  has a past medical history of A-fib ([facility]), Arthritis (05/05/2020), Atrial fibrillation (HCC), Benign essential hypertension (7/1/2011), Bowel habit changes, Breast cancer ([facility]), Bronchitis (2015), CAD (coronary artery disease), Cancer (HCC) (1987, 2000), Cataract (2018), Diabetes (HCC), Diastolic congestive heart failure (HCC) (2019), Disorder of thyroid, High  cholesterol, History of cardiac catheterization (4/22/2010), History of echocardiogram (4/22/2011), History of hypothyroidism (8/29/2008), Hypercholesteremia (7/1/2011), Long term (current) use of anticoagulants (7/1/2011), Morbid obesity (HCC) (10/28/2008), Ovarian cancer (HCC), Pain, Sleep apnea, Snoring, and Urinary incontinence.  MEDS:   Current Outpatient Medications:   •  mupirocin (BACTROBAN) 2 % Ointment, Apply 1 Application to affected area(s) 2 times a day., Disp: 22 g, Rfl: 0  •  torsemide (DEMADEX) 20 MG Tab, Take 2 Tabs by mouth 2 times a day., Disp: 360 Tab, Rfl: 3  •  metoprolol (LOPRESSOR) 50 MG Tab, TAKE 1 TABLET BY MOUTH TWICE DAILY, Disp: 180 Tab, Rfl: 4  •  rosuvastatin (CRESTOR) 20 MG Tab, Take 1 Tab by mouth every evening., Disp: 90 Tab, Rfl: 4  •  potassium chloride SA (KDUR) 20 MEQ Tab CR, Take 1 Tab by mouth 2 times a day., Disp: 180 Tab, Rfl: 4  •  losartan (COZAAR) 50 MG Tab, Take 1 Tab by mouth every day., Disp: 100 Tab, Rfl: 4  •  gemfibrozil (LOPID) 600 MG Tab, Take 1 Tab by mouth every morning., Disp: 90 Tab, Rfl: 4  •  warfarin (COUMADIN) 5 MG Tab, Take 1/2-1 tab by mouth daily or as directed by anticoagulation clinic, Disp: 90 Tab, Rfl: 1  •  levothyroxine (SYNTHROID) 200 MCG Tab, Take 200 mcg by mouth See Admin Instructions. All days except Sunday Brand Name, Disp: , Rfl:   •  pioglitazone (ACTOS) 15 MG Tab, Take 15 mg by mouth every day., Disp: , Rfl:   •  thiamine (THIAMINE) 100 MG Tab, Take 100 mg by mouth every day., Disp: , Rfl:   •  Ascorbic Acid (VITAMIN C) 500 MG Cap, Take 500 mg by mouth every day., Disp: , Rfl:   •  Pyridoxine HCl (VITAMIN B-6 PO), Take 1 Tab by mouth every day., Disp: , Rfl:   •  LEVEMIR FLEXTOUCH 100 UNIT/ML injection PEN, Inject 14 Units as instructed 2 times a day., Disp: , Rfl:   •  oxybutynin (DITROPAN XL) 15 MG CR tablet, Take 15 mg by mouth every morning., Disp: , Rfl: 2  •  allopurinol (ZYLOPRIM) 100 MG Tab, Take 100 mg by mouth every bedtime.,  "Disp: , Rfl: 2  •  acetaminophen (TYLENOL 8 HOUR ARTHRITIS PAIN) 650 MG CR tablet, Take 1,300 mg by mouth 2 Times a Day., Disp: , Rfl:   •  Cholecalciferol (VITAMIN D-3) 5000 units Tab, Take 5,000 Units by mouth every day., Disp: , Rfl:   •  traZODone (DESYREL) 100 MG Tab, Take 100 mg by mouth every evening., Disp: , Rfl:   •  glipiZIDE (GLUCOTROL) 5 MG Tab, Take 2.5 mg by mouth 2 times a day., Disp: , Rfl: 1  •  levothyroxine (SYNTHROID) 175 MCG Tab, Take 175 mcg by mouth every Sunday. Brand Name, Disp: , Rfl:   •  loratadine (CLARITIN) 10 MG Tab, Take 10 mg by mouth every day., Disp: , Rfl:   •  citalopram (CELEXA) 20 MG TABS, Take 20 mg by mouth every day., Disp: , Rfl:   ALLERGIES:   Allergies   Allergen Reactions   • Sulfa Drugs Rash     8/2015 rash and hives       SURGHX:   Past Surgical History:   Procedure Laterality Date   • [facility] CARDIAC CATH  02/27/2019    normal coronaries   • CATARACT PHACO WITH IOL Right 1/22/2018    Procedure: CATARACT PHACO WITH IOL;  Surgeon: Santosh Holden M.D.;  Location: SURGERY SAME DAY [facility] ORS;  Service: Ophthalmology   • CATARACT PHACO WITH IOL Left 1/8/2018    Procedure: CATARACT PHACO WITH IOL;  Surgeon: Santosh Holden M.D.;  Location: SURGERY SAME DAY [facility] ORS;  Service: Ophthalmology   • OTHER ORTHOPEDIC SURGERY Right 2013    hip arthroplasty   • CHOLECYSTECTOMY  2001   • ABDOMINAL HYSTERECTOMY TOTAL      1987   • MASTECTOMY  partial   • PB RADIATION THERAPY PLAN SIMPLE       SOCHX:  reports that she has never smoked. She has never used smokeless tobacco. She reports current alcohol use. She reports that she does not use drugs.  FH: Family history was reviewed, no pertinent findings to report     Objective:     /62 (BP Location: Right arm, Patient Position: Sitting, BP Cuff Size: Adult long)   Pulse 89   Temp 36.2 °C (97.1 °F) (Temporal)   Resp 16   Ht 1.651 m (5' 5\")   Wt 121.7 kg (268 lb 3.2 oz)   LMP 01/05/1987 (Approximate)   SpO2 99%   BMI 44.63 " kg/m²      Physical Exam  Constitutional:       Appearance: She is well-developed.   HENT:      Head: Normocephalic and atraumatic.      Right Ear: External ear normal.      Left Ear: External ear normal.   Eyes:      Conjunctiva/sclera: Conjunctivae normal.      Pupils: Pupils are equal, round, and reactive to light.   Cardiovascular:      Rate and Rhythm: Normal rate. Rhythm irregularly irregular.      Pulses: Normal pulses.      Heart sounds: Normal heart sounds. No murmur.   Pulmonary:      Effort: Pulmonary effort is normal.      Breath sounds: Normal breath sounds. No wheezing.   Musculoskeletal:      Right lower le+ Pitting Edema present.      Left lower le+ Pitting Edema present.   Skin:     General: Skin is warm and dry.      Capillary Refill: Capillary refill takes less than 2 seconds.             Comments: Right shin exhibits an open wound approximately 6 x 4 cm in diameter.  The wound bed is erythematous and is oozing clear/yellow fluid.  There is no surrounding erythema or warmth.  No induration or fluctuance.  No streaking from the wound.   Neurological:      Mental Status: She is alert and oriented to person, place, and time.   Psychiatric:         Behavior: Behavior normal.         Judgment: Judgment normal.            Assessment/Plan:     1. Blister of right lower extremity without infection, initial encounter  - mupirocin (BACTROBAN) 2 % Ointment; Apply 1 Application to affected area(s) 2 times a day.  Dispense: 22 g; Refill: 0  No evidence of secondary Skin infection.  The discharge and wound itself seem consistent with a popped blister.  Wound was cleaned with chlorhexidine and irrigated copiously with sterile saline.  Dressing of antibiotic ointment, Xeroform gauze, Telfa, and roll gauze was applied.  Patient was advised to change the dressing at least twice daily when she applies Bactroban ointment but she should change dressing more often if dressing becomes saturated.  As the  discharge becomes less she should try to keep the wound open to the air more often.  Due to the size of the wound and with patient's long list of chronic medical problems would like her to follow back up on Friday for wound check.  Sooner if symptoms worsen.  Worsening infection precautions discussed with patient.          Differential Diagnosis, natural history, and supportive care discussed. Return to the Urgent Care or follow up with your PCP if symptoms fail to resolve, or for any new or worsening symptoms. Emergency room precautions discussed. Patient and/or family appears understanding of information.

## 2020-09-09 ENCOUNTER — ANTICOAGULATION VISIT (OUTPATIENT)
Dept: VASCULAR LAB | Facility: MEDICAL CENTER | Age: 70
End: 2020-09-09
Attending: INTERNAL MEDICINE
Payer: MEDICARE

## 2020-09-09 DIAGNOSIS — Z79.01 CHRONIC ANTICOAGULATION: ICD-10-CM

## 2020-09-09 LAB
INR BLD: 3.6 (ref 0.9–1.2)
INR PPP: 3.6 (ref 2–3.5)

## 2020-09-09 PROCEDURE — 99212 OFFICE O/P EST SF 10 MIN: CPT | Performed by: NURSE PRACTITIONER

## 2020-09-09 PROCEDURE — 85610 PROTHROMBIN TIME: CPT

## 2020-09-09 NOTE — PROGRESS NOTES
Anticoagulation Summary  As of 9/9/2020    INR goal:  2.0-3.0   TTR:  55.9 % (5.1 y)   INR used for dosing:  3.60 (9/9/2020)   Warfarin maintenance plan:  2.5 mg (5 mg x 0.5) every day   Weekly warfarin total:  17.5 mg   Plan last modified:  NICK Oneal (9/9/2020)   Next INR check:  9/30/2020   Target end date:  Indefinite    Indications    Chronic anticoagulation [Z79.01]  Atrial fibrillation (HCC) (Resolved) [I48.91]             Anticoagulation Episode Summary     INR check location:  Anticoagulation Clinic    Preferred lab:      Send INR reminders to:      Comments:        Anticoagulation Care Providers     Provider Role Specialty Phone number    Florian Carnes M.D. Referring Cardiology [phone]    [facility] Responsible  [phone]        Anticoagulation Patient Findings      HPI:  Rachelle Reina seen in clinic today for follow up on anticoagulation therapy in the presence of AF.   Denies any changes to current medical/health status since last appointment.   Denies any medication or diet changes.   No current symptoms of bleeding or thrombosis reported.    A/P:   INR supratherapeutic. INR trending high. Will decrease regimen.   BP declined.    Follow up appointment in 3 week(s) per pt preference.    Next Appointment: Wednesday, Sept 30, 2020 at 2:30 pm.    Sherri GARCIA

## 2020-09-10 ENCOUNTER — TELEPHONE (OUTPATIENT)
Dept: TELEMETRY | Facility: MEDICAL CENTER | Age: 70
End: 2020-09-10

## 2020-09-10 ENCOUNTER — TELEPHONE (OUTPATIENT)
Dept: CARDIOLOGY | Facility: MEDICAL CENTER | Age: 70
End: 2020-09-10

## 2020-09-10 DIAGNOSIS — I50.9 HEART FAILURE, NYHA CLASS 3 (HCC): ICD-10-CM

## 2020-09-10 DIAGNOSIS — Z79.899 HIGH RISK MEDICATION USE: ICD-10-CM

## 2020-09-10 DIAGNOSIS — I50.30 ACC/AHA STAGE C HEART FAILURE WITH PRESERVED EJECTION FRACTION (HCC): ICD-10-CM

## 2020-09-10 RX ORDER — POTASSIUM CHLORIDE 20 MEQ/1
40 TABLET, EXTENDED RELEASE ORAL 2 TIMES DAILY
Qty: 360 TAB | Refills: 3 | Status: SHIPPED | OUTPATIENT
Start: 2020-09-10 | End: 2021-01-01

## 2020-09-10 NOTE — TELEPHONE ENCOUNTER
Patient returned call regarding labs, patient instructed to increase potassium to 40meq BID and to get labs completed in a couple weeks, patient states she is tired of being poked but will get her labs for Audrey completed when she gets her labs for her PCP done in about a week and a half

## 2020-09-10 NOTE — RESULT ENCOUNTER NOTE
Please let patient know that her kidney function looks good but her potassium level is low, please have her increase her potassium to 40 mEq BID. Please have her repeat her labs in a couple weeks.

## 2020-09-10 NOTE — TELEPHONE ENCOUNTER
Please let patient know that her kidney function looks good but her potassium level is low, please have her increase her potassium to 40 mEq BID. Please have her repeat her labs in a couple weeks. New prescription sent in to her pharmacy.

## 2020-09-11 ENCOUNTER — OFFICE VISIT (OUTPATIENT)
Dept: URGENT CARE | Facility: PHYSICIAN GROUP | Age: 70
End: 2020-09-11
Payer: MEDICARE

## 2020-09-11 VITALS
HEART RATE: 99 BPM | SYSTOLIC BLOOD PRESSURE: 118 MMHG | OXYGEN SATURATION: 96 % | WEIGHT: 268.2 LBS | HEIGHT: 65 IN | BODY MASS INDEX: 44.68 KG/M2 | TEMPERATURE: 97.9 F | DIASTOLIC BLOOD PRESSURE: 68 MMHG | RESPIRATION RATE: 16 BRPM

## 2020-09-11 DIAGNOSIS — S80.821D: ICD-10-CM

## 2020-09-11 PROCEDURE — 99213 OFFICE O/P EST LOW 20 MIN: CPT | Performed by: NURSE PRACTITIONER

## 2020-09-11 ASSESSMENT — FIBROSIS 4 INDEX: FIB4 SCORE: 1.669557677801570543

## 2020-09-11 ASSESSMENT — ENCOUNTER SYMPTOMS
RESPIRATORY NEGATIVE: 1
FEVER: 0
CONSTITUTIONAL NEGATIVE: 1
NEUROLOGICAL NEGATIVE: 1

## 2020-09-11 ASSESSMENT — VISUAL ACUITY: OU: 1

## 2020-09-11 NOTE — PATIENT INSTRUCTIONS
A referral request has been placed for wound clinic. We have a referrals department that is tasked with locating a suitable office that currently accepts patients and your insurance and you should be receiving referral information from them such as the office name, address, and number. This process usually takes around 3 business days. If you are not contacted by our referrals department, please call [phone].     In the meantime, continue with wound care at home. Monitor for signs of infection or other complications.

## 2020-09-11 NOTE — PROGRESS NOTES
"Subjective:     Rachelle Reina is a 70 y.o. female who presents for Wound Check (has been covering it up and cleaning it up)       Wound Check  Treated in ED: 9/8/2020.     Initial UC visit 9/8/2020 copied for continuity of care:    \"This is a new problem. Rachelle Reina is a 70 y.o. female who presents today for evaluation of possible infection of her right lower extremity.  Patient has congestive heart failure, longstanding A. fib on chronic anticoagulation with Coumadin, chronic venous insufficiency, and obesity.  Lately she has had problems with fluid retention in her legs.  States that some of her medications were recently adjusted and the swelling started to go down but it got so bad a little over a week ago that she had listers pop up on her lower extremities bilaterally.  States that she had a rather large blister on her right shin that popped about 4 days ago.  She says that the skin underlying the blister looks very red and has been oozing clear/yellow fluid.  It is not painful.  There is been no streaking from the wound.  No fever/chills or numbness/tingling.  Leg swelling continues to improve each day.\"    Follow-up UC visit today 9/11/2020:    Patient presents for wound check of a large popped blister at the front of her right lower leg. Has been performing wound care with topical Mupirocin as directed. Reports right leg swelling overall is improved. Currently on torsemide and potassium supplementation. Reports the blister is weeping less, but reports concern of the large wound which remains.    Patient was screened prior to rooming and denied COVID-19 diagnosis or contact with a person who has been diagnosed or is suspected to have COVID-19. During this visit, appropriate PPE was worn, hand hygiene was performed, and the patient and any visitors were masked.     PMH:  has a past medical history of A-fib (HCC), Arthritis (05/05/2020), Atrial fibrillation (HCC), Benign essential " hypertension (7/1/2011), Bowel habit changes, Breast cancer (HCC), Bronchitis (2015), CAD (coronary artery disease), Cancer (HCC) (1987, 2000), Cataract (2018), Diabetes (HCC), Diastolic congestive heart failure (HCC) (2019), Disorder of thyroid, High cholesterol, History of cardiac catheterization (4/22/2010), History of echocardiogram (4/22/2011), History of hypothyroidism (8/29/2008), Hypercholesteremia (7/1/2011), Long term (current) use of anticoagulants (7/1/2011), Morbid obesity (HCC) (10/28/2008), Ovarian cancer (HCC), Pain, Sleep apnea, Snoring, and Urinary incontinence.    MEDS:   Current Outpatient Medications:   •  potassium chloride SA (KDUR) 20 MEQ Tab CR, Take 2 Tabs by mouth 2 times a day., Disp: 360 Tab, Rfl: 3  •  mupirocin (BACTROBAN) 2 % Ointment, Apply 1 Application to affected area(s) 2 times a day., Disp: 22 g, Rfl: 0  •  torsemide (DEMADEX) 20 MG Tab, Take 2 Tabs by mouth 2 times a day., Disp: 360 Tab, Rfl: 3  •  metoprolol (LOPRESSOR) 50 MG Tab, TAKE 1 TABLET BY MOUTH TWICE DAILY, Disp: 180 Tab, Rfl: 4  •  rosuvastatin (CRESTOR) 20 MG Tab, Take 1 Tab by mouth every evening., Disp: 90 Tab, Rfl: 4  •  losartan (COZAAR) 50 MG Tab, Take 1 Tab by mouth every day., Disp: 100 Tab, Rfl: 4  •  gemfibrozil (LOPID) 600 MG Tab, Take 1 Tab by mouth every morning., Disp: 90 Tab, Rfl: 4  •  warfarin (COUMADIN) 5 MG Tab, Take 1/2-1 tab by mouth daily or as directed by anticoagulation clinic, Disp: 90 Tab, Rfl: 1  •  levothyroxine (SYNTHROID) 200 MCG Tab, Take 200 mcg by mouth See Admin Instructions. All days except Sunday Brand Name, Disp: , Rfl:   •  pioglitazone (ACTOS) 15 MG Tab, Take 15 mg by mouth every day., Disp: , Rfl:   •  thiamine (THIAMINE) 100 MG Tab, Take 100 mg by mouth every day., Disp: , Rfl:   •  Ascorbic Acid (VITAMIN C) 500 MG Cap, Take 500 mg by mouth every day., Disp: , Rfl:   •  Pyridoxine HCl (VITAMIN B-6 PO), Take 1 Tab by mouth every day., Disp: , Rfl:   •  LEVEMIR FLEXTOUCH 100  UNIT/ML injection PEN, Inject 14 Units as instructed 2 times a day., Disp: , Rfl:   •  oxybutynin (DITROPAN XL) 15 MG CR tablet, Take 15 mg by mouth every morning., Disp: , Rfl: 2  •  allopurinol (ZYLOPRIM) 100 MG Tab, Take 100 mg by mouth every bedtime., Disp: , Rfl: 2  •  acetaminophen (TYLENOL 8 HOUR ARTHRITIS PAIN) 650 MG CR tablet, Take 1,300 mg by mouth 2 Times a Day., Disp: , Rfl:   •  Cholecalciferol (VITAMIN D-3) 5000 units Tab, Take 5,000 Units by mouth every day., Disp: , Rfl:   •  traZODone (DESYREL) 100 MG Tab, Take 100 mg by mouth every evening., Disp: , Rfl:   •  glipiZIDE (GLUCOTROL) 5 MG Tab, Take 2.5 mg by mouth 2 times a day., Disp: , Rfl: 1  •  levothyroxine (SYNTHROID) 175 MCG Tab, Take 175 mcg by mouth every Sunday. Brand Name, Disp: , Rfl:   •  loratadine (CLARITIN) 10 MG Tab, Take 10 mg by mouth every day., Disp: , Rfl:   •  citalopram (CELEXA) 20 MG TABS, Take 20 mg by mouth every day., Disp: , Rfl:     ALLERGIES:   Allergies   Allergen Reactions   • Sulfa Drugs Rash     8/2015 rash and hives       SURGHX:   Past Surgical History:   Procedure Laterality Date   • [facility] CARDIAC CATH  02/27/2019    normal coronaries   • CATARACT PHACO WITH IOL Right 1/22/2018    Procedure: CATARACT PHACO WITH IOL;  Surgeon: Santosh Holden M.D.;  Location: SURGERY SAME DAY [facility] ORS;  Service: Ophthalmology   • CATARACT PHACO WITH IOL Left 1/8/2018    Procedure: CATARACT PHACO WITH IOL;  Surgeon: Santosh Holden M.D.;  Location: SURGERY SAME DAY [facility];  Service: Ophthalmology   • OTHER ORTHOPEDIC SURGERY Right 2013    hip arthroplasty   • CHOLECYSTECTOMY  2001   • ABDOMINAL HYSTERECTOMY TOTAL      1987   • MASTECTOMY  partial   • PB RADIATION THERAPY PLAN SIMPLE       SOCHX:  reports that she has never smoked. She has never used smokeless tobacco. She reports current alcohol use. She reports that she does not use drugs.     FH: Reviewed with patient, not pertinent to this visit.    Review of Systems  "  Constitutional: Negative.  Negative for fever and malaise/fatigue.   Respiratory: Negative.    Cardiovascular: Positive for leg swelling (Improving).   Skin:        Blister in front of right lower leg, weeping   Neurological: Negative.    All other systems reviewed and are negative.    Additional details per HPI.      Objective:     /68 (BP Location: Right arm, Patient Position: Sitting, BP Cuff Size: Adult)   Pulse 99   Temp 36.6 °C (97.9 °F) (Temporal)   Resp 16   Ht 1.651 m (5' 5\")   Wt 121.7 kg (268 lb 3.2 oz)   LMP 01/05/1987 (Approximate)   SpO2 96%   BMI 44.63 kg/m²     Physical Exam  Vitals signs reviewed.   Constitutional:       General: She is not in acute distress.     Appearance: She is well-developed. She is not toxic-appearing.   HENT:      Head: Normocephalic.      Right Ear: External ear normal.      Left Ear: External ear normal.      Nose: Nose normal.   Eyes:      General: Vision grossly intact.      Extraocular Movements: Extraocular movements intact.      Conjunctiva/sclera: Conjunctivae normal.   Neck:      Musculoskeletal: Normal range of motion.   Cardiovascular:      Rate and Rhythm: Normal rate.      Pulses: Normal pulses.   Pulmonary:      Effort: Pulmonary effort is normal. No respiratory distress.   Musculoskeletal: Normal range of motion.         General: No deformity.      Right lower leg: Edema (1+ pitting) present.      Left lower leg: Edema (1+ pitting) present.        Legs:    Skin:     General: Skin is warm and dry.      Capillary Refill: Capillary refill takes less than 2 seconds.      Coloration: Skin is not pale.   Neurological:      Mental Status: She is alert and oriented to person, place, and time.      Sensory: No sensory deficit.      Motor: No weakness.      Coordination: Coordination normal.   Psychiatric:         Behavior: Behavior normal. Behavior is cooperative.       Assessment/Plan:     1. Blister of right lower leg without infection, subsequent " encounter  - REFERRAL TO WOUND CLINIC    Wound care performed. No signs of acute infection. Antibiotic ointment, Xeroform, non-stick dressing, and Kerlix applied. Continue with home wound care as directed. Referral for wound clinic evaluation and treatment. Continue to monitor for signs of infection or complications.    Differential diagnosis, natural history, supportive care, over-the-counter symptom management per 's instructions, close monitoring, and indications for immediate follow-up discussed.     Patient advised to: Return for 1) Symptoms that worsen/don't improve, or go to ER, 2) Follow up with primary care in 7-10 days.    All questions answered. Patient agrees with the plan of care.    Discharge summary provided.

## 2020-09-16 ENCOUNTER — OFFICE VISIT (OUTPATIENT)
Dept: WOUND CARE | Facility: MEDICAL CENTER | Age: 70
End: 2020-09-16
Attending: NURSE PRACTITIONER
Payer: MEDICARE

## 2020-09-16 VITALS
SYSTOLIC BLOOD PRESSURE: 123 MMHG | HEART RATE: 101 BPM | DIASTOLIC BLOOD PRESSURE: 60 MMHG | TEMPERATURE: 97.2 F | RESPIRATION RATE: 20 BRPM | OXYGEN SATURATION: 93 %

## 2020-09-16 DIAGNOSIS — Z79.01 CHRONIC ANTICOAGULATION: ICD-10-CM

## 2020-09-16 DIAGNOSIS — I50.32 CHRONIC DIASTOLIC HEART FAILURE (HCC): ICD-10-CM

## 2020-09-16 DIAGNOSIS — L08.9 WOUND INFECTION: ICD-10-CM

## 2020-09-16 DIAGNOSIS — R60.0 BILATERAL LOWER EXTREMITY EDEMA: ICD-10-CM

## 2020-09-16 DIAGNOSIS — T14.8XXA WOUND INFECTION: ICD-10-CM

## 2020-09-16 DIAGNOSIS — T14.8XXA OPEN WOUND: ICD-10-CM

## 2020-09-16 PROCEDURE — 99214 OFFICE O/P EST MOD 30 MIN: CPT

## 2020-09-16 PROCEDURE — 11042 DBRDMT SUBQ TIS 1ST 20SQCM/<: CPT | Performed by: NURSE PRACTITIONER

## 2020-09-16 PROCEDURE — 11042 DBRDMT SUBQ TIS 1ST 20SQCM/<: CPT

## 2020-09-16 PROCEDURE — 99213 OFFICE O/P EST LOW 20 MIN: CPT | Mod: 25 | Performed by: NURSE PRACTITIONER

## 2020-09-16 RX ORDER — AMOXICILLIN AND CLAVULANATE POTASSIUM 875; 125 MG/1; MG/1
1 TABLET, FILM COATED ORAL 2 TIMES DAILY
Qty: 14 TAB | Refills: 0 | Status: SHIPPED | OUTPATIENT
Start: 2020-09-16 | End: 2020-09-18

## 2020-09-16 ASSESSMENT — ENCOUNTER SYMPTOMS
WHEEZING: 0
SHORTNESS OF BREATH: 0
CHILLS: 1
BLURRED VISION: 0
VOMITING: 0
DOUBLE VISION: 0
PALPITATIONS: 0
COUGH: 0
HEADACHES: 0
WEAKNESS: 0
FEVER: 0
DIARRHEA: 0
NAUSEA: 0
DIZZINESS: 0
CONSTIPATION: 0

## 2020-09-16 ASSESSMENT — PAIN SCALES - GENERAL: PAINLEVEL: 4=SLIGHT-MODERATE PAIN

## 2020-09-16 NOTE — PROGRESS NOTES
Provider Encounter- Full Thickness wound    HISTORY OF PRESENT ILLNESS  Wound History:    START OF CARE IN CLINIC: 9/16/2020    REFERRING PROVIDER: Vikash Hannon     WOUND- Full Thickness Wound   LOCATION: Bilateral lower extremities   HISTORY: Patient presented to [address] urgent care on 9/11/2020 for possible right lower extremity infection.  Patient has a history significant for congestive heart failure with the cardiac mems implantation, longstanding atrial fibrillation, patient is on Coumadin currently.  Patient also positive for chronic venous insufficiency and obesity.  Patient has had problems with significant bilateral lower extremity edema she has had her diuretics adjusted.  She however developed a large blister to the right lower extremity that opened on approximately 9/4/2020 the wounds are not painful.  Patient reports that she has had some chills and increased erythema to right lower extremity.  Patient referred to [facility] for care of open wounds to bilateral lower extremities.    Pertinent Medical History: Diastolic congestive heart failure, obesity, hypothyroidism, hypertension, atrial fibrillation, diabetes mellitus, dyslipidemia, venous insufficiency, anemia    TOBACCO USE: Never smoked or use smokeless tobacco    Patient's problem list, allergies, and current medications reviewed and updated in Epic    Interval History:  9/16/2020: Clinic visit with RADHA Villasenor. Patient states that they are feeling well today.  Patient denies fever, chills, nausea, vomiting, lightheadedness, dizziness, shortness of breath and chest pain.  Pertinent positives include 3+ bilateral lower extremity edema, right lower extremity erythema with pain to palpation.  Open wounds to bilateral lower extremities        REVIEW OF SYSTEMS:   Review of Systems   Constitutional: Positive for chills. Negative for fever and malaise/fatigue.   Eyes: Negative for blurred vision and double vision.   Respiratory:  Negative for cough, shortness of breath and wheezing.    Cardiovascular: Positive for leg swelling. Negative for chest pain and palpitations.        Regular heart rhythm   Gastrointestinal: Negative for constipation, diarrhea, nausea and vomiting.   Skin: Negative for itching and rash.        Open blisters to bilateral lower extremities  Erythema to right lower extremity   Neurological: Negative for dizziness, weakness and headaches.       PHYSICAL EXAMINATION:   /60   Pulse (!) 101   Temp 36.2 °C (97.2 °F)   Resp 20   LMP 01/05/1987 (Approximate)   SpO2 93%     Physical Exam   Constitutional: She is oriented to person, place, and time and well-developed, well-nourished, and in no distress.   HENT:   Head: Normocephalic and atraumatic.   Eyes: Pupils are equal, round, and reactive to light. Conjunctivae are normal.   Cardiovascular:   Irregularly irregular rhythm palpable to DP pulses biphasic pulses heard to DP and PT to bilateral lower extremities through use of Doppler   Neurological: She is alert and oriented to person, place, and time.   Skin: Skin is warm. No rash noted. There is erythema. No pallor.   Blisters to right lower extremity and left lower extremity.  Erythema right lower extremity  See doc flowsheets and photos   Psychiatric: Mood and affect normal.       WOUND ASSESSMENT       Wound 09/16/20 Pretibial Right anterior lower leg (Active)   Wound Image    09/16/20 0830   Site Assessment Red;Yellow 09/16/20 0830   Periwound Assessment Edema 09/16/20 0830   Margins Attached edges 09/16/20 0830   Drainage Amount Large 09/16/20 0830   Drainage Description Serosanguineous 09/16/20 0830   Treatments Cleansed;Topical Lidocaine;Provider debridement 09/16/20 0830   Wound Cleansing Normal Saline Irrigation 09/16/20 0830   Periwound Protectant Barrier Paste 09/16/20 0830   Dressing Cleansing/Solutions Not Applicable 09/16/20 0830   Dressing Options Hydrofiber Silver;Hydrofiber;Nonadhesive Foam;Dry  Roll Gauze;Hypafix Tape;Tubigrip 09/16/20 0830   Dressing Changed New 09/16/20 0830   Dressing Status Clean;Dry;Intact 09/16/20 0830   Dressing Change/Treatment Frequency Every 72 hrs, and As Needed 09/16/20 0830   Non-staged Wound Description Partial thickness 09/16/20 0830   Wound Length (cm) 3.5 cm 09/16/20 0830   Wound Width (cm) 4.8 cm 09/16/20 0830   Wound Depth (cm) 0 cm 09/16/20 0830   Wound Surface Area (cm^2) 16.8 cm^2 09/16/20 0830   Wound Volume (cm^3) 0 cm^3 09/16/20 0830   Post-Procedure Length (cm) 3.5 cm 09/16/20 0830   Post-Procedure Width (cm) 5 cm 09/16/20 0830   Post-Procedure Depth (cm) 0 cm 09/16/20 0830   Post-Procedure Surface Area (cm^2) 17.5 cm^2 09/16/20 0830   Post-Procedure Volume (cm^3) 0 cm^3 09/16/20 0830   Tunneling (cm) 0 cm 09/16/20 0830   Undermining (cm) 0 cm 09/16/20 0830   Shape Circular 09/16/20 0830   Wound Odor None 09/16/20 0830   Pulses 1+;Right;DP;PT;Doppler 09/16/20 0830   Exposed Structures None 09/16/20 0830       Wound 09/16/20 Pretibial Left anterior superior loewr leg (Active)   Wound Image    09/16/20 0830   Site Assessment Red 09/16/20 0830   Periwound Assessment Edema 09/16/20 0830   Margins Attached edges 09/16/20 0830   Drainage Amount Moderate 09/16/20 0830   Drainage Description Serosanguineous 09/16/20 0830   Treatments Cleansed;Topical Lidocaine;Provider debridement 09/16/20 0830   Wound Cleansing Normal Saline Irrigation 09/16/20 0830   Periwound Protectant Barrier Paste 09/16/20 0830   Dressing Cleansing/Solutions Not Applicable 09/16/20 0830   Dressing Options Hydrofiber Silver;Hydrofiber;Nonadhesive Foam;Dry Roll Gauze;Hypafix Tape;Tubigrip 09/16/20 0830   Dressing Changed New 09/16/20 0830   Dressing Status Clean;Dry;Intact 09/16/20 0830   Dressing Change/Treatment Frequency Every 72 hrs, and As Needed 09/16/20 0830   Non-staged Wound Description Partial thickness 09/16/20 0830   Wound Length (cm) 0.8 cm 09/16/20 0830   Wound Width (cm) 0.5 cm 09/16/20  0830   Wound Depth (cm) 0 cm 09/16/20 0830   Wound Surface Area (cm^2) 0.4 cm^2 09/16/20 0830   Wound Volume (cm^3) 0 cm^3 09/16/20 0830   Post-Procedure Length (cm) 0.8 cm 09/16/20 0830   Post-Procedure Width (cm) 0.6 cm 09/16/20 0830   Post-Procedure Depth (cm) 0 cm 09/16/20 0830   Post-Procedure Surface Area (cm^2) 0.48 cm^2 09/16/20 0830   Post-Procedure Volume (cm^3) 0 cm^3 09/16/20 0830   Tunneling (cm) 0 cm 09/16/20 0830   Undermining (cm) 0 cm 09/16/20 0830   Wound Odor None 09/16/20 0830   Pulses 2+;Left;DP;PT;Doppler 09/16/20 0830   Exposed Structures None 09/16/20 0830       Wound 09/16/20 Pretibial Left anterior lower leg inferior (Active)   Wound Image    09/16/20 0830   Site Assessment Red 09/16/20 0830   Periwound Assessment Edema 09/16/20 0830   Margins Attached edges 09/16/20 0830   Drainage Amount Moderate 09/16/20 0830   Drainage Description Serosanguineous 09/16/20 0830   Treatments Cleansed;Topical Lidocaine;Provider debridement 09/16/20 0830   Wound Cleansing Normal Saline Irrigation 09/16/20 0830   Periwound Protectant Barrier Paste 09/16/20 0830   Dressing Cleansing/Solutions Not Applicable 09/16/20 0830   Dressing Options Hydrofiber Silver;Hydrofiber;Nonadhesive Foam;Dry Roll Gauze;Hypafix Tape;Tubigrip 09/16/20 0830   Dressing Changed New 09/16/20 0830   Dressing Status Clean;Dry;Intact 09/16/20 0830   Dressing Change/Treatment Frequency Every 72 hrs, and As Needed 09/16/20 0830   Non-staged Wound Description Partial thickness 09/16/20 0830   Wound Length (cm) 0.3 cm 09/16/20 0830   Wound Width (cm) 0.5 cm 09/16/20 0830   Wound Depth (cm) 0 cm 09/16/20 0830   Wound Surface Area (cm^2) 0.15 cm^2 09/16/20 0830   Wound Volume (cm^3) 0 cm^3 09/16/20 0830   Post-Procedure Length (cm) 0.5 cm 09/16/20 0830   Post-Procedure Width (cm) 0.6 cm 09/16/20 0830   Post-Procedure Depth (cm) 0 cm 09/16/20 0830   Post-Procedure Surface Area (cm^2) 0.3 cm^2 09/16/20 0830   Post-Procedure Volume (cm^3) 0 cm^3  09/16/20 0830   Tunneling (cm) 0 cm 09/16/20 0830   Undermining (cm) 0 cm 09/16/20 0830   Wound Odor None 09/16/20 0830   Pulses 1+;Left;DP;PT;Doppler 09/16/20 0830   Exposed Structures None 09/16/20 0830            PROCEDURE:   -2% viscous lidocaine applied topically to wound bed for approximately 5 minutes prior to debridement  -Curette used to debride wound bed.  Excisional debridement was performed to remove devitalized tissue until healthy, bleeding tissue was visualized.   Entire surface of wound, 17.58 cm2 debrided.  Tissue debrided into the subcutaneous layer.    -Bleeding controlled with manual pressure.    -Wound care completed by wound RN, refer to flowsheet  -Patient tolerated the procedure well, without c/o pain or discomfort.       Pertinent Labs and Diagnostics:    Labs:     A1c:   Lab Results   Component Value Date/Time    HBA1C 6.7 (H) 03/26/2020 10:55 AM          IMAGING: N/A    VASCULAR STUDIES: N/A    LAST  WOUND CULTURE:  DATE : N/A            ASSESSMENT AND PLAN:     1. Wound infection  Comments: Patient with right lower extremity erythema, edema, pain to palpation.  Patient was given mupirocin at the urgent care however I believe this is been insufficient and the patient's symptoms warrant oral antibiotic therapy.  I have placed the patient on oral Augmentin for 7 days.  Patient is to monitor for diarrhea patient instructed to hold antibiotics if she develops severe diarrhea and to contact me. Patient called and informed me she was having diarrhea.  I will prescribe and alternative (keflex)    2. Chronic diastolic heart failure (HCC)  Comments: Patient with a history of diastolic heart failure has implantable mems monitoring device.  -Significant 3+ bilateral pitting edema contributing to development of blisters to bilateral lower extremities  -Initiated compression in clinic with Tubigrip's to bilateral lower extremities    3. Chronic anticoagulation  Complicating factor: Patient to discuss  antibiotic therapy with Coumadin clinic and adjust dose of Coumadin as prescribed by the Coumadin clinic.    4. Bilateral lower extremity edema  Comments: Chronic bilateral lower extremity due to heart failure.  -Patient to elevate legs for at least 30 minutes 3 times a day patient reports that she has an adjustable bed that allows her to recline her feet.  -Patient takes 40 mg of Lasix twice daily.    5.  Open wound  -Excisional debridement of wound in clinic today, medically necessary to promote wound healing.  -Patient to return to clinic weekly for assessment and debridement  -Patient to change dressing 1-2 times per week in between clinic visits   Wound Care: Hydrofiber silver, Hydrofiber, nonadhesive foam, dry roll gauze, Hypafix tape, Tubigrip            PATIENT EDUCATION  - Importance of adequate nutrition for wound healing  -Advised to go to ER for any increased redness, swelling, drainage, or odor, or if patient develops fever, chills, nausea or vomiting.     15 min spent face to face with patient, >50% of time spent counseling, coordinating care, reviewing records, discussing POC, educating patient regarding wound healing and progression.  This time was spent in excess to procedure time.       Please note that this note may have been created using voice recognition software. I have worked with technical experts from [employer] to optimize the interface.  I have made every reasonable attempt to correct obvious errors, but there may be errors of grammar and possibly content that I did not discover before finalizing the note.    N

## 2020-09-17 ENCOUNTER — TELEPHONE (OUTPATIENT)
Dept: CARDIOLOGY | Facility: MEDICAL CENTER | Age: 70
End: 2020-09-17

## 2020-09-17 NOTE — TELEPHONE ENCOUNTER
Called patient to update on cardiomems, patient is getting labs completed by Tuesday if not tomorrow, no concerns about volume overload.

## 2020-09-17 NOTE — TELEPHONE ENCOUNTER
Patient returned call regarding diuretics, more often than not she is taking the 40mg BID along witht the potassium but sometimes she will adjust according to her schedule, for example she has 2 events next Saturday at 10am and 3pm, she plans to take 20mg in the morning and 60mg when she gets home.

## 2020-09-18 RX ORDER — CEPHALEXIN 250 MG/1
250 CAPSULE ORAL 4 TIMES DAILY
Qty: 28 CAP | Refills: 0 | Status: SHIPPED | OUTPATIENT
Start: 2020-09-18 | End: 2020-09-25

## 2020-09-22 ENCOUNTER — HOSPITAL ENCOUNTER (OUTPATIENT)
Dept: LAB | Facility: MEDICAL CENTER | Age: 70
End: 2020-09-22
Attending: NURSE PRACTITIONER
Payer: MEDICARE

## 2020-09-22 DIAGNOSIS — I50.30 ACC/AHA STAGE C HEART FAILURE WITH PRESERVED EJECTION FRACTION (HCC): ICD-10-CM

## 2020-09-22 DIAGNOSIS — Z79.899 HIGH RISK MEDICATION USE: ICD-10-CM

## 2020-09-22 DIAGNOSIS — I50.9 HEART FAILURE, NYHA CLASS 3 (HCC): ICD-10-CM

## 2020-09-22 LAB
ALBUMIN SERPL BCP-MCNC: 3.7 G/DL (ref 3.2–4.9)
ALBUMIN/GLOB SERPL: 1.3 G/DL
ALP SERPL-CCNC: 75 U/L (ref 30–99)
ALT SERPL-CCNC: 10 U/L (ref 2–50)
ANION GAP SERPL CALC-SCNC: 13 MMOL/L (ref 7–16)
ANION GAP SERPL CALC-SCNC: 15 MMOL/L (ref 7–16)
APPEARANCE UR: CLEAR
AST SERPL-CCNC: 14 U/L (ref 12–45)
BASOPHILS # BLD AUTO: 1.3 % (ref 0–1.8)
BASOPHILS # BLD: 0.06 K/UL (ref 0–0.12)
BILIRUB SERPL-MCNC: 0.3 MG/DL (ref 0.1–1.5)
BILIRUB UR QL STRIP.AUTO: NEGATIVE
BUN SERPL-MCNC: 37 MG/DL (ref 8–22)
BUN SERPL-MCNC: 37 MG/DL (ref 8–22)
CALCIUM SERPL-MCNC: 9.8 MG/DL (ref 8.5–10.5)
CALCIUM SERPL-MCNC: 9.9 MG/DL (ref 8.5–10.5)
CHLORIDE SERPL-SCNC: 98 MMOL/L (ref 96–112)
CHLORIDE SERPL-SCNC: 99 MMOL/L (ref 96–112)
CHOLEST SERPL-MCNC: 119 MG/DL (ref 100–199)
CO2 SERPL-SCNC: 25 MMOL/L (ref 20–33)
CO2 SERPL-SCNC: 25 MMOL/L (ref 20–33)
COLOR UR: YELLOW
CREAT SERPL-MCNC: 1.1 MG/DL (ref 0.5–1.4)
CREAT SERPL-MCNC: 1.11 MG/DL (ref 0.5–1.4)
EOSINOPHIL # BLD AUTO: 0.17 K/UL (ref 0–0.51)
EOSINOPHIL NFR BLD: 3.7 % (ref 0–6.9)
ERYTHROCYTE [DISTWIDTH] IN BLOOD BY AUTOMATED COUNT: 59.6 FL (ref 35.9–50)
EST. AVERAGE GLUCOSE BLD GHB EST-MCNC: 126 MG/DL
FASTING STATUS PATIENT QL REPORTED: NORMAL
FOLATE SERPL-MCNC: 7.1 NG/ML
GLOBULIN SER CALC-MCNC: 2.9 G/DL (ref 1.9–3.5)
GLUCOSE SERPL-MCNC: 102 MG/DL (ref 65–99)
GLUCOSE SERPL-MCNC: 102 MG/DL (ref 65–99)
GLUCOSE UR STRIP.AUTO-MCNC: NEGATIVE MG/DL
HBA1C MFR BLD: 6 % (ref 0–5.6)
HCT VFR BLD AUTO: 36.5 % (ref 37–47)
HDLC SERPL-MCNC: 45 MG/DL
HGB BLD-MCNC: 11.2 G/DL (ref 12–16)
IMM GRANULOCYTES # BLD AUTO: 0.02 K/UL (ref 0–0.11)
IMM GRANULOCYTES NFR BLD AUTO: 0.4 % (ref 0–0.9)
KETONES UR STRIP.AUTO-MCNC: NEGATIVE MG/DL
LDLC SERPL CALC-MCNC: 50 MG/DL
LEUKOCYTE ESTERASE UR QL STRIP.AUTO: NEGATIVE
LYMPHOCYTES # BLD AUTO: 1.78 K/UL (ref 1–4.8)
LYMPHOCYTES NFR BLD: 38.9 % (ref 22–41)
MCH RBC QN AUTO: 25.4 PG (ref 27–33)
MCHC RBC AUTO-ENTMCNC: 30.7 G/DL (ref 33.6–35)
MCV RBC AUTO: 82.8 FL (ref 81.4–97.8)
MICRO URNS: NORMAL
MONOCYTES # BLD AUTO: 0.35 K/UL (ref 0–0.85)
MONOCYTES NFR BLD AUTO: 7.7 % (ref 0–13.4)
NEUTROPHILS # BLD AUTO: 2.19 K/UL (ref 2–7.15)
NEUTROPHILS NFR BLD: 48 % (ref 44–72)
NITRITE UR QL STRIP.AUTO: NEGATIVE
NRBC # BLD AUTO: 0 K/UL
NRBC BLD-RTO: 0 /100 WBC
PH UR STRIP.AUTO: 5 [PH] (ref 5–8)
PLATELET # BLD AUTO: 258 K/UL (ref 164–446)
PMV BLD AUTO: 9.8 FL (ref 9–12.9)
POTASSIUM SERPL-SCNC: 3.6 MMOL/L (ref 3.6–5.5)
POTASSIUM SERPL-SCNC: 3.6 MMOL/L (ref 3.6–5.5)
PROT SERPL-MCNC: 6.6 G/DL (ref 6–8.2)
PROT UR QL STRIP: NEGATIVE MG/DL
RBC # BLD AUTO: 4.41 M/UL (ref 4.2–5.4)
RBC UR QL AUTO: NEGATIVE
SODIUM SERPL-SCNC: 137 MMOL/L (ref 135–145)
SODIUM SERPL-SCNC: 138 MMOL/L (ref 135–145)
SP GR UR STRIP.AUTO: 1.01
T3 SERPL-MCNC: 109 NG/DL (ref 60–181)
T3FREE SERPL-MCNC: 2.39 PG/ML (ref 2–4.4)
TRIGL SERPL-MCNC: 118 MG/DL (ref 0–149)
TSH SERPL DL<=0.005 MIU/L-ACNC: 0.57 UIU/ML (ref 0.38–5.33)
UROBILINOGEN UR STRIP.AUTO-MCNC: 0.2 MG/DL
VIT B12 SERPL-MCNC: 382 PG/ML (ref 211–911)
WBC # BLD AUTO: 4.6 K/UL (ref 4.8–10.8)

## 2020-09-22 PROCEDURE — 80053 COMPREHEN METABOLIC PANEL: CPT

## 2020-09-22 PROCEDURE — 84480 ASSAY TRIIODOTHYRONINE (T3): CPT

## 2020-09-22 PROCEDURE — 84207 ASSAY OF VITAMIN B-6: CPT

## 2020-09-22 PROCEDURE — 83036 HEMOGLOBIN GLYCOSYLATED A1C: CPT

## 2020-09-22 PROCEDURE — 82746 ASSAY OF FOLIC ACID SERUM: CPT

## 2020-09-22 PROCEDURE — 81003 URINALYSIS AUTO W/O SCOPE: CPT

## 2020-09-22 PROCEDURE — 85025 COMPLETE CBC W/AUTO DIFF WBC: CPT

## 2020-09-22 PROCEDURE — 84425 ASSAY OF VITAMIN B-1: CPT

## 2020-09-22 PROCEDURE — 84439 ASSAY OF FREE THYROXINE: CPT

## 2020-09-22 PROCEDURE — 82652 VIT D 1 25-DIHYDROXY: CPT

## 2020-09-22 PROCEDURE — 36415 COLL VENOUS BLD VENIPUNCTURE: CPT

## 2020-09-22 PROCEDURE — 84443 ASSAY THYROID STIM HORMONE: CPT

## 2020-09-22 PROCEDURE — 84481 FREE ASSAY (FT-3): CPT

## 2020-09-22 PROCEDURE — 82607 VITAMIN B-12: CPT

## 2020-09-22 PROCEDURE — 80048 BASIC METABOLIC PNL TOTAL CA: CPT

## 2020-09-22 PROCEDURE — 80061 LIPID PANEL: CPT

## 2020-09-23 ENCOUNTER — OFFICE VISIT (OUTPATIENT)
Dept: WOUND CARE | Facility: MEDICAL CENTER | Age: 70
End: 2020-09-23
Attending: NURSE PRACTITIONER
Payer: MEDICARE

## 2020-09-23 VITALS
TEMPERATURE: 98.1 F | DIASTOLIC BLOOD PRESSURE: 65 MMHG | HEART RATE: 78 BPM | RESPIRATION RATE: 20 BRPM | SYSTOLIC BLOOD PRESSURE: 116 MMHG | OXYGEN SATURATION: 99 %

## 2020-09-23 DIAGNOSIS — Z79.01 CHRONIC ANTICOAGULATION: ICD-10-CM

## 2020-09-23 DIAGNOSIS — L08.9 WOUND INFECTION: ICD-10-CM

## 2020-09-23 DIAGNOSIS — T14.8XXA WOUND INFECTION: ICD-10-CM

## 2020-09-23 DIAGNOSIS — I50.32 CHRONIC DIASTOLIC HEART FAILURE (HCC): ICD-10-CM

## 2020-09-23 DIAGNOSIS — T14.8XXA OPEN WOUND: Primary | ICD-10-CM

## 2020-09-23 DIAGNOSIS — R60.0 BILATERAL LOWER EXTREMITY EDEMA: ICD-10-CM

## 2020-09-23 PROCEDURE — 11042 DBRDMT SUBQ TIS 1ST 20SQCM/<: CPT

## 2020-09-23 PROCEDURE — 29580 STRAPPING UNNA BOOT: CPT | Mod: 50,XU | Performed by: NURSE PRACTITIONER

## 2020-09-23 PROCEDURE — 11042 DBRDMT SUBQ TIS 1ST 20SQCM/<: CPT | Performed by: NURSE PRACTITIONER

## 2020-09-23 ASSESSMENT — ENCOUNTER SYMPTOMS
SHORTNESS OF BREATH: 0
VOMITING: 0
CONSTIPATION: 0
PALPITATIONS: 0
HEADACHES: 0
NAUSEA: 0
WHEEZING: 0
DOUBLE VISION: 0
COUGH: 0
DIARRHEA: 0
BLURRED VISION: 0
CHILLS: 1
DIZZINESS: 0
FEVER: 0
WEAKNESS: 0

## 2020-09-23 ASSESSMENT — PAIN SCALES - GENERAL: PAINLEVEL: NO PAIN

## 2020-09-23 NOTE — PROGRESS NOTES
Records request (for venous ultrasound and ablation report from 2019) faxed to [facility] at fax #[phone].

## 2020-09-23 NOTE — PROGRESS NOTES
Provider Encounter- Full Thickness wound    HISTORY OF PRESENT ILLNESS  Wound History:    START OF CARE IN CLINIC: 9/16/2020    REFERRING PROVIDER: Vikash Hannon     WOUND- Full Thickness Wound   LOCATION: Bilateral lower extremities   HISTORY: Patient presented to [facility] urgent care on 9/11/2020 for possible right lower extremity infection.  Patient has a history significant for congestive heart failure with the cardiac mems implantation, longstanding atrial fibrillation, patient is on Coumadin currently.  Patient also positive for chronic venous insufficiency and obesity.  Patient has had problems with significant bilateral lower extremity edema she has had her diuretics adjusted.  She however developed a large blister to the right lower extremity that opened on approximately 9/4/2020 the wounds are not painful.  Patient reports that she has had some chills and increased erythema to right lower extremity.  Patient referred to [facility] for care of open wounds to bilateral lower extremities.    Pertinent Medical History: Diastolic congestive heart failure, obesity, hypothyroidism, hypertension, atrial fibrillation, diabetes mellitus, dyslipidemia, venous insufficiency, anemia    TOBACCO USE: Never smoked or use smokeless tobacco    Patient's problem list, allergies, and current medications reviewed and updated in Epic    Interval History:  9/16/2020: Clinic visit with RADHA Villasenor. Patient states that they are feeling well today.  Patient denies fever, chills, nausea, vomiting, lightheadedness, dizziness, shortness of breath and chest pain.  Pertinent positives include 3+ bilateral lower extremity edema, right lower extremity erythema with pain to palpation.  Open wounds to bilateral lower extremities    9/23/2020: Clinic visit with RADHA Villasenor. Patient states that they are feeling well today.  Patient denies fever, chills, nausea, vomiting, lightheadedness, dizziness, shortness of  breath and chest pain.  Patient continues to have a significant amount of bilateral lower extremity edema patient reports she takes approximately 80 mg of Lasix daily depending on the day.  Patient has some weeping through small open wounds to left lower extremity.  Patient is in need of increased compression we will initiate Unna boot in clinic today.        REVIEW OF SYSTEMS:   Review of Systems   Constitutional: Positive for chills. Negative for fever and malaise/fatigue.   Eyes: Negative for blurred vision and double vision.   Respiratory: Negative for cough, shortness of breath and wheezing.    Cardiovascular: Positive for leg swelling. Negative for chest pain and palpitations.   Gastrointestinal: Negative for constipation, diarrhea, nausea and vomiting.   Skin: Negative for itching and rash.        Open blisters to bilateral lower extremities  Erythema to right lower extremity   Neurological: Negative for dizziness, weakness and headaches.       PHYSICAL EXAMINATION:   /65 (BP Location: Right arm, Patient Position: Sitting)   Pulse 78   Temp 36.7 °C (98.1 °F) (Temporal)   Resp 20   LMP 01/05/1987 (Approximate)   SpO2 99%     Physical Exam   Constitutional: She is oriented to person, place, and time and well-developed, well-nourished, and in no distress.   HENT:   Head: Normocephalic and atraumatic.   Eyes: Pupils are equal, round, and reactive to light. Conjunctivae are normal.   Cardiovascular:   Irregularly irregular rhythm palpable to DP pulses biphasic pulses heard to DP and PT to bilateral lower extremities through use of Doppler   Neurological: She is alert and oriented to person, place, and time.   Skin: Skin is warm. No rash noted. There is erythema. No pallor.   With few scattered weeping wounds to left lower extremity  Open wound to right lower extremity  Erythema to bilateral lower extremities  See doc flowsheets and photos   Psychiatric: Mood and affect normal.       WOUND ASSESSMENT      Wound 09/16/20 Pretibial Right anterior lower leg (Active)   Wound Image    09/23/20 1100   Site Assessment Red;Yellow 09/23/20 1100   Periwound Assessment Edema 09/23/20 1100   Margins Attached edges 09/23/20 1100   Drainage Amount Moderate 09/23/20 1100   Drainage Description Serosanguineous 09/23/20 1100   Treatments Cleansed;Topical Lidocaine;Provider debridement 09/23/20 1100   Wound Cleansing Normal Saline Irrigation 09/23/20 1100   Periwound Protectant Barrier Paste 09/23/20 1100   Dressing Cleansing/Solutions Not Applicable 09/16/20 0830   Dressing Options Hydrofiber Silver;Unna Boot 09/23/20 1100   Dressing Changed New 09/16/20 0830   Dressing Status Clean;Dry;Intact 09/16/20 0830   Dressing Change/Treatment Frequency Every 72 hrs, and As Needed 09/16/20 0830   Non-staged Wound Description Full thickness 09/23/20 1100   Wound Length (cm) 3.2 cm 09/23/20 1100   Wound Width (cm) 4.2 cm 09/23/20 1100   Wound Depth (cm) 0.1 cm 09/23/20 1100   Wound Surface Area (cm^2) 13.44 cm^2 09/23/20 1100   Wound Volume (cm^3) 1.34 cm^3 09/23/20 1100   Post-Procedure Length (cm) 3.2 cm 09/23/20 1100   Post-Procedure Width (cm) 4.4 cm 09/23/20 1100   Post-Procedure Depth (cm) 0.1 cm 09/23/20 1100   Post-Procedure Surface Area (cm^2) 14.08 cm^2 09/23/20 1100   Post-Procedure Volume (cm^3) 1.41 cm^3 09/23/20 1100   Tunneling (cm) 0 cm 09/23/20 1100   Undermining (cm) 0 cm 09/23/20 1100   Shape Circular 09/16/20 0830   Wound Odor None 09/23/20 1100   Pulses 1+;Right;DP;PT;Doppler 09/16/20 0830   Exposed Structures None 09/23/20 1100       Wound 09/16/20 Pretibial Left anterior superior lower leg (Active)   Wound Image   09/23/20 1100   Site Assessment Red 09/23/20 1100   Periwound Assessment Edema 09/23/20 1100   Margins Attached edges 09/23/20 1100   Drainage Amount Moderate 09/23/20 1100   Drainage Description Serosanguineous 09/23/20 1100   Treatments Cleansed;Topical Lidocaine;Provider debridement 09/23/20 1100   Wound  Cleansing Normal Saline Irrigation 09/23/20 1100   Periwound Protectant Barrier Paste 09/23/20 1100   Dressing Cleansing/Solutions Not Applicable 09/16/20 0830   Dressing Options Hydrofiber Silver;Unna Boot 09/23/20 1100   Dressing Changed New 09/16/20 0830   Dressing Status Clean;Dry;Intact 09/16/20 0830   Dressing Change/Treatment Frequency Every 72 hrs, and As Needed 09/16/20 0830   Non-staged Wound Description Full thickness 09/23/20 1100   Wound Length (cm) 0.2 cm 09/23/20 1100   Wound Width (cm) 0.2 cm 09/23/20 1100   Wound Depth (cm) 0 cm 09/23/20 1100   Wound Surface Area (cm^2) 0.04 cm^2 09/23/20 1100   Wound Volume (cm^3) 0 cm^3 09/23/20 1100   Post-Procedure Length (cm) 0.8 cm 09/16/20 0830   Post-Procedure Width (cm) 0.6 cm 09/16/20 0830   Post-Procedure Depth (cm) 0 cm 09/16/20 0830   Post-Procedure Surface Area (cm^2) 0.48 cm^2 09/16/20 0830   Post-Procedure Volume (cm^3) 0 cm^3 09/16/20 0830   Tunneling (cm) 0 cm 09/23/20 1100   Undermining (cm) 0 cm 09/23/20 1100   Wound Odor None 09/23/20 1100   Pulses 2+;Left;DP;PT;Doppler 09/16/20 0830   Exposed Structures None 09/23/20 1100       Wound 09/16/20 Pretibial Left anterior lower leg inferior (Active)   Wound Image    09/16/20 0830   Site Assessment Dry;[place] 09/23/20 1100   Periwound Assessment Edema 09/23/20 1100   Margins Attached edges 09/23/20 1100   Drainage Amount None 09/23/20 1100   Drainage Description Serosanguineous 09/16/20 0830   Treatments Cleansed 09/23/20 1100   Wound Cleansing Normal Saline Irrigation 09/16/20 0830   Periwound Protectant Barrier Paste 09/23/20 1100   Dressing Cleansing/Solutions Not Applicable 09/16/20 0830   Dressing Options Hydrofiber Silver;Unna Boot 09/23/20 1100   Dressing Changed New 09/16/20 0830   Dressing Status Clean;Dry;Intact 09/16/20 0830   Dressing Change/Treatment Frequency Every 72 hrs, and As Needed 09/16/20 0830   Non-staged Wound Description Partial thickness 09/16/20 0830   Wound Length (cm) 0.3 cm  09/16/20 0830   Wound Width (cm) 0.5 cm 09/16/20 0830   Wound Depth (cm) 0 cm 09/16/20 0830   Wound Surface Area (cm^2) 0.15 cm^2 09/16/20 0830   Wound Volume (cm^3) 0 cm^3 09/16/20 0830   Post-Procedure Length (cm) 0.5 cm 09/16/20 0830   Post-Procedure Width (cm) 0.6 cm 09/16/20 0830   Post-Procedure Depth (cm) 0 cm 09/16/20 0830   Post-Procedure Surface Area (cm^2) 0.3 cm^2 09/16/20 0830   Post-Procedure Volume (cm^3) 0 cm^3 09/16/20 0830   Tunneling (cm) 0 cm 09/23/20 1100   Undermining (cm) 0 cm 09/23/20 1100   Wound Odor None 09/23/20 1100   Pulses 1+;Left;DP;PT;Doppler 09/16/20 0830   Exposed Structures None 09/23/20 1100        PROCEDURE:   -2% viscous lidocaine applied topically to wound bed for approximately 5 minutes prior to debridement  -Curette used to debride wound bed.  Excisional debridement was performed to remove devitalized tissue until healthy, bleeding tissue was visualized.   Entire surface of wound, 14.08 cm2 debrided.  Tissue debrided into the subcutaneous layer.    -Bleeding controlled with manual pressure.    -Wound care completed by wound RN, refer to flowsheet  -Patient tolerated the procedure well, without c/o pain or discomfort.       Pertinent Labs and Diagnostics:    Labs:     A1c:   Lab Results   Component Value Date/Time    HBA1C 6.0 (H) 09/22/2020 12:30 PM          IMAGING: N/A    VASCULAR STUDIES: N/A    LAST  WOUND CULTURE:  DATE : N/A            ASSESSMENT AND PLAN:     1. Wound infection  Comments: Patient with right lower extremity erythema (improved), edema.  Patient was given mupirocin at the urgent care however I believe this is been insufficient and the patient's symptoms warrant oral antibiotic therapy.      2. Chronic diastolic heart failure (HCC)  Comments: Patient with a history of diastolic heart failure has implantable mems monitoring device.  -Significant 3+ bilateral pitting edema contributing to development of open wounds  - Increased compression with use of unna  boot to support skin integrity and promote fluid return to the heart.     3. Chronic anticoagulation  Complicating factor: Patient to discuss antibiotic therapy with Coumadin clinic and adjust dose of Coumadin as prescribed by the Coumadin clinic.    4. Bilateral lower extremity edema  Comments: Chronic bilateral lower extremity due to heart failure.  -Patient to elevate legs for at least 30 minutes 3 times a day patient reports that she has an adjustable bed that allows her to recline her feet.  -Patient reports she takes up to 80 mg of Lasix per day depending on the amount of fluid to bilateral lower extremities.    5.  Open wound  -Excisional debridement of right lower extremity wound only in clinic today, medically necessary to promote wound healing.  -Patient to return to clinic weekly for assessment and debridement  -Patient to to leave Unna boot in place unless the dressing becomes saturated at times she supposed to cut off the Unna boot in place cover dressing until next clinic visit.   Wound Care: Hydrofiber silver, Unna boot        PATIENT EDUCATION  - Importance of adequate nutrition for wound healing  -Advised to go to ER for any increased redness, swelling, drainage, or odor, or if patient develops fever, chills, nausea or vomiting.       Please note that this note may have been created using voice recognition software. I have worked with technical experts from [employer] to optimize the interface.  I have made every reasonable attempt to correct obvious errors, but there may be errors of grammar and possibly content that I did not discover before finalizing the note.    N

## 2020-09-23 NOTE — PATIENT INSTRUCTIONS
-Keep dressings clean, dry and covered while bathing. Only change dressings if they become over saturated, soiled or fall off.     -Avoid prolonged standing or sitting without elevating your legs.    -Remove your compression garments if you have severe pain, severe swelling, numbness, color change, or temperature change in your toes. If you need to remove your compression garments, do so by unrolling them. Do not cut the compression garments off, this is to prevent cutting yourself on accident.    -Should you experience any significant changes in your wound(s), such as infection (redness, swelling, localized heat, increased pain, fever > 101 F, chills) or have any questions regarding your home care instructions, please contact the wound center at [phone]. If after hours, contact your primary care physician or go to the hospital emergency room.

## 2020-09-25 LAB — 1,25(OH)2D3 SERPL-MCNC: 27.5 PG/ML (ref 19.9–79.3)

## 2020-09-27 LAB
T4 FREE SERPL DIALY-MCNC: 3.4 NG/DL (ref 1.1–2.4)
VIT B1 BLD-MCNC: 67 NMOL/L (ref 70–180)

## 2020-09-29 LAB — VIT B6 SERPL-MCNC: 8.8 NMOL/L (ref 20–125)

## 2020-09-30 ENCOUNTER — APPOINTMENT (OUTPATIENT)
Dept: VASCULAR LAB | Facility: MEDICAL CENTER | Age: 70
End: 2020-09-30
Attending: INTERNAL MEDICINE
Payer: MEDICARE

## 2020-10-01 ENCOUNTER — OFFICE VISIT (OUTPATIENT)
Dept: WOUND CARE | Facility: MEDICAL CENTER | Age: 70
End: 2020-10-01
Attending: NURSE PRACTITIONER
Payer: MEDICARE

## 2020-10-01 VITALS
RESPIRATION RATE: 20 BRPM | TEMPERATURE: 97.9 F | SYSTOLIC BLOOD PRESSURE: 111 MMHG | OXYGEN SATURATION: 97 % | HEART RATE: 82 BPM | DIASTOLIC BLOOD PRESSURE: 60 MMHG

## 2020-10-01 DIAGNOSIS — I50.32 CHRONIC DIASTOLIC HEART FAILURE (HCC): ICD-10-CM

## 2020-10-01 DIAGNOSIS — T14.8XXA WOUND INFECTION: ICD-10-CM

## 2020-10-01 DIAGNOSIS — Z79.01 CHRONIC ANTICOAGULATION: ICD-10-CM

## 2020-10-01 DIAGNOSIS — T14.8XXA OPEN WOUND: Primary | ICD-10-CM

## 2020-10-01 DIAGNOSIS — R60.0 BILATERAL LOWER EXTREMITY EDEMA: ICD-10-CM

## 2020-10-01 DIAGNOSIS — L08.9 WOUND INFECTION: ICD-10-CM

## 2020-10-01 PROCEDURE — 29580 STRAPPING UNNA BOOT: CPT

## 2020-10-01 PROCEDURE — 99215 OFFICE O/P EST HI 40 MIN: CPT | Mod: 25

## 2020-10-01 PROCEDURE — 99213 OFFICE O/P EST LOW 20 MIN: CPT | Performed by: NURSE PRACTITIONER

## 2020-10-01 ASSESSMENT — ENCOUNTER SYMPTOMS
BLURRED VISION: 0
WHEEZING: 0
NAUSEA: 0
PALPITATIONS: 0
DIZZINESS: 0
DIARRHEA: 0
FEVER: 0
DOUBLE VISION: 0
COUGH: 0
CONSTIPATION: 0
WEAKNESS: 0
CHILLS: 1
SHORTNESS OF BREATH: 0
HEADACHES: 0
VOMITING: 0

## 2020-10-01 ASSESSMENT — PAIN SCALES - GENERAL: PAINLEVEL: NO PAIN

## 2020-10-01 NOTE — PATIENT INSTRUCTIONS
Avoid prolonged standing or sitting without elevating your legs.  - Knee-high gradient compression both leg. Do not get wet and keep on for the week. Only remove if temperature or sensation changes.   If compression needs to be removed, un-wrap it do not cut it off.     Should you experience any significant changes in your wound(s), such as infection (redness, swelling, localized heat, increased pain, fever > 101 F, chills) or have any questions regarding your home care instructions, please contact the wound center at [phone]. If after hours, contact your primary care physician or go to the hospital emergency room.   Keep dressing clean, dry and covered while bathing. Only change dressing if it becomes over saturated, soiled or falls off.     Your compressions stockings will come from [employer]. Please wear them every day and apply first thing in the morning.

## 2020-10-01 NOTE — PROGRESS NOTES
Provider Encounter- Full Thickness wound    HISTORY OF PRESENT ILLNESS  Wound History:    START OF CARE IN CLINIC: 9/16/2020    REFERRING PROVIDER: Vikash Hannon     WOUND- Full Thickness Wound   LOCATION: Bilateral lower extremities   HISTORY: Patient presented to [place] urgent care on 9/11/2020 for possible right lower extremity infection.  Patient has a history significant for congestive heart failure with the cardiac mems implantation, longstanding atrial fibrillation, patient is on Coumadin currently.  Patient also positive for chronic venous insufficiency and obesity.  Patient has had problems with significant bilateral lower extremity edema she has had her diuretics adjusted.  She however developed a large blister to the right lower extremity that opened on approximately 9/4/2020 the wounds are not painful.  Patient reports that she has had some chills and increased erythema to right lower extremity.  Patient referred to [facility] for care of open wounds to bilateral lower extremities.    Pertinent Medical History: Diastolic congestive heart failure, obesity, hypothyroidism, hypertension, atrial fibrillation, diabetes mellitus, dyslipidemia, venous insufficiency, anemia    TOBACCO USE: Never smoked or use smokeless tobacco    Patient's problem list, allergies, and current medications reviewed and updated in Epic    Interval History:  9/16/2020: Clinic visit with RADHA Villasenor. Patient states that they are feeling well today.  Patient denies fever, chills, nausea, vomiting, lightheadedness, dizziness, shortness of breath and chest pain.  Pertinent positives include 3+ bilateral lower extremity edema, right lower extremity erythema with pain to palpation.  Open wounds to bilateral lower extremities    9/23/2020: Clinic visit with RADHA Villasenor. Patient states that they are feeling well today.  Patient denies fever, chills, nausea, vomiting, lightheadedness, dizziness, shortness of  breath and chest pain.  Patient continues to have a significant amount of bilateral lower extremity edema patient reports she takes approximately 80 mg of Lasix daily depending on the day.  Patient has some weeping through small open wounds to left lower extremity.  Patient is in need of increased compression we will initiate Unna boot in clinic today.    10/1/2020: Clinic visit with RADHA Villasenor. Patient states that they are feeling well today.  Patient denies fever, chills, nausea, vomiting, lightheadedness, dizziness, shortness of breath and chest pain.  Wounds have significantly decreased with the use of Unna boots patient is near 100% epithelialization.  We will continue current plan of care for another additional week with hopeful discharge next clinic appointment.        REVIEW OF SYSTEMS:   Review of Systems   Constitutional: Positive for chills. Negative for fever and malaise/fatigue.   Eyes: Negative for blurred vision and double vision.   Respiratory: Negative for cough, shortness of breath and wheezing.    Cardiovascular: Positive for leg swelling. Negative for chest pain and palpitations.   Gastrointestinal: Negative for constipation, diarrhea, nausea and vomiting.   Skin: Negative for itching and rash.        Open blisters to bilateral lower extremities  Erythema to right lower extremity   Neurological: Negative for dizziness, weakness and headaches.       PHYSICAL EXAMINATION:   /60 (BP Location: Left arm, Patient Position: Sitting)   Pulse 82   Temp 36.6 °C (97.9 °F) (Temporal)   Resp 20   LMP 01/05/1987 (Approximate)   SpO2 97%     Physical Exam   Constitutional: She is oriented to person, place, and time and well-developed, well-nourished, and in no distress.   HENT:   Head: Normocephalic and atraumatic.   Eyes: Pupils are equal, round, and reactive to light. Conjunctivae are normal.   Cardiovascular:   Irregularly irregular rhythm palpable to DP pulses biphasic pulses heard to  DP and PT to bilateral lower extremities through use of Doppler   Neurological: She is alert and oriented to person, place, and time.   Skin: Skin is warm. No rash noted. There is erythema. No pallor.   With few scattered weeping wounds to left lower extremity  Open wound to right lower extremity  Erythema to bilateral lower extremities  See doc flowsheets and photos   Psychiatric: Mood and affect normal.       WOUND ASSESSMENT     Wound 09/16/20 Pretibial Right anterior lower leg (Active)   Wound Image   10/01/20 0948   Site Assessment Red;Pink;Granulation tissue;Epithelialization 10/01/20 0948   Periwound Assessment Edema;Blanchable erythema 10/01/20 0948   Margins Attached edges 10/01/20 0948   Closure Secondary intention 10/01/20 0948   Drainage Amount Small 10/01/20 0948   Drainage Description Serosanguineous 10/01/20 0948   Treatments Cleansed 10/01/20 0948   Wound Cleansing Normal Saline Irrigation 10/01/20 0948   Periwound Protectant Barrier Paste 10/01/20 0948   Dressing Cleansing/Solutions Not Applicable 10/01/20 0948   Dressing Options Unna Boot;Viscopaste 10/01/20 0948   Dressing Changed Changed 10/01/20 0948   Dressing Status Clean;Dry;Intact 10/01/20 0948   Dressing Change/Treatment Frequency Weekly, and As Needed 10/01/20 0948   Non-staged Wound Description Partial thickness 10/01/20 0948   Wound Length (cm) 0.4 cm 10/01/20 0948   Wound Width (cm) 3.5 cm 10/01/20 0948   Wound Depth (cm) 0.1 cm 10/01/20 0948   Wound Surface Area (cm^2) 1.4 cm^2 10/01/20 0948   Wound Volume (cm^3) 0.14 cm^3 10/01/20 0948   Post-Procedure Length (cm) 0.4 cm 10/01/20 0948   Post-Procedure Width (cm) 3.5 cm 10/01/20 0948   Post-Procedure Depth (cm) 0.1 cm 10/01/20 0948   Post-Procedure Surface Area (cm^2) 1.4 cm^2 10/01/20 0948   Post-Procedure Volume (cm^3) 0.14 cm^3 10/01/20 0948   Wound Bed Granulation (%) 100 % 10/01/20 0948   Wound Bed Granulation (%) - Post-Procedure 100 % 10/01/20 0948   Tunneling (cm) 0 cm 10/01/20  0948   Undermining (cm) 0 cm 10/01/20 0948   Shape Circular 09/16/20 0830   Wound Odor None 10/01/20 0948   Pulses 1+;Right;DP;PT;Doppler 09/16/20 0830   Exposed Structures None 10/01/20 0948       Wound 09/16/20 Pretibial Left anterior superior lower leg (Active)   Wound Image   10/01/20 0948   Site Assessment Pink 10/01/20 0948   Periwound Assessment Edema 10/01/20 0948   Margins Attached edges 10/01/20 0948   Closure Secondary intention 10/01/20 0948   Drainage Amount None 10/01/20 0948   Drainage Description Serosanguineous 09/23/20 1100   Treatments Cleansed 10/01/20 0948   Wound Cleansing Normal Saline Irrigation 10/01/20 0948   Periwound Protectant Barrier Paste 10/01/20 0948   Dressing Cleansing/Solutions Not Applicable 10/01/20 0948   Dressing Options Unna Boot;Viscopaste 10/01/20 0948   Dressing Changed Changed 10/01/20 0948   Dressing Status Clean;Dry;Intact 10/01/20 0948   Dressing Change/Treatment Frequency Weekly, and As Needed 10/01/20 0948   Non-staged Wound Description Not applicable 10/01/20 0948   Wound Length (cm) 0.2 cm 09/23/20 1100   Wound Width (cm) 0.2 cm 09/23/20 1100   Wound Depth (cm) 0 cm 09/23/20 1100   Wound Surface Area (cm^2) 0.04 cm^2 09/23/20 1100   Wound Volume (cm^3) 0 cm^3 09/23/20 1100   Post-Procedure Length (cm) 0.8 cm 09/16/20 0830   Post-Procedure Width (cm) 0.6 cm 09/16/20 0830   Post-Procedure Depth (cm) 0 cm 09/16/20 0830   Post-Procedure Surface Area (cm^2) 0.48 cm^2 09/16/20 0830   Post-Procedure Volume (cm^3) 0 cm^3 09/16/20 0830   Wound Bed Epithelium (%) 100 % 10/01/20 0948   Wound Bed Epithelium % - (Post-Procedure) 100 % 10/01/20 0948   Tunneling (cm) 0 cm 10/01/20 0948   Undermining (cm) 0 cm 10/01/20 0948   Wound Odor None 10/01/20 0948   Pulses 2+;Left;DP;PT;Doppler 09/16/20 0830   Exposed Structures None 10/01/20 0948       Wound 09/16/20 Pretibial Left anterior lower leg inferior (Active)   Wound Image   10/01/20 0948   Site Assessment Pink 10/01/20 0948    Periwound Assessment Edema 10/01/20 0948   Margins Attached edges 10/01/20 0948   Closure Secondary intention 10/01/20 0948   Drainage Amount None 10/01/20 0948   Drainage Description Serosanguineous 09/16/20 0830   Treatments Cleansed 10/01/20 0948   Wound Cleansing Normal Saline Irrigation 10/01/20 0948   Periwound Protectant Barrier Paste 10/01/20 0948   Dressing Cleansing/Solutions Not Applicable 09/16/20 0830   Dressing Options Unna Boot;Viscopaste 10/01/20 0948   Dressing Changed Changed 10/01/20 0948   Dressing Status Dry;Clean;Intact 10/01/20 0948   Dressing Change/Treatment Frequency Weekly, and As Needed 10/01/20 0948   Non-staged Wound Description Not applicable 10/01/20 0948   Wound Length (cm) 0.3 cm 09/16/20 0830   Wound Width (cm) 0.5 cm 09/16/20 0830   Wound Depth (cm) 0 cm 09/16/20 0830   Wound Surface Area (cm^2) 0.15 cm^2 09/16/20 0830   Wound Volume (cm^3) 0 cm^3 09/16/20 0830   Post-Procedure Length (cm) 0.5 cm 09/16/20 0830   Post-Procedure Width (cm) 0.6 cm 09/16/20 0830   Post-Procedure Depth (cm) 0 cm 09/16/20 0830   Post-Procedure Surface Area (cm^2) 0.3 cm^2 09/16/20 0830   Post-Procedure Volume (cm^3) 0 cm^3 09/16/20 0830   Wound Bed Epithelium (%) 100 % 10/01/20 0948   Wound Bed Epithelium % - (Post-Procedure) 100 % 10/01/20 0948   Tunneling (cm) 0 cm 10/01/20 0948   Undermining (cm) 0 cm 10/01/20 0948   Wound Odor None 10/01/20 0948   Pulses 1+;Left;DP;PT;Doppler 09/16/20 0830   Exposed Structures None 10/01/20 0948          PROCEDURE:   Excisional debridement not necessary in clinic today patient's wounds are superficial without exudate, pus or slough.  Patient to maintain Unna boot to bilateral lower extremities for approximately 1 more week.  Hopeful that tissue will 100% epithelialized by next clinic appointment will be able to discharge patient in [facility] at that time.      Pertinent Labs and Diagnostics:    Labs:     A1c:   Lab Results   Component Value Date/Time    HBA1C 6.0 (H)  09/22/2020 12:30 PM          IMAGING: N/A    VASCULAR STUDIES: N/A    LAST  WOUND CULTURE:  DATE : N/A            ASSESSMENT AND PLAN:     1. Wound infection  -No signs or symptoms infection at this clinic visit  -Continue to monitor use additional clinic for signs symptoms of new developing infection    2. Chronic diastolic heart failure (HCC)  Comments: Patient with a history of diastolic heart failure has implantable mems monitoring device.  -Significant 3+ bilateral pitting edema contributing to development of open wounds  - Increased compression with use of unna boot to support skin integrity and promote fluid return to the heart.     3. Chronic anticoagulation  Patient's last INR was 3.6 on 9/9/2020    4. Bilateral lower extremity edema  Comments: Chronic bilateral lower extremity edema due to heart failure.  -Patient to elevate legs for at least 30 minutes 3 times a day patient reports that she has an adjustable bed that allows her to recline her feet.  -Patient reports she takes up to 80 mg of Lasix per day depending on the amount of fluid to bilateral lower extremities.  -Bilateral lower extremity Unna boots to help with compression and return of venous flow back to the heart.    5.  Open wound  -Excisional debridement not necessary in clinic today.  -Patient to return to clinic weekly for assessment and debridement  -Patient to to leave Unna boot in place unless the dressing becomes saturated.  If dressing becomes saturated patient is to remove and place cover dressings until next clinic visit.   Wound Care: Hydrofiber silver, Unna boot        PATIENT EDUCATION  - Importance of adequate nutrition for wound healing  -Advised to go to ER for any increased redness, swelling, drainage, or odor, or if patient develops fever, chills, nausea or vomiting.     > 15 min spent face to face with patient, >50% of time spent counseling, coordinating care, reviewing records, discussing POC, educating patient      Please  note that this note may have been created using voice recognition software. I have worked with technical experts from [facility], [facility] to optimize the interface.  I have made every reasonable attempt to correct obvious errors, but there may be errors of grammar and possibly content that I did not discover before finalizing the note.    N

## 2020-10-07 ENCOUNTER — OFFICE VISIT (OUTPATIENT)
Dept: WOUND CARE | Facility: MEDICAL CENTER | Age: 70
End: 2020-10-07
Attending: NURSE PRACTITIONER
Payer: MEDICARE

## 2020-10-07 VITALS
SYSTOLIC BLOOD PRESSURE: 129 MMHG | TEMPERATURE: 97.9 F | HEART RATE: 97 BPM | OXYGEN SATURATION: 94 % | RESPIRATION RATE: 20 BRPM | DIASTOLIC BLOOD PRESSURE: 60 MMHG

## 2020-10-07 DIAGNOSIS — I50.32 CHRONIC DIASTOLIC HEART FAILURE (HCC): ICD-10-CM

## 2020-10-07 DIAGNOSIS — R60.0 BILATERAL LOWER EXTREMITY EDEMA: ICD-10-CM

## 2020-10-07 DIAGNOSIS — T14.8XXA OPEN WOUND: Primary | ICD-10-CM

## 2020-10-07 DIAGNOSIS — Z79.01 CHRONIC ANTICOAGULATION: ICD-10-CM

## 2020-10-07 DIAGNOSIS — T14.8XXA WOUND INFECTION: ICD-10-CM

## 2020-10-07 DIAGNOSIS — L08.9 WOUND INFECTION: ICD-10-CM

## 2020-10-07 PROCEDURE — 99213 OFFICE O/P EST LOW 20 MIN: CPT

## 2020-10-07 PROCEDURE — 99213 OFFICE O/P EST LOW 20 MIN: CPT | Performed by: NURSE PRACTITIONER

## 2020-10-07 ASSESSMENT — ENCOUNTER SYMPTOMS
DIZZINESS: 0
HEADACHES: 0
WEAKNESS: 0
PALPITATIONS: 0
DIARRHEA: 0
WHEEZING: 0
FEVER: 0
COUGH: 0
VOMITING: 0
NAUSEA: 0
BLURRED VISION: 0
CHILLS: 0
DOUBLE VISION: 0
CONSTIPATION: 0
SHORTNESS OF BREATH: 0

## 2020-10-07 NOTE — PROGRESS NOTES
Provider Encounter- Full Thickness wound    HISTORY OF PRESENT ILLNESS  Wound History:    START OF CARE IN CLINIC: 9/16/2020    REFERRING PROVIDER: Vikash Hannon     WOUND- Full Thickness Wound   LOCATION: Bilateral lower extremities   HISTORY: Patient presented to [place] urgent care on 9/11/2020 for possible right lower extremity infection.  Patient has a history significant for congestive heart failure with the cardiac mems implantation, longstanding atrial fibrillation, patient is on Coumadin currently.  Patient also positive for chronic venous insufficiency and obesity.  Patient has had problems with significant bilateral lower extremity edema she has had her diuretics adjusted.  She however developed a large blister to the right lower extremity that opened on approximately 9/4/2020 the wounds are not painful.  Patient reports that she has had some chills and increased erythema to right lower extremity.  Patient referred to [facility] for care of open wounds to bilateral lower extremities.    Pertinent Medical History: Diastolic congestive heart failure, obesity, hypothyroidism, hypertension, atrial fibrillation, diabetes mellitus, dyslipidemia, venous insufficiency, anemia    TOBACCO USE: Never smoked or use smokeless tobacco    Patient's problem list, allergies, and current medications reviewed and updated in Epic    Interval History:  9/16/2020: Clinic visit with RADHA Villasenor. Patient states that they are feeling well today.  Patient denies fever, chills, nausea, vomiting, lightheadedness, dizziness, shortness of breath and chest pain.  Pertinent positives include 3+ bilateral lower extremity edema, right lower extremity erythema with pain to palpation.  Open wounds to bilateral lower extremities    9/23/2020: Clinic visit with RADHA Villasenor. Patient states that they are feeling well today.  Patient denies fever, chills, nausea, vomiting, lightheadedness, dizziness, shortness of  breath and chest pain.  Patient continues to have a significant amount of bilateral lower extremity edema patient reports she takes approximately 80 mg of Lasix daily depending on the day.  Patient has some weeping through small open wounds to left lower extremity.  Patient is in need of increased compression we will initiate Unna boot in clinic today.    10/1/2020: Clinic visit with RADHA Villasenor. Patient states that they are feeling well today.  Patient denies fever, chills, nausea, vomiting, lightheadedness, dizziness, shortness of breath and chest pain.  Wounds have significantly decreased with the use of Unna boots patient is near 100% epithelialization.  We will continue current plan of care for another additional week with hopeful discharge next clinic appointment.    10/7/2020: Clinic visit with RADHA Villasenor. Patient states that they are feeling well today.  Patient denies fever, chills, nausea, vomiting, lightheadedness, dizziness, shortness of breath and chest pain.  Patient's wounds have 100% epithelialized patient be discharged in [facility] at this time.          REVIEW OF SYSTEMS:   Review of Systems   Constitutional: Negative for chills, fever and malaise/fatigue.   Eyes: Negative for blurred vision and double vision.   Respiratory: Negative for cough, shortness of breath and wheezing.    Cardiovascular: Positive for leg swelling. Negative for chest pain and palpitations.   Gastrointestinal: Negative for constipation, diarrhea, nausea and vomiting.   Skin: Negative for itching and rash.        No open wounds bilateral lower extremities   Neurological: Negative for dizziness, weakness and headaches.       PHYSICAL EXAMINATION:   /60 (BP Location: Left arm)   Pulse 97   Temp 36.6 °C (97.9 °F) (Temporal)   Resp 20   LMP 01/05/1987 (Approximate)   SpO2 94%     Physical Exam   Constitutional: She is oriented to person, place, and time and well-developed, well-nourished, and in no  distress.   HENT:   Head: Normocephalic and atraumatic.   Eyes: Pupils are equal, round, and reactive to light. Conjunctivae are normal.   Cardiovascular:   Irregularly irregular rhythm palpable to DP pulses biphasic pulses heard to DP and PT to bilateral lower extremities through use of Doppler   Neurological: She is alert and oriented to person, place, and time.   Skin: Skin is warm. No rash noted. No erythema. No pallor.   No open wounds at this time See doc flowsheets and photos   Psychiatric: Mood and affect normal.       WOUND ASSESSMENT      Wound 09/16/20 Pretibial Right anterior lower leg (Active)   Wound Image   10/07/20 1045   Site Assessment Epithelialization 10/07/20 1045   Periwound Assessment Edema;Blanchable erythema 10/07/20 1045   Margins Attached edges 10/07/20 1045   Closure Secondary intention 10/07/20 1045   Drainage Amount None 10/07/20 1045   Drainage Description Serosanguineous 10/01/20 0948   Treatments Cleansed 10/07/20 1045   Wound Cleansing Foam Cleanser/Washcloth 10/07/20 1045   Periwound Protectant Barrier Paste 10/01/20 0948   Dressing Cleansing/Solutions Not Applicable 10/07/20 1045   Dressing Options Other (Comments) 10/07/20 1045   Dressing Changed New 10/07/20 1045   Dressing Status Clean;Dry;Intact 10/07/20 1045   Dressing Change/Treatment Frequency Weekly, and As Needed 10/07/20 1045   Non-staged Wound Description Partial thickness 10/07/20 1045   Wound Length (cm) 0.4 cm 10/01/20 0948   Wound Width (cm) 3.5 cm 10/01/20 0948   Wound Depth (cm) 0.1 cm 10/01/20 0948   Wound Surface Area (cm^2) 1.4 cm^2 10/01/20 0948   Wound Volume (cm^3) 0.14 cm^3 10/01/20 0948   Post-Procedure Length (cm) 0.4 cm 10/01/20 0948   Post-Procedure Width (cm) 3.5 cm 10/01/20 0948   Post-Procedure Depth (cm) 0.1 cm 10/01/20 0948   Post-Procedure Surface Area (cm^2) 1.4 cm^2 10/01/20 0948   Post-Procedure Volume (cm^3) 0.14 cm^3 10/01/20 0948   Wound Bed Granulation (%) 100 % 10/01/20 0948   Wound Bed  Granulation (%) - Post-Procedure 100 % 10/01/20 0948   Tunneling (cm) 0 cm 10/07/20 1045   Undermining (cm) 0 cm 10/07/20 1045   Shape Circular 09/16/20 0830   Wound Odor None 10/07/20 1045   Pulses 1+;Right;DP;PT;Doppler 09/16/20 0830   Exposed Structures None 10/07/20 1045       Wound 09/16/20 Pretibial Left anterior superior lower leg (Active)   Wound Image   10/07/20 1045   Site Assessment [place] 10/07/20 1045   Periwound Assessment Edema 10/07/20 1045   Margins Attached edges 10/07/20 1045   Closure Secondary intention 10/07/20 1045   Drainage Amount None 10/07/20 1045   Drainage Description Serosanguineous 09/23/20 1100   Treatments Cleansed 10/07/20 1045   Wound Cleansing Foam Cleanser/Washcloth 10/07/20 1045   Periwound Protectant Barrier Paste 10/01/20 0948   Dressing Cleansing/Solutions Not Applicable 10/01/20 0948   Dressing Options Open to Air 10/07/20 1045   Dressing Changed Changed 10/01/20 0948   Dressing Status Clean;Dry;Intact 10/07/20 1045   Dressing Change/Treatment Frequency Weekly, and As Needed 10/07/20 1045   Non-staged Wound Description Not applicable 10/07/20 1045   Wound Length (cm) 0.2 cm 09/23/20 1100   Wound Width (cm) 0.2 cm 09/23/20 1100   Wound Depth (cm) 0 cm 09/23/20 1100   Wound Surface Area (cm^2) 0.04 cm^2 09/23/20 1100   Wound Volume (cm^3) 0 cm^3 09/23/20 1100   Post-Procedure Length (cm) 0.8 cm 09/16/20 0830   Post-Procedure Width (cm) 0.6 cm 09/16/20 0830   Post-Procedure Depth (cm) 0 cm 09/16/20 0830   Post-Procedure Surface Area (cm^2) 0.48 cm^2 09/16/20 0830   Post-Procedure Volume (cm^3) 0 cm^3 09/16/20 0830   Wound Bed Epithelium (%) 100 % 10/01/20 0948   Wound Bed Epithelium % - (Post-Procedure) 100 % 10/01/20 0948   Tunneling (cm) 0 cm 10/07/20 1045   Undermining (cm) 0 cm 10/07/20 1045   Wound Odor None 10/07/20 1045   Pulses 2+;Left;DP;PT;Doppler 09/16/20 0830   Exposed Structures None 10/07/20 1045       Wound 09/16/20 Pretibial Left anterior lower leg inferior  (Active)   Wound Image   10/07/20 1045   Site Assessment Epithelialization 10/07/20 1045   Periwound Assessment Edema 10/07/20 1045   Margins Attached edges 10/01/20 0948   Closure Secondary intention 10/07/20 1045   Drainage Amount None 10/07/20 1045   Drainage Description Serosanguineous 09/16/20 0830   Treatments Cleansed 10/07/20 1045   Wound Cleansing Foam Cleanser/Washcloth 10/07/20 1045   Periwound Protectant Barrier Paste 10/01/20 0948   Dressing Cleansing/Solutions Not Applicable 09/16/20 0830   Dressing Options Open to Air 10/07/20 1045   Dressing Changed Changed 10/01/20 0948   Dressing Status Dry;Clean;Intact 10/07/20 1045   Dressing Change/Treatment Frequency Weekly, and As Needed 10/07/20 1045   Non-staged Wound Description Not applicable 10/07/20 1045   Wound Length (cm) 0.3 cm 09/16/20 0830   Wound Width (cm) 0.5 cm 09/16/20 0830   Wound Depth (cm) 0 cm 09/16/20 0830   Wound Surface Area (cm^2) 0.15 cm^2 09/16/20 0830   Wound Volume (cm^3) 0 cm^3 09/16/20 0830   Post-Procedure Length (cm) 0.5 cm 09/16/20 0830   Post-Procedure Width (cm) 0.6 cm 09/16/20 0830   Post-Procedure Depth (cm) 0 cm 09/16/20 0830   Post-Procedure Surface Area (cm^2) 0.3 cm^2 09/16/20 0830   Post-Procedure Volume (cm^3) 0 cm^3 09/16/20 0830   Wound Bed Epithelium (%) 100 % 10/01/20 0948   Wound Bed Epithelium % - (Post-Procedure) 100 % 10/01/20 0948   Tunneling (cm) 0 cm 10/07/20 1045   Undermining (cm) 0 cm 10/07/20 1045   Wound Odor None 10/07/20 1045   Pulses 1+;Left;DP;PT;Doppler 09/16/20 0830   Exposed Structures None 10/07/20 1045        PROCEDURE: Patient assessed in clinic today no open wounds at this time patient be discharge from [facility].  Patient and  shown how to apply 2 layer compression.  However, patient was only able to tolerate 1 layer.  Patient instructed to apply compression stockings to bilateral lower extremities daily.  She is to continue her compression lifelong.  Patient is to elevate her legs  periodically throughout the day to reduce bilateral lower extremity edema.  Patient is to have compression on her bilateral lower extremities unless she is sleeping.        Pertinent Labs and Diagnostics:    Labs:     A1c:   Lab Results   Component Value Date/Time    HBA1C 6.0 (H) 09/22/2020 12:30 PM          IMAGING: N/A    VASCULAR STUDIES: N/A    LAST  WOUND CULTURE:  DATE : N/A            ASSESSMENT AND PLAN:     1. Wound infection  -No signs or symptoms infection at this clinic visit      2. Chronic diastolic heart failure (HCC)  Comments: Patient with a history of diastolic heart failure has implantable mems monitoring device.  -Significant 3+ bilateral pitting edema contributing to development of open wounds  -Patient was only able to tolerate 1 layer of the 2 layer compression stocking ordered through [facility].  -Patient reports that she has some of her own stockings at home that she can apply.  -Patient has a cardio mems implantable device is monitored remotely by [facility] heart failure clinic.    3. Chronic anticoagulation  Patient's last INR was 3.6 on 9/9/2020    4. Bilateral lower extremity edema  Comments: Chronic bilateral lower extremity edema due to heart failure.  -Patient to elevate legs for at least 30 minutes 3 times a day patient reports that she has an adjustable bed that allows her to recline her feet.  -Patient reports she takes up to 80 mg of Lasix per day depending on the amount of fluid to bilateral lower extremities.  -Demonstrated to patient  how to apply 2 layer compression stocking.  Fortunately patient was not able to tolerate the amount of compression.  Patient refused to wear 2 layer compression.  However, she is in agreement that she will wear compression she reports that she has her own stockings at home.  Patient left with single-layer 2 layer compression stocking from clinic today.    5.  Open wound  Resolved        PATIENT EDUCATION  - Importance of adequate nutrition for  wound healing  -Advised to go to ER for any increased redness, swelling, drainage, or odor, or if patient develops fever, chills, nausea or vomiting.     > 15 min spent face to face with patient, >50% of time spent counseling, coordinating care, reviewing records, discussing POC, educating patient      Please note that this note may have been created using voice recognition software. I have worked with technical experts from [facility] to optimize the interface.  I have made every reasonable attempt to correct obvious errors, but there may be errors of grammar and possibly content that I did not discover before finalizing the note.    N

## 2020-10-12 ENCOUNTER — SLEEP STUDY (OUTPATIENT)
Dept: SLEEP MEDICINE | Facility: MEDICAL CENTER | Age: 70
End: 2020-10-12
Attending: FAMILY MEDICINE
Payer: MEDICARE

## 2020-10-12 DIAGNOSIS — G47.30 SLEEP DISORDER BREATHING: ICD-10-CM

## 2020-10-14 ENCOUNTER — APPOINTMENT (OUTPATIENT)
Dept: WOUND CARE | Facility: MEDICAL CENTER | Age: 70
End: 2020-10-14
Attending: NURSE PRACTITIONER
Payer: MEDICARE

## 2020-10-15 PROCEDURE — 95810 POLYSOM 6/> YRS 4/> PARAM: CPT | Performed by: FAMILY MEDICINE

## 2020-10-15 NOTE — PROCEDURES
Technical summary:The patient underwent a diagnostic polysomnogram. This was a 16 channel montage study to include a 6 channel EEG, a 2 channel EOG, and chin EMG, left and right leg EMG, a snore channel, a nasal pressure transducer, and a nasal oral airflow thermistor.   Respiratory effort was assessed with the use of a thoracic and abdominal monitor and overnight oximetry was obtained. Audio and video recordings were reviewed. This was a fully attended study and sleep stage scoring was performed. The test was technically adequate.       Scoring Criteria: A modification of the the AASM Manual for the Scoring of Sleep and Associated Events. Obstructive apnea was scored by cessation of airflow for at least 10 seconds with continuing respiratory effort.  Central apnea was scored by cessation of airflow for at least 10 seconds with no effort.  Hypopnea was scored by a 30% or more reduction in airflow for at least 10 seconds accompanied by an arterial oxygen desaturation of 3% or more.  (For Medicare patients, hypopneas were scored by a 30% or more reduction in airflow for at least 10 seconds accompanied by an arterial oxygen saturation of 4% or more, as required by their insurance, CMS.    Interpretation:  Study start time was 10:41:45 PM. Diagnostic recording time was 6h 59.5m with a total sleep time of 1h 39.0m resulting in a sleep efficiency of 23.60%. Sleep latency from the start of the study was 16 minutes and the latency from sleep to REM was 00 minutes.In total,215 arousals were scored for an arousal index of 130.3. Sleep stages showed decreased SE, increased WASo of 304 min, no N3 or REM sleep.    Respiratory:  There were a total of 77 apneas consisting of 77 obstructive apneas, 0 mixed apneas, and 0 central apneas. A total of 118 hypopneas were scored.The apnea index was 46.67 per hour and the hypopnea index was 71.52 per hour resulting in an overall AHI of 118.18.AHI during rem was 0.0 and AHI while supine was  0.00.    Oximetry:  There was a mean oxygen saturation of 87.0% with a minimum oxygen saturation of 73.0%. Time spent with oxygen saturations below 89% was 291.5 minutes.    Cardiac:  The highest heart rate seen while awake was 113 BPM while the highest heart rate during sleep was 110 BPM with an average sleeping heart rate of 93 BPM.    Limb Movements:  There were a total of 64 PLMs during sleep, of which 22 were PLMS arousals. This resulted in a PLMS index of 38.8 and a PLMS arousal index of 13.3.    Impression:  1.  Severe OAS with AHI of 118.2/hr and O2 blaise 76 %  2.  Decreased sleep efficiency   3.  Sleep related hypoxia  4.  PLMS  5. Afib    Recommendations:  Recommend the patient return for a CPAP titration. In some cases alternative treatment options may prove effective in resolving sleep apnea and these options include upper airway surgery, the use of a dental orthotic or weight loss and positional therapy. Clinical correlation is required. In general patients with sleep apnea are advised to avoid alcohol and sedatives and to not operate a motor vehicle while drowsy and are at a greater risk for cardiovascular disease.

## 2020-10-19 ENCOUNTER — TELEPHONE (OUTPATIENT)
Dept: VASCULAR LAB | Facility: MEDICAL CENTER | Age: 70
End: 2020-10-19

## 2020-10-19 NOTE — TELEPHONE ENCOUNTER
[facility]    Left VM with pt requesting a call back to discuss making next appt.    Dennis Oakley, PharmD

## 2020-10-20 ENCOUNTER — APPOINTMENT (OUTPATIENT)
Dept: RADIOLOGY | Facility: MEDICAL CENTER | Age: 70
End: 2020-10-20
Attending: NURSE PRACTITIONER
Payer: MEDICARE

## 2020-10-21 ENCOUNTER — ANTICOAGULATION VISIT (OUTPATIENT)
Dept: VASCULAR LAB | Facility: MEDICAL CENTER | Age: 70
End: 2020-10-21
Attending: INTERNAL MEDICINE
Payer: MEDICARE

## 2020-10-21 ENCOUNTER — APPOINTMENT (OUTPATIENT)
Dept: WOUND CARE | Facility: MEDICAL CENTER | Age: 70
End: 2020-10-21
Attending: NURSE PRACTITIONER
Payer: MEDICARE

## 2020-10-21 VITALS — SYSTOLIC BLOOD PRESSURE: 108 MMHG | DIASTOLIC BLOOD PRESSURE: 57 MMHG | HEART RATE: 66 BPM

## 2020-10-21 DIAGNOSIS — Z79.01 CHRONIC ANTICOAGULATION: ICD-10-CM

## 2020-10-21 LAB
INR BLD: 2.1 (ref 0.9–1.2)
INR PPP: 2.1 (ref 2–3.5)

## 2020-10-21 PROCEDURE — 85610 PROTHROMBIN TIME: CPT

## 2020-10-21 PROCEDURE — 99211 OFF/OP EST MAY X REQ PHY/QHP: CPT | Performed by: NURSE PRACTITIONER

## 2020-10-21 NOTE — PROGRESS NOTES
Anticoagulation Summary  As of 10/21/2020    INR goal:  2.0-3.0   TTR:  56.0 % (5.2 y)   INR used for dosin.10 (10/21/2020)   Warfarin maintenance plan:  2.5 mg (5 mg x 0.5) every day   Weekly warfarin total:  17.5 mg   Plan last modified:  NICK Oneal (2020)   Next INR check:  2020   Target end date:  Indefinite    Indications    Chronic anticoagulation [Z79.01]  Atrial fibrillation (HCC) (Resolved) [I48.91]             Anticoagulation Episode Summary     INR check location:  Anticoagulation Clinic    Preferred lab:      Send INR reminders to:      Comments:        Anticoagulation Care Providers     Provider Role Specialty Phone number    Florian Carnes M.D. Referring Cardiology [phone]    [facility] Responsible  [phone]        Anticoagulation Patient Findings      HPI:  Rachelle Reina seen in clinic today for follow up on anticoagulation therapy in the presence of AF.   Denies any changes to current medical/health status since last appointment.   Denies any medication or diet changes.   No current symptoms of bleeding or thrombosis reported.    A/P:   INR therapeutic.   Continue current regimen.   BP recorded in vitals.    Follow up appointment in 6 week(s).    Next Appointment: Wednesday, Dec 2, 2020 at 4:15 pm.    Sherri GARCIA

## 2020-10-22 ENCOUNTER — HOSPITAL ENCOUNTER (OUTPATIENT)
Dept: RADIOLOGY | Facility: MEDICAL CENTER | Age: 70
End: 2020-10-22
Attending: NURSE PRACTITIONER
Payer: MEDICARE

## 2020-10-22 DIAGNOSIS — Z79.899 HIGH RISK MEDICATION USE: ICD-10-CM

## 2020-10-22 DIAGNOSIS — I50.30 ACC/AHA STAGE C HEART FAILURE WITH PRESERVED EJECTION FRACTION (HCC): ICD-10-CM

## 2020-10-22 DIAGNOSIS — I50.9 HEART FAILURE, NYHA CLASS 3 (HCC): ICD-10-CM

## 2020-10-22 NOTE — Clinical Note
Can you sign this please since Dr. Jeffrey is out and it is sitting in his basket?  Do you want to also order a BMP instead of me ordering just the study required/paid creatinine?

## 2020-10-24 ENCOUNTER — IMMUNIZATION (OUTPATIENT)
Dept: SOCIAL WORK | Facility: CLINIC | Age: 70
End: 2020-10-24
Payer: MEDICARE

## 2020-10-24 DIAGNOSIS — Z23 NEED FOR VACCINATION: Primary | ICD-10-CM

## 2020-10-24 PROCEDURE — 90662 IIV NO PRSV INCREASED AG IM: CPT | Performed by: REGISTERED NURSE

## 2020-10-24 PROCEDURE — G0008 ADMIN INFLUENZA VIRUS VAC: HCPCS | Performed by: REGISTERED NURSE

## 2020-10-24 NOTE — PROGRESS NOTES
Anticoagulation Summary  As of 1/29/2020    INR goal:   2.0-3.0   TTR:   57.3 % (4.5 y)   INR used for dosing:   3.80! (1/29/2020)   Warfarin maintenance plan:   2.5 mg (5 mg x 0.5) every day   Weekly warfarin total:   17.5 mg   Plan last modified:   NICK Oneal (1/29/2020)   Next INR check:      Target end date:   Indefinite    Indications    Chronic anticoagulation [Z79.01]  Atrial fibrillation (HCC) (Resolved) [I48.91]             Anticoagulation Episode Summary     INR check location:   Anticoagulation Clinic    Preferred lab:       Send INR reminders to:       Comments:         Anticoagulation Care Providers     Provider Role Specialty Phone number    Florian Carnes M.D. Referring Cardiology [phone]    [facility] Responsible  [phone]        Anticoagulation Patient Findings      HPI:  Rachelle Reina seen in clinic today for follow up on anticoagulation therapy in the presence of AF.   Pt discharged from rehab yesterday. Was admitted earlier this month with CHF.  Now on furosemide. Appetite fair lately.  No current symptoms of bleeding or thrombosis reported.    A/P:   INR supratherapeutic.   Will decrease regimen.   BP recorded in vitals. BP low x 3. She feels mildly lightheaded. She will recheck her BP as soon as she gets home and follow up with MD if it remains low. Currently, she feels okay to leave and is using her walker.    Follow up appointment in 2 week(s).    Next Appointment: Wednesday, Feb 12, 2020 at 2:15 pm.    Sherri GARCIA                     normal...

## 2020-10-26 ENCOUNTER — HOSPITAL ENCOUNTER (OUTPATIENT)
Dept: LAB | Facility: MEDICAL CENTER | Age: 70
End: 2020-10-26
Attending: NURSE PRACTITIONER
Payer: MEDICARE

## 2020-10-26 DIAGNOSIS — I50.30 ACC/AHA STAGE C HEART FAILURE WITH PRESERVED EJECTION FRACTION (HCC): ICD-10-CM

## 2020-10-26 DIAGNOSIS — Z79.899 HIGH RISK MEDICATION USE: ICD-10-CM

## 2020-10-26 DIAGNOSIS — I50.9 HEART FAILURE, NYHA CLASS 3 (HCC): ICD-10-CM

## 2020-10-26 LAB
ANION GAP SERPL CALC-SCNC: 12 MMOL/L (ref 7–16)
BUN SERPL-MCNC: 42 MG/DL (ref 8–22)
CALCIUM SERPL-MCNC: 10.1 MG/DL (ref 8.5–10.5)
CHLORIDE SERPL-SCNC: 101 MMOL/L (ref 96–112)
CO2 SERPL-SCNC: 27 MMOL/L (ref 20–33)
CREAT SERPL-MCNC: 1.25 MG/DL (ref 0.5–1.4)
GLUCOSE SERPL-MCNC: 87 MG/DL (ref 65–99)
NT-PROBNP SERPL IA-MCNC: 1373 PG/ML (ref 0–125)
POTASSIUM SERPL-SCNC: 3.7 MMOL/L (ref 3.6–5.5)
SODIUM SERPL-SCNC: 140 MMOL/L (ref 135–145)

## 2020-10-26 PROCEDURE — 83880 ASSAY OF NATRIURETIC PEPTIDE: CPT

## 2020-10-26 PROCEDURE — 80048 BASIC METABOLIC PNL TOTAL CA: CPT

## 2020-10-26 PROCEDURE — 36415 COLL VENOUS BLD VENIPUNCTURE: CPT

## 2020-10-27 ENCOUNTER — OFFICE VISIT (OUTPATIENT)
Dept: CARDIOLOGY | Facility: MEDICAL CENTER | Age: 70
End: 2020-10-27
Payer: MEDICARE

## 2020-10-27 ENCOUNTER — RESEARCH ENCOUNTER (OUTPATIENT)
Dept: CARDIOLOGY | Facility: MEDICAL CENTER | Age: 70
End: 2020-10-27
Payer: MEDICARE

## 2020-10-27 ENCOUNTER — HOSPITAL ENCOUNTER (OUTPATIENT)
Dept: RADIOLOGY | Facility: MEDICAL CENTER | Age: 70
End: 2020-10-27
Attending: NURSE PRACTITIONER
Payer: MEDICARE

## 2020-10-27 VITALS
HEART RATE: 87 BPM | WEIGHT: 262 LBS | HEIGHT: 65 IN | DIASTOLIC BLOOD PRESSURE: 60 MMHG | RESPIRATION RATE: 16 BRPM | BODY MASS INDEX: 43.65 KG/M2 | OXYGEN SATURATION: 95 % | SYSTOLIC BLOOD PRESSURE: 102 MMHG

## 2020-10-27 DIAGNOSIS — E11.22 CONTROLLED TYPE 2 DIABETES MELLITUS WITH STAGE 3 CHRONIC KIDNEY DISEASE, WITH LONG-TERM CURRENT USE OF INSULIN (HCC): ICD-10-CM

## 2020-10-27 DIAGNOSIS — R79.9 ABNORMAL BLOOD CHEMISTRY: ICD-10-CM

## 2020-10-27 DIAGNOSIS — E66.01 CLASS 3 SEVERE OBESITY DUE TO EXCESS CALORIES WITH SERIOUS COMORBIDITY AND BODY MASS INDEX (BMI) OF 40.0 TO 44.9 IN ADULT (HCC): ICD-10-CM

## 2020-10-27 DIAGNOSIS — I50.9 HEART FAILURE, NYHA CLASS 3 (HCC): ICD-10-CM

## 2020-10-27 DIAGNOSIS — I10 HTN (HYPERTENSION), MALIGNANT: ICD-10-CM

## 2020-10-27 DIAGNOSIS — Z79.899 HIGH RISK MEDICATION USE: ICD-10-CM

## 2020-10-27 DIAGNOSIS — Z79.4 CONTROLLED TYPE 2 DIABETES MELLITUS WITH STAGE 3 CHRONIC KIDNEY DISEASE, WITH LONG-TERM CURRENT USE OF INSULIN (HCC): ICD-10-CM

## 2020-10-27 DIAGNOSIS — I87.2 CHRONIC VENOUS INSUFFICIENCY: ICD-10-CM

## 2020-10-27 DIAGNOSIS — Z79.01 CHRONIC ANTICOAGULATION: ICD-10-CM

## 2020-10-27 DIAGNOSIS — I48.11 LONGSTANDING PERSISTENT ATRIAL FIBRILLATION (HCC): ICD-10-CM

## 2020-10-27 DIAGNOSIS — E78.5 DYSLIPIDEMIA: ICD-10-CM

## 2020-10-27 DIAGNOSIS — G47.33 OSA (OBSTRUCTIVE SLEEP APNEA): ICD-10-CM

## 2020-10-27 DIAGNOSIS — N18.30 CONTROLLED TYPE 2 DIABETES MELLITUS WITH STAGE 3 CHRONIC KIDNEY DISEASE, WITH LONG-TERM CURRENT USE OF INSULIN (HCC): ICD-10-CM

## 2020-10-27 DIAGNOSIS — N28.9 NEPHROPATHY: ICD-10-CM

## 2020-10-27 DIAGNOSIS — I50.30 ACC/AHA STAGE C HEART FAILURE WITH PRESERVED EJECTION FRACTION (HCC): ICD-10-CM

## 2020-10-27 PROCEDURE — 76700 US EXAM ABDOM COMPLETE: CPT

## 2020-10-27 PROCEDURE — 99215 OFFICE O/P EST HI 40 MIN: CPT | Performed by: NURSE PRACTITIONER

## 2020-10-27 RX ORDER — CEPHALEXIN 250 MG/1
CAPSULE ORAL
COMMUNITY
End: 2020-10-27

## 2020-10-27 RX ORDER — NITROFURANTOIN 25; 75 MG/1; MG/1
CAPSULE ORAL
COMMUNITY
End: 2020-10-27

## 2020-10-27 RX ORDER — ZOLPIDEM TARTRATE 5 MG/1
TABLET ORAL
COMMUNITY
End: 2020-10-27

## 2020-10-27 ASSESSMENT — MINNESOTA LIVING WITH HEART FAILURE QUESTIONNAIRE (MLHF)
DIFFICULTY WITH RECREATIONAL PASTIMES, SPORTS, HOBBIES: 5
MAKING YOU FEEL DEPRESSED: 2
WALKING ABOUT OR CLIMBING STAIRS DIFFICULT: 5
LOSS OF SELF CONTROL IN YOUR LIFE: 4
TIRED, FATIGUED OR LOW ON ENERGY: 5
COSTING YOU MONEY FOR MEDICAL CARE: 0
FEELING LIKE A BURDEN TO FAMILY AND FRIENDS: 3
DIFFICULTY WITH SEXUAL ACTIVITIES: 0
MAKING YOU STAY IN A HOSPITAL: 5
WORKING AROUND THE HOUSE OR YARD DIFFICULT: 5
SWELLING IN ANKLES OR LEGS: 4
GIVING YOU SIDE EFFECTS FROM TREATMENTS: 0
TOTAL_SCORE: 66
MAKING YOU SHORT OF BREATH: 5
MAKING YOU WORRY: 4
DIFFICULTY SLEEPING WELL AT NIGHT: 1
EATING LESS FOODS YOU LIKE: 5
DIFFICULTY GOING AWAY FROM HOME: 4
DIFFICULTY TO CONCENTRATE OR REMEMBERING THINGS: 1
DIFFICULTY SOCIALIZING WITH FAMILY OR FRIENDS: 4
DIFFICULTY WORKING TO EARN A LIVING: 0
HAVING TO SIT OR LIE DOWN DURING THE DAY: 4

## 2020-10-27 ASSESSMENT — ENCOUNTER SYMPTOMS
ABDOMINAL PAIN: 0
ORTHOPNEA: 0
SHORTNESS OF BREATH: 1
DIZZINESS: 0
MYALGIAS: 0
CLAUDICATION: 0
PND: 0
PALPITATIONS: 0
COUGH: 0
FEVER: 0

## 2020-10-27 ASSESSMENT — FIBROSIS 4 INDEX: FIB4 SCORE: 1.2

## 2020-10-27 NOTE — PROGRESS NOTES
Chief Complaint   Patient presents with   • Congestive Heart Failure       Subjective:   Rachelle Reina is a 70 y.o. female who is following up on her heart failure.      Patient of Dr. Carnes and Dr. Jeffrey in the heart failure clinic.  She was last seen in clinic on 8/12/2020 with Dr. Jeffrey.  No changes were made during her last visit.    Patient comes in the clinic today feeling slight increase in shortness of breath.  She also reports slight lower extremity edema and fatigue.  Patient also complains of urinary frequency, urgency and incontinence.  She states is hard for her to take her diuretics.    She otherwise denies chest pain, palpitations, orthopnea, PND or dizziness/lightheadedness.    She reports her home weights ranged between 260-262 pounds.    She does use compression stockings on occasion.    She reports she takes her torsemide twice a day most of the time, but sometimes needs to adjust her schedule to go to appointments.    She did see a urologist for her urinary problems, and she was started on oxybutynin.    Her CardioMEMS pressure was PaD 29 mmHg today.    She did have her sleep study done, but needs to follow-up.  Looks like she will need a reevaluation.    She does try to monitor her sodium intake, but states it can be difficult due to her energy level and desire to cook.    Patient would like education regarding her diabetes.    Additonally, patient has the following medical problems:    -Atrial fibrillation: Taking warfarin, followed by anticoagulation clinic    -No coronary disease on angiogram in 2019    -Insulin-dependent diabetic: Followed by her PCP    -Hyperlipidemia: Taking rosuvastatin, gemfibrozil    -Gout: Taking allopurinol    -Depression: Taking Celexa    -Thyroid disease: Taking Synthroid    -History of ovarian and breast cancer    -Chronic venous insufficiency    Past Medical History:   Diagnosis Date   • A-fib (HCC)    • Arthritis 05/05/2020    knees and fingers   • Atrial  fibrillation (HCC)    • Benign essential hypertension 7/1/2011   • Bowel habit changes     diarrhea   • Breast cancer (HCC)    • Bronchitis 2015    history of   • CAD (coronary artery disease)    • Cancer (HCC) 1987, 2000    breast and ovary   • Cataract 2018    IOL bilat   • Diabetes (HCC)     oral medication, insulin   • Diastolic congestive heart failure (HCC) 2019   • Disorder of thyroid    • High cholesterol    • History of cardiac catheterization 4/22/2010   • History of echocardiogram 4/22/2011   • History of hypothyroidism 8/29/2008   • Hypercholesteremia 7/1/2011   • Long term (current) use of anticoagulants 7/1/2011   • Morbid obesity (HCC) 10/28/2008   • Ovarian cancer (HCC)    • Pain     knees   • Sleep apnea     pt does not use cpap   • Snoring     sleep study done   • Urinary incontinence      Past Surgical History:   Procedure Laterality Date   • Z CARDIAC CATH  02/27/2019    normal coronaries   • CATARACT PHACO WITH IOL Right 1/22/2018    Procedure: CATARACT PHACO WITH IOL;  Surgeon: Santosh Holden M.D.;  Location: SURGERY SAME DAY [facility];  Service: Ophthalmology   • CATARACT PHACO WITH IOL Left 1/8/2018    Procedure: CATARACT PHACO WITH IOL;  Surgeon: Satnosh Holden M.D.;  Location: SURGERY SAME DAY [facility];  Service: Ophthalmology   • OTHER ORTHOPEDIC SURGERY Right 2013    hip arthroplasty   • CHOLECYSTECTOMY  2001   • ABDOMINAL HYSTERECTOMY TOTAL      1987   • MASTECTOMY  partial   • PB RADIATION THERAPY PLAN SIMPLE       Family History   Problem Relation Age of Onset   • Heart Disease Mother         HEART VALVE REPLACEMENT.   • Heart Disease Father         CORONARY ARTERY BYPASS GRAFTS   • Cancer Maternal Aunt    • Diabetes Brother    • Thyroid Brother    • Heart Attack Brother    • Heart Disease Brother      Social History     Socioeconomic History   • Marital status:      Spouse name: Not on file   • Number of children: Not on file   • Years of education: Not on file   •  Highest education level: Not on file   Occupational History   • Not on file   Social Needs   • Financial resource strain: Not on file   • Food insecurity     Worry: Not on file     Inability: Not on file   • Transportation needs     Medical: Not on file     Non-medical: Not on file   Tobacco Use   • Smoking status: Never Smoker   • Smokeless tobacco: Never Used   Substance and Sexual Activity   • Alcohol use: Yes     Comment: 1 or 2 drinks a month   • Drug use: No   • Sexual activity: Not on file   Lifestyle   • Physical activity     Days per week: Not on file     Minutes per session: Not on file   • Stress: Not on file   Relationships   • Social connections     Talks on phone: Not on file     Gets together: Not on file     Attends Taoism service: Not on file     Active member of club or organization: Not on file     Attends meetings of clubs or organizations: Not on file     Relationship status: Not on file   • Intimate partner violence     Fear of current or ex partner: Not on file     Emotionally abused: Not on file     Physically abused: Not on file     Forced sexual activity: Not on file   Other Topics Concern   • Not on file   Social History Narrative   • Not on file     Allergies   Allergen Reactions   • Sulfa Drugs Rash     8/2015 rash and hives       Outpatient Encounter Medications as of 10/27/2020   Medication Sig Dispense Refill   • potassium chloride SA (KDUR) 20 MEQ Tab CR Take 2 Tabs by mouth 2 times a day. 360 Tab 3   • torsemide (DEMADEX) 20 MG Tab Take 2 Tabs by mouth 2 times a day. 360 Tab 3   • metoprolol (LOPRESSOR) 50 MG Tab TAKE 1 TABLET BY MOUTH TWICE DAILY 180 Tab 4   • rosuvastatin (CRESTOR) 20 MG Tab Take 1 Tab by mouth every evening. 90 Tab 4   • losartan (COZAAR) 50 MG Tab Take 1 Tab by mouth every day. 100 Tab 4   • gemfibrozil (LOPID) 600 MG Tab Take 1 Tab by mouth every morning. 90 Tab 4   • warfarin (COUMADIN) 5 MG Tab Take 1/2-1 tab by mouth daily or as directed by  anticoagulation clinic 90 Tab 1   • levothyroxine (SYNTHROID) 200 MCG Tab Take 200 mcg by mouth See Admin Instructions. All days except Sunday  Brand Name     • pioglitazone (ACTOS) 15 MG Tab Take 15 mg by mouth every day.     • Ascorbic Acid (VITAMIN C) 500 MG Cap Take 500 mg by mouth every day.     • Pyridoxine HCl (VITAMIN B-6 PO) Take 1 Tab by mouth every day.     • LEVEMIR FLEXTOUCH 100 UNIT/ML injection PEN Inject 14 Units as instructed 2 times a day.     • oxybutynin (DITROPAN XL) 15 MG CR tablet Take 15 mg by mouth every morning.  2   • allopurinol (ZYLOPRIM) 100 MG Tab Take 100 mg by mouth every bedtime.  2   • acetaminophen (TYLENOL 8 HOUR ARTHRITIS PAIN) 650 MG CR tablet Take 1,300 mg by mouth 2 Times a Day.     • Cholecalciferol (VITAMIN D-3) 5000 units Tab Take 5,000 Units by mouth 2 Times a Day.     • traZODone (DESYREL) 100 MG Tab Take 100 mg by mouth every evening.     • levothyroxine (SYNTHROID) 175 MCG Tab Take 175 mcg by mouth every Sunday. Brand Name     • loratadine (CLARITIN) 10 MG Tab Take 10 mg by mouth every day.     • citalopram (CELEXA) 20 MG TABS Take 20 mg by mouth every day.     • [DISCONTINUED] zolpidem (AMBIEN) 5 MG Tab zolpidem 5 mg tablet     • [DISCONTINUED] nitrofurantoin (MACROBID) 100 MG Cap nitrofurantoin monohydrate/macrocrystals 100 mg capsule     • [DISCONTINUED] cephALEXin (KEFLEX) 250 MG Cap cephalexin 250 mg capsule     • [DISCONTINUED] mupirocin (BACTROBAN) 2 % Ointment Apply 1 Application to affected area(s) 2 times a day. (Patient not taking: Reported on 10/27/2020) 22 g 0   • [DISCONTINUED] thiamine (THIAMINE) 100 MG Tab Take 100 mg by mouth every day.     • [DISCONTINUED] glipiZIDE (GLUCOTROL) 5 MG Tab Take 2.5 mg by mouth 2 times a day.  1     No facility-administered encounter medications on file as of 10/27/2020.      Review of Systems   Constitutional: Positive for malaise/fatigue. Negative for fever.   Respiratory: Positive for shortness of breath. Negative for  "cough.    Cardiovascular: Positive for leg swelling. Negative for chest pain, palpitations, orthopnea, claudication and PND.   Gastrointestinal: Negative for abdominal pain.   Genitourinary: Positive for frequency and urgency.        Incontinence   Musculoskeletal: Negative for myalgias.   Neurological: Negative for dizziness.   All other systems reviewed and are negative.       Objective:   /60 (BP Location: Left arm, Patient Position: Sitting, BP Cuff Size: Adult)   Pulse 87   Resp 16   Ht 1.651 m (5' 5\")   Wt 118.8 kg (262 lb)   LMP 01/05/1987 (Approximate)   SpO2 95%   BMI 43.60 kg/m²     Physical Exam   Constitutional: She is oriented to person, place, and time. She appears well-developed and well-nourished.   BMI 43.60   HENT:   Head: Normocephalic and atraumatic.   Eyes: Pupils are equal, round, and reactive to light. EOM are normal.   Neck: Normal range of motion. Neck supple. No JVD present.   Cardiovascular: Normal rate, regular rhythm and normal heart sounds.   Pulmonary/Chest: Effort normal and breath sounds normal. No respiratory distress. She has no wheezes. She has no rales.   Abdominal: Soft. Bowel sounds are normal.   Musculoskeletal:         General: Edema (Bilateral trace lower extremity edema) present.   Neurological: She is alert and oriented to person, place, and time.   Skin: Skin is warm and dry.   Some venous stasis changes on bilateral lower extremity   Psychiatric: She has a normal mood and affect. Her behavior is normal.   Vitals reviewed.      Lab Results   Component Value Date/Time    CHOLSTRLTOT 119 09/22/2020 12:30 PM    LDL 50 09/22/2020 12:30 PM    HDL 45 09/22/2020 12:30 PM    TRIGLYCERIDE 118 09/22/2020 12:30 PM       Lab Results   Component Value Date/Time    SODIUM 140 10/26/2020 11:35 AM    POTASSIUM 3.7 10/26/2020 11:35 AM    CHLORIDE 101 10/26/2020 11:35 AM    CO2 27 10/26/2020 11:35 AM    GLUCOSE 87 10/26/2020 11:35 AM    BUN 42 (H) 10/26/2020 11:35 AM    " CREATININE 1.25 10/26/2020 11:35 AM    CREATININE 0.6 01/27/2009 01:10 PM     Lab Results   Component Value Date/Time    ALKPHOSPHAT 75 09/22/2020 12:30 PM    ASTSGOT 14 09/22/2020 12:30 PM    ALTSGPT 10 09/22/2020 12:30 PM    TBILIRUBIN 0.3 09/22/2020 12:30 PM      Transthoracic Echo Report 11/10/2015  Difficult study.  No significant valve disease or flow abnormalities.   Normal left ventricular systolic function.   Trace to Small pericardial effusion without evidence of hemodynamic   compromise.  No prior study is available for comparison.      Myocardial Perfusion Report 11/11/2015   IMPRESSIONS   No reversible defects that would indicate ischemia.      TREADMILL STRESS EKG AND MYOCARDIAL PERFUSION SCAN 11/11/2015     DESCRIPTION: The patient received IV lexiscan. The infusion was associated with no symptoms. The baseline electrocardiogram is abnormal Atrial fibrillation. During the infusion, there were no electrocardiographic changes diagnostic of ischemia.     Myocardial perfusion images are to be reported in separate report.     Cath 2/27/2019  POSTPROCEDURE DIAGNOSES:  1.  No angiographic evidence of coronary artery disease.  2.  Normal left ventricular systolic function with ejection fraction of 70%.  3.  Elevated left ventricular end-diastolic pressure.       Transthoracic Echo Report 11/14/2019  Compared to the images of the study done 11/10/15 - there has been no significant change.  Left ventricular ejection fraction is visually estimated to be 60%.  Aortic valve area calculated from the continuity equation is 1.1 cm2.  Mild aortic stenosis.  Right ventricular systolic pressure is estimated to be 30 mmHg.      Assessment:     1. ACC/AHA stage C heart failure with preserved ejection fraction (HCC)  Basic Metabolic Panel   2. Heart failure, NYHA class 3 (HCC)     3. High risk medication use  REFERRAL TO DIABETIC EDUCATION Diabetes Self Management Education / Training (DSME/T) and Medical Nutrition Therapy  (MNT): Initial Group DSME/MNT as authorized by payor, Follow-Up DSME/MNT as authorized by payor; DSME/T Content: Monitoring Diabete...    REFERRAL TO PHARMACOTHERAPY SERVICE    Basic Metabolic Panel   4. Controlled type 2 diabetes mellitus with stage 3 chronic kidney disease, with long-term current use of insulin (HCC)  REFERRAL TO DIABETIC EDUCATION Diabetes Self Management Education / Training (DSME/T) and Medical Nutrition Therapy (MNT): Initial Group DSME/MNT as authorized by payor, Follow-Up DSME/MNT as authorized by payor; DSME/T Content: Monitoring Diabete...    REFERRAL TO PHARMACOTHERAPY SERVICE    Basic Metabolic Panel   5. HTN (hypertension), malignant     6. Longstanding persistent atrial fibrillation (HCC)     7. Chronic anticoagulation     8. Dyslipidemia     9. KEN (obstructive sleep apnea)     10. Class 3 severe obesity due to excess calories with serious comorbidity and body mass index (BMI) of 40.0 to 44.9 in adult ([facility])     11. Chronic venous insufficiency         Medical Decision Making:  Today's Assessment / Status / Plan:   1. HFpEF, Stage C, Class 3, LVEF 60%: pt appears to have LE edema  -CardioMEMS pressures continue to be elevated, PaD 29 mmHg today  -Patient to increase torsemide to 60 mg twice a day for 3 days then resume torsemide 40 mg twice a day  -Continue potassium 20 mEq twice a day  -Reinforced 2000 mg sodium diet per day  -Obtain a BMP in 1 month  -Patient to follow-up with sleep medicine for results of sleep study  -Reinforced s/sx of worsening heart failure with patient and weight monitoring. Pt verbalizes understanding. Pt to call office or RTC if present.   -pt does have a history of chronic venous insuffiiciency    2.  Hypertension: Stable  -Continue losartan 50 mg daily  -Continue metoprolol tartrate 50 mg twice a day    3.  Atrial fibrillation, hs unsuccessful cardioversion and prior amiodarone use:  -Continue warfarin, followed by the anticoagulation clinic  -Continue  metoprolol 50 mg twice a day    4.  Dyslipidemia: Last LDL 50 on 9/22/2020  -Continue rosuvastatin 20 mg daily  -Continue gemfibrozil 600 mg daily    5. Obesity: BMI 43.60  -Encouraged weight loss    6. Diabetes:  -Will refer patient over to diabetes education  -Will also refer patient over to pharmacotherapy for diabetes management  -Patient to notify PCP  -Discussed with patient hopefully changing her medications may help with weight loss and some of her symptoms    FU in clinic in 2 months with labs in 1 month. Sooner if needed.    Patient verbalizes understanding and agrees with the plan of care.     PLEASE NOTE: This Note was created using voice recognition Software. I have made every reasonable attempt to correct obvious errors, but I expect that there are errors of grammar and possibly content that I did not discover before finalizing the note

## 2020-10-27 NOTE — RESEARCH NOTE
Name: Rachelle Reina   MRN: [id number]  Participant ID:  [id number]  : 1950  Visit Date/Time: 10/27/2020 3:35 PM  Who is present: Audrey Waggoner APRN     Study:    [id number] - GUIDE HF / [id number]   Status Consented/Enrolled   Start Date 20   Participant ID [id number]   Coordinator Lizzy Harvey; Nadege Anderson; Lena Griffith   NCT [id number]    Ash Jeffrey M.D.       Vitals:  BP: 102/60  WT: 262lb  HR: 87    Assessment and Plan:      6MWT:  Rachelle walked 240 feet during 6 minutes and had to stop several times due to back pain and shortness of breath.    Questionarres/QOL/Index’s Completed: KCCQ-12, EQ-5D-5L    Reading compliance: Rachelle was reminded to try and take daily CardioMems readings.          Labs:                  Recent Results (from the past 336 hour(s))   POCT Protime    Collection Time: 10/21/20 12:00 AM   Result Value Ref Range    INR 2.10    Point Of Care INR    Collection Time: 10/21/20  4:24 PM   Result Value Ref Range    POC INR 2.1 (H) 0.9 - 1.2   Basic Metabolic Panel    Collection Time: 10/26/20 11:35 AM   Result Value Ref Range    Sodium 140 135 - 145 mmol/L    Potassium 3.7 3.6 - 5.5 mmol/L    Chloride 101 96 - 112 mmol/L    Co2 27 20 - 33 mmol/L    Glucose 87 65 - 99 mg/dL    Bun 42 (H) 8 - 22 mg/dL    Creatinine 1.25 0.50 - 1.40 mg/dL    Calcium 10.1 8.5 - 10.5 mg/dL    Anion Gap 12.0 7.0 - 16.0   proBrain Natriuretic Peptide, NT    Collection Time: 10/26/20 11:35 AM   Result Value Ref Range    NT-proBNP 1373 (H) 0 - 125 pg/mL   ESTIMATED GFR    Collection Time: 10/26/20 11:35 AM   Result Value Ref Range    GFR If  51 (A) >60 mL/min/1.73 m 2    GFR If Non  42 (A) >60 mL/min/1.73 m 2         Meds:    Medication Changes: Yes, Torsemide     Adverse Events: No      Current Outpatient Medications   Medication Sig Dispense Refill   • potassium chloride SA (KDUR) 20 MEQ Tab CR Take 2 Tabs by mouth 2 times a  day. 360 Tab 3   • torsemide (DEMADEX) 20 MG Tab Take 2 Tabs by mouth 2 times a day. 360 Tab 3   • metoprolol (LOPRESSOR) 50 MG Tab TAKE 1 TABLET BY MOUTH TWICE DAILY 180 Tab 4   • rosuvastatin (CRESTOR) 20 MG Tab Take 1 Tab by mouth every evening. 90 Tab 4   • losartan (COZAAR) 50 MG Tab Take 1 Tab by mouth every day. 100 Tab 4   • gemfibrozil (LOPID) 600 MG Tab Take 1 Tab by mouth every morning. 90 Tab 4   • warfarin (COUMADIN) 5 MG Tab Take 1/2-1 tab by mouth daily or as directed by anticoagulation clinic 90 Tab 1   • levothyroxine (SYNTHROID) 200 MCG Tab Take 200 mcg by mouth See Admin Instructions. All days except Sunday  Brand Name     • pioglitazone (ACTOS) 15 MG Tab Take 15 mg by mouth every day.     • Ascorbic Acid (VITAMIN C) 500 MG Cap Take 500 mg by mouth every day.     • Pyridoxine HCl (VITAMIN B-6 PO) Take 1 Tab by mouth every day.     • LEVEMIR FLEXTOUCH 100 UNIT/ML injection PEN Inject 14 Units as instructed 2 times a day.     • oxybutynin (DITROPAN XL) 15 MG CR tablet Take 15 mg by mouth every morning.  2   • allopurinol (ZYLOPRIM) 100 MG Tab Take 100 mg by mouth every bedtime.  2   • acetaminophen (TYLENOL 8 HOUR ARTHRITIS PAIN) 650 MG CR tablet Take 1,300 mg by mouth 2 Times a Day.     • Cholecalciferol (VITAMIN D-3) 5000 units Tab Take 5,000 Units by mouth 2 Times a Day.     • traZODone (DESYREL) 100 MG Tab Take 100 mg by mouth every evening.     • levothyroxine (SYNTHROID) 175 MCG Tab Take 175 mcg by mouth every Sunday. Brand Name     • loratadine (CLARITIN) 10 MG Tab Take 10 mg by mouth every day.     • citalopram (CELEXA) 20 MG TABS Take 20 mg by mouth every day.       No current facility-administered medications for this visit.             Follow-up: 12 Month Study Visit due 5/6/2021

## 2020-10-28 VITALS
BODY MASS INDEX: 43.6 KG/M2 | HEART RATE: 87 BPM | SYSTOLIC BLOOD PRESSURE: 102 MMHG | WEIGHT: 262 LBS | DIASTOLIC BLOOD PRESSURE: 60 MMHG

## 2020-10-28 ASSESSMENT — FIBROSIS 4 INDEX: FIB4 SCORE: 1.2

## 2020-11-02 ENCOUNTER — TELEPHONE (OUTPATIENT)
Dept: CARDIOLOGY | Facility: MEDICAL CENTER | Age: 70
End: 2020-11-02

## 2020-11-02 NOTE — TELEPHONE ENCOUNTER
Guide HF singlearm study patient    Patient's CardioMEMS readings are within her goal today, PaD 25 mmHg which is also her threshold.  Patient reporting urinary frequency issues to research staff.  Please discuss with her and encouraged her to resume torsemide 40 mg twice a day with potassium 20 mEq twice a day.    Please make sure she is following up with her PCP and her urologist.

## 2020-11-02 NOTE — TELEPHONE ENCOUNTER
Called patient and spoke to her about diuretics, patient is trying to get appointment with her PCP or urologist for urinary frequency. Patient would eventually like to go to 20mg in am and 40mg in pm as it is effecting her quality of life. Patient will try to take the 40mg BID of her diuretic and will communicate if it is a problem.

## 2020-11-04 ENCOUNTER — TELEMEDICINE (OUTPATIENT)
Dept: SLEEP MEDICINE | Facility: MEDICAL CENTER | Age: 70
End: 2020-11-04
Payer: MEDICARE

## 2020-11-04 VITALS — WEIGHT: 260.2 LBS | BODY MASS INDEX: 43.35 KG/M2 | HEIGHT: 65 IN | RESPIRATION RATE: 14 BRPM

## 2020-11-04 DIAGNOSIS — G47.34 SLEEP RELATED HYPOXIA: ICD-10-CM

## 2020-11-04 DIAGNOSIS — G47.33 OSA (OBSTRUCTIVE SLEEP APNEA): ICD-10-CM

## 2020-11-04 PROCEDURE — 99213 OFFICE O/P EST LOW 20 MIN: CPT | Mod: 95,CR | Performed by: FAMILY MEDICINE

## 2020-11-04 RX ORDER — ZOLPIDEM TARTRATE 5 MG/1
5 TABLET ORAL NIGHTLY PRN
Qty: 2 TAB | Refills: 0 | Status: SHIPPED | OUTPATIENT
Start: 2020-11-04 | End: 2020-11-05

## 2020-11-04 ASSESSMENT — FIBROSIS 4 INDEX: FIB4 SCORE: 1.2

## 2020-11-04 NOTE — PROGRESS NOTES
Telemedicine Visit: Established Patient     This encounter was conducted via Zoom. Verbal consent was obtained. Patient's identity was verified. Given the importance of social distancing and other strategies recommended to reduce the risk of COVID-19 transmission, I am providing medical care to this patient via audio/video visit in place of an in person visit at the request of the patient.    [facility] Follow Up Note     Date: 11/4/2020 / Time: 8:24 AM    Patient ID:   Name:             Rachelle Reina   YOB: 1950  Age:                 70 y.o.  female   MRN:               [id number]      Thank you for requesting a sleep medicine consultation on Rachelle Reina at the sleep center. She presents today with the chief complaints of KEN and PSG follow up.     HISTORY OF PRESENT ILLNESS:       Pt is currently not on therapy. She goes to sleep around 11 pm and wakes up around 9 am. She is getting about 7 hrs of sleep on a good night and about 5-6 hr of sleep on a bad night. Overall, she doesnot finds her sleep refreshing. She is currently on trazodone 100 mg.She wakes up about 3-4 times during the night due to bathroom use due to diuretics and on average It takes her few min  however on some nights, it take 30-45 min to fall back asleep.  She does take regular naps. The naps are usually 2hr min long.She denies any symptoms of RLS, narcolepsy or any symptoms to suggest parasomnias such as nightmares, sleep walking or acting out of dreams.The symptoms of excessive daytime, snoring and gasping has continued. Since her last visit she had a PSG which showed Severe OAS with AHI of 118.2/hr and O2 blaise 76 %. Time spent with oxygen saturations below 89% was 291.5 minutes.      REVIEW OF SYSTEMS:       Constitutional: Denies fevers, Denies weight changes  Eyes: Denies changes in vision, no eye pain  Ears/Nose/Throat/Mouth: Denies nasal congestion or sore throat   Cardiovascular: Denies  chest pain or palpitations   Respiratory: Denies shortness of breath , Denies cough  Gastrointestinal/Hepatic: Denies abdominal pain, nausea, vomiting,   Musculoskeletal/Rheum: Denies  joint pain and swelling   Skin/Breast: Denies rash,   Neurological: Denies headache, confusion, memory loss or focal weakness/parasthesias  Psychiatric: denies mood disorder   Sleep: + snoring    Comprehensive review of systems form is reviewed with the patient and is attached in the EMR.     PMH:  has a past medical history of A-fib (HCC), Arthritis (05/05/2020), Atrial fibrillation (HCC), Benign essential hypertension (7/1/2011), Bowel habit changes, Breast cancer (HCC), Bronchitis (2015), CAD (coronary artery disease), Cancer (HCC) (1987, 2000), Cataract (2018), Diabetes (HCC), Diastolic congestive heart failure (HCC) (2019), Disorder of thyroid, High cholesterol, History of cardiac catheterization (4/22/2010), History of echocardiogram (4/22/2011), History of hypothyroidism (8/29/2008), Hypercholesteremia (7/1/2011), Long term (current) use of anticoagulants (7/1/2011), Morbid obesity (HCC) (10/28/2008), Ovarian cancer ([facility]), Pain, Sleep apnea, Snoring, and Urinary incontinence.  MEDS:   Current Outpatient Medications:   •  potassium chloride SA (KDUR) 20 MEQ Tab CR, Take 2 Tabs by mouth 2 times a day., Disp: 360 Tab, Rfl: 3  •  torsemide (DEMADEX) 20 MG Tab, Take 2 Tabs by mouth 2 times a day., Disp: 360 Tab, Rfl: 3  •  metoprolol (LOPRESSOR) 50 MG Tab, TAKE 1 TABLET BY MOUTH TWICE DAILY, Disp: 180 Tab, Rfl: 4  •  rosuvastatin (CRESTOR) 20 MG Tab, Take 1 Tab by mouth every evening., Disp: 90 Tab, Rfl: 4  •  losartan (COZAAR) 50 MG Tab, Take 1 Tab by mouth every day., Disp: 100 Tab, Rfl: 4  •  warfarin (COUMADIN) 5 MG Tab, Take 1/2-1 tab by mouth daily or as directed by anticoagulation clinic, Disp: 90 Tab, Rfl: 1  •  levothyroxine (SYNTHROID) 200 MCG Tab, Take 200 mcg by mouth See Admin Instructions. All days except Sunday Brand  Name, Disp: , Rfl:   •  pioglitazone (ACTOS) 15 MG Tab, Take 15 mg by mouth every day., Disp: , Rfl:   •  Ascorbic Acid (VITAMIN C) 500 MG Cap, Take 500 mg by mouth every day., Disp: , Rfl:   •  Pyridoxine HCl (VITAMIN B-6 PO), Take 1 Tab by mouth every day., Disp: , Rfl:   •  oxybutynin (DITROPAN XL) 15 MG CR tablet, Take 15 mg by mouth every morning., Disp: , Rfl: 2  •  allopurinol (ZYLOPRIM) 100 MG Tab, Take 100 mg by mouth every bedtime., Disp: , Rfl: 2  •  acetaminophen (TYLENOL 8 HOUR ARTHRITIS PAIN) 650 MG CR tablet, Take 1,300 mg by mouth 2 Times a Day., Disp: , Rfl:   •  traZODone (DESYREL) 100 MG Tab, Take 100 mg by mouth every evening., Disp: , Rfl:   •  levothyroxine (SYNTHROID) 175 MCG Tab, Take 175 mcg by mouth every Sunday. Brand Name, Disp: , Rfl:   •  loratadine (CLARITIN) 10 MG Tab, Take 10 mg by mouth every day., Disp: , Rfl:   •  citalopram (CELEXA) 20 MG TABS, Take 20 mg by mouth every day., Disp: , Rfl:   •  gemfibrozil (LOPID) 600 MG Tab, Take 1 Tab by mouth every morning., Disp: 90 Tab, Rfl: 4  •  LEVEMIR FLEXTOUCH 100 UNIT/ML injection PEN, Inject 14 Units as instructed 2 times a day., Disp: , Rfl:   •  Cholecalciferol (VITAMIN D-3) 5000 units Tab, Take 5,000 Units by mouth 2 Times a Day., Disp: , Rfl:   ALLERGIES:   Allergies   Allergen Reactions   • Sulfa Drugs Rash     8/2015 rash and hives       SURGHX:   Past Surgical History:   Procedure Laterality Date   • ITZEL CARDIAC CATH  02/27/2019    normal coronaries   • CATARACT PHACO WITH IOL Right 1/22/2018    Procedure: CATARACT PHACO WITH IOL;  Surgeon: Santosh Holden M.D.;  Location: SURGERY SAME DAY [facility];  Service: Ophthalmology   • CATARACT PHACO WITH IOL Left 1/8/2018    Procedure: CATARACT PHACO WITH IOL;  Surgeon: Santosh Holden M.D.;  Location: SURGERY SAME DAY [facility];  Service: Ophthalmology   • OTHER ORTHOPEDIC SURGERY Right 2013    hip arthroplasty   • CHOLECYSTECTOMY  2001   • ABDOMINAL HYSTERECTOMY TOTAL      1987  "  • MASTECTOMY  partial   • PB RADIATION THERAPY PLAN SIMPLE       SOCHX:  reports that she has never smoked. She has never used smokeless tobacco. She reports current alcohol use. She reports that she does not use drugs..  FH:   Family History   Problem Relation Age of Onset   • Heart Disease Mother         HEART VALVE REPLACEMENT.   • Heart Disease Father         CORONARY ARTERY BYPASS GRAFTS   • Cancer Maternal Aunt    • Diabetes Brother    • Thyroid Brother    • Heart Attack Brother    • Heart Disease Brother          Physical Exam:  Vitals/ General Appearance:   Weight/BMI: Body mass index is 43.3 kg/m².  Resp 14   Ht 1.651 m (5' 5\")   Wt 118 kg (260 lb 3.2 oz)   Vitals:    11/04/20 0811   Resp: 14   Weight: 118 kg (260 lb 3.2 oz)   Height: 1.651 m (5' 5\")       Pt. is alert and oriented to time, place and person. Cooperative and in no apparent distress.       Constitutional: Alert, no distress, well-groomed.  Skin: No rashes in visible areas.  Eye: Round. Conjunctiva clear, lids normal. No icterus.   ENMT: Lips pink without lesions, good dentition, moist mucous membranes. Phonation normal.  Neck: No masses, no thyromegaly. Moves freely without pain.  CV: Pulse as reported by patient  Respiratory: Unlabored respiratory effort, no cough or audible wheeze  Psych: Alert and oriented x3, normal affect and mood.     ASSESSMENT AND PLAN     1. Sleep Apnea .   The pathophysiology of sleep anea and the increased risk of cardiovascular morbidity from untreated sleep apnea is discussed in detail with the patient. She  also has HTN, heart disease and Afib which can be worsened by her KEN.     She is urged to avoid supine sleep, weight gain and alcoholic beverages since all of these can worsen sleep apnea. She is cautioned against drowsy driving. If She feels sleepy while driving, She must pull over for a break/nap, rather than persist on the road, in the interest of She own safety and that of others on the " road.   Plan   - Auto CPAP vs overnight CPAP titration vs dental appliance and surgeries was dicussed in detail. After informed discussion CPAP/BiPAP is ordered today    - SS was reviewed and discussed with the pt    2. Regarding treatment of other past medical problems and general health maintenance,  She is urged to follow up with PCP.

## 2020-11-09 ENCOUNTER — TELEPHONE (OUTPATIENT)
Dept: CARDIOLOGY | Facility: MEDICAL CENTER | Age: 70
End: 2020-11-09

## 2020-11-09 NOTE — TELEPHONE ENCOUNTER
Returned call to patient regarding her question about salt substitutes. Patient educated that they are ok to use very sparingly, when used in large doses salt subtitutes can increase potassium levels in the blood. Encouraged use of other spices to season food as needed and avoid salt substitutes as much as possible. Patient verbalized understanding but stated that she was at a doctor appointment and needed to go.

## 2020-11-23 ENCOUNTER — TELEPHONE (OUTPATIENT)
Dept: CARDIOLOGY | Facility: MEDICAL CENTER | Age: 70
End: 2020-11-23

## 2020-11-23 NOTE — TELEPHONE ENCOUNTER
Has she been taking her Diuretics as prescribed? If so, please have her increase her Torsemide to 60 mg in the morning and continue 40 mg in there afternoon.

## 2020-11-23 NOTE — TELEPHONE ENCOUNTER
Patient updated to increase torsemide to 60/40 and will get labs completed in a couple weeks and prior to her December appointment. Talked about different ideas for low sodium recipes and seasonings.

## 2020-11-23 NOTE — TELEPHONE ENCOUNTER
Guide HF Single Arm Study Patient:    Her CardioMEMs pressures are PaD 31 mmHg today and has been above her threshold since last Wednesday., Threshold is PaD 25 mmHg.     Please Call patient and see how she is feeling and if she is taking her diuretic.

## 2020-11-23 NOTE — TELEPHONE ENCOUNTER
"Called patient, she is not having any SOB, or weight gain, does have some increased swelling. Admits to \"not eating the best\" but will not tell me what she has been eating, just says increased sodium.   "

## 2020-11-30 ENCOUNTER — TELEPHONE (OUTPATIENT)
Dept: CARDIOLOGY | Facility: MEDICAL CENTER | Age: 70
End: 2020-11-30

## 2020-11-30 NOTE — TELEPHONE ENCOUNTER
Guide HF Single Arm Study Patient:    Her CardioMEMs pressures are PaD 32 mmHg today. Threshold/Goal is PaD 25 mmHg.    Pt reports only taking increased Dose of Torsemide 60 mg in AM and 40 mg in PM for 3 days. Please have her take that dose until I change it or until our next visit. Labs due the week before her appointment. She can schedule an appointment for her labs now.

## 2020-11-30 NOTE — TELEPHONE ENCOUNTER
Called patient to reiterate that patient is to continue to take torsemide 60mg in AM and 40mg in afternoon continuously unless changed by JIN or MD, also reminded patient to schedule her lab appointment early as the labs are very busy during this time. Patient verbalized understanding, said she tried texting, updated her that I do not receive her texts and that she should call the office if she has questions.

## 2020-12-04 ENCOUNTER — HOSPITAL ENCOUNTER (OUTPATIENT)
Dept: RADIOLOGY | Facility: MEDICAL CENTER | Age: 70
End: 2020-12-04
Attending: NURSE PRACTITIONER
Payer: MEDICARE

## 2020-12-04 DIAGNOSIS — N13.30 HYDRONEPHROSIS, UNSPECIFIED HYDRONEPHROSIS TYPE: ICD-10-CM

## 2020-12-04 PROCEDURE — 74176 CT ABD & PELVIS W/O CONTRAST: CPT

## 2020-12-08 ENCOUNTER — HOSPITAL ENCOUNTER (OUTPATIENT)
Dept: RADIOLOGY | Facility: MEDICAL CENTER | Age: 70
End: 2020-12-08
Payer: MEDICARE

## 2020-12-09 ENCOUNTER — TELEPHONE (OUTPATIENT)
Dept: CARDIOLOGY | Facility: MEDICAL CENTER | Age: 70
End: 2020-12-09

## 2020-12-09 ENCOUNTER — HOSPITAL ENCOUNTER (OUTPATIENT)
Dept: LAB | Facility: MEDICAL CENTER | Age: 70
End: 2020-12-09
Attending: NURSE PRACTITIONER
Payer: MEDICARE

## 2020-12-09 ENCOUNTER — HOSPITAL ENCOUNTER (OUTPATIENT)
Dept: CARDIOLOGY | Facility: MEDICAL CENTER | Age: 70
End: 2020-12-09
Attending: NURSE PRACTITIONER
Payer: MEDICARE

## 2020-12-09 ENCOUNTER — TELEPHONE (OUTPATIENT)
Dept: VASCULAR LAB | Facility: MEDICAL CENTER | Age: 70
End: 2020-12-09

## 2020-12-09 DIAGNOSIS — E71.19: ICD-10-CM

## 2020-12-09 DIAGNOSIS — Z79.899 HIGH RISK MEDICATION USE: ICD-10-CM

## 2020-12-09 DIAGNOSIS — Z79.4 CONTROLLED TYPE 2 DIABETES MELLITUS WITH STAGE 3 CHRONIC KIDNEY DISEASE, WITH LONG-TERM CURRENT USE OF INSULIN (HCC): ICD-10-CM

## 2020-12-09 DIAGNOSIS — I50.30 ACC/AHA STAGE C HEART FAILURE WITH PRESERVED EJECTION FRACTION (HCC): ICD-10-CM

## 2020-12-09 DIAGNOSIS — N18.30 CONTROLLED TYPE 2 DIABETES MELLITUS WITH STAGE 3 CHRONIC KIDNEY DISEASE, WITH LONG-TERM CURRENT USE OF INSULIN (HCC): ICD-10-CM

## 2020-12-09 DIAGNOSIS — E11.22 CONTROLLED TYPE 2 DIABETES MELLITUS WITH STAGE 3 CHRONIC KIDNEY DISEASE, WITH LONG-TERM CURRENT USE OF INSULIN (HCC): ICD-10-CM

## 2020-12-09 LAB
LV EJECT FRACT  99904: 60
LV EJECT FRACT MOD 2C 99903: 37.47
LV EJECT FRACT MOD 4C 99902: 62.7
LV EJECT FRACT MOD BP 99901: 53.37

## 2020-12-09 PROCEDURE — 93306 TTE W/DOPPLER COMPLETE: CPT

## 2020-12-09 PROCEDURE — 93306 TTE W/DOPPLER COMPLETE: CPT | Mod: 26 | Performed by: INTERNAL MEDICINE

## 2020-12-09 NOTE — TELEPHONE ENCOUNTER
[place]      Hello, Carinton calling from [facility] on behalf of Mahesh Gamez she is requesting a call back in regards to a CT ordered. She will like a call back at [phone]      Thank you!

## 2020-12-15 ENCOUNTER — APPOINTMENT (OUTPATIENT)
Dept: RADIOLOGY | Facility: MEDICAL CENTER | Age: 70
End: 2020-12-15
Attending: NURSE PRACTITIONER
Payer: MEDICARE

## 2020-12-16 ENCOUNTER — TELEPHONE (OUTPATIENT)
Dept: VASCULAR LAB | Facility: MEDICAL CENTER | Age: 70
End: 2020-12-16

## 2020-12-16 NOTE — TELEPHONE ENCOUNTER
We have been unable to reach this patient via telephone.  Letter sent  Patrizia Arias, Clinical Pharmacist, CDE, CACP

## 2020-12-16 NOTE — LETTER
[address] [id number]        Dear Rachelle Reina ,    We have been unsuccessful in our attempts to contact you regarding your Diabetes Clinical Pharmacist referral.  Please contact us if you would like to schedule an appointment for help managing your Diabetes.    Please contact our clinic so we may assist you.  We are open Monday-Friday 8 am until 5 pm.  You may reach our Service at [phone].        Sincerely,       Dennis Oakley PharmD, [facility]  Pharmacy Clinical Supervisor  [facility]  Outpatient Ambulatory Care Service

## 2020-12-17 ENCOUNTER — TELEPHONE (OUTPATIENT)
Dept: CARDIOLOGY | Facility: MEDICAL CENTER | Age: 70
End: 2020-12-17

## 2020-12-17 DIAGNOSIS — I50.32 CHRONIC DIASTOLIC CONGESTIVE HEART FAILURE (HCC): Primary | Chronic | ICD-10-CM

## 2020-12-17 NOTE — TELEPHONE ENCOUNTER
Patients brother tested positive for COVID19, son was in contact with brother, patient's sleep study was moved, labs discontinued and mammogram moved due to exposure, patient instructed to increase torsemide to 60mg BID until we say otherwise, get labs completed next week as long as she is not having any signs of COVID

## 2020-12-17 NOTE — TELEPHONE ENCOUNTER
Guide HF Single Arm Study Patient:    Her CardioMEMs pressures are PaD 37 mmHg today. Her threshold is PaD 30 mmHg.     Increase torsemide to 60 mg twice a day and stay on until we tell her otherwise. Can you ask her about her labs. She needed to do those labs last week. Looks like something was discontinued in her labs. Does it need to be reordered    What happened to her sleep study she was suppose to do on Monday?     How are her symptoms?

## 2020-12-30 ENCOUNTER — TELEPHONE (OUTPATIENT)
Dept: VASCULAR LAB | Facility: MEDICAL CENTER | Age: 70
End: 2020-12-30

## 2020-12-30 NOTE — TELEPHONE ENCOUNTER
Pt has not responded to multiple phone calls or letters to establish care for DM.  Will await to hear from patient.Patrizia Arias, Clinical Pharmacist, CDE, CACP

## 2020-12-31 ENCOUNTER — TELEPHONE (OUTPATIENT)
Dept: CARDIOLOGY | Facility: MEDICAL CENTER | Age: 70
End: 2020-12-31

## 2020-12-31 NOTE — TELEPHONE ENCOUNTER
Called patient for significantly elevated cardiomems pressures, up to 39 today,Patient got checked for DVT and was negative but now has gout in foot, due to gout she has not taken any diuretics the last 2 days, emphasized the importance of taking her diuretics as prescribed and that she risks needing to be hospitalized if she allows herself to become fluid overloaded like she is. Patient verbalized understanding and stated she will take her torsemide 60mg BID again.

## 2020-12-31 NOTE — TELEPHONE ENCOUNTER
Called patient regarding her labs that were order on 12/17/2020. Patient stated that she will have them done Monday 01/04/2021

## 2021-01-01 ENCOUNTER — PHARMACY VISIT (OUTPATIENT)
Dept: PHARMACY | Facility: MEDICAL CENTER | Age: 71
End: 2021-01-01
Payer: COMMERCIAL

## 2021-01-01 ENCOUNTER — ANTICOAGULATION VISIT (OUTPATIENT)
Dept: VASCULAR LAB | Facility: MEDICAL CENTER | Age: 71
End: 2021-01-01
Attending: INTERNAL MEDICINE
Payer: MEDICARE

## 2021-01-01 ENCOUNTER — PATIENT MESSAGE (OUTPATIENT)
Dept: CARDIOLOGY | Facility: MEDICAL CENTER | Age: 71
End: 2021-01-01
Payer: MEDICARE

## 2021-01-01 ENCOUNTER — TELEMEDICINE (OUTPATIENT)
Dept: SLEEP MEDICINE | Facility: MEDICAL CENTER | Age: 71
End: 2021-01-01
Payer: MEDICARE

## 2021-01-01 ENCOUNTER — APPOINTMENT (OUTPATIENT)
Dept: SLEEP MEDICINE | Facility: MEDICAL CENTER | Age: 71
End: 2021-01-01
Payer: MEDICARE

## 2021-01-01 ENCOUNTER — OFFICE VISIT (OUTPATIENT)
Dept: CARDIOLOGY | Facility: MEDICAL CENTER | Age: 71
End: 2021-01-01
Payer: MEDICARE

## 2021-01-01 ENCOUNTER — TELEMEDICINE (OUTPATIENT)
Dept: CARDIOLOGY | Facility: MEDICAL CENTER | Age: 71
End: 2021-01-01
Payer: MEDICARE

## 2021-01-01 ENCOUNTER — TELEPHONE (OUTPATIENT)
Dept: VASCULAR LAB | Facility: MEDICAL CENTER | Age: 71
End: 2021-01-01

## 2021-01-01 ENCOUNTER — TELEPHONE (OUTPATIENT)
Dept: CARDIOLOGY | Facility: MEDICAL CENTER | Age: 71
End: 2021-01-01

## 2021-01-01 ENCOUNTER — PATIENT MESSAGE (OUTPATIENT)
Dept: CARDIOLOGY | Facility: MEDICAL CENTER | Age: 71
End: 2021-01-01

## 2021-01-01 ENCOUNTER — DOCUMENTATION (OUTPATIENT)
Dept: VASCULAR LAB | Facility: MEDICAL CENTER | Age: 71
End: 2021-01-01

## 2021-01-01 ENCOUNTER — HOSPITAL ENCOUNTER (OUTPATIENT)
Dept: LAB | Facility: MEDICAL CENTER | Age: 71
End: 2021-12-20
Attending: INTERNAL MEDICINE
Payer: MEDICARE

## 2021-01-01 VITALS
DIASTOLIC BLOOD PRESSURE: 56 MMHG | SYSTOLIC BLOOD PRESSURE: 98 MMHG | BODY MASS INDEX: 44.32 KG/M2 | WEIGHT: 266 LBS | HEIGHT: 65 IN | HEART RATE: 95 BPM

## 2021-01-01 VITALS
OXYGEN SATURATION: 94 % | DIASTOLIC BLOOD PRESSURE: 80 MMHG | HEART RATE: 78 BPM | SYSTOLIC BLOOD PRESSURE: 126 MMHG | BODY MASS INDEX: 44.98 KG/M2 | WEIGHT: 270 LBS | HEIGHT: 65 IN

## 2021-01-01 VITALS — BODY MASS INDEX: 44.32 KG/M2 | WEIGHT: 266 LBS | HEIGHT: 65 IN

## 2021-01-01 VITALS
BODY MASS INDEX: 44.48 KG/M2 | DIASTOLIC BLOOD PRESSURE: 63 MMHG | SYSTOLIC BLOOD PRESSURE: 106 MMHG | HEIGHT: 65 IN | WEIGHT: 267 LBS

## 2021-01-01 DIAGNOSIS — N18.31 STAGE 3A CHRONIC KIDNEY DISEASE: ICD-10-CM

## 2021-01-01 DIAGNOSIS — I50.32 CHRONIC DIASTOLIC CONGESTIVE HEART FAILURE (HCC): ICD-10-CM

## 2021-01-01 DIAGNOSIS — I10 HTN (HYPERTENSION), MALIGNANT: ICD-10-CM

## 2021-01-01 DIAGNOSIS — Z79.4 TYPE 2 DIABETES MELLITUS WITH HYPERGLYCEMIA, WITH LONG-TERM CURRENT USE OF INSULIN (HCC): ICD-10-CM

## 2021-01-01 DIAGNOSIS — I48.11 LONGSTANDING PERSISTENT ATRIAL FIBRILLATION (HCC): ICD-10-CM

## 2021-01-01 DIAGNOSIS — Z79.01 CHRONIC ANTICOAGULATION: ICD-10-CM

## 2021-01-01 DIAGNOSIS — G47.33 OBSTRUCTIVE SLEEP APNEA: ICD-10-CM

## 2021-01-01 DIAGNOSIS — E11.65 TYPE 2 DIABETES MELLITUS WITH HYPERGLYCEMIA, WITH LONG-TERM CURRENT USE OF INSULIN (HCC): ICD-10-CM

## 2021-01-01 DIAGNOSIS — Z79.899 HIGH RISK MEDICATION USE: ICD-10-CM

## 2021-01-01 DIAGNOSIS — I50.30 ACC/AHA STAGE C HEART FAILURE WITH PRESERVED EJECTION FRACTION (HCC): ICD-10-CM

## 2021-01-01 DIAGNOSIS — E11.22 CONTROLLED TYPE 2 DIABETES MELLITUS WITH STAGE 3 CHRONIC KIDNEY DISEASE, WITH LONG-TERM CURRENT USE OF INSULIN (HCC): ICD-10-CM

## 2021-01-01 DIAGNOSIS — I35.0 MODERATE AORTIC STENOSIS: ICD-10-CM

## 2021-01-01 DIAGNOSIS — I48.20 CHRONIC ATRIAL FIBRILLATION (HCC): ICD-10-CM

## 2021-01-01 DIAGNOSIS — Z79.4 CONTROLLED TYPE 2 DIABETES MELLITUS WITH STAGE 3 CHRONIC KIDNEY DISEASE, WITH LONG-TERM CURRENT USE OF INSULIN (HCC): ICD-10-CM

## 2021-01-01 DIAGNOSIS — Z78.9 NONSMOKER: ICD-10-CM

## 2021-01-01 DIAGNOSIS — I10 BENIGN ESSENTIAL HYPERTENSION: Chronic | ICD-10-CM

## 2021-01-01 DIAGNOSIS — I50.9 HEART FAILURE, NYHA CLASS 3 (HCC): ICD-10-CM

## 2021-01-01 DIAGNOSIS — F51.04 CHRONIC INSOMNIA: Chronic | ICD-10-CM

## 2021-01-01 DIAGNOSIS — D68.69 SECONDARY HYPERCOAGULABLE STATE (HCC): ICD-10-CM

## 2021-01-01 DIAGNOSIS — I50.32 CHRONIC DIASTOLIC CONGESTIVE HEART FAILURE (HCC): Chronic | ICD-10-CM

## 2021-01-01 DIAGNOSIS — N18.30 CONTROLLED TYPE 2 DIABETES MELLITUS WITH STAGE 3 CHRONIC KIDNEY DISEASE, WITH LONG-TERM CURRENT USE OF INSULIN (HCC): ICD-10-CM

## 2021-01-01 LAB
ANION GAP SERPL CALC-SCNC: 10 MMOL/L (ref 7–16)
BUN SERPL-MCNC: 34 MG/DL (ref 8–22)
CALCIUM SERPL-MCNC: 9.8 MG/DL (ref 8.5–10.5)
CHLORIDE SERPL-SCNC: 105 MMOL/L (ref 96–112)
CO2 SERPL-SCNC: 25 MMOL/L (ref 20–33)
CREAT SERPL-MCNC: 1.15 MG/DL (ref 0.5–1.4)
EKG IMPRESSION: NORMAL
GLUCOSE SERPL-MCNC: 89 MG/DL (ref 65–99)
INR BLD: 1.8 (ref 0.9–1.2)
INR BLD: 2 (ref 0.9–1.2)
INR BLD: 3.2 (ref 0.9–1.2)
INR BLD: 3.7 (ref 0.9–1.2)
INR PPP: 1.8 (ref 2–3.5)
INR PPP: 2 (ref 2–3.5)
INR PPP: 2.6 (ref 2–3.5)
INR PPP: 3.2 (ref 2–3.5)
INR PPP: 3.7 (ref 2–3.5)
POTASSIUM SERPL-SCNC: 4.4 MMOL/L (ref 3.6–5.5)
SODIUM SERPL-SCNC: 140 MMOL/L (ref 135–145)

## 2021-01-01 PROCEDURE — 99212 OFFICE O/P EST SF 10 MIN: CPT

## 2021-01-01 PROCEDURE — 85610 PROTHROMBIN TIME: CPT

## 2021-01-01 PROCEDURE — 99214 OFFICE O/P EST MOD 30 MIN: CPT | Mod: 95 | Performed by: INTERNAL MEDICINE

## 2021-01-01 PROCEDURE — 80048 BASIC METABOLIC PNL TOTAL CA: CPT

## 2021-01-01 PROCEDURE — 93000 ELECTROCARDIOGRAM COMPLETE: CPT | Performed by: INTERNAL MEDICINE

## 2021-01-01 PROCEDURE — 99211 OFF/OP EST MAY X REQ PHY/QHP: CPT | Performed by: NURSE PRACTITIONER

## 2021-01-01 PROCEDURE — 36415 COLL VENOUS BLD VENIPUNCTURE: CPT

## 2021-01-01 PROCEDURE — 99215 OFFICE O/P EST HI 40 MIN: CPT | Mod: 25 | Performed by: INTERNAL MEDICINE

## 2021-01-01 PROCEDURE — 99213 OFFICE O/P EST LOW 20 MIN: CPT | Mod: 95 | Performed by: NURSE PRACTITIONER

## 2021-01-01 PROCEDURE — 99211 OFF/OP EST MAY X REQ PHY/QHP: CPT

## 2021-01-01 PROCEDURE — RXMED WILLOW AMBULATORY MEDICATION CHARGE: Performed by: INTERNAL MEDICINE

## 2021-01-01 RX ORDER — FINERENONE 10 MG/1
10 TABLET, FILM COATED ORAL DAILY
Qty: 30 TABLET | Refills: 11 | COMMUNITY
Start: 2021-01-01 | End: 2021-01-01 | Stop reason: SDUPTHER

## 2021-01-01 RX ORDER — FINERENONE 10 MG/1
10 TABLET, FILM COATED ORAL DAILY
Qty: 30 TABLET | Refills: 11 | Status: SHIPPED | OUTPATIENT
Start: 2021-01-01 | End: 2021-01-01 | Stop reason: SDUPTHER

## 2021-01-01 RX ORDER — FINERENONE 10 MG/1
10 TABLET, FILM COATED ORAL DAILY
Qty: 30 TABLET | Refills: 0 | Status: SHIPPED | OUTPATIENT
Start: 2021-01-01 | End: 2022-01-01

## 2021-01-01 RX ORDER — FINERENONE 10 MG/1
10 TABLET, FILM COATED ORAL DAILY
Qty: 90 TABLET | Refills: 1 | Status: SHIPPED | OUTPATIENT
Start: 2021-01-01 | End: 2021-01-01

## 2021-01-01 ASSESSMENT — ENCOUNTER SYMPTOMS
DIAPHORESIS: 0
DEPRESSION: 0
SHORTNESS OF BREATH: 1
SENSORY CHANGE: 0
BRUISES/BLEEDS EASILY: 0
ABDOMINAL PAIN: 0
DIAPHORESIS: 0
ABDOMINAL PAIN: 0
DOUBLE VISION: 0
HEADACHES: 0
BLURRED VISION: 0
PALPITATIONS: 0
MEMORY LOSS: 0
DIZZINESS: 0
FALLS: 0
BLURRED VISION: 0
BRUISES/BLEEDS EASILY: 0
ABDOMINAL PAIN: 0
MEMORY LOSS: 0
MYALGIAS: 0
DOUBLE VISION: 0
DIAPHORESIS: 0
DEPRESSION: 0
SENSORY CHANGE: 0
PALPITATIONS: 0
FEVER: 0
PALPITATIONS: 0
DOUBLE VISION: 0
MEMORY LOSS: 0
COUGH: 0
FEVER: 0
FALLS: 0
FEVER: 0
SHORTNESS OF BREATH: 1
BRUISES/BLEEDS EASILY: 0
BLURRED VISION: 0
FALLS: 0
DIZZINESS: 0
SHORTNESS OF BREATH: 1
DIZZINESS: 0
SENSORY CHANGE: 0
MYALGIAS: 0
HEADACHES: 0
COUGH: 0
HEADACHES: 0
MYALGIAS: 0
COUGH: 0
DEPRESSION: 0

## 2021-01-01 ASSESSMENT — FIBROSIS 4 INDEX
FIB4 SCORE: 1.61

## 2021-01-05 ENCOUNTER — TELEPHONE (OUTPATIENT)
Dept: VASCULAR LAB | Facility: MEDICAL CENTER | Age: 71
End: 2021-01-05

## 2021-01-06 ENCOUNTER — OFFICE VISIT (OUTPATIENT)
Dept: CARDIOLOGY | Facility: MEDICAL CENTER | Age: 71
End: 2021-01-06
Payer: MEDICARE

## 2021-01-06 ENCOUNTER — TELEPHONE (OUTPATIENT)
Dept: CARDIOLOGY | Facility: MEDICAL CENTER | Age: 71
End: 2021-01-06

## 2021-01-06 ENCOUNTER — HOSPITAL ENCOUNTER (OUTPATIENT)
Dept: LAB | Facility: MEDICAL CENTER | Age: 71
End: 2021-01-06
Attending: NURSE PRACTITIONER
Payer: MEDICARE

## 2021-01-06 VITALS
SYSTOLIC BLOOD PRESSURE: 102 MMHG | BODY MASS INDEX: 42.65 KG/M2 | HEART RATE: 66 BPM | OXYGEN SATURATION: 91 % | RESPIRATION RATE: 17 BRPM | HEIGHT: 65 IN | WEIGHT: 256 LBS | DIASTOLIC BLOOD PRESSURE: 60 MMHG

## 2021-01-06 DIAGNOSIS — Z79.01 CHRONIC ANTICOAGULATION: ICD-10-CM

## 2021-01-06 DIAGNOSIS — E78.5 DYSLIPIDEMIA: ICD-10-CM

## 2021-01-06 DIAGNOSIS — I10 HTN (HYPERTENSION), MALIGNANT: ICD-10-CM

## 2021-01-06 DIAGNOSIS — I50.30 ACC/AHA STAGE C HEART FAILURE WITH PRESERVED EJECTION FRACTION (HCC): ICD-10-CM

## 2021-01-06 DIAGNOSIS — N18.30 CONTROLLED TYPE 2 DIABETES MELLITUS WITH STAGE 3 CHRONIC KIDNEY DISEASE, WITH LONG-TERM CURRENT USE OF INSULIN (HCC): ICD-10-CM

## 2021-01-06 DIAGNOSIS — E11.22 CONTROLLED TYPE 2 DIABETES MELLITUS WITH STAGE 3 CHRONIC KIDNEY DISEASE, WITH LONG-TERM CURRENT USE OF INSULIN (HCC): ICD-10-CM

## 2021-01-06 DIAGNOSIS — I10 BENIGN ESSENTIAL HYPERTENSION: Chronic | ICD-10-CM

## 2021-01-06 DIAGNOSIS — I50.9 HEART FAILURE, NYHA CLASS 3 (HCC): ICD-10-CM

## 2021-01-06 DIAGNOSIS — Z79.899 HIGH RISK MEDICATION USE: ICD-10-CM

## 2021-01-06 DIAGNOSIS — I50.32 CHRONIC DIASTOLIC CONGESTIVE HEART FAILURE (HCC): Chronic | ICD-10-CM

## 2021-01-06 DIAGNOSIS — I48.11 LONGSTANDING PERSISTENT ATRIAL FIBRILLATION (HCC): ICD-10-CM

## 2021-01-06 DIAGNOSIS — E66.01 CLASS 3 SEVERE OBESITY DUE TO EXCESS CALORIES WITH SERIOUS COMORBIDITY AND BODY MASS INDEX (BMI) OF 40.0 TO 44.9 IN ADULT (HCC): ICD-10-CM

## 2021-01-06 DIAGNOSIS — I35.0 AORTIC VALVE STENOSIS, ETIOLOGY OF CARDIAC VALVE DISEASE UNSPECIFIED: ICD-10-CM

## 2021-01-06 DIAGNOSIS — Z79.4 CONTROLLED TYPE 2 DIABETES MELLITUS WITH STAGE 3 CHRONIC KIDNEY DISEASE, WITH LONG-TERM CURRENT USE OF INSULIN (HCC): ICD-10-CM

## 2021-01-06 LAB
ANION GAP SERPL CALC-SCNC: 14 MMOL/L (ref 7–16)
BUN SERPL-MCNC: 42 MG/DL (ref 8–22)
CALCIUM SERPL-MCNC: 10.3 MG/DL (ref 8.5–10.5)
CHLORIDE SERPL-SCNC: 101 MMOL/L (ref 96–112)
CO2 SERPL-SCNC: 26 MMOL/L (ref 20–33)
CREAT SERPL-MCNC: 1.43 MG/DL (ref 0.5–1.4)
GLUCOSE SERPL-MCNC: 85 MG/DL (ref 65–99)
NT-PROBNP SERPL IA-MCNC: 1350 PG/ML (ref 0–125)
POTASSIUM SERPL-SCNC: 4.1 MMOL/L (ref 3.6–5.5)
SODIUM SERPL-SCNC: 141 MMOL/L (ref 135–145)

## 2021-01-06 PROCEDURE — 99214 OFFICE O/P EST MOD 30 MIN: CPT | Performed by: NURSE PRACTITIONER

## 2021-01-06 PROCEDURE — 36415 COLL VENOUS BLD VENIPUNCTURE: CPT

## 2021-01-06 PROCEDURE — 83880 ASSAY OF NATRIURETIC PEPTIDE: CPT

## 2021-01-06 PROCEDURE — 80048 BASIC METABOLIC PNL TOTAL CA: CPT

## 2021-01-06 RX ORDER — CEPHALEXIN 500 MG/1
500 CAPSULE ORAL
COMMUNITY
Start: 2020-12-30 | End: 2021-01-20

## 2021-01-06 RX ORDER — TORSEMIDE 20 MG/1
60 TABLET ORAL 2 TIMES DAILY
Qty: 540 TAB | Refills: 3 | Status: SHIPPED | OUTPATIENT
Start: 2021-01-06 | End: 2022-01-01

## 2021-01-06 RX ORDER — FLUCONAZOLE 200 MG/1
TABLET ORAL
COMMUNITY
Start: 2020-11-24 | End: 2021-01-20

## 2021-01-06 ASSESSMENT — FIBROSIS 4 INDEX: FIB4 SCORE: 1.2

## 2021-01-06 ASSESSMENT — ENCOUNTER SYMPTOMS
MYALGIAS: 0
FEVER: 0
PND: 0
DIARRHEA: 1
ORTHOPNEA: 0
CLAUDICATION: 0
COUGH: 0
SHORTNESS OF BREATH: 1
DIZZINESS: 0
PALPITATIONS: 0
ABDOMINAL PAIN: 0

## 2021-01-06 NOTE — TELEPHONE ENCOUNTER
Your patient has been flagged through our echocardiogram surveillance with the following echocardiogram results:    Moderate Aortic Stenosis    Cardiologist: Dr. Jeffrey and Audrey GARCIA    Current Plan of Care for Structural Heart Disease: not mentioned in last cardiology clinic visit    Next Echo date: not scheduled    Next Follow up appointment: 4/13/21 with Dr. Jeffrey    Please place Referral to Structural Heart team if warranted for echo surveillance and valvular heart disease education.

## 2021-01-06 NOTE — TELEPHONE ENCOUNTER
I saw this patient today and just finished her note. Discussed findings with Dr. Jeffrey and we will keep an eye on her AS for now. She is also a Study patient for CardioMEMs.

## 2021-01-06 NOTE — PROGRESS NOTES
Chief Complaint   Patient presents with   • Congestive Heart Failure       Subjective:   Rachelle Reina is a 70 y.o. female who is following up on her heart failure.      Patient of Dr. Carnes and Dr. Jeffrey in the heart failure clinic.  She was last seen in clinic on 10/27/2020.  During her last visit, patient was recommended to increase her diuretics for 3 days.    Since that visit, her CardioMEMS pressures continue to be elevated, patient was recommended to continue torsemide at 60 mg twice a day.  Patient was sent for lab testing, which she has not completed.    Patient does admit to being inconsistent about her sodium intake.  Patient states she does not get much help from her  with meal preparation, and has not been as disciplined with monitoring her sodium intake.    Her weights have been at 253 pounds.  She continues to try to lose weight.    For her symptoms, aside from a little bit of lower extremity edema and shortness of breath, she reports diarrhea symptoms.  She is trying her best to take her diuretics as directed.  She denies chest pain, palpitations, orthopnea, PND or dizziness/lightheadedness.    She has been having difficulty transmitting readings from her CardioMEMS, her in office reading today was 32 mmHg, but she does not have a good waveform. She will try again when she gets home. She is being sent home with a new pillow machine today.     She has her sleep study rescheduled for this Sunday.    She has not established with the pharmacotherapy program for her diabetes yet.  She states she will call and get set up.    Additonally, patient has the following medical problems:    -Atrial fibrillation: Taking warfarin, followed by anticoagulation clinic    -No coronary disease on angiogram in 2019    -Insulin-dependent diabetic: Followed by her PCP    -Hyperlipidemia: Taking rosuvastatin, gemfibrozil    -Gout: Taking allopurinol    -Depression: Taking Celexa    -Thyroid disease: Taking  Synthroid    -History of ovarian and breast cancer    -Chronic venous insufficiency    Past Medical History:   Diagnosis Date   • A-fib (HCC)    • Arthritis 05/05/2020    knees and fingers   • Atrial fibrillation (HCC)    • Benign essential hypertension 7/1/2011   • Bowel habit changes     diarrhea   • Breast cancer (HCC)    • Bronchitis 2015    history of   • CAD (coronary artery disease)    • Cancer (HCC) 1987, 2000    breast and ovary   • Cataract 2018    IOL bilat   • Diabetes ([facility])     oral medication, insulin   • Diastolic congestive heart failure (HCC) 2019   • Disorder of thyroid    • High cholesterol    • History of cardiac catheterization 4/22/2010   • History of echocardiogram 4/22/2011   • History of hypothyroidism 8/29/2008   • Hypercholesteremia 7/1/2011   • Long term (current) use of anticoagulants 7/1/2011   • Morbid obesity (HCC) 10/28/2008   • Ovarian cancer (HCC)    • Pain     knees   • Sleep apnea     pt does not use cpap   • Snoring     sleep study done   • Urinary incontinence      Past Surgical History:   Procedure Laterality Date   • ZZZ CARDIAC CATH  02/27/2019    normal coronaries   • CATARACT PHACO WITH IOL Right 1/22/2018    Procedure: CATARACT PHACO WITH IOL;  Surgeon: Santosh Holden M.D.;  Location: SURGERY SAME DAY [facility] ORS;  Service: Ophthalmology   • CATARACT PHACO WITH IOL Left 1/8/2018    Procedure: CATARACT PHACO WITH IOL;  Surgeon: Santosh Holden M.D.;  Location: SURGERY SAME DAY [facility] ORS;  Service: Ophthalmology   • OTHER ORTHOPEDIC SURGERY Right 2013    hip arthroplasty   • CHOLECYSTECTOMY  2001   • ABDOMINAL HYSTERECTOMY TOTAL      1987   • MASTECTOMY  partial   • PB RADIATION THERAPY PLAN SIMPLE       Family History   Problem Relation Age of Onset   • Heart Disease Mother         HEART VALVE REPLACEMENT.   • Heart Disease Father         CORONARY ARTERY BYPASS GRAFTS   • Cancer Maternal Aunt    • Diabetes Brother    • Thyroid Brother    • Heart Attack Brother    •  Heart Disease Brother      Social History     Socioeconomic History   • Marital status:      Spouse name: Not on file   • Number of children: Not on file   • Years of education: Not on file   • Highest education level: Not on file   Occupational History   • Not on file   Social Needs   • Financial resource strain: Not on file   • Food insecurity     Worry: Not on file     Inability: Not on file   • Transportation needs     Medical: Not on file     Non-medical: Not on file   Tobacco Use   • Smoking status: Never Smoker   • Smokeless tobacco: Never Used   Substance and Sexual Activity   • Alcohol use: Yes     Comment: 1 or 2 drinks a month   • Drug use: No   • Sexual activity: Not on file   Lifestyle   • Physical activity     Days per week: Not on file     Minutes per session: Not on file   • Stress: Not on file   Relationships   • Social connections     Talks on phone: Not on file     Gets together: Not on file     Attends Ten[employer] service: Not on file     Active member of club or organization: Not on file     Attends meetings of clubs or organizations: Not on file     Relationship status: Not on file   • Intimate partner violence     Fear of current or ex partner: Not on file     Emotionally abused: Not on file     Physically abused: Not on file     Forced sexual activity: Not on file   Other Topics Concern   • Not on file   Social History Narrative   • Not on file     Allergies   Allergen Reactions   • Sulfa Drugs Rash     8/2015 rash and hives       Outpatient Encounter Medications as of 1/6/2021   Medication Sig Dispense Refill   • torsemide (DEMADEX) 20 MG Tab Take 3 Tabs by mouth 2 times a day. 540 Tab 3   • cephALEXin (KEFLEX) 500 MG Cap Take 500 mg by mouth.     • fluconazole (DIFLUCAN) 200 MG Tab TK 1 T PO  ONCE D IN CASE ANTIBIOTIC STARTS YEAST INFECTION     • potassium chloride SA (KDUR) 20 MEQ Tab CR Take 2 Tabs by mouth 2 times a day. 360 Tab 3   • metoprolol (LOPRESSOR) 50 MG Tab TAKE 1 TABLET  BY MOUTH TWICE DAILY 180 Tab 4   • rosuvastatin (CRESTOR) 20 MG Tab Take 1 Tab by mouth every evening. 90 Tab 4   • losartan (COZAAR) 50 MG Tab Take 1 Tab by mouth every day. 100 Tab 4   • gemfibrozil (LOPID) 600 MG Tab Take 1 Tab by mouth every morning. 90 Tab 4   • warfarin (COUMADIN) 5 MG Tab Take 1/2-1 tab by mouth daily or as directed by anticoagulation clinic 90 Tab 1   • [DISCONTINUED] torsemide (DEMADEX) 20 MG Tab Take 2 Tabs by mouth 2 times a day. 360 Tab 3   • levothyroxine (SYNTHROID) 200 MCG Tab Take 200 mcg by mouth See Admin Instructions. All days except Sunday  Brand Name     • pioglitazone (ACTOS) 15 MG Tab Take 15 mg by mouth every day.     • Ascorbic Acid (VITAMIN C) 500 MG Cap Take 500 mg by mouth every day.     • Pyridoxine HCl (VITAMIN B-6 PO) Take 1 Tab by mouth every day.     • LEVEMIR FLEXTOUCH 100 UNIT/ML injection PEN Inject 14 Units as instructed 2 times a day.     • oxybutynin (DITROPAN XL) 15 MG CR tablet Take 15 mg by mouth every morning.  2   • allopurinol (ZYLOPRIM) 100 MG Tab Take 100 mg by mouth every bedtime.  2   • acetaminophen (TYLENOL 8 HOUR ARTHRITIS PAIN) 650 MG CR tablet Take 1,300 mg by mouth 2 Times a Day.     • Cholecalciferol (VITAMIN D-3) 5000 units Tab Take 5,000 Units by mouth 2 Times a Day.     • traZODone (DESYREL) 100 MG Tab Take 100 mg by mouth every evening.     • levothyroxine (SYNTHROID) 175 MCG Tab Take 175 mcg by mouth every Sunday. Brand Name     • loratadine (CLARITIN) 10 MG Tab Take 10 mg by mouth every day.     • citalopram (CELEXA) 20 MG TABS Take 20 mg by mouth every day.       No facility-administered encounter medications on file as of 1/6/2021.      Review of Systems   Constitutional: Negative for fever and malaise/fatigue.   Respiratory: Positive for shortness of breath. Negative for cough.    Cardiovascular: Positive for leg swelling. Negative for chest pain, palpitations, orthopnea, claudication and PND.   Gastrointestinal: Positive for diarrhea.  "Negative for abdominal pain.   Musculoskeletal: Negative for myalgias.   Neurological: Negative for dizziness.   All other systems reviewed and are negative.       Objective:   /60 (BP Location: Right arm, Patient Position: Sitting, BP Cuff Size: Adult)   Pulse 66   Resp 17   Ht 1.651 m (5' 5\")   Wt 116.1 kg (256 lb)   LMP 01/05/1987 (Approximate)   SpO2 91%   BMI 42.60 kg/m²     Physical Exam   Constitutional: She is oriented to person, place, and time. She appears well-developed and well-nourished.   Using a walker, BMI 42.60   HENT:   Head: Normocephalic and atraumatic.   Eyes: Pupils are equal, round, and reactive to light. EOM are normal.   Neck: Normal range of motion. Neck supple. No JVD present.   Cardiovascular: Normal rate, regular rhythm and normal heart sounds.   Pulmonary/Chest: Effort normal and breath sounds normal. No respiratory distress. She has no wheezes. She has no rales.   Abdominal: Soft. Bowel sounds are normal.   Musculoskeletal:         General: Edema (R>L trace LE edema ) present.   Neurological: She is alert and oriented to person, place, and time.   Skin: Skin is warm and dry.   Psychiatric: She has a normal mood and affect. Her behavior is normal.   Vitals reviewed.      Lab Results   Component Value Date/Time    CHOLSTRLTOT 119 09/22/2020 12:30 PM    LDL 50 09/22/2020 12:30 PM    HDL 45 09/22/2020 12:30 PM    TRIGLYCERIDE 118 09/22/2020 12:30 PM       Lab Results   Component Value Date/Time    SODIUM 140 10/26/2020 11:35 AM    POTASSIUM 3.7 10/26/2020 11:35 AM    CHLORIDE 101 10/26/2020 11:35 AM    CO2 27 10/26/2020 11:35 AM    GLUCOSE 87 10/26/2020 11:35 AM    BUN 42 (H) 10/26/2020 11:35 AM    CREATININE 1.25 10/26/2020 11:35 AM    CREATININE 0.6 01/27/2009 01:10 PM     Lab Results   Component Value Date/Time    ALKPHOSPHAT 75 09/22/2020 12:30 PM    ASTSGOT 14 09/22/2020 12:30 PM    ALTSGPT 10 09/22/2020 12:30 PM    TBILIRUBIN 0.3 09/22/2020 12:30 PM      Transthoracic " Echo Report 11/10/2015  Difficult study.  No significant valve disease or flow abnormalities.   Normal left ventricular systolic function.   Trace to Small pericardial effusion without evidence of hemodynamic   compromise.  No prior study is available for comparison.      Myocardial Perfusion Report 11/11/2015   IMPRESSIONS   No reversible defects that would indicate ischemia.      TREADMILL STRESS EKG AND MYOCARDIAL PERFUSION SCAN 11/11/2015     DESCRIPTION: The patient received IV lexiscan. The infusion was associated with no symptoms. The baseline electrocardiogram is abnormal Atrial fibrillation. During the infusion, there were no electrocardiographic changes diagnostic of ischemia.     Myocardial perfusion images are to be reported in separate report.     Cath 2/27/2019  POSTPROCEDURE DIAGNOSES:  1.  No angiographic evidence of coronary artery disease.  2.  Normal left ventricular systolic function with ejection fraction of 70%.  3.  Elevated left ventricular end-diastolic pressure.       Transthoracic Echo Report 11/14/2019  Compared to the images of the study done 11/10/15 - there has been no significant change.  Left ventricular ejection fraction is visually estimated to be 60%.  Aortic valve area calculated from the continuity equation is 1.1 cm2.  Mild aortic stenosis.  Right ventricular systolic pressure is estimated to be 30 mmHg.    Transthoracic Echo Report 12/9/2020  Compared to the images of the study done on 11/14/2019 there has been progression of aortic stenosis, previously mild.  Normal left ventricular size, wall thickness, and systolic function.  Left ventricular ejection fraction is visually estimated to be 60%.  Normal right ventricular systolic function.  Mild mitral regurgitation.  Moderate aortic stenosis.  Mild tricuspid regurgitation.  Estimated right ventricular systolic pressure  is 50 mmHg.     Assessment:     1. Benign essential hypertension  torsemide (DEMADEX) 20 MG Tab   2.  "ACC/AHA stage C heart failure with preserved ejection fraction (HCC)     3. Heart failure, NYHA class 3 ([facility])     4. High risk medication use     5. HTN (hypertension), malignant     6. Longstanding persistent atrial fibrillation ([facility])     7. Chronic anticoagulation     8. Dyslipidemia     9. Class 3 severe obesity due to excess calories with serious comorbidity and body mass index (BMI) of 40.0 to 44.9 in adult ([facility])     10. Controlled type 2 diabetes mellitus with stage 3 chronic kidney disease, with long-term current use of insulin ([facility])     11. Aortic valve stenosis, etiology of cardiac valve disease unspecified         Medical Decision Making:  Today's Assessment / Status / Plan:   1. HFpEF, Stage C, Class 3, LVEF 60%: pt appears to have LE edema  -CardioMEMS pressure in office, does not have a good waveform.  Patient to try again when she gets home.  Encouraged regular readings. Garvin looking into changing out her \"pillow\"  -Patient instructed on the point of getting her lab work done.  Lab slips reprinted, she plans on getting her labs done today  -Continue torsemide 60 mg twice a day  -Continue potassium 40 mEq twice a day  -Reinforced 2000 mg sodium diet per day, discussed the importance of this and her symptoms.  Patient verbalized understanding  -Patient has sleep study Sunday. She is follow up with sleep medicine  -Reinforced s/sx of worsening heart failure with patient and weight monitoring. Pt verbalizes understanding. Pt to call office or RTC if present.   -pt does have a history of chronic venous insuffiiciency    2.  Hypertension: Stable  -Continue losartan 50 mg daily  -Continue metoprolol tartrate 50 mg twice a day    3.  Atrial fibrillation, hx unsuccessful cardioversion and prior amiodarone use:  -Continue warfarin, followed by the anticoagulation clinic, pt to get back in with anticoagulation clinic, phone number provided  -Continue metoprolol 50 mg twice a day    4.  Dyslipidemia: Last LDL 50 " on 9/22/2020  -Continue rosuvastatin 20 mg daily  -Continue gemfibrozil 600 mg daily    5. Obesity: BMI 42.60  -Encouraged weight loss    6. Diabetes:  -Pharmacotherapy program and has been trying to get in touch with patient to schedule for diabetes management, patient to schedule appointment when she calls for the anticoagulation clinic.  -Patient to notify PCP  -Discussed with patient hopefully changing her medications may help with weight loss and some of her symptoms    7. Moderate Aortic Stenosis:  -Looks like on recent echo her AS has progressed from mild to moderate.    -Will follow  -Discussed with Dr. Jeffrey, will hold on referral to the structural heart clinic.     FU in clinic in 3 months with Dr. Jeffrey and labs. Sooner if needed.    Patient verbalizes understanding and agrees with the plan of care.     PLEASE NOTE: This Note was created using voice recognition Software. I have made every reasonable attempt to correct obvious errors, but I expect that there are errors of grammar and possibly content that I did not discover before finalizing the note

## 2021-01-10 ENCOUNTER — SLEEP STUDY (OUTPATIENT)
Dept: SLEEP MEDICINE | Facility: MEDICAL CENTER | Age: 71
End: 2021-01-10
Attending: FAMILY MEDICINE
Payer: MEDICARE

## 2021-01-10 DIAGNOSIS — G47.33 OSA (OBSTRUCTIVE SLEEP APNEA): ICD-10-CM

## 2021-01-10 DIAGNOSIS — G47.34 SLEEP RELATED HYPOXIA: ICD-10-CM

## 2021-01-13 ENCOUNTER — TELEMEDICINE (OUTPATIENT)
Dept: SLEEP MEDICINE | Facility: MEDICAL CENTER | Age: 71
End: 2021-01-13
Payer: MEDICARE

## 2021-01-13 VITALS — WEIGHT: 253 LBS | BODY MASS INDEX: 42.15 KG/M2 | HEIGHT: 65 IN

## 2021-01-13 DIAGNOSIS — G47.33 OBSTRUCTIVE SLEEP APNEA: ICD-10-CM

## 2021-01-13 DIAGNOSIS — Z78.9 NONSMOKER: ICD-10-CM

## 2021-01-13 DIAGNOSIS — I10 BENIGN ESSENTIAL HYPERTENSION: Chronic | ICD-10-CM

## 2021-01-13 DIAGNOSIS — F51.04 CHRONIC INSOMNIA: ICD-10-CM

## 2021-01-13 DIAGNOSIS — I50.32 CHRONIC DIASTOLIC CONGESTIVE HEART FAILURE (HCC): Chronic | ICD-10-CM

## 2021-01-13 PROCEDURE — 95811 POLYSOM 6/>YRS CPAP 4/> PARM: CPT | Performed by: FAMILY MEDICINE

## 2021-01-13 PROCEDURE — 99214 OFFICE O/P EST MOD 30 MIN: CPT | Mod: 95,CR | Performed by: NURSE PRACTITIONER

## 2021-01-13 ASSESSMENT — FIBROSIS 4 INDEX: FIB4 SCORE: 1.2

## 2021-01-13 NOTE — PROCEDURES
Technical summary: The patient underwent a CPAP titration.  This was a 16 channel montage study to include a 6 channel EEG, a 2 channel EOG, and chin EMG, left and right leg EMG, a snore channel, and a CFLOW pressure transducer.   Respiratory effort was assessed with the use of a thoracic and abdominal monitor and overnight oximetry was obtained. Audio and video recordings were reviewed. This was a fully attended study and sleep stage scoring was performed. The test was technically adequate.      Interpretation:  Study start time was 11:04:51 PM. Diagnostic recording time was 7h 0.0m with a total sleep time of 4h 44.5m resulting in a sleep efficiency of 67.74%%.   Sleep latency from the start of the study was 11 minutes and the latency from sleep to REM was 205 minutes.  In total,359 arousals were scored for an arousal index of 75.7.    Respiratory:  There were a total of 5 apneas consisting of 0 obstructive apneas, 0 mixed apneas, and 5 central apneas. A total of 18 hypopneas were scored.  The apnea index was 1.05 per hour and the hypopnea index was 3.80 per hour resulting in an overall AHI of 4.85.  AHI during rem was 15.8 and AHI while supine was 0.00.  21% of the apneas were central in nature.    Oximetry:  There was a mean oxygen saturation of 93.0% with a minimum oxygen saturation of 86.0%. Time spent with oxygen saturations below 89% was 15.0 minutes.    Cardiac:  The highest heart rate seen while awake was 111 BPM while the highest heart rate during sleep was 97 BPM with an average sleeping heart rate of 86 BPM.    Limb Movements:  There were a total of 513 PLMs during sleep, of which 297 were PLMS arousals. This resulted in a PLMS index of 108.2 and a PLMS arousal index of 62.6.    CPAP was tried from 5 to 9cm H2O.    CPAP Titration:  The PAP titration was initiated with CPAP 5 cm of water and the pressure which was slowly titrated up in an attempt to eliminate sleep disordered breathing and snoring. The  final pressure tested during the study was CPAP 9 cm water and at this final pressure the patient was observed in non supine REM sleep stage. The apnea hypopnea index improved to 4.9 per hour and O2 blaise 87%. The average O2 stauration was 93%. He spent 0.8 min of sleep time below 89% O2 saturation. Snoring was resolved. The patient utilized small vitera mask with heated humidification. The CPAP was well-tolerated and there were minimal air leaks. No supplemental oxygen was required.    Impression:  1.  Obstructive sleep apnea   2.  PLMS    Recommendations:  I recommend CPAP 9 cm with vitera mask. Recommended 30 day compliance download to assess the efficacy of the recommended pressure and compliance for further outpatient monitoring and management of CPAP therapy. In some cases alternative treatment options may prove effective in resolving sleep apnea and these options include upper airway surgery, the use of a dental orthotic or weight loss and positional therapy. Clinical correlation is required. In general patients with sleep apnea are advised to avoid alcohol and sedatives and to not operate a motor vehicle while drowsy and are at a greater risk for cardiovascular disease.

## 2021-01-13 NOTE — PROGRESS NOTES
Virtual Visit: Established Patient   This visit was conducted via Zoom using secure and encrypted videoconferencing technology. The patient was in a private location in the state of [place].    The patient's identity was confirmed and verbal consent was obtained for this virtual visit.    Subjective:   CC:   Chief Complaint   Patient presents with   • Results     SS        Rachelle Reina is a 70 y.o. female presenting for evaluation and management of:    Sleep study results today  Last OV 11/4/20 with Dr. Hensley.    PSG 10/12/20 noted:  1.  Severe OAS with AHI of 118.2/hr and O2 blaise 76 %  2.  Decreased sleep efficiency   3.  Sleep related hypoxia  4.  PLMS  5. Afib  Dedicated titration study recommended.    Titration study 1/10/21 noted good response to CPAP at final pressure 9cm with reduced AHi 4.9/hr, mean spo2 93% and O2 blaise 87% with REM sleep achieved in left lying position. Reviewed with patient. Recommend initiated CPAP treatment.    Patient also has hx of insomnia and using trazodone 100mg nightly. She would like to stop this in the future.    Hx of diastolic HF with previous cardiac catheterization and HTN. Prior ECHO 12/9/20 noted progression of aortic stenosis since 11/14/19, LVEF 60%, moderate AS, mild MR, and RVSP 50mmHg. Reviewed with patient.    She denies cardiac or respiratory symptoms today. BMI 42. She uses claritin daily to prevent sinus issues. She denies GERD.    ROS in HPI; otherwise negative.    Allergies   Allergen Reactions   • Sulfa Drugs Rash     8/2015 rash and hives         Current medicines (including changes today)  Current Outpatient Medications   Medication Sig Dispense Refill   • cephALEXin (KEFLEX) 500 MG Cap Take 500 mg by mouth.     • fluconazole (DIFLUCAN) 200 MG Tab TK 1 T PO  ONCE D IN CASE ANTIBIOTIC STARTS YEAST INFECTION     • torsemide (DEMADEX) 20 MG Tab Take 3 Tabs by mouth 2 times a day. 540 Tab 3   • potassium chloride SA (KDUR) 20 MEQ Tab CR Take 2 Tabs by  mouth 2 times a day. 360 Tab 3   • metoprolol (LOPRESSOR) 50 MG Tab TAKE 1 TABLET BY MOUTH TWICE DAILY 180 Tab 4   • rosuvastatin (CRESTOR) 20 MG Tab Take 1 Tab by mouth every evening. 90 Tab 4   • losartan (COZAAR) 50 MG Tab Take 1 Tab by mouth every day. 100 Tab 4   • gemfibrozil (LOPID) 600 MG Tab Take 1 Tab by mouth every morning. 90 Tab 4   • warfarin (COUMADIN) 5 MG Tab Take 1/2-1 tab by mouth daily or as directed by anticoagulation clinic 90 Tab 1   • levothyroxine (SYNTHROID) 200 MCG Tab Take 200 mcg by mouth See Admin Instructions. All days except Sunday  Brand Name     • pioglitazone (ACTOS) 15 MG Tab Take 15 mg by mouth every day.     • Ascorbic Acid (VITAMIN C) 500 MG Cap Take 500 mg by mouth every day.     • Pyridoxine HCl (VITAMIN B-6 PO) Take 1 Tab by mouth every day.     • LEVEMIR FLEXTOUCH 100 UNIT/ML injection PEN Inject 14 Units as instructed 2 times a day.     • oxybutynin (DITROPAN XL) 15 MG CR tablet Take 15 mg by mouth every morning.  2   • allopurinol (ZYLOPRIM) 100 MG Tab Take 100 mg by mouth every bedtime.  2   • acetaminophen (TYLENOL 8 HOUR ARTHRITIS PAIN) 650 MG CR tablet Take 1,300 mg by mouth 2 Times a Day.     • Cholecalciferol (VITAMIN D-3) 5000 units Tab Take 5,000 Units by mouth 2 Times a Day.     • traZODone (DESYREL) 100 MG Tab Take 100 mg by mouth every evening.     • levothyroxine (SYNTHROID) 175 MCG Tab Take 175 mcg by mouth every Sunday. Brand Name     • loratadine (CLARITIN) 10 MG Tab Take 10 mg by mouth every day.     • citalopram (CELEXA) 20 MG TABS Take 20 mg by mouth every day.       No current facility-administered medications for this visit.        Patient Active Problem List    Diagnosis Date Noted   • Elevated troponin 12/31/2019     Priority: Medium   • Insulin dependent diabetes mellitus 11/13/2019     Priority: Medium   • Chronic atrial fibrillation 02/19/2019     Priority: Medium   • Diastolic congestive heart failure 07/01/2011     Priority: Medium   • Benign  "essential hypertension 07/01/2011     Priority: Low   • Chronic insomnia 01/13/2021   • Obstructive sleep apnea 01/13/2021   • Research study patient 05/01/2020   • [facility] 05/01/2020   • Left knee pain 01/01/2020   • Anemia 12/31/2019   • Localized edema 06/19/2019   • Chronic venous insufficiency 06/19/2019   • Chronic anticoagulation 05/03/2018   • Dyslipidemia 02/21/2017   • History of echocardiogram 04/22/2011   • History of cardiac catheterization 04/22/2010   • Morbid obesity (HCC) 10/28/2008   • History of hypothyroidism 08/29/2008       Family History   Problem Relation Age of Onset   • Heart Disease Mother         HEART VALVE REPLACEMENT.   • Heart Disease Father         CORONARY ARTERY BYPASS GRAFTS   • Cancer Maternal Aunt    • Diabetes Brother    • Thyroid Brother    • Heart Attack Brother    • Heart Disease Brother        She  has a past medical history of A-fib ([facility]), Arthritis (05/05/2020), Atrial fibrillation ([facility]), Benign essential hypertension (7/1/2011), Bowel habit changes, Breast cancer ([facility]), Bronchitis (2015), CAD (coronary artery disease), Cancer (HCC) (1987, 2000), Cataract (2018), Diabetes (HCC), Diastolic congestive heart failure (HCC) (2019), Disorder of thyroid, High cholesterol, History of cardiac catheterization (4/22/2010), History of echocardiogram (4/22/2011), History of hypothyroidism (8/29/2008), Hypercholesteremia (7/1/2011), Long term (current) use of anticoagulants (7/1/2011), Morbid obesity (HCC) (10/28/2008), Ovarian cancer ([facility]), Pain, Sleep apnea, Snoring, and Urinary incontinence.  She  has a past surgical history that includes abdominal hysterectomy total; mastectomy (partial); pr radiation therapy plan simple; cholecystectomy (2001); cataract phaco with iol (Left, 1/8/2018); cataract phaco with iol (Right, 1/22/2018); other orthopedic surgery (Right, 2013); and zzz cardiac cath (02/27/2019).       Objective:   Ht 1.651 m (5' 5\")   Wt 114.8 kg " (253 lb)   LMP 01/05/1987 (Approximate)   BMI 42.10 kg/m²     Physical Exam:  Constitutional: Alert, no distress, well-groomed.  Skin: No rashes in visible areas.  Eye: Round. Conjunctiva clear, lids normal. No icterus.   ENMT: Lips pink without lesions, good dentition, moist mucous membranes. Phonation normal.  Neck: No masses, no thyromegaly. Moves freely without pain.  Respiratory: Unlabored respiratory effort, no cough or audible wheeze  Psych: Alert and oriented x3, normal affect and mood.       Assessment and Plan:   The following treatment plan was discussed:     1. Obstructive sleep apnea    2. Chronic insomnia    3. Chronic diastolic congestive heart failure (HCC)    4. Benign essential hypertension    5. BMI 40.0-44.9, adult (HCC)    6. Nonsmoker    Start CPAP 9cm now.   DME CPAP  Patient understands they may have mask fits within first 30 days of therapy covered by insurance to obtain best fit.  Patient understands the need to use device every night for >4hrs to meet compliance standards for insurance purposes.  Discussed sleep hygiene; goal to stop trazodone in the future once acclimated to therapy.  F/u with Annie for CHF and HTN; stable today  Encourage weight loss through diet and gentle exercise; she has lost 17lbs since she started a diet program    Follow-up: 2-3 mos for first compliance check, sooner if needed.

## 2021-01-15 DIAGNOSIS — Z23 NEED FOR VACCINATION: ICD-10-CM

## 2021-01-20 ENCOUNTER — ANTICOAGULATION VISIT (OUTPATIENT)
Dept: VASCULAR LAB | Facility: MEDICAL CENTER | Age: 71
End: 2021-01-20
Attending: INTERNAL MEDICINE
Payer: MEDICARE

## 2021-01-20 DIAGNOSIS — Z79.01 CHRONIC ANTICOAGULATION: ICD-10-CM

## 2021-01-20 LAB
INR BLD: 2.3 (ref 0.9–1.2)
INR PPP: 2.3 (ref 2–3.5)

## 2021-01-20 PROCEDURE — 99211 OFF/OP EST MAY X REQ PHY/QHP: CPT | Performed by: NURSE PRACTITIONER

## 2021-01-20 PROCEDURE — 85610 PROTHROMBIN TIME: CPT

## 2021-01-20 NOTE — PROGRESS NOTES
Anticoagulation Summary  As of 2021    INR goal:  2.0-3.0   TTR:  58.0 % (5.5 y)   INR used for dosin.30 (2021)   Warfarin maintenance plan:  2.5 mg (5 mg x 0.5) every day   Weekly warfarin total:  17.5 mg   Plan last modified:  NICK Oneal (2020)   Next INR check:     Target end date:  Indefinite    Indications    Chronic anticoagulation [Z79.01]  Atrial fibrillation (HCC) (Resolved) [I48.91]             Anticoagulation Episode Summary     INR check location:  Anticoagulation Clinic    Preferred lab:      Send INR reminders to:      Comments:        Anticoagulation Care Providers     Provider Role Specialty Phone number    Florian Carnes M.D. Referring Cardiology [phone]    [facility] Responsible  [phone]        Anticoagulation Patient Findings      HPI:  Rachelle Reina seen in clinic today for follow up on anticoagulation therapy in the presence of AF.   Denies any changes to current medical/health status since last appointment.   Denies any medication or diet changes.   No current symptoms of bleeding or thrombosis reported.    Pt on antiplatelet therapy - n/a.    A/P:   INR therapeutic.   Continue current regimen.   BP recorded in vitals.    Follow up appointment in 8 week(s).    Next Appointment: 2021 at 1:00 pm.    Sherri GARCIA

## 2021-01-26 DIAGNOSIS — I50.30 ACC/AHA STAGE C HEART FAILURE WITH PRESERVED EJECTION FRACTION (HCC): ICD-10-CM

## 2021-01-26 DIAGNOSIS — Z45.09 ENCOUNTER FOR ADJUSTMENT AND MANAGEMENT OF OTHER CARDIAC DEVICE: ICD-10-CM

## 2021-02-05 ENCOUNTER — HOSPITAL ENCOUNTER (OUTPATIENT)
Dept: LAB | Facility: MEDICAL CENTER | Age: 71
End: 2021-02-05
Attending: NURSE PRACTITIONER
Payer: MEDICARE

## 2021-02-05 LAB
APPEARANCE UR: CLEAR
BILIRUB UR QL STRIP.AUTO: NEGATIVE
COLOR UR: YELLOW
GLUCOSE UR STRIP.AUTO-MCNC: NEGATIVE MG/DL
KETONES UR STRIP.AUTO-MCNC: NEGATIVE MG/DL
LEUKOCYTE ESTERASE UR QL STRIP.AUTO: NEGATIVE
MICRO URNS: NORMAL
NITRITE UR QL STRIP.AUTO: NEGATIVE
PH UR STRIP.AUTO: 5.5 [PH] (ref 5–8)
PROT UR QL STRIP: NEGATIVE MG/DL
RBC UR QL AUTO: NEGATIVE
SP GR UR STRIP.AUTO: 1.01
UROBILINOGEN UR STRIP.AUTO-MCNC: 0.2 MG/DL

## 2021-02-05 PROCEDURE — 86480 TB TEST CELL IMMUN MEASURE: CPT

## 2021-02-05 PROCEDURE — 81003 URINALYSIS AUTO W/O SCOPE: CPT

## 2021-02-05 PROCEDURE — 80053 COMPREHEN METABOLIC PANEL: CPT

## 2021-02-05 PROCEDURE — 84550 ASSAY OF BLOOD/URIC ACID: CPT

## 2021-02-05 PROCEDURE — 36415 COLL VENOUS BLD VENIPUNCTURE: CPT

## 2021-02-05 PROCEDURE — 87040 BLOOD CULTURE FOR BACTERIA: CPT

## 2021-02-05 PROCEDURE — 86431 RHEUMATOID FACTOR QUANT: CPT

## 2021-02-05 PROCEDURE — 88184 FLOWCYTOMETRY/ TC 1 MARKER: CPT

## 2021-02-05 PROCEDURE — 86200 CCP ANTIBODY: CPT

## 2021-02-05 PROCEDURE — 86038 ANTINUCLEAR ANTIBODIES: CPT

## 2021-02-05 PROCEDURE — 88185 FLOWCYTOMETRY/TC ADD-ON: CPT | Mod: 91

## 2021-02-06 LAB
ALBUMIN SERPL BCP-MCNC: 3.9 G/DL (ref 3.2–4.9)
ALBUMIN/GLOB SERPL: 1.1 G/DL
ALP SERPL-CCNC: 72 U/L (ref 30–99)
ALT SERPL-CCNC: 12 U/L (ref 2–50)
ANION GAP SERPL CALC-SCNC: 11 MMOL/L (ref 7–16)
AST SERPL-CCNC: 26 U/L (ref 12–45)
BILIRUB SERPL-MCNC: 0.4 MG/DL (ref 0.1–1.5)
BUN SERPL-MCNC: 36 MG/DL (ref 8–22)
CALCIUM SERPL-MCNC: 10.4 MG/DL (ref 8.5–10.5)
CHLORIDE SERPL-SCNC: 97 MMOL/L (ref 96–112)
CO2 SERPL-SCNC: 29 MMOL/L (ref 20–33)
CREAT SERPL-MCNC: 1.16 MG/DL (ref 0.5–1.4)
GLOBULIN SER CALC-MCNC: 3.4 G/DL (ref 1.9–3.5)
GLUCOSE SERPL-MCNC: 85 MG/DL (ref 65–99)
POTASSIUM SERPL-SCNC: 3.4 MMOL/L (ref 3.6–5.5)
PROT SERPL-MCNC: 7.3 G/DL (ref 6–8.2)
SODIUM SERPL-SCNC: 137 MMOL/L (ref 135–145)
URATE SERPL-MCNC: 8.1 MG/DL (ref 1.9–8.2)

## 2021-02-07 LAB
ACUTE LEUKEMIA MARKERS SPEC-IMP: NORMAL
CCP IGG SERPL-ACNC: 5 UNITS (ref 0–19)
EVENTS COUNTED SPEC: 31 MARKERS
NUCLEAR IGG SER QL IA: NORMAL
SOURCE 9121: NORMAL

## 2021-02-08 ENCOUNTER — HOSPITAL ENCOUNTER (OUTPATIENT)
Dept: RADIOLOGY | Facility: MEDICAL CENTER | Age: 71
End: 2021-02-08
Attending: NURSE PRACTITIONER
Payer: MEDICARE

## 2021-02-08 DIAGNOSIS — Z45.09 ENCOUNTER FOR ADJUSTMENT AND MANAGEMENT OF OTHER CARDIAC DEVICE: ICD-10-CM

## 2021-02-08 DIAGNOSIS — I50.30 ACC/AHA STAGE C HEART FAILURE WITH PRESERVED EJECTION FRACTION (HCC): ICD-10-CM

## 2021-02-08 LAB
GAMMA INTERFERON BACKGROUND BLD IA-ACNC: 0.11 IU/ML
M TB IFN-G BLD-IMP: NEGATIVE
M TB IFN-G CD4+ BCKGRND COR BLD-ACNC: -0.04 IU/ML
MITOGEN IGNF BCKGRD COR BLD-ACNC: >10 IU/ML
QFT TB2 - NIL TBQ2: -0.04 IU/ML
RHEUMATOID FACT SER IA-ACNC: <10 IU/ML (ref 0–14)

## 2021-02-08 PROCEDURE — 71046 X-RAY EXAM CHEST 2 VIEWS: CPT

## 2021-02-10 LAB
BACTERIA BLD CULT: NORMAL
BACTERIA BLD CULT: NORMAL
SIGNIFICANT IND 70042: NORMAL
SIGNIFICANT IND 70042: NORMAL
SITE SITE: NORMAL
SITE SITE: NORMAL
SOURCE SOURCE: NORMAL
SOURCE SOURCE: NORMAL

## 2021-02-22 ENCOUNTER — APPOINTMENT (OUTPATIENT)
Dept: RADIOLOGY | Facility: MEDICAL CENTER | Age: 71
End: 2021-02-22
Attending: NURSE PRACTITIONER
Payer: MEDICARE

## 2021-02-24 ENCOUNTER — TELEPHONE (OUTPATIENT)
Dept: CARDIOLOGY | Facility: MEDICAL CENTER | Age: 71
End: 2021-02-24

## 2021-02-24 DIAGNOSIS — I50.30 ACC/AHA STAGE C HEART FAILURE WITH PRESERVED EJECTION FRACTION (HCC): ICD-10-CM

## 2021-02-24 DIAGNOSIS — I50.9 HEART FAILURE, NYHA CLASS 2 (HCC): ICD-10-CM

## 2021-03-17 ENCOUNTER — HOSPITAL ENCOUNTER (OUTPATIENT)
Dept: RADIOLOGY | Facility: MEDICAL CENTER | Age: 71
End: 2021-03-17
Attending: INTERNAL MEDICINE
Payer: MEDICARE

## 2021-03-17 ENCOUNTER — ANTICOAGULATION VISIT (OUTPATIENT)
Dept: VASCULAR LAB | Facility: MEDICAL CENTER | Age: 71
End: 2021-03-17
Attending: INTERNAL MEDICINE
Payer: MEDICARE

## 2021-03-17 DIAGNOSIS — I50.9 HEART FAILURE, NYHA CLASS 2 (HCC): ICD-10-CM

## 2021-03-17 DIAGNOSIS — I50.30 ACC/AHA STAGE C HEART FAILURE WITH PRESERVED EJECTION FRACTION (HCC): ICD-10-CM

## 2021-03-17 DIAGNOSIS — Z79.01 CHRONIC ANTICOAGULATION: ICD-10-CM

## 2021-03-17 LAB — INR PPP: 2.1 (ref 2–3.5)

## 2021-03-17 PROCEDURE — 700117 HCHG RX CONTRAST REV CODE 255: Performed by: INTERNAL MEDICINE

## 2021-03-17 PROCEDURE — 85610 PROTHROMBIN TIME: CPT

## 2021-03-17 PROCEDURE — 71275 CT ANGIOGRAPHY CHEST: CPT | Mod: MG

## 2021-03-17 PROCEDURE — 99211 OFF/OP EST MAY X REQ PHY/QHP: CPT | Mod: 25 | Performed by: NURSE PRACTITIONER

## 2021-03-17 RX ADMIN — IOHEXOL 100 ML: 350 INJECTION, SOLUTION INTRAVENOUS at 15:17

## 2021-03-17 NOTE — PROGRESS NOTES
Anticoagulation Summary  As of 3/17/2021    INR goal:  2.0-3.0   TTR:  59.2 % (5.6 y)   INR used for dosin.10 (3/17/2021)   Warfarin maintenance plan:  2.5 mg (5 mg x 0.5) every day   Weekly warfarin total:  17.5 mg   Plan last modified:  NICK Oneal (2020)   Next INR check:  2021   Target end date:  Indefinite    Indications    Chronic anticoagulation [Z79.01]  Atrial fibrillation (HCC) (Resolved) [I48.91]             Anticoagulation Episode Summary     INR check location:  Anticoagulation Clinic    Preferred lab:      Send INR reminders to:      Comments:        Anticoagulation Care Providers     Provider Role Specialty Phone number    Florian Carnes M.D. Referring Interventional Cardiology [phone]    [facility] Responsible  [phone]        Anticoagulation Patient Findings      HPI:  Rachelle Reina seen in clinic today for follow up on anticoagulation therapy in the presence of AF.   Denies any changes to current medical/health status since last appointment.   Denies any medication or diet changes.   No current symptoms of bleeding or thrombosis reported.    Pt on antiplatelet therapy - none.    A/P:   INR therapeutic.   Continue current regimen.   BP declined.    Pt educated to contact our clinic with any changes in medications or s/s of bleeding or thrombosis. Pt is aware to seek immediate medical attention for falls, head injury or deep cuts    Follow up appointment in 8 week(s).    Next Appointment: Wednesday, May 12, 2021 at 1:00 pm    Sherri GARCIA

## 2021-03-18 LAB — INR BLD: 2.1 (ref 0.9–1.2)

## 2021-03-24 ENCOUNTER — APPOINTMENT (OUTPATIENT)
Dept: RADIOLOGY | Facility: MEDICAL CENTER | Age: 71
End: 2021-03-24
Attending: NURSE PRACTITIONER
Payer: MEDICARE

## 2021-03-26 ENCOUNTER — PATIENT MESSAGE (OUTPATIENT)
Dept: CARDIOLOGY | Facility: MEDICAL CENTER | Age: 71
End: 2021-03-26

## 2021-03-26 NOTE — PATIENT COMMUNICATION
Waiting to receive fax for clearance.     To [facility], ok for patient to proceed?    To [facility] Clinic- ok for patient to come off of Coumadin?

## 2021-03-30 NOTE — PROGRESS NOTES
Yes, she can proceed. Does she need to come off of the coumadin for that procedure? She can if she needs to, but please notify the anticoagulation clinic.

## 2021-04-02 ENCOUNTER — ANTICOAGULATION MONITORING (OUTPATIENT)
Dept: VASCULAR LAB | Facility: MEDICAL CENTER | Age: 71
End: 2021-04-02

## 2021-04-02 DIAGNOSIS — Z79.01 CHRONIC ANTICOAGULATION: ICD-10-CM

## 2021-04-02 NOTE — PROGRESS NOTES
Anticoagulation Summary  As of 4/2/2021    INR goal:  2.0-3.0   TTR:  59.2 % (5.6 y)   INR used for dosing:     Warfarin maintenance plan:  2.5 mg (5 mg x 0.5) every day   Weekly warfarin total:  17.5 mg   Plan last modified:  NICK Oneal (9/9/2020)   Next INR check:  4/14/2021   Target end date:  Indefinite    Indications    Chronic anticoagulation [Z79.01]  Atrial fibrillation (HCC) (Resolved) [I48.91]             Anticoagulation Episode Summary     INR check location:  Anticoagulation Clinic    Preferred lab:      Send INR reminders to:      Comments:        Anticoagulation Care Providers     Provider Role Specialty Phone number    Florian Carnes M.D. Referring Interventional Cardiology [phone]    [facility]ticoagulation [facility] Responsible  [phone]        Anticoagulation Patient Findings     spoke with Rachelle, who is scheduled for an invasive procedure 4/5/2021.  Pt has been holding her warfarin since 3/30/2021.  Lovenox bridging is not indicated in this patient.  Pt to resume regular regimen after her procedure on Monday.  Follow up in 1 weeks, to reduce the risk of adverse events related to this high risk medication, warfarin.    Patrizia Arias, Clinical Pharmacist

## 2021-04-13 ENCOUNTER — TELEMEDICINE (OUTPATIENT)
Dept: CARDIOLOGY | Facility: MEDICAL CENTER | Age: 71
End: 2021-04-13
Payer: MEDICARE

## 2021-04-13 ENCOUNTER — APPOINTMENT (OUTPATIENT)
Dept: CARDIOLOGY | Facility: MEDICAL CENTER | Age: 71
End: 2021-04-13
Payer: MEDICARE

## 2021-04-13 VITALS
BODY MASS INDEX: 43.32 KG/M2 | OXYGEN SATURATION: 95 % | WEIGHT: 260 LBS | DIASTOLIC BLOOD PRESSURE: 70 MMHG | SYSTOLIC BLOOD PRESSURE: 118 MMHG | HEIGHT: 65 IN

## 2021-04-13 DIAGNOSIS — I50.30 ACC/AHA STAGE C HEART FAILURE WITH PRESERVED EJECTION FRACTION (HCC): ICD-10-CM

## 2021-04-13 DIAGNOSIS — I10 HTN (HYPERTENSION), MALIGNANT: ICD-10-CM

## 2021-04-13 DIAGNOSIS — Z79.01 CHRONIC ANTICOAGULATION: ICD-10-CM

## 2021-04-13 DIAGNOSIS — I48.11 LONGSTANDING PERSISTENT ATRIAL FIBRILLATION (HCC): ICD-10-CM

## 2021-04-13 DIAGNOSIS — Z79.899 HIGH RISK MEDICATION USE: ICD-10-CM

## 2021-04-13 DIAGNOSIS — I51.89 LEFT VENTRICULAR DIASTOLIC DYSFUNCTION, NYHA CLASS 3: ICD-10-CM

## 2021-04-13 PROCEDURE — 99214 OFFICE O/P EST MOD 30 MIN: CPT | Mod: 95,CR | Performed by: INTERNAL MEDICINE

## 2021-04-13 ASSESSMENT — ENCOUNTER SYMPTOMS
DIAPHORESIS: 0
MYALGIAS: 0
DEPRESSION: 0
FALLS: 0
SHORTNESS OF BREATH: 1
PALPITATIONS: 0
MEMORY LOSS: 0
SENSORY CHANGE: 0
DIZZINESS: 0
HEADACHES: 0
BLURRED VISION: 0
COUGH: 0
FEVER: 0
ABDOMINAL PAIN: 0
DOUBLE VISION: 0
BRUISES/BLEEDS EASILY: 0

## 2021-04-13 ASSESSMENT — FIBROSIS 4 INDEX: FIB4 SCORE: 2.04

## 2021-04-13 NOTE — PROGRESS NOTES
Chief Complaint   Patient presents with   • CHF (Diastolic)     F/V Dx: Diastolic congestive heart failure   • Hypertension     F/V Dx: Benign essential hypertension   • Atrial Fibrillation     F/V Dx: Chronic atrial fibrillation     Of note, this visit was done through telemedicine with video on demand through epic system.  This visit complies with Medicare guidelines during this current COVID-19 pandemic.    This encounter was conducted via Zoom.  Verbal consent was obtained. Patient's identity was verified.      Subjective:   Rachelle Reina is a 70 y.o. female who presents today for cardiac care and management of HFpEF.  Patient has a history of atrial fibrillation along with morbid obesity, hypertension.    She had a transthoracic echocardiogram done in November 2019 which showed normal LV function, mild aortic stenosis, RVSP of 30 mmHg.  I personally interpreted images of her transthoracic echocardiogram.    Was not able to tolerate KEN sleep test.    Patient still gets winded with daily living activities and exertion. No symptoms at rest.    No obstructive CAD on coronary angiogram in 02/2019.    I have independently interpreted and reviewed blood tests results with patient in clinic which shows GFR of 46.      Past Medical History:   Diagnosis Date   • A-fib (HCC)    • Arthritis 05/05/2020    knees and fingers   • Atrial fibrillation (HCC)    • Benign essential hypertension 7/1/2011   • Bowel habit changes     diarrhea   • Breast cancer ([facility])    • Bronchitis 2015    history of   • CAD (coronary artery disease)    • Cancer (HCC) 1987, 2000    breast and ovary   • Cataract 2018    IOL bilat   • Diabetes (HCC)     oral medication, insulin   • Diastolic congestive heart failure (HCC) 2019   • Disorder of thyroid    • High cholesterol    • History of cardiac catheterization 4/22/2010   • History of echocardiogram 4/22/2011   • History of hypothyroidism 8/29/2008   • Hypercholesteremia 7/1/2011   • Long  term (current) use of anticoagulants 7/1/2011   • Morbid obesity (HCC) 10/28/2008   • Ovarian cancer (HCC)    • Pain     knees   • Sleep apnea     pt does not use cpap   • Snoring     sleep study done   • Urinary incontinence      Past Surgical History:   Procedure Laterality Date   • ZZZ CARDIAC CATH  02/27/2019    normal coronaries   • CATARACT PHACO WITH IOL Right 1/22/2018    Procedure: CATARACT PHACO WITH IOL;  Surgeon: Santosh Holden M.D.;  Location: [facility];  Service: Ophthalmology   • CATARACT PHACO WITH IOL Left 1/8/2018    Procedure: CATARACT PHACO WITH IOL;  Surgeon: Santosh Holden M.D.;  Location: [facility];  Service: Ophthalmology   • OTHER ORTHOPEDIC SURGERY Right 2013    hip arthroplasty   • CHOLECYSTECTOMY  2001   • ABDOMINAL HYSTERECTOMY TOTAL      1987   • MASTECTOMY  partial   • PB RADIATION THERAPY PLAN SIMPLE       Family History   Problem Relation Age of Onset   • Heart Disease Mother         HEART VALVE REPLACEMENT.   • Heart Disease Father         CORONARY ARTERY BYPASS GRAFTS   • Cancer Maternal Aunt    • Diabetes Brother    • Thyroid Brother    • Heart Attack Brother    • Heart Disease Brother      Social History     Socioeconomic History   • Marital status:      Spouse name: Not on file   • Number of children: Not on file   • Years of education: Not on file   • Highest education level: Not on file   Occupational History   • Not on file   Tobacco Use   • Smoking status: Never Smoker   • Smokeless tobacco: Never Used   Substance and Sexual Activity   • Alcohol use: Yes     Comment: 1 or 2 drinks a month   • Drug use: No   • Sexual activity: Not on file   Other Topics Concern   • Not on file   Social History Narrative   • Not on file     Social Determinants of Health     Financial Resource Strain:    • Difficulty of Paying Living Expenses:    Food Insecurity:    • Worried About Running Out of Food in the Last Year:    • Ran Out of Food in the  Last Year:    Transportation Needs:    • Lack of Transportation (Medical):    • Lack of Transportation (Non-Medical):    Physical Activity:    • Days of Exercise per Week:    • Minutes of Exercise per Session:    Stress:    • Feeling of Stress :    Social Connections:    • Frequency of Communication with Friends and Family:    • Frequency of Social Gatherings with Friends and Family:    • Attends [employer] Services:    • Active Member of Clubs or Organizations:    • Attends Club or Organization Meetings:    • Marital Status:    Intimate Partner Violence:    • Fear of Current or Ex-Partner:    • Emotionally Abused:    • Physically Abused:    • Sexually Abused:      Allergies   Allergen Reactions   • Sulfa Drugs Rash     8/2015 rash and hives       Outpatient Encounter Medications as of 4/13/2021   Medication Sig Dispense Refill   • torsemide (DEMADEX) 20 MG Tab Take 3 Tabs by mouth 2 times a day. 540 Tab 3   • potassium chloride SA (KDUR) 20 MEQ Tab CR Take 2 Tabs by mouth 2 times a day. 360 Tab 3   • metoprolol (LOPRESSOR) 50 MG Tab TAKE 1 TABLET BY MOUTH TWICE DAILY 180 Tab 4   • rosuvastatin (CRESTOR) 20 MG Tab Take 1 Tab by mouth every evening. 90 Tab 4   • losartan (COZAAR) 50 MG Tab Take 1 Tab by mouth every day. 100 Tab 4   • gemfibrozil (LOPID) 600 MG Tab Take 1 Tab by mouth every morning. 90 Tab 4   • warfarin (COUMADIN) 5 MG Tab Take 1/2-1 tab by mouth daily or as directed by anticoagulation clinic (Patient taking differently: 2.5 mg. Take 1/2-1 tab by mouth daily or as directed by anticoagulation clinic) 90 Tab 1   • levothyroxine (SYNTHROID) 200 MCG Tab Take 200 mcg by mouth See Admin Instructions. All days except Sunday  Brand Name     • pioglitazone (ACTOS) 15 MG Tab Take 15 mg by mouth every day.     • Pyridoxine HCl (VITAMIN B-6 PO) Take 1 Tab by mouth every day.     • LEVEMIR FLEXTOUCH 100 UNIT/ML injection PEN Inject 15 Units under the skin 2 times a day. 7 UNITS THE FIRST TIME AND 8 UNITS THE  "SECOND TIME     • oxybutynin (DITROPAN XL) 15 MG CR tablet Take 15 mg by mouth every morning.  2   • allopurinol (ZYLOPRIM) 100 MG Tab Take 100 mg by mouth every bedtime.  2   • acetaminophen (TYLENOL 8 HOUR ARTHRITIS PAIN) 650 MG CR tablet Take 1,300 mg by mouth 2 Times a Day.     • Cholecalciferol (VITAMIN D-3) 5000 units Tab Take 5,000 Units by mouth 2 Times a Day.     • traZODone (DESYREL) 100 MG Tab Take 50 mg by mouth every evening.     • levothyroxine (SYNTHROID) 175 MCG Tab Take 175 mcg by mouth every Sunday. Brand Name     • loratadine (CLARITIN) 10 MG Tab Take 10 mg by mouth every day.     • citalopram (CELEXA) 20 MG TABS Take 20 mg by mouth every day.     • [DISCONTINUED] Ascorbic Acid (VITAMIN C) 500 MG Cap Take 500 mg by mouth every day.       No facility-administered encounter medications on file as of 4/13/2021.     Review of Systems   Constitutional: Negative for diaphoresis and fever.   HENT: Negative for nosebleeds.    Eyes: Negative for blurred vision and double vision.   Respiratory: Positive for shortness of breath. Negative for cough.    Cardiovascular: Negative for chest pain and palpitations.   Gastrointestinal: Negative for abdominal pain.   Genitourinary: Negative for dysuria and frequency.   Musculoskeletal: Negative for falls and myalgias.   Skin: Negative for rash.   Neurological: Negative for dizziness, sensory change and headaches.   Endo/Heme/Allergies: Does not bruise/bleed easily.   Psychiatric/Behavioral: Negative for depression and memory loss.        Objective:   /70 (BP Location: Right arm, Patient Position: Sitting, BP Cuff Size: Adult)   Ht 1.651 m (5' 5\")   Wt 118 kg (260 lb)   LMP 01/05/1987 (Approximate)   SpO2 95%   BMI 43.27 kg/m²     Physical Exam  Constitutional:   no acute distress  Heart: + some pitting edema in BLE.  Lungs: no breathing distress, not tachypneic  Abdomen: no distention  Neurology: no signs of focal deficits.  Mentation is " alert.    Assessment:     1. ACC/AHA stage C heart failure with preserved ejection fraction (HCC)  Basic Metabolic Panel    proBrain Natriuretic Peptide, NT   2. Left ventricular diastolic dysfunction, NYHA class 3     3. High risk medication use     4. HTN (hypertension), malignant     5. Longstanding persistent atrial fibrillation (HCC)     6. Chronic anticoagulation         Medical Decision Making:  Today's Assessment / Status / Plan:   Today, based on physical examination findings, patient is euvolemic. No JVD, lungs are clear to auscultation, no pitting edema in bilateral lower extremities, no ascites.    Dry weight is 260 lbs.    Waiting to have sleep studies for obstructive sleep apnea.    Optimize diuresis. ContinueTorsemide 60 mg bid.    Optimize blood pressure control. Continue Losartan 50 mg po daily, Metoprolol 50 bid.    Rate control for her atrial fibrillation. Continue Metoprolol 50 mg bid, anticoagulation with warfarin for stroke risk reduction.    Continue rosuvastatin and gemfibrozil for mixed hyperlipidemia and hypertriglyceridemia.    She had successful implantation of CardioMEMS device.  The device was working appropriately for 6 months and then stopped working.  She has a CT angiogram of her pulmonary arterial tree which showed possible migration of the device which had to enter a zone for which no flow could be seen through the device.  Patient is completely asymptomatic.  No shortness of breath.  No evidence of pulmonary embolism.  I explained to her about the nature of the possible device migration.  At this time, we will not implant another device.  We will clinically monitor and treat her accordingly.  Patient has agreed to proceed with such plan.

## 2021-04-14 ENCOUNTER — ANTICOAGULATION VISIT (OUTPATIENT)
Dept: VASCULAR LAB | Facility: MEDICAL CENTER | Age: 71
End: 2021-04-14
Attending: INTERNAL MEDICINE
Payer: MEDICARE

## 2021-04-14 DIAGNOSIS — Z79.01 CHRONIC ANTICOAGULATION: ICD-10-CM

## 2021-04-14 LAB — INR PPP: 1.6 (ref 2–3.5)

## 2021-04-14 PROCEDURE — 99212 OFFICE O/P EST SF 10 MIN: CPT | Performed by: NURSE PRACTITIONER

## 2021-04-14 PROCEDURE — 85610 PROTHROMBIN TIME: CPT

## 2021-04-14 NOTE — PROGRESS NOTES
Anticoagulation Summary  As of 2021    INR goal:  2.0-3.0   TTR:  58.7 % (5.7 y)   INR used for dosin.60 (2021)   Warfarin maintenance plan:  2.5 mg (5 mg x 0.5) every day   Weekly warfarin total:  17.5 mg   Plan last modified:  NICK Oneal (2020)   Next INR check:     Target end date:  Indefinite    Indications    Chronic anticoagulation [Z79.01]  Atrial fibrillation (HCC) (Resolved) [I48.91]             Anticoagulation Episode Summary     INR check location:  Anticoagulation Clinic    Preferred lab:      Send INR reminders to:      Comments:        Anticoagulation Care Providers     Provider Role Specialty Phone number    Florian Carnes M.D. Referring Interventional Cardiology [phone]    [facility] Responsible  [phone]        Anticoagulation Patient Findings      HPI:  Rachelle Reina seen in clinic today for follow up on anticoagulation therapy in the presence of AF.   Recently had a bladder procedure with Dr Cordero on 21. She held warfarin 5 days prior. CHADS 2 score = 3 (chf, htn, dm). She is scheduled for another bladder procedure on 5/3/21 and will need to stop warfarin 5 days prior.   Lovenox not indicated given her CHADS 2 score.  She is eating more salads lately and plans to stay consistent.  Denies any medication or diet changes.   No current symptoms of bleeding or thrombosis reported.    Pt on antiplatelet therapy - none.    A/P:   INR subtherapeutic. Will increase regimen to account for increased vit k intake.    Pt educated to contact our clinic with any changes in medications or s/s of bleeding or thrombosis. Pt is aware to seek immediate medical attention for falls, head injury or deep cuts    Follow up appointment in 3 week(s).    Next Appointment: Wednesday, May 12, 2021 at 1:15 pm.    Sherri GARCIA

## 2021-04-15 ENCOUNTER — OFFICE VISIT (OUTPATIENT)
Dept: SLEEP MEDICINE | Facility: MEDICAL CENTER | Age: 71
End: 2021-04-15
Payer: MEDICARE

## 2021-04-15 VITALS
HEIGHT: 65 IN | RESPIRATION RATE: 16 BRPM | SYSTOLIC BLOOD PRESSURE: 128 MMHG | DIASTOLIC BLOOD PRESSURE: 72 MMHG | OXYGEN SATURATION: 91 % | WEIGHT: 264 LBS | BODY MASS INDEX: 43.99 KG/M2 | HEART RATE: 86 BPM

## 2021-04-15 DIAGNOSIS — Z78.9 NONSMOKER: ICD-10-CM

## 2021-04-15 DIAGNOSIS — F51.04 CHRONIC INSOMNIA: Chronic | ICD-10-CM

## 2021-04-15 DIAGNOSIS — G47.33 OBSTRUCTIVE SLEEP APNEA: ICD-10-CM

## 2021-04-15 PROCEDURE — 99213 OFFICE O/P EST LOW 20 MIN: CPT | Performed by: NURSE PRACTITIONER

## 2021-04-15 ASSESSMENT — FIBROSIS 4 INDEX: FIB4 SCORE: 2.04

## 2021-04-15 NOTE — PROGRESS NOTES
Chief Complaint   Patient presents with   • Apnea     Last Seen 1/31/2021        HPI:  Rachelle Reina is a 70 y.o. year old female here today for follow-up on KEN; first compliance check.  Last OV 1/3/21    Since last OV, patient started on CPAP 9cm. Compliance report 3/16/2021 through 4/14/2021 notes 97% compliance, average nightly use 4 hours 33 minutes, minimal to moderate mask leak with an overall AHI of 2.4/h.  Reviewed finds with patient. She notes benefit with therapy and going about 2-3hrs to urinate; she is starting a new bladder urgency in hopes of increasing her duration to 4-5hrs before having to use restroom at night. She notes usually sleeping 7-8hrs but may remove it after 4hrs; she will start wearing it the entire night. She is tolerating mask and pressure well. She notes waking more alert and less napping.   Patient also has hx of insomnia and using trazodone 100mg 1/2 tab nightly with benefit. She has gone without it and not able to fall asleep. She would like to stop this in the future.  She denies cardiac or respiratory symptoms.    SLEEP HX:  PSG 10/12/20 noted:  1.  Severe OAS with AHI of 118.2/hr and O2 blaise 76 %  2.  Decreased sleep efficiency   3.  Sleep related hypoxia  4.  PLMS  5. Afib  Dedicated titration study recommended.     Titration study 1/10/21 noted good response to CPAP at final pressure 9cm with reduced AHi 4.9/hr, mean spo2 93% and O2 blaise 87% with REM sleep achieved in left lying position. Reviewed with patient. Recommend initiated CPAP treatment.          Hx of diastolic HF with previous cardiac catheterization and HTN. Prior ECHO 12/9/20 noted progression of aortic stenosis since 11/14/19, LVEF 60%, moderate AS, mild MR, and RVSP 50mmHg. Reviewed with patient.    ROS: As per HPI and otherwise negative if not stated.    Past Medical History:   Diagnosis Date   • A-fib (HCC)    • Arthritis 05/05/2020    knees and fingers   • Atrial fibrillation (HCC)    • Benign  essential hypertension 7/1/2011   • Bowel habit changes     diarrhea   • Breast cancer (HCC)    • Bronchitis 2015    history of   • CAD (coronary artery disease)    • Cancer (HCC) 1987, 2000    breast and ovary   • Cataract 2018    IOL bilat   • Diabetes (HCC)     oral medication, insulin   • Diastolic congestive heart failure (HCC) 2019   • Disorder of thyroid    • High cholesterol    • History of cardiac catheterization 4/22/2010   • History of echocardiogram 4/22/2011   • History of hypothyroidism 8/29/2008   • Hypercholesteremia 7/1/2011   • Long term (current) use of anticoagulants 7/1/2011   • Morbid obesity (HCC) 10/28/2008   • Ovarian cancer (HCC)    • Pain     knees   • Sleep apnea     pt does not use cpap   • Snoring     sleep study done   • Urinary incontinence        Past Surgical History:   Procedure Laterality Date   • [facility] CARDIAC CATH  02/27/2019    normal coronaries   • CATARACT PHACO WITH IOL Right 1/22/2018    Procedure: CATARACT PHACO WITH IOL;  Surgeon: Santosh Holden M.D.;  Location: [facility]ERY [facility];  Service: Ophthalmology   • CATARACT PHACO WITH IOL Left 1/8/2018    Procedure: CATARACT PHACO WITH IOL;  Surgeon: Santosh Holden M.D.;  Location: [facility];  Service: Ophthalmology   • OTHER ORTHOPEDIC SURGERY Right 2013    hip arthroplasty   • CHOLECYSTECTOMY  2001   • ABDOMINAL HYSTERECTOMY TOTAL      1987   • MASTECTOMY  partial   • PB RADIATION THERAPY PLAN SIMPLE         Family History   Problem Relation Age of Onset   • Heart Disease Mother         HEART VALVE REPLACEMENT.   • Heart Disease Father         CORONARY ARTERY BYPASS GRAFTS   • Cancer Maternal Aunt    • Diabetes Brother    • Thyroid Brother    • Heart Attack Brother    • Heart Disease Brother        Social History     Socioeconomic History   • Marital status:      Spouse name: Not on file   • Number of children: Not on file   • Years of education: Not on file   • Highest education level:  "Not on file   Occupational History   • Not on file   Tobacco Use   • Smoking status: Never Smoker   • Smokeless tobacco: Never Used   Substance and Sexual Activity   • Alcohol use: Yes     Comment: 1 or 2 drinks a month   • Drug use: No   • Sexual activity: Not on file   Other Topics Concern   • Not on file   Social History Narrative   • Not on file     Social Determinants of Health     Financial Resource Strain:    • Difficulty of Paying Living Expenses:    Food Insecurity:    • Worried About Running Out of Food in the Last Year:    • Ran Out of Food in the Last Year:    Transportation Needs:    • Lack of Transportation (Medical):    • Lack of Transportation (Non-Medical):    Physical Activity:    • Days of Exercise per Week:    • Minutes of Exercise per Session:    Stress:    • Feeling of Stress :    Social Connections:    • Frequency of Communication with Friends and Family:    • Frequency of Social Gatherings with Friends and Family:    • Attends [employer] Services:    • Active Member of Clubs or Organizations:    • Attends Club or Organization Meetings:    • Marital Status:    Intimate Partner Violence:    • Fear of Current or Ex-Partner:    • Emotionally Abused:    • Physically Abused:    • Sexually Abused:        Allergies as of 04/15/2021 - Reviewed 04/15/2021   Allergen Reaction Noted   • Sulfa drugs Rash 08/08/2015        Vitals:  /72 (BP Location: Left arm, Patient Position: Sitting, BP Cuff Size: Adult)   Pulse 86   Resp 16   Ht 1.651 m (5' 5\")   Wt 120 kg (264 lb)   SpO2 91%     Current medications as of today   Current Outpatient Medications   Medication Sig Dispense Refill   • torsemide (DEMADEX) 20 MG Tab Take 3 Tabs by mouth 2 times a day. 540 Tab 3   • potassium chloride SA (KDUR) 20 MEQ Tab CR Take 2 Tabs by mouth 2 times a day. 360 Tab 3   • metoprolol (LOPRESSOR) 50 MG Tab TAKE 1 TABLET BY MOUTH TWICE DAILY 180 Tab 4   • rosuvastatin (CRESTOR) 20 MG Tab Take 1 Tab by mouth every " evening. 90 Tab 4   • losartan (COZAAR) 50 MG Tab Take 1 Tab by mouth every day. 100 Tab 4   • gemfibrozil (LOPID) 600 MG Tab Take 1 Tab by mouth every morning. 90 Tab 4   • warfarin (COUMADIN) 5 MG Tab Take 1/2-1 tab by mouth daily or as directed by anticoagulation clinic (Patient taking differently: 2.5 mg. Take 1/2-1 tab by mouth daily or as directed by anticoagulation clinic) 90 Tab 1   • levothyroxine (SYNTHROID) 200 MCG Tab Take 200 mcg by mouth See Admin Instructions. All days except Sunday  Brand Name     • pioglitazone (ACTOS) 15 MG Tab Take 15 mg by mouth every day.     • Pyridoxine HCl (VITAMIN B-6 PO) Take 1 Tab by mouth every day.     • LEVEMIR FLEXTOUCH 100 UNIT/ML injection PEN Inject 15 Units under the skin 2 times a day. 7 UNITS THE FIRST TIME AND 8 UNITS THE SECOND TIME     • oxybutynin (DITROPAN XL) 15 MG CR tablet Take 15 mg by mouth every morning.  2   • allopurinol (ZYLOPRIM) 100 MG Tab Take 100 mg by mouth every bedtime.  2   • acetaminophen (TYLENOL 8 HOUR ARTHRITIS PAIN) 650 MG CR tablet Take 1,300 mg by mouth 2 Times a Day.     • Cholecalciferol (VITAMIN D-3) 5000 units Tab Take 5,000 Units by mouth 2 Times a Day.     • traZODone (DESYREL) 100 MG Tab Take 50 mg by mouth every evening.     • levothyroxine (SYNTHROID) 175 MCG Tab Take 175 mcg by mouth every Sunday. Brand Name     • loratadine (CLARITIN) 10 MG Tab Take 10 mg by mouth every day.     • citalopram (CELEXA) 20 MG TABS Take 20 mg by mouth every day.       No current facility-administered medications for this visit.         Physical Exam:   Gen:           Alert and oriented, No apparent distress. Mood and affect appropriate, normal interaction with examiner.  Eyes:          PERRL, EOM intact, sclere white, conjunctive moist.  Ears:          Not examined.   Hearing:     Grossly intact.  Nose:          Normal, no lesions or deformities.  Dentition:    mask  Oropharynx:   mask  Mallampati Classification: mask  Neck:        Supple,  trachea midline, no masses.  Respiratory Effort: No intercostal retractions or use of accessory muscles.   Lung Auscultation:      Clear to auscultation bilaterally; no rales, rhonchi or wheezing.  CV:            A. Fib.  Abd:           Not examined.   Lymphadenopathy: Not examined.  Gait and Station: Normal.  Digits and Nails: No clubbing, cyanosis, petechiae, or nodes.   Cranial Nerves: II-XII grossly intact.  Skin:        No rashes, lesions or ulcers noted.               Ext:           No cyanosis or edema.      Assessment:  1. Obstructive sleep apnea     2. Chronic insomnia     3. BMI 40.0-44.9, adult ([facility])  Height And Weight   4. Nonsmoker         Immunizations:    Flu:3/2021  Pneumovax 23:2011  Prevnar 13:2015  COVID-19: 3/27/21, 2/27/21    Plan:  1. Patient is responding well to treatment; she will continue to benefit from treatment.  Continue CPAP nightly and she will start using the entire time she is sleeping.  DME maskk/supplies  2. Discussed sleep hygiene  3. Encourage weight loss through diet/exercise  4. F/u in 3 mos for compliance check, sooner if needed.    Please note that this dictation was created using voice recognition software. I have made every reasonable attempt to correct obvious errors, but it is possible there are errors of grammar and possibly content that I did not discover before finalizing the note.

## 2021-04-16 LAB — INR BLD: 1.6 (ref 0.9–1.2)

## 2021-04-20 ENCOUNTER — HOSPITAL ENCOUNTER (OUTPATIENT)
Dept: LAB | Facility: MEDICAL CENTER | Age: 71
End: 2021-04-20
Attending: INTERNAL MEDICINE
Payer: MEDICARE

## 2021-04-20 ENCOUNTER — APPOINTMENT (OUTPATIENT)
Dept: RADIOLOGY | Facility: MEDICAL CENTER | Age: 71
End: 2021-04-20
Attending: NURSE PRACTITIONER
Payer: MEDICARE

## 2021-04-20 DIAGNOSIS — I50.30 ACC/AHA STAGE C HEART FAILURE WITH PRESERVED EJECTION FRACTION (HCC): ICD-10-CM

## 2021-04-20 PROCEDURE — 80048 BASIC METABOLIC PNL TOTAL CA: CPT

## 2021-04-20 PROCEDURE — 83880 ASSAY OF NATRIURETIC PEPTIDE: CPT | Mod: GA

## 2021-04-20 PROCEDURE — 36415 COLL VENOUS BLD VENIPUNCTURE: CPT | Mod: GA

## 2021-04-21 LAB
ANION GAP SERPL CALC-SCNC: 7 MMOL/L (ref 7–16)
BUN SERPL-MCNC: 38 MG/DL (ref 8–22)
CALCIUM SERPL-MCNC: 9.8 MG/DL (ref 8.5–10.5)
CHLORIDE SERPL-SCNC: 100 MMOL/L (ref 96–112)
CO2 SERPL-SCNC: 28 MMOL/L (ref 20–33)
CREAT SERPL-MCNC: 1.42 MG/DL (ref 0.5–1.4)
FASTING STATUS PATIENT QL REPORTED: NORMAL
GLUCOSE SERPL-MCNC: 108 MG/DL (ref 65–99)
NT-PROBNP SERPL IA-MCNC: 2146 PG/ML (ref 0–125)
POTASSIUM SERPL-SCNC: 3.1 MMOL/L (ref 3.6–5.5)
SODIUM SERPL-SCNC: 135 MMOL/L (ref 135–145)

## 2021-05-12 ENCOUNTER — ANTICOAGULATION VISIT (OUTPATIENT)
Dept: VASCULAR LAB | Facility: MEDICAL CENTER | Age: 71
End: 2021-05-12
Attending: INTERNAL MEDICINE
Payer: MEDICARE

## 2021-05-12 DIAGNOSIS — Z79.01 CHRONIC ANTICOAGULATION: ICD-10-CM

## 2021-05-12 LAB — INR PPP: 2 (ref 2–3.5)

## 2021-05-12 PROCEDURE — 99211 OFF/OP EST MAY X REQ PHY/QHP: CPT | Performed by: NURSE PRACTITIONER

## 2021-05-12 PROCEDURE — 85610 PROTHROMBIN TIME: CPT

## 2021-05-12 NOTE — PROGRESS NOTES
Anticoagulation Summary  As of 2021    INR goal:  2.0-3.0   TTR:  57.9 % (5.8 y)   INR used for dosin.00 (2021)   Warfarin maintenance plan:  5 mg (5 mg x 1) every Wed; 2.5 mg (5 mg x 0.5) all other days   Weekly warfarin total:  20 mg   Plan last modified:  NICK Oneal (2021)   Next INR check:  2021   Target end date:  Indefinite    Indications    Chronic anticoagulation [Z79.01]  Atrial fibrillation (HCC) (Resolved) [I48.91]             Anticoagulation Episode Summary     INR check location:  Anticoagulation Clinic    Preferred lab:      Send INR reminders to:      Comments:        Anticoagulation Care Providers     Provider Role Specialty Phone number    Florian Carnes M.D. Referring Interventional Cardiology [phone]    [facility] Anticoagulation [facility] Responsible  [phone]        Anticoagulation Patient Findings      HPI:  Rachelle Reina seen in clinic today for follow up on anticoagulation therapy in the presence of AF.   Recently underwent a bladder procedure on 5/3/21. She resume her warfarin that evening. Is taking 2.5 mg on Sundays (not 5 mg as previously charted).  Denies any medication or diet changes.   No current symptoms of bleeding or thrombosis reported.    Pt is not on antiplatelet therapy.    A/P:   INR therapeutic. Pt prefers to continue current regimen of 5 mg on , 2.5 mg AODs..   BP recorded in vitals.    Pt educated to contact our clinic with any changes in medications or s/s of bleeding or thrombosis. Pt is aware to seek immediate medical attention for falls, head injury or deep cuts    Follow up appointment in 4 week(s) per pt's request.    Next Appointment: 2021 at 3:15 pm.    Sherri GARCIA

## 2021-06-09 ENCOUNTER — APPOINTMENT (OUTPATIENT)
Dept: VASCULAR LAB | Facility: MEDICAL CENTER | Age: 71
End: 2021-06-09
Attending: INTERNAL MEDICINE
Payer: MEDICARE

## 2021-06-09 ENCOUNTER — DOCUMENTATION (OUTPATIENT)
Dept: VASCULAR LAB | Facility: MEDICAL CENTER | Age: 71
End: 2021-06-09

## 2021-06-23 ENCOUNTER — APPOINTMENT (OUTPATIENT)
Dept: VASCULAR LAB | Facility: MEDICAL CENTER | Age: 71
End: 2021-06-23
Attending: INTERNAL MEDICINE
Payer: MEDICARE

## 2021-07-01 ENCOUNTER — ANTICOAGULATION VISIT (OUTPATIENT)
Dept: VASCULAR LAB | Facility: MEDICAL CENTER | Age: 71
End: 2021-07-01
Attending: INTERNAL MEDICINE
Payer: MEDICARE

## 2021-07-01 VITALS — HEART RATE: 84 BPM | SYSTOLIC BLOOD PRESSURE: 90 MMHG | DIASTOLIC BLOOD PRESSURE: 63 MMHG

## 2021-07-01 DIAGNOSIS — Z79.01 CHRONIC ANTICOAGULATION: ICD-10-CM

## 2021-07-01 LAB
INR BLD: 4.3 (ref 0.9–1.2)
INR PPP: 4.3 (ref 2–3.5)

## 2021-07-01 PROCEDURE — 99212 OFFICE O/P EST SF 10 MIN: CPT | Performed by: NURSE PRACTITIONER

## 2021-07-01 PROCEDURE — 85610 PROTHROMBIN TIME: CPT

## 2021-07-01 NOTE — PROGRESS NOTES
Anticoagulation Summary  As of 2021    INR goal:  2.0-3.0   TTR:  57.5 % (5.9 y)   INR used for dosin.30 (2021)   Warfarin maintenance plan:  5 mg (5 mg x 1) every Wed; 2.5 mg (5 mg x 0.5) all other days   Weekly warfarin total:  20 mg   Plan last modified:  LUIS OnealPROE (2021)   Next INR check:  2021   Target end date:  Indefinite    Indications    Chronic anticoagulation [Z79.01]  Atrial fibrillation (HCC) (Resolved) [I48.91]             Anticoagulation Episode Summary     INR check location:  Anticoagulation Clinic    Preferred lab:      Send INR reminders to:      Comments:        Anticoagulation Care Providers     Provider Role Specialty Phone number    Florian Carnes M.D. Referring Interventional Cardiology [phone]    [facility] Anticoagulation Services Responsible  [phone]        Anticoagulation Patient Findings      HPI:  Rachelle Reina seen in clinic today for follow up on anticoagulation therapy in the presence of AF.   Denies any changes to current medical/health status since last appointment.   Denies any medication or diet changes. No ETOH or cranberries. Not taking any NSAIDs.  No current symptoms of bleeding or thrombosis reported.  Verified dose with patient.  BP recorded in vitals.    Pt is not on antiplatelet therapy.    A/P:   INR supratherapeutic. Unsure why INR so high. Will HOLD two doses and continue current regimen.     Pt educated to contact our clinic with any changes in medications or s/s of bleeding or thrombosis. Pt is aware to seek immediate medical attention for falls, head injury or deep cuts    Follow up appointment in 1 week(s).    Next Appointment: 2021 at 10:00 am.    Sherri GARCIA

## 2021-07-07 ENCOUNTER — HOSPITAL ENCOUNTER (OUTPATIENT)
Dept: LAB | Facility: MEDICAL CENTER | Age: 71
End: 2021-07-07
Attending: INTERNAL MEDICINE
Payer: MEDICARE

## 2021-07-07 ENCOUNTER — HOSPITAL ENCOUNTER (OUTPATIENT)
Dept: LAB | Facility: MEDICAL CENTER | Age: 71
End: 2021-07-07
Attending: NURSE PRACTITIONER
Payer: MEDICARE

## 2021-07-07 LAB
ALBUMIN SERPL BCP-MCNC: 3.6 G/DL (ref 3.2–4.9)
ALBUMIN/GLOB SERPL: 1.2 G/DL
ALP SERPL-CCNC: 88 U/L (ref 30–99)
ALT SERPL-CCNC: 9 U/L (ref 2–50)
ANION GAP SERPL CALC-SCNC: 12 MMOL/L (ref 7–16)
ANISOCYTOSIS BLD QL SMEAR: ABNORMAL
AST SERPL-CCNC: 24 U/L (ref 12–45)
BASOPHILS # BLD AUTO: 1.2 % (ref 0–1.8)
BASOPHILS # BLD AUTO: 1.4 % (ref 0–1.8)
BASOPHILS # BLD: 0.06 K/UL (ref 0–0.12)
BASOPHILS # BLD: 0.07 K/UL (ref 0–0.12)
BILIRUB SERPL-MCNC: 0.4 MG/DL (ref 0.1–1.5)
BUN SERPL-MCNC: 22 MG/DL (ref 8–22)
BURR CELLS BLD QL SMEAR: NORMAL
CALCIUM SERPL-MCNC: 9.5 MG/DL (ref 8.5–10.5)
CHLORIDE SERPL-SCNC: 106 MMOL/L (ref 96–112)
CHOLEST SERPL-MCNC: 111 MG/DL (ref 100–199)
CO2 SERPL-SCNC: 22 MMOL/L (ref 20–33)
COMMENT 1642: NORMAL
CREAT SERPL-MCNC: 1.17 MG/DL (ref 0.5–1.4)
EOSINOPHIL # BLD AUTO: 0.22 K/UL (ref 0–0.51)
EOSINOPHIL # BLD AUTO: 0.24 K/UL (ref 0–0.51)
EOSINOPHIL NFR BLD: 4.4 % (ref 0–6.9)
EOSINOPHIL NFR BLD: 4.8 % (ref 0–6.9)
ERYTHROCYTE [DISTWIDTH] IN BLOOD BY AUTOMATED COUNT: 53.1 FL (ref 35.9–50)
ERYTHROCYTE [DISTWIDTH] IN BLOOD BY AUTOMATED COUNT: 54 FL (ref 35.9–50)
EST. AVERAGE GLUCOSE BLD GHB EST-MCNC: 128 MG/DL
FASTING STATUS PATIENT QL REPORTED: NORMAL
GLOBULIN SER CALC-MCNC: 3 G/DL (ref 1.9–3.5)
GLUCOSE SERPL-MCNC: 83 MG/DL (ref 65–99)
HBA1C MFR BLD: 6.1 % (ref 4–5.6)
HCT VFR BLD AUTO: 37.3 % (ref 37–47)
HCT VFR BLD AUTO: 38 % (ref 37–47)
HDLC SERPL-MCNC: 59 MG/DL
HGB BLD-MCNC: 11.2 G/DL (ref 12–16)
HGB BLD-MCNC: 11.2 G/DL (ref 12–16)
HGB RETIC QN AUTO: 24.9 PG/CELL (ref 29–35)
IMM GRANULOCYTES # BLD AUTO: 0.01 K/UL (ref 0–0.11)
IMM GRANULOCYTES # BLD AUTO: 0.02 K/UL (ref 0–0.11)
IMM GRANULOCYTES NFR BLD AUTO: 0.2 % (ref 0–0.9)
IMM GRANULOCYTES NFR BLD AUTO: 0.4 % (ref 0–0.9)
IMM RETICS NFR: 28.2 % (ref 9.3–17.4)
LDH SERPL L TO P-CCNC: 270 U/L (ref 107–266)
LDLC SERPL CALC-MCNC: 39 MG/DL
LYMPHOCYTES # BLD AUTO: 1.86 K/UL (ref 1–4.8)
LYMPHOCYTES # BLD AUTO: 1.95 K/UL (ref 1–4.8)
LYMPHOCYTES NFR BLD: 37 % (ref 22–41)
LYMPHOCYTES NFR BLD: 38.8 % (ref 22–41)
MACROCYTES BLD QL SMEAR: ABNORMAL
MCH RBC QN AUTO: 24.9 PG (ref 27–33)
MCH RBC QN AUTO: 25.2 PG (ref 27–33)
MCHC RBC AUTO-ENTMCNC: 29.5 G/DL (ref 33.6–35)
MCHC RBC AUTO-ENTMCNC: 30 G/DL (ref 33.6–35)
MCV RBC AUTO: 84 FL (ref 81.4–97.8)
MCV RBC AUTO: 84.4 FL (ref 81.4–97.8)
MONOCYTES # BLD AUTO: 0.46 K/UL (ref 0–0.85)
MONOCYTES # BLD AUTO: 0.48 K/UL (ref 0–0.85)
MONOCYTES NFR BLD AUTO: 9.1 % (ref 0–13.4)
MONOCYTES NFR BLD AUTO: 9.6 % (ref 0–13.4)
MORPHOLOGY BLD-IMP: NORMAL
NEUTROPHILS # BLD AUTO: 2.3 K/UL (ref 2–7.15)
NEUTROPHILS # BLD AUTO: 2.38 K/UL (ref 2–7.15)
NEUTROPHILS NFR BLD: 45.8 % (ref 44–72)
NEUTROPHILS NFR BLD: 47.3 % (ref 44–72)
NRBC # BLD AUTO: 0 K/UL
NRBC # BLD AUTO: 0 K/UL
NRBC BLD-RTO: 0 /100 WBC
NRBC BLD-RTO: 0 /100 WBC
PLATELET # BLD AUTO: 273 K/UL (ref 164–446)
PLATELET # BLD AUTO: 276 K/UL (ref 164–446)
PLATELET BLD QL SMEAR: NORMAL
PMV BLD AUTO: 10.2 FL (ref 9–12.9)
PMV BLD AUTO: 10.5 FL (ref 9–12.9)
POIKILOCYTOSIS BLD QL SMEAR: NORMAL
POLYCHROMASIA BLD QL SMEAR: NORMAL
POTASSIUM SERPL-SCNC: 4.7 MMOL/L (ref 3.6–5.5)
PROT SERPL-MCNC: 6.6 G/DL (ref 6–8.2)
RBC # BLD AUTO: 4.44 M/UL (ref 4.2–5.4)
RBC # BLD AUTO: 4.5 M/UL (ref 4.2–5.4)
RBC BLD AUTO: PRESENT
RETICS # AUTO: 0.11 M/UL (ref 0.04–0.06)
RETICS/RBC NFR: 2.5 % (ref 0.8–2.1)
SODIUM SERPL-SCNC: 140 MMOL/L (ref 135–145)
T3FREE SERPL-MCNC: 2.5 PG/ML (ref 2–4.4)
TRIGL SERPL-MCNC: 63 MG/DL (ref 0–149)
TSH SERPL DL<=0.005 MIU/L-ACNC: 0.74 UIU/ML (ref 0.38–5.33)
URATE SERPL-MCNC: 7.3 MG/DL (ref 1.9–8.2)
WBC # BLD AUTO: 5 K/UL (ref 4.8–10.8)
WBC # BLD AUTO: 5 K/UL (ref 4.8–10.8)

## 2021-07-07 PROCEDURE — 82784 ASSAY IGA/IGD/IGG/IGM EACH: CPT

## 2021-07-07 PROCEDURE — 83036 HEMOGLOBIN GLYCOSYLATED A1C: CPT | Mod: GA

## 2021-07-07 PROCEDURE — 84165 PROTEIN E-PHORESIS SERUM: CPT

## 2021-07-07 PROCEDURE — 80053 COMPREHEN METABOLIC PANEL: CPT

## 2021-07-07 PROCEDURE — 84443 ASSAY THYROID STIM HORMONE: CPT

## 2021-07-07 PROCEDURE — 85025 COMPLETE CBC W/AUTO DIFF WBC: CPT

## 2021-07-07 PROCEDURE — 84550 ASSAY OF BLOOD/URIC ACID: CPT

## 2021-07-07 PROCEDURE — 84439 ASSAY OF FREE THYROXINE: CPT

## 2021-07-07 PROCEDURE — 85046 RETICYTE/HGB CONCENTRATE: CPT

## 2021-07-07 PROCEDURE — 85025 COMPLETE CBC W/AUTO DIFF WBC: CPT | Mod: 91

## 2021-07-07 PROCEDURE — 80061 LIPID PANEL: CPT

## 2021-07-07 PROCEDURE — 36415 COLL VENOUS BLD VENIPUNCTURE: CPT

## 2021-07-07 PROCEDURE — 82652 VIT D 1 25-DIHYDROXY: CPT

## 2021-07-07 PROCEDURE — 84481 FREE ASSAY (FT-3): CPT

## 2021-07-07 PROCEDURE — 85652 RBC SED RATE AUTOMATED: CPT

## 2021-07-07 PROCEDURE — 84155 ASSAY OF PROTEIN SERUM: CPT

## 2021-07-07 PROCEDURE — 82785 ASSAY OF IGE: CPT

## 2021-07-07 PROCEDURE — 83615 LACTATE (LD) (LDH) ENZYME: CPT

## 2021-07-08 LAB — ERYTHROCYTE [SEDIMENTATION RATE] IN BLOOD BY WESTERGREN METHOD: 23 MM/HOUR (ref 0–25)

## 2021-07-09 ENCOUNTER — ANTICOAGULATION VISIT (OUTPATIENT)
Dept: VASCULAR LAB | Facility: MEDICAL CENTER | Age: 71
End: 2021-07-09
Attending: INTERNAL MEDICINE
Payer: MEDICARE

## 2021-07-09 DIAGNOSIS — Z79.01 CHRONIC ANTICOAGULATION: ICD-10-CM

## 2021-07-09 LAB
1,25(OH)2D3 SERPL-MCNC: 42.1 PG/ML (ref 19.9–79.3)
IGA SERPL-MCNC: 180 MG/DL (ref 68–408)
IGG SERPL-MCNC: 713 MG/DL (ref 768–1632)
IGM SERPL-MCNC: 77 MG/DL (ref 35–263)
INR PPP: 2.8 (ref 2–3.5)

## 2021-07-09 PROCEDURE — 99211 OFF/OP EST MAY X REQ PHY/QHP: CPT

## 2021-07-09 PROCEDURE — 85610 PROTHROMBIN TIME: CPT

## 2021-07-09 NOTE — PROGRESS NOTES
Anticoagulation Summary  As of 2021    INR goal:  2.0-3.0   TTR:  57.4 % (5.9 y)   INR used for dosin.80 (2021)   Warfarin maintenance plan:  5 mg (5 mg x 1) every Wed; 2.5 mg (5 mg x 0.5) all other days   Weekly warfarin total:  20 mg   Plan last modified:  INCK Oneal (2021)   Next INR check:  2021   Target end date:  Indefinite    Indications    Chronic anticoagulation [Z79.01]  Atrial fibrillation (HCC) (Resolved) [I48.91]             Anticoagulation Episode Summary     INR check location:  Anticoagulation Clinic    Preferred lab:      Send INR reminders to:      Comments:        Anticoagulation Care Providers     Provider Role Specialty Phone number    Florian Carnes M.D. Referring Interventional Cardiology [phone]    [facility] Anticoagulation Services Responsible  [phone]        Anticoagulation Patient Findings  Patient Findings     Negatives:  Signs/symptoms of thrombosis, Signs/symptoms of bleeding, Laboratory test error suspected, Change in health, Change in alcohol use, Change in activity, Upcoming invasive procedure, Emergency department visit, Upcoming dental procedure, Missed doses, Extra doses, Change in medications, Change in diet/appetite, Hospital admission, Bruising, Other complaints          HPI:  Rachelle Reina seen in clinic today, on anticoagulation therapy with warfarin for Afib.   Pt is not on antiplatelet therapy   Changes to current medical/health status since last appt: none  Denies signs/symptoms of bleeding and/or thrombosis since the last appt.    Denies any interval changes to diet  Denies any interval changes to medications since last appt.   Denies any complications or cost restrictions with current therapy.   Confirmed dosing regimen.     A/P   INR  therapeutic.   Pt is to continue with current warfarin dosing regimen.     Follow up appointment in 2-3 weeks based on pt's availability and her upcoming vacation.     Dennis Oakley,  PharmD

## 2021-07-10 LAB — IGE SERPL-ACNC: 21 KU/L

## 2021-07-11 LAB
ALBUMIN SERPL ELPH-MCNC: 3.16 G/DL (ref 3.75–5.01)
ALPHA1 GLOB SERPL ELPH-MCNC: 0.38 G/DL (ref 0.19–0.46)
ALPHA2 GLOB SERPL ELPH-MCNC: 1.03 G/DL (ref 0.48–1.05)
B-GLOBULIN SERPL ELPH-MCNC: 0.89 G/DL (ref 0.48–1.1)
GAMMA GLOB SERPL ELPH-MCNC: 0.74 G/DL (ref 0.62–1.51)
INTERPRETATION SERPL IFE-IMP: ABNORMAL
MONOCLON BAND OBS SERPL: ABNORMAL
PATHOLOGY STUDY: ABNORMAL
PROT SERPL-MCNC: 6.2 G/DL (ref 6.3–8.2)

## 2021-07-12 LAB — INR BLD: 2.8 (ref 0.9–1.2)

## 2021-07-13 LAB — T4 FREE SERPL DIALY-MCNC: 1.6 NG/DL (ref 1.1–2.4)

## 2021-07-15 DIAGNOSIS — Z79.01 CHRONIC ANTICOAGULATION: ICD-10-CM

## 2021-07-23 ENCOUNTER — HOSPITAL ENCOUNTER (OUTPATIENT)
Dept: LAB | Facility: MEDICAL CENTER | Age: 71
End: 2021-07-23
Attending: NURSE PRACTITIONER
Payer: MEDICARE

## 2021-07-23 LAB — NT-PROBNP SERPL IA-MCNC: 1236 PG/ML (ref 0–125)

## 2021-07-23 PROCEDURE — 83880 ASSAY OF NATRIURETIC PEPTIDE: CPT

## 2021-07-23 PROCEDURE — 36415 COLL VENOUS BLD VENIPUNCTURE: CPT

## 2021-07-28 ENCOUNTER — APPOINTMENT (OUTPATIENT)
Dept: VASCULAR LAB | Facility: MEDICAL CENTER | Age: 71
End: 2021-07-28
Attending: INTERNAL MEDICINE
Payer: MEDICARE

## 2021-07-28 ENCOUNTER — APPOINTMENT (OUTPATIENT)
Dept: SLEEP MEDICINE | Facility: MEDICAL CENTER | Age: 71
End: 2021-07-28
Payer: MEDICARE

## 2021-08-04 ENCOUNTER — APPOINTMENT (OUTPATIENT)
Dept: VASCULAR LAB | Facility: MEDICAL CENTER | Age: 71
End: 2021-08-04
Payer: MEDICARE

## 2021-08-30 ENCOUNTER — APPOINTMENT (OUTPATIENT)
Dept: RADIOLOGY | Facility: MEDICAL CENTER | Age: 71
End: 2021-08-30
Attending: EMERGENCY MEDICINE
Payer: MEDICARE

## 2021-08-30 ENCOUNTER — HOSPITAL ENCOUNTER (EMERGENCY)
Facility: MEDICAL CENTER | Age: 71
End: 2021-08-30
Attending: EMERGENCY MEDICINE
Payer: MEDICARE

## 2021-08-30 VITALS
RESPIRATION RATE: 18 BRPM | HEIGHT: 65 IN | BODY MASS INDEX: 46.15 KG/M2 | DIASTOLIC BLOOD PRESSURE: 63 MMHG | SYSTOLIC BLOOD PRESSURE: 137 MMHG | TEMPERATURE: 97 F | OXYGEN SATURATION: 94 % | WEIGHT: 277 LBS | HEART RATE: 99 BPM

## 2021-08-30 DIAGNOSIS — I50.9 ACUTE ON CHRONIC CONGESTIVE HEART FAILURE, UNSPECIFIED HEART FAILURE TYPE (HCC): ICD-10-CM

## 2021-08-30 DIAGNOSIS — Z91.148 NONCOMPLIANCE WITH MEDICATIONS: ICD-10-CM

## 2021-08-30 LAB
ALBUMIN SERPL BCP-MCNC: 3.9 G/DL (ref 3.2–4.9)
ALBUMIN/GLOB SERPL: 1.2 G/DL
ALP SERPL-CCNC: 89 U/L (ref 30–99)
ALT SERPL-CCNC: 12 U/L (ref 2–50)
ANION GAP SERPL CALC-SCNC: 12 MMOL/L (ref 7–16)
ANISOCYTOSIS BLD QL SMEAR: ABNORMAL
APTT PPP: 41.3 SEC (ref 24.7–36)
AST SERPL-CCNC: 21 U/L (ref 12–45)
BASOPHILS # BLD AUTO: 2.6 % (ref 0–1.8)
BASOPHILS # BLD: 0.14 K/UL (ref 0–0.12)
BILIRUB SERPL-MCNC: 0.3 MG/DL (ref 0.1–1.5)
BUN SERPL-MCNC: 19 MG/DL (ref 8–22)
CALCIUM SERPL-MCNC: 10.3 MG/DL (ref 8.5–10.5)
CHLORIDE SERPL-SCNC: 106 MMOL/L (ref 96–112)
CO2 SERPL-SCNC: 24 MMOL/L (ref 20–33)
CREAT SERPL-MCNC: 1.11 MG/DL (ref 0.5–1.4)
EKG IMPRESSION: NORMAL
EOSINOPHIL # BLD AUTO: 0.28 K/UL (ref 0–0.51)
EOSINOPHIL NFR BLD: 5.2 % (ref 0–6.9)
ERYTHROCYTE [DISTWIDTH] IN BLOOD BY AUTOMATED COUNT: 57.1 FL (ref 35.9–50)
GLOBULIN SER CALC-MCNC: 3.2 G/DL (ref 1.9–3.5)
GLUCOSE SERPL-MCNC: 85 MG/DL (ref 65–99)
HCT VFR BLD AUTO: 40.2 % (ref 37–47)
HGB BLD-MCNC: 11.9 G/DL (ref 12–16)
INR PPP: 1.92 (ref 0.87–1.13)
LYMPHOCYTES # BLD AUTO: 1.38 K/UL (ref 1–4.8)
LYMPHOCYTES NFR BLD: 26.1 % (ref 22–41)
MANUAL DIFF BLD: NORMAL
MCH RBC QN AUTO: 24.4 PG (ref 27–33)
MCHC RBC AUTO-ENTMCNC: 29.6 G/DL (ref 33.6–35)
MCV RBC AUTO: 82.5 FL (ref 81.4–97.8)
MONOCYTES # BLD AUTO: 0.37 K/UL (ref 0–0.85)
MONOCYTES NFR BLD AUTO: 7 % (ref 0–13.4)
MORPHOLOGY BLD-IMP: NORMAL
NEUTROPHILS # BLD AUTO: 3.13 K/UL (ref 2–7.15)
NEUTROPHILS NFR BLD: 59.1 % (ref 44–72)
NRBC # BLD AUTO: 0 K/UL
NRBC BLD-RTO: 0 /100 WBC
NT-PROBNP SERPL IA-MCNC: 1803 PG/ML (ref 0–125)
PLATELET # BLD AUTO: 267 K/UL (ref 164–446)
PLATELET BLD QL SMEAR: NORMAL
PMV BLD AUTO: 9.6 FL (ref 9–12.9)
POTASSIUM SERPL-SCNC: 4.2 MMOL/L (ref 3.6–5.5)
PROT SERPL-MCNC: 7.1 G/DL (ref 6–8.2)
PROTHROMBIN TIME: 21.4 SEC (ref 12–14.6)
RBC # BLD AUTO: 4.87 M/UL (ref 4.2–5.4)
RBC BLD AUTO: PRESENT
SODIUM SERPL-SCNC: 142 MMOL/L (ref 135–145)
TROPONIN T SERPL-MCNC: 41 NG/L (ref 6–19)
TROPONIN T SERPL-MCNC: 45 NG/L (ref 6–19)
WBC # BLD AUTO: 5.3 K/UL (ref 4.8–10.8)

## 2021-08-30 PROCEDURE — 71045 X-RAY EXAM CHEST 1 VIEW: CPT

## 2021-08-30 PROCEDURE — 700111 HCHG RX REV CODE 636 W/ 250 OVERRIDE (IP): Performed by: EMERGENCY MEDICINE

## 2021-08-30 PROCEDURE — 93005 ELECTROCARDIOGRAM TRACING: CPT

## 2021-08-30 PROCEDURE — 93005 ELECTROCARDIOGRAM TRACING: CPT | Performed by: EMERGENCY MEDICINE

## 2021-08-30 PROCEDURE — 85610 PROTHROMBIN TIME: CPT

## 2021-08-30 PROCEDURE — 83880 ASSAY OF NATRIURETIC PEPTIDE: CPT

## 2021-08-30 PROCEDURE — 84484 ASSAY OF TROPONIN QUANT: CPT

## 2021-08-30 PROCEDURE — 36415 COLL VENOUS BLD VENIPUNCTURE: CPT

## 2021-08-30 PROCEDURE — 80053 COMPREHEN METABOLIC PANEL: CPT

## 2021-08-30 PROCEDURE — 96374 THER/PROPH/DIAG INJ IV PUSH: CPT

## 2021-08-30 PROCEDURE — 85007 BL SMEAR W/DIFF WBC COUNT: CPT

## 2021-08-30 PROCEDURE — 85027 COMPLETE CBC AUTOMATED: CPT

## 2021-08-30 PROCEDURE — 99285 EMERGENCY DEPT VISIT HI MDM: CPT

## 2021-08-30 PROCEDURE — 85730 THROMBOPLASTIN TIME PARTIAL: CPT

## 2021-08-30 RX ORDER — FUROSEMIDE 10 MG/ML
40 INJECTION INTRAMUSCULAR; INTRAVENOUS
Status: DISCONTINUED | OUTPATIENT
Start: 2021-08-30 | End: 2021-08-30 | Stop reason: HOSPADM

## 2021-08-30 RX ADMIN — FUROSEMIDE 40 MG: 10 INJECTION, SOLUTION INTRAMUSCULAR; INTRAVENOUS at 17:07

## 2021-08-30 ASSESSMENT — FIBROSIS 4 INDEX: FIB4 SCORE: 2.08

## 2021-08-30 NOTE — ED PROVIDER NOTES
"ED Provider Note    Scribed for Aleisha Bang M.D. by Kim Lee. 8/30/2021  3:56 PM    Primary care provider: Daniel Fernández M.D.  Means of arrival: Walk In  History obtained from: Patient  History limited by: None     CHIEF COMPLAINT  Chief Complaint   Patient presents with    Shortness of Breath     Hx of CHF, patient on diuretics that make her urinate too often so she has trouble taking her full dose. She has been having SOB for past couple weeks. Denies any other symptoms       HPI  Rachelle Reina is a 71 y.o. female with a history of congestive heart failure, diabetes, and atrial fibrillation who presents to the Emergency Department for mild shortnessof breath onset 1.5 months ago. The patient states her lungs are \"filled with fluid\" and she has having \"a hard time breathing.\" She reports she was home for 2 weeks and \"did a lot urinating with my water pills.\" The patient says she has not been compliant with her congestive heart failure medications and has been taking Torsemide once a day instead of three times a day. She notes she has been maintaining a good diet and avoiding salts, and states she went to a lymphedema clinic 3 days ago where she was informed she will receive procedure in about 1.5 weeks. No alleviating or exacerbating factors were noted. Patient has associated chills, but denies abdominal pain, fever, or chest pain. She is allergic to Sulfa Drugs and takes Torsemide, Coumadin, and Levemir twice a day. Patient denies using oxygen at baseline. She does not smoke cigarette or use drugs, but drinks alcohol.  She reports her cardiologist is Dr. Jeffrey. Patient notes she has received her COVID-19 vaccination.     REVIEW OF SYSTEMS  CARDIAC: No chest pain  PULMONARY: Positive shortness of breath   GI: No abdominal pain   Endocrine: Positive chills. No fever    See history of present illness. All other systems are negative. C.    PAST MEDICAL HISTORY   has a past medical history of A-fib " (HCC), Arthritis (05/05/2020), Atrial fibrillation (HCC), Benign essential hypertension (7/1/2011), Bowel habit changes, Breast cancer (HCC), Bronchitis (2015), CAD (coronary artery disease), Cancer (HCC) (1987, 2000), Cataract (2018), Diabetes (HCC), Diastolic congestive heart failure (HCC) (2019), Disorder of thyroid, High cholesterol, History of cardiac catheterization (4/22/2010), History of echocardiogram (4/22/2011), History of hypothyroidism (8/29/2008), Hypercholesteremia (7/1/2011), Long term (current) use of anticoagulants (7/1/2011), Morbid obesity (HCC) (10/28/2008), Ovarian cancer ([facility]), Pain, Sleep apnea, Snoring, and Urinary incontinence.    SURGICAL HISTORY   has a past surgical history that includes abdominal hysterectomy total; mastectomy (partial); radiation therapy plan simple; cholecystectomy (2001); cataract phaco with iol (Left, 1/8/2018); cataract phaco with iol (Right, 1/22/2018); other orthopedic surgery (Right, 2013); and [facility] cardiac cath (02/27/2019).    SOCIAL HISTORY  Social History     Tobacco Use    Smoking status: Never Smoker    Smokeless tobacco: Never Used   Vaping Use    Vaping Use: Never used   Substance Use Topics    Alcohol use: Yes     Comment: 1 or 2 drinks a month    Drug use: No      Social History     Substance and Sexual Activity   Drug Use No       FAMILY HISTORY  Family History   Problem Relation Age of Onset    Heart Disease Mother         HEART VALVE REPLACEMENT.    Heart Disease Father         CORONARY ARTERY BYPASS GRAFTS    Cancer Maternal Aunt     Diabetes Brother     Thyroid Brother     Heart Attack Brother     Heart Disease Brother        CURRENT MEDICATIONS  Home Medications       Reviewed by Tracy Paulson R.N. (Registered Nurse) on 08/30/21 at 1053  Med List Status: Partial     Medication Last Dose Status   acetaminophen (TYLENOL 8 HOUR ARTHRITIS PAIN) 650 MG CR tablet  Active   allopurinol (ZYLOPRIM) 100 MG Tab  Active   Cholecalciferol (VITAMIN D-3) 5000  "units Tab  Active   citalopram (CELEXA) 20 MG TABS  Active   gemfibrozil (LOPID) 600 MG Tab  Active   LEVEMIR FLEXTOUCH 100 UNIT/ML injection PEN  Active   levothyroxine (SYNTHROID) 175 MCG Tab  Active   levothyroxine (SYNTHROID) 200 MCG Tab  Active   loratadine (CLARITIN) 10 MG Tab  Active   losartan (COZAAR) 50 MG Tab  Active   metoprolol (LOPRESSOR) 50 MG Tab  Active   oxybutynin (DITROPAN XL) 15 MG CR tablet  Active   pioglitazone (ACTOS) 15 MG Tab  Active   potassium chloride SA (KDUR) 20 MEQ Tab CR  Active   Pyridoxine HCl (VITAMIN B-6 PO)  Active   rosuvastatin (CRESTOR) 20 MG Tab  Active   torsemide (DEMADEX) 20 MG Tab  Active   traZODone (DESYREL) 100 MG Tab  Active   warfarin (COUMADIN) 5 MG Tab  Active                    ALLERGIES  Allergies   Allergen Reactions    Sulfa Drugs Rash     8/2015 rash and hives         PHYSICAL EXAM  VITAL SIGNS: /76   Pulse 87   Temp 36.1 °C (97 °F) (Temporal)   Resp 18   Ht 1.651 m (5' 5\")   Wt (!) 126 kg (277 lb)   LMP 01/05/1987 (Approximate)   SpO2 97%   BMI 46.10 kg/m²     Constitutional: Speaking full sentences, Well developed, Well nourished, No acute distress, Non-toxic appearance.   HEENT: Normocephalic, Atraumatic,  external ears normal, pharynx pink,  Mucous  Membranes moist, No rhinorrhea or mucosal edema  Eyes: PERRL, EOMI, Conjunctiva normal, No discharge.   Neck: Normal range of motion, No tenderness, Supple, No stridor.   Lymphatic: No lymphadenopathy    Cardiovascular: Regular Rate, Irregularly irregular rhythm, No murmurs,  rubs, or gallops.   Thorax & Lungs: Scattered rales at the bases, No respiratory distress, No wheezes or rhonchi, No chest wall tenderness.   Abdomen: Elevated BMI, Bowel sounds normal, Soft, non tender, non distended,  No pulsatile masses., no rebound guarding or peritoneal signs.   Skin: Warm, Dry, No erythema, No rash,   Back:  No CVA tenderness,  No spinal tenderness, bony crepitance, step offs, or instability. "   Neurologic: Alert & oriented clear speech no focal deficits  Extremities: Equal, intact distal pulses, 3+ edema in the bilateral lower extremities, Ulcer on the right anterior shin that has dressing in place, No erythema, foul odor, or red streaking, No cyanosis or clubbing,  No tenderness.   Musculoskeletal: Good range of motion in all major joints. No tenderness to palpation or major deformities noted.     DIAGNOSTIC STUDIES / PROCEDURES    LABS  Results for orders placed or performed during the hospital encounter of 08/30/21   CBC with Differential   Result Value Ref Range    WBC 5.3 4.8 - 10.8 K/uL    RBC 4.87 4.20 - 5.40 M/uL    Hemoglobin 11.9 (L) 12.0 - 16.0 g/dL    Hematocrit 40.2 37.0 - 47.0 %    MCV 82.5 81.4 - 97.8 fL    MCH 24.4 (L) 27.0 - 33.0 pg    MCHC 29.6 (L) 33.6 - 35.0 g/dL    RDW 57.1 (H) 35.9 - 50.0 fL    Platelet Count 267 164 - 446 K/uL    MPV 9.6 9.0 - 12.9 fL    Neutrophils-Polys 59.10 44.00 - 72.00 %    Lymphocytes 26.10 22.00 - 41.00 %    Monocytes 7.00 0.00 - 13.40 %    Eosinophils 5.20 0.00 - 6.90 %    Basophils 2.60 (H) 0.00 - 1.80 %    Nucleated RBC 0.00 /100 WBC    Neutrophils (Absolute) 3.13 2.00 - 7.15 K/uL    Lymphs (Absolute) 1.38 1.00 - 4.80 K/uL    Monos (Absolute) 0.37 0.00 - 0.85 K/uL    Eos (Absolute) 0.28 0.00 - 0.51 K/uL    Baso (Absolute) 0.14 (H) 0.00 - 0.12 K/uL    NRBC (Absolute) 0.00 K/uL    Anisocytosis 1+    Comp Metabolic Panel   Result Value Ref Range    Sodium 142 135 - 145 mmol/L    Potassium 4.2 3.6 - 5.5 mmol/L    Chloride 106 96 - 112 mmol/L    Co2 24 20 - 33 mmol/L    Anion Gap 12.0 7.0 - 16.0    Glucose 85 65 - 99 mg/dL    Bun 19 8 - 22 mg/dL    Creatinine 1.11 0.50 - 1.40 mg/dL    Calcium 10.3 8.5 - 10.5 mg/dL    AST(SGOT) 21 12 - 45 U/L    ALT(SGPT) 12 2 - 50 U/L    Alkaline Phosphatase 89 30 - 99 U/L    Total Bilirubin 0.3 0.1 - 1.5 mg/dL    Albumin 3.9 3.2 - 4.9 g/dL    Total Protein 7.1 6.0 - 8.2 g/dL    Globulin 3.2 1.9 - 3.5 g/dL    A-G Ratio 1.2  g/dL   ESTIMATED GFR   Result Value Ref Range    GFR If  59 (A) >60 mL/min/1.73 m 2    GFR If Non  48 (A) >60 mL/min/1.73 m 2   MORPHOLOGY   Result Value Ref Range    RBC Morphology Present    PERIPHERAL SMEAR REVIEW   Result Value Ref Range    Peripheral Smear Review see below    DIFFERENTIAL MANUAL   Result Value Ref Range    Manual Diff Status PERFORMED    PLATELET ESTIMATE   Result Value Ref Range    Plt Estimation Normal    TROPONIN   Result Value Ref Range    Troponin T 45 (H) 6 - 19 ng/L   PT/INR   Result Value Ref Range    PT 21.4 (H) 12.0 - 14.6 sec    INR 1.92 (H) 0.87 - 1.13   PTT   Result Value Ref Range    APTT 41.3 (H) 24.7 - 36.0 sec   proBrain Natriuretic Peptide, NT   Result Value Ref Range    NT-proBNP 1803 (H) 0 - 125 pg/mL   TROPONIN   Result Value Ref Range    Troponin T 41 (H) 6 - 19 ng/L   EKG (NOW)   Result Value Ref Range    Report       [facility] Emergency Dept.    Test Date:  2021  Pt Name:    RAUL RODRIGUES             Department: ER  MRN:        [id number]                      Room:  Gender:     Female                       Technician: [id number]  :        [id number]                   Requested By:ER TRIAGE PROTOCOL  Order #:    [id number]                    Reading MD: JARAD BARLOW MD    Measurements  Intervals                                Axis  Rate:       98                           P:  NV:                                      QRS:        95  QRSD:       92                           T:          6  QT:         360  QTc:        460    Interpretive Statements  ATRIAL FIBRILLATION, V-RATE    RIGHT AXIS DEVIATION  LOW VOLTAGE THROUGHOUT  LATE PRECORDIAL R/S TRANSITION  Compared to ECG 2020 07:27:00  No significant changes  Electronically Signed On 2021 16:06:06 PDT by JARAD BARLOW MD        All labs reviewed by me.    EKG  12 lead EKG; Interpreted by emergency department physician as noted above.      RADIOLOGY  DX-CHEST-PORTABLE (1 VIEW)   Final Result      1.  No acute cardiac or pulmonary abnormality is noted.      2.  Stable marked cardiomegaly.      3.  Limited visualization of each upper pulmonary lobe.        The radiologist's interpretation of all radiological studies have been reviewed by me.    COURSE & MEDICAL DECISION MAKING  Nursing notes, VS, PMSFHx reviewed in chart.    3:56 PM Patient seen and examined at bedside. Discussed plan of care with patient. I informed them that labs and imaging will be ordered to evaluate symptoms. The patient is understanding and agreeable with plan of care. Patient will be treated with 40 mg Lasix. Ordered DX-Chest-Portable, PT/INR, PTT, ProBrain Natriuretic Peptide, Troponin, Estimated GFR, Morphology, Peripheral Smear Review, Differential Manual, Platelet Estimate, CBC w/ Differential, and CMP to evaluate her symptoms. The differential diagnoses include but are not limited to: CHF due to medication noncompliance, Cardiac ischemia, Pneumonia     5:26 PM Ordered ProBrain Natriuretic Peptide to evaluate the patient.     6:22 PM Ordered Troponin to evaluate the patient.     8:31 PM Patient was reevaluated at bedside. She shows no signs of hypoxemia. Discussed lab and radiology results with the patient and informed them that there were no abnormal findings as noted above. The plan of care was discussed with the patient. She was encouraged to be compliant in taking her cardiac medications and was told to follow up with her cardiologist. Patient verbalizes understanding and agreement to this plan of care. Patient had the opportunity to ask any questions. The plan for discharge was discussed with the patient and she was told to return for any new or worsening symptoms and to follow up with her PCP. Patient is understanding and agreeable to the plan for discharge.       Heart Score is: Less than 3     The patient will return for new or worsening symptoms and is stable at  the time of discharge.    DISPOSITION:  Patient will be discharged home in stable condition.    FOLLOW UP:  Ash Jeffrey M.D.  [address]  [address]  [facility] [address]  [phone]    Call in 1 day  for recheck    OUTPATIENT MEDICATIONS:  Discharge Medication List as of 8/30/2021  9:07 PM           FINAL IMPRESSION  1. Acute on chronic congestive heart failure, unspecified heart failure type (HCC)    2. Noncompliance with medications          Kim CUEVAS (Omi), am scribing for, and in the presence of, Aleisha Bang M.D..    Electronically signed by: Kim Lee (Omi), 8/30/2021    Aleisha CUEVAS M.D. personally performed the services described in this documentation, as scribed by Kim Lee in my presence, and it is both accurate and complete. C     The note accurately reflects work and decisions made by me.  Aleisha Bang M.D.  8/30/2021  11:27 PM

## 2021-08-30 NOTE — ED TRIAGE NOTES
"Chief Complaint   Patient presents with   • Shortness of Breath     Hx of CHF, patient on diuretics that make her urinate too often so she has trouble taking her full dose. She has been having SOB for past couple weeks. Denies any other symptoms       Patient to triage with a steady gait using walker, AAOx4, Appropriate precautions in place.     Explained wait time and triage process. Placed back in lobby after EKG. Told to notify ED tech or RN of any changes, verbalized understanding.    /75   Pulse 86   Temp 36.8 °C (98.2 °F) (Temporal)   Resp 18   Ht 1.651 m (5' 5\")   Wt (!) 126 kg (277 lb)   LMP 01/05/1987 (Approximate)   SpO2 92%   BMI 46.10 kg/m²   .  "

## 2021-08-31 NOTE — ED NOTES
Patient speaking in full sentences; patient to R1 via wheelchair, personal walker at patient side.  PIV placed, unable to draw additional blood - phelb tech made aware.  Patient resting on gurney, respirations even and unlabored. Purewick in place for urine collection.

## 2021-08-31 NOTE — ED NOTES
PIV removed and bandaged.  Rachelle Reina [facility]  Discharge instructions given and reviewed, patient educated to follow up with Cardiologist and PCP, verbalized understanding.  All personal belongings in possession including personal walker.  No questions at this time.

## 2021-09-13 ENCOUNTER — PATIENT MESSAGE (OUTPATIENT)
Dept: CARDIOLOGY | Facility: MEDICAL CENTER | Age: 71
End: 2021-09-13

## 2021-09-13 NOTE — PATIENT COMMUNICATION
Ash Jeffrey M.D.  You 1 minute ago (3:13 PM)     We need to evaluate her in clinic     Ash Jeffrey M.D.    Message text

## 2021-09-17 NOTE — PATIENT COMMUNICATION
Called pt. Advised that she call 911 if she is feeling this unwell and cannot even get downstairs. She will do this. Advised that if the ER/hospitalist feels that she needs to be seen by cardiology, they will consult us. Informed her that it would be whichever rounding cardiologist is available but that were are all the same team. Advised that once she is feeling better and discharged from the hospital, to call us to make a f/u appt.

## 2021-09-20 ENCOUNTER — TELEPHONE (OUTPATIENT)
Dept: CARDIOLOGY | Facility: MEDICAL CENTER | Age: 71
End: 2021-09-20

## 2021-09-20 NOTE — TELEPHONE ENCOUNTER
----- Message from Marialuisa Acevedo R.N. sent at 9/20/2021 11:42 AM PDT -----  Regarding: FW: Lymphedema  Please contact pt to schedule an appt as soon as possible, can be w/ TT's care team and if the pt would like please place her on TT's cancellation list as well.   ----- Message -----  From: Sarbjit Bardales Ass't  Sent: 9/20/2021   8:05 AM PDT  To: Marialuisa Acevedo R.N.  Subject: FW: Lymphedema                                     ----- Message -----  From: Rachelle Reina  Sent: 9/19/2021   8:32 PM PDT  To: Braden Hernandez  Subject: Lymphedema                                       I got into [facility] on Friday and got my Covid test. They called yesterday, Saturday, and it was negative. And I am feeling much better.   I want to get in to see Dr Jeffrey. Only letting you know because perhaps you can help me get in sooner than if I just call for an appointment. [facility] and all should be in my chart. [facility] took EKG, blood and X-ray on Friday 9/17. They said that doctor should be able to get results by contacting [facility]. I was taken there because [facility] was so full already.     [phone]  Thank you  Rachelle Reina

## 2021-09-24 ENCOUNTER — ANTICOAGULATION VISIT (OUTPATIENT)
Dept: VASCULAR LAB | Facility: MEDICAL CENTER | Age: 71
End: 2021-09-24
Attending: INTERNAL MEDICINE
Payer: MEDICARE

## 2021-09-24 VITALS — HEART RATE: 97 BPM | SYSTOLIC BLOOD PRESSURE: 133 MMHG | DIASTOLIC BLOOD PRESSURE: 75 MMHG

## 2021-09-24 DIAGNOSIS — Z79.01 CHRONIC ANTICOAGULATION: ICD-10-CM

## 2021-09-24 LAB
INR BLD: 1.7 (ref 0.9–1.2)
INR PPP: 1.7 (ref 2–3.5)

## 2021-09-24 PROCEDURE — 85610 PROTHROMBIN TIME: CPT

## 2021-09-24 PROCEDURE — 99212 OFFICE O/P EST SF 10 MIN: CPT

## 2021-09-24 RX ORDER — WARFARIN SODIUM 5 MG/1
TABLET ORAL
Qty: 90 TABLET | Refills: 1 | Status: SHIPPED | OUTPATIENT
Start: 2021-09-24 | End: 2022-01-01

## 2021-09-24 NOTE — PROGRESS NOTES
Anticoagulation Summary  As of 2021    INR goal:  2.0-3.0   TTR:  57.5 % (6.1 y)   INR used for dosin.70 (2021)   Warfarin maintenance plan:  5 mg (5 mg x 1) every Tue, Fri; 2.5 mg (5 mg x 0.5) all other days   Weekly warfarin total:  22.5 mg   Plan last modified:  Becca Galvez, PharmD (2021)   Next INR check:  10/1/2021   Target end date:  Indefinite    Indications    Chronic anticoagulation [Z79.01]  Atrial fibrillation (HCC) (Resolved) [I48.91]             Anticoagulation Episode Summary     INR check location:  Anticoagulation Clinic    Preferred lab:      Send INR reminders to:      Comments:        Anticoagulation Care Providers     Provider Role Specialty Phone number    Florian Carnes M.D. Referring Interventional Cardiology [phone]    [facility] Responsible  [phone]           Refer to Patient Findings for HPI:  Patient Findings     Negatives:  Signs/symptoms of thrombosis, Signs/symptoms of bleeding, Laboratory test error suspected, Change in health, Change in alcohol use, Change in activity, Upcoming invasive procedure, Emergency department visit, Upcoming dental procedure, Missed doses, Extra doses, Change in medications, Change in diet/appetite, Hospital admission, Bruising, Other complaints        Vitals:    21 1555   BP: 133/75   Pulse: 97       Verified current warfarin dosing schedule.    Medications reconciled   Pt is not on antiplatelet therapy      A/P   INR sub-therapeutic. Patient moved to assisted living facility ~ 1.5 months ago, reports increase in dietary green intake. Believes this is responsible for subtherapeutic value.     Warfarin dosing recommendation: Increase to 5 mg Tuesday/Friday, 2.5 mg AOD. Increase of ~12.5%.     Pt educated to contact our clinic with any changes in medications or s/s of bleeding or thrombosis. Pt is aware to seek immediate medical attention for falls, head injury or deep cuts.    Refilled warfarin 5  mg tablets today.    Follow up appointment in 1 week(s).    Becca Galvez, PharmD  PGY1 Pharmacy Practice Resident

## 2021-10-01 NOTE — PROGRESS NOTES
Anticoagulation Summary  As of 10/1/2021    INR goal:  2.0-3.0   TTR:  57.4 % (6.2 y)   INR used for dosin.00 (10/1/2021)   Warfarin maintenance plan:  5 mg (5 mg x 1) every Tue, Fri; 2.5 mg (5 mg x 0.5) all other days   Weekly warfarin total:  22.5 mg   Plan last modified:  Becca Galvez PharmD (2021)   Next INR check:  10/15/2021   Target end date:  Indefinite    Indications    Chronic anticoagulation [Z79.01]  Atrial fibrillation (HCC) (Resolved) [I48.91]             Anticoagulation Episode Summary     INR check location:  Anticoagulation Clinic    Preferred lab:      Send INR reminders to:      Comments:        Anticoagulation Care Providers     Provider Role Specialty Phone number    Florian Carnes M.D. Referring Interventional Cardiology [phone]    [facility] Responsible  [phone]          Refer to Patient Findings for HPI:  Patient Findings     Negatives:  Signs/symptoms of thrombosis, Signs/symptoms of bleeding, Laboratory test error suspected, Change in health, Change in alcohol use, Change in activity, Upcoming invasive procedure, Emergency department visit, Upcoming dental procedure, Missed doses, Extra doses, Change in medications, Change in diet/appetite, Hospital admission, Bruising, Other complaints          There were no vitals filed for this visit.  pt declined vitals    Verified current warfarin dosing schedule.    Medications reconciled  Pt is not on antiplatelet therapy      A/P   INR  is therapeutic.     Warfarin dosing recommendation: Continue on current regimen    Pt educated to contact our clinic with any changes in medications or s/s of bleeding or thrombosis. Pt is aware to seek immediate medical attention for falls, head injury or deep cuts.    Follow up appointment in 2 week(s).    Nakita Benitez, OrtegaD

## 2021-10-13 NOTE — PROGRESS NOTES
Chief Complaint   Patient presents with   • Congestive Heart Failure   • Atrial Fibrillation   • HTN (Controlled)       Subjective:   Rachelle Reina is a 71 y.o. female who presents today for cardiac care and management of HFpEF.  Patient has a history of atrial fibrillation along with morbid obesity, hypertension.    She had a transthoracic echocardiogram done in December 2020, which showed normal LV function, moderate aortic stenosis with mean gradient of 20 mm Hg, RVSP of 50 mmHg.  I personally interpreted images of her transthoracic echocardiogram.    Patient still gets winded with daily living activities and exertion. No symptoms at rest.    No obstructive CAD on coronary angiogram in 02/2019.    I have independently interpreted and reviewed blood tests results with patient in clinic which shows GFR of 48.    Saw lymph clinic and plan was to warp her legs.      Past Medical History:   Diagnosis Date   • A-fib ([facility])    • Arthritis 05/05/2020    knees and fingers   • Atrial fibrillation ([facility])    • Benign essential hypertension 7/1/2011   • Bowel habit changes     diarrhea   • Breast cancer ([facility])    • Bronchitis 2015    history of   • CAD (coronary artery disease)    • Cancer ([facility]) 1987, 2000    breast and ovary   • Cataract 2018    IOL bilat   • Diabetes ([facility])     oral medication, insulin   • Diastolic congestive heart failure (HCC) 2019   • Disorder of thyroid    • High cholesterol    • History of cardiac catheterization 4/22/2010   • History of echocardiogram 4/22/2011   • History of hypothyroidism 8/29/2008   • Hypercholesteremia 7/1/2011   • Long term (current) use of anticoagulants 7/1/2011   • Morbid obesity (HCC) 10/28/2008   • Ovarian cancer ([facility])    • Pain     knees   • Sleep apnea     pt does not use cpap   • Snoring     sleep study done   • Urinary incontinence      Past Surgical History:   Procedure Laterality Date   • ZZZ CARDIAC CATH  02/27/2019    normal coronaries   • CATARACT PHACO WITH  IOL Right 1/22/2018    Procedure: CATARACT PHACO WITH IOL;  Surgeon: Santosh Holden M.D.;  Location: [facility];  Service: Ophthalmology   • CATARACT PHACO WITH IOL Left 1/8/2018    Procedure: CATARACT PHACO WITH IOL;  Surgeon: Santosh Holden M.D.;  Location: [facility];  Service: Ophthalmology   • OTHER ORTHOPEDIC SURGERY Right 2013    hip arthroplasty   • CHOLECYSTECTOMY  2001   • ABDOMINAL HYSTERECTOMY TOTAL      1987   • MASTECTOMY  partial   • PB RADIATION THERAPY PLAN SIMPLE       Family History   Problem Relation Age of Onset   • Heart Disease Mother         HEART VALVE REPLACEMENT.   • Heart Disease Father         CORONARY ARTERY BYPASS GRAFTS   • Cancer Maternal Aunt    • Diabetes Brother    • Thyroid Brother    • Heart Attack Brother    • Heart Disease Brother      Social History     Socioeconomic History   • Marital status:      Spouse name: Not on file   • Number of children: Not on file   • Years of education: Not on file   • Highest education level: Not on file   Occupational History   • Not on file   Tobacco Use   • Smoking status: Never Smoker   • Smokeless tobacco: Never Used   Vaping Use   • Vaping Use: Never used   Substance and Sexual Activity   • Alcohol use: Yes     Comment: 1 or 2 drinks a month   • Drug use: No   • Sexual activity: Not on file   Other Topics Concern   • Not on file   Social History Narrative   • Not on file     Social Determinants of Health     Financial Resource Strain:    • Difficulty of Paying Living Expenses:    Food Insecurity:    • Worried About Running Out of Food in the Last Year:    • Ran Out of Food in the Last Year:    Transportation Needs:    • Lack of Transportation (Medical):    • Lack of Transportation (Non-Medical):    Physical Activity:    • Days of Exercise per Week:    • Minutes of Exercise per Session:    Stress:    • Feeling of Stress :    Social Connections:    • Frequency of Communication with Friends and  Family:    • Frequency of Social Gatherings with Friends and Family:    • Attends [employer] Services:    • Active Member of Clubs or Organizations:    • Attends Club or Organization Meetings:    • Marital Status:    Intimate Partner Violence:    • Fear of Current or Ex-Partner:    • Emotionally Abused:    • Physically Abused:    • Sexually Abused:      Allergies   Allergen Reactions   • Sulfa Drugs Rash     8/2015 rash and hives       Outpatient Encounter Medications as of 10/13/2021   Medication Sig Dispense Refill   • Finerenone (KERENDIA) 10 MG Tab Take 10 mg by mouth every day. 30 Tablet 11   • Finerenone (KERENDIA) 10 MG Tab Take 10 mg by mouth every day. 30 Tablet 11   • warfarin (COUMADIN) 5 MG Tab Take 1/2-1 tab by mouth daily or as directed by anticoagulation clinic 90 Tablet 1   • torsemide (DEMADEX) 20 MG Tab Take 3 Tabs by mouth 2 times a day. 540 Tab 3   • potassium chloride SA (KDUR) 20 MEQ Tab CR Take 2 Tabs by mouth 2 times a day. 360 Tab 3   • metoprolol (LOPRESSOR) 50 MG Tab TAKE 1 TABLET BY MOUTH TWICE DAILY 180 Tab 4   • rosuvastatin (CRESTOR) 20 MG Tab Take 1 Tab by mouth every evening. 90 Tab 4   • losartan (COZAAR) 50 MG Tab Take 1 Tab by mouth every day. 100 Tab 4   • gemfibrozil (LOPID) 600 MG Tab Take 1 Tab by mouth every morning. 90 Tab 4   • levothyroxine (SYNTHROID) 200 MCG Tab Take 200 mcg by mouth See Admin Instructions. All days except Sunday  Brand Name     • pioglitazone (ACTOS) 15 MG Tab Take 15 mg by mouth every day.     • Pyridoxine HCl (VITAMIN B-6 PO) Take 1 Tab by mouth every day.     • LEVEMIR FLEXTOUCH 100 UNIT/ML injection PEN Inject 15 Units under the skin 2 times a day. 7 UNITS THE FIRST TIME AND 8 UNITS THE SECOND TIME     • oxybutynin (DITROPAN XL) 15 MG CR tablet Take 15 mg by mouth every morning.  2   • allopurinol (ZYLOPRIM) 100 MG Tab Take 100 mg by mouth every bedtime.  2   • acetaminophen (TYLENOL 8 HOUR ARTHRITIS PAIN) 650 MG CR tablet Take 1,300 mg by mouth 2  "Times a Day.     • Cholecalciferol (VITAMIN D-3) 5000 units Tab Take 5,000 Units by mouth 2 Times a Day.     • traZODone (DESYREL) 100 MG Tab Take 50 mg by mouth every evening.     • levothyroxine (SYNTHROID) 175 MCG Tab Take 175 mcg by mouth every Sunday. Brand Name     • loratadine (CLARITIN) 10 MG Tab Take 10 mg by mouth every day.     • citalopram (CELEXA) 20 MG TABS Take 20 mg by mouth every day.       No facility-administered encounter medications on file as of 10/13/2021.     Review of Systems   Constitutional: Negative for diaphoresis and fever.   HENT: Negative for nosebleeds.    Eyes: Negative for blurred vision and double vision.   Respiratory: Positive for shortness of breath. Negative for cough.    Cardiovascular: Positive for leg swelling. Negative for chest pain and palpitations.   Gastrointestinal: Negative for abdominal pain.   Genitourinary: Negative for dysuria and frequency.   Musculoskeletal: Negative for falls and myalgias.   Skin: Negative for rash.   Neurological: Negative for dizziness, sensory change and headaches.   Endo/Heme/Allergies: Does not bruise/bleed easily.   Psychiatric/Behavioral: Negative for depression and memory loss.        Objective:   /80 (BP Location: Right arm, Patient Position: Sitting, BP Cuff Size: Adult)   Pulse 78   Ht 1.651 m (5' 5\")   Wt 122 kg (270 lb)   LMP 01/05/1987 (Approximate)   SpO2 94%   BMI 44.93 kg/m²     Physical Exam  Vitals and nursing note reviewed.   Constitutional:       General: She is not in acute distress.     Appearance: She is not diaphoretic.   HENT:      Head: Normocephalic and atraumatic.      Right Ear: External ear normal.      Left Ear: External ear normal.   Eyes:      General:         Right eye: No discharge.         Left eye: No discharge.   Neck:      Thyroid: No thyromegaly.      Vascular: No JVD.   Cardiovascular:      Rate and Rhythm: Normal rate and regular rhythm.      Pulses: Normal pulses.   Pulmonary:      " Effort: No respiratory distress.   Abdominal:      General: Bowel sounds are normal.   Musculoskeletal:         General: Swelling present. No tenderness.      Right lower leg: Edema present.      Left lower leg: Edema present.   Skin:     General: Skin is warm and dry.   Neurological:      Mental Status: She is alert and oriented to person, place, and time.      Cranial Nerves: No cranial nerve deficit.   Psychiatric:         Behavior: Behavior normal.       Assessment:     1. ACC/AHA stage C heart failure with preserved ejection fraction ([facility])     2. Heart failure, NYHA class 3 ([facility])     3. Longstanding persistent atrial fibrillation ([facility])     4. HTN (hypertension), malignant     5. Chronic anticoagulation     6. High risk medication use     7. Stage 3a chronic kidney disease ([facility])  Basic Metabolic Panel   8. Type 2 diabetes mellitus with hyperglycemia, with long-term current use of insulin ([facility])  Basic Metabolic Panel   9. Moderate aortic stenosis         Medical Decision Making:  Today's Assessment / Status / Plan:   Today, based on physical examination findings, patient is euvolemic. No JVD, lungs are clear to auscultation, no pitting edema in bilateral lower extremities, no ascites.    Dry weight is 260 lbs. Now at 270, gained 10 lbs.    Continue CPAP for obstructive sleep apnea.    Optimize diuresis. ContinueTorsemide 60 mg bid and adjusted by her based on her needs.    Optimize blood pressure control. Continue Losartan 50 mg po daily, Metoprolol 50 bid.    Rate control for her atrial fibrillation. Continue Metoprolol 50 mg bid, anticoagulation with warfarin for stroke risk reduction.    Continue rosuvastatin and gemfibrozil for mixed hyperlipidemia and hypertriglyceridemia.    Continue care at lymph clinic care for legs swelling.    Based on recent FDA approval of Kerendia (finerenone) therapy to reduce the risk of kidney function decline, kidney failure, cardiovascular death, non-fatal heart attacks, and  hospitalization for heart failure in adults with chronic kidney disease associated with type 2 diabetes, I will start patient on Kerendia 10 mg daily. Risks and benefits of this therapy were discussed with patient, who has agreed to proceed. We will closely monitor based on protocol for the side effects of this high risk medication with basic metabolic panel in 3 weeks.    She had successful implantation of CardioMEMS device.  The device was working appropriately for 6 months and then stopped working.  She has a CT angiogram of her pulmonary arterial tree which showed possible migration of the device which had to enter a zone for which no flow could be seen through the device.  Patient is completely asymptomatic.  No shortness of breath.  No evidence of pulmonary embolism.  I explained to her about the nature of the possible device migration.  At this time, we will not implant another device.  We will clinically monitor and treat her accordingly.  Patient has agreed to proceed with such plan.

## 2021-10-15 NOTE — PROGRESS NOTES
Anticoagulation Summary  As of 10/15/2021    INR goal:  2.0-3.0   TTR:  57.4 % (6.2 y)   INR used for dosing:  3.70 (10/15/2021)   Warfarin maintenance plan:  5 mg (5 mg x 1) every Tue, Fri; 2.5 mg (5 mg x 0.5) all other days   Weekly warfarin total:  22.5 mg   Plan last modified:  Becca Galvez, PharmD (9/24/2021)   Next INR check:  10/22/2021   Target end date:  Indefinite    Indications    Chronic anticoagulation [Z79.01]  Atrial fibrillation (HCC) (Resolved) [I48.91]             Anticoagulation Episode Summary     INR check location:  Anticoagulation Clinic    Preferred lab:      Send INR reminders to:      Comments:        Anticoagulation Care Providers     Provider Role Specialty Phone number    Florian Carnes M.D. Referring Interventional Cardiology [phone]    [facility] Responsible  [phone]          Refer to Patient Findings for HPI:  Patient Findings     Negatives:  Signs/symptoms of thrombosis, Signs/symptoms of bleeding, Laboratory test error suspected, Change in health, Change in alcohol use, Change in activity, Upcoming invasive procedure, Emergency department visit, Upcoming dental procedure, Missed doses, Extra doses, Change in medications, Change in diet/appetite, Hospital admission, Bruising, Other complaints          There were no vitals filed for this visit.  Pt declined vitals    Verified current warfarin dosing schedule.    Medications reconciled     Pt is not on antiplatelet therapy      A/P   INR  SUPRA-therapeutic at 3.7. Patient reports decreased intake of dietary vegetables over the past week, which has returned to normal. Likely cause of supratherapeutic INR.  Warfarin dosing recommendation: Hold warfarin for 2 days, then resume normal dosing.     Pt educated to contact our clinic with any changes in medications or s/s of bleeding or thrombosis. Pt is aware to seek immediate medical attention for falls, head injury or deep cuts.    Follow up appointment  in 1 week(s).    Becca Galvez, PharmD  PGY1 Pharmacy Practice Resident

## 2021-10-20 NOTE — TELEPHONE ENCOUNTER
LVM for patient regarding Kerendia Rx. Per Luiza/ Kerendia Rep, Narcisa. Voucher was dropped off at patient's [facility] pharmacy. Patient can  med FREE. Left details on vm, CB number also given if needed.

## 2021-10-22 NOTE — PROGRESS NOTES
Anticoagulation Summary  As of 10/22/2021    INR goal:  2.0-3.0   TTR:  57.4 % (6.2 y)   INR used for dosin.80 (10/22/2021)   Warfarin maintenance plan:  5 mg (5 mg x 1) every Tue, Fri; 2.5 mg (5 mg x 0.5) all other days   Weekly warfarin total:  22.5 mg   Plan last modified:  Becca Galvez PharmD (2021)   Next INR check:  10/28/2021   Target end date:  Indefinite    Indications    Chronic anticoagulation [Z79.01]  Atrial fibrillation (HCC) (Resolved) [I48.91]             Anticoagulation Episode Summary     INR check location:  Anticoagulation Clinic    Preferred lab:      Send INR reminders to:      Comments:        Anticoagulation Care Providers     Provider Role Specialty Phone number    Florian Carnes M.D. Referring Interventional Cardiology [phone]    [facility] Responsible  [phone]          Refer to Patient Findings for HPI:  Patient Findings     Negatives:  Signs/symptoms of thrombosis, Signs/symptoms of bleeding, Laboratory test error suspected, Change in health, Change in alcohol use, Change in activity, Upcoming invasive procedure, Emergency department visit, Upcoming dental procedure, Missed doses, Extra doses, Change in medications, Change in diet/appetite, Hospital admission, Bruising, Other complaints          There were no vitals filed for this visit.  Pt declined vitals    Verified current warfarin dosing schedule.    Medications reconciled .  Pt is not on antiplatelet therapy      A/P   INR  SUB-therapeutic.     Warfarin dosing recommendation: Take 5 mg x 2 days, then resume normal dosing.     Pt educated to contact our clinic with any changes in medications or s/s of bleeding or thrombosis. Pt is aware to seek immediate medical attention for falls, head injury or deep cuts.    Follow up appointment in 1 week(s).    Becca Galvez PharmD  PGY1 Pharmacy Practice Resident

## 2021-10-28 PROBLEM — D68.69 SECONDARY HYPERCOAGULABLE STATE (HCC): Status: ACTIVE | Noted: 2021-01-01

## 2021-10-28 NOTE — PROGRESS NOTES
Anticoagulation Summary  As of 10/28/2021    INR goal:  2.0-3.0   TTR:  57.4 % (6.2 y)   INR used for dosing:  3.20 (10/28/2021)   Warfarin maintenance plan:  5 mg (5 mg x 1) every Tue, Fri; 2.5 mg (5 mg x 0.5) all other days   Weekly warfarin total:  22.5 mg   Plan last modified:  Becca Galvez, PharmD (9/24/2021)   Next INR check:  11/11/2021   Target end date:  Indefinite    Indications    Atrial fibrillation (HCC) (Resolved) [I48.91]  Chronic anticoagulation [Z79.01]  Secondary hypercoagulable state (HCC) [D68.69]             Anticoagulation Episode Summary     INR check location:  Anticoagulation Clinic    Preferred lab:      Send INR reminders to:      Comments:        Anticoagulation Care Providers     Provider Role Specialty Phone number    Florian Carnes M.D. Referring Interventional Cardiology [phone]    [facility] Responsible  [phone]                Refer to Patient Findings for HPI:      There were no vitals filed for this visit.   pt declined vitals    Verified current warfarin dosing schedule.    Medications reconciled   Pt is not on antiplatelet therapy      A/P   INR  supra-therapeutic. INRs labile lately. With shared decision-making, we will not make a dose change today.    Warfarin dosing recommendation: Continue current regimen.    Pt educated to contact our clinic with any changes in medications or s/s of bleeding or thrombosis. Pt is aware to seek immediate medical attention for falls, head injury or deep cuts.    Follow up appointment in 2 week(s).    NICK Oneal

## 2021-11-11 NOTE — PROGRESS NOTES
Chief Complaint   Patient presents with   • Congestive Heart Failure   • Hypertension   • Atrial Fibrillation     F/V Dx:Longstanding persistent atrial fibrillation (HCC)     Of note, this visit was done through telemedicine with video on demand through epic system.  This visit complies with Medicare guidelines during this current COVID-19 pandemic.    This encounter was conducted via Zoom.  Verbal consent was obtained. Patient's identity was verified.    Subjective:   Rachelle Reina is a 71 y.o. female who presents today for cardiac care and management of HFpEF.  Patient has a history of atrial fibrillation along with morbid obesity, hypertension.    She had a transthoracic echocardiogram done in December 2020, which showed normal LV function, moderate aortic stenosis with mean gradient of 20 mm Hg, RVSP of 50 mmHg.  I personally interpreted images of her transthoracic echocardiogram.    Patient still gets winded with daily living activities and exertion. No symptoms at rest.    No obstructive CAD on coronary angiogram in 02/2019.    I have independently interpreted and reviewed blood tests results with patient in clinic which shows GFR of 48. Kerendia 10 mg daily was started but no recent blood test.    Saw lymph clinic and plan was to warp her legs.       Past Medical History:   Diagnosis Date   • A-fib (HCC)    • Arthritis 05/05/2020    knees and fingers   • Atrial fibrillation (HCC)    • Benign essential hypertension 7/1/2011   • Bowel habit changes     diarrhea   • Breast cancer (HCC)    • Bronchitis 2015    history of   • CAD (coronary artery disease)    • Cancer (HCC) 1987, 2000    breast and ovary   • Cataract 2018    IOL bilat   • Diabetes (HCC)     oral medication, insulin   • Diastolic congestive heart failure (HCC) 2019   • Disorder of thyroid    • High cholesterol    • History of cardiac catheterization 4/22/2010   • History of echocardiogram 4/22/2011   • History of hypothyroidism 8/29/2008    • Hypercholesteremia 7/1/2011   • Long term (current) use of anticoagulants 7/1/2011   • Morbid obesity (HCC) 10/28/2008   • Ovarian cancer (HCC)    • Pain     knees   • Sleep apnea     pt does not use cpap   • Snoring     sleep study done   • Urinary incontinence      Past Surgical History:   Procedure Laterality Date   • ZZZ CARDIAC CATH  02/27/2019    normal coronaries   • CATARACT PHACO WITH IOL Right 1/22/2018    Procedure: CATARACT PHACO WITH IOL;  Surgeon: Santosh Holden M.D.;  Location: SURGERY SAME DAY [facility] ORS;  Service: Ophthalmology   • CATARACT PHACO WITH IOL Left 1/8/2018    Procedure: CATARACT PHACO WITH IOL;  Surgeon: Santosh Holden M.D.;  Location: SURGERY SAME DAY [facility];  Service: Ophthalmology   • OTHER ORTHOPEDIC SURGERY Right 2013    hip arthroplasty   • CHOLECYSTECTOMY  2001   • ABDOMINAL HYSTERECTOMY TOTAL      1987   • MASTECTOMY  partial   • PB RADIATION THERAPY PLAN SIMPLE       Family History   Problem Relation Age of Onset   • Heart Disease Mother         HEART VALVE REPLACEMENT.   • Heart Disease Father         CORONARY ARTERY BYPASS GRAFTS   • Cancer Maternal Aunt    • Diabetes Brother    • Thyroid Brother    • Heart Attack Brother    • Heart Disease Brother      Social History     Socioeconomic History   • Marital status:      Spouse name: Not on file   • Number of children: Not on file   • Years of education: Not on file   • Highest education level: Not on file   Occupational History   • Not on file   Tobacco Use   • Smoking status: Never Smoker   • Smokeless tobacco: Never Used   Vaping Use   • Vaping Use: Never used   Substance and Sexual Activity   • Alcohol use: Yes     Comment: 1 or 2 drinks a month   • Drug use: No   • Sexual activity: Not on file   Other Topics Concern   • Not on file   Social History Narrative   • Not on file     Social Determinants of Health     Financial Resource Strain:    • Difficulty of Paying Living Expenses: Not on file   Food  Insecurity:    • Worried About Running Out of Food in the Last Year: Not on file   • Ran Out of Food in the Last Year: Not on file   Transportation Needs:    • Lack of Transportation (Medical): Not on file   • Lack of Transportation (Non-Medical): Not on file   Physical Activity:    • Days of Exercise per Week: Not on file   • Minutes of Exercise per Session: Not on file   Stress:    • Feeling of Stress : Not on file   Social Connections:    • Frequency of Communication with Friends and Family: Not on file   • Frequency of Social Gatherings with Friends and Family: Not on file   • Attends Je[employer] witness Services: Not on file   • Active Member of Clubs or Organizations: Not on file   • Attends Club or Organization Meetings: Not on file   • Marital Status: Not on file   Intimate Partner Violence:    • Fear of Current or Ex-Partner: Not on file   • Emotionally Abused: Not on file   • Physically Abused: Not on file   • Sexually Abused: Not on file   Housing Stability:    • Unable to Pay for Housing in the Last Year: Not on file   • Number of Places Lived in the Last Year: Not on file   • Unstable Housing in the Last Year: Not on file     Allergies   Allergen Reactions   • Sulfa Drugs Rash     8/2015 rash and hives       Outpatient Encounter Medications as of 11/11/2021   Medication Sig Dispense Refill   • Finerenone (KERENDIA) 10 MG Tab Take 10 mg by mouth every day. 90 Tablet 1   • potassium chloride SA (KDUR) 20 MEQ Tab CR Take 2 Tablets by mouth 2 times a day. 360 Tablet 1   • Finerenone (KERENDIA) 10 MG Tab Take 1 tablet by mouth every day. 30 Tablet 0   • warfarin (COUMADIN) 5 MG Tab Take 1/2-1 tab by mouth daily or as directed by anticoagulation clinic 90 Tablet 1   • torsemide (DEMADEX) 20 MG Tab Take 3 Tabs by mouth 2 times a day. 540 Tab 3   • metoprolol (LOPRESSOR) 50 MG Tab TAKE 1 TABLET BY MOUTH TWICE DAILY 180 Tab 4   • rosuvastatin (CRESTOR) 20 MG Tab Take 1 Tab by mouth every evening. 90 Tab 4   • losartan  (COZAAR) 50 MG Tab Take 1 Tab by mouth every day. 100 Tab 4   • gemfibrozil (LOPID) 600 MG Tab Take 1 Tab by mouth every morning. 90 Tab 4   • levothyroxine (SYNTHROID) 200 MCG Tab Take 200 mcg by mouth See Admin Instructions. All days except Sunday  Brand Name     • pioglitazone (ACTOS) 15 MG Tab Take 15 mg by mouth every day.     • Pyridoxine HCl (VITAMIN B-6 PO) Take 1 Tab by mouth every day.     • LEVEMIR FLEXTOUCH 100 UNIT/ML injection PEN Inject 15 Units under the skin 2 times a day. 7 UNITS THE FIRST TIME AND 8 UNITS THE SECOND TIME     • oxybutynin (DITROPAN XL) 15 MG CR tablet Take 15 mg by mouth every morning.  2   • allopurinol (ZYLOPRIM) 100 MG Tab Take 100 mg by mouth every bedtime.  2   • acetaminophen (TYLENOL 8 HOUR ARTHRITIS PAIN) 650 MG CR tablet Take 1,300 mg by mouth 2 Times a Day.     • Cholecalciferol (VITAMIN D-3) 5000 units Tab Take 5,000 Units by mouth 2 Times a Day.     • traZODone (DESYREL) 100 MG Tab Take 50 mg by mouth every evening.     • levothyroxine (SYNTHROID) 175 MCG Tab Take 175 mcg by mouth every Sunday. Brand Name     • loratadine (CLARITIN) 10 MG Tab Take 10 mg by mouth every day.     • citalopram (CELEXA) 20 MG TABS Take 20 mg by mouth every day.       No facility-administered encounter medications on file as of 11/11/2021.     Review of Systems   Constitutional: Negative for diaphoresis and fever.   HENT: Negative for nosebleeds.    Eyes: Negative for blurred vision and double vision.   Respiratory: Positive for shortness of breath. Negative for cough.    Cardiovascular: Positive for leg swelling. Negative for chest pain and palpitations.   Gastrointestinal: Negative for abdominal pain.   Genitourinary: Negative for dysuria and frequency.   Musculoskeletal: Negative for falls and myalgias.   Skin: Negative for rash.   Neurological: Negative for dizziness, sensory change and headaches.   Endo/Heme/Allergies: Does not bruise/bleed easily.   Psychiatric/Behavioral: Negative for  "depression and memory loss.        Objective:   Ht 1.651 m (5' 5\")   Wt 121 kg (266 lb)   LMP 01/05/1987 (Approximate)   BMI 44.26 kg/m²     Physical Exam   Constitutional:   Well appearing, no acute distress  Heart: + pitting edema in BLE, +bruises.  Lungs: no breathing distress, not tachypneic  Abdomen: no distention  Neurology: no signs of focal deficits.  Mentation is alert.    Assessment:     1. ACC/AHA stage C heart failure with preserved ejection fraction (HCC)  Referral to Home Health   2. Heart failure, NYHA class 3 (HCC)  Referral to Home Health   3. Longstanding persistent atrial fibrillation (HCC)  Referral to Home Health   4. HTN (hypertension), malignant  Referral to Home Health   5. Chronic anticoagulation  Referral to Home Health   6. High risk medication use     7. Stage 3a chronic kidney disease (HCC)     8. Type 2 diabetes mellitus with hyperglycemia, with long-term current use of insulin ([facility])     9. Moderate aortic stenosis         Medical Decision Making:  Today's Assessment / Status / Plan:   Today, based on physical examination findings, patient is euvolemic. No JVD, lungs are clear to auscultation, no pitting edema in bilateral lower extremities, no ascites.    Dry weight is 266 lbs.     Continue CPAP for obstructive sleep apnea.    Optimize diuresis. ContinueTorsemide 60 mg bid and adjusted by her based on her needs.    Optimize blood pressure control. Continue Losartan 50 mg po daily, Metoprolol 50 bid.    Rate control for her atrial fibrillation. Continue Metoprolol 50 mg bid, anticoagulation with warfarin for stroke risk reduction.    Continue rosuvastatin and gemfibrozil for mixed hyperlipidemia and hypertriglyceridemia.    Continue care at lymph clinic care for legs swelling.    Based on recent FDA approval of Kerendia (finerenone) therapy to reduce the risk of kidney function decline, kidney failure, cardiovascular death, non-fatal heart attacks, and hospitalization for heart " failure in adults with chronic kidney disease associated with type 2 diabetes, I will continue patient on Kerendia 10 mg daily.     She had successful implantation of CardioMEMS device.  The device was working appropriately for 6 months and then stopped working.  She has a CT angiogram of her pulmonary arterial tree which showed possible migration of the device which had to enter a zone for which no flow could be seen through the device.  Patient is completely asymptomatic.  No shortness of breath.  No evidence of pulmonary embolism.  I explained to her about the nature of the possible device migration.  At this time, we will not implant another device.  We will clinically monitor and treat her accordingly.  Patient has agreed to proceed with such plan.   Closure 3 Information: This tab is for additional flaps and grafts above and beyond our usual structured repairs.  Please note if you enter information here it will not currently bill and you will need to add the billing information manually.

## 2021-11-11 NOTE — TELEPHONE ENCOUNTER
"TT    Pt states [facility] cannot find the \"voucher\" or the prescription  for the Finerenone (KERENDIA) 10 MG Tab,   that was suppose to be sent.    Thank you,  Evaristo CARROLL  "

## 2021-11-12 NOTE — TELEPHONE ENCOUNTER
Called patients [facility] pharmacy, spoke to Rey, she was able to find the original voucher, dropped off by Howie Brewster.   Ran the claim, free to patient, pharmacy to contact.

## 2021-11-16 PROBLEM — G47.33 OBSTRUCTIVE SLEEP APNEA: Chronic | Status: ACTIVE | Noted: 2021-01-13
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
## 2021-12-20 NOTE — PROGRESS NOTES
[facility]    Called patient to reschedule missed appointment. There was no answer. LVM with call back instructions.     Bakari Gonzalez, OrtegaD

## 2021-12-21 NOTE — PROGRESS NOTES
Chief Complaint   Patient presents with   • Congestive Heart Failure     F/V Dx: ACC/AHA stage C heart failure with preserved ejection fraction (HCC)    • Atrial Fibrillation     F/V Dx: Longstanding persistent atrial fibrillation (HCC)      Of note, this visit was done through telemedicine with video on demand through epic system.  This visit complies with Medicare guidelines during this current COVID-19 pandemic.    This encounter was conducted via Zoom.  Verbal consent was obtained. Patient's identity was verified.  [place].    Subjective:   Rachelle Reina is a 71 y.o. female who presents today for cardiac care and management of HFpEF.  Patient has a history of atrial fibrillation along with morbid obesity, hypertension.    She had a transthoracic echocardiogram done in December 2020, which showed normal LV function, moderate aortic stenosis with mean gradient of 20 mm Hg, RVSP of 50 mmHg.  I personally interpreted images of her transthoracic echocardiogram.    Patient still gets winded with daily living activities and exertion. No symptoms at rest.    No obstructive CAD on coronary angiogram in 02/2019.    I have independently interpreted and reviewed blood tests results with patient in clinic which shows GFR of 46. K of 4.4.    Kerendia 10 mg daily was started. She is doing well on it.    Saw lymph clinic and plan was to warp her legs. Legs swelling improved.      Past Medical History:   Diagnosis Date   • A-fib (HCC)    • Arthritis 05/05/2020    knees and fingers   • Atrial fibrillation (HCC)    • Benign essential hypertension 7/1/2011   • Bowel habit changes     diarrhea   • Breast cancer (HCC)    • Bronchitis 2015    history of   • CAD (coronary artery disease)    • Cancer (HCC) 1987, 2000    breast and ovary   • Cataract 2018    IOL bilat   • Diabetes (HCC)     oral medication, insulin   • Diastolic congestive heart failure (HCC) 2019   • Disorder of thyroid    • High cholesterol    • History of  cardiac catheterization 4/22/2010   • History of echocardiogram 4/22/2011   • History of hypothyroidism 8/29/2008   • Hypercholesteremia 7/1/2011   • Long term (current) use of anticoagulants 7/1/2011   • Morbid obesity (HCC) 10/28/2008   • Ovarian cancer (HCC)    • Pain     knees   • Sleep apnea     pt does not use cpap   • Snoring     sleep study done   • Urinary incontinence      Past Surgical History:   Procedure Laterality Date   • ZZZ CARDIAC CATH  02/27/2019    normal coronaries   • CATARACT PHACO WITH IOL Right 1/22/2018    Procedure: CATARACT PHACO WITH IOL;  Surgeon: Santosh Holden M.D.;  Location: SURGERY SAME DAY [facility];  Service: Ophthalmology   • CATARACT PHACO WITH IOL Left 1/8/2018    Procedure: CATARACT PHACO WITH IOL;  Surgeon: Santosh Holden M.D.;  Location: SURGERY SAME DAY [facility];  Service: Ophthalmology   • OTHER ORTHOPEDIC SURGERY Right 2013    hip arthroplasty   • CHOLECYSTECTOMY  2001   • ABDOMINAL HYSTERECTOMY TOTAL      1987   • MASTECTOMY  partial   • PB RADIATION THERAPY PLAN SIMPLE       Family History   Problem Relation Age of Onset   • Heart Disease Mother         HEART VALVE REPLACEMENT.   • Heart Disease Father         CORONARY ARTERY BYPASS GRAFTS   • Cancer Maternal Aunt    • Diabetes Brother    • Thyroid Brother    • Heart Attack Brother    • Heart Disease Brother      Social History     Socioeconomic History   • Marital status:      Spouse name: Not on file   • Number of children: Not on file   • Years of education: Not on file   • Highest education level: Not on file   Occupational History   • Not on file   Tobacco Use   • Smoking status: Never Smoker   • Smokeless tobacco: Never Used   Vaping Use   • Vaping Use: Never used   Substance and Sexual Activity   • Alcohol use: Yes     Comment: 1 or 2 drinks a month   • Drug use: No   • Sexual activity: Not on file   Other Topics Concern   • Not on file   Social History Narrative   • Not on file     Social  Determinants of Health     Financial Resource Strain:    • Difficulty of Paying Living Expenses: Not on file   Food Insecurity:    • Worried About Running Out of Food in the Last Year: Not on file   • Ran Out of Food in the Last Year: Not on file   Transportation Needs:    • Lack of Transportation (Medical): Not on file   • Lack of Transportation (Non-Medical): Not on file   Physical Activity:    • Days of Exercise per Week: Not on file   • Minutes of Exercise per Session: Not on file   Stress:    • Feeling of Stress : Not on file   Social Connections:    • Frequency of Communication with Friends and Family: Not on file   • Frequency of Social Gatherings with Friends and Family: Not on file   • Attends Buddhism Services: Not on file   • Active Member of Clubs or Organizations: Not on file   • Attends Club or Organization Meetings: Not on file   • Marital Status: Not on file   Intimate Partner Violence:    • Fear of Current or Ex-Partner: Not on file   • Emotionally Abused: Not on file   • Physically Abused: Not on file   • Sexually Abused: Not on file   Housing Stability:    • Unable to Pay for Housing in the Last Year: Not on file   • Number of Places Lived in the Last Year: Not on file   • Unstable Housing in the Last Year: Not on file     Allergies   Allergen Reactions   • Sulfa Drugs Rash     8/2015 rash and hives       Outpatient Encounter Medications as of 12/21/2021   Medication Sig Dispense Refill   • potassium chloride SA (KDUR) 20 MEQ Tab CR Take 2 Tablets by mouth 2 times a day. 360 Tablet 1   • Finerenone (KERENDIA) 10 MG Tab Take 1 tablet by mouth every day. 30 Tablet 0   • warfarin (COUMADIN) 5 MG Tab Take 1/2-1 tab by mouth daily or as directed by anticoagulation clinic 90 Tablet 1   • torsemide (DEMADEX) 20 MG Tab Take 3 Tabs by mouth 2 times a day. 540 Tab 3   • metoprolol (LOPRESSOR) 50 MG Tab TAKE 1 TABLET BY MOUTH TWICE DAILY 180 Tab 4   • rosuvastatin (CRESTOR) 20 MG Tab Take 1 Tab by mouth  every evening. 90 Tab 4   • losartan (COZAAR) 50 MG Tab Take 1 Tab by mouth every day. 100 Tab 4   • gemfibrozil (LOPID) 600 MG Tab Take 1 Tab by mouth every morning. 90 Tab 4   • levothyroxine (SYNTHROID) 200 MCG Tab Take 200 mcg by mouth See Admin Instructions. All days except Sunday  Brand Name     • pioglitazone (ACTOS) 15 MG Tab Take 15 mg by mouth every day.     • Pyridoxine HCl (VITAMIN B-6 PO) Take 1 Tab by mouth every day.     • LEVEMIR FLEXTOUCH 100 UNIT/ML injection PEN Inject 15 Units under the skin 2 times a day. 7 UNITS THE FIRST TIME AND 8 UNITS THE SECOND TIME     • oxybutynin (DITROPAN XL) 15 MG CR tablet Take 15 mg by mouth every morning.  2   • allopurinol (ZYLOPRIM) 100 MG Tab Take 100 mg by mouth every bedtime.  2   • acetaminophen (TYLENOL 8 HOUR ARTHRITIS PAIN) 650 MG CR tablet Take 1,300 mg by mouth 2 Times a Day.     • Cholecalciferol (VITAMIN D-3) 5000 units Tab Take 5,000 Units by mouth 2 Times a Day.     • traZODone (DESYREL) 100 MG Tab Take 50 mg by mouth every evening.     • levothyroxine (SYNTHROID) 175 MCG Tab Take 175 mcg by mouth every Sunday. Brand Name     • loratadine (CLARITIN) 10 MG Tab Take 10 mg by mouth every day.     • citalopram (CELEXA) 20 MG TABS Take 20 mg by mouth every day.       No facility-administered encounter medications on file as of 12/21/2021.     Review of Systems   Constitutional: Negative for diaphoresis and fever.   HENT: Negative for nosebleeds.    Eyes: Negative for blurred vision and double vision.   Respiratory: Positive for shortness of breath. Negative for cough.    Cardiovascular: Positive for leg swelling. Negative for chest pain and palpitations.   Gastrointestinal: Negative for abdominal pain.   Genitourinary: Negative for dysuria and frequency.   Musculoskeletal: Negative for falls and myalgias.   Skin: Negative for rash.   Neurological: Negative for dizziness, sensory change and headaches.   Endo/Heme/Allergies: Does not bruise/bleed easily.  "  Psychiatric/Behavioral: Negative for depression and memory loss.        Objective:   Ht 1.651 m (5' 5\")   LMP 01/05/1987 (Approximate)   BMI 44.43 kg/m²     Physical Exam     Constitutional:   Well appearing, no acute distress  Heart: + pitting edema in BLE but better, +bruises.  Lungs: no breathing distress, not tachypneic  Abdomen: no distention  Neurology: no signs of focal deficits.  Mentation is alert.    Assessment:     1. ACC/AHA stage C heart failure with preserved ejection fraction (HCC)     2. Heart failure, NYHA class 3 (HCC)     3. Longstanding persistent atrial fibrillation (HCC)     4. HTN (hypertension), malignant     5. Chronic anticoagulation     6. Stage 3a chronic kidney disease (HCC)     7. Type 2 diabetes mellitus with hyperglycemia, with long-term current use of insulin (HCC)     8. Moderate aortic stenosis     9. High risk medication use         Medical Decision Making:  Today's Assessment / Status / Plan:   Today, based on physical examination findings, patient is euvolemic. No JVD, lungs are clear to auscultation, no pitting edema in bilateral lower extremities, no ascites.    Dry weight is 266 lbs.     Continue CPAP for obstructive sleep apnea.    Optimize diuresis. ContinueTorsemide 60 mg bid and adjusted by her based on her needs.    Optimize blood pressure control. Continue Losartan 50 mg po daily, Metoprolol 50 bid.    Rate control for her atrial fibrillation. Continue Metoprolol 50 mg bid, anticoagulation with warfarin for stroke risk reduction.    Continue rosuvastatin and gemfibrozil for mixed hyperlipidemia and hypertriglyceridemia.    Continue care at lymph clinic care for legs swelling.    Based on recent FDA approval of Kerendia (finerenone) therapy to reduce the risk of kidney function decline, kidney failure, cardiovascular death, non-fatal heart attacks, and hospitalization for heart failure in adults with chronic kidney disease associated with type 2 diabetes, I will " continue patient on Kerendia 10 mg daily.     She had successful implantation of CardioMEMS device.  The device was working appropriately for 6 months and then stopped working.  She has a CT angiogram of her pulmonary arterial tree which showed possible migration of the device which had to enter a zone for which no flow could be seen through the device.  Patient is completely asymptomatic.  No shortness of breath.  No evidence of pulmonary embolism.  I explained to her about the nature of the possible device migration.  At this time, we will not implant another device.  We will clinically monitor and treat her accordingly.  Patient has agreed to proceed with such plan.

## 2021-12-30 NOTE — TELEPHONE ENCOUNTER
Completed [employer] home meter application submitted per patient request  Patrizia Arias, Clinical Pharmacist, CDE, CACP

## 2022-01-01 ENCOUNTER — TELEPHONE (OUTPATIENT)
Dept: VASCULAR LAB | Facility: MEDICAL CENTER | Age: 72
End: 2022-01-01
Payer: MEDICARE

## 2022-01-01 ENCOUNTER — ANESTHESIA EVENT (OUTPATIENT)
Dept: SURGERY | Facility: MEDICAL CENTER | Age: 72
DRG: 267 | End: 2022-01-01
Payer: MEDICARE

## 2022-01-01 ENCOUNTER — DOCUMENTATION (OUTPATIENT)
Dept: CARDIOLOGY | Facility: MEDICAL CENTER | Age: 72
End: 2022-01-01
Payer: MEDICARE

## 2022-01-01 ENCOUNTER — ANTICOAGULATION MONITORING (OUTPATIENT)
Dept: VASCULAR LAB | Facility: MEDICAL CENTER | Age: 72
End: 2022-01-01
Payer: MEDICARE

## 2022-01-01 ENCOUNTER — TELEPHONE (OUTPATIENT)
Dept: CARDIOLOGY | Facility: MEDICAL CENTER | Age: 72
End: 2022-01-01
Payer: MEDICARE

## 2022-01-01 ENCOUNTER — ANTICOAGULATION MONITORING (OUTPATIENT)
Dept: MEDICAL GROUP | Facility: MEDICAL CENTER | Age: 72
End: 2022-01-01
Payer: MEDICARE

## 2022-01-01 ENCOUNTER — APPOINTMENT (OUTPATIENT)
Dept: RADIOLOGY | Facility: MEDICAL CENTER | Age: 72
DRG: 267 | End: 2022-01-01
Attending: NURSE PRACTITIONER
Payer: MEDICARE

## 2022-01-01 ENCOUNTER — ANTICOAGULATION MONITORING (OUTPATIENT)
Dept: MEDICAL GROUP | Facility: PHYSICIAN GROUP | Age: 72
End: 2022-01-01

## 2022-01-01 ENCOUNTER — APPOINTMENT (OUTPATIENT)
Dept: CARDIOLOGY | Facility: MEDICAL CENTER | Age: 72
DRG: 242 | End: 2022-01-01
Attending: INTERNAL MEDICINE
Payer: MEDICARE

## 2022-01-01 ENCOUNTER — HOSPICE ADMISSION (OUTPATIENT)
Dept: HOSPICE | Facility: HOSPICE | Age: 72
End: 2022-01-01
Payer: MEDICARE

## 2022-01-01 ENCOUNTER — HOSPITAL ENCOUNTER (OUTPATIENT)
Dept: LAB | Facility: MEDICAL CENTER | Age: 72
End: 2022-04-28
Attending: INTERNAL MEDICINE
Payer: MEDICARE

## 2022-01-01 ENCOUNTER — TELEPHONE (OUTPATIENT)
Dept: VASCULAR LAB | Facility: MEDICAL CENTER | Age: 72
End: 2022-01-01

## 2022-01-01 ENCOUNTER — ANESTHESIA (OUTPATIENT)
Dept: SURGERY | Facility: MEDICAL CENTER | Age: 72
DRG: 267 | End: 2022-01-01
Payer: MEDICARE

## 2022-01-01 ENCOUNTER — HOSPITAL ENCOUNTER (INPATIENT)
Facility: MEDICAL CENTER | Age: 72
LOS: 5 days | DRG: 286 | End: 2022-06-15
Attending: STUDENT IN AN ORGANIZED HEALTH CARE EDUCATION/TRAINING PROGRAM | Admitting: STUDENT IN AN ORGANIZED HEALTH CARE EDUCATION/TRAINING PROGRAM
Payer: MEDICARE

## 2022-01-01 ENCOUNTER — APPOINTMENT (OUTPATIENT)
Dept: RADIOLOGY | Facility: MEDICAL CENTER | Age: 72
DRG: 242 | End: 2022-01-01
Attending: INTERNAL MEDICINE
Payer: MEDICARE

## 2022-01-01 ENCOUNTER — ANTICOAGULATION MONITORING (OUTPATIENT)
Dept: CARDIOLOGY | Facility: MEDICAL CENTER | Age: 72
End: 2022-01-01
Payer: MEDICARE

## 2022-01-01 ENCOUNTER — OFFICE VISIT (OUTPATIENT)
Dept: CARDIOTHORACIC SURGERY | Facility: MEDICAL CENTER | Age: 72
End: 2022-01-01
Payer: MEDICARE

## 2022-01-01 ENCOUNTER — APPOINTMENT (OUTPATIENT)
Dept: RADIOLOGY | Facility: MEDICAL CENTER | Age: 72
DRG: 286 | End: 2022-01-01
Attending: STUDENT IN AN ORGANIZED HEALTH CARE EDUCATION/TRAINING PROGRAM
Payer: MEDICARE

## 2022-01-01 ENCOUNTER — HOSPITAL ENCOUNTER (OUTPATIENT)
Facility: MEDICAL CENTER | Age: 72
DRG: 286 | End: 2022-06-09
Attending: EMERGENCY MEDICINE | Admitting: STUDENT IN AN ORGANIZED HEALTH CARE EDUCATION/TRAINING PROGRAM
Payer: MEDICARE

## 2022-01-01 ENCOUNTER — APPOINTMENT (OUTPATIENT)
Dept: RADIOLOGY | Facility: MEDICAL CENTER | Age: 72
DRG: 242 | End: 2022-01-01
Attending: STUDENT IN AN ORGANIZED HEALTH CARE EDUCATION/TRAINING PROGRAM
Payer: MEDICARE

## 2022-01-01 ENCOUNTER — APPOINTMENT (OUTPATIENT)
Dept: CARDIOLOGY | Facility: MEDICAL CENTER | Age: 72
DRG: 242 | End: 2022-01-01
Attending: NURSE PRACTITIONER
Payer: MEDICARE

## 2022-01-01 ENCOUNTER — APPOINTMENT (OUTPATIENT)
Dept: RADIOLOGY | Facility: MEDICAL CENTER | Age: 72
DRG: 286 | End: 2022-01-01
Attending: EMERGENCY MEDICINE
Payer: MEDICARE

## 2022-01-01 ENCOUNTER — PHARMACY VISIT (OUTPATIENT)
Dept: PHARMACY | Facility: MEDICAL CENTER | Age: 72
End: 2022-01-01
Payer: COMMERCIAL

## 2022-01-01 ENCOUNTER — APPOINTMENT (OUTPATIENT)
Dept: CARDIOLOGY | Facility: MEDICAL CENTER | Age: 72
DRG: 267 | End: 2022-01-01
Attending: INTERNAL MEDICINE
Payer: MEDICARE

## 2022-01-01 ENCOUNTER — DOCUMENTATION (OUTPATIENT)
Dept: CARDIOLOGY | Facility: MEDICAL CENTER | Age: 72
End: 2022-01-01

## 2022-01-01 ENCOUNTER — PATIENT MESSAGE (OUTPATIENT)
Dept: CARDIOLOGY | Facility: MEDICAL CENTER | Age: 72
End: 2022-01-01
Payer: MEDICARE

## 2022-01-01 ENCOUNTER — APPOINTMENT (OUTPATIENT)
Dept: RADIOLOGY | Facility: MEDICAL CENTER | Age: 72
DRG: 286 | End: 2022-01-01
Attending: NURSE PRACTITIONER
Payer: MEDICARE

## 2022-01-01 ENCOUNTER — ANESTHESIA (OUTPATIENT)
Dept: CARDIOLOGY | Facility: MEDICAL CENTER | Age: 72
DRG: 242 | End: 2022-01-01
Payer: MEDICARE

## 2022-01-01 ENCOUNTER — APPOINTMENT (OUTPATIENT)
Dept: CARDIOLOGY | Facility: MEDICAL CENTER | Age: 72
DRG: 286 | End: 2022-01-01
Attending: NURSE PRACTITIONER
Payer: MEDICARE

## 2022-01-01 ENCOUNTER — OFFICE VISIT (OUTPATIENT)
Dept: CARDIOLOGY | Facility: MEDICAL CENTER | Age: 72
End: 2022-01-01
Payer: MEDICARE

## 2022-01-01 ENCOUNTER — APPOINTMENT (OUTPATIENT)
Dept: CARDIOLOGY | Facility: MEDICAL CENTER | Age: 72
DRG: 291 | End: 2022-01-01
Attending: INTERNAL MEDICINE
Payer: MEDICARE

## 2022-01-01 ENCOUNTER — PATIENT OUTREACH (OUTPATIENT)
Dept: SCHEDULING | Facility: IMAGING CENTER | Age: 72
End: 2022-01-01

## 2022-01-01 ENCOUNTER — PATIENT OUTREACH (OUTPATIENT)
Dept: SCHEDULING | Facility: IMAGING CENTER | Age: 72
End: 2022-01-01
Payer: MEDICARE

## 2022-01-01 ENCOUNTER — HOSPITAL ENCOUNTER (INPATIENT)
Facility: MEDICAL CENTER | Age: 72
LOS: 1 days | DRG: 291 | End: 2022-06-10
Attending: INTERNAL MEDICINE | Admitting: INTERNAL MEDICINE
Payer: MEDICARE

## 2022-01-01 ENCOUNTER — TELEMEDICINE (OUTPATIENT)
Dept: CARDIOLOGY | Facility: MEDICAL CENTER | Age: 72
End: 2022-01-01
Payer: MEDICARE

## 2022-01-01 ENCOUNTER — HOSPITAL ENCOUNTER (OUTPATIENT)
Dept: RADIOLOGY | Facility: MEDICAL CENTER | Age: 72
End: 2022-08-04
Attending: INTERNAL MEDICINE
Payer: MEDICARE

## 2022-01-01 ENCOUNTER — TELEMEDICINE (OUTPATIENT)
Dept: SLEEP MEDICINE | Facility: MEDICAL CENTER | Age: 72
End: 2022-01-01
Payer: MEDICARE

## 2022-01-01 ENCOUNTER — PRE-ADMISSION TESTING (OUTPATIENT)
Dept: ADMISSIONS | Facility: MEDICAL CENTER | Age: 72
DRG: 267 | End: 2022-01-01
Attending: INTERNAL MEDICINE
Payer: MEDICARE

## 2022-01-01 ENCOUNTER — ANESTHESIA EVENT (OUTPATIENT)
Dept: CARDIOLOGY | Facility: MEDICAL CENTER | Age: 72
DRG: 242 | End: 2022-01-01
Payer: MEDICARE

## 2022-01-01 ENCOUNTER — APPOINTMENT (OUTPATIENT)
Dept: RADIOLOGY | Facility: MEDICAL CENTER | Age: 72
DRG: 291 | End: 2022-01-01
Attending: INTERNAL MEDICINE
Payer: MEDICARE

## 2022-01-01 ENCOUNTER — APPOINTMENT (OUTPATIENT)
Dept: RADIOLOGY | Facility: MEDICAL CENTER | Age: 72
DRG: 242 | End: 2022-01-01
Attending: EMERGENCY MEDICINE
Payer: MEDICARE

## 2022-01-01 ENCOUNTER — HOSPITAL ENCOUNTER (INPATIENT)
Facility: MEDICAL CENTER | Age: 72
LOS: 2 days | DRG: 267 | End: 2022-08-17
Attending: INTERNAL MEDICINE | Admitting: INTERNAL MEDICINE
Payer: MEDICARE

## 2022-01-01 ENCOUNTER — ANTICOAGULATION MONITORING (OUTPATIENT)
Dept: VASCULAR LAB | Facility: MEDICAL CENTER | Age: 72
End: 2022-01-01

## 2022-01-01 ENCOUNTER — HOSPITAL ENCOUNTER (OUTPATIENT)
Dept: CARDIOLOGY | Facility: MEDICAL CENTER | Age: 72
End: 2022-08-01
Attending: INTERNAL MEDICINE
Payer: MEDICARE

## 2022-01-01 ENCOUNTER — HOSPITAL ENCOUNTER (INPATIENT)
Facility: MEDICAL CENTER | Age: 72
LOS: 13 days | DRG: 242 | End: 2022-09-30
Attending: EMERGENCY MEDICINE | Admitting: STUDENT IN AN ORGANIZED HEALTH CARE EDUCATION/TRAINING PROGRAM
Payer: MEDICARE

## 2022-01-01 VITALS
SYSTOLIC BLOOD PRESSURE: 122 MMHG | HEIGHT: 65 IN | BODY MASS INDEX: 42.65 KG/M2 | OXYGEN SATURATION: 90 % | WEIGHT: 256 LBS | DIASTOLIC BLOOD PRESSURE: 84 MMHG | RESPIRATION RATE: 16 BRPM | HEART RATE: 84 BPM

## 2022-01-01 VITALS
WEIGHT: 287 LBS | RESPIRATION RATE: 16 BRPM | HEIGHT: 65 IN | BODY MASS INDEX: 47.82 KG/M2 | DIASTOLIC BLOOD PRESSURE: 57 MMHG | TEMPERATURE: 96.8 F | SYSTOLIC BLOOD PRESSURE: 102 MMHG | OXYGEN SATURATION: 98 % | HEART RATE: 73 BPM

## 2022-01-01 VITALS
TEMPERATURE: 97.6 F | DIASTOLIC BLOOD PRESSURE: 61 MMHG | HEART RATE: 121 BPM | BODY MASS INDEX: 47.82 KG/M2 | WEIGHT: 287 LBS | HEIGHT: 65 IN | OXYGEN SATURATION: 96 % | SYSTOLIC BLOOD PRESSURE: 100 MMHG | RESPIRATION RATE: 16 BRPM

## 2022-01-01 VITALS
BODY MASS INDEX: 43.16 KG/M2 | RESPIRATION RATE: 18 BRPM | TEMPERATURE: 97.8 F | SYSTOLIC BLOOD PRESSURE: 129 MMHG | HEIGHT: 65 IN | DIASTOLIC BLOOD PRESSURE: 65 MMHG | HEART RATE: 105 BPM | OXYGEN SATURATION: 93 % | WEIGHT: 259.04 LBS

## 2022-01-01 VITALS
OXYGEN SATURATION: 69 % | HEIGHT: 65 IN | BODY MASS INDEX: 44.11 KG/M2 | WEIGHT: 264.77 LBS | DIASTOLIC BLOOD PRESSURE: 51 MMHG | TEMPERATURE: 97.2 F | HEART RATE: 69 BPM | SYSTOLIC BLOOD PRESSURE: 104 MMHG | RESPIRATION RATE: 11 BRPM

## 2022-01-01 VITALS — BODY MASS INDEX: 44.15 KG/M2 | HEIGHT: 65 IN | WEIGHT: 265 LBS

## 2022-01-01 VITALS
DIASTOLIC BLOOD PRESSURE: 68 MMHG | TEMPERATURE: 97.2 F | HEART RATE: 84 BPM | SYSTOLIC BLOOD PRESSURE: 106 MMHG | WEIGHT: 256 LBS | OXYGEN SATURATION: 90 % | BODY MASS INDEX: 42.65 KG/M2 | HEIGHT: 65 IN

## 2022-01-01 VITALS
SYSTOLIC BLOOD PRESSURE: 116 MMHG | RESPIRATION RATE: 18 BRPM | DIASTOLIC BLOOD PRESSURE: 73 MMHG | WEIGHT: 264.33 LBS | BODY MASS INDEX: 44.04 KG/M2 | OXYGEN SATURATION: 100 % | HEART RATE: 105 BPM | HEIGHT: 65 IN | TEMPERATURE: 97.9 F

## 2022-01-01 VITALS
WEIGHT: 266 LBS | RESPIRATION RATE: 16 BRPM | HEART RATE: 90 BPM | SYSTOLIC BLOOD PRESSURE: 100 MMHG | HEIGHT: 65 IN | BODY MASS INDEX: 44.32 KG/M2 | DIASTOLIC BLOOD PRESSURE: 70 MMHG | OXYGEN SATURATION: 92 %

## 2022-01-01 VITALS
RESPIRATION RATE: 16 BRPM | HEART RATE: 116 BPM | WEIGHT: 266 LBS | SYSTOLIC BLOOD PRESSURE: 102 MMHG | DIASTOLIC BLOOD PRESSURE: 68 MMHG | BODY MASS INDEX: 44.32 KG/M2 | OXYGEN SATURATION: 95 % | HEIGHT: 65 IN

## 2022-01-01 VITALS
HEIGHT: 65 IN | DIASTOLIC BLOOD PRESSURE: 63 MMHG | WEIGHT: 264 LBS | HEART RATE: 96 BPM | BODY MASS INDEX: 43.99 KG/M2 | SYSTOLIC BLOOD PRESSURE: 111 MMHG

## 2022-01-01 DIAGNOSIS — I35.0 SEVERE AORTIC STENOSIS: ICD-10-CM

## 2022-01-01 DIAGNOSIS — E78.5 DYSLIPIDEMIA: ICD-10-CM

## 2022-01-01 DIAGNOSIS — E11.65 TYPE 2 DIABETES MELLITUS WITH HYPERGLYCEMIA, WITH LONG-TERM CURRENT USE OF INSULIN (HCC): ICD-10-CM

## 2022-01-01 DIAGNOSIS — I48.20 CHRONIC ATRIAL FIBRILLATION (HCC): ICD-10-CM

## 2022-01-01 DIAGNOSIS — Z79.01 CHRONIC ANTICOAGULATION: ICD-10-CM

## 2022-01-01 DIAGNOSIS — Z86.39 HISTORY OF HYPOTHYROIDISM: Chronic | ICD-10-CM

## 2022-01-01 DIAGNOSIS — E66.01 CLASS 3 SEVERE OBESITY DUE TO EXCESS CALORIES WITH SERIOUS COMORBIDITY AND BODY MASS INDEX (BMI) OF 40.0 TO 44.9 IN ADULT (HCC): ICD-10-CM

## 2022-01-01 DIAGNOSIS — I50.30 ACC/AHA STAGE C HEART FAILURE WITH PRESERVED EJECTION FRACTION (HCC): ICD-10-CM

## 2022-01-01 DIAGNOSIS — D50.9 IRON DEFICIENCY ANEMIA, UNSPECIFIED IRON DEFICIENCY ANEMIA TYPE: ICD-10-CM

## 2022-01-01 DIAGNOSIS — Z79.4 TYPE 2 DIABETES MELLITUS WITHOUT COMPLICATION, WITH LONG-TERM CURRENT USE OF INSULIN (HCC): ICD-10-CM

## 2022-01-01 DIAGNOSIS — D68.69 SECONDARY HYPERCOAGULABLE STATE (HCC): ICD-10-CM

## 2022-01-01 DIAGNOSIS — I10 HTN (HYPERTENSION), MALIGNANT: ICD-10-CM

## 2022-01-01 DIAGNOSIS — N18.31 STAGE 3A CHRONIC KIDNEY DISEASE: ICD-10-CM

## 2022-01-01 DIAGNOSIS — R21 PERINEAL RASH IN FEMALE: ICD-10-CM

## 2022-01-01 DIAGNOSIS — I50.9 EDEMA DUE TO CONGESTIVE HEART FAILURE (HCC): ICD-10-CM

## 2022-01-01 DIAGNOSIS — I50.32 CHRONIC DIASTOLIC CONGESTIVE HEART FAILURE (HCC): Chronic | ICD-10-CM

## 2022-01-01 DIAGNOSIS — R06.02 SHORTNESS OF BREATH: ICD-10-CM

## 2022-01-01 DIAGNOSIS — I50.9 ACUTE ON CHRONIC CONGESTIVE HEART FAILURE, UNSPECIFIED HEART FAILURE TYPE (HCC): ICD-10-CM

## 2022-01-01 DIAGNOSIS — I48.11 LONGSTANDING PERSISTENT ATRIAL FIBRILLATION (HCC): ICD-10-CM

## 2022-01-01 DIAGNOSIS — L03.90 CELLULITIS, UNSPECIFIED CELLULITIS SITE: ICD-10-CM

## 2022-01-01 DIAGNOSIS — I35.0 NONRHEUMATIC AORTIC VALVE STENOSIS: ICD-10-CM

## 2022-01-01 DIAGNOSIS — Z01.810 PRE-PROCEDURAL CARDIOVASCULAR EXAMINATION: ICD-10-CM

## 2022-01-01 DIAGNOSIS — I10 BENIGN ESSENTIAL HYPERTENSION: Chronic | ICD-10-CM

## 2022-01-01 DIAGNOSIS — R09.02 HYPOXIA: ICD-10-CM

## 2022-01-01 DIAGNOSIS — I50.33 ACUTE ON CHRONIC DIASTOLIC CONGESTIVE HEART FAILURE (HCC): ICD-10-CM

## 2022-01-01 DIAGNOSIS — E66.01 MORBID OBESITY WITH BMI OF 40.0-44.9, ADULT (HCC): ICD-10-CM

## 2022-01-01 DIAGNOSIS — G47.33 OBSTRUCTIVE SLEEP APNEA: Chronic | ICD-10-CM

## 2022-01-01 DIAGNOSIS — I25.10 CORONARY ARTERY DISEASE INVOLVING NATIVE CORONARY ARTERY OF NATIVE HEART WITHOUT ANGINA PECTORIS: ICD-10-CM

## 2022-01-01 DIAGNOSIS — Z95.2 S/P TAVR (TRANSCATHETER AORTIC VALVE REPLACEMENT): ICD-10-CM

## 2022-01-01 DIAGNOSIS — I87.2 CHRONIC VENOUS INSUFFICIENCY: ICD-10-CM

## 2022-01-01 DIAGNOSIS — Z00.6 EXAMINATION OF PARTICIPANT IN CLINICAL TRIAL: ICD-10-CM

## 2022-01-01 DIAGNOSIS — L03.119 CELLULITIS OF LOWER EXTREMITY, UNSPECIFIED LATERALITY: ICD-10-CM

## 2022-01-01 DIAGNOSIS — Z01.812 PRE-OPERATIVE LABORATORY EXAMINATION: ICD-10-CM

## 2022-01-01 DIAGNOSIS — E11.9 TYPE 2 DIABETES MELLITUS WITHOUT COMPLICATION, WITH LONG-TERM CURRENT USE OF INSULIN (HCC): ICD-10-CM

## 2022-01-01 DIAGNOSIS — I35.0 MODERATE AORTIC STENOSIS: ICD-10-CM

## 2022-01-01 DIAGNOSIS — I50.9 HEART FAILURE, NYHA CLASS 3 (HCC): ICD-10-CM

## 2022-01-01 DIAGNOSIS — Z79.4 TYPE 2 DIABETES MELLITUS WITH HYPERGLYCEMIA, WITH LONG-TERM CURRENT USE OF INSULIN (HCC): ICD-10-CM

## 2022-01-01 DIAGNOSIS — I45.9 HEART BLOCK: ICD-10-CM

## 2022-01-01 DIAGNOSIS — I27.20 PULMONARY HYPERTENSION (HCC): ICD-10-CM

## 2022-01-01 DIAGNOSIS — J96.21 ACUTE ON CHRONIC RESPIRATORY FAILURE WITH HYPOXIA (HCC): ICD-10-CM

## 2022-01-01 DIAGNOSIS — G47.33 OSA (OBSTRUCTIVE SLEEP APNEA): ICD-10-CM

## 2022-01-01 DIAGNOSIS — I31.39 PERICARDIAL EFFUSION: ICD-10-CM

## 2022-01-01 DIAGNOSIS — Z78.9 NONSMOKER: ICD-10-CM

## 2022-01-01 DIAGNOSIS — Z79.899 HIGH RISK MEDICATION USE: ICD-10-CM

## 2022-01-01 LAB
1,25(OH)2D3 SERPL-MCNC: 46 PG/ML (ref 19.9–79.3)
25(OH)D3 SERPL-MCNC: 82 NG/ML (ref 30–100)
ABO + RH BLD: NORMAL
ABO GROUP BLD: NORMAL
ABO GROUP BLD: NORMAL
ACT BLD: 347 SEC (ref 74–137)
ALBUMIN SERPL BCP-MCNC: 2.7 G/DL (ref 3.2–4.9)
ALBUMIN SERPL BCP-MCNC: 2.8 G/DL (ref 3.2–4.9)
ALBUMIN SERPL BCP-MCNC: 2.8 G/DL (ref 3.2–4.9)
ALBUMIN SERPL BCP-MCNC: 2.9 G/DL (ref 3.2–4.9)
ALBUMIN SERPL BCP-MCNC: 3 G/DL (ref 3.2–4.9)
ALBUMIN SERPL BCP-MCNC: 3.1 G/DL (ref 3.2–4.9)
ALBUMIN SERPL BCP-MCNC: 3.1 G/DL (ref 3.2–4.9)
ALBUMIN SERPL BCP-MCNC: 3.2 G/DL (ref 3.2–4.9)
ALBUMIN SERPL BCP-MCNC: 3.3 G/DL (ref 3.2–4.9)
ALBUMIN SERPL BCP-MCNC: 3.5 G/DL (ref 3.2–4.9)
ALBUMIN SERPL BCP-MCNC: 3.6 G/DL (ref 3.2–4.9)
ALBUMIN/GLOB SERPL: 0.9 G/DL
ALBUMIN/GLOB SERPL: 1 G/DL
ALBUMIN/GLOB SERPL: 1.1 G/DL
ALBUMIN/GLOB SERPL: 1.1 G/DL
ALBUMIN/GLOB SERPL: 1.2 G/DL
ALBUMIN/GLOB SERPL: 1.3 G/DL
ALP SERPL-CCNC: 57 U/L (ref 30–99)
ALP SERPL-CCNC: 67 U/L (ref 30–99)
ALP SERPL-CCNC: 68 U/L (ref 30–99)
ALP SERPL-CCNC: 70 U/L (ref 30–99)
ALP SERPL-CCNC: 70 U/L (ref 30–99)
ALP SERPL-CCNC: 71 U/L (ref 30–99)
ALP SERPL-CCNC: 71 U/L (ref 30–99)
ALP SERPL-CCNC: 72 U/L (ref 30–99)
ALP SERPL-CCNC: 75 U/L (ref 30–99)
ALP SERPL-CCNC: 78 U/L (ref 30–99)
ALP SERPL-CCNC: 79 U/L (ref 30–99)
ALP SERPL-CCNC: 85 U/L (ref 30–99)
ALP SERPL-CCNC: 87 U/L (ref 30–99)
ALT SERPL-CCNC: 10 U/L (ref 2–50)
ALT SERPL-CCNC: 11 U/L (ref 2–50)
ALT SERPL-CCNC: 15 U/L (ref 2–50)
ALT SERPL-CCNC: 15 U/L (ref 2–50)
ALT SERPL-CCNC: 19 U/L (ref 2–50)
ALT SERPL-CCNC: 5 U/L (ref 2–50)
ALT SERPL-CCNC: 6 U/L (ref 2–50)
ALT SERPL-CCNC: 7 U/L (ref 2–50)
ALT SERPL-CCNC: 8 U/L (ref 2–50)
ALT SERPL-CCNC: 9 U/L (ref 2–50)
ALT SERPL-CCNC: <5 U/L (ref 2–50)
ANION GAP SERPL CALC-SCNC: 10 MMOL/L (ref 7–16)
ANION GAP SERPL CALC-SCNC: 11 MMOL/L (ref 7–16)
ANION GAP SERPL CALC-SCNC: 12 MMOL/L (ref 7–16)
ANION GAP SERPL CALC-SCNC: 12 MMOL/L (ref 7–16)
ANION GAP SERPL CALC-SCNC: 3 MMOL/L (ref 7–16)
ANION GAP SERPL CALC-SCNC: 4 MMOL/L (ref 7–16)
ANION GAP SERPL CALC-SCNC: 4 MMOL/L (ref 7–16)
ANION GAP SERPL CALC-SCNC: 5 MMOL/L (ref 7–16)
ANION GAP SERPL CALC-SCNC: 6 MMOL/L (ref 7–16)
ANION GAP SERPL CALC-SCNC: 7 MMOL/L (ref 7–16)
ANION GAP SERPL CALC-SCNC: 8 MMOL/L (ref 7–16)
ANION GAP SERPL CALC-SCNC: 8 MMOL/L (ref 7–16)
ANION GAP SERPL CALC-SCNC: 9 MMOL/L (ref 7–16)
ANISOCYTOSIS BLD QL SMEAR: ABNORMAL
APTT PPP: 44.5 SEC (ref 24.7–36)
APTT PPP: 45.4 SEC (ref 24.7–36)
APTT PPP: 46.2 SEC (ref 24.7–36)
APTT PPP: 50.5 SEC (ref 24.7–36)
AST SERPL-CCNC: 16 U/L (ref 12–45)
AST SERPL-CCNC: 16 U/L (ref 12–45)
AST SERPL-CCNC: 18 U/L (ref 12–45)
AST SERPL-CCNC: 21 U/L (ref 12–45)
AST SERPL-CCNC: 23 U/L (ref 12–45)
AST SERPL-CCNC: 25 U/L (ref 12–45)
AST SERPL-CCNC: 26 U/L (ref 12–45)
AST SERPL-CCNC: 27 U/L (ref 12–45)
AST SERPL-CCNC: 28 U/L (ref 12–45)
AST SERPL-CCNC: 30 U/L (ref 12–45)
AST SERPL-CCNC: 32 U/L (ref 12–45)
BACTERIA BLD CULT: NORMAL
BACTERIA BLD CULT: NORMAL
BASE EXCESS BLDA CALC-SCNC: 13 MMOL/L (ref -4–3)
BASE EXCESS BLDA CALC-SCNC: 23 MMOL/L (ref -4–3)
BASE EXCESS BLDA CALC-SCNC: 25 MMOL/L (ref -4–3)
BASE EXCESS BLDA CALC-SCNC: 26 MMOL/L (ref -4–3)
BASOPHILS # BLD AUTO: 0 % (ref 0–1.8)
BASOPHILS # BLD AUTO: 0.3 % (ref 0–1.8)
BASOPHILS # BLD AUTO: 0.4 % (ref 0–1.8)
BASOPHILS # BLD AUTO: 0.5 % (ref 0–1.8)
BASOPHILS # BLD AUTO: 0.5 % (ref 0–1.8)
BASOPHILS # BLD AUTO: 0.6 % (ref 0–1.8)
BASOPHILS # BLD AUTO: 1 % (ref 0–1.8)
BASOPHILS # BLD AUTO: 1 % (ref 0–1.8)
BASOPHILS # BLD AUTO: 1.7 % (ref 0–1.8)
BASOPHILS # BLD: 0 K/UL (ref 0–0.12)
BASOPHILS # BLD: 0.03 K/UL (ref 0–0.12)
BASOPHILS # BLD: 0.04 K/UL (ref 0–0.12)
BASOPHILS # BLD: 0.04 K/UL (ref 0–0.12)
BASOPHILS # BLD: 0.05 K/UL (ref 0–0.12)
BASOPHILS # BLD: 0.08 K/UL (ref 0–0.12)
BILIRUB SERPL-MCNC: 0.2 MG/DL (ref 0.1–1.5)
BILIRUB SERPL-MCNC: 0.3 MG/DL (ref 0.1–1.5)
BILIRUB SERPL-MCNC: 0.4 MG/DL (ref 0.1–1.5)
BILIRUB SERPL-MCNC: 0.6 MG/DL (ref 0.1–1.5)
BILIRUB SERPL-MCNC: 0.7 MG/DL (ref 0.1–1.5)
BLD GP AB SCN SERPL QL: NORMAL
BLD GP AB SCN SERPL QL: NORMAL
BLOOD CULTURE HOLD CXBCH: NORMAL
BODY TEMPERATURE: 36.4 CENTIGRADE
BODY TEMPERATURE: ABNORMAL DEGREES
BUN SERPL-MCNC: 22 MG/DL (ref 8–22)
BUN SERPL-MCNC: 25 MG/DL (ref 8–22)
BUN SERPL-MCNC: 26 MG/DL (ref 8–22)
BUN SERPL-MCNC: 27 MG/DL (ref 8–22)
BUN SERPL-MCNC: 28 MG/DL (ref 8–22)
BUN SERPL-MCNC: 28 MG/DL (ref 8–22)
BUN SERPL-MCNC: 29 MG/DL (ref 8–22)
BUN SERPL-MCNC: 30 MG/DL (ref 8–22)
BUN SERPL-MCNC: 31 MG/DL (ref 8–22)
BUN SERPL-MCNC: 34 MG/DL (ref 8–22)
BUN SERPL-MCNC: 35 MG/DL (ref 8–22)
BUN SERPL-MCNC: 35 MG/DL (ref 8–22)
BUN SERPL-MCNC: 36 MG/DL (ref 8–22)
BUN SERPL-MCNC: 37 MG/DL (ref 8–22)
BUN SERPL-MCNC: 39 MG/DL (ref 8–22)
BUN SERPL-MCNC: 40 MG/DL (ref 8–22)
BUN SERPL-MCNC: 41 MG/DL (ref 8–22)
BUN SERPL-MCNC: 45 MG/DL (ref 8–22)
BUN SERPL-MCNC: 45 MG/DL (ref 8–22)
BUN SERPL-MCNC: 46 MG/DL (ref 8–22)
BUN SERPL-MCNC: 47 MG/DL (ref 8–22)
BUN SERPL-MCNC: 51 MG/DL (ref 8–22)
CALCIUM SERPL-MCNC: 10 MG/DL (ref 8.5–10.5)
CALCIUM SERPL-MCNC: 9 MG/DL (ref 8.5–10.5)
CALCIUM SERPL-MCNC: 9.2 MG/DL (ref 8.5–10.5)
CALCIUM SERPL-MCNC: 9.2 MG/DL (ref 8.5–10.5)
CALCIUM SERPL-MCNC: 9.3 MG/DL (ref 8.5–10.5)
CALCIUM SERPL-MCNC: 9.4 MG/DL (ref 8.5–10.5)
CALCIUM SERPL-MCNC: 9.5 MG/DL (ref 8.5–10.5)
CALCIUM SERPL-MCNC: 9.6 MG/DL (ref 8.5–10.5)
CALCIUM SERPL-MCNC: 9.8 MG/DL (ref 8.5–10.5)
CALCIUM SERPL-MCNC: 9.9 MG/DL (ref 8.5–10.5)
CHLORIDE SERPL-SCNC: 102 MMOL/L (ref 96–112)
CHLORIDE SERPL-SCNC: 103 MMOL/L (ref 96–112)
CHLORIDE SERPL-SCNC: 104 MMOL/L (ref 96–112)
CHLORIDE SERPL-SCNC: 104 MMOL/L (ref 96–112)
CHLORIDE SERPL-SCNC: 105 MMOL/L (ref 96–112)
CHLORIDE SERPL-SCNC: 106 MMOL/L (ref 96–112)
CHLORIDE SERPL-SCNC: 107 MMOL/L (ref 96–112)
CHLORIDE SERPL-SCNC: 109 MMOL/L (ref 96–112)
CHLORIDE SERPL-SCNC: 87 MMOL/L (ref 96–112)
CHLORIDE SERPL-SCNC: 88 MMOL/L (ref 96–112)
CHLORIDE SERPL-SCNC: 89 MMOL/L (ref 96–112)
CHLORIDE SERPL-SCNC: 89 MMOL/L (ref 96–112)
CHLORIDE SERPL-SCNC: 92 MMOL/L (ref 96–112)
CHLORIDE SERPL-SCNC: 93 MMOL/L (ref 96–112)
CHLORIDE SERPL-SCNC: 95 MMOL/L (ref 96–112)
CHLORIDE SERPL-SCNC: 96 MMOL/L (ref 96–112)
CHLORIDE SERPL-SCNC: 97 MMOL/L (ref 96–112)
CHLORIDE SERPL-SCNC: 99 MMOL/L (ref 96–112)
CHOLEST SERPL-MCNC: 115 MG/DL (ref 100–199)
CHOLEST SERPL-MCNC: 115 MG/DL (ref 100–199)
CO2 BLDA-SCNC: >50 MMOL/L (ref 20–33)
CO2 SERPL-SCNC: 21 MMOL/L (ref 20–33)
CO2 SERPL-SCNC: 24 MMOL/L (ref 20–33)
CO2 SERPL-SCNC: 25 MMOL/L (ref 20–33)
CO2 SERPL-SCNC: 26 MMOL/L (ref 20–33)
CO2 SERPL-SCNC: 28 MMOL/L (ref 20–33)
CO2 SERPL-SCNC: 28 MMOL/L (ref 20–33)
CO2 SERPL-SCNC: 30 MMOL/L (ref 20–33)
CO2 SERPL-SCNC: 32 MMOL/L (ref 20–33)
CO2 SERPL-SCNC: 33 MMOL/L (ref 20–33)
CO2 SERPL-SCNC: 36 MMOL/L (ref 20–33)
CO2 SERPL-SCNC: 39 MMOL/L (ref 20–33)
CO2 SERPL-SCNC: 43 MMOL/L (ref 20–33)
CO2 SERPL-SCNC: 47 MMOL/L (ref 20–33)
CO2 SERPL-SCNC: 48 MMOL/L (ref 20–33)
CO2 SERPL-SCNC: 50 MMOL/L (ref 20–33)
CO2 SERPL-SCNC: >50 MMOL/L (ref 20–33)
COMMENT 1642: NORMAL
CREAT SERPL-MCNC: 0.79 MG/DL (ref 0.5–1.4)
CREAT SERPL-MCNC: 0.8 MG/DL (ref 0.5–1.4)
CREAT SERPL-MCNC: 0.82 MG/DL (ref 0.5–1.4)
CREAT SERPL-MCNC: 0.86 MG/DL (ref 0.5–1.4)
CREAT SERPL-MCNC: 0.9 MG/DL (ref 0.5–1.4)
CREAT SERPL-MCNC: 0.91 MG/DL (ref 0.5–1.4)
CREAT SERPL-MCNC: 0.94 MG/DL (ref 0.5–1.4)
CREAT SERPL-MCNC: 0.97 MG/DL (ref 0.5–1.4)
CREAT SERPL-MCNC: 1 MG/DL (ref 0.5–1.4)
CREAT SERPL-MCNC: 1 MG/DL (ref 0.5–1.4)
CREAT SERPL-MCNC: 1.02 MG/DL (ref 0.5–1.4)
CREAT SERPL-MCNC: 1.05 MG/DL (ref 0.5–1.4)
CREAT SERPL-MCNC: 1.1 MG/DL (ref 0.5–1.4)
CREAT SERPL-MCNC: 1.12 MG/DL (ref 0.5–1.4)
CREAT SERPL-MCNC: 1.12 MG/DL (ref 0.5–1.4)
CREAT SERPL-MCNC: 1.13 MG/DL (ref 0.5–1.4)
CREAT SERPL-MCNC: 1.15 MG/DL (ref 0.5–1.4)
CREAT SERPL-MCNC: 1.27 MG/DL (ref 0.5–1.4)
CREAT SERPL-MCNC: 1.36 MG/DL (ref 0.5–1.4)
CREAT SERPL-MCNC: 1.37 MG/DL (ref 0.5–1.4)
CREAT SERPL-MCNC: 1.37 MG/DL (ref 0.5–1.4)
CREAT SERPL-MCNC: 1.4 MG/DL (ref 0.5–1.4)
DELSYS IDSYS: ABNORMAL
DIGOXIN SERPL-MCNC: 2 NG/ML (ref 0.8–2)
DIGOXIN SERPL-MCNC: 2.2 NG/ML (ref 0.8–2)
DIGOXIN SERPL-MCNC: 2.7 NG/ML (ref 0.8–2)
EKG IMPRESSION: NORMAL
EOSINOPHIL # BLD AUTO: 0.11 K/UL (ref 0–0.51)
EOSINOPHIL # BLD AUTO: 0.12 K/UL (ref 0–0.51)
EOSINOPHIL # BLD AUTO: 0.23 K/UL (ref 0–0.51)
EOSINOPHIL # BLD AUTO: 0.26 K/UL (ref 0–0.51)
EOSINOPHIL # BLD AUTO: 0.29 K/UL (ref 0–0.51)
EOSINOPHIL # BLD AUTO: 0.36 K/UL (ref 0–0.51)
EOSINOPHIL # BLD AUTO: 0.38 K/UL (ref 0–0.51)
EOSINOPHIL # BLD AUTO: 0.49 K/UL (ref 0–0.51)
EOSINOPHIL # BLD AUTO: 0.53 K/UL (ref 0–0.51)
EOSINOPHIL NFR BLD: 1.1 % (ref 0–6.9)
EOSINOPHIL NFR BLD: 1.3 % (ref 0–6.9)
EOSINOPHIL NFR BLD: 10.1 % (ref 0–6.9)
EOSINOPHIL NFR BLD: 2.5 % (ref 0–6.9)
EOSINOPHIL NFR BLD: 3 % (ref 0–6.9)
EOSINOPHIL NFR BLD: 3.6 % (ref 0–6.9)
EOSINOPHIL NFR BLD: 6.9 % (ref 0–6.9)
EOSINOPHIL NFR BLD: 7.9 % (ref 0–6.9)
EOSINOPHIL NFR BLD: 9.6 % (ref 0–6.9)
ERYTHROCYTE [DISTWIDTH] IN BLOOD BY AUTOMATED COUNT: 57.9 FL (ref 35.9–50)
ERYTHROCYTE [DISTWIDTH] IN BLOOD BY AUTOMATED COUNT: 59.5 FL (ref 35.9–50)
ERYTHROCYTE [DISTWIDTH] IN BLOOD BY AUTOMATED COUNT: 59.7 FL (ref 35.9–50)
ERYTHROCYTE [DISTWIDTH] IN BLOOD BY AUTOMATED COUNT: 59.7 FL (ref 35.9–50)
ERYTHROCYTE [DISTWIDTH] IN BLOOD BY AUTOMATED COUNT: 59.8 FL (ref 35.9–50)
ERYTHROCYTE [DISTWIDTH] IN BLOOD BY AUTOMATED COUNT: 59.8 FL (ref 35.9–50)
ERYTHROCYTE [DISTWIDTH] IN BLOOD BY AUTOMATED COUNT: 60.3 FL (ref 35.9–50)
ERYTHROCYTE [DISTWIDTH] IN BLOOD BY AUTOMATED COUNT: 60.6 FL (ref 35.9–50)
ERYTHROCYTE [DISTWIDTH] IN BLOOD BY AUTOMATED COUNT: 60.6 FL (ref 35.9–50)
ERYTHROCYTE [DISTWIDTH] IN BLOOD BY AUTOMATED COUNT: 61.1 FL (ref 35.9–50)
ERYTHROCYTE [DISTWIDTH] IN BLOOD BY AUTOMATED COUNT: 61.3 FL (ref 35.9–50)
ERYTHROCYTE [DISTWIDTH] IN BLOOD BY AUTOMATED COUNT: 61.5 FL (ref 35.9–50)
ERYTHROCYTE [DISTWIDTH] IN BLOOD BY AUTOMATED COUNT: 61.8 FL (ref 35.9–50)
ERYTHROCYTE [DISTWIDTH] IN BLOOD BY AUTOMATED COUNT: 62.4 FL (ref 35.9–50)
ERYTHROCYTE [DISTWIDTH] IN BLOOD BY AUTOMATED COUNT: 62.7 FL (ref 35.9–50)
ERYTHROCYTE [DISTWIDTH] IN BLOOD BY AUTOMATED COUNT: 62.7 FL (ref 35.9–50)
ERYTHROCYTE [DISTWIDTH] IN BLOOD BY AUTOMATED COUNT: 63.6 FL (ref 35.9–50)
ERYTHROCYTE [DISTWIDTH] IN BLOOD BY AUTOMATED COUNT: 65.1 FL (ref 35.9–50)
ERYTHROCYTE [DISTWIDTH] IN BLOOD BY AUTOMATED COUNT: 65.9 FL (ref 35.9–50)
ERYTHROCYTE [DISTWIDTH] IN BLOOD BY AUTOMATED COUNT: 66.6 FL (ref 35.9–50)
EST. AVERAGE GLUCOSE BLD GHB EST-MCNC: 120 MG/DL
FASTING STATUS PATIENT QL REPORTED: NORMAL
FASTING STATUS PATIENT QL REPORTED: NORMAL
FERRITIN SERPL-MCNC: 30.9 NG/ML (ref 10–291)
FOLATE SERPL-MCNC: 7 NG/ML
GFR SERPLBLD CREATININE-BSD FMLA CKD-EPI: 40 ML/MIN/1.73 M 2
GFR SERPLBLD CREATININE-BSD FMLA CKD-EPI: 41 ML/MIN/1.73 M 2
GFR SERPLBLD CREATININE-BSD FMLA CKD-EPI: 45 ML/MIN/1.73 M 2
GFR SERPLBLD CREATININE-BSD FMLA CKD-EPI: 51 ML/MIN/1.73 M 2
GFR SERPLBLD CREATININE-BSD FMLA CKD-EPI: 52 ML/MIN/1.73 M 2
GFR SERPLBLD CREATININE-BSD FMLA CKD-EPI: 53 ML/MIN/1.73 M 2
GFR SERPLBLD CREATININE-BSD FMLA CKD-EPI: 56 ML/MIN/1.73 M 2
GFR SERPLBLD CREATININE-BSD FMLA CKD-EPI: 58 ML/MIN/1.73 M 2
GFR SERPLBLD CREATININE-BSD FMLA CKD-EPI: 60 ML/MIN/1.73 M 2
GFR SERPLBLD CREATININE-BSD FMLA CKD-EPI: 60 ML/MIN/1.73 M 2
GFR SERPLBLD CREATININE-BSD FMLA CKD-EPI: 62 ML/MIN/1.73 M 2
GFR SERPLBLD CREATININE-BSD FMLA CKD-EPI: 64 ML/MIN/1.73 M 2
GFR SERPLBLD CREATININE-BSD FMLA CKD-EPI: 67 ML/MIN/1.73 M 2
GFR SERPLBLD CREATININE-BSD FMLA CKD-EPI: 68 ML/MIN/1.73 M 2
GFR SERPLBLD CREATININE-BSD FMLA CKD-EPI: 72 ML/MIN/1.73 M 2
GFR SERPLBLD CREATININE-BSD FMLA CKD-EPI: 76 ML/MIN/1.73 M 2
GFR SERPLBLD CREATININE-BSD FMLA CKD-EPI: 78 ML/MIN/1.73 M 2
GFR SERPLBLD CREATININE-BSD FMLA CKD-EPI: 79 ML/MIN/1.73 M 2
GLOBULIN SER CALC-MCNC: 2.7 G/DL (ref 1.9–3.5)
GLOBULIN SER CALC-MCNC: 2.8 G/DL (ref 1.9–3.5)
GLOBULIN SER CALC-MCNC: 3 G/DL (ref 1.9–3.5)
GLOBULIN SER CALC-MCNC: 3.1 G/DL (ref 1.9–3.5)
GLOBULIN SER CALC-MCNC: 3.1 G/DL (ref 1.9–3.5)
GLOBULIN SER CALC-MCNC: 3.2 G/DL (ref 1.9–3.5)
GLOBULIN SER CALC-MCNC: 3.2 G/DL (ref 1.9–3.5)
GLUCOSE BLD STRIP.AUTO-MCNC: 102 MG/DL (ref 65–99)
GLUCOSE BLD STRIP.AUTO-MCNC: 103 MG/DL (ref 65–99)
GLUCOSE BLD STRIP.AUTO-MCNC: 104 MG/DL (ref 65–99)
GLUCOSE BLD STRIP.AUTO-MCNC: 104 MG/DL (ref 65–99)
GLUCOSE BLD STRIP.AUTO-MCNC: 105 MG/DL (ref 65–99)
GLUCOSE BLD STRIP.AUTO-MCNC: 106 MG/DL (ref 65–99)
GLUCOSE BLD STRIP.AUTO-MCNC: 107 MG/DL (ref 65–99)
GLUCOSE BLD STRIP.AUTO-MCNC: 108 MG/DL (ref 65–99)
GLUCOSE BLD STRIP.AUTO-MCNC: 109 MG/DL (ref 65–99)
GLUCOSE BLD STRIP.AUTO-MCNC: 109 MG/DL (ref 65–99)
GLUCOSE BLD STRIP.AUTO-MCNC: 110 MG/DL (ref 65–99)
GLUCOSE BLD STRIP.AUTO-MCNC: 110 MG/DL (ref 65–99)
GLUCOSE BLD STRIP.AUTO-MCNC: 111 MG/DL (ref 65–99)
GLUCOSE BLD STRIP.AUTO-MCNC: 112 MG/DL (ref 65–99)
GLUCOSE BLD STRIP.AUTO-MCNC: 113 MG/DL (ref 65–99)
GLUCOSE BLD STRIP.AUTO-MCNC: 114 MG/DL (ref 65–99)
GLUCOSE BLD STRIP.AUTO-MCNC: 115 MG/DL (ref 65–99)
GLUCOSE BLD STRIP.AUTO-MCNC: 115 MG/DL (ref 65–99)
GLUCOSE BLD STRIP.AUTO-MCNC: 116 MG/DL (ref 65–99)
GLUCOSE BLD STRIP.AUTO-MCNC: 117 MG/DL (ref 65–99)
GLUCOSE BLD STRIP.AUTO-MCNC: 118 MG/DL (ref 65–99)
GLUCOSE BLD STRIP.AUTO-MCNC: 118 MG/DL (ref 65–99)
GLUCOSE BLD STRIP.AUTO-MCNC: 119 MG/DL (ref 65–99)
GLUCOSE BLD STRIP.AUTO-MCNC: 119 MG/DL (ref 65–99)
GLUCOSE BLD STRIP.AUTO-MCNC: 121 MG/DL (ref 65–99)
GLUCOSE BLD STRIP.AUTO-MCNC: 122 MG/DL (ref 65–99)
GLUCOSE BLD STRIP.AUTO-MCNC: 122 MG/DL (ref 65–99)
GLUCOSE BLD STRIP.AUTO-MCNC: 123 MG/DL (ref 65–99)
GLUCOSE BLD STRIP.AUTO-MCNC: 123 MG/DL (ref 65–99)
GLUCOSE BLD STRIP.AUTO-MCNC: 124 MG/DL (ref 65–99)
GLUCOSE BLD STRIP.AUTO-MCNC: 126 MG/DL (ref 65–99)
GLUCOSE BLD STRIP.AUTO-MCNC: 126 MG/DL (ref 65–99)
GLUCOSE BLD STRIP.AUTO-MCNC: 128 MG/DL (ref 65–99)
GLUCOSE BLD STRIP.AUTO-MCNC: 129 MG/DL (ref 65–99)
GLUCOSE BLD STRIP.AUTO-MCNC: 129 MG/DL (ref 65–99)
GLUCOSE BLD STRIP.AUTO-MCNC: 130 MG/DL (ref 65–99)
GLUCOSE BLD STRIP.AUTO-MCNC: 131 MG/DL (ref 65–99)
GLUCOSE BLD STRIP.AUTO-MCNC: 132 MG/DL (ref 65–99)
GLUCOSE BLD STRIP.AUTO-MCNC: 133 MG/DL (ref 65–99)
GLUCOSE BLD STRIP.AUTO-MCNC: 134 MG/DL (ref 65–99)
GLUCOSE BLD STRIP.AUTO-MCNC: 135 MG/DL (ref 65–99)
GLUCOSE BLD STRIP.AUTO-MCNC: 136 MG/DL (ref 65–99)
GLUCOSE BLD STRIP.AUTO-MCNC: 139 MG/DL (ref 65–99)
GLUCOSE BLD STRIP.AUTO-MCNC: 141 MG/DL (ref 65–99)
GLUCOSE BLD STRIP.AUTO-MCNC: 142 MG/DL (ref 65–99)
GLUCOSE BLD STRIP.AUTO-MCNC: 143 MG/DL (ref 65–99)
GLUCOSE BLD STRIP.AUTO-MCNC: 144 MG/DL (ref 65–99)
GLUCOSE BLD STRIP.AUTO-MCNC: 146 MG/DL (ref 65–99)
GLUCOSE BLD STRIP.AUTO-MCNC: 147 MG/DL (ref 65–99)
GLUCOSE BLD STRIP.AUTO-MCNC: 151 MG/DL (ref 65–99)
GLUCOSE BLD STRIP.AUTO-MCNC: 152 MG/DL (ref 65–99)
GLUCOSE BLD STRIP.AUTO-MCNC: 154 MG/DL (ref 65–99)
GLUCOSE BLD STRIP.AUTO-MCNC: 156 MG/DL (ref 65–99)
GLUCOSE BLD STRIP.AUTO-MCNC: 161 MG/DL (ref 65–99)
GLUCOSE BLD STRIP.AUTO-MCNC: 164 MG/DL (ref 65–99)
GLUCOSE BLD STRIP.AUTO-MCNC: 69 MG/DL (ref 65–99)
GLUCOSE BLD STRIP.AUTO-MCNC: 70 MG/DL (ref 65–99)
GLUCOSE BLD STRIP.AUTO-MCNC: 70 MG/DL (ref 65–99)
GLUCOSE BLD STRIP.AUTO-MCNC: 71 MG/DL (ref 65–99)
GLUCOSE BLD STRIP.AUTO-MCNC: 72 MG/DL (ref 65–99)
GLUCOSE BLD STRIP.AUTO-MCNC: 77 MG/DL (ref 65–99)
GLUCOSE BLD STRIP.AUTO-MCNC: 79 MG/DL (ref 65–99)
GLUCOSE BLD STRIP.AUTO-MCNC: 82 MG/DL (ref 65–99)
GLUCOSE BLD STRIP.AUTO-MCNC: 83 MG/DL (ref 65–99)
GLUCOSE BLD STRIP.AUTO-MCNC: 85 MG/DL (ref 65–99)
GLUCOSE BLD STRIP.AUTO-MCNC: 86 MG/DL (ref 65–99)
GLUCOSE BLD STRIP.AUTO-MCNC: 87 MG/DL (ref 65–99)
GLUCOSE BLD STRIP.AUTO-MCNC: 90 MG/DL (ref 65–99)
GLUCOSE BLD STRIP.AUTO-MCNC: 92 MG/DL (ref 65–99)
GLUCOSE BLD STRIP.AUTO-MCNC: 93 MG/DL (ref 65–99)
GLUCOSE BLD STRIP.AUTO-MCNC: 95 MG/DL (ref 65–99)
GLUCOSE BLD STRIP.AUTO-MCNC: 96 MG/DL (ref 65–99)
GLUCOSE BLD STRIP.AUTO-MCNC: 96 MG/DL (ref 65–99)
GLUCOSE BLD STRIP.AUTO-MCNC: 97 MG/DL (ref 65–99)
GLUCOSE BLD STRIP.AUTO-MCNC: 97 MG/DL (ref 65–99)
GLUCOSE BLD STRIP.AUTO-MCNC: 99 MG/DL (ref 65–99)
GLUCOSE BLD STRIP.AUTO-MCNC: 99 MG/DL (ref 65–99)
GLUCOSE SERPL-MCNC: 102 MG/DL (ref 65–99)
GLUCOSE SERPL-MCNC: 103 MG/DL (ref 65–99)
GLUCOSE SERPL-MCNC: 103 MG/DL (ref 65–99)
GLUCOSE SERPL-MCNC: 106 MG/DL (ref 65–99)
GLUCOSE SERPL-MCNC: 113 MG/DL (ref 65–99)
GLUCOSE SERPL-MCNC: 114 MG/DL (ref 65–99)
GLUCOSE SERPL-MCNC: 115 MG/DL (ref 65–99)
GLUCOSE SERPL-MCNC: 118 MG/DL (ref 65–99)
GLUCOSE SERPL-MCNC: 119 MG/DL (ref 65–99)
GLUCOSE SERPL-MCNC: 125 MG/DL (ref 65–99)
GLUCOSE SERPL-MCNC: 125 MG/DL (ref 65–99)
GLUCOSE SERPL-MCNC: 130 MG/DL (ref 65–99)
GLUCOSE SERPL-MCNC: 134 MG/DL (ref 65–99)
GLUCOSE SERPL-MCNC: 146 MG/DL (ref 65–99)
GLUCOSE SERPL-MCNC: 159 MG/DL (ref 65–99)
GLUCOSE SERPL-MCNC: 161 MG/DL (ref 65–99)
GLUCOSE SERPL-MCNC: 85 MG/DL (ref 65–99)
GLUCOSE SERPL-MCNC: 86 MG/DL (ref 65–99)
GLUCOSE SERPL-MCNC: 86 MG/DL (ref 65–99)
GLUCOSE SERPL-MCNC: 87 MG/DL (ref 65–99)
GLUCOSE SERPL-MCNC: 87 MG/DL (ref 65–99)
GLUCOSE SERPL-MCNC: 92 MG/DL (ref 65–99)
GLUCOSE SERPL-MCNC: 92 MG/DL (ref 65–99)
GLUCOSE SERPL-MCNC: 97 MG/DL (ref 65–99)
HBA1C MFR BLD: 5.8 % (ref 4–5.6)
HCO3 BLDA-SCNC: 42 MMOL/L (ref 17–25)
HCO3 BLDA-SCNC: 52.1 MMOL/L (ref 17–25)
HCO3 BLDA-SCNC: 58 MMOL/L (ref 17–25)
HCO3 BLDA-SCNC: 58.8 MMOL/L (ref 17–25)
HCT VFR BLD AUTO: 31.8 % (ref 37–47)
HCT VFR BLD AUTO: 33.2 % (ref 37–47)
HCT VFR BLD AUTO: 33.6 % (ref 37–47)
HCT VFR BLD AUTO: 34.5 % (ref 37–47)
HCT VFR BLD AUTO: 34.6 % (ref 37–47)
HCT VFR BLD AUTO: 35 % (ref 37–47)
HCT VFR BLD AUTO: 35.2 % (ref 37–47)
HCT VFR BLD AUTO: 35.4 % (ref 37–47)
HCT VFR BLD AUTO: 35.7 % (ref 37–47)
HCT VFR BLD AUTO: 36.1 % (ref 37–47)
HCT VFR BLD AUTO: 36.2 % (ref 37–47)
HCT VFR BLD AUTO: 36.5 % (ref 37–47)
HCT VFR BLD AUTO: 36.9 % (ref 37–47)
HCT VFR BLD AUTO: 37 % (ref 37–47)
HCT VFR BLD AUTO: 37.5 % (ref 37–47)
HCT VFR BLD AUTO: 37.7 % (ref 37–47)
HCT VFR BLD AUTO: 38.1 % (ref 37–47)
HCT VFR BLD AUTO: 38.5 % (ref 37–47)
HCT VFR BLD AUTO: 39.1 % (ref 37–47)
HCT VFR BLD AUTO: 39.4 % (ref 37–47)
HCT VFR BLD AUTO: 40.5 % (ref 37–47)
HCT VFR BLD AUTO: 43 % (ref 37–47)
HDLC SERPL-MCNC: 54 MG/DL
HDLC SERPL-MCNC: 56 MG/DL
HGB BLD-MCNC: 10 G/DL (ref 12–16)
HGB BLD-MCNC: 10.1 G/DL (ref 12–16)
HGB BLD-MCNC: 10.1 G/DL (ref 12–16)
HGB BLD-MCNC: 10.4 G/DL (ref 12–16)
HGB BLD-MCNC: 10.4 G/DL (ref 12–16)
HGB BLD-MCNC: 10.5 G/DL (ref 12–16)
HGB BLD-MCNC: 10.7 G/DL (ref 12–16)
HGB BLD-MCNC: 10.7 G/DL (ref 12–16)
HGB BLD-MCNC: 10.9 G/DL (ref 12–16)
HGB BLD-MCNC: 10.9 G/DL (ref 12–16)
HGB BLD-MCNC: 11.1 G/DL (ref 12–16)
HGB BLD-MCNC: 11.2 G/DL (ref 12–16)
HGB BLD-MCNC: 11.9 G/DL (ref 12–16)
HGB BLD-MCNC: 12.2 G/DL (ref 12–16)
HGB BLD-MCNC: 9.2 G/DL (ref 12–16)
HGB BLD-MCNC: 9.5 G/DL (ref 12–16)
HGB BLD-MCNC: 9.6 G/DL (ref 12–16)
HGB BLD-MCNC: 9.8 G/DL (ref 12–16)
HGB BLD-MCNC: 9.8 G/DL (ref 12–16)
HGB BLD-MCNC: 9.9 G/DL (ref 12–16)
HOROWITZ INDEX BLDA+IHG-RTO: 109 MM[HG]
HOROWITZ INDEX BLDA+IHG-RTO: 128 MM[HG]
HOROWITZ INDEX BLDA+IHG-RTO: 60 MM[HG]
HYPOCHROMIA BLD QL SMEAR: ABNORMAL
HYPOCHROMIA BLD QL SMEAR: ABNORMAL
IMM GRANULOCYTES # BLD AUTO: 0.02 K/UL (ref 0–0.11)
IMM GRANULOCYTES # BLD AUTO: 0.02 K/UL (ref 0–0.11)
IMM GRANULOCYTES # BLD AUTO: 0.06 K/UL (ref 0–0.11)
IMM GRANULOCYTES # BLD AUTO: 0.1 K/UL (ref 0–0.11)
IMM GRANULOCYTES # BLD AUTO: 0.11 K/UL (ref 0–0.11)
IMM GRANULOCYTES # BLD AUTO: 0.12 K/UL (ref 0–0.11)
IMM GRANULOCYTES # BLD AUTO: 0.35 K/UL (ref 0–0.11)
IMM GRANULOCYTES NFR BLD AUTO: 0.4 % (ref 0–0.9)
IMM GRANULOCYTES NFR BLD AUTO: 0.4 % (ref 0–0.9)
IMM GRANULOCYTES NFR BLD AUTO: 0.7 % (ref 0–0.9)
IMM GRANULOCYTES NFR BLD AUTO: 1.1 % (ref 0–0.9)
IMM GRANULOCYTES NFR BLD AUTO: 1.2 % (ref 0–0.9)
IMM GRANULOCYTES NFR BLD AUTO: 1.5 % (ref 0–0.9)
IMM GRANULOCYTES NFR BLD AUTO: 3.6 % (ref 0–0.9)
INHALED O2 FLOW RATE: 15 L/MIN (ref 2–10)
INR PPP: 1.27 (ref 0.87–1.13)
INR PPP: 1.29 (ref 0.87–1.13)
INR PPP: 1.35 (ref 0.87–1.13)
INR PPP: 1.4 (ref 2–3.5)
INR PPP: 1.45 (ref 0.87–1.13)
INR PPP: 1.56 (ref 0.87–1.13)
INR PPP: 1.59 (ref 0.87–1.13)
INR PPP: 1.74 (ref 0.87–1.13)
INR PPP: 1.75 (ref 0.87–1.13)
INR PPP: 1.81 (ref 0.87–1.13)
INR PPP: 1.86 (ref 0.87–1.13)
INR PPP: 1.9 (ref 0.87–1.13)
INR PPP: 2 (ref 2–3.5)
INR PPP: 2 (ref 2–3.5)
INR PPP: 2.12 (ref 0.87–1.13)
INR PPP: 2.2 (ref 2–3.5)
INR PPP: 2.3 (ref 2–3.5)
INR PPP: 2.31 (ref 0.87–1.13)
INR PPP: 2.4 (ref 2–3.5)
INR PPP: 2.41 (ref 0.87–1.13)
INR PPP: 2.5 (ref 2–3.5)
INR PPP: 2.51 (ref 0.87–1.13)
INR PPP: 2.53 (ref 0.87–1.13)
INR PPP: 2.77 (ref 0.87–1.13)
INR PPP: 2.8 (ref 2–3.5)
INR PPP: 2.81 (ref 0.87–1.13)
INR PPP: 2.83 (ref 0.87–1.13)
INR PPP: 2.84 (ref 0.87–1.13)
INR PPP: 2.92 (ref 0.87–1.13)
INR PPP: 3 (ref 0.87–1.13)
INR PPP: 3.02 (ref 0.87–1.13)
INR PPP: 3.02 (ref 0.87–1.13)
INR PPP: 3.1 (ref 0.87–1.13)
INR PPP: 3.2 (ref 2–3.5)
INR PPP: 3.29 (ref 0.87–1.13)
INR PPP: 3.34 (ref 0.87–1.13)
INR PPP: 3.6 (ref 2–3.5)
INR PPP: 3.61 (ref 0.87–1.13)
INR PPP: 4 (ref 0.87–1.13)
INR PPP: 4.08 (ref 0.87–1.13)
IRON SATN MFR SERPL: 8 % (ref 15–55)
IRON SERPL-MCNC: 31 UG/DL (ref 40–170)
LDLC SERPL CALC-MCNC: 48 MG/DL
LDLC SERPL CALC-MCNC: 50 MG/DL
LPM ILPM: 50 LPM
LV EJECT FRACT  99904: 55
LV EJECT FRACT  99904: 60
LV EJECT FRACT  99904: 65
LV EJECT FRACT  99904: 70
LV EJECT FRACT  99904: 70
LV EJECT FRACT  99904: 75
LV EJECT FRACT MOD 4C 99902: 63.84
LV EJECT FRACT MOD 4C 99902: 66.08
LYMPHOCYTES # BLD AUTO: 1.05 K/UL (ref 1–4.8)
LYMPHOCYTES # BLD AUTO: 1.08 K/UL (ref 1–4.8)
LYMPHOCYTES # BLD AUTO: 1.19 K/UL (ref 1–4.8)
LYMPHOCYTES # BLD AUTO: 1.27 K/UL (ref 1–4.8)
LYMPHOCYTES # BLD AUTO: 1.31 K/UL (ref 1–4.8)
LYMPHOCYTES # BLD AUTO: 1.33 K/UL (ref 1–4.8)
LYMPHOCYTES # BLD AUTO: 1.47 K/UL (ref 1–4.8)
LYMPHOCYTES # BLD AUTO: 1.48 K/UL (ref 1–4.8)
LYMPHOCYTES # BLD AUTO: 1.51 K/UL (ref 1–4.8)
LYMPHOCYTES NFR BLD: 11 % (ref 22–41)
LYMPHOCYTES NFR BLD: 13.9 % (ref 22–41)
LYMPHOCYTES NFR BLD: 15 % (ref 22–41)
LYMPHOCYTES NFR BLD: 16.1 % (ref 22–41)
LYMPHOCYTES NFR BLD: 18.6 % (ref 22–41)
LYMPHOCYTES NFR BLD: 21.7 % (ref 22–41)
LYMPHOCYTES NFR BLD: 21.7 % (ref 22–41)
LYMPHOCYTES NFR BLD: 25.6 % (ref 22–41)
LYMPHOCYTES NFR BLD: 30.7 % (ref 22–41)
MACROCYTES BLD QL SMEAR: ABNORMAL
MAGNESIUM SERPL-MCNC: 1.7 MG/DL (ref 1.5–2.5)
MAGNESIUM SERPL-MCNC: 1.8 MG/DL (ref 1.5–2.5)
MAGNESIUM SERPL-MCNC: 1.8 MG/DL (ref 1.5–2.5)
MAGNESIUM SERPL-MCNC: 1.9 MG/DL (ref 1.5–2.5)
MAGNESIUM SERPL-MCNC: 2.1 MG/DL (ref 1.5–2.5)
MAGNESIUM SERPL-MCNC: 2.6 MG/DL (ref 1.5–2.5)
MAGNESIUM SERPL-MCNC: 2.7 MG/DL (ref 1.5–2.5)
MAGNESIUM SERPL-MCNC: 2.7 MG/DL (ref 1.5–2.5)
MAGNESIUM SERPL-MCNC: 2.8 MG/DL (ref 1.5–2.5)
MANUAL DIFF BLD: NORMAL
MANUAL DIFF BLD: NORMAL
MCH RBC QN AUTO: 23.4 PG (ref 27–33)
MCH RBC QN AUTO: 23.6 PG (ref 27–33)
MCH RBC QN AUTO: 24 PG (ref 27–33)
MCH RBC QN AUTO: 24.1 PG (ref 27–33)
MCH RBC QN AUTO: 24.2 PG (ref 27–33)
MCH RBC QN AUTO: 24.8 PG (ref 27–33)
MCH RBC QN AUTO: 24.9 PG (ref 27–33)
MCH RBC QN AUTO: 25 PG (ref 27–33)
MCH RBC QN AUTO: 25.1 PG (ref 27–33)
MCH RBC QN AUTO: 25.2 PG (ref 27–33)
MCH RBC QN AUTO: 25.3 PG (ref 27–33)
MCH RBC QN AUTO: 25.3 PG (ref 27–33)
MCH RBC QN AUTO: 25.5 PG (ref 27–33)
MCH RBC QN AUTO: 25.5 PG (ref 27–33)
MCH RBC QN AUTO: 25.6 PG (ref 27–33)
MCH RBC QN AUTO: 25.7 PG (ref 27–33)
MCH RBC QN AUTO: 25.9 PG (ref 27–33)
MCHC RBC AUTO-ENTMCNC: 26.4 G/DL (ref 33.6–35)
MCHC RBC AUTO-ENTMCNC: 27.1 G/DL (ref 33.6–35)
MCHC RBC AUTO-ENTMCNC: 27.5 G/DL (ref 33.6–35)
MCHC RBC AUTO-ENTMCNC: 27.9 G/DL (ref 33.6–35)
MCHC RBC AUTO-ENTMCNC: 28.1 G/DL (ref 33.6–35)
MCHC RBC AUTO-ENTMCNC: 28.2 G/DL (ref 33.6–35)
MCHC RBC AUTO-ENTMCNC: 28.2 G/DL (ref 33.6–35)
MCHC RBC AUTO-ENTMCNC: 28.3 G/DL (ref 33.6–35)
MCHC RBC AUTO-ENTMCNC: 28.4 G/DL (ref 33.6–35)
MCHC RBC AUTO-ENTMCNC: 28.4 G/DL (ref 33.6–35)
MCHC RBC AUTO-ENTMCNC: 28.6 G/DL (ref 33.6–35)
MCHC RBC AUTO-ENTMCNC: 28.8 G/DL (ref 33.6–35)
MCHC RBC AUTO-ENTMCNC: 28.8 G/DL (ref 33.6–35)
MCHC RBC AUTO-ENTMCNC: 28.9 G/DL (ref 33.6–35)
MCHC RBC AUTO-ENTMCNC: 28.9 G/DL (ref 33.6–35)
MCHC RBC AUTO-ENTMCNC: 29 G/DL (ref 33.6–35)
MCHC RBC AUTO-ENTMCNC: 29.2 G/DL (ref 33.6–35)
MCHC RBC AUTO-ENTMCNC: 29.2 G/DL (ref 33.6–35)
MCHC RBC AUTO-ENTMCNC: 29.3 G/DL (ref 33.6–35)
MCHC RBC AUTO-ENTMCNC: 29.4 G/DL (ref 33.6–35)
MCHC RBC AUTO-ENTMCNC: 29.5 G/DL (ref 33.6–35)
MCHC RBC AUTO-ENTMCNC: 30.2 G/DL (ref 33.6–35)
MCV RBC AUTO: 80.6 FL (ref 81.4–97.8)
MCV RBC AUTO: 82.5 FL (ref 81.4–97.8)
MCV RBC AUTO: 83 FL (ref 81.4–97.8)
MCV RBC AUTO: 83.5 FL (ref 81.4–97.8)
MCV RBC AUTO: 84.1 FL (ref 81.4–97.8)
MCV RBC AUTO: 84.1 FL (ref 81.4–97.8)
MCV RBC AUTO: 84.6 FL (ref 81.4–97.8)
MCV RBC AUTO: 86.2 FL (ref 81.4–97.8)
MCV RBC AUTO: 86.4 FL (ref 81.4–97.8)
MCV RBC AUTO: 86.7 FL (ref 81.4–97.8)
MCV RBC AUTO: 87.5 FL (ref 81.4–97.8)
MCV RBC AUTO: 87.6 FL (ref 81.4–97.8)
MCV RBC AUTO: 88.4 FL (ref 81.4–97.8)
MCV RBC AUTO: 88.5 FL (ref 81.4–97.8)
MCV RBC AUTO: 89 FL (ref 81.4–97.8)
MCV RBC AUTO: 90.8 FL (ref 81.4–97.8)
MCV RBC AUTO: 90.9 FL (ref 81.4–97.8)
MCV RBC AUTO: 91 FL (ref 81.4–97.8)
MCV RBC AUTO: 91.4 FL (ref 81.4–97.8)
MCV RBC AUTO: 94.2 FL (ref 81.4–97.8)
MCV RBC AUTO: 94.3 FL (ref 81.4–97.8)
MCV RBC AUTO: 96.9 FL (ref 81.4–97.8)
MICROCYTES BLD QL SMEAR: ABNORMAL
MONOCYTES # BLD AUTO: 0.24 K/UL (ref 0–0.85)
MONOCYTES # BLD AUTO: 0.25 K/UL (ref 0–0.85)
MONOCYTES # BLD AUTO: 0.41 K/UL (ref 0–0.85)
MONOCYTES # BLD AUTO: 0.49 K/UL (ref 0–0.85)
MONOCYTES # BLD AUTO: 0.89 K/UL (ref 0–0.85)
MONOCYTES # BLD AUTO: 0.93 K/UL (ref 0–0.85)
MONOCYTES # BLD AUTO: 0.94 K/UL (ref 0–0.85)
MONOCYTES # BLD AUTO: 1.01 K/UL (ref 0–0.85)
MONOCYTES # BLD AUTO: 1.32 K/UL (ref 0–0.85)
MONOCYTES NFR BLD AUTO: 10.1 % (ref 0–13.4)
MONOCYTES NFR BLD AUTO: 10.2 % (ref 0–13.4)
MONOCYTES NFR BLD AUTO: 10.2 % (ref 0–13.4)
MONOCYTES NFR BLD AUTO: 10.9 % (ref 0–13.4)
MONOCYTES NFR BLD AUTO: 11.5 % (ref 0–13.4)
MONOCYTES NFR BLD AUTO: 13.4 % (ref 0–13.4)
MONOCYTES NFR BLD AUTO: 4.3 % (ref 0–13.4)
MONOCYTES NFR BLD AUTO: 5.3 % (ref 0–13.4)
MONOCYTES NFR BLD AUTO: 7.9 % (ref 0–13.4)
MORPHOLOGY BLD-IMP: NORMAL
NEUTROPHILS # BLD AUTO: 2.61 K/UL (ref 2–7.15)
NEUTROPHILS # BLD AUTO: 2.74 K/UL (ref 2–7.15)
NEUTROPHILS # BLD AUTO: 3.02 K/UL (ref 2–7.15)
NEUTROPHILS # BLD AUTO: 3.54 K/UL (ref 2–7.15)
NEUTROPHILS # BLD AUTO: 5.28 K/UL (ref 2–7.15)
NEUTROPHILS # BLD AUTO: 6.03 K/UL (ref 2–7.15)
NEUTROPHILS # BLD AUTO: 6.4 K/UL (ref 2–7.15)
NEUTROPHILS # BLD AUTO: 6.73 K/UL (ref 2–7.15)
NEUTROPHILS # BLD AUTO: 6.92 K/UL (ref 2–7.15)
NEUTROPHILS NFR BLD: 54.4 % (ref 44–72)
NEUTROPHILS NFR BLD: 56.7 % (ref 44–72)
NEUTROPHILS NFR BLD: 58.2 % (ref 44–72)
NEUTROPHILS NFR BLD: 64.4 % (ref 44–72)
NEUTROPHILS NFR BLD: 64.8 % (ref 44–72)
NEUTROPHILS NFR BLD: 68.9 % (ref 44–72)
NEUTROPHILS NFR BLD: 69.6 % (ref 44–72)
NEUTROPHILS NFR BLD: 70.4 % (ref 44–72)
NEUTROPHILS NFR BLD: 73.6 % (ref 44–72)
NRBC # BLD AUTO: 0 K/UL
NRBC # BLD AUTO: 0.02 K/UL
NRBC # BLD AUTO: 0.02 K/UL
NRBC # BLD AUTO: 0.03 K/UL
NRBC BLD-RTO: 0 /100 WBC
NRBC BLD-RTO: 0.2 /100 WBC
NRBC BLD-RTO: 0.2 /100 WBC
NRBC BLD-RTO: 0.3 /100 WBC
NT-PROBNP SERPL IA-MCNC: 1126 PG/ML (ref 0–125)
NT-PROBNP SERPL IA-MCNC: 1646 PG/ML (ref 0–125)
NT-PROBNP SERPL IA-MCNC: 1739 PG/ML (ref 0–125)
NT-PROBNP SERPL IA-MCNC: 2028 PG/ML (ref 0–125)
NT-PROBNP SERPL IA-MCNC: 2231 PG/ML (ref 0–125)
NT-PROBNP SERPL IA-MCNC: 2447 PG/ML (ref 0–125)
NT-PROBNP SERPL IA-MCNC: 3050 PG/ML (ref 0–125)
NT-PROBNP SERPL IA-MCNC: ABNORMAL PG/ML (ref 0–125)
O2/TOTAL GAS SETTING VFR VENT: 100 %
O2/TOTAL GAS SETTING VFR VENT: 50 %
O2/TOTAL GAS SETTING VFR VENT: 80 %
OVALOCYTES BLD QL SMEAR: NORMAL
PCO2 BLDA: 78.4 MMHG (ref 26–37)
PCO2 BLDA: 79.1 MMHG (ref 26–37)
PCO2 BLDA: >100 MMHG (ref 26–37)
PCO2 BLDA: >100 MMHG (ref 26–37)
PCO2 TEMP ADJ BLDA: 75.4 MMHG (ref 26–37)
PCO2 TEMP ADJ BLDA: >100 MMHG (ref 26–37)
PCO2 TEMP ADJ BLDA: >100 MMHG (ref 26–37)
PH BLDA: 7.27 [PH] (ref 7.4–7.5)
PH BLDA: 7.29 [PH] (ref 7.4–7.5)
PH BLDA: 7.34 [PH] (ref 7.4–7.5)
PH BLDA: 7.43 [PH] (ref 7.4–7.5)
PH TEMP ADJ BLDA: 7.28 [PH] (ref 7.4–7.5)
PH TEMP ADJ BLDA: 7.29 [PH] (ref 7.4–7.5)
PH TEMP ADJ BLDA: 7.44 [PH] (ref 7.4–7.5)
PHOSPHATE SERPL-MCNC: 1.5 MG/DL (ref 2.5–4.5)
PHOSPHATE SERPL-MCNC: 2.2 MG/DL (ref 2.5–4.5)
PHOSPHATE SERPL-MCNC: 2.8 MG/DL (ref 2.5–4.5)
PHOSPHATE SERPL-MCNC: 3.2 MG/DL (ref 2.5–4.5)
PHOSPHATE SERPL-MCNC: 3.4 MG/DL (ref 2.5–4.5)
PHOSPHATE SERPL-MCNC: 3.7 MG/DL (ref 2.5–4.5)
PHOSPHATE SERPL-MCNC: 4 MG/DL (ref 2.5–4.5)
PHOSPHATE SERPL-MCNC: 4.4 MG/DL (ref 2.5–4.5)
PHOSPHATE SERPL-MCNC: 4.7 MG/DL (ref 2.5–4.5)
PLATELET # BLD AUTO: 152 K/UL (ref 164–446)
PLATELET # BLD AUTO: 154 K/UL (ref 164–446)
PLATELET # BLD AUTO: 160 K/UL (ref 164–446)
PLATELET # BLD AUTO: 162 K/UL (ref 164–446)
PLATELET # BLD AUTO: 165 K/UL (ref 164–446)
PLATELET # BLD AUTO: 167 K/UL (ref 164–446)
PLATELET # BLD AUTO: 168 K/UL (ref 164–446)
PLATELET # BLD AUTO: 170 K/UL (ref 164–446)
PLATELET # BLD AUTO: 171 K/UL (ref 164–446)
PLATELET # BLD AUTO: 174 K/UL (ref 164–446)
PLATELET # BLD AUTO: 175 K/UL (ref 164–446)
PLATELET # BLD AUTO: 178 K/UL (ref 164–446)
PLATELET # BLD AUTO: 181 K/UL (ref 164–446)
PLATELET # BLD AUTO: 184 K/UL (ref 164–446)
PLATELET # BLD AUTO: 203 K/UL (ref 164–446)
PLATELET # BLD AUTO: 205 K/UL (ref 164–446)
PLATELET # BLD AUTO: 206 K/UL (ref 164–446)
PLATELET # BLD AUTO: 209 K/UL (ref 164–446)
PLATELET # BLD AUTO: 248 K/UL (ref 164–446)
PLATELET # BLD AUTO: 249 K/UL (ref 164–446)
PLATELET # BLD AUTO: 250 K/UL (ref 164–446)
PLATELET # BLD AUTO: 273 K/UL (ref 164–446)
PLATELET BLD QL SMEAR: NORMAL
PMV BLD AUTO: 10 FL (ref 9–12.9)
PMV BLD AUTO: 10 FL (ref 9–12.9)
PMV BLD AUTO: 10.1 FL (ref 9–12.9)
PMV BLD AUTO: 10.1 FL (ref 9–12.9)
PMV BLD AUTO: 10.2 FL (ref 9–12.9)
PMV BLD AUTO: 10.6 FL (ref 9–12.9)
PMV BLD AUTO: 9 FL (ref 9–12.9)
PMV BLD AUTO: 9.2 FL (ref 9–12.9)
PMV BLD AUTO: 9.4 FL (ref 9–12.9)
PMV BLD AUTO: 9.5 FL (ref 9–12.9)
PMV BLD AUTO: 9.6 FL (ref 9–12.9)
PMV BLD AUTO: 9.8 FL (ref 9–12.9)
PMV BLD AUTO: 9.9 FL (ref 9–12.9)
PO2 BLDA: 60 MMHG (ref 64–87)
PO2 BLDA: 64 MMHG (ref 64–87)
PO2 BLDA: 64.7 MMHG (ref 64–87)
PO2 BLDA: 87 MMHG (ref 64–87)
PO2 TEMP ADJ BLDA: 59 MMHG (ref 64–87)
PO2 TEMP ADJ BLDA: 60 MMHG (ref 64–87)
PO2 TEMP ADJ BLDA: 83 MMHG (ref 64–87)
POIKILOCYTOSIS BLD QL SMEAR: NORMAL
POLYCHROMASIA BLD QL SMEAR: NORMAL
POTASSIUM SERPL-SCNC: 3.2 MMOL/L (ref 3.6–5.5)
POTASSIUM SERPL-SCNC: 3.6 MMOL/L (ref 3.6–5.5)
POTASSIUM SERPL-SCNC: 3.7 MMOL/L (ref 3.6–5.5)
POTASSIUM SERPL-SCNC: 3.8 MMOL/L (ref 3.6–5.5)
POTASSIUM SERPL-SCNC: 3.9 MMOL/L (ref 3.6–5.5)
POTASSIUM SERPL-SCNC: 3.9 MMOL/L (ref 3.6–5.5)
POTASSIUM SERPL-SCNC: 4 MMOL/L (ref 3.6–5.5)
POTASSIUM SERPL-SCNC: 4 MMOL/L (ref 3.6–5.5)
POTASSIUM SERPL-SCNC: 4.1 MMOL/L (ref 3.6–5.5)
POTASSIUM SERPL-SCNC: 4.2 MMOL/L (ref 3.6–5.5)
POTASSIUM SERPL-SCNC: 4.3 MMOL/L (ref 3.6–5.5)
POTASSIUM SERPL-SCNC: 4.3 MMOL/L (ref 3.6–5.5)
POTASSIUM SERPL-SCNC: 4.4 MMOL/L (ref 3.6–5.5)
POTASSIUM SERPL-SCNC: 4.7 MMOL/L (ref 3.6–5.5)
POTASSIUM SERPL-SCNC: 4.7 MMOL/L (ref 3.6–5.5)
POTASSIUM SERPL-SCNC: 4.8 MMOL/L (ref 3.6–5.5)
POTASSIUM SERPL-SCNC: 4.9 MMOL/L (ref 3.6–5.5)
POTASSIUM SERPL-SCNC: 4.9 MMOL/L (ref 3.6–5.5)
POTASSIUM SERPL-SCNC: 5.2 MMOL/L (ref 3.6–5.5)
PROCALCITONIN SERPL-MCNC: 0.08 NG/ML
PROT SERPL-MCNC: 5.6 G/DL (ref 6–8.2)
PROT SERPL-MCNC: 5.7 G/DL (ref 6–8.2)
PROT SERPL-MCNC: 5.8 G/DL (ref 6–8.2)
PROT SERPL-MCNC: 5.9 G/DL (ref 6–8.2)
PROT SERPL-MCNC: 5.9 G/DL (ref 6–8.2)
PROT SERPL-MCNC: 6 G/DL (ref 6–8.2)
PROT SERPL-MCNC: 6.1 G/DL (ref 6–8.2)
PROT SERPL-MCNC: 6.2 G/DL (ref 6–8.2)
PROT SERPL-MCNC: 6.3 G/DL (ref 6–8.2)
PROT SERPL-MCNC: 6.5 G/DL (ref 6–8.2)
PROT SERPL-MCNC: 6.5 G/DL (ref 6–8.2)
PROT SERPL-MCNC: 6.6 G/DL (ref 6–8.2)
PROT SERPL-MCNC: 6.6 G/DL (ref 6–8.2)
PROTHROMBIN TIME: 15.7 SEC (ref 12–14.6)
PROTHROMBIN TIME: 15.8 SEC (ref 12–14.6)
PROTHROMBIN TIME: 16.5 SEC (ref 12–14.6)
PROTHROMBIN TIME: 17.4 SEC (ref 12–14.6)
PROTHROMBIN TIME: 18.2 SEC (ref 12–14.6)
PROTHROMBIN TIME: 18.6 SEC (ref 12–14.6)
PROTHROMBIN TIME: 19.8 SEC (ref 12–14.6)
PROTHROMBIN TIME: 20 SEC (ref 12–14.6)
PROTHROMBIN TIME: 20.4 SEC (ref 12–14.6)
PROTHROMBIN TIME: 20.9 SEC (ref 12–14.6)
PROTHROMBIN TIME: 21.2 SEC (ref 12–14.6)
PROTHROMBIN TIME: 23.1 SEC (ref 12–14.6)
PROTHROMBIN TIME: 24.7 SEC (ref 12–14.6)
PROTHROMBIN TIME: 25.5 SEC (ref 12–14.6)
PROTHROMBIN TIME: 26.3 SEC (ref 12–14.6)
PROTHROMBIN TIME: 26.5 SEC (ref 12–14.6)
PROTHROMBIN TIME: 28.3 SEC (ref 12–14.6)
PROTHROMBIN TIME: 28.7 SEC (ref 12–14.6)
PROTHROMBIN TIME: 28.8 SEC (ref 12–14.6)
PROTHROMBIN TIME: 29 SEC (ref 12–14.6)
PROTHROMBIN TIME: 29.2 SEC (ref 12–14.6)
PROTHROMBIN TIME: 29.2 SEC (ref 12–14.6)
PROTHROMBIN TIME: 29.6 SEC (ref 12–14.6)
PROTHROMBIN TIME: 30.1 SEC (ref 12–14.6)
PROTHROMBIN TIME: 30.8 SEC (ref 12–14.6)
PROTHROMBIN TIME: 32.3 SEC (ref 12–14.6)
PROTHROMBIN TIME: 32.6 SEC (ref 12–14.6)
PROTHROMBIN TIME: 34.6 SEC (ref 12–14.6)
PROTHROMBIN TIME: 37.5 SEC (ref 12–14.6)
PROTHROMBIN TIME: 38 SEC (ref 12–14.6)
RBC # BLD AUTO: 3.68 M/UL (ref 4.2–5.4)
RBC # BLD AUTO: 3.71 M/UL (ref 4.2–5.4)
RBC # BLD AUTO: 3.79 M/UL (ref 4.2–5.4)
RBC # BLD AUTO: 3.84 M/UL (ref 4.2–5.4)
RBC # BLD AUTO: 3.85 M/UL (ref 4.2–5.4)
RBC # BLD AUTO: 3.87 M/UL (ref 4.2–5.4)
RBC # BLD AUTO: 3.94 M/UL (ref 4.2–5.4)
RBC # BLD AUTO: 3.98 M/UL (ref 4.2–5.4)
RBC # BLD AUTO: 3.99 M/UL (ref 4.2–5.4)
RBC # BLD AUTO: 4.06 M/UL (ref 4.2–5.4)
RBC # BLD AUTO: 4.08 M/UL (ref 4.2–5.4)
RBC # BLD AUTO: 4.1 M/UL (ref 4.2–5.4)
RBC # BLD AUTO: 4.15 M/UL (ref 4.2–5.4)
RBC # BLD AUTO: 4.17 M/UL (ref 4.2–5.4)
RBC # BLD AUTO: 4.24 M/UL (ref 4.2–5.4)
RBC # BLD AUTO: 4.33 M/UL (ref 4.2–5.4)
RBC # BLD AUTO: 4.4 M/UL (ref 4.2–5.4)
RBC # BLD AUTO: 4.49 M/UL (ref 4.2–5.4)
RBC # BLD AUTO: 4.55 M/UL (ref 4.2–5.4)
RBC # BLD AUTO: 4.65 M/UL (ref 4.2–5.4)
RBC # BLD AUTO: 4.91 M/UL (ref 4.2–5.4)
RBC # BLD AUTO: 5.18 M/UL (ref 4.2–5.4)
RBC BLD AUTO: PRESENT
RH BLD: NORMAL
RH BLD: NORMAL
SAO2 % BLDA: 83 % (ref 93–99)
SAO2 % BLDA: 91 % (ref 93–99)
SAO2 % BLDA: 91.7 % (ref 93–99)
SAO2 % BLDA: 93 % (ref 93–99)
SARS-COV+SARS-COV-2 AG RESP QL IA.RAPID: NOTDETECTED
SARS-COV+SARS-COV-2 AG RESP QL IA.RAPID: NOTDETECTED
SIGNIFICANT IND 70042: NORMAL
SIGNIFICANT IND 70042: NORMAL
SITE SITE: NORMAL
SITE SITE: NORMAL
SODIUM SERPL-SCNC: 137 MMOL/L (ref 135–145)
SODIUM SERPL-SCNC: 137 MMOL/L (ref 135–145)
SODIUM SERPL-SCNC: 138 MMOL/L (ref 135–145)
SODIUM SERPL-SCNC: 138 MMOL/L (ref 135–145)
SODIUM SERPL-SCNC: 140 MMOL/L (ref 135–145)
SODIUM SERPL-SCNC: 141 MMOL/L (ref 135–145)
SODIUM SERPL-SCNC: 142 MMOL/L (ref 135–145)
SODIUM SERPL-SCNC: 143 MMOL/L (ref 135–145)
SODIUM SERPL-SCNC: 144 MMOL/L (ref 135–145)
SODIUM SERPL-SCNC: 146 MMOL/L (ref 135–145)
SOURCE SOURCE: NORMAL
SOURCE SOURCE: NORMAL
SPECIMEN DRAWN FROM PATIENT: ABNORMAL
SPECIMEN SOURCE: NORMAL
SPECIMEN SOURCE: NORMAL
TIBC SERPL-MCNC: 399 UG/DL (ref 250–450)
TRANSFERRIN SERPL-MCNC: 333 MG/DL (ref 200–370)
TRIGL SERPL-MCNC: 54 MG/DL (ref 0–149)
TRIGL SERPL-MCNC: 55 MG/DL (ref 0–149)
TROPONIN T SERPL-MCNC: 58 NG/L (ref 6–19)
TROPONIN T SERPL-MCNC: 59 NG/L (ref 6–19)
TROPONIN T SERPL-MCNC: 61 NG/L (ref 6–19)
TROPONIN T SERPL-MCNC: 67 NG/L (ref 6–19)
TSH SERPL DL<=0.005 MIU/L-ACNC: 1.74 UIU/ML (ref 0.38–5.33)
UFH PPP CHRO-ACNC: <0.1 IU/ML
UFH PPP CHRO-ACNC: <0.1 IU/ML
UIBC SERPL-MCNC: 368 UG/DL (ref 110–370)
VIT B1 BLD-MCNC: 118 NMOL/L (ref 70–180)
VIT B12 SERPL-MCNC: 385 PG/ML (ref 211–911)
WBC # BLD AUTO: 4.2 K/UL (ref 4.8–10.8)
WBC # BLD AUTO: 4.7 K/UL (ref 4.8–10.8)
WBC # BLD AUTO: 4.8 K/UL (ref 4.8–10.8)
WBC # BLD AUTO: 4.8 K/UL (ref 4.8–10.8)
WBC # BLD AUTO: 5.2 K/UL (ref 4.8–10.8)
WBC # BLD AUTO: 5.5 K/UL (ref 4.8–10.8)
WBC # BLD AUTO: 5.6 K/UL (ref 4.8–10.8)
WBC # BLD AUTO: 5.7 K/UL (ref 4.8–10.8)
WBC # BLD AUTO: 5.7 K/UL (ref 4.8–10.8)
WBC # BLD AUTO: 6 K/UL (ref 4.8–10.8)
WBC # BLD AUTO: 6.7 K/UL (ref 4.8–10.8)
WBC # BLD AUTO: 6.8 K/UL (ref 4.8–10.8)
WBC # BLD AUTO: 7.1 K/UL (ref 4.8–10.8)
WBC # BLD AUTO: 7.1 K/UL (ref 4.8–10.8)
WBC # BLD AUTO: 7.9 K/UL (ref 4.8–10.8)
WBC # BLD AUTO: 8 K/UL (ref 4.8–10.8)
WBC # BLD AUTO: 8.1 K/UL (ref 4.8–10.8)
WBC # BLD AUTO: 8.2 K/UL (ref 4.8–10.8)
WBC # BLD AUTO: 8.8 K/UL (ref 4.8–10.8)
WBC # BLD AUTO: 9.1 K/UL (ref 4.8–10.8)
WBC # BLD AUTO: 9.2 K/UL (ref 4.8–10.8)
WBC # BLD AUTO: 9.8 K/UL (ref 4.8–10.8)

## 2022-01-01 PROCEDURE — 80061 LIPID PANEL: CPT | Mod: GA

## 2022-01-01 PROCEDURE — 770020 HCHG ROOM/CARE - TELE (206)

## 2022-01-01 PROCEDURE — 85610 PROTHROMBIN TIME: CPT

## 2022-01-01 PROCEDURE — 84443 ASSAY THYROID STIM HORMONE: CPT

## 2022-01-01 PROCEDURE — 82962 GLUCOSE BLOOD TEST: CPT | Mod: 91

## 2022-01-01 PROCEDURE — 83880 ASSAY OF NATRIURETIC PEPTIDE: CPT

## 2022-01-01 PROCEDURE — 93312 ECHO TRANSESOPHAGEAL: CPT | Mod: 26,59 | Performed by: STUDENT IN AN ORGANIZED HEALTH CARE EDUCATION/TRAINING PROGRAM

## 2022-01-01 PROCEDURE — C1894 INTRO/SHEATH, NON-LASER: HCPCS | Performed by: INTERNAL MEDICINE

## 2022-01-01 PROCEDURE — 700105 HCHG RX REV CODE 258: Performed by: STUDENT IN AN ORGANIZED HEALTH CARE EDUCATION/TRAINING PROGRAM

## 2022-01-01 PROCEDURE — 700102 HCHG RX REV CODE 250 W/ 637 OVERRIDE(OP): Performed by: NURSE PRACTITIONER

## 2022-01-01 PROCEDURE — A9270 NON-COVERED ITEM OR SERVICE: HCPCS | Performed by: STUDENT IN AN ORGANIZED HEALTH CARE EDUCATION/TRAINING PROGRAM

## 2022-01-01 PROCEDURE — 700102 HCHG RX REV CODE 250 W/ 637 OVERRIDE(OP): Performed by: INTERNAL MEDICINE

## 2022-01-01 PROCEDURE — 700111 HCHG RX REV CODE 636 W/ 250 OVERRIDE (IP): Performed by: INTERNAL MEDICINE

## 2022-01-01 PROCEDURE — 700102 HCHG RX REV CODE 250 W/ 637 OVERRIDE(OP): Performed by: STUDENT IN AN ORGANIZED HEALTH CARE EDUCATION/TRAINING PROGRAM

## 2022-01-01 PROCEDURE — 93308 TTE F-UP OR LMTD: CPT | Mod: 26 | Performed by: INTERNAL MEDICINE

## 2022-01-01 PROCEDURE — 700111 HCHG RX REV CODE 636 W/ 250 OVERRIDE (IP)

## 2022-01-01 PROCEDURE — 94644 CONT INHLJ TX 1ST HOUR: CPT

## 2022-01-01 PROCEDURE — 94669 MECHANICAL CHEST WALL OSCILL: CPT

## 2022-01-01 PROCEDURE — 94660 CPAP INITIATION&MGMT: CPT

## 2022-01-01 PROCEDURE — 33207 INSERT HEART PM VENTRICULAR: CPT | Mod: KX | Performed by: INTERNAL MEDICINE

## 2022-01-01 PROCEDURE — 93880 EXTRACRANIAL BILAT STUDY: CPT | Mod: 26 | Performed by: INTERNAL MEDICINE

## 2022-01-01 PROCEDURE — 94640 AIRWAY INHALATION TREATMENT: CPT

## 2022-01-01 PROCEDURE — 160002 HCHG RECOVERY MINUTES (STAT): Performed by: INTERNAL MEDICINE

## 2022-01-01 PROCEDURE — 83540 ASSAY OF IRON: CPT

## 2022-01-01 PROCEDURE — 99214 OFFICE O/P EST MOD 30 MIN: CPT | Mod: 95 | Performed by: NURSE PRACTITIONER

## 2022-01-01 PROCEDURE — A9270 NON-COVERED ITEM OR SERVICE: HCPCS | Performed by: NURSE PRACTITIONER

## 2022-01-01 PROCEDURE — 80048 BASIC METABOLIC PNL TOTAL CA: CPT

## 2022-01-01 PROCEDURE — 36600 WITHDRAWAL OF ARTERIAL BLOOD: CPT

## 2022-01-01 PROCEDURE — 97530 THERAPEUTIC ACTIVITIES: CPT

## 2022-01-01 PROCEDURE — 82962 GLUCOSE BLOOD TEST: CPT

## 2022-01-01 PROCEDURE — 93321 DOPPLER ECHO F-UP/LMTD STD: CPT | Mod: 26 | Performed by: INTERNAL MEDICINE

## 2022-01-01 PROCEDURE — 86901 BLOOD TYPING SEROLOGIC RH(D): CPT

## 2022-01-01 PROCEDURE — C1769 GUIDE WIRE: HCPCS | Performed by: INTERNAL MEDICINE

## 2022-01-01 PROCEDURE — C1760 CLOSURE DEV, VASC: HCPCS | Performed by: INTERNAL MEDICINE

## 2022-01-01 PROCEDURE — 86900 BLOOD TYPING SEROLOGIC ABO: CPT

## 2022-01-01 PROCEDURE — 97162 PT EVAL MOD COMPLEX 30 MIN: CPT

## 2022-01-01 PROCEDURE — 700105 HCHG RX REV CODE 258: Performed by: INTERNAL MEDICINE

## 2022-01-01 PROCEDURE — 85730 THROMBOPLASTIN TIME PARTIAL: CPT | Mod: 91

## 2022-01-01 PROCEDURE — 84100 ASSAY OF PHOSPHORUS: CPT

## 2022-01-01 PROCEDURE — A9270 NON-COVERED ITEM OR SERVICE: HCPCS | Performed by: INTERNAL MEDICINE

## 2022-01-01 PROCEDURE — 99285 EMERGENCY DEPT VISIT HI MDM: CPT

## 2022-01-01 PROCEDURE — 99291 CRITICAL CARE FIRST HOUR: CPT | Performed by: INTERNAL MEDICINE

## 2022-01-01 PROCEDURE — 36415 COLL VENOUS BLD VENIPUNCTURE: CPT

## 2022-01-01 PROCEDURE — 700111 HCHG RX REV CODE 636 W/ 250 OVERRIDE (IP): Performed by: NURSE PRACTITIONER

## 2022-01-01 PROCEDURE — 80053 COMPREHEN METABOLIC PANEL: CPT

## 2022-01-01 PROCEDURE — 110371 HCHG SHELL REV 272: Performed by: INTERNAL MEDICINE

## 2022-01-01 PROCEDURE — 770006 HCHG ROOM/CARE - MED/SURG/GYN SEMI*

## 2022-01-01 PROCEDURE — 85730 THROMBOPLASTIN TIME PARTIAL: CPT

## 2022-01-01 PROCEDURE — 83550 IRON BINDING TEST: CPT

## 2022-01-01 PROCEDURE — 85025 COMPLETE CBC W/AUTO DIFF WBC: CPT

## 2022-01-01 PROCEDURE — 94760 N-INVAS EAR/PLS OXIMETRY 1: CPT

## 2022-01-01 PROCEDURE — 83735 ASSAY OF MAGNESIUM: CPT

## 2022-01-01 PROCEDURE — 160002 HCHG RECOVERY MINUTES (STAT)

## 2022-01-01 PROCEDURE — 97166 OT EVAL MOD COMPLEX 45 MIN: CPT

## 2022-01-01 PROCEDURE — 4A023N7 MEASUREMENT OF CARDIAC SAMPLING AND PRESSURE, LEFT HEART, PERCUTANEOUS APPROACH: ICD-10-PCS | Performed by: INTERNAL MEDICINE

## 2022-01-01 PROCEDURE — 71045 X-RAY EXAM CHEST 1 VIEW: CPT

## 2022-01-01 PROCEDURE — 770022 HCHG ROOM/CARE - ICU (200)

## 2022-01-01 PROCEDURE — 93306 TTE W/DOPPLER COMPLETE: CPT

## 2022-01-01 PROCEDURE — 93880 EXTRACRANIAL BILAT STUDY: CPT

## 2022-01-01 PROCEDURE — 82746 ASSAY OF FOLIC ACID SERUM: CPT

## 2022-01-01 PROCEDURE — A9270 NON-COVERED ITEM OR SERVICE: HCPCS

## 2022-01-01 PROCEDURE — 71275 CT ANGIOGRAPHY CHEST: CPT | Mod: MG

## 2022-01-01 PROCEDURE — 99100 ANES PT EXTEME AGE<1 YR&>70: CPT | Performed by: STUDENT IN AN ORGANIZED HEALTH CARE EDUCATION/TRAINING PROGRAM

## 2022-01-01 PROCEDURE — 93458 L HRT ARTERY/VENTRICLE ANGIO: CPT | Mod: 26 | Performed by: INTERNAL MEDICINE

## 2022-01-01 PROCEDURE — 99233 SBSQ HOSP IP/OBS HIGH 50: CPT | Mod: FS | Performed by: INTERNAL MEDICINE

## 2022-01-01 PROCEDURE — 93010 ELECTROCARDIOGRAM REPORT: CPT | Performed by: INTERNAL MEDICINE

## 2022-01-01 PROCEDURE — 700117 HCHG RX CONTRAST REV CODE 255: Performed by: INTERNAL MEDICINE

## 2022-01-01 PROCEDURE — 99226 PR SUBSEQUENT OBSERVATION CARE,LEVEL III: CPT | Performed by: INTERNAL MEDICINE

## 2022-01-01 PROCEDURE — 99214 OFFICE O/P EST MOD 30 MIN: CPT | Performed by: NURSE PRACTITIONER

## 2022-01-01 PROCEDURE — 99233 SBSQ HOSP IP/OBS HIGH 50: CPT | Mod: 57 | Performed by: INTERNAL MEDICINE

## 2022-01-01 PROCEDURE — 36620 INSERTION CATHETER ARTERY: CPT | Performed by: ANESTHESIOLOGY

## 2022-01-01 PROCEDURE — 85007 BL SMEAR W/DIFF WBC COUNT: CPT

## 2022-01-01 PROCEDURE — 99223 1ST HOSP IP/OBS HIGH 75: CPT | Performed by: INTERNAL MEDICINE

## 2022-01-01 PROCEDURE — 700111 HCHG RX REV CODE 636 W/ 250 OVERRIDE (IP): Performed by: EMERGENCY MEDICINE

## 2022-01-01 PROCEDURE — 99152 MOD SED SAME PHYS/QHP 5/>YRS: CPT | Performed by: INTERNAL MEDICINE

## 2022-01-01 PROCEDURE — C1883 ADAPT/EXT, PACING/NEURO LEAD: HCPCS | Performed by: INTERNAL MEDICINE

## 2022-01-01 PROCEDURE — 700101 HCHG RX REV CODE 250: Performed by: STUDENT IN AN ORGANIZED HEALTH CARE EDUCATION/TRAINING PROGRAM

## 2022-01-01 PROCEDURE — 84145 PROCALCITONIN (PCT): CPT

## 2022-01-01 PROCEDURE — 700105 HCHG RX REV CODE 258: Performed by: NURSE PRACTITIONER

## 2022-01-01 PROCEDURE — 80162 ASSAY OF DIGOXIN TOTAL: CPT

## 2022-01-01 PROCEDURE — 99232 SBSQ HOSP IP/OBS MODERATE 35: CPT | Performed by: STUDENT IN AN ORGANIZED HEALTH CARE EDUCATION/TRAINING PROGRAM

## 2022-01-01 PROCEDURE — 82803 BLOOD GASES ANY COMBINATION: CPT

## 2022-01-01 PROCEDURE — 99220 PR INITIAL OBSERVATION CARE,LEVL III: CPT | Performed by: STUDENT IN AN ORGANIZED HEALTH CARE EDUCATION/TRAINING PROGRAM

## 2022-01-01 PROCEDURE — 00560 ANES PX HEART W/O PUMP OXTJ: CPT | Performed by: STUDENT IN AN ORGANIZED HEALTH CARE EDUCATION/TRAINING PROGRAM

## 2022-01-01 PROCEDURE — 99232 SBSQ HOSP IP/OBS MODERATE 35: CPT | Performed by: INTERNAL MEDICINE

## 2022-01-01 PROCEDURE — 700101 HCHG RX REV CODE 250: Performed by: INTERNAL MEDICINE

## 2022-01-01 PROCEDURE — 99223 1ST HOSP IP/OBS HIGH 75: CPT | Mod: 57 | Performed by: INTERNAL MEDICINE

## 2022-01-01 PROCEDURE — 97535 SELF CARE MNGMENT TRAINING: CPT

## 2022-01-01 PROCEDURE — 700111 HCHG RX REV CODE 636 W/ 250 OVERRIDE (IP): Performed by: STUDENT IN AN ORGANIZED HEALTH CARE EDUCATION/TRAINING PROGRAM

## 2022-01-01 PROCEDURE — 02HK3JZ INSERTION OF PACEMAKER LEAD INTO RIGHT VENTRICLE, PERCUTANEOUS APPROACH: ICD-10-PCS | Performed by: INTERNAL MEDICINE

## 2022-01-01 PROCEDURE — 99291 CRITICAL CARE FIRST HOUR: CPT | Performed by: STUDENT IN AN ORGANIZED HEALTH CARE EDUCATION/TRAINING PROGRAM

## 2022-01-01 PROCEDURE — 96365 THER/PROPH/DIAG IV INF INIT: CPT

## 2022-01-01 PROCEDURE — 99205 OFFICE O/P NEW HI 60 MIN: CPT | Performed by: THORACIC SURGERY (CARDIOTHORACIC VASCULAR SURGERY)

## 2022-01-01 PROCEDURE — 82607 VITAMIN B-12: CPT

## 2022-01-01 PROCEDURE — 110372 HCHG SHELL REV 278: Performed by: INTERNAL MEDICINE

## 2022-01-01 PROCEDURE — 93010 ELECTROCARDIOGRAM REPORT: CPT | Mod: 59 | Performed by: INTERNAL MEDICINE

## 2022-01-01 PROCEDURE — 87426 SARSCOV CORONAVIRUS AG IA: CPT

## 2022-01-01 PROCEDURE — B24BZZ4 ULTRASONOGRAPHY OF HEART WITH AORTA, TRANSESOPHAGEAL: ICD-10-PCS | Performed by: STUDENT IN AN ORGANIZED HEALTH CARE EDUCATION/TRAINING PROGRAM

## 2022-01-01 PROCEDURE — 99292 CRITICAL CARE ADDL 30 MIN: CPT | Performed by: INTERNAL MEDICINE

## 2022-01-01 PROCEDURE — 93005 ELECTROCARDIOGRAM TRACING: CPT

## 2022-01-01 PROCEDURE — 85027 COMPLETE CBC AUTOMATED: CPT

## 2022-01-01 PROCEDURE — 99232 SBSQ HOSP IP/OBS MODERATE 35: CPT | Performed by: NURSE PRACTITIONER

## 2022-01-01 PROCEDURE — 80061 LIPID PANEL: CPT | Mod: 91

## 2022-01-01 PROCEDURE — 93325 DOPPLER ECHO COLOR FLOW MAPG: CPT | Mod: 26 | Performed by: INTERNAL MEDICINE

## 2022-01-01 PROCEDURE — 84425 ASSAY OF VITAMIN B-1: CPT

## 2022-01-01 PROCEDURE — 93922 UPR/L XTREMITY ART 2 LEVELS: CPT | Mod: 26 | Performed by: INTERNAL MEDICINE

## 2022-01-01 PROCEDURE — G0378 HOSPITAL OBSERVATION PER HR: HCPCS

## 2022-01-01 PROCEDURE — 700102 HCHG RX REV CODE 250 W/ 637 OVERRIDE(OP)

## 2022-01-01 PROCEDURE — 160048 HCHG OR STATISTICAL LEVEL 1-5: Performed by: INTERNAL MEDICINE

## 2022-01-01 PROCEDURE — 94645 CONT INHLJ TX EACH ADDL HOUR: CPT

## 2022-01-01 PROCEDURE — 84484 ASSAY OF TROPONIN QUANT: CPT

## 2022-01-01 PROCEDURE — 96374 THER/PROPH/DIAG INJ IV PUSH: CPT

## 2022-01-01 PROCEDURE — 93922 UPR/L XTREMITY ART 2 LEVELS: CPT

## 2022-01-01 PROCEDURE — 93005 ELECTROCARDIOGRAM TRACING: CPT | Performed by: EMERGENCY MEDICINE

## 2022-01-01 PROCEDURE — 87040 BLOOD CULTURE FOR BACTERIA: CPT | Mod: 91

## 2022-01-01 PROCEDURE — 99232 SBSQ HOSP IP/OBS MODERATE 35: CPT | Mod: 24 | Performed by: INTERNAL MEDICINE

## 2022-01-01 PROCEDURE — 97165 OT EVAL LOW COMPLEX 30 MIN: CPT

## 2022-01-01 PROCEDURE — 99233 SBSQ HOSP IP/OBS HIGH 50: CPT | Performed by: STUDENT IN AN ORGANIZED HEALTH CARE EDUCATION/TRAINING PROGRAM

## 2022-01-01 PROCEDURE — 93312 ECHO TRANSESOPHAGEAL: CPT

## 2022-01-01 PROCEDURE — 83036 HEMOGLOBIN GLYCOSYLATED A1C: CPT | Mod: GA

## 2022-01-01 PROCEDURE — 93005 ELECTROCARDIOGRAM TRACING: CPT | Performed by: INTERNAL MEDICINE

## 2022-01-01 PROCEDURE — 99223 1ST HOSP IP/OBS HIGH 75: CPT | Mod: AI | Performed by: STUDENT IN AN ORGANIZED HEALTH CARE EDUCATION/TRAINING PROGRAM

## 2022-01-01 PROCEDURE — 160035 HCHG PACU - 1ST 60 MINS PHASE I

## 2022-01-01 PROCEDURE — 93308 TTE F-UP OR LMTD: CPT

## 2022-01-01 PROCEDURE — 97602 WOUND(S) CARE NON-SELECTIVE: CPT

## 2022-01-01 PROCEDURE — 99238 HOSP IP/OBS DSCHRG MGMT 30/<: CPT | Mod: FS | Performed by: INTERNAL MEDICINE

## 2022-01-01 PROCEDURE — 36620 INSERTION CATHETER ARTERY: CPT | Performed by: STUDENT IN AN ORGANIZED HEALTH CARE EDUCATION/TRAINING PROGRAM

## 2022-01-01 PROCEDURE — 02RF38Z REPLACEMENT OF AORTIC VALVE WITH ZOOPLASTIC TISSUE, PERCUTANEOUS APPROACH: ICD-10-PCS | Performed by: INTERNAL MEDICINE

## 2022-01-01 PROCEDURE — 160036 HCHG PACU - EA ADDL 30 MINS PHASE I

## 2022-01-01 PROCEDURE — 93005 ELECTROCARDIOGRAM TRACING: CPT | Performed by: NURSE PRACTITIONER

## 2022-01-01 PROCEDURE — 503001 HCHG PERFUSION: Performed by: INTERNAL MEDICINE

## 2022-01-01 PROCEDURE — 82652 VIT D 1 25-DIHYDROXY: CPT

## 2022-01-01 PROCEDURE — 86850 RBC ANTIBODY SCREEN: CPT

## 2022-01-01 PROCEDURE — 82728 ASSAY OF FERRITIN: CPT

## 2022-01-01 PROCEDURE — 700102 HCHG RX REV CODE 250 W/ 637 OVERRIDE(OP): Performed by: HOSPITALIST

## 2022-01-01 PROCEDURE — 160036 HCHG PACU - EA ADDL 30 MINS PHASE I: Performed by: INTERNAL MEDICINE

## 2022-01-01 PROCEDURE — 33207 INSERT HEART PM VENTRICULAR: CPT

## 2022-01-01 PROCEDURE — 93306 TTE W/DOPPLER COMPLETE: CPT | Mod: 26 | Performed by: INTERNAL MEDICINE

## 2022-01-01 PROCEDURE — 00530 ANES PERM TRANSVNS PM INSJ: CPT | Performed by: ANESTHESIOLOGY

## 2022-01-01 PROCEDURE — 82306 VITAMIN D 25 HYDROXY: CPT

## 2022-01-01 PROCEDURE — 93321 DOPPLER ECHO F-UP/LMTD STD: CPT

## 2022-01-01 PROCEDURE — 700101 HCHG RX REV CODE 250

## 2022-01-01 PROCEDURE — 160035 HCHG PACU - 1ST 60 MINS PHASE I: Performed by: INTERNAL MEDICINE

## 2022-01-01 PROCEDURE — 85347 COAGULATION TIME ACTIVATED: CPT

## 2022-01-01 PROCEDURE — 700101 HCHG RX REV CODE 250: Performed by: ANESTHESIOLOGY

## 2022-01-01 PROCEDURE — 85520 HEPARIN ASSAY: CPT | Mod: 91

## 2022-01-01 PROCEDURE — 93000 ELECTROCARDIOGRAM COMPLETE: CPT | Performed by: INTERNAL MEDICINE

## 2022-01-01 PROCEDURE — 97163 PT EVAL HIGH COMPLEX 45 MIN: CPT

## 2022-01-01 PROCEDURE — 96375 TX/PRO/DX INJ NEW DRUG ADDON: CPT

## 2022-01-01 PROCEDURE — 99213 OFFICE O/P EST LOW 20 MIN: CPT | Mod: 95 | Performed by: NURSE PRACTITIONER

## 2022-01-01 PROCEDURE — 700111 HCHG RX REV CODE 636 W/ 250 OVERRIDE (IP): Performed by: ANESTHESIOLOGY

## 2022-01-01 PROCEDURE — 700101 HCHG RX REV CODE 250: Performed by: NURSE PRACTITIONER

## 2022-01-01 PROCEDURE — A9270 NON-COVERED ITEM OR SERVICE: HCPCS | Performed by: HOSPITALIST

## 2022-01-01 PROCEDURE — 93458 L HRT ARTERY/VENTRICLE ANGIO: CPT

## 2022-01-01 PROCEDURE — B2111ZZ FLUOROSCOPY OF MULTIPLE CORONARY ARTERIES USING LOW OSMOLAR CONTRAST: ICD-10-PCS | Performed by: INTERNAL MEDICINE

## 2022-01-01 PROCEDURE — 03HY32Z INSERTION OF MONITORING DEVICE INTO UPPER ARTERY, PERCUTANEOUS APPROACH: ICD-10-PCS | Performed by: STUDENT IN AN ORGANIZED HEALTH CARE EDUCATION/TRAINING PROGRAM

## 2022-01-01 PROCEDURE — 99233 SBSQ HOSP IP/OBS HIGH 50: CPT | Performed by: INTERNAL MEDICINE

## 2022-01-01 PROCEDURE — 0JH604Z INSERTION OF PACEMAKER, SINGLE CHAMBER INTO CHEST SUBCUTANEOUS TISSUE AND FASCIA, OPEN APPROACH: ICD-10-PCS | Performed by: INTERNAL MEDICINE

## 2022-01-01 PROCEDURE — 85610 PROTHROMBIN TIME: CPT | Mod: 91

## 2022-01-01 PROCEDURE — RXMED WILLOW AMBULATORY MEDICATION CHARGE: Performed by: NURSE PRACTITIONER

## 2022-01-01 PROCEDURE — 84466 ASSAY OF TRANSFERRIN: CPT

## 2022-01-01 PROCEDURE — 160031 HCHG SURGERY MINUTES - 1ST 30 MINS LEVEL 5: Performed by: INTERNAL MEDICINE

## 2022-01-01 PROCEDURE — 99100 ANES PT EXTEME AGE<1 YR&>70: CPT | Performed by: ANESTHESIOLOGY

## 2022-01-01 PROCEDURE — 33361 REPLACE AORTIC VALVE PERQ: CPT | Mod: 62,Q0 | Performed by: THORACIC SURGERY (CARDIOTHORACIC VASCULAR SURGERY)

## 2022-01-01 PROCEDURE — 160042 HCHG SURGERY MINUTES - EA ADDL 1 MIN LEVEL 5: Performed by: INTERNAL MEDICINE

## 2022-01-01 PROCEDURE — 99233 SBSQ HOSP IP/OBS HIGH 50: CPT | Performed by: NURSE PRACTITIONER

## 2022-01-01 PROCEDURE — 5A1223Z PERFORMANCE OF CARDIAC PACING, CONTINUOUS: ICD-10-PCS | Performed by: INTERNAL MEDICINE

## 2022-01-01 PROCEDURE — 160009 HCHG ANES TIME/MIN: Performed by: INTERNAL MEDICINE

## 2022-01-01 PROCEDURE — 700105 HCHG RX REV CODE 258: Performed by: ANESTHESIOLOGY

## 2022-01-01 PROCEDURE — 33361 REPLACE AORTIC VALVE PERQ: CPT | Mod: 62,Q0 | Performed by: INTERNAL MEDICINE

## 2022-01-01 PROCEDURE — 99239 HOSP IP/OBS DSCHRG MGMT >30: CPT | Performed by: STUDENT IN AN ORGANIZED HEALTH CARE EDUCATION/TRAINING PROGRAM

## 2022-01-01 PROCEDURE — 99214 OFFICE O/P EST MOD 30 MIN: CPT | Performed by: INTERNAL MEDICINE

## 2022-01-01 PROCEDURE — 85520 HEPARIN ASSAY: CPT

## 2022-01-01 DEVICE — ANGIOSEAL 8F VAS CLOS DEV - STS PLUS ANGIOSEAL (10EA/CA): Type: IMPLANTABLE DEVICE | Site: HEART | Status: FUNCTIONAL

## 2022-01-01 DEVICE — IMPLANTABLE DEVICE: Type: IMPLANTABLE DEVICE | Site: HEART | Status: FUNCTIONAL

## 2022-01-01 RX ORDER — FERROUS SULFATE 325(65) MG
325 TABLET ORAL 2 TIMES DAILY
Status: DISCONTINUED | OUTPATIENT
Start: 2022-01-01 | End: 2022-01-01

## 2022-01-01 RX ORDER — HEPARIN SODIUM 1000 [USP'U]/ML
80 INJECTION, SOLUTION INTRAVENOUS; SUBCUTANEOUS ONCE
Status: DISCONTINUED | OUTPATIENT
Start: 2022-01-01 | End: 2022-01-01

## 2022-01-01 RX ORDER — NYSTATIN 100000 [USP'U]/G
POWDER TOPICAL 2 TIMES DAILY
Status: CANCELLED | OUTPATIENT
Start: 2022-01-01 | End: 2022-01-01

## 2022-01-01 RX ORDER — DEXTROSE MONOHYDRATE 25 G/50ML
25 INJECTION, SOLUTION INTRAVENOUS
Status: DISCONTINUED | OUTPATIENT
Start: 2022-01-01 | End: 2022-01-01

## 2022-01-01 RX ORDER — CEFAZOLIN SODIUM 1 G/3ML
INJECTION, POWDER, FOR SOLUTION INTRAMUSCULAR; INTRAVENOUS PRN
Status: DISCONTINUED | OUTPATIENT
Start: 2022-01-01 | End: 2022-01-01 | Stop reason: SURG

## 2022-01-01 RX ORDER — LEVOTHYROXINE SODIUM 175 UG/1
175 TABLET ORAL
Status: CANCELLED | OUTPATIENT
Start: 2022-01-01

## 2022-01-01 RX ORDER — INSULIN LISPRO 100 [IU]/ML
1-6 INJECTION, SOLUTION INTRAVENOUS; SUBCUTANEOUS
Status: DISCONTINUED | OUTPATIENT
Start: 2022-01-01 | End: 2022-01-01

## 2022-01-01 RX ORDER — CITALOPRAM 20 MG/1
20 TABLET ORAL DAILY
Status: CANCELLED | OUTPATIENT
Start: 2022-01-01

## 2022-01-01 RX ORDER — LEVOTHYROXINE SODIUM 0.2 MG/1
200 TABLET ORAL
Status: CANCELLED | OUTPATIENT
Start: 2022-01-01

## 2022-01-01 RX ORDER — FUROSEMIDE 10 MG/ML
60 INJECTION INTRAMUSCULAR; INTRAVENOUS EVERY 8 HOURS
Status: DISCONTINUED | OUTPATIENT
Start: 2022-01-01 | End: 2022-01-01

## 2022-01-01 RX ORDER — FUROSEMIDE 10 MG/ML
40 INJECTION INTRAMUSCULAR; INTRAVENOUS DAILY
Status: DISCONTINUED | OUTPATIENT
Start: 2022-01-01 | End: 2022-01-01

## 2022-01-01 RX ORDER — ACETAMINOPHEN 500 MG
500 TABLET ORAL EVERY 6 HOURS PRN
Status: DISCONTINUED | OUTPATIENT
Start: 2022-01-01 | End: 2022-01-01

## 2022-01-01 RX ORDER — ROSUVASTATIN CALCIUM 20 MG/1
20 TABLET, COATED ORAL EVERY EVENING
Status: DISCONTINUED | OUTPATIENT
Start: 2022-01-01 | End: 2022-01-01 | Stop reason: HOSPADM

## 2022-01-01 RX ORDER — WARFARIN SODIUM 2.5 MG/1
2.5 TABLET ORAL
Status: DISCONTINUED | OUTPATIENT
Start: 2022-01-01 | End: 2022-01-01

## 2022-01-01 RX ORDER — HYDRALAZINE HYDROCHLORIDE 20 MG/ML
5 INJECTION INTRAMUSCULAR; INTRAVENOUS
Status: DISCONTINUED | OUTPATIENT
Start: 2022-01-01 | End: 2022-01-01 | Stop reason: HOSPADM

## 2022-01-01 RX ORDER — ALLOPURINOL 100 MG/1
100 TABLET ORAL
Status: DISCONTINUED | OUTPATIENT
Start: 2022-01-01 | End: 2022-01-01 | Stop reason: HOSPADM

## 2022-01-01 RX ORDER — DIGOXIN 0.25 MG/ML
250 INJECTION INTRAMUSCULAR; INTRAVENOUS EVERY 6 HOURS
Status: COMPLETED | OUTPATIENT
Start: 2022-01-01 | End: 2022-01-01

## 2022-01-01 RX ORDER — PIOGLITAZONEHYDROCHLORIDE 15 MG/1
15 TABLET ORAL EVERY MORNING
Status: DISCONTINUED | OUTPATIENT
Start: 2022-01-01 | End: 2022-01-01 | Stop reason: HOSPADM

## 2022-01-01 RX ORDER — FUROSEMIDE 10 MG/ML
40 INJECTION INTRAMUSCULAR; INTRAVENOUS
Status: DISCONTINUED | OUTPATIENT
Start: 2022-01-01 | End: 2022-01-01 | Stop reason: HOSPADM

## 2022-01-01 RX ORDER — GEMFIBROZIL 600 MG/1
600 TABLET, FILM COATED ORAL EVERY MORNING
Status: DISCONTINUED | OUTPATIENT
Start: 2022-01-01 | End: 2022-01-01 | Stop reason: HOSPADM

## 2022-01-01 RX ORDER — ACETAZOLAMIDE 500 MG/5ML
250 INJECTION, POWDER, LYOPHILIZED, FOR SOLUTION INTRAVENOUS EVERY 8 HOURS
Status: DISCONTINUED | OUTPATIENT
Start: 2022-01-01 | End: 2022-01-01

## 2022-01-01 RX ORDER — BISACODYL 10 MG
10 SUPPOSITORY, RECTAL RECTAL
Status: DISCONTINUED | OUTPATIENT
Start: 2022-01-01 | End: 2022-01-01

## 2022-01-01 RX ORDER — POTASSIUM CHLORIDE 20 MEQ/1
20 TABLET, EXTENDED RELEASE ORAL EVERY EVENING
Status: DISCONTINUED | OUTPATIENT
Start: 2022-01-01 | End: 2022-01-01

## 2022-01-01 RX ORDER — ATORVASTATIN CALCIUM 40 MG/1
40 TABLET, FILM COATED ORAL NIGHTLY
COMMUNITY
End: 2022-01-01

## 2022-01-01 RX ORDER — HEPARIN SODIUM 5000 [USP'U]/100ML
0-30 INJECTION, SOLUTION INTRAVENOUS CONTINUOUS
Status: DISCONTINUED | OUTPATIENT
Start: 2022-01-01 | End: 2022-01-01

## 2022-01-01 RX ORDER — ROSUVASTATIN CALCIUM 20 MG/1
20 TABLET, COATED ORAL EVERY EVENING
Status: DISCONTINUED | OUTPATIENT
Start: 2022-01-01 | End: 2022-01-01

## 2022-01-01 RX ORDER — LOSARTAN POTASSIUM 50 MG/1
50 TABLET ORAL DAILY
Status: CANCELLED | OUTPATIENT
Start: 2022-01-01

## 2022-01-01 RX ORDER — FERROUS SULFATE 325(65) MG
325 TABLET ORAL 2 TIMES DAILY
Qty: 60 TABLET | Refills: 11 | Status: SHIPPED | OUTPATIENT
Start: 2022-01-01

## 2022-01-01 RX ORDER — DIGOXIN 250 MCG
250 TABLET ORAL EVERY MORNING
Qty: 30 TABLET | Status: SHIPPED
Start: 2022-01-01

## 2022-01-01 RX ORDER — LOSARTAN POTASSIUM 25 MG/1
50 TABLET ORAL DAILY
Status: DISCONTINUED | OUTPATIENT
Start: 2022-01-01 | End: 2022-01-01 | Stop reason: HOSPADM

## 2022-01-01 RX ORDER — MAGNESIUM SULFATE HEPTAHYDRATE 40 MG/ML
2 INJECTION, SOLUTION INTRAVENOUS ONCE
Status: COMPLETED | OUTPATIENT
Start: 2022-01-01 | End: 2022-01-01

## 2022-01-01 RX ORDER — FINERENONE 10 MG/1
10 TABLET, FILM COATED ORAL DAILY
Qty: 90 TABLET | Refills: 3 | Status: ON HOLD | OUTPATIENT
Start: 2022-01-01 | End: 2022-01-01

## 2022-01-01 RX ORDER — ETOMIDATE 2 MG/ML
INJECTION INTRAVENOUS PRN
Status: DISCONTINUED | OUTPATIENT
Start: 2022-01-01 | End: 2022-01-01 | Stop reason: SURG

## 2022-01-01 RX ORDER — ALBUTEROL SULFATE 2.5 MG/3ML
2.5 SOLUTION RESPIRATORY (INHALATION)
Status: DISCONTINUED | OUTPATIENT
Start: 2022-01-01 | End: 2022-01-01 | Stop reason: HOSPADM

## 2022-01-01 RX ORDER — AMOXICILLIN 250 MG
2 CAPSULE ORAL 2 TIMES DAILY
Status: DISCONTINUED | OUTPATIENT
Start: 2022-01-01 | End: 2022-01-01

## 2022-01-01 RX ORDER — METOPROLOL TARTRATE 50 MG/1
50 TABLET, FILM COATED ORAL 2 TIMES DAILY
Status: DISCONTINUED | OUTPATIENT
Start: 2022-01-01 | End: 2022-01-01

## 2022-01-01 RX ORDER — WARFARIN SODIUM 1 MG/1
1 TABLET ORAL DAILY
Status: DISCONTINUED | OUTPATIENT
Start: 2022-01-01 | End: 2022-01-01

## 2022-01-01 RX ORDER — WARFARIN SODIUM 1 MG/1
1 TABLET ORAL
Status: COMPLETED | OUTPATIENT
Start: 2022-01-01 | End: 2022-01-01

## 2022-01-01 RX ORDER — ALLOPURINOL 100 MG/1
100 TABLET ORAL
Status: CANCELLED | OUTPATIENT
Start: 2022-01-01

## 2022-01-01 RX ORDER — GEMFIBROZIL 600 MG/1
600 TABLET, FILM COATED ORAL EVERY MORNING
Status: CANCELLED | OUTPATIENT
Start: 2022-01-01

## 2022-01-01 RX ORDER — ROSUVASTATIN CALCIUM 10 MG/1
20 TABLET, COATED ORAL EVERY EVENING
Status: DISCONTINUED | OUTPATIENT
Start: 2022-01-01 | End: 2022-01-01 | Stop reason: HOSPADM

## 2022-01-01 RX ORDER — ONDANSETRON 4 MG/1
4 TABLET, ORALLY DISINTEGRATING ORAL EVERY 4 HOURS PRN
Status: DISCONTINUED | OUTPATIENT
Start: 2022-01-01 | End: 2022-01-01 | Stop reason: HOSPADM

## 2022-01-01 RX ORDER — NYSTATIN 100000 U/G
CREAM TOPICAL
COMMUNITY
End: 2022-01-01

## 2022-01-01 RX ORDER — FUROSEMIDE 10 MG/ML
60 INJECTION INTRAMUSCULAR; INTRAVENOUS EVERY 8 HOURS
Status: CANCELLED | OUTPATIENT
Start: 2022-01-01

## 2022-01-01 RX ORDER — DIGOXIN 0.25 MG/ML
500 INJECTION INTRAMUSCULAR; INTRAVENOUS EVERY 6 HOURS
Status: COMPLETED | OUTPATIENT
Start: 2022-01-01 | End: 2022-01-01

## 2022-01-01 RX ORDER — ROCURONIUM BROMIDE 10 MG/ML
INJECTION, SOLUTION INTRAVENOUS PRN
Status: DISCONTINUED | OUTPATIENT
Start: 2022-01-01 | End: 2022-01-01 | Stop reason: SURG

## 2022-01-01 RX ORDER — MIDODRINE HYDROCHLORIDE 5 MG/1
2.5 TABLET ORAL
Status: COMPLETED | OUTPATIENT
Start: 2022-01-01 | End: 2022-01-01

## 2022-01-01 RX ORDER — DEXAMETHASONE SODIUM PHOSPHATE 4 MG/ML
INJECTION, SOLUTION INTRA-ARTICULAR; INTRALESIONAL; INTRAMUSCULAR; INTRAVENOUS; SOFT TISSUE PRN
Status: DISCONTINUED | OUTPATIENT
Start: 2022-01-01 | End: 2022-01-01 | Stop reason: SURG

## 2022-01-01 RX ORDER — OXYCODONE HYDROCHLORIDE 5 MG/1
5 TABLET ORAL EVERY 4 HOURS PRN
Status: DISCONTINUED | OUTPATIENT
Start: 2022-01-01 | End: 2022-01-01 | Stop reason: HOSPADM

## 2022-01-01 RX ORDER — ROSUVASTATIN CALCIUM 10 MG/1
20 TABLET, COATED ORAL EVERY EVENING
Status: CANCELLED | OUTPATIENT
Start: 2022-01-01

## 2022-01-01 RX ORDER — WARFARIN SODIUM 2.5 MG/1
2.5 TABLET ORAL
Status: DISCONTINUED | OUTPATIENT
Start: 2022-01-01 | End: 2022-01-01 | Stop reason: HOSPADM

## 2022-01-01 RX ORDER — WARFARIN SODIUM 5 MG/1
5 TABLET ORAL
Status: DISCONTINUED | OUTPATIENT
Start: 2022-01-01 | End: 2022-01-01

## 2022-01-01 RX ORDER — LOSARTAN POTASSIUM 50 MG/1
50 TABLET ORAL DAILY
Status: DISCONTINUED | OUTPATIENT
Start: 2022-01-01 | End: 2022-01-01 | Stop reason: HOSPADM

## 2022-01-01 RX ORDER — ACETAMINOPHEN 500 MG
500 TABLET ORAL EVERY 6 HOURS PRN
Status: DISCONTINUED | OUTPATIENT
Start: 2022-01-01 | End: 2022-01-01 | Stop reason: HOSPADM

## 2022-01-01 RX ORDER — OXYCODONE HYDROCHLORIDE 5 MG/1
5 TABLET ORAL EVERY 4 HOURS PRN
Status: CANCELLED | OUTPATIENT
Start: 2022-01-01

## 2022-01-01 RX ORDER — MIDODRINE HYDROCHLORIDE 5 MG/1
5 TABLET ORAL
Status: DISCONTINUED | OUTPATIENT
Start: 2022-01-01 | End: 2022-01-01

## 2022-01-01 RX ORDER — DIPHENHYDRAMINE HYDROCHLORIDE 50 MG/ML
12.5 INJECTION INTRAMUSCULAR; INTRAVENOUS
Status: DISCONTINUED | OUTPATIENT
Start: 2022-01-01 | End: 2022-01-01 | Stop reason: HOSPADM

## 2022-01-01 RX ORDER — CITALOPRAM 20 MG/1
20 TABLET ORAL DAILY
Status: DISCONTINUED | OUTPATIENT
Start: 2022-01-01 | End: 2022-01-01 | Stop reason: HOSPADM

## 2022-01-01 RX ORDER — FUROSEMIDE 20 MG/1
20 TABLET ORAL
Status: DISCONTINUED | OUTPATIENT
Start: 2022-01-01 | End: 2022-01-01

## 2022-01-01 RX ORDER — HEPARIN SODIUM 1000 [USP'U]/ML
40 INJECTION, SOLUTION INTRAVENOUS; SUBCUTANEOUS PRN
Status: DISCONTINUED | OUTPATIENT
Start: 2022-01-01 | End: 2022-01-01

## 2022-01-01 RX ORDER — ONDANSETRON 2 MG/ML
4 INJECTION INTRAMUSCULAR; INTRAVENOUS EVERY 4 HOURS PRN
Status: DISCONTINUED | OUTPATIENT
Start: 2022-01-01 | End: 2022-01-01 | Stop reason: HOSPADM

## 2022-01-01 RX ORDER — LOSARTAN POTASSIUM 50 MG/1
25 TABLET ORAL
Status: DISCONTINUED | OUTPATIENT
Start: 2022-01-01 | End: 2022-01-01

## 2022-01-01 RX ORDER — SPIRONOLACTONE 25 MG/1
25 TABLET ORAL
Status: DISCONTINUED | OUTPATIENT
Start: 2022-01-01 | End: 2022-01-01

## 2022-01-01 RX ORDER — HEPARIN SODIUM 1000 [USP'U]/ML
INJECTION, SOLUTION INTRAVENOUS; SUBCUTANEOUS PRN
Status: DISCONTINUED | OUTPATIENT
Start: 2022-01-01 | End: 2022-01-01 | Stop reason: SURG

## 2022-01-01 RX ORDER — CITALOPRAM 20 MG/1
20 TABLET ORAL EVERY MORNING
Status: DISCONTINUED | OUTPATIENT
Start: 2022-01-01 | End: 2022-01-01

## 2022-01-01 RX ORDER — HEPARIN SODIUM 5000 [USP'U]/100ML
0-30 INJECTION, SOLUTION INTRAVENOUS CONTINUOUS
Status: CANCELLED | OUTPATIENT
Start: 2022-01-01

## 2022-01-01 RX ORDER — ACETAZOLAMIDE 500 MG/5ML
500 INJECTION, POWDER, LYOPHILIZED, FOR SOLUTION INTRAVENOUS ONCE
Status: COMPLETED | OUTPATIENT
Start: 2022-01-01 | End: 2022-01-01

## 2022-01-01 RX ORDER — OXYBUTYNIN CHLORIDE 5 MG/1
15 TABLET, EXTENDED RELEASE ORAL EVERY MORNING
Status: DISCONTINUED | OUTPATIENT
Start: 2022-01-01 | End: 2022-01-01 | Stop reason: HOSPADM

## 2022-01-01 RX ORDER — LEVOTHYROXINE SODIUM 0.1 MG/1
200 TABLET ORAL
Status: DISCONTINUED | OUTPATIENT
Start: 2022-01-01 | End: 2022-01-01 | Stop reason: HOSPADM

## 2022-01-01 RX ORDER — METOPROLOL TARTRATE 1 MG/ML
5 INJECTION, SOLUTION INTRAVENOUS ONCE
Status: COMPLETED | OUTPATIENT
Start: 2022-01-01 | End: 2022-01-01

## 2022-01-01 RX ORDER — LOSARTAN POTASSIUM 50 MG/1
50 TABLET ORAL
Status: DISCONTINUED | OUTPATIENT
Start: 2022-01-01 | End: 2022-01-01

## 2022-01-01 RX ORDER — ONDANSETRON 2 MG/ML
INJECTION INTRAMUSCULAR; INTRAVENOUS PRN
Status: DISCONTINUED | OUTPATIENT
Start: 2022-01-01 | End: 2022-01-01 | Stop reason: SURG

## 2022-01-01 RX ORDER — WARFARIN SODIUM 2.5 MG/1
2.5 TABLET ORAL DAILY
Status: DISCONTINUED | OUTPATIENT
Start: 2022-01-01 | End: 2022-01-01

## 2022-01-01 RX ORDER — CEPHALEXIN 500 MG/1
1000 CAPSULE ORAL 4 TIMES DAILY
Refills: 0 | Status: SHIPPED
Start: 2022-01-01 | End: 2022-01-01

## 2022-01-01 RX ORDER — WARFARIN SODIUM 5 MG/1
5 TABLET ORAL
Status: CANCELLED | OUTPATIENT
Start: 2022-01-01

## 2022-01-01 RX ORDER — FUROSEMIDE 10 MG/ML
40 INJECTION INTRAMUSCULAR; INTRAVENOUS
Status: DISCONTINUED | OUTPATIENT
Start: 2022-01-01 | End: 2022-01-01

## 2022-01-01 RX ORDER — SPIRONOLACTONE 25 MG/1
25 TABLET ORAL DAILY
Qty: 30 TABLET | Refills: 3 | Status: SHIPPED
Start: 2022-01-01

## 2022-01-01 RX ORDER — CEFAZOLIN SODIUM 2 G/100ML
2 INJECTION, SOLUTION INTRAVENOUS EVERY 8 HOURS
Status: COMPLETED | OUTPATIENT
Start: 2022-01-01 | End: 2022-01-01

## 2022-01-01 RX ORDER — LANOLIN ALCOHOL/MO/W.PET/CERES
400 CREAM (GRAM) TOPICAL DAILY
Status: DISCONTINUED | OUTPATIENT
Start: 2022-01-01 | End: 2022-01-01

## 2022-01-01 RX ORDER — FUROSEMIDE 40 MG/1
40 TABLET ORAL
Status: DISCONTINUED | OUTPATIENT
Start: 2022-01-01 | End: 2022-01-01

## 2022-01-01 RX ORDER — ALPRAZOLAM 0.25 MG/1
0.25 TABLET ORAL 4 TIMES DAILY PRN
Status: DISCONTINUED | OUTPATIENT
Start: 2022-01-01 | End: 2022-01-01

## 2022-01-01 RX ORDER — METOPROLOL TARTRATE 50 MG/1
50 TABLET, FILM COATED ORAL 2 TIMES DAILY
Status: DISCONTINUED | OUTPATIENT
Start: 2022-01-01 | End: 2022-01-01 | Stop reason: HOSPADM

## 2022-01-01 RX ORDER — LEVOTHYROXINE SODIUM 175 UG/1
175 TABLET ORAL
Qty: 30 TABLET | Status: SHIPPED
Start: 2022-01-01

## 2022-01-01 RX ORDER — HYDRALAZINE HYDROCHLORIDE 20 MG/ML
INJECTION INTRAMUSCULAR; INTRAVENOUS PRN
Status: DISCONTINUED | OUTPATIENT
Start: 2022-01-01 | End: 2022-01-01 | Stop reason: SURG

## 2022-01-01 RX ORDER — DEXTROSE MONOHYDRATE 25 G/50ML
25 INJECTION, SOLUTION INTRAVENOUS
Status: DISCONTINUED | OUTPATIENT
Start: 2022-01-01 | End: 2022-01-01 | Stop reason: HOSPADM

## 2022-01-01 RX ORDER — SODIUM CHLORIDE 9 MG/ML
INJECTION, SOLUTION INTRAVENOUS CONTINUOUS
Status: ACTIVE | OUTPATIENT
Start: 2022-01-01 | End: 2022-01-01

## 2022-01-01 RX ORDER — POTASSIUM CHLORIDE 20 MEQ/1
40 TABLET, EXTENDED RELEASE ORAL DAILY
Status: COMPLETED | OUTPATIENT
Start: 2022-01-01 | End: 2022-01-01

## 2022-01-01 RX ORDER — POTASSIUM CHLORIDE 20 MEQ/1
20 TABLET, EXTENDED RELEASE ORAL EVERY EVENING
Status: ON HOLD | COMMUNITY
End: 2022-01-01

## 2022-01-01 RX ORDER — LORAZEPAM 2 MG/ML
2-4 CONCENTRATE ORAL
Status: DISCONTINUED | OUTPATIENT
Start: 2022-01-01 | End: 2022-01-01

## 2022-01-01 RX ORDER — NYSTATIN 100000 [USP'U]/G
1 POWDER TOPICAL 2 TIMES DAILY PRN
Status: DISCONTINUED | OUTPATIENT
Start: 2022-01-01 | End: 2022-01-01

## 2022-01-01 RX ORDER — FUROSEMIDE 10 MG/ML
20 INJECTION INTRAMUSCULAR; INTRAVENOUS ONCE
Status: DISCONTINUED | OUTPATIENT
Start: 2022-01-01 | End: 2022-01-01

## 2022-01-01 RX ORDER — FINERENONE 10 MG/1
10 TABLET, FILM COATED ORAL DAILY
COMMUNITY
End: 2022-01-01

## 2022-01-01 RX ORDER — ACETAZOLAMIDE 500 MG/5ML
500 INJECTION, POWDER, LYOPHILIZED, FOR SOLUTION INTRAVENOUS EVERY 8 HOURS
Status: DISCONTINUED | OUTPATIENT
Start: 2022-01-01 | End: 2022-01-01

## 2022-01-01 RX ORDER — FUROSEMIDE 10 MG/ML
40 INJECTION INTRAMUSCULAR; INTRAVENOUS ONCE
Status: COMPLETED | OUTPATIENT
Start: 2022-01-01 | End: 2022-01-01

## 2022-01-01 RX ORDER — TRAZODONE HYDROCHLORIDE 50 MG/1
50 TABLET ORAL
Status: DISCONTINUED | OUTPATIENT
Start: 2022-01-01 | End: 2022-01-01 | Stop reason: HOSPADM

## 2022-01-01 RX ORDER — ONDANSETRON 2 MG/ML
4 INJECTION INTRAMUSCULAR; INTRAVENOUS
Status: DISCONTINUED | OUTPATIENT
Start: 2022-01-01 | End: 2022-01-01 | Stop reason: HOSPADM

## 2022-01-01 RX ORDER — HALOPERIDOL 5 MG/ML
1 INJECTION INTRAMUSCULAR
Status: DISCONTINUED | OUTPATIENT
Start: 2022-01-01 | End: 2022-01-01 | Stop reason: HOSPADM

## 2022-01-01 RX ORDER — WARFARIN SODIUM 5 MG/1
5 TABLET ORAL
Status: COMPLETED | OUTPATIENT
Start: 2022-01-01 | End: 2022-01-01

## 2022-01-01 RX ORDER — LOSARTAN POTASSIUM 25 MG/1
50 TABLET ORAL DAILY
Status: CANCELLED | OUTPATIENT
Start: 2022-01-01

## 2022-01-01 RX ORDER — WARFARIN SODIUM 5 MG/1
TABLET ORAL
Qty: 90 TABLET | Refills: 1 | Status: ON HOLD | OUTPATIENT
Start: 2022-01-01 | End: 2022-01-01

## 2022-01-01 RX ORDER — METOPROLOL TARTRATE 50 MG/1
50 TABLET, FILM COATED ORAL ONCE
Status: COMPLETED | OUTPATIENT
Start: 2022-01-01 | End: 2022-01-01

## 2022-01-01 RX ORDER — GUAIFENESIN 600 MG/1
600 TABLET, EXTENDED RELEASE ORAL EVERY 12 HOURS
Status: DISCONTINUED | OUTPATIENT
Start: 2022-01-01 | End: 2022-01-01 | Stop reason: HOSPADM

## 2022-01-01 RX ORDER — ONDANSETRON 4 MG/1
4 TABLET, ORALLY DISINTEGRATING ORAL EVERY 4 HOURS PRN
Status: CANCELLED | OUTPATIENT
Start: 2022-01-01

## 2022-01-01 RX ORDER — LIDOCAINE HYDROCHLORIDE 20 MG/ML
INJECTION, SOLUTION INFILTRATION; PERINEURAL
Status: COMPLETED
Start: 2022-01-01 | End: 2022-01-01

## 2022-01-01 RX ORDER — TRAZODONE HYDROCHLORIDE 50 MG/1
50 TABLET ORAL NIGHTLY
Status: DISCONTINUED | OUTPATIENT
Start: 2022-01-01 | End: 2022-01-01 | Stop reason: HOSPADM

## 2022-01-01 RX ORDER — ONDANSETRON 4 MG/1
8 TABLET, ORALLY DISINTEGRATING ORAL EVERY 8 HOURS PRN
Status: DISCONTINUED | OUTPATIENT
Start: 2022-01-01 | End: 2022-01-01

## 2022-01-01 RX ORDER — LOSARTAN POTASSIUM 50 MG/1
50 TABLET ORAL
Status: DISCONTINUED | OUTPATIENT
Start: 2022-01-01 | End: 2022-01-01 | Stop reason: HOSPADM

## 2022-01-01 RX ORDER — CEFAZOLIN SODIUM 2 G/100ML
2 INJECTION, SOLUTION INTRAVENOUS EVERY 8 HOURS
Status: DISCONTINUED | OUTPATIENT
Start: 2022-01-01 | End: 2022-01-01

## 2022-01-01 RX ORDER — ACETAMINOPHEN 650 MG/1
650 SUPPOSITORY RECTAL EVERY 4 HOURS PRN
Status: DISCONTINUED | OUTPATIENT
Start: 2022-01-01 | End: 2022-01-01

## 2022-01-01 RX ORDER — METOPROLOL TARTRATE 50 MG/1
50 TABLET, FILM COATED ORAL 2 TIMES DAILY
Status: CANCELLED | OUTPATIENT
Start: 2022-01-01

## 2022-01-01 RX ORDER — FUROSEMIDE 10 MG/ML
20 INJECTION INTRAMUSCULAR; INTRAVENOUS
Status: DISCONTINUED | OUTPATIENT
Start: 2022-01-01 | End: 2022-01-01

## 2022-01-01 RX ORDER — POLYETHYLENE GLYCOL 3350 17 G/17G
1 POWDER, FOR SOLUTION ORAL DAILY
Status: DISCONTINUED | OUTPATIENT
Start: 2022-01-01 | End: 2022-01-01

## 2022-01-01 RX ORDER — AMOXICILLIN AND CLAVULANATE POTASSIUM 875; 125 MG/1; MG/1
1 TABLET, FILM COATED ORAL 2 TIMES DAILY
Status: ON HOLD | COMMUNITY
Start: 2022-01-01 | End: 2022-01-01

## 2022-01-01 RX ORDER — ALLOPURINOL 100 MG/1
100 TABLET ORAL
Status: DISCONTINUED | OUTPATIENT
Start: 2022-01-01 | End: 2022-01-01

## 2022-01-01 RX ORDER — WARFARIN SODIUM 3 MG/1
3 TABLET ORAL DAILY
Status: DISCONTINUED | OUTPATIENT
Start: 2022-01-01 | End: 2022-01-01 | Stop reason: HOSPADM

## 2022-01-01 RX ORDER — CEFAZOLIN SODIUM 2 G/100ML
2 INJECTION, SOLUTION INTRAVENOUS EVERY 8 HOURS
Status: CANCELLED | OUTPATIENT
Start: 2022-01-01 | End: 2022-01-01

## 2022-01-01 RX ORDER — POLYETHYLENE GLYCOL 3350 17 G/17G
1 POWDER, FOR SOLUTION ORAL
Status: DISCONTINUED | OUTPATIENT
Start: 2022-01-01 | End: 2022-01-01

## 2022-01-01 RX ORDER — POTASSIUM CHLORIDE 20 MEQ/1
40 TABLET, EXTENDED RELEASE ORAL ONCE
Status: COMPLETED | OUTPATIENT
Start: 2022-01-01 | End: 2022-01-01

## 2022-01-01 RX ORDER — MIDAZOLAM HYDROCHLORIDE 1 MG/ML
INJECTION INTRAMUSCULAR; INTRAVENOUS
Status: COMPLETED
Start: 2022-01-01 | End: 2022-01-01

## 2022-01-01 RX ORDER — SCOLOPAMINE TRANSDERMAL SYSTEM 1 MG/1
1 PATCH, EXTENDED RELEASE TRANSDERMAL
Status: DISCONTINUED | OUTPATIENT
Start: 2022-01-01 | End: 2022-01-01

## 2022-01-01 RX ORDER — CEFAZOLIN SODIUM 2 G/100ML
2000 INJECTION, SOLUTION INTRAVENOUS ONCE
Status: COMPLETED | OUTPATIENT
Start: 2022-01-01 | End: 2022-01-01

## 2022-01-01 RX ORDER — MIDODRINE HYDROCHLORIDE 5 MG/1
10 TABLET ORAL
Status: DISCONTINUED | OUTPATIENT
Start: 2022-01-01 | End: 2022-01-01

## 2022-01-01 RX ORDER — CITALOPRAM 20 MG/1
20 TABLET ORAL EVERY MORNING
Status: DISCONTINUED | OUTPATIENT
Start: 2022-01-01 | End: 2022-01-01 | Stop reason: HOSPADM

## 2022-01-01 RX ORDER — FUROSEMIDE 10 MG/ML
10 INJECTION INTRAMUSCULAR; INTRAVENOUS ONCE
Status: COMPLETED | OUTPATIENT
Start: 2022-01-01 | End: 2022-01-01

## 2022-01-01 RX ORDER — KETAMINE HYDROCHLORIDE 50 MG/ML
INJECTION, SOLUTION, CONCENTRATE INTRAMUSCULAR; INTRAVENOUS PRN
Status: DISCONTINUED | OUTPATIENT
Start: 2022-01-01 | End: 2022-01-01 | Stop reason: SURG

## 2022-01-01 RX ORDER — ONDANSETRON 2 MG/ML
4 INJECTION INTRAMUSCULAR; INTRAVENOUS EVERY 4 HOURS PRN
Status: DISCONTINUED | OUTPATIENT
Start: 2022-01-01 | End: 2022-01-01

## 2022-01-01 RX ORDER — FUROSEMIDE 10 MG/ML
40 INJECTION INTRAMUSCULAR; INTRAVENOUS
Status: CANCELLED | OUTPATIENT
Start: 2022-01-01

## 2022-01-01 RX ORDER — DIPHENHYDRAMINE HYDROCHLORIDE 50 MG/ML
25 INJECTION INTRAMUSCULAR; INTRAVENOUS EVERY 6 HOURS PRN
Status: DISCONTINUED | OUTPATIENT
Start: 2022-01-01 | End: 2022-01-01 | Stop reason: HOSPADM

## 2022-01-01 RX ORDER — ACETAMINOPHEN 325 MG/1
650 TABLET ORAL EVERY 6 HOURS PRN
Status: DISCONTINUED | OUTPATIENT
Start: 2022-01-01 | End: 2022-01-01 | Stop reason: HOSPADM

## 2022-01-01 RX ORDER — SUCCINYLCHOLINE CHLORIDE 20 MG/ML
INJECTION INTRAMUSCULAR; INTRAVENOUS PRN
Status: DISCONTINUED | OUTPATIENT
Start: 2022-01-01 | End: 2022-01-01 | Stop reason: SURG

## 2022-01-01 RX ORDER — NYSTATIN 100000 [USP'U]/G
POWDER TOPICAL 2 TIMES DAILY
Status: DISCONTINUED | OUTPATIENT
Start: 2022-01-01 | End: 2022-01-01 | Stop reason: HOSPADM

## 2022-01-01 RX ORDER — CEPHALEXIN 500 MG/1
500 CAPSULE ORAL EVERY 6 HOURS
Status: DISCONTINUED | OUTPATIENT
Start: 2022-01-01 | End: 2022-01-01 | Stop reason: HOSPADM

## 2022-01-01 RX ORDER — LEVOTHYROXINE SODIUM 175 UG/1
175 TABLET ORAL
Status: DISCONTINUED | OUTPATIENT
Start: 2022-01-01 | End: 2022-01-01 | Stop reason: HOSPADM

## 2022-01-01 RX ORDER — TRAZODONE HYDROCHLORIDE 50 MG/1
50 TABLET ORAL
Status: DISCONTINUED | OUTPATIENT
Start: 2022-01-01 | End: 2022-01-01

## 2022-01-01 RX ORDER — OXYCODONE HCL 5 MG/5 ML
10 SOLUTION, ORAL ORAL
Status: COMPLETED | OUTPATIENT
Start: 2022-01-01 | End: 2022-01-01

## 2022-01-01 RX ORDER — LORAZEPAM 2 MG/ML
1 INJECTION INTRAMUSCULAR
Status: DISCONTINUED | OUTPATIENT
Start: 2022-01-01 | End: 2022-01-01

## 2022-01-01 RX ORDER — LEVOTHYROXINE SODIUM 0.2 MG/1
200 TABLET ORAL
Status: DISCONTINUED | OUTPATIENT
Start: 2022-01-01 | End: 2022-01-01 | Stop reason: HOSPADM

## 2022-01-01 RX ORDER — FERROUS SULFATE 325(65) MG
325 TABLET ORAL EVERY MORNING
Status: ON HOLD | COMMUNITY
Start: 2022-01-01 | End: 2022-01-01 | Stop reason: SDUPTHER

## 2022-01-01 RX ORDER — WARFARIN SODIUM 2.5 MG/1
2.5 TABLET ORAL
Status: CANCELLED | OUTPATIENT
Start: 2022-01-01

## 2022-01-01 RX ORDER — HEPARIN SODIUM 1000 [USP'U]/ML
INJECTION, SOLUTION INTRAVENOUS; SUBCUTANEOUS
Status: COMPLETED
Start: 2022-01-01 | End: 2022-01-01

## 2022-01-01 RX ORDER — CEFAZOLIN SODIUM 2 G/100ML
2 INJECTION, SOLUTION INTRAVENOUS EVERY 8 HOURS
Status: DISPENSED | OUTPATIENT
Start: 2022-01-01 | End: 2022-01-01

## 2022-01-01 RX ORDER — LOSARTAN POTASSIUM 50 MG/1
50 TABLET ORAL DAILY
Status: DISCONTINUED | OUTPATIENT
Start: 2022-01-01 | End: 2022-01-01

## 2022-01-01 RX ORDER — POLYVINYL ALCOHOL 14 MG/ML
2 SOLUTION/ DROPS OPHTHALMIC EVERY 6 HOURS PRN
Status: DISCONTINUED | OUTPATIENT
Start: 2022-01-01 | End: 2022-01-01 | Stop reason: HOSPADM

## 2022-01-01 RX ORDER — NYSTATIN 100000 [USP'U]/G
POWDER TOPICAL
COMMUNITY
End: 2022-01-01

## 2022-01-01 RX ORDER — ATROPINE SULFATE 10 MG/ML
2 SOLUTION/ DROPS OPHTHALMIC EVERY 4 HOURS PRN
Status: DISCONTINUED | OUTPATIENT
Start: 2022-01-01 | End: 2022-01-01 | Stop reason: HOSPADM

## 2022-01-01 RX ORDER — ACETAZOLAMIDE 500 MG/5ML
500 INJECTION, POWDER, LYOPHILIZED, FOR SOLUTION INTRAVENOUS EVERY 6 HOURS
Status: DISCONTINUED | OUTPATIENT
Start: 2022-01-01 | End: 2022-01-01

## 2022-01-01 RX ORDER — VERAPAMIL HYDROCHLORIDE 2.5 MG/ML
INJECTION, SOLUTION INTRAVENOUS
Status: COMPLETED
Start: 2022-01-01 | End: 2022-01-01

## 2022-01-01 RX ORDER — ACETAMINOPHEN 500 MG
500 TABLET ORAL EVERY 6 HOURS PRN
Status: CANCELLED | OUTPATIENT
Start: 2022-01-01

## 2022-01-01 RX ORDER — CEFAZOLIN SODIUM 1 G/3ML
INJECTION, POWDER, FOR SOLUTION INTRAMUSCULAR; INTRAVENOUS
Status: COMPLETED
Start: 2022-01-01 | End: 2022-01-01

## 2022-01-01 RX ORDER — FUROSEMIDE 10 MG/ML
10 INJECTION INTRAMUSCULAR; INTRAVENOUS
Status: DISCONTINUED | OUTPATIENT
Start: 2022-01-01 | End: 2022-01-01

## 2022-01-01 RX ORDER — PHYTONADIONE 5 MG/1
2.5 TABLET ORAL ONCE
Status: COMPLETED | OUTPATIENT
Start: 2022-01-01 | End: 2022-01-01

## 2022-01-01 RX ORDER — METOPROLOL SUCCINATE 25 MG/1
12.5 TABLET, EXTENDED RELEASE ORAL EVERY EVENING
Status: DISCONTINUED | OUTPATIENT
Start: 2022-01-01 | End: 2022-01-01 | Stop reason: HOSPADM

## 2022-01-01 RX ORDER — HEPARIN SODIUM 200 [USP'U]/100ML
INJECTION, SOLUTION INTRAVENOUS
Status: COMPLETED
Start: 2022-01-01 | End: 2022-01-01

## 2022-01-01 RX ORDER — LEVOTHYROXINE SODIUM 175 UG/1
175 TABLET ORAL
Status: DISCONTINUED | OUTPATIENT
Start: 2022-01-01 | End: 2022-01-01

## 2022-01-01 RX ORDER — ACETAMINOPHEN 500 MG
1000 TABLET ORAL ONCE
Status: DISCONTINUED | OUTPATIENT
Start: 2022-01-01 | End: 2022-01-01 | Stop reason: HOSPADM

## 2022-01-01 RX ORDER — LIDOCAINE HYDROCHLORIDE 40 MG/ML
SOLUTION TOPICAL PRN
Status: DISCONTINUED | OUTPATIENT
Start: 2022-01-01 | End: 2022-01-01 | Stop reason: SURG

## 2022-01-01 RX ORDER — TORSEMIDE 20 MG/1
20 TABLET ORAL EVERY MORNING
Status: DISCONTINUED | OUTPATIENT
Start: 2022-01-01 | End: 2022-01-01

## 2022-01-01 RX ORDER — INSULIN LISPRO 100 [IU]/ML
0.2 INJECTION, SOLUTION INTRAVENOUS; SUBCUTANEOUS
Status: DISCONTINUED | OUTPATIENT
Start: 2022-01-01 | End: 2022-01-01

## 2022-01-01 RX ORDER — PROTAMINE SULFATE 10 MG/ML
INJECTION, SOLUTION INTRAVENOUS PRN
Status: DISCONTINUED | OUTPATIENT
Start: 2022-01-01 | End: 2022-01-01 | Stop reason: SURG

## 2022-01-01 RX ORDER — LEVOTHYROXINE SODIUM 0.2 MG/1
200 TABLET ORAL
Status: DISCONTINUED | OUTPATIENT
Start: 2022-01-01 | End: 2022-01-01

## 2022-01-01 RX ORDER — NYSTATIN 100000 [USP'U]/G
1 POWDER TOPICAL 2 TIMES DAILY PRN
Status: ON HOLD | COMMUNITY
End: 2022-01-01

## 2022-01-01 RX ORDER — TRAZODONE HYDROCHLORIDE 50 MG/1
50 TABLET ORAL
Status: CANCELLED | OUTPATIENT
Start: 2022-01-01

## 2022-01-01 RX ORDER — OXYCODONE HCL 5 MG/5 ML
5 SOLUTION, ORAL ORAL
Status: COMPLETED | OUTPATIENT
Start: 2022-01-01 | End: 2022-01-01

## 2022-01-01 RX ORDER — NYSTATIN 100000 [USP'U]/G
1 POWDER TOPICAL 3 TIMES DAILY
Status: DISPENSED | OUTPATIENT
Start: 2022-01-01 | End: 2022-01-01

## 2022-01-01 RX ORDER — SODIUM CHLORIDE, SODIUM LACTATE, POTASSIUM CHLORIDE, CALCIUM CHLORIDE 600; 310; 30; 20 MG/100ML; MG/100ML; MG/100ML; MG/100ML
INJECTION, SOLUTION INTRAVENOUS CONTINUOUS
Status: DISCONTINUED | OUTPATIENT
Start: 2022-01-01 | End: 2022-01-01 | Stop reason: HOSPADM

## 2022-01-01 RX ORDER — ROSUVASTATIN CALCIUM 20 MG/1
20 TABLET, COATED ORAL EVERY EVENING
Status: CANCELLED | OUTPATIENT
Start: 2022-01-01

## 2022-01-01 RX ORDER — DIPHENHYDRAMINE HCL 25 MG
25 TABLET ORAL NIGHTLY PRN
Status: DISCONTINUED | OUTPATIENT
Start: 2022-01-01 | End: 2022-01-01 | Stop reason: HOSPADM

## 2022-01-01 RX ORDER — LIDOCAINE HYDROCHLORIDE 20 MG/ML
INJECTION, SOLUTION INFILTRATION; PERINEURAL
Status: DISCONTINUED | OUTPATIENT
Start: 2022-01-01 | End: 2022-01-01 | Stop reason: HOSPADM

## 2022-01-01 RX ORDER — WARFARIN SODIUM 5 MG/1
5 TABLET ORAL
Status: DISCONTINUED | OUTPATIENT
Start: 2022-01-01 | End: 2022-01-01 | Stop reason: HOSPADM

## 2022-01-01 RX ORDER — SODIUM CHLORIDE 9 MG/ML
250 INJECTION, SOLUTION INTRAVENOUS ONCE
Status: COMPLETED | OUTPATIENT
Start: 2022-01-01 | End: 2022-01-01

## 2022-01-01 RX ORDER — FLUCONAZOLE 200 MG/1
TABLET ORAL
COMMUNITY
End: 2022-01-01

## 2022-01-01 RX ORDER — HYDROXYZINE HYDROCHLORIDE 25 MG/1
50 TABLET, FILM COATED ORAL 3 TIMES DAILY PRN
Status: DISCONTINUED | OUTPATIENT
Start: 2022-01-01 | End: 2022-01-01

## 2022-01-01 RX ORDER — ACETAMINOPHEN 325 MG/1
650 TABLET ORAL ONCE
Status: COMPLETED | OUTPATIENT
Start: 2022-01-01 | End: 2022-01-01

## 2022-01-01 RX ORDER — ONDANSETRON 2 MG/ML
8 INJECTION INTRAMUSCULAR; INTRAVENOUS EVERY 8 HOURS PRN
Status: DISCONTINUED | OUTPATIENT
Start: 2022-01-01 | End: 2022-01-01

## 2022-01-01 RX ORDER — POLYETHYLENE GLYCOL 3350 17 G/17G
17 POWDER, FOR SOLUTION ORAL PRN
Status: ON HOLD | COMMUNITY
End: 2022-01-01

## 2022-01-01 RX ORDER — ASCORBIC ACID 500 MG
500 TABLET ORAL DAILY
Status: ON HOLD | COMMUNITY
End: 2022-01-01

## 2022-01-01 RX ORDER — WARFARIN SODIUM 3 MG/1
3 TABLET ORAL DAILY
Qty: 30 TABLET | Refills: 3 | Status: SHIPPED | OUTPATIENT
Start: 2022-01-01 | End: 2022-01-01

## 2022-01-01 RX ORDER — LIDOCAINE HYDROCHLORIDE 20 MG/ML
INJECTION, SOLUTION EPIDURAL; INFILTRATION; INTRACAUDAL; PERINEURAL PRN
Status: DISCONTINUED | OUTPATIENT
Start: 2022-01-01 | End: 2022-01-01 | Stop reason: SURG

## 2022-01-01 RX ORDER — ACETAMINOPHEN 500 MG
1000 TABLET ORAL EVERY 6 HOURS PRN
Status: DISCONTINUED | OUTPATIENT
Start: 2022-01-01 | End: 2022-01-01

## 2022-01-01 RX ORDER — IPRATROPIUM BROMIDE AND ALBUTEROL SULFATE 2.5; .5 MG/3ML; MG/3ML
3 SOLUTION RESPIRATORY (INHALATION) ONCE
Status: COMPLETED | OUTPATIENT
Start: 2022-01-01 | End: 2022-01-01

## 2022-01-01 RX ORDER — TRAZODONE HYDROCHLORIDE 50 MG/1
50 TABLET ORAL ONCE
Status: COMPLETED | OUTPATIENT
Start: 2022-01-01 | End: 2022-01-01

## 2022-01-01 RX ORDER — LOSARTAN POTASSIUM 50 MG/1
50 TABLET ORAL
Status: ON HOLD | COMMUNITY
End: 2022-01-01

## 2022-01-01 RX ORDER — NYSTATIN 100000 [USP'U]/G
POWDER TOPICAL 2 TIMES DAILY PRN
Status: DISCONTINUED | OUTPATIENT
Start: 2022-01-01 | End: 2022-01-01 | Stop reason: HOSPADM

## 2022-01-01 RX ORDER — CEPHALEXIN 500 MG/1
1000 CAPSULE ORAL 4 TIMES DAILY
Status: DISCONTINUED | OUTPATIENT
Start: 2022-01-01 | End: 2022-01-01 | Stop reason: HOSPADM

## 2022-01-01 RX ORDER — FUROSEMIDE 10 MG/ML
30 INJECTION INTRAMUSCULAR; INTRAVENOUS ONCE
Status: DISCONTINUED | OUTPATIENT
Start: 2022-01-01 | End: 2022-01-01

## 2022-01-01 RX ORDER — HYDRALAZINE HYDROCHLORIDE 20 MG/ML
10 INJECTION INTRAMUSCULAR; INTRAVENOUS
Status: DISCONTINUED | OUTPATIENT
Start: 2022-01-01 | End: 2022-01-01 | Stop reason: HOSPADM

## 2022-01-01 RX ORDER — DEXTROSE MONOHYDRATE 25 G/50ML
25 INJECTION, SOLUTION INTRAVENOUS
Status: CANCELLED | OUTPATIENT
Start: 2022-01-01

## 2022-01-01 RX ORDER — TRAMADOL HYDROCHLORIDE 50 MG/1
50 TABLET ORAL EVERY 6 HOURS PRN
Status: DISCONTINUED | OUTPATIENT
Start: 2022-01-01 | End: 2022-01-01

## 2022-01-01 RX ORDER — WARFARIN SODIUM 2.5 MG/1
2.5-5 TABLET ORAL DAILY
COMMUNITY

## 2022-01-01 RX ORDER — INSULIN LISPRO 100 [IU]/ML
1-6 INJECTION, SOLUTION INTRAVENOUS; SUBCUTANEOUS EVERY 6 HOURS
Status: DISCONTINUED | OUTPATIENT
Start: 2022-01-01 | End: 2022-01-01

## 2022-01-01 RX ORDER — NYSTATIN 100000 [USP'U]/G
1 POWDER TOPICAL 3 TIMES DAILY
Qty: 15 G | Refills: 1 | Status: ON HOLD | OUTPATIENT
Start: 2022-01-01 | End: 2022-01-01

## 2022-01-01 RX ORDER — MIDAZOLAM HYDROCHLORIDE 1 MG/ML
2-4 INJECTION INTRAMUSCULAR; INTRAVENOUS
Status: DISCONTINUED | OUTPATIENT
Start: 2022-01-01 | End: 2022-01-01 | Stop reason: HOSPADM

## 2022-01-01 RX ORDER — KETAMINE HYDROCHLORIDE 50 MG/ML
INJECTION, SOLUTION INTRAMUSCULAR; INTRAVENOUS
Status: COMPLETED
Start: 2022-01-01 | End: 2022-01-01

## 2022-01-01 RX ORDER — NYSTATIN 100000 [USP'U]/G
1 POWDER TOPICAL 3 TIMES DAILY
Status: DISCONTINUED | OUTPATIENT
Start: 2022-01-01 | End: 2022-01-01 | Stop reason: HOSPADM

## 2022-01-01 RX ORDER — SODIUM CHLORIDE, SODIUM LACTATE, POTASSIUM CHLORIDE, CALCIUM CHLORIDE 600; 310; 30; 20 MG/100ML; MG/100ML; MG/100ML; MG/100ML
INJECTION, SOLUTION INTRAVENOUS
Status: DISCONTINUED | OUTPATIENT
Start: 2022-01-01 | End: 2022-01-01 | Stop reason: SURG

## 2022-01-01 RX ORDER — SODIUM CHLORIDE, SODIUM LACTATE, POTASSIUM CHLORIDE, CALCIUM CHLORIDE 600; 310; 30; 20 MG/100ML; MG/100ML; MG/100ML; MG/100ML
INJECTION, SOLUTION INTRAVENOUS CONTINUOUS
Status: DISCONTINUED | OUTPATIENT
Start: 2022-01-01 | End: 2022-01-01

## 2022-01-01 RX ORDER — PHENYLEPHRINE HYDROCHLORIDE 10 MG/ML
INJECTION, SOLUTION INTRAMUSCULAR; INTRAVENOUS; SUBCUTANEOUS PRN
Status: DISCONTINUED | OUTPATIENT
Start: 2022-01-01 | End: 2022-01-01 | Stop reason: HOSPADM

## 2022-01-01 RX ORDER — FUROSEMIDE 10 MG/ML
80 INJECTION INTRAMUSCULAR; INTRAVENOUS EVERY 8 HOURS
Status: DISCONTINUED | OUTPATIENT
Start: 2022-01-01 | End: 2022-01-01

## 2022-01-01 RX ORDER — DIGOXIN 125 MCG
250 TABLET ORAL EVERY MORNING
Status: DISCONTINUED | OUTPATIENT
Start: 2022-01-01 | End: 2022-01-01

## 2022-01-01 RX ORDER — LORAZEPAM 2 MG/ML
2 INJECTION INTRAMUSCULAR
Status: DISCONTINUED | OUTPATIENT
Start: 2022-01-01 | End: 2022-01-01

## 2022-01-01 RX ORDER — ACETAMINOPHEN 325 MG/1
650 TABLET ORAL EVERY 4 HOURS PRN
Status: DISCONTINUED | OUTPATIENT
Start: 2022-01-01 | End: 2022-01-01

## 2022-01-01 RX ORDER — TORSEMIDE 20 MG/1
20 TABLET ORAL EVERY MORNING
Status: ON HOLD | COMMUNITY
End: 2022-01-01

## 2022-01-01 RX ORDER — BUPIVACAINE HYDROCHLORIDE 2.5 MG/ML
INJECTION, SOLUTION EPIDURAL; INFILTRATION; INTRACAUDAL
Status: DISCONTINUED | OUTPATIENT
Start: 2022-01-01 | End: 2022-01-01 | Stop reason: HOSPADM

## 2022-01-01 RX ORDER — NYSTATIN 100000 U/G
1 CREAM TOPICAL PRN
Status: ON HOLD | COMMUNITY
End: 2022-01-01

## 2022-01-01 RX ORDER — BUPIVACAINE HYDROCHLORIDE 2.5 MG/ML
INJECTION, SOLUTION EPIDURAL; INFILTRATION; INTRACAUDAL
Status: COMPLETED
Start: 2022-01-01 | End: 2022-01-01

## 2022-01-01 RX ORDER — GEMFIBROZIL 600 MG/1
600 TABLET, FILM COATED ORAL EVERY MORNING
Status: DISCONTINUED | OUTPATIENT
Start: 2022-01-01 | End: 2022-01-01

## 2022-01-01 RX ORDER — LEVOTHYROXINE SODIUM 0.1 MG/1
200 TABLET ORAL
Status: CANCELLED | OUTPATIENT
Start: 2022-01-01

## 2022-01-01 RX ADMIN — METOPROLOL TARTRATE 25 MG: 25 TABLET, FILM COATED ORAL at 17:53

## 2022-01-01 RX ADMIN — FUROSEMIDE 80 MG: 10 INJECTION, SOLUTION INTRAMUSCULAR; INTRAVENOUS at 14:16

## 2022-01-01 RX ADMIN — Medication 10 MG: at 13:45

## 2022-01-01 RX ADMIN — LEVOTHYROXINE SODIUM 200 MCG: 0.2 TABLET ORAL at 06:13

## 2022-01-01 RX ADMIN — FUROSEMIDE 40 MG: 20 INJECTION, SOLUTION INTRAMUSCULAR; INTRAVENOUS at 17:16

## 2022-01-01 RX ADMIN — NYSTATIN 100000 UNITS: 100000 POWDER TOPICAL at 06:00

## 2022-01-01 RX ADMIN — CITALOPRAM HYDROBROMIDE 20 MG: 20 TABLET ORAL at 05:50

## 2022-01-01 RX ADMIN — FUROSEMIDE 80 MG: 10 INJECTION, SOLUTION INTRAMUSCULAR; INTRAVENOUS at 21:29

## 2022-01-01 RX ADMIN — PROPOFOL 100 MG: 10 INJECTION, EMULSION INTRAVENOUS at 07:37

## 2022-01-01 RX ADMIN — CEFAZOLIN 3 G: 330 INJECTION, POWDER, FOR SOLUTION INTRAMUSCULAR; INTRAVENOUS at 14:16

## 2022-01-01 RX ADMIN — METOPROLOL TARTRATE 25 MG: 25 TABLET, FILM COATED ORAL at 12:11

## 2022-01-01 RX ADMIN — GEMFIBROZIL 600 MG: 600 TABLET ORAL at 05:04

## 2022-01-01 RX ADMIN — GEMFIBROZIL 600 MG: 600 TABLET ORAL at 04:44

## 2022-01-01 RX ADMIN — LEVOTHYROXINE SODIUM 200 MCG: 0.2 TABLET ORAL at 04:57

## 2022-01-01 RX ADMIN — ALLOPURINOL 100 MG: 100 TABLET ORAL at 20:17

## 2022-01-01 RX ADMIN — FERROUS SULFATE TAB 325 MG (65 MG ELEMENTAL FE) 325 MG: 325 (65 FE) TAB at 17:33

## 2022-01-01 RX ADMIN — FUROSEMIDE 40 MG: 10 INJECTION, SOLUTION INTRAMUSCULAR; INTRAVENOUS at 05:20

## 2022-01-01 RX ADMIN — METOPROLOL TARTRATE 25 MG: 25 TABLET, FILM COATED ORAL at 06:14

## 2022-01-01 RX ADMIN — NYSTATIN 1 APPLICATION: 100000 POWDER TOPICAL at 05:23

## 2022-01-01 RX ADMIN — CEFAZOLIN SODIUM 2 G: 2 INJECTION, SOLUTION INTRAVENOUS at 21:32

## 2022-01-01 RX ADMIN — FERROUS SULFATE TAB 325 MG (65 MG ELEMENTAL FE) 325 MG: 325 (65 FE) TAB at 04:57

## 2022-01-01 RX ADMIN — DIGOXIN 250 MCG: 0.12 TABLET ORAL at 05:04

## 2022-01-01 RX ADMIN — CITALOPRAM HYDROBROMIDE 20 MG: 20 TABLET ORAL at 04:45

## 2022-01-01 RX ADMIN — WARFARIN SODIUM 1 MG: 1 TABLET ORAL at 17:17

## 2022-01-01 RX ADMIN — ALPRAZOLAM 0.25 MG: 0.25 TABLET ORAL at 18:28

## 2022-01-01 RX ADMIN — MAGNESIUM OXIDE 400 MG (241.3 MG MAGNESIUM) TABLET 400 MG: TABLET at 04:45

## 2022-01-01 RX ADMIN — ALLOPURINOL 100 MG: 100 TABLET ORAL at 21:58

## 2022-01-01 RX ADMIN — MIDODRINE HYDROCHLORIDE 10 MG: 5 TABLET ORAL at 08:16

## 2022-01-01 RX ADMIN — METOPROLOL TARTRATE 25 MG: 25 TABLET, FILM COATED ORAL at 12:00

## 2022-01-01 RX ADMIN — ACETAZOLAMIDE 500 MG: 500 INJECTION, POWDER, LYOPHILIZED, FOR SOLUTION INTRAVENOUS at 12:11

## 2022-01-01 RX ADMIN — LEVOTHYROXINE SODIUM 200 MCG: 0.2 TABLET ORAL at 06:25

## 2022-01-01 RX ADMIN — DEXAMETHASONE SODIUM PHOSPHATE 4 MG: 4 INJECTION, SOLUTION INTRA-ARTICULAR; INTRALESIONAL; INTRAMUSCULAR; INTRAVENOUS; SOFT TISSUE at 14:31

## 2022-01-01 RX ADMIN — FERROUS SULFATE TAB 325 MG (65 MG ELEMENTAL FE) 325 MG: 325 (65 FE) TAB at 17:04

## 2022-01-01 RX ADMIN — NYSTATIN 100000 UNITS: 100000 POWDER TOPICAL at 11:33

## 2022-01-01 RX ADMIN — PHYTONADIONE 10 MG: 10 INJECTION, EMULSION INTRAMUSCULAR; INTRAVENOUS; SUBCUTANEOUS at 17:36

## 2022-01-01 RX ADMIN — TRAMADOL HYDROCHLORIDE 50 MG: 50 TABLET, COATED ORAL at 16:49

## 2022-01-01 RX ADMIN — METOPROLOL TARTRATE 25 MG: 25 TABLET, FILM COATED ORAL at 16:43

## 2022-01-01 RX ADMIN — FUROSEMIDE 80 MG: 10 INJECTION, SOLUTION INTRAMUSCULAR; INTRAVENOUS at 06:13

## 2022-01-01 RX ADMIN — LOSARTAN POTASSIUM 50 MG: 50 TABLET, FILM COATED ORAL at 23:34

## 2022-01-01 RX ADMIN — FUROSEMIDE 40 MG: 10 INJECTION, SOLUTION INTRAMUSCULAR; INTRAVENOUS at 10:58

## 2022-01-01 RX ADMIN — MAGNESIUM HYDROXIDE 30 ML: 400 SUSPENSION ORAL at 12:57

## 2022-01-01 RX ADMIN — FERROUS SULFATE TAB 325 MG (65 MG ELEMENTAL FE) 325 MG: 325 (65 FE) TAB at 05:50

## 2022-01-01 RX ADMIN — PROPOFOL 10 MG: 10 INJECTION, EMULSION INTRAVENOUS at 14:49

## 2022-01-01 RX ADMIN — FERROUS SULFATE TAB 325 MG (65 MG ELEMENTAL FE) 325 MG: 325 (65 FE) TAB at 05:04

## 2022-01-01 RX ADMIN — ROSUVASTATIN CALCIUM 20 MG: 10 TABLET, COATED ORAL at 17:36

## 2022-01-01 RX ADMIN — ROSUVASTATIN CALCIUM 20 MG: 20 TABLET, FILM COATED ORAL at 17:51

## 2022-01-01 RX ADMIN — DIGOXIN 250 MCG: 0.25 INJECTION INTRAMUSCULAR; INTRAVENOUS at 13:05

## 2022-01-01 RX ADMIN — DOCUSATE SODIUM 50 MG AND SENNOSIDES 8.6 MG 2 TABLET: 8.6; 5 TABLET, FILM COATED ORAL at 17:18

## 2022-01-01 RX ADMIN — DIGOXIN 250 MCG: 0.25 INJECTION INTRAMUSCULAR; INTRAVENOUS at 20:18

## 2022-01-01 RX ADMIN — CITALOPRAM HYDROBROMIDE 20 MG: 20 TABLET ORAL at 06:47

## 2022-01-01 RX ADMIN — CEFAZOLIN SODIUM 2 G: 2 INJECTION, SOLUTION INTRAVENOUS at 17:51

## 2022-01-01 RX ADMIN — WARFARIN SODIUM 5 MG: 5 TABLET ORAL at 18:10

## 2022-01-01 RX ADMIN — WATER 0.05 MCG/KG/MIN: 1 SOLUTION INTRAVENOUS at 05:46

## 2022-01-01 RX ADMIN — INSULIN HUMAN 1 UNITS: 100 INJECTION, SOLUTION PARENTERAL at 21:43

## 2022-01-01 RX ADMIN — ROSUVASTATIN CALCIUM 20 MG: 20 TABLET, FILM COATED ORAL at 17:32

## 2022-01-01 RX ADMIN — POTASSIUM CHLORIDE 40 MEQ: 1500 TABLET, EXTENDED RELEASE ORAL at 08:20

## 2022-01-01 RX ADMIN — CEFAZOLIN SODIUM 2 G: 2 INJECTION, SOLUTION INTRAVENOUS at 02:02

## 2022-01-01 RX ADMIN — DOCUSATE SODIUM 50 MG AND SENNOSIDES 8.6 MG 2 TABLET: 8.6; 5 TABLET, FILM COATED ORAL at 10:05

## 2022-01-01 RX ADMIN — POLYETHYLENE GLYCOL 3350 1 PACKET: 17 POWDER, FOR SOLUTION ORAL at 13:23

## 2022-01-01 RX ADMIN — ALPRAZOLAM 0.25 MG: 0.25 TABLET ORAL at 18:27

## 2022-01-01 RX ADMIN — LEVOTHYROXINE SODIUM 175 MCG: 0.12 TABLET ORAL at 05:23

## 2022-01-01 RX ADMIN — FERROUS SULFATE TAB 325 MG (65 MG ELEMENTAL FE) 325 MG: 325 (65 FE) TAB at 04:44

## 2022-01-01 RX ADMIN — FUROSEMIDE 40 MG: 10 INJECTION, SOLUTION INTRAMUSCULAR; INTRAVENOUS at 04:46

## 2022-01-01 RX ADMIN — ROSUVASTATIN CALCIUM 20 MG: 20 TABLET, FILM COATED ORAL at 17:18

## 2022-01-01 RX ADMIN — CITALOPRAM HYDROBROMIDE 20 MG: 20 TABLET ORAL at 06:34

## 2022-01-01 RX ADMIN — GEMFIBROZIL 600 MG: 600 TABLET ORAL at 05:47

## 2022-01-01 RX ADMIN — GEMFIBROZIL 600 MG: 600 TABLET ORAL at 05:15

## 2022-01-01 RX ADMIN — INSULIN GLARGINE-YFGN 5 UNITS: 100 INJECTION, SOLUTION SUBCUTANEOUS at 17:52

## 2022-01-01 RX ADMIN — WARFARIN SODIUM 2.5 MG: 2.5 TABLET ORAL at 17:05

## 2022-01-01 RX ADMIN — MAGNESIUM OXIDE 400 MG (241.3 MG MAGNESIUM) TABLET 400 MG: TABLET at 06:12

## 2022-01-01 RX ADMIN — PIOGLITAZONE 15 MG: 15 TABLET ORAL at 06:37

## 2022-01-01 RX ADMIN — SODIUM CHLORIDE 250 ML: 9 INJECTION, SOLUTION INTRAVENOUS at 06:29

## 2022-01-01 RX ADMIN — LEVOTHYROXINE SODIUM 200 MCG: 0.2 TABLET ORAL at 05:48

## 2022-01-01 RX ADMIN — ACETAMINOPHEN 650 MG: 325 TABLET ORAL at 22:30

## 2022-01-01 RX ADMIN — ASPIRIN 81 MG: 81 TABLET, COATED ORAL at 06:33

## 2022-01-01 RX ADMIN — FUROSEMIDE 40 MG: 10 INJECTION, SOLUTION INTRAMUSCULAR; INTRAVENOUS at 17:08

## 2022-01-01 RX ADMIN — ROSUVASTATIN CALCIUM 20 MG: 20 TABLET, FILM COATED ORAL at 17:09

## 2022-01-01 RX ADMIN — FUROSEMIDE 40 MG: 10 INJECTION, SOLUTION INTRAMUSCULAR; INTRAVENOUS at 05:06

## 2022-01-01 RX ADMIN — WARFARIN SODIUM 2.5 MG: 2.5 TABLET ORAL at 17:36

## 2022-01-01 RX ADMIN — ROSUVASTATIN CALCIUM 20 MG: 20 TABLET, FILM COATED ORAL at 18:43

## 2022-01-01 RX ADMIN — METOPROLOL TARTRATE 25 MG: 25 TABLET, FILM COATED ORAL at 05:06

## 2022-01-01 RX ADMIN — LEVOTHYROXINE SODIUM 200 MCG: 0.2 TABLET ORAL at 05:03

## 2022-01-01 RX ADMIN — CITALOPRAM HYDROBROMIDE 20 MG: 20 TABLET ORAL at 05:06

## 2022-01-01 RX ADMIN — FUROSEMIDE 40 MG: 10 INJECTION, SOLUTION INTRAMUSCULAR; INTRAVENOUS at 05:23

## 2022-01-01 RX ADMIN — PROPOFOL 10 MG: 10 INJECTION, EMULSION INTRAVENOUS at 14:45

## 2022-01-01 RX ADMIN — GUAIFENESIN 600 MG: 600 TABLET, EXTENDED RELEASE ORAL at 04:48

## 2022-01-01 RX ADMIN — CEFAZOLIN SODIUM 2000 MG: 2 INJECTION, SOLUTION INTRAVENOUS at 14:34

## 2022-01-01 RX ADMIN — ROCURONIUM BROMIDE 50 MG: 10 INJECTION, SOLUTION INTRAVENOUS at 14:08

## 2022-01-01 RX ADMIN — MIDODRINE HYDROCHLORIDE 10 MG: 5 TABLET ORAL at 12:05

## 2022-01-01 RX ADMIN — ROSUVASTATIN CALCIUM 20 MG: 20 TABLET, FILM COATED ORAL at 17:53

## 2022-01-01 RX ADMIN — PROPOFOL 30 MCG/KG/MIN: 10 INJECTION, EMULSION INTRAVENOUS at 13:33

## 2022-01-01 RX ADMIN — DIGOXIN 500 MCG: 0.25 INJECTION INTRAMUSCULAR; INTRAVENOUS at 08:17

## 2022-01-01 RX ADMIN — ACETAMINOPHEN 500 MG: 500 TABLET ORAL at 05:38

## 2022-01-01 RX ADMIN — NYSTATIN 100000 UNITS: 100000 POWDER TOPICAL at 17:28

## 2022-01-01 RX ADMIN — MIDAZOLAM HYDROCHLORIDE 0.5 MG: 1 INJECTION, SOLUTION INTRAMUSCULAR; INTRAVENOUS at 14:16

## 2022-01-01 RX ADMIN — CITALOPRAM HYDROBROMIDE 20 MG: 20 TABLET ORAL at 06:33

## 2022-01-01 RX ADMIN — NYSTATIN 100000 UNITS: 100000 POWDER TOPICAL at 05:19

## 2022-01-01 RX ADMIN — NYSTATIN 100000 UNITS: 100000 POWDER TOPICAL at 05:04

## 2022-01-01 RX ADMIN — GEMFIBROZIL 600 MG: 600 TABLET ORAL at 04:46

## 2022-01-01 RX ADMIN — INSULIN GLARGINE-YFGN 5 UNITS: 100 INJECTION, SOLUTION SUBCUTANEOUS at 17:08

## 2022-01-01 RX ADMIN — FERROUS SULFATE TAB 325 MG (65 MG ELEMENTAL FE) 325 MG: 325 (65 FE) TAB at 06:25

## 2022-01-01 RX ADMIN — FUROSEMIDE 60 MG: 10 INJECTION, SOLUTION INTRAMUSCULAR; INTRAVENOUS at 23:33

## 2022-01-01 RX ADMIN — HEPARIN SODIUM 2000 UNITS: 200 INJECTION, SOLUTION INTRAVENOUS at 14:04

## 2022-01-01 RX ADMIN — WARFARIN SODIUM 3 MG: 3 TABLET ORAL at 21:59

## 2022-01-01 RX ADMIN — OXYBUTYNIN CHLORIDE 15 MG: 5 TABLET, EXTENDED RELEASE ORAL at 06:33

## 2022-01-01 RX ADMIN — WARFARIN SODIUM 5 MG: 5 TABLET ORAL at 18:00

## 2022-01-01 RX ADMIN — MIDAZOLAM HYDROCHLORIDE 4 MG: 1 INJECTION, SOLUTION INTRAMUSCULAR; INTRAVENOUS at 09:51

## 2022-01-01 RX ADMIN — SPIRONOLACTONE 25 MG: 25 TABLET ORAL at 05:40

## 2022-01-01 RX ADMIN — ROSUVASTATIN CALCIUM 20 MG: 20 TABLET, FILM COATED ORAL at 17:04

## 2022-01-01 RX ADMIN — ALLOPURINOL 100 MG: 100 TABLET ORAL at 21:19

## 2022-01-01 RX ADMIN — FERROUS SULFATE TAB 325 MG (65 MG ELEMENTAL FE) 325 MG: 325 (65 FE) TAB at 17:27

## 2022-01-01 RX ADMIN — INSULIN GLARGINE-YFGN 10 UNITS: 100 INJECTION, SOLUTION SUBCUTANEOUS at 00:21

## 2022-01-01 RX ADMIN — FERROUS SULFATE TAB 325 MG (65 MG ELEMENTAL FE) 325 MG: 325 (65 FE) TAB at 06:12

## 2022-01-01 RX ADMIN — PROTAMINE SULFATE 50 MG: 10 INJECTION, SOLUTION INTRAVENOUS at 08:37

## 2022-01-01 RX ADMIN — NYSTATIN 100000 UNITS: 100000 POWDER TOPICAL at 18:00

## 2022-01-01 RX ADMIN — FUROSEMIDE 40 MG: 10 INJECTION, SOLUTION INTRAMUSCULAR; INTRAVENOUS at 05:14

## 2022-01-01 RX ADMIN — NYSTATIN: 100000 POWDER TOPICAL at 00:36

## 2022-01-01 RX ADMIN — WARFARIN SODIUM 1 MG: 1 TABLET ORAL at 17:54

## 2022-01-01 RX ADMIN — GEMFIBROZIL 600 MG: 600 TABLET ORAL at 05:34

## 2022-01-01 RX ADMIN — FUROSEMIDE 20 MG: 10 INJECTION, SOLUTION INTRAMUSCULAR; INTRAVENOUS at 17:56

## 2022-01-01 RX ADMIN — GEMFIBROZIL 600 MG: 600 TABLET ORAL at 05:20

## 2022-01-01 RX ADMIN — CEPHALEXIN 1000 MG: 500 CAPSULE ORAL at 06:47

## 2022-01-01 RX ADMIN — CITALOPRAM HYDROBROMIDE 20 MG: 20 TABLET ORAL at 06:25

## 2022-01-01 RX ADMIN — MIDODRINE HYDROCHLORIDE 10 MG: 5 TABLET ORAL at 07:52

## 2022-01-01 RX ADMIN — ONDANSETRON 4 MG: 2 INJECTION INTRAMUSCULAR; INTRAVENOUS at 14:31

## 2022-01-01 RX ADMIN — MIDODRINE HYDROCHLORIDE 2.5 MG: 5 TABLET ORAL at 11:06

## 2022-01-01 RX ADMIN — ALLOPURINOL 100 MG: 100 TABLET ORAL at 22:05

## 2022-01-01 RX ADMIN — GUAIFENESIN 600 MG: 600 TABLET, EXTENDED RELEASE ORAL at 06:47

## 2022-01-01 RX ADMIN — WATER 0.05 MCG/KG/MIN: 1 SOLUTION INTRAVENOUS at 18:18

## 2022-01-01 RX ADMIN — NYSTATIN 100000 UNITS: 100000 POWDER TOPICAL at 12:12

## 2022-01-01 RX ADMIN — INSULIN GLARGINE-YFGN 5 UNITS: 100 INJECTION, SOLUTION SUBCUTANEOUS at 18:07

## 2022-01-01 RX ADMIN — POLYETHYLENE GLYCOL 3350 1 PACKET: 17 POWDER, FOR SOLUTION ORAL at 06:03

## 2022-01-01 RX ADMIN — LEVOTHYROXINE SODIUM 200 MCG: 0.1 TABLET ORAL at 06:32

## 2022-01-01 RX ADMIN — GUAIFENESIN 600 MG: 600 TABLET, EXTENDED RELEASE ORAL at 10:10

## 2022-01-01 RX ADMIN — ROSUVASTATIN CALCIUM 20 MG: 20 TABLET, FILM COATED ORAL at 17:54

## 2022-01-01 RX ADMIN — NYSTATIN: 100000 POWDER TOPICAL at 05:15

## 2022-01-01 RX ADMIN — METOPROLOL TARTRATE 25 MG: 25 TABLET, FILM COATED ORAL at 13:08

## 2022-01-01 RX ADMIN — NYSTATIN: 100000 POWDER TOPICAL at 17:18

## 2022-01-01 RX ADMIN — GEMFIBROZIL 600 MG: 600 TABLET ORAL at 06:24

## 2022-01-01 RX ADMIN — METOPROLOL TARTRATE 25 MG: 25 TABLET, FILM COATED ORAL at 17:19

## 2022-01-01 RX ADMIN — ROSUVASTATIN CALCIUM 20 MG: 20 TABLET, FILM COATED ORAL at 16:19

## 2022-01-01 RX ADMIN — GUAIFENESIN 600 MG: 600 TABLET, EXTENDED RELEASE ORAL at 16:43

## 2022-01-01 RX ADMIN — SPIRONOLACTONE 25 MG: 25 TABLET ORAL at 08:16

## 2022-01-01 RX ADMIN — FUROSEMIDE 40 MG: 10 INJECTION, SOLUTION INTRAMUSCULAR; INTRAVENOUS at 13:08

## 2022-01-01 RX ADMIN — ACETAZOLAMIDE 250 MG: 500 INJECTION, POWDER, LYOPHILIZED, FOR SOLUTION INTRAVENOUS at 01:17

## 2022-01-01 RX ADMIN — FUROSEMIDE 20 MG: 10 INJECTION, SOLUTION INTRAMUSCULAR; INTRAVENOUS at 17:27

## 2022-01-01 RX ADMIN — MAGNESIUM OXIDE 400 MG (241.3 MG MAGNESIUM) TABLET 400 MG: TABLET at 04:14

## 2022-01-01 RX ADMIN — NYSTATIN 100000 UNITS: 100000 POWDER TOPICAL at 15:10

## 2022-01-01 RX ADMIN — POTASSIUM CHLORIDE 40 MEQ: 1500 TABLET, EXTENDED RELEASE ORAL at 13:37

## 2022-01-01 RX ADMIN — ALLOPURINOL 100 MG: 100 TABLET ORAL at 21:08

## 2022-01-01 RX ADMIN — LEVOTHYROXINE SODIUM 200 MCG: 0.1 TABLET ORAL at 05:19

## 2022-01-01 RX ADMIN — NYSTATIN: 100000 POWDER TOPICAL at 18:00

## 2022-01-01 RX ADMIN — CITALOPRAM HYDROBROMIDE 20 MG: 20 TABLET ORAL at 04:48

## 2022-01-01 RX ADMIN — MAGNESIUM OXIDE 400 MG (241.3 MG MAGNESIUM) TABLET 400 MG: TABLET at 05:40

## 2022-01-01 RX ADMIN — FERROUS SULFATE TAB 325 MG (65 MG ELEMENTAL FE) 325 MG: 325 (65 FE) TAB at 06:33

## 2022-01-01 RX ADMIN — ALLOPURINOL 100 MG: 100 TABLET ORAL at 21:02

## 2022-01-01 RX ADMIN — INSULIN LISPRO 1 UNITS: 100 INJECTION, SOLUTION INTRAVENOUS; SUBCUTANEOUS at 23:45

## 2022-01-01 RX ADMIN — ALLOPURINOL 100 MG: 100 TABLET ORAL at 20:33

## 2022-01-01 RX ADMIN — CEFAZOLIN 3 G: 330 INJECTION, POWDER, FOR SOLUTION INTRAMUSCULAR; INTRAVENOUS at 07:39

## 2022-01-01 RX ADMIN — MAGNESIUM OXIDE 400 MG (241.3 MG MAGNESIUM) TABLET 400 MG: TABLET at 04:57

## 2022-01-01 RX ADMIN — SUGAMMADEX 200 MG: 100 INJECTION, SOLUTION INTRAVENOUS at 14:45

## 2022-01-01 RX ADMIN — GEMFIBROZIL 600 MG: 600 TABLET ORAL at 05:25

## 2022-01-01 RX ADMIN — FUROSEMIDE 40 MG: 10 INJECTION, SOLUTION INTRAMUSCULAR; INTRAVENOUS at 12:32

## 2022-01-01 RX ADMIN — LEVOTHYROXINE SODIUM 200 MCG: 0.1 TABLET ORAL at 05:14

## 2022-01-01 RX ADMIN — WARFARIN SODIUM 1 MG: 1 TABLET ORAL at 17:57

## 2022-01-01 RX ADMIN — CITALOPRAM HYDROBROMIDE 20 MG: 20 TABLET ORAL at 04:57

## 2022-01-01 RX ADMIN — BUPIVACAINE HYDROCHLORIDE: 2.5 INJECTION, SOLUTION EPIDURAL; INFILTRATION; INTRACAUDAL; PERINEURAL at 14:22

## 2022-01-01 RX ADMIN — MIDODRINE HYDROCHLORIDE 5 MG: 5 TABLET ORAL at 11:40

## 2022-01-01 RX ADMIN — PROPOFOL 10 MG: 10 INJECTION, EMULSION INTRAVENOUS at 14:53

## 2022-01-01 RX ADMIN — PHYTONADIONE 2.5 MG: 5 TABLET ORAL at 12:01

## 2022-01-01 RX ADMIN — CITALOPRAM HYDROBROMIDE 20 MG: 20 TABLET ORAL at 05:48

## 2022-01-01 RX ADMIN — FERROUS SULFATE TAB 325 MG (65 MG ELEMENTAL FE) 325 MG: 325 (65 FE) TAB at 05:24

## 2022-01-01 RX ADMIN — ROSUVASTATIN CALCIUM 20 MG: 20 TABLET, FILM COATED ORAL at 17:50

## 2022-01-01 RX ADMIN — METOPROLOL TARTRATE 25 MG: 25 TABLET, FILM COATED ORAL at 16:18

## 2022-01-01 RX ADMIN — CITALOPRAM HYDROBROMIDE 20 MG: 20 TABLET ORAL at 05:25

## 2022-01-01 RX ADMIN — FUROSEMIDE 20 MG: 10 INJECTION, SOLUTION INTRAMUSCULAR; INTRAVENOUS at 05:25

## 2022-01-01 RX ADMIN — ACETAMINOPHEN 1000 MG: 500 TABLET ORAL at 09:14

## 2022-01-01 RX ADMIN — DIGOXIN 250 MCG: 0.12 TABLET ORAL at 05:50

## 2022-01-01 RX ADMIN — HYDROXYZINE HYDROCHLORIDE 50 MG: 25 TABLET, FILM COATED ORAL at 05:03

## 2022-01-01 RX ADMIN — MIDODRINE HYDROCHLORIDE 2.5 MG: 5 TABLET ORAL at 14:46

## 2022-01-01 RX ADMIN — CITALOPRAM HYDROBROMIDE 20 MG: 20 TABLET ORAL at 05:19

## 2022-01-01 RX ADMIN — LOSARTAN POTASSIUM 50 MG: 50 TABLET, FILM COATED ORAL at 05:48

## 2022-01-01 RX ADMIN — FERROUS SULFATE TAB 325 MG (65 MG ELEMENTAL FE) 325 MG: 325 (65 FE) TAB at 16:48

## 2022-01-01 RX ADMIN — CEFAZOLIN SODIUM 2 G: 2 INJECTION, SOLUTION INTRAVENOUS at 17:50

## 2022-01-01 RX ADMIN — TRAZODONE HYDROCHLORIDE 50 MG: 50 TABLET ORAL at 22:20

## 2022-01-01 RX ADMIN — CEFAZOLIN SODIUM 2 G: 2 INJECTION, SOLUTION INTRAVENOUS at 18:09

## 2022-01-01 RX ADMIN — INSULIN GLARGINE-YFGN 10 UNITS: 100 INJECTION, SOLUTION SUBCUTANEOUS at 22:12

## 2022-01-01 RX ADMIN — ROCURONIUM BROMIDE 50 MG: 10 INJECTION, SOLUTION INTRAVENOUS at 07:39

## 2022-01-01 RX ADMIN — LEVOTHYROXINE SODIUM 200 MCG: 0.1 TABLET ORAL at 05:06

## 2022-01-01 RX ADMIN — LEVOTHYROXINE SODIUM 200 MCG: 0.2 TABLET ORAL at 04:32

## 2022-01-01 RX ADMIN — FUROSEMIDE 20 MG: 20 TABLET ORAL at 05:03

## 2022-01-01 RX ADMIN — NYSTATIN: 100000 POWDER TOPICAL at 05:35

## 2022-01-01 RX ADMIN — METOPROLOL TARTRATE 25 MG: 25 TABLET, FILM COATED ORAL at 16:48

## 2022-01-01 RX ADMIN — POTASSIUM CHLORIDE 40 MEQ: 1500 TABLET, EXTENDED RELEASE ORAL at 17:04

## 2022-01-01 RX ADMIN — METOPROLOL TARTRATE 25 MG: 25 TABLET, FILM COATED ORAL at 05:40

## 2022-01-01 RX ADMIN — ACETAZOLAMIDE 500 MG: 500 INJECTION, POWDER, LYOPHILIZED, FOR SOLUTION INTRAVENOUS at 08:21

## 2022-01-01 RX ADMIN — TRAZODONE HYDROCHLORIDE 50 MG: 50 TABLET ORAL at 22:30

## 2022-01-01 RX ADMIN — ALLOPURINOL 100 MG: 100 TABLET ORAL at 21:15

## 2022-01-01 RX ADMIN — FERROUS SULFATE TAB 325 MG (65 MG ELEMENTAL FE) 325 MG: 325 (65 FE) TAB at 17:53

## 2022-01-01 RX ADMIN — POTASSIUM CHLORIDE 40 MEQ: 1500 TABLET, EXTENDED RELEASE ORAL at 04:57

## 2022-01-01 RX ADMIN — HEPARIN SODIUM 10000 UNITS: 1000 INJECTION, SOLUTION INTRAVENOUS; SUBCUTANEOUS at 08:22

## 2022-01-01 RX ADMIN — FUROSEMIDE 40 MG: 20 INJECTION, SOLUTION INTRAMUSCULAR; INTRAVENOUS at 04:45

## 2022-01-01 RX ADMIN — SODIUM CHLORIDE, POTASSIUM CHLORIDE, SODIUM LACTATE AND CALCIUM CHLORIDE: 600; 310; 30; 20 INJECTION, SOLUTION INTRAVENOUS at 07:30

## 2022-01-01 RX ADMIN — SPIRONOLACTONE 25 MG: 25 TABLET ORAL at 06:24

## 2022-01-01 RX ADMIN — LEVOTHYROXINE SODIUM 200 MCG: 0.1 TABLET ORAL at 06:47

## 2022-01-01 RX ADMIN — OXYCODONE 5 MG: 5 TABLET ORAL at 21:02

## 2022-01-01 RX ADMIN — CEFAZOLIN SODIUM 2 G: 2 INJECTION, SOLUTION INTRAVENOUS at 09:45

## 2022-01-01 RX ADMIN — MIDODRINE HYDROCHLORIDE 10 MG: 5 TABLET ORAL at 17:17

## 2022-01-01 RX ADMIN — WARFARIN SODIUM 2.5 MG: 2.5 TABLET ORAL at 17:33

## 2022-01-01 RX ADMIN — TRAZODONE HYDROCHLORIDE 50 MG: 50 TABLET ORAL at 21:27

## 2022-01-01 RX ADMIN — ALLOPURINOL 100 MG: 100 TABLET ORAL at 21:59

## 2022-01-01 RX ADMIN — DOCUSATE SODIUM 50 MG AND SENNOSIDES 8.6 MG 2 TABLET: 8.6; 5 TABLET, FILM COATED ORAL at 17:04

## 2022-01-01 RX ADMIN — TRAMADOL HYDROCHLORIDE 50 MG: 50 TABLET, COATED ORAL at 11:06

## 2022-01-01 RX ADMIN — FUROSEMIDE 40 MG: 10 INJECTION, SOLUTION INTRAMUSCULAR; INTRAVENOUS at 06:47

## 2022-01-01 RX ADMIN — ALLOPURINOL 100 MG: 100 TABLET ORAL at 20:12

## 2022-01-01 RX ADMIN — MIDODRINE HYDROCHLORIDE 5 MG: 5 TABLET ORAL at 11:29

## 2022-01-01 RX ADMIN — SUCCINYLCHOLINE CHLORIDE 120 MG: 20 INJECTION, SOLUTION INTRAMUSCULAR; INTRAVENOUS; PARENTERAL at 07:37

## 2022-01-01 RX ADMIN — CEPHALEXIN 1000 MG: 500 CAPSULE ORAL at 18:18

## 2022-01-01 RX ADMIN — ROSUVASTATIN CALCIUM 20 MG: 20 TABLET, FILM COATED ORAL at 17:07

## 2022-01-01 RX ADMIN — ONDANSETRON 4 MG: 2 INJECTION INTRAMUSCULAR; INTRAVENOUS at 08:36

## 2022-01-01 RX ADMIN — METOPROLOL TARTRATE 25 MG: 25 TABLET, FILM COATED ORAL at 08:16

## 2022-01-01 RX ADMIN — LEVOTHYROXINE SODIUM 200 MCG: 0.2 TABLET ORAL at 05:12

## 2022-01-01 RX ADMIN — CITALOPRAM HYDROBROMIDE 20 MG: 20 TABLET ORAL at 05:24

## 2022-01-01 RX ADMIN — INSULIN GLARGINE-YFGN 5 UNITS: 100 INJECTION, SOLUTION SUBCUTANEOUS at 17:36

## 2022-01-01 RX ADMIN — GUAIFENESIN 600 MG: 600 TABLET, EXTENDED RELEASE ORAL at 18:18

## 2022-01-01 RX ADMIN — MAGNESIUM OXIDE 400 MG (241.3 MG MAGNESIUM) TABLET 400 MG: TABLET at 05:47

## 2022-01-01 RX ADMIN — CEFAZOLIN SODIUM 2 G: 2 INJECTION, SOLUTION INTRAVENOUS at 17:36

## 2022-01-01 RX ADMIN — WARFARIN SODIUM 2.5 MG: 2.5 TABLET ORAL at 19:57

## 2022-01-01 RX ADMIN — GEMFIBROZIL 600 MG: 600 TABLET ORAL at 05:23

## 2022-01-01 RX ADMIN — GEMFIBROZIL 600 MG: 600 TABLET ORAL at 04:15

## 2022-01-01 RX ADMIN — CEFAZOLIN SODIUM 2 G: 2 INJECTION, SOLUTION INTRAVENOUS at 09:58

## 2022-01-01 RX ADMIN — METOPROLOL TARTRATE 25 MG: 25 TABLET, FILM COATED ORAL at 04:48

## 2022-01-01 RX ADMIN — OXYCODONE HYDROCHLORIDE 5 MG: 5 TABLET ORAL at 09:38

## 2022-01-01 RX ADMIN — METOPROLOL TARTRATE 5 MG: 1 INJECTION, SOLUTION INTRAVENOUS at 01:59

## 2022-01-01 RX ADMIN — SPIRONOLACTONE 25 MG: 25 TABLET ORAL at 06:13

## 2022-01-01 RX ADMIN — INSULIN HUMAN 2 UNITS: 100 INJECTION, SOLUTION PARENTERAL at 22:11

## 2022-01-01 RX ADMIN — LEVOTHYROXINE SODIUM 200 MCG: 0.2 TABLET ORAL at 04:45

## 2022-01-01 RX ADMIN — NYSTATIN 100000 UNITS: 100000 POWDER TOPICAL at 18:33

## 2022-01-01 RX ADMIN — WATER 0.05 MCG/KG/MIN: 1 SOLUTION INTRAVENOUS at 14:27

## 2022-01-01 RX ADMIN — FERROUS SULFATE TAB 325 MG (65 MG ELEMENTAL FE) 325 MG: 325 (65 FE) TAB at 05:25

## 2022-01-01 RX ADMIN — MAGNESIUM OXIDE 400 MG (241.3 MG MAGNESIUM) TABLET 400 MG: TABLET at 08:34

## 2022-01-01 RX ADMIN — FUROSEMIDE 80 MG: 10 INJECTION, SOLUTION INTRAMUSCULAR; INTRAVENOUS at 13:23

## 2022-01-01 RX ADMIN — ACETAZOLAMIDE 500 MG: 500 INJECTION, POWDER, LYOPHILIZED, FOR SOLUTION INTRAVENOUS at 23:32

## 2022-01-01 RX ADMIN — ACETAZOLAMIDE 500 MG: 500 INJECTION, POWDER, LYOPHILIZED, FOR SOLUTION INTRAVENOUS at 17:54

## 2022-01-01 RX ADMIN — METOPROLOL TARTRATE 25 MG: 25 TABLET, FILM COATED ORAL at 11:50

## 2022-01-01 RX ADMIN — ROSUVASTATIN CALCIUM 20 MG: 20 TABLET, FILM COATED ORAL at 19:58

## 2022-01-01 RX ADMIN — METOPROLOL TARTRATE 25 MG: 25 TABLET, FILM COATED ORAL at 17:33

## 2022-01-01 RX ADMIN — GUAIFENESIN 600 MG: 600 TABLET, EXTENDED RELEASE ORAL at 17:50

## 2022-01-01 RX ADMIN — FENTANYL CITRATE 50 MCG: 50 INJECTION, SOLUTION INTRAMUSCULAR; INTRAVENOUS at 09:25

## 2022-01-01 RX ADMIN — METOPROLOL TARTRATE 25 MG: 25 TABLET, FILM COATED ORAL at 06:25

## 2022-01-01 RX ADMIN — INSULIN HUMAN 1 UNITS: 100 INJECTION, SOLUTION PARENTERAL at 21:21

## 2022-01-01 RX ADMIN — FUROSEMIDE 40 MG: 20 INJECTION, SOLUTION INTRAMUSCULAR; INTRAVENOUS at 17:27

## 2022-01-01 RX ADMIN — PIOGLITAZONE 15 MG: 15 TABLET ORAL at 05:19

## 2022-01-01 RX ADMIN — LOSARTAN POTASSIUM 25 MG: 50 TABLET, FILM COATED ORAL at 20:06

## 2022-01-01 RX ADMIN — GEMFIBROZIL 600 MG: 600 TABLET ORAL at 05:24

## 2022-01-01 RX ADMIN — FERROUS SULFATE TAB 325 MG (65 MG ELEMENTAL FE) 325 MG: 325 (65 FE) TAB at 05:11

## 2022-01-01 RX ADMIN — PROPOFOL 10 MG: 10 INJECTION, EMULSION INTRAVENOUS at 14:59

## 2022-01-01 RX ADMIN — SUGAMMADEX 200 MG: 100 INJECTION, SOLUTION INTRAVENOUS at 08:45

## 2022-01-01 RX ADMIN — MIDODRINE HYDROCHLORIDE 10 MG: 5 TABLET ORAL at 12:11

## 2022-01-01 RX ADMIN — FENTANYL CITRATE 50 MCG: 50 INJECTION, SOLUTION INTRAMUSCULAR; INTRAVENOUS at 09:16

## 2022-01-01 RX ADMIN — FUROSEMIDE 60 MG: 10 INJECTION, SOLUTION INTRAMUSCULAR; INTRAVENOUS at 05:47

## 2022-01-01 RX ADMIN — LEVOTHYROXINE SODIUM 200 MCG: 0.2 TABLET ORAL at 06:32

## 2022-01-01 RX ADMIN — OXYCODONE 5 MG: 5 TABLET ORAL at 21:19

## 2022-01-01 RX ADMIN — DOCUSATE SODIUM 50 MG AND SENNOSIDES 8.6 MG 2 TABLET: 8.6; 5 TABLET, FILM COATED ORAL at 17:53

## 2022-01-01 RX ADMIN — METOPROLOL SUCCINATE 12.5 MG: 25 TABLET, EXTENDED RELEASE ORAL at 17:04

## 2022-01-01 RX ADMIN — METOPROLOL TARTRATE 25 MG: 25 TABLET, FILM COATED ORAL at 05:49

## 2022-01-01 RX ADMIN — NYSTATIN: 100000 POWDER TOPICAL at 16:43

## 2022-01-01 RX ADMIN — LIDOCAINE HYDROCHLORIDE: 20 INJECTION, SOLUTION INFILTRATION; PERINEURAL at 14:04

## 2022-01-01 RX ADMIN — ROSUVASTATIN CALCIUM 20 MG: 20 TABLET, FILM COATED ORAL at 16:43

## 2022-01-01 RX ADMIN — CITALOPRAM HYDROBROMIDE 20 MG: 20 TABLET ORAL at 04:14

## 2022-01-01 RX ADMIN — OXYBUTYNIN CHLORIDE 15 MG: 5 TABLET, EXTENDED RELEASE ORAL at 05:19

## 2022-01-01 RX ADMIN — LOSARTAN POTASSIUM 50 MG: 50 TABLET, FILM COATED ORAL at 21:57

## 2022-01-01 RX ADMIN — METOPROLOL TARTRATE 25 MG: 25 TABLET, FILM COATED ORAL at 05:04

## 2022-01-01 RX ADMIN — METOPROLOL TARTRATE 25 MG: 25 TABLET, FILM COATED ORAL at 17:12

## 2022-01-01 RX ADMIN — ACETAZOLAMIDE 500 MG: 500 INJECTION, POWDER, LYOPHILIZED, FOR SOLUTION INTRAVENOUS at 05:12

## 2022-01-01 RX ADMIN — DOCUSATE SODIUM 50 MG AND SENNOSIDES 8.6 MG 2 TABLET: 8.6; 5 TABLET, FILM COATED ORAL at 06:03

## 2022-01-01 RX ADMIN — LIDOCAINE HYDROCHLORIDE 60 MG: 20 INJECTION, SOLUTION EPIDURAL; INFILTRATION; INTRACAUDAL at 14:08

## 2022-01-01 RX ADMIN — HYDROXYZINE HYDROCHLORIDE 50 MG: 25 TABLET, FILM COATED ORAL at 20:12

## 2022-01-01 RX ADMIN — FERROUS SULFATE TAB 325 MG (65 MG ELEMENTAL FE) 325 MG: 325 (65 FE) TAB at 17:54

## 2022-01-01 RX ADMIN — LEVOTHYROXINE SODIUM 200 MCG: 0.2 TABLET ORAL at 04:15

## 2022-01-01 RX ADMIN — ETOMIDATE 16 MG: 2 INJECTION INTRAVENOUS at 14:08

## 2022-01-01 RX ADMIN — GEMFIBROZIL 600 MG: 600 TABLET ORAL at 06:47

## 2022-01-01 RX ADMIN — ALPRAZOLAM 0.25 MG: 0.25 TABLET ORAL at 17:34

## 2022-01-01 RX ADMIN — FERROUS SULFATE TAB 325 MG (65 MG ELEMENTAL FE) 325 MG: 325 (65 FE) TAB at 17:07

## 2022-01-01 RX ADMIN — ACETAZOLAMIDE 250 MG: 500 INJECTION, POWDER, LYOPHILIZED, FOR SOLUTION INTRAVENOUS at 17:04

## 2022-01-01 RX ADMIN — FERROUS SULFATE TAB 325 MG (65 MG ELEMENTAL FE) 325 MG: 325 (65 FE) TAB at 06:03

## 2022-01-01 RX ADMIN — DOCUSATE SODIUM 50 MG AND SENNOSIDES 8.6 MG 2 TABLET: 8.6; 5 TABLET, FILM COATED ORAL at 06:12

## 2022-01-01 RX ADMIN — FUROSEMIDE 40 MG: 20 INJECTION, SOLUTION INTRAMUSCULAR; INTRAVENOUS at 17:35

## 2022-01-01 RX ADMIN — LEVOTHYROXINE SODIUM 200 MCG: 0.2 TABLET ORAL at 04:44

## 2022-01-01 RX ADMIN — SPIRONOLACTONE 25 MG: 25 TABLET ORAL at 06:14

## 2022-01-01 RX ADMIN — CITALOPRAM HYDROBROMIDE 20 MG: 20 TABLET ORAL at 05:23

## 2022-01-01 RX ADMIN — FERROUS SULFATE TAB 325 MG (65 MG ELEMENTAL FE) 325 MG: 325 (65 FE) TAB at 04:14

## 2022-01-01 RX ADMIN — Medication 10 MG: at 13:34

## 2022-01-01 RX ADMIN — FERROUS SULFATE TAB 325 MG (65 MG ELEMENTAL FE) 325 MG: 325 (65 FE) TAB at 17:20

## 2022-01-01 RX ADMIN — SODIUM PHOSPHATE, MONOBASIC, MONOHYDRATE AND SODIUM PHOSPHATE, DIBASIC, ANHYDROUS 30 MMOL: 276; 142 INJECTION, SOLUTION INTRAVENOUS at 16:58

## 2022-01-01 RX ADMIN — WATER 0.05 MCG/KG/MIN: 1 SOLUTION INTRAVENOUS at 22:05

## 2022-01-01 RX ADMIN — ALLOPURINOL 100 MG: 100 TABLET ORAL at 21:27

## 2022-01-01 RX ADMIN — ALLOPURINOL 100 MG: 100 TABLET ORAL at 21:12

## 2022-01-01 RX ADMIN — ROSUVASTATIN CALCIUM 20 MG: 20 TABLET, FILM COATED ORAL at 16:48

## 2022-01-01 RX ADMIN — METOPROLOL TARTRATE 25 MG: 25 TABLET, FILM COATED ORAL at 15:59

## 2022-01-01 RX ADMIN — GEMFIBROZIL 600 MG: 600 TABLET ORAL at 08:04

## 2022-01-01 RX ADMIN — MAGNESIUM SULFATE HEPTAHYDRATE 2 G: 40 INJECTION, SOLUTION INTRAVENOUS at 07:52

## 2022-01-01 RX ADMIN — POTASSIUM CHLORIDE 40 MEQ: 1500 TABLET, EXTENDED RELEASE ORAL at 04:44

## 2022-01-01 RX ADMIN — FUROSEMIDE 40 MG: 10 INJECTION, SOLUTION INTRAMUSCULAR; INTRAVENOUS at 06:34

## 2022-01-01 RX ADMIN — WARFARIN SODIUM 5 MG: 5 TABLET ORAL at 21:47

## 2022-01-01 RX ADMIN — FENTANYL CITRATE 25 MCG: 50 INJECTION, SOLUTION INTRAMUSCULAR; INTRAVENOUS at 14:17

## 2022-01-01 RX ADMIN — METOPROLOL TARTRATE 25 MG: 25 TABLET, FILM COATED ORAL at 06:47

## 2022-01-01 RX ADMIN — LEVOTHYROXINE SODIUM 200 MCG: 0.2 TABLET ORAL at 05:25

## 2022-01-01 RX ADMIN — METOPROLOL TARTRATE 25 MG: 25 TABLET, FILM COATED ORAL at 13:05

## 2022-01-01 RX ADMIN — MAGNESIUM OXIDE 400 MG (241.3 MG MAGNESIUM) TABLET 400 MG: TABLET at 05:04

## 2022-01-01 RX ADMIN — CEFAZOLIN SODIUM 2 G: 2 INJECTION, SOLUTION INTRAVENOUS at 09:41

## 2022-01-01 RX ADMIN — PROPOFOL 10 MG: 10 INJECTION, EMULSION INTRAVENOUS at 15:03

## 2022-01-01 RX ADMIN — POLYETHYLENE GLYCOL 3350 1 PACKET: 17 POWDER, FOR SOLUTION ORAL at 05:24

## 2022-01-01 RX ADMIN — CITALOPRAM HYDROBROMIDE 20 MG: 20 TABLET ORAL at 05:39

## 2022-01-01 RX ADMIN — NYSTATIN 1 APPLICATION: 100000 POWDER TOPICAL at 05:05

## 2022-01-01 RX ADMIN — HYDRALAZINE HYDROCHLORIDE 5 MG: 20 INJECTION INTRAMUSCULAR; INTRAVENOUS at 08:36

## 2022-01-01 RX ADMIN — METOPROLOL TARTRATE 25 MG: 25 TABLET, FILM COATED ORAL at 17:27

## 2022-01-01 RX ADMIN — MORPHINE SULFATE 10 MG: 10 INJECTION INTRAVENOUS at 09:51

## 2022-01-01 RX ADMIN — CEPHALEXIN 1000 MG: 500 CAPSULE ORAL at 21:46

## 2022-01-01 RX ADMIN — DIGOXIN 250 MCG: 0.12 TABLET ORAL at 06:14

## 2022-01-01 RX ADMIN — METOPROLOL TARTRATE 25 MG: 25 TABLET, FILM COATED ORAL at 18:00

## 2022-01-01 RX ADMIN — ALLOPURINOL 100 MG: 100 TABLET ORAL at 20:06

## 2022-01-01 RX ADMIN — CEFAZOLIN 1000 MG: 330 INJECTION, POWDER, FOR SOLUTION INTRAMUSCULAR; INTRAVENOUS at 14:22

## 2022-01-01 RX ADMIN — METOPROLOL TARTRATE 50 MG: 25 TABLET, FILM COATED ORAL at 06:33

## 2022-01-01 RX ADMIN — NYSTATIN 100000 UNITS: 100000 POWDER TOPICAL at 16:56

## 2022-01-01 RX ADMIN — POTASSIUM CHLORIDE 20 MEQ: 20 TABLET, EXTENDED RELEASE ORAL at 18:43

## 2022-01-01 RX ADMIN — ALLOPURINOL 100 MG: 100 TABLET ORAL at 21:32

## 2022-01-01 RX ADMIN — MAGNESIUM OXIDE 400 MG (241.3 MG MAGNESIUM) TABLET 400 MG: TABLET at 06:14

## 2022-01-01 RX ADMIN — CITALOPRAM HYDROBROMIDE 20 MG: 20 TABLET ORAL at 05:11

## 2022-01-01 RX ADMIN — FUROSEMIDE 40 MG: 20 INJECTION, SOLUTION INTRAMUSCULAR; INTRAVENOUS at 06:00

## 2022-01-01 RX ADMIN — WARFARIN SODIUM 2.5 MG: 2.5 TABLET ORAL at 17:26

## 2022-01-01 RX ADMIN — GEMFIBROZIL 600 MG: 600 TABLET ORAL at 05:40

## 2022-01-01 RX ADMIN — FERROUS SULFATE TAB 325 MG (65 MG ELEMENTAL FE) 325 MG: 325 (65 FE) TAB at 17:18

## 2022-01-01 RX ADMIN — POLYETHYLENE GLYCOL 3350 1 PACKET: 17 POWDER, FOR SOLUTION ORAL at 06:12

## 2022-01-01 RX ADMIN — DEXTROSE 25 G: 10 SOLUTION INTRAVENOUS at 12:20

## 2022-01-01 RX ADMIN — CEFAZOLIN SODIUM 2 G: 2 INJECTION, SOLUTION INTRAVENOUS at 11:54

## 2022-01-01 RX ADMIN — GEMFIBROZIL 600 MG: 600 TABLET ORAL at 06:14

## 2022-01-01 RX ADMIN — MIDODRINE HYDROCHLORIDE 2.5 MG: 5 TABLET ORAL at 07:31

## 2022-01-01 RX ADMIN — OXYCODONE 2.5 MG: 5 TABLET ORAL at 21:31

## 2022-01-01 RX ADMIN — METOPROLOL TARTRATE 50 MG: 50 TABLET, FILM COATED ORAL at 18:09

## 2022-01-01 RX ADMIN — NYSTATIN 1 APPLICATION: 100000 POWDER TOPICAL at 04:57

## 2022-01-01 RX ADMIN — NYSTATIN 100000 UNITS: 100000 POWDER TOPICAL at 17:21

## 2022-01-01 RX ADMIN — MIDODRINE HYDROCHLORIDE 10 MG: 5 TABLET ORAL at 08:08

## 2022-01-01 RX ADMIN — MIDODRINE HYDROCHLORIDE 5 MG: 5 TABLET ORAL at 17:09

## 2022-01-01 RX ADMIN — WATER 0.05 MCG/KG/MIN: 1 SOLUTION INTRAVENOUS at 10:28

## 2022-01-01 RX ADMIN — CEFAZOLIN 2 G: 2 INJECTION, POWDER, FOR SOLUTION INTRAMUSCULAR; INTRAVENOUS at 10:27

## 2022-01-01 RX ADMIN — TRAZODONE HYDROCHLORIDE 50 MG: 50 TABLET ORAL at 23:35

## 2022-01-01 RX ADMIN — HEPARIN SODIUM: 1000 INJECTION, SOLUTION INTRAVENOUS; SUBCUTANEOUS at 14:04

## 2022-01-01 RX ADMIN — FUROSEMIDE 40 MG: 10 INJECTION, SOLUTION INTRAMUSCULAR; INTRAVENOUS at 17:32

## 2022-01-01 RX ADMIN — PHENYLEPHRINE HYDROCHLORIDE 100 MCG: 10 INJECTION INTRAVENOUS at 07:37

## 2022-01-01 RX ADMIN — MIDODRINE HYDROCHLORIDE 10 MG: 5 TABLET ORAL at 17:26

## 2022-01-01 RX ADMIN — SPIRONOLACTONE 25 MG: 25 TABLET ORAL at 06:03

## 2022-01-01 RX ADMIN — NYSTATIN 100000 UNITS: 100000 POWDER TOPICAL at 05:12

## 2022-01-01 RX ADMIN — INSULIN GLARGINE-YFGN 5 UNITS: 100 INJECTION, SOLUTION SUBCUTANEOUS at 17:26

## 2022-01-01 RX ADMIN — SPIRONOLACTONE 25 MG: 25 TABLET ORAL at 05:50

## 2022-01-01 RX ADMIN — ALLOPURINOL 100 MG: 100 TABLET ORAL at 21:57

## 2022-01-01 RX ADMIN — IPRATROPIUM BROMIDE AND ALBUTEROL SULFATE 3 ML: .5; 2.5 SOLUTION RESPIRATORY (INHALATION) at 10:46

## 2022-01-01 RX ADMIN — METOPROLOL TARTRATE 50 MG: 50 TABLET, FILM COATED ORAL at 17:19

## 2022-01-01 RX ADMIN — MAGNESIUM OXIDE 400 MG (241.3 MG MAGNESIUM) TABLET 400 MG: TABLET at 06:25

## 2022-01-01 RX ADMIN — NYSTATIN 100000 UNITS: 100000 POWDER TOPICAL at 13:05

## 2022-01-01 RX ADMIN — ALLOPURINOL 100 MG: 100 TABLET ORAL at 21:46

## 2022-01-01 RX ADMIN — ROSUVASTATIN CALCIUM 20 MG: 20 TABLET, FILM COATED ORAL at 17:19

## 2022-01-01 RX ADMIN — SPIRONOLACTONE 25 MG: 25 TABLET ORAL at 05:03

## 2022-01-01 RX ADMIN — CEFAZOLIN SODIUM 2 G: 2 INJECTION, SOLUTION INTRAVENOUS at 02:18

## 2022-01-01 RX ADMIN — GEMFIBROZIL 600 MG: 600 TABLET ORAL at 05:48

## 2022-01-01 RX ADMIN — DIGOXIN 250 MCG: 0.12 TABLET ORAL at 06:25

## 2022-01-01 RX ADMIN — CEPHALEXIN 1000 MG: 500 CAPSULE ORAL at 12:11

## 2022-01-01 RX ADMIN — ACETAMINOPHEN 650 MG: 325 TABLET, FILM COATED ORAL at 17:04

## 2022-01-01 RX ADMIN — TRAZODONE HYDROCHLORIDE 50 MG: 50 TABLET ORAL at 21:19

## 2022-01-01 RX ADMIN — ACETAMINOPHEN 650 MG: 325 TABLET, FILM COATED ORAL at 15:52

## 2022-01-01 RX ADMIN — ROSUVASTATIN CALCIUM 20 MG: 20 TABLET, FILM COATED ORAL at 18:18

## 2022-01-01 RX ADMIN — CITALOPRAM HYDROBROMIDE 20 MG: 20 TABLET ORAL at 05:15

## 2022-01-01 RX ADMIN — FERROUS SULFATE TAB 325 MG (65 MG ELEMENTAL FE) 325 MG: 325 (65 FE) TAB at 17:26

## 2022-01-01 RX ADMIN — LEVOTHYROXINE SODIUM 175 MCG: 0.17 TABLET ORAL at 05:25

## 2022-01-01 RX ADMIN — ALLOPURINOL 100 MG: 100 TABLET ORAL at 21:29

## 2022-01-01 RX ADMIN — CITALOPRAM HYDROBROMIDE 20 MG: 20 TABLET ORAL at 06:04

## 2022-01-01 RX ADMIN — FUROSEMIDE 40 MG: 10 INJECTION, SOLUTION INTRAMUSCULAR; INTRAVENOUS at 08:36

## 2022-01-01 RX ADMIN — WATER 0.05 MCG/KG/MIN: 1 SOLUTION INTRAVENOUS at 01:55

## 2022-01-01 RX ADMIN — WARFARIN SODIUM 1 MG: 1 TABLET ORAL at 17:07

## 2022-01-01 RX ADMIN — DOCUSATE SODIUM 50 MG AND SENNOSIDES 8.6 MG 2 TABLET: 8.6; 5 TABLET, FILM COATED ORAL at 05:25

## 2022-01-01 RX ADMIN — NYSTATIN: 100000 POWDER TOPICAL at 06:49

## 2022-01-01 RX ADMIN — CEPHALEXIN 1000 MG: 500 CAPSULE ORAL at 13:09

## 2022-01-01 RX ADMIN — NYSTATIN 100000 UNITS: 100000 POWDER TOPICAL at 05:26

## 2022-01-01 RX ADMIN — HYDROXYZINE HYDROCHLORIDE 50 MG: 25 TABLET, FILM COATED ORAL at 07:53

## 2022-01-01 RX ADMIN — ONDANSETRON 4 MG: 2 INJECTION INTRAMUSCULAR; INTRAVENOUS at 12:56

## 2022-01-01 RX ADMIN — GEMFIBROZIL 600 MG: 600 TABLET ORAL at 04:57

## 2022-01-01 RX ADMIN — FERROUS SULFATE TAB 325 MG (65 MG ELEMENTAL FE) 325 MG: 325 (65 FE) TAB at 16:19

## 2022-01-01 RX ADMIN — LEVOTHYROXINE SODIUM 200 MCG: 0.1 TABLET ORAL at 05:34

## 2022-01-01 RX ADMIN — METOPROLOL TARTRATE 25 MG: 25 TABLET, FILM COATED ORAL at 13:11

## 2022-01-01 RX ADMIN — ACETAZOLAMIDE 500 MG: 500 INJECTION, POWDER, LYOPHILIZED, FOR SOLUTION INTRAVENOUS at 14:30

## 2022-01-01 RX ADMIN — FUROSEMIDE 40 MG: 10 INJECTION, SOLUTION INTRAMUSCULAR; INTRAVENOUS at 18:18

## 2022-01-01 RX ADMIN — MIDODRINE HYDROCHLORIDE 5 MG: 5 TABLET ORAL at 08:34

## 2022-01-01 RX ADMIN — LIDOCAINE HYDROCHLORIDE: 20 INJECTION, SOLUTION INFILTRATION; PERINEURAL at 14:22

## 2022-01-01 RX ADMIN — GUAIFENESIN 600 MG: 600 TABLET, EXTENDED RELEASE ORAL at 05:06

## 2022-01-01 RX ADMIN — NYSTATIN: 100000 POWDER TOPICAL at 17:50

## 2022-01-01 RX ADMIN — OXYCODONE HYDROCHLORIDE 10 MG: 5 SOLUTION ORAL at 09:13

## 2022-01-01 RX ADMIN — METOPROLOL TARTRATE 25 MG: 25 TABLET, FILM COATED ORAL at 05:11

## 2022-01-01 RX ADMIN — IOHEXOL 100 ML: 350 INJECTION, SOLUTION INTRAVENOUS at 13:45

## 2022-01-01 RX ADMIN — VERAPAMIL HYDROCHLORIDE 5 MG: 2.5 INJECTION, SOLUTION INTRAVENOUS at 14:04

## 2022-01-01 RX ADMIN — FERROUS SULFATE TAB 325 MG (65 MG ELEMENTAL FE) 325 MG: 325 (65 FE) TAB at 05:40

## 2022-01-01 RX ADMIN — ALPRAZOLAM 0.25 MG: 0.25 TABLET ORAL at 10:05

## 2022-01-01 RX ADMIN — MAGNESIUM OXIDE 400 MG (241.3 MG MAGNESIUM) TABLET 400 MG: TABLET at 06:02

## 2022-01-01 RX ADMIN — MIDODRINE HYDROCHLORIDE 2.5 MG: 5 TABLET ORAL at 16:49

## 2022-01-01 RX ADMIN — ROSUVASTATIN CALCIUM 20 MG: 20 TABLET, FILM COATED ORAL at 17:17

## 2022-01-01 RX ADMIN — FUROSEMIDE 40 MG: 10 INJECTION, SOLUTION INTRAMUSCULAR; INTRAVENOUS at 01:15

## 2022-01-01 RX ADMIN — GEMFIBROZIL 600 MG: 600 TABLET ORAL at 06:13

## 2022-01-01 RX ADMIN — METOPROLOL TARTRATE 25 MG: 25 TABLET, FILM COATED ORAL at 06:15

## 2022-01-01 RX ADMIN — MAGNESIUM SULFATE HEPTAHYDRATE 2 G: 40 INJECTION, SOLUTION INTRAVENOUS at 17:00

## 2022-01-01 RX ADMIN — CITALOPRAM HYDROBROMIDE 20 MG: 20 TABLET ORAL at 06:14

## 2022-01-01 RX ADMIN — FERROUS SULFATE TAB 325 MG (65 MG ELEMENTAL FE) 325 MG: 325 (65 FE) TAB at 06:14

## 2022-01-01 RX ADMIN — CITALOPRAM HYDROBROMIDE 20 MG: 20 TABLET ORAL at 05:34

## 2022-01-01 RX ADMIN — GEMFIBROZIL 600 MG: 600 TABLET ORAL at 06:34

## 2022-01-01 RX ADMIN — DEXAMETHASONE SODIUM PHOSPHATE 4 MG: 4 INJECTION, SOLUTION INTRA-ARTICULAR; INTRALESIONAL; INTRAMUSCULAR; INTRAVENOUS; SOFT TISSUE at 07:39

## 2022-01-01 RX ADMIN — SODIUM CHLORIDE, POTASSIUM CHLORIDE, SODIUM LACTATE AND CALCIUM CHLORIDE: 600; 310; 30; 20 INJECTION, SOLUTION INTRAVENOUS at 13:28

## 2022-01-01 RX ADMIN — ACETAZOLAMIDE 500 MG: 500 INJECTION, POWDER, LYOPHILIZED, FOR SOLUTION INTRAVENOUS at 00:37

## 2022-01-01 RX ADMIN — GEMFIBROZIL 600 MG: 600 TABLET ORAL at 05:06

## 2022-01-01 RX ADMIN — METOPROLOL TARTRATE 50 MG: 50 TABLET, FILM COATED ORAL at 05:48

## 2022-01-01 RX ADMIN — CEFAZOLIN SODIUM 2 G: 2 INJECTION, SOLUTION INTRAVENOUS at 05:09

## 2022-01-01 RX ADMIN — LEVOTHYROXINE SODIUM 200 MCG: 0.1 TABLET ORAL at 04:48

## 2022-01-01 RX ADMIN — ROSUVASTATIN CALCIUM 20 MG: 20 TABLET, FILM COATED ORAL at 17:26

## 2022-01-01 RX ADMIN — ROSUVASTATIN CALCIUM 20 MG: 20 TABLET, FILM COATED ORAL at 17:27

## 2022-01-01 RX ADMIN — MIDODRINE HYDROCHLORIDE 10 MG: 5 TABLET ORAL at 17:19

## 2022-01-01 RX ADMIN — METOPROLOL TARTRATE 25 MG: 25 TABLET, FILM COATED ORAL at 17:54

## 2022-01-01 RX ADMIN — CITALOPRAM HYDROBROMIDE 20 MG: 20 TABLET ORAL at 06:12

## 2022-01-01 RX ADMIN — CEFAZOLIN SODIUM 2 G: 2 INJECTION, SOLUTION INTRAVENOUS at 01:54

## 2022-01-01 RX ADMIN — LEVOTHYROXINE SODIUM 175 MCG: 0.17 TABLET ORAL at 05:50

## 2022-01-01 RX ADMIN — NYSTATIN 100000 UNITS: 100000 POWDER TOPICAL at 12:05

## 2022-01-01 RX ADMIN — GEMFIBROZIL 600 MG: 600 TABLET ORAL at 05:11

## 2022-01-01 RX ADMIN — WARFARIN SODIUM 2.5 MG: 2.5 TABLET ORAL at 16:18

## 2022-01-01 RX ADMIN — ALLOPURINOL 100 MG: 100 TABLET ORAL at 20:53

## 2022-01-01 RX ADMIN — LIDOCAINE HYDROCHLORIDE 4 ML: 40 SOLUTION TOPICAL at 07:38

## 2022-01-01 RX ADMIN — WARFARIN SODIUM 3 MG: 3 TABLET ORAL at 23:34

## 2022-01-01 RX ADMIN — ALLOPURINOL 100 MG: 100 TABLET ORAL at 23:34

## 2022-01-01 RX ADMIN — ALLOPURINOL 100 MG: 100 TABLET ORAL at 20:47

## 2022-01-01 RX ADMIN — IOHEXOL 16 ML: 350 INJECTION, SOLUTION INTRAVENOUS at 13:45

## 2022-01-01 RX ADMIN — SODIUM PHOSPHATE, MONOBASIC, MONOHYDRATE AND SODIUM PHOSPHATE, DIBASIC, ANHYDROUS 15 MMOL: 276; 142 INJECTION, SOLUTION INTRAVENOUS at 23:03

## 2022-01-01 RX ADMIN — NYSTATIN 100000 UNITS: 100000 POWDER TOPICAL at 12:00

## 2022-01-01 RX ADMIN — FERROUS SULFATE TAB 325 MG (65 MG ELEMENTAL FE) 325 MG: 325 (65 FE) TAB at 17:09

## 2022-01-01 RX ADMIN — SODIUM CHLORIDE: 9 INJECTION, SOLUTION INTRAVENOUS at 13:29

## 2022-01-01 RX ADMIN — CEFAZOLIN SODIUM 2 G: 2 INJECTION, SOLUTION INTRAVENOUS at 01:59

## 2022-01-01 RX ADMIN — METOPROLOL TARTRATE 25 MG: 25 TABLET, FILM COATED ORAL at 11:06

## 2022-01-01 RX ADMIN — ALPRAZOLAM 0.25 MG: 0.25 TABLET ORAL at 12:38

## 2022-01-01 RX ADMIN — CITALOPRAM HYDROBROMIDE 20 MG: 20 TABLET ORAL at 05:04

## 2022-01-01 RX ADMIN — FERROUS SULFATE TAB 325 MG (65 MG ELEMENTAL FE) 325 MG: 325 (65 FE) TAB at 17:17

## 2022-01-01 RX ADMIN — NYSTATIN: 100000 POWDER TOPICAL at 17:58

## 2022-01-01 RX ADMIN — FUROSEMIDE 10 MG: 10 INJECTION, SOLUTION INTRAMUSCULAR; INTRAVENOUS at 12:55

## 2022-01-01 RX ADMIN — POTASSIUM CHLORIDE 40 MEQ: 1500 TABLET, EXTENDED RELEASE ORAL at 07:52

## 2022-01-01 RX ADMIN — METOPROLOL TARTRATE 50 MG: 50 TABLET, FILM COATED ORAL at 22:21

## 2022-01-01 ASSESSMENT — ENCOUNTER SYMPTOMS
BRUISES/BLEEDS EASILY: 0
ARTHRALGIAS: 1
BLURRED VISION: 0
SPEECH CHANGE: 0
HEADACHES: 0
NERVOUS/ANXIOUS: 0
NAUSEA: 0
BRUISES/BLEEDS EASILY: 1
CHILLS: 0
NAUSEA: 0
BACK PAIN: 1
ORTHOPNEA: 1
FEVER: 0
PSYCHIATRIC NEGATIVE: 1
PALPITATIONS: 0
COUGH: 0
SPUTUM PRODUCTION: 1
NAUSEA: 0
DEPRESSION: 0
ABDOMINAL PAIN: 0
COUGH: 0
HEADACHES: 0
VOMITING: 0
WEAKNESS: 1
FEVER: 0
DIZZINESS: 0
SPUTUM PRODUCTION: 1
ORTHOPNEA: 1
PALPITATIONS: 0
ABDOMINAL PAIN: 0
BRUISES/BLEEDS EASILY: 1
NERVOUS/ANXIOUS: 1
CHILLS: 0
FEVER: 0
SORE THROAT: 0
DIARRHEA: 0
WEAKNESS: 1
FEVER: 0
FEVER: 0
DEPRESSION: 0
PALPITATIONS: 0
BRUISES/BLEEDS EASILY: 0
PALPITATIONS: 1
SHORTNESS OF BREATH: 1
ORTHOPNEA: 1
SHORTNESS OF BREATH: 0
BRUISES/BLEEDS EASILY: 1
ABDOMINAL PAIN: 0
CLAUDICATION: 0
ORTHOPNEA: 1
WHEEZING: 0
EYE PAIN: 0
DIZZINESS: 0
ORTHOPNEA: 1
WHEEZING: 0
SPUTUM PRODUCTION: 1
HEADACHES: 0
SHORTNESS OF BREATH: 0
SPUTUM PRODUCTION: 1
BACK PAIN: 1
DIZZINESS: 0
MUSCULOSKELETAL NEGATIVE: 1
ORTHOPNEA: 1
CHILLS: 0
SHORTNESS OF BREATH: 0
GASTROINTESTINAL NEGATIVE: 1
SPUTUM PRODUCTION: 1
GASTROINTESTINAL NEGATIVE: 1
MYALGIAS: 0
VOMITING: 0
BACK PAIN: 1
HEADACHES: 0
SHORTNESS OF BREATH: 1
NERVOUS/ANXIOUS: 0
WEAKNESS: 1
CHEST TIGHTNESS: 0
FEVER: 0
CHEST TIGHTNESS: 0
MYALGIAS: 0
DEPRESSION: 0
NAUSEA: 0
PALPITATIONS: 0
PALPITATIONS: 0
BLURRED VISION: 0
CHEST TIGHTNESS: 0
HEADACHES: 0
FEVER: 0
FEVER: 0
DIZZINESS: 0
FEVER: 1
NERVOUS/ANXIOUS: 1
PALPITATIONS: 0
HEADACHES: 0
FATIGUE: 1
SHORTNESS OF BREATH: 1
BLURRED VISION: 0
APNEA: 0
ORTHOPNEA: 0
VOMITING: 0
DOUBLE VISION: 0
FEVER: 0
EYES NEGATIVE: 1
ABDOMINAL PAIN: 0
CHEST TIGHTNESS: 0
ABDOMINAL PAIN: 0
SHORTNESS OF BREATH: 1
EYES NEGATIVE: 1
DIARRHEA: 0
ORTHOPNEA: 0
SORE THROAT: 0
SHORTNESS OF BREATH: 0
NAUSEA: 0
TROUBLE SWALLOWING: 0
MYALGIAS: 0
MUSCULOSKELETAL NEGATIVE: 1
HEADACHES: 0
CHEST TIGHTNESS: 0
MUSCULOSKELETAL NEGATIVE: 1
PSYCHIATRIC NEGATIVE: 1
ABDOMINAL PAIN: 0
VOMITING: 0
COUGH: 0
FATIGUE: 0
ABDOMINAL PAIN: 0
BLURRED VISION: 0
FEVER: 0
NAUSEA: 0
PND: 0
DOUBLE VISION: 0
DOUBLE VISION: 0
WEIGHT LOSS: 0
DEPRESSION: 0
PALPITATIONS: 0
DIZZINESS: 0
COUGH: 0
CHILLS: 0
PSYCHIATRIC NEGATIVE: 1
EYES NEGATIVE: 1
DOUBLE VISION: 0
ABDOMINAL PAIN: 0
WEAKNESS: 0
NECK PAIN: 0
NERVOUS/ANXIOUS: 0
FEVER: 0
VOMITING: 0
NAUSEA: 0
NAUSEA: 0
DEPRESSION: 0
NERVOUS/ANXIOUS: 1
MYALGIAS: 0
EYES NEGATIVE: 1
ABDOMINAL PAIN: 0
APNEA: 0
DIARRHEA: 0
FOCAL WEAKNESS: 0
BLOOD IN STOOL: 0
STRIDOR: 0
DEPRESSION: 0
BLURRED VISION: 0
SEIZURES: 0
HEMOPTYSIS: 0
PALPITATIONS: 0
PHOTOPHOBIA: 0
BRUISES/BLEEDS EASILY: 0
FOCAL WEAKNESS: 0
ABDOMINAL PAIN: 0
BRUISES/BLEEDS EASILY: 0
HEADACHES: 0
MYALGIAS: 0
NERVOUS/ANXIOUS: 1
DIZZINESS: 0
NERVOUS/ANXIOUS: 1
ABDOMINAL PAIN: 0
BRUISES/BLEEDS EASILY: 1
DIZZINESS: 0
DIARRHEA: 0
DIZZINESS: 0
BACK PAIN: 1
NERVOUS/ANXIOUS: 1
DEPRESSION: 0
HEARTBURN: 0
CHEST TIGHTNESS: 0
SHORTNESS OF BREATH: 0
COUGH: 0
VOMITING: 0
PALPITATIONS: 0
SHORTNESS OF BREATH: 1
ORTHOPNEA: 1
DOUBLE VISION: 0
PND: 0
SHORTNESS OF BREATH: 0
SHORTNESS OF BREATH: 1
PALPITATIONS: 1
CHILLS: 0
TREMORS: 0
ABDOMINAL PAIN: 0
SPUTUM PRODUCTION: 1
EYES NEGATIVE: 1
PALPITATIONS: 0
WEAKNESS: 1
WEAKNESS: 1
NAUSEA: 0
SHORTNESS OF BREATH: 0
DOUBLE VISION: 0
CHEST TIGHTNESS: 0
ABDOMINAL PAIN: 0
SPEECH DIFFICULTY: 0
COUGH: 0
SHORTNESS OF BREATH: 1
ABDOMINAL PAIN: 0
DIZZINESS: 0
NERVOUS/ANXIOUS: 1
SHORTNESS OF BREATH: 1
COUGH: 0
NEUROLOGICAL NEGATIVE: 1
FEVER: 0
ABDOMINAL PAIN: 0
CHILLS: 0
SHORTNESS OF BREATH: 1
CLAUDICATION: 0
SHORTNESS OF BREATH: 1
FOCAL WEAKNESS: 0
SORE THROAT: 0
PND: 0
NUMBNESS: 0
ACTIVITY CHANGE: 0
BRUISES/BLEEDS EASILY: 1
WEAKNESS: 1
DIZZINESS: 0
VOMITING: 0
SHORTNESS OF BREATH: 1
VOMITING: 0
FEVER: 0
ABDOMINAL PAIN: 0
COUGH: 0
FEVER: 0
CHILLS: 0
MUSCULOSKELETAL NEGATIVE: 1
ABDOMINAL PAIN: 0
ORTHOPNEA: 0
FATIGUE: 1
BLURRED VISION: 0
ORTHOPNEA: 1
BRUISES/BLEEDS EASILY: 1
SHORTNESS OF BREATH: 1
PND: 0
SORE THROAT: 0
BRUISES/BLEEDS EASILY: 0
DIZZINESS: 0
HEMOPTYSIS: 0
BRUISES/BLEEDS EASILY: 1
BACK PAIN: 1
ABDOMINAL PAIN: 0
SHORTNESS OF BREATH: 1
BRUISES/BLEEDS EASILY: 0
ABDOMINAL PAIN: 0
HALLUCINATIONS: 0
NAUSEA: 0
NAUSEA: 0
CHILLS: 0
ABDOMINAL PAIN: 0
DEPRESSION: 0
WEAKNESS: 0
COUGH: 0
CARDIOVASCULAR NEGATIVE: 1
BLURRED VISION: 0
COUGH: 0
WEAKNESS: 1
FEVER: 0
PALPITATIONS: 0
CHILLS: 0
NAUSEA: 0
BLURRED VISION: 0
DIARRHEA: 0
BLURRED VISION: 0
MUSCULOSKELETAL NEGATIVE: 1
VOMITING: 0
DEPRESSION: 0
DOUBLE VISION: 0
WEAKNESS: 1
HEADACHES: 0
COUGH: 0
DEPRESSION: 0
HEARTBURN: 0
SHORTNESS OF BREATH: 0
NAUSEA: 0
CLAUDICATION: 0
CHILLS: 0
WOUND: 1
EYES NEGATIVE: 1
ABDOMINAL PAIN: 0
DIZZINESS: 0
ABDOMINAL PAIN: 0
MYALGIAS: 0
CHILLS: 0
NAUSEA: 0
HEMATOLOGIC/LYMPHATIC NEGATIVE: 1
CHILLS: 0
BACK PAIN: 1
BLURRED VISION: 0
WEAKNESS: 1
HEADACHES: 0
MYALGIAS: 0
HEADACHES: 0
MEMORY LOSS: 0
BLURRED VISION: 0
NAUSEA: 0
COUGH: 0
SHORTNESS OF BREATH: 0
SHORTNESS OF BREATH: 1
EYES NEGATIVE: 1
PALPITATIONS: 0
MUSCULOSKELETAL NEGATIVE: 1
VOMITING: 0
SHORTNESS OF BREATH: 1
HEADACHES: 0
SHORTNESS OF BREATH: 0
VOMITING: 0
POLYDIPSIA: 0
CHILLS: 0
COUGH: 0
VOMITING: 0
FEVER: 0
CHILLS: 0
FATIGUE: 1
BACK PAIN: 1
BRUISES/BLEEDS EASILY: 1
HEMOPTYSIS: 0
SHORTNESS OF BREATH: 1
APNEA: 0
NAUSEA: 0
ABDOMINAL PAIN: 0
BRUISES/BLEEDS EASILY: 0
SORE THROAT: 0
GASTROINTESTINAL NEGATIVE: 1
DEPRESSION: 0
MUSCULOSKELETAL NEGATIVE: 1
WEAKNESS: 1
PALPITATIONS: 0
NAUSEA: 0
PALPITATIONS: 0
SHORTNESS OF BREATH: 0
CHILLS: 0
SHORTNESS OF BREATH: 0
FEVER: 0
DIZZINESS: 1
PALPITATIONS: 0
FEVER: 0
HEADACHES: 0
DIZZINESS: 0
CHILLS: 0
EYES NEGATIVE: 1
COUGH: 0
FEVER: 0
PALPITATIONS: 0
FOCAL WEAKNESS: 0
HEADACHES: 0
WHEEZING: 0
COUGH: 0
DEPRESSION: 0
CHILLS: 0
CHILLS: 0
FEVER: 0
CHILLS: 0
DOUBLE VISION: 0
CHILLS: 0
MUSCULOSKELETAL NEGATIVE: 1
HEADACHES: 0
SHORTNESS OF BREATH: 1
PALPITATIONS: 0
FEVER: 0
BLOOD IN STOOL: 0
DIZZINESS: 0
NECK PAIN: 0
PALPITATIONS: 0
STRIDOR: 0
HEARTBURN: 0
HEMOPTYSIS: 0
PALPITATIONS: 0
SORE THROAT: 0
ABDOMINAL PAIN: 0
MYALGIAS: 0
BACK PAIN: 1
MYALGIAS: 0
HEADACHES: 0
DIZZINESS: 0
PALPITATIONS: 0
WEAKNESS: 0
CHOKING: 0
HEADACHES: 0
NEUROLOGICAL NEGATIVE: 1
EYES NEGATIVE: 1
SHORTNESS OF BREATH: 1
DIZZINESS: 0
DIARRHEA: 0
VOMITING: 0
FEVER: 0
HEADACHES: 0
CHILLS: 0
FEVER: 0
NERVOUS/ANXIOUS: 1
HEADACHES: 0
SHORTNESS OF BREATH: 1
DIZZINESS: 0
NAUSEA: 0
LIGHT-HEADEDNESS: 0
TINGLING: 0
DIARRHEA: 0
NECK PAIN: 0
COUGH: 0
WEAKNESS: 1
DOUBLE VISION: 0
FEVER: 0
MYALGIAS: 0
PALPITATIONS: 0
EYES NEGATIVE: 1
COUGH: 0
WEAKNESS: 0
NAUSEA: 0
HEADACHES: 0
NEUROLOGICAL NEGATIVE: 1
FATIGUE: 1
MUSCULOSKELETAL NEGATIVE: 1
FOCAL WEAKNESS: 0
FOCAL WEAKNESS: 0
VOMITING: 0
PALPITATIONS: 0
DEPRESSION: 0
FEVER: 0
DIARRHEA: 0
CARDIOVASCULAR NEGATIVE: 1
DOUBLE VISION: 0
SHORTNESS OF BREATH: 0
COUGH: 0
FOCAL WEAKNESS: 0
DOUBLE VISION: 0
HEADACHES: 0
DIZZINESS: 0
PALPITATIONS: 0
CHILLS: 0
HEADACHES: 0
WEAKNESS: 1
DIZZINESS: 0
MYALGIAS: 0

## 2022-01-01 ASSESSMENT — CHA2DS2 SCORE
CHA2DS2 VASC SCORE: 4
AGE 75 OR GREATER: NO
CHF OR LEFT VENTRICULAR DYSFUNCTION: YES
AGE 65 TO 74: YES
HYPERTENSION: YES
AGE 75 OR GREATER: NO
CHA2DS2 VASC SCORE: 6
PRIOR STROKE OR TIA OR THROMBOEMBOLISM: NO
HYPERTENSION: YES
PRIOR STROKE OR TIA OR THROMBOEMBOLISM: NO
CHF OR LEFT VENTRICULAR DYSFUNCTION: YES
VASCULAR DISEASE: YES
SEX: FEMALE
PRIOR STROKE OR TIA OR THROMBOEMBOLISM: NO
AGE 75 OR GREATER: NO
VASCULAR DISEASE: NO
DIABETES: YES
SEX: FEMALE
AGE 65 TO 74: YES
HYPERTENSION: YES
AGE 75 OR GREATER: NO
CHA2DS2 VASC SCORE: 3
AGE 65 TO 74: YES
CHA2DS2 VASC SCORE: 4
HYPERTENSION: YES
VASCULAR DISEASE: YES
HYPERTENSION: NO
CHF OR LEFT VENTRICULAR DYSFUNCTION: YES
DIABETES: YES
AGE 65 TO 74: YES
CHA2DS2 VASC SCORE: 6
SEX: FEMALE
AGE 65 TO 74: YES
DIABETES: NO
DIABETES: YES
VASCULAR DISEASE: NO
CHF OR LEFT VENTRICULAR DYSFUNCTION: YES
SEX: FEMALE
VASCULAR DISEASE: NO
PRIOR STROKE OR TIA OR THROMBOEMBOLISM: NO
PRIOR STROKE OR TIA OR THROMBOEMBOLISM: NO
SEX: FEMALE
DIABETES: YES
CHF OR LEFT VENTRICULAR DYSFUNCTION: NO
CHA2DS2 VASC SCORE: 6
DIABETES: YES
AGE 75 OR GREATER: NO
PRIOR STROKE OR TIA OR THROMBOEMBOLISM: NO
AGE 75 OR GREATER: NO
HYPERTENSION: YES
CHA2DS2 VASC SCORE: 4
AGE 75 OR GREATER: NO
PRIOR STROKE OR TIA OR THROMBOEMBOLISM: NO
SEX: MALE
DIABETES: YES
CHA2DS2 VASC SCORE: 6
CHF OR LEFT VENTRICULAR DYSFUNCTION: NO
VASCULAR DISEASE: NO
SEX: FEMALE
CHF OR LEFT VENTRICULAR DYSFUNCTION: YES
SEX: FEMALE
HYPERTENSION: YES
HYPERTENSION: YES
PRIOR STROKE OR TIA OR THROMBOEMBOLISM: NO
AGE 75 OR GREATER: NO
AGE 65 TO 74: YES
VASCULAR DISEASE: YES
DIABETES: YES
CHF OR LEFT VENTRICULAR DYSFUNCTION: YES
VASCULAR DISEASE: YES

## 2022-01-01 ASSESSMENT — PULMONARY FUNCTION TESTS
EPAP_CMH2O: 6
EPAP_CMH2O: 6
EPAP_CMH2O: 8
EPAP_CMH2O: 6
EPAP_CMH2O: 8
EPAP_CMH2O: 8
EPAP_CMH2O: 6
EPAP_CMH2O: 8
EPAP_CMH2O: 6
EPAP_CMH2O: 8
EPAP_CMH2O: 8
EPAP_CMH2O: 6

## 2022-01-01 ASSESSMENT — PAIN DESCRIPTION - PAIN TYPE
TYPE: ACUTE PAIN
TYPE: ACUTE PAIN;CHRONIC PAIN
TYPE: ACUTE PAIN

## 2022-01-01 ASSESSMENT — COGNITIVE AND FUNCTIONAL STATUS - GENERAL
SUGGESTED CMS G CODE MODIFIER MOBILITY: CL
HELP NEEDED FOR BATHING: A LITTLE
DRESSING REGULAR UPPER BODY CLOTHING: A LITTLE
STANDING UP FROM CHAIR USING ARMS: A LITTLE
CLIMB 3 TO 5 STEPS WITH RAILING: A LOT
DAILY ACTIVITIY SCORE: 17
WALKING IN HOSPITAL ROOM: A LOT
PERSONAL GROOMING: A LITTLE
HELP NEEDED FOR BATHING: A LOT
TOILETING: A LOT
MOVING FROM LYING ON BACK TO SITTING ON SIDE OF FLAT BED: A LOT
MOVING TO AND FROM BED TO CHAIR: A LITTLE
HELP NEEDED FOR BATHING: A LOT
WALKING IN HOSPITAL ROOM: A LITTLE
STANDING UP FROM CHAIR USING ARMS: A LOT
MOVING TO AND FROM BED TO CHAIR: A LITTLE
PERSONAL GROOMING: A LITTLE
MOVING TO AND FROM BED TO CHAIR: A LOT
DAILY ACTIVITIY SCORE: 16
STANDING UP FROM CHAIR USING ARMS: A LITTLE
DAILY ACTIVITIY SCORE: 14
WALKING IN HOSPITAL ROOM: TOTAL
CLIMB 3 TO 5 STEPS WITH RAILING: A LOT
TURNING FROM BACK TO SIDE WHILE IN FLAT BAD: A LITTLE
DAILY ACTIVITIY SCORE: 18
MOBILITY SCORE: 14
HELP NEEDED FOR BATHING: A LITTLE
TURNING FROM BACK TO SIDE WHILE IN FLAT BAD: A LITTLE
DAILY ACTIVITIY SCORE: 14
DRESSING REGULAR LOWER BODY CLOTHING: A LOT
MOBILITY SCORE: 9
WALKING IN HOSPITAL ROOM: A LOT
MOBILITY SCORE: 11
TURNING FROM BACK TO SIDE WHILE IN FLAT BAD: UNABLE
PERSONAL GROOMING: A LOT
STANDING UP FROM CHAIR USING ARMS: TOTAL
PERSONAL GROOMING: A LITTLE
MOBILITY SCORE: 16
MOBILITY SCORE: 14
HELP NEEDED FOR BATHING: A LITTLE
CLIMB 3 TO 5 STEPS WITH RAILING: TOTAL
MOVING TO AND FROM BED TO CHAIR: A LITTLE
WALKING IN HOSPITAL ROOM: TOTAL
SUGGESTED CMS G CODE MODIFIER DAILY ACTIVITY: CK
DRESSING REGULAR UPPER BODY CLOTHING: A LITTLE
MOBILITY SCORE: 17
SUGGESTED CMS G CODE MODIFIER MOBILITY: CK
TURNING FROM BACK TO SIDE WHILE IN FLAT BAD: A LOT
CLIMB 3 TO 5 STEPS WITH RAILING: A LOT
SUGGESTED CMS G CODE MODIFIER DAILY ACTIVITY: CK
CLIMB 3 TO 5 STEPS WITH RAILING: A LOT
SUGGESTED CMS G CODE MODIFIER DAILY ACTIVITY: CK
MOVING TO AND FROM BED TO CHAIR: UNABLE
DRESSING REGULAR LOWER BODY CLOTHING: A LOT
MOVING FROM LYING ON BACK TO SITTING ON SIDE OF FLAT BED: A LOT
SUGGESTED CMS G CODE MODIFIER DAILY ACTIVITY: CK
HELP NEEDED FOR BATHING: A LOT
MOVING FROM LYING ON BACK TO SITTING ON SIDE OF FLAT BED: A LOT
MOVING FROM LYING ON BACK TO SITTING ON SIDE OF FLAT BED: A LITTLE
TURNING FROM BACK TO SIDE WHILE IN FLAT BAD: A LITTLE
STANDING UP FROM CHAIR USING ARMS: A LITTLE
DRESSING REGULAR UPPER BODY CLOTHING: A LITTLE
DAILY ACTIVITIY SCORE: 19
DRESSING REGULAR UPPER BODY CLOTHING: A LITTLE
DAILY ACTIVITIY SCORE: 19
MOBILITY SCORE: 17
PERSONAL GROOMING: A LITTLE
SUGGESTED CMS G CODE MODIFIER MOBILITY: CL
MOBILITY SCORE: 10
MOVING FROM LYING ON BACK TO SITTING ON SIDE OF FLAT BED: UNABLE
SUGGESTED CMS G CODE MODIFIER MOBILITY: CK
MOVING TO AND FROM BED TO CHAIR: A LOT
CLIMB 3 TO 5 STEPS WITH RAILING: A LOT
HELP NEEDED FOR BATHING: A LOT
DRESSING REGULAR LOWER BODY CLOTHING: A LOT
HELP NEEDED FOR BATHING: A LITTLE
TURNING FROM BACK TO SIDE WHILE IN FLAT BAD: UNABLE
WALKING IN HOSPITAL ROOM: A LOT
SUGGESTED CMS G CODE MODIFIER DAILY ACTIVITY: CK
PERSONAL GROOMING: A LITTLE
DAILY ACTIVITIY SCORE: 18
TOILETING: A LITTLE
MOVING TO AND FROM BED TO CHAIR: UNABLE
DRESSING REGULAR UPPER BODY CLOTHING: A LOT
EATING MEALS: A LITTLE
PERSONAL GROOMING: A LOT
SUGGESTED CMS G CODE MODIFIER DAILY ACTIVITY: CK
TOILETING: A LOT
DRESSING REGULAR LOWER BODY CLOTHING: A LITTLE
STANDING UP FROM CHAIR USING ARMS: A LOT
TURNING FROM BACK TO SIDE WHILE IN FLAT BAD: A LOT
SUGGESTED CMS G CODE MODIFIER MOBILITY: CK
SUGGESTED CMS G CODE MODIFIER DAILY ACTIVITY: CK
TOILETING: A LITTLE
SUGGESTED CMS G CODE MODIFIER DAILY ACTIVITY: CK
WALKING IN HOSPITAL ROOM: A LOT
TOILETING: A LITTLE
TURNING FROM BACK TO SIDE WHILE IN FLAT BAD: A LITTLE
TOILETING: A LOT
CLIMB 3 TO 5 STEPS WITH RAILING: A LOT
DRESSING REGULAR UPPER BODY CLOTHING: A LOT
WALKING IN HOSPITAL ROOM: A LITTLE
TOILETING: A LITTLE
PERSONAL GROOMING: A LITTLE
SUGGESTED CMS G CODE MODIFIER MOBILITY: CL
DRESSING REGULAR LOWER BODY CLOTHING: A LOT
CLIMB 3 TO 5 STEPS WITH RAILING: TOTAL
SUGGESTED CMS G CODE MODIFIER MOBILITY: CL
SUGGESTED CMS G CODE MODIFIER MOBILITY: CM
DRESSING REGULAR LOWER BODY CLOTHING: A LITTLE
DRESSING REGULAR LOWER BODY CLOTHING: A LOT
DRESSING REGULAR LOWER BODY CLOTHING: A LITTLE
STANDING UP FROM CHAIR USING ARMS: A LITTLE
SUGGESTED CMS G CODE MODIFIER MOBILITY: CM
MOVING FROM LYING ON BACK TO SITTING ON SIDE OF FLAT BED: A LITTLE
DRESSING REGULAR UPPER BODY CLOTHING: A LITTLE
WALKING IN HOSPITAL ROOM: A LITTLE
STANDING UP FROM CHAIR USING ARMS: TOTAL
MOVING TO AND FROM BED TO CHAIR: UNABLE
MOVING FROM LYING ON BACK TO SITTING ON SIDE OF FLAT BED: A LITTLE
CLIMB 3 TO 5 STEPS WITH RAILING: TOTAL
TOILETING: A LOT
MOBILITY SCORE: 7
MOVING FROM LYING ON BACK TO SITTING ON SIDE OF FLAT BED: A LITTLE
MOVING TO AND FROM BED TO CHAIR: A LITTLE
STANDING UP FROM CHAIR USING ARMS: A LOT
TURNING FROM BACK TO SIDE WHILE IN FLAT BAD: UNABLE

## 2022-01-01 ASSESSMENT — PATIENT HEALTH QUESTIONNAIRE - PHQ9
3. TROUBLE FALLING OR STAYING ASLEEP OR SLEEPING TOO MUCH: NEARLY EVERY DAY
4. FEELING TIRED OR HAVING LITTLE ENERGY: NEARLY EVERY DAY
6. FEELING BAD ABOUT YOURSELF - OR THAT YOU ARE A FAILURE OR HAVE LET YOURSELF OR YOUR FAMILY DOWN: NEARLY EVERY DAY
7. TROUBLE CONCENTRATING ON THINGS, SUCH AS READING THE NEWSPAPER OR WATCHING TELEVISION: MORE THAN HALF THE DAYS
SUM OF ALL RESPONSES TO PHQ QUESTIONS 1-9: 17
CLINICAL INTERPRETATION OF PHQ2 SCORE: 0
SUM OF ALL RESPONSES TO PHQ9 QUESTIONS 1 AND 2: 0
SUM OF ALL RESPONSES TO PHQ9 QUESTIONS 1 AND 2: 3
SUM OF ALL RESPONSES TO PHQ9 QUESTIONS 1 AND 2: 0
1. LITTLE INTEREST OR PLEASURE IN DOING THINGS: NOT AT ALL
1. LITTLE INTEREST OR PLEASURE IN DOING THINGS: SEVERAL DAYS
2. FEELING DOWN, DEPRESSED, IRRITABLE, OR HOPELESS: NOT AT ALL
1. LITTLE INTEREST OR PLEASURE IN DOING THINGS: NOT AT ALL
SUM OF ALL RESPONSES TO PHQ9 QUESTIONS 1 AND 2: 0
1. LITTLE INTEREST OR PLEASURE IN DOING THINGS: NOT AT ALL
9. THOUGHTS THAT YOU WOULD BE BETTER OFF DEAD, OR OF HURTING YOURSELF: NOT AT ALL
5. POOR APPETITE OR OVEREATING: 0 - NOT AT ALL
CLINICAL INTERPRETATION OF PHQ2 SCORE: 1
2. FEELING DOWN, DEPRESSED, IRRITABLE, OR HOPELESS: NOT AT ALL
8. MOVING OR SPEAKING SO SLOWLY THAT OTHER PEOPLE COULD HAVE NOTICED. OR THE OPPOSITE, BEING SO FIGETY OR RESTLESS THAT YOU HAVE BEEN MOVING AROUND A LOT MORE THAN USUAL: NEARLY EVERY DAY
2. FEELING DOWN, DEPRESSED, IRRITABLE, OR HOPELESS: NOT AT ALL
SUM OF ALL RESPONSES TO PHQ QUESTIONS 1-9: 2
5. POOR APPETITE OR OVEREATING: NOT AT ALL
2. FEELING DOWN, DEPRESSED, IRRITABLE, OR HOPELESS: MORE THAN HALF THE DAYS

## 2022-01-01 ASSESSMENT — MINNESOTA LIVING WITH HEART FAILURE QUESTIONNAIRE (MLHF)
SWELLING IN ANKLES OR LEGS: 4
WORKING AROUND THE HOUSE OR YARD DIFFICULT: 4
DIFFICULTY GOING AWAY FROM HOME: 4
LOSS OF SELF CONTROL IN YOUR LIFE: 4
COSTING YOU MONEY FOR MEDICAL CARE: 4
DIFFICULTY WITH SEXUAL ACTIVITIES: 1
WALKING ABOUT OR CLIMBING STAIRS DIFFICULT: 5
GIVING YOU SIDE EFFECTS FROM TREATMENTS: 4
DIFFICULTY WORKING TO EARN A LIVING: 0
TOTAL_SCORE: 72
FEELING LIKE A BURDEN TO FAMILY AND FRIENDS: 4
DIFFICULTY TO CONCENTRATE OR REMEMBERING THINGS: 3
DIFFICULTY WITH RECREATIONAL PASTIMES, SPORTS, HOBBIES: 4
HAVING TO SIT OR LIE DOWN DURING THE DAY: 4
MAKING YOU WORRY: 5
MAKING YOU STAY IN A HOSPITAL: 5
MAKING YOU FEEL DEPRESSED: 2
TIRED, FATIGUED OR LOW ON ENERGY: 4
MAKING YOU SHORT OF BREATH: 5
EATING LESS FOODS YOU LIKE: 1
DIFFICULTY SLEEPING WELL AT NIGHT: 1
DIFFICULTY SOCIALIZING WITH FAMILY OR FRIENDS: 4

## 2022-01-01 ASSESSMENT — LIFESTYLE VARIABLES
AVERAGE NUMBER OF DAYS PER WEEK YOU HAVE A DRINK CONTAINING ALCOHOL: 0
HOW MANY TIMES IN THE PAST YEAR HAVE YOU HAD 5 OR MORE DRINKS IN A DAY: 0
ON A TYPICAL DAY WHEN YOU DRINK ALCOHOL HOW MANY DRINKS DO YOU HAVE: 0
EVER FELT BAD OR GUILTY ABOUT YOUR DRINKING: NO
EVER HAD A DRINK FIRST THING IN THE MORNING TO STEADY YOUR NERVES TO GET RID OF A HANGOVER: NO
TOTAL SCORE: 0
ALCOHOL_USE: NO
ALCOHOL_USE: YES
HAVE PEOPLE ANNOYED YOU BY CRITICIZING YOUR DRINKING: NO
HAVE YOU EVER FELT YOU SHOULD CUT DOWN ON YOUR DRINKING: NO
EVER FELT BAD OR GUILTY ABOUT YOUR DRINKING: NO
CONSUMPTION TOTAL: NEGATIVE
AVERAGE NUMBER OF DAYS PER WEEK YOU HAVE A DRINK CONTAINING ALCOHOL: 0
TOTAL SCORE: 0
EVER HAD A DRINK FIRST THING IN THE MORNING TO STEADY YOUR NERVES TO GET RID OF A HANGOVER: NO
ON A TYPICAL DAY WHEN YOU DRINK ALCOHOL HOW MANY DRINKS DO YOU HAVE: 0
ON A TYPICAL DAY WHEN YOU DRINK ALCOHOL HOW MANY DRINKS DO YOU HAVE: 0
HOW MANY TIMES IN THE PAST YEAR HAVE YOU HAD 5 OR MORE DRINKS IN A DAY: 0
HOW MANY TIMES IN THE PAST YEAR HAVE YOU HAD 5 OR MORE DRINKS IN A DAY: 0
DOES PATIENT WANT TO STOP DRINKING: NO
HAVE YOU EVER FELT YOU SHOULD CUT DOWN ON YOUR DRINKING: NO
HAVE PEOPLE ANNOYED YOU BY CRITICIZING YOUR DRINKING: NO
CONSUMPTION TOTAL: NEGATIVE
EVER FELT BAD OR GUILTY ABOUT YOUR DRINKING: NO
TOTAL SCORE: 0
AVERAGE NUMBER OF DAYS PER WEEK YOU HAVE A DRINK CONTAINING ALCOHOL: 0
CONSUMPTION TOTAL: NEGATIVE
EVER HAD A DRINK FIRST THING IN THE MORNING TO STEADY YOUR NERVES TO GET RID OF A HANGOVER: NO
DOES PATIENT WANT TO STOP DRINKING: NO
ALCOHOL_USE: NO
TOTAL SCORE: 0
TOTAL SCORE: 0
HAVE PEOPLE ANNOYED YOU BY CRITICIZING YOUR DRINKING: NO
ON A TYPICAL DAY WHEN YOU DRINK ALCOHOL HOW MANY DRINKS DO YOU HAVE: 0
TOTAL SCORE: 0
AVERAGE NUMBER OF DAYS PER WEEK YOU HAVE A DRINK CONTAINING ALCOHOL: 0
HAVE YOU EVER FELT YOU SHOULD CUT DOWN ON YOUR DRINKING: NO
TOTAL SCORE: 0
HAVE YOU EVER FELT YOU SHOULD CUT DOWN ON YOUR DRINKING: NO
TOTAL SCORE: 0
EVER FELT BAD OR GUILTY ABOUT YOUR DRINKING: NO
HAVE PEOPLE ANNOYED YOU BY CRITICIZING YOUR DRINKING: NO
CONSUMPTION TOTAL: NEGATIVE
EVER HAD A DRINK FIRST THING IN THE MORNING TO STEADY YOUR NERVES TO GET RID OF A HANGOVER: NO
ALCOHOL_USE: NO
TOTAL SCORE: 0
TOTAL SCORE: 0
SUBSTANCE_ABUSE: 0
TOTAL SCORE: 0
TOTAL SCORE: 0
HOW MANY TIMES IN THE PAST YEAR HAVE YOU HAD 5 OR MORE DRINKS IN A DAY: 0

## 2022-01-01 ASSESSMENT — GAIT ASSESSMENTS
GAIT LEVEL OF ASSIST: UNABLE TO PARTICIPATE
DISTANCE (FEET): 8
GAIT LEVEL OF ASSIST: UNABLE TO PARTICIPATE
ASSISTIVE DEVICE: FRONT WHEEL WALKER
GAIT LEVEL OF ASSIST: STANDBY ASSIST
DISTANCE (FEET): 20
ASSISTIVE DEVICE: FRONT WHEEL WALKER
GAIT LEVEL OF ASSIST: CONTACT GUARD ASSIST
GAIT LEVEL OF ASSIST: UNABLE TO PARTICIPATE

## 2022-01-01 ASSESSMENT — FIBROSIS 4 INDEX
FIB4 SCORE: 1.77
FIB4 SCORE: 2.1
FIB4 SCORE: 1.46
FIB4 SCORE: 3.42
FIB4 SCORE: 1.69
FIB4 SCORE: 2.17
FIB4 SCORE: 1.8
FIB4 SCORE: 2.35
FIB4 SCORE: 3.87
FIB4 SCORE: 1.77
FIB4 SCORE: 1.56
FIB4 SCORE: 3.42
FIB4 SCORE: 1.8
FIB4 SCORE: 1.56
FIB4 SCORE: 1.77
FIB4 SCORE: 1.77
FIB4 SCORE: 4.78
FIB4 SCORE: 5.17
FIB4 SCORE: 3.28
FIB4 SCORE: 1.8
FIB4 SCORE: 1.61
FIB4 SCORE: 5.52

## 2022-01-01 ASSESSMENT — COPD QUESTIONNAIRES
HAVE YOU SMOKED AT LEAST 100 CIGARETTES IN YOUR ENTIRE LIFE: NO/DON'T KNOW
DURING THE PAST 4 WEEKS HOW MUCH DID YOU FEEL SHORT OF BREATH: SOME OF THE TIME
COPD SCREENING SCORE: 3
DO YOU EVER COUGH UP ANY MUCUS OR PHLEGM?: NO/ONLY WITH OCCASIONAL COLDS OR INFECTIONS

## 2022-01-01 ASSESSMENT — PAIN SCALES - WONG BAKER: WONGBAKER_NUMERICALRESPONSE: HURTS JUST A LITTLE BIT

## 2022-01-01 ASSESSMENT — PAIN SCALES - GENERAL
PAIN_LEVEL: 2
PAIN_LEVEL: 0

## 2022-01-01 ASSESSMENT — ACTIVITIES OF DAILY LIVING (ADL)
TOILETING: INDEPENDENT
TOILETING: INDEPENDENT
TOILETING: REQUIRES ASSIST

## 2022-01-12 NOTE — LETTER
Rachelle Reina  [address]  [facility] [address]    01/12/22    Dear Rachelle Reina ,    We have been unsuccessful in our attempts to contact you regarding your Anticoagulation Service appointments. Warfarin is a potent blood-thinning agent that requires monitoring to ensure that the dosage is correct for your body.  If it isn't, you could develop serious, sometimes life-threatening bleeding problems or life-threatening blood clots or stroke could result.    To monitor you effectively, we need to be able to communicate with you.  This is a requirement to be followed by our Service.       If you repeatedly fail to keep your lab appointments, you are at risk of being discharged from the Anticoagulation Service.    It is extremely important that you contact the clinic as soon as possible to arrange appropriate follow up.  We are open Monday-Friday 8 am until 5 pm.  You may reach our Service at [phone].           Sincerely,           Dennis Oakley, [place], [facility]  Clinic Supervisor  [facility]  Outpatient Anticoagulation Service

## 2022-01-12 NOTE — TELEPHONE ENCOUNTER
Left message for pt to have INR checked  2nd call  Letter sent  Patrizia Arias, Clinical Pharmacist, CDE, CACP

## 2022-01-26 NOTE — PROGRESS NOTES
Anticoagulation Summary  As of 2022    INR goal:  2.0-3.0   TTR:  58.6 % (6.5 y)   INR used for dosin.40 (2022)   Warfarin maintenance plan:  5 mg (5 mg x 1) every Tue, Fri; 2.5 mg (5 mg x 0.5) all other days   Weekly warfarin total:  22.5 mg   Plan last modified:  Becca Galvez PharmD (2021)   Next INR check:  2022   Target end date:  Indefinite    Indications    Atrial fibrillation (HCC) (Resolved) [I48.91]  Chronic anticoagulation [Z79.01]  Secondary hypercoagulable state (HCC) [D68.69]             Anticoagulation Episode Summary     INR check location:  Anticoagulation Clinic    Preferred lab:      Send INR reminders to:      Comments:  karine      Anticoagulation Care Providers     Provider Role Specialty Phone number    Florian Carnes M.D. Referring Interventional Cardiology [phone]    [facility] Anticoagulation Services Responsible  [phone]          Refer to Anticoagulation Patient Findings for HPI  Patient Findings     Negatives:  Signs/symptoms of thrombosis, Signs/symptoms of bleeding, Laboratory test error suspected, Change in health, Change in alcohol use, Change in activity, Upcoming invasive procedure, Emergency department visit, Upcoming dental procedure, Missed doses, Extra doses, Change in medications, Change in diet/appetite, Hospital admission, Bruising, Other complaints          Spoke with patient to report a therapeutic INR.        Pt instructed to continue with current warfarin dosing regimen, confirms dosing.   Will follow up in 2 week(s).     Lucinda Honeycutt, OrtegaD

## 2022-02-09 NOTE — PROGRESS NOTES
Anticoagulation Summary  As of 2022    INR goal:  2.0-3.0   TTR:  58.9 % (6.5 y)   INR used for dosin.00 (2022)   Warfarin maintenance plan:  5 mg (5 mg x 1) every Tue, Fri; 2.5 mg (5 mg x 0.5) all other days   Weekly warfarin total:  22.5 mg   Plan last modified:  Becca Galvez PharmD (2021)   Next INR check:  2022   Target end date:  Indefinite    Indications    Atrial fibrillation (HCC) (Resolved) [I48.91]  Chronic anticoagulation [Z79.01]  Secondary hypercoagulable state (HCC) [D68.69]             Anticoagulation Episode Summary     INR check location:  Anticoagulation Clinic    Preferred lab:      Send INR reminders to:      Comments:  karine      Anticoagulation Care Providers     Provider Role Specialty Phone number    Florian Carnes M.D. Referring Interventional Cardiology [phone]    [facility] Anticoagulation Services Responsible  [phone]        Anticoagulation Patient Findings  Patient Findings     Negatives:  Signs/symptoms of thrombosis, Signs/symptoms of bleeding, Laboratory test error suspected, Change in health, Change in alcohol use, Change in activity, Upcoming invasive procedure, Emergency department visit, Upcoming dental procedure, Missed doses, Extra doses, Change in medications, Change in diet/appetite, Hospital admission, Bruising, Other complaints        Spoke with patient today regarding therapeutic INR of 2.0.  Patient denies any signs/symptoms of bruising or bleeding or any changes in diet and medications.  Instructed patient to call clinic with any questions or concerns.    Pt is not on antiplatelet therapy    Pt is to continue with current warfarin dosing regimen.  Follow up in 2 weeks, to reduce risk of adverse events related to this high risk medication,  Warfarin.    Lázaro Santos, PharmD, BCACP

## 2022-02-23 NOTE — PROGRESS NOTES
Anticoagulation Summary  As of 2022    INR goal:  2.0-3.0   TTR:  59.1 % (6.6 y)   INR used for dosin.30 (2022)   Warfarin maintenance plan:  5 mg (5 mg x 1) every Tue, Fri; 2.5 mg (5 mg x 0.5) all other days   Weekly warfarin total:  22.5 mg   Plan last modified:  Becca Galvez PharmD (2021)   Next INR check:  3/8/2022   Target end date:  Indefinite    Indications    Atrial fibrillation (HCC) (Resolved) [I48.91]  Chronic anticoagulation [Z79.01]  Secondary hypercoagulable state (HCC) [D68.69]             Anticoagulation Episode Summary     INR check location:  Anticoagulation Clinic    Preferred lab:      Send INR reminders to:      Comments:  karine      Anticoagulation Care Providers     Provider Role Specialty Phone number    Florian Carnes M.D. Referring Interventional Cardiology [phone]    [facility] Anticoagulation Services Responsible  [phone]        Anticoagulation Patient Findings    Spoke with patient today regarding therapeutic INR of 2.3.  Patient denies any signs/symptoms of bruising or bleeding or any changes in diet and medications.  Instructed patient to call clinic with any questions or concerns.    Pt is not on antiplatelet therapy    Pt is to continue with current warfarin dosing regimen.  Follow up in 2 weeks, to reduce risk of adverse events related to this high risk medication,  Warfarin.    Lázaro Santos, PharmD, BCACP

## 2022-04-05 NOTE — PROGRESS NOTES
Anticoagulation Summary  As of 2022    INR goal:  2.0-3.0   TTR:  59.8 % (6.7 y)   INR used for dosin.00 (2022)   Warfarin maintenance plan:  5 mg (5 mg x 1) every Tue, Fri; 2.5 mg (5 mg x 0.5) all other days   Weekly warfarin total:  22.5 mg   Plan last modified:  Becca Galvez PharmD (2021)   Next INR check:  2022   Target end date:  Indefinite    Indications    Atrial fibrillation (HCC) (Resolved) [I48.91]  Chronic anticoagulation [Z79.01]  Secondary hypercoagulable state (HCC) [D68.69]             Anticoagulation Episode Summary     INR check location:  Anticoagulation Clinic    Preferred lab:      Send INR reminders to:      Comments:  karine      Anticoagulation Care Providers     Provider Role Specialty Phone number    Florian Carnes M.D. Referring Interventional Cardiology [phone]    [facility] Anticoagulation Services Responsible  [phone]        Anticoagulation Patient Findings  Patient Findings     Negatives:  Signs/symptoms of thrombosis, Signs/symptoms of bleeding, Laboratory test error suspected, Change in health, Change in alcohol use, Change in activity, Upcoming invasive procedure, Emergency department visit, Upcoming dental procedure, Missed doses, Extra doses, Change in medications, Change in diet/appetite, Hospital admission, Bruising, Other complaints        Spoke with patient today regarding therapeutic INR of 2.0.  Patient denies any signs/symptoms of bruising or bleeding or any changes in diet and medications.  Instructed patient to call clinic with any questions or concerns.    Pt is not on antiplatelet therapy    Pt is to continue with current warfarin dosing regimen.  Follow up in 2 weeks, to reduce risk of adverse events related to this high risk medication,  Warfarin.    Lázaro Santos, PharmD, BCACP

## 2022-04-13 NOTE — PROGRESS NOTES
Chief Complaint   Patient presents with   • Congestive Heart Failure     F/V DX: ACC/AHA stage C heart failure with preserved ejection fraction (HCC        Subjective:   This evaluation was conducted via Zoom using secure and encrypted videoconferencing technology. The patient was in their home in the [facility].    The patient's identity was confirmed and verbal consent was obtained for this virtual visit.    Rachelle Reina is a 71 y.o. female who is following up on her heart failure.      Patient of Dr. Carnes and Dr. Jeffrey in the heart failure clinic.  She was last seen in clinic on 12/21/2021 with Dr. Jeffrey.  During her last visit, no changes were made.  She was recommended to get lab testing done.    Patient reports she currently has increased her diuretics to 40 mg daily.  She reports she is able to lose a couple of pounds.  She is currently weighing around 262 to 264 pounds.    She does continue to complain of urinary frequency which prohibits her from taking increased doses of diuretics.    She continues to have lower extremity edema with some weeping and wounds.  She does wear her compression stockings or wrap her lower extremities. Pt does continue to report SOB.     Patient denies chest pain, palpitations, dizziness/lightheadedness, orthopnea, PND.    She has been using her CPAP regularly and has been able to sleep up to 6 hours.    Additonally, patient has the following medical problems:    -Atrial fibrillation: Taking warfarin, followed by anticoagulation clinic    -No coronary disease on angiogram in 2019    -Insulin-dependent diabetic: Followed by her PCP    -Hyperlipidemia: Taking rosuvastatin, gemfibrozil    -Gout: Taking allopurinol    -Depression: Taking Celexa    -Thyroid disease: Taking Synthroid    -History of ovarian and breast cancer    -Chronic venous insufficiency    Past Medical History:   Diagnosis Date   • A-fib (HCC)    • Arthritis 05/05/2020    knees and fingers   • Atrial  fibrillation (HCC)    • Benign essential hypertension 7/1/2011   • Bowel habit changes     diarrhea   • Breast cancer (HCC)    • Bronchitis 2015    history of   • CAD (coronary artery disease)    • Cancer (HCC) 1987, 2000    breast and ovary   • Cataract 2018    IOL bilat   • Diabetes (HCC)     oral medication, insulin   • Diastolic congestive heart failure (HCC) 2019   • Disorder of thyroid    • High cholesterol    • History of cardiac catheterization 4/22/2010   • History of echocardiogram 4/22/2011   • History of hypothyroidism 8/29/2008   • Hypercholesteremia 7/1/2011   • Long term (current) use of anticoagulants 7/1/2011   • Morbid obesity (HCC) 10/28/2008   • Ovarian cancer (HCC)    • Pain     knees   • Sleep apnea     pt does not use cpap   • Snoring     sleep study done   • Urinary incontinence      Past Surgical History:   Procedure Laterality Date   • Z CARDIAC CATH  02/27/2019    normal coronaries   • CATARACT PHACO WITH IOL Right 1/22/2018    Procedure: CATARACT PHACO WITH IOL;  Surgeon: Santosh Holden M.D.;  Location: SURGERY SAME DAY [facility];  Service: Ophthalmology   • CATARACT PHACO WITH IOL Left 1/8/2018    Procedure: CATARACT PHACO WITH IOL;  Surgeon: Santosh Holden M.D.;  Location: SURGERY SAME DAY [facility];  Service: Ophthalmology   • OTHER ORTHOPEDIC SURGERY Right 2013    hip arthroplasty   • CHOLECYSTECTOMY  2001   • ABDOMINAL HYSTERECTOMY TOTAL      1987   • MASTECTOMY  partial   • [place] RADIATION THERAPY PLAN SIMPLE       Family History   Problem Relation Age of Onset   • Heart Disease Mother         HEART VALVE REPLACEMENT.   • Heart Disease Father         CORONARY ARTERY BYPASS GRAFTS   • Cancer Maternal Aunt    • Diabetes Brother    • Thyroid Brother    • Heart Attack Brother    • Heart Disease Brother      Social History     Socioeconomic History   • Marital status:      Spouse name: Not on file   • Number of children: Not on file   • Years of education: Not on file   •  Highest education level: Not on file   Occupational History   • Not on file   Tobacco Use   • Smoking status: Never Smoker   • Smokeless tobacco: Never Used   Vaping Use   • Vaping Use: Never used   Substance and Sexual Activity   • Alcohol use: Yes     Comment: 1 or 2 drinks a month   • Drug use: No   • Sexual activity: Not on file   Other Topics Concern   • Not on file   Social History Narrative   • Not on file     Social Determinants of Health     Financial Resource Strain: Not on file   Food Insecurity: Not on file   Transportation Needs: Not on file   Physical Activity: Not on file   Stress: Not on file   Social Connections: Not on file   Intimate Partner Violence: Not on file   Housing Stability: Not on file     Allergies   Allergen Reactions   • Sulfa Drugs Rash     8/2015 rash and hives       Outpatient Encounter Medications as of 4/13/2022   Medication Sig Dispense Refill   • potassium chloride SA (KDUR) 20 MEQ Tab CR Take 2 Tablets by mouth 2 times a day. 360 Tablet 1   • Finerenone (KERENDIA) 10 MG Tab Take 1 tablet by mouth every day. 30 Tablet 0   • warfarin (COUMADIN) 5 MG Tab Take 1/2-1 tab by mouth daily or as directed by anticoagulation clinic 90 Tablet 1   • torsemide (DEMADEX) 20 MG Tab Take 3 Tabs by mouth 2 times a day. 540 Tab 3   • metoprolol (LOPRESSOR) 50 MG Tab TAKE 1 TABLET BY MOUTH TWICE DAILY 180 Tab 4   • rosuvastatin (CRESTOR) 20 MG Tab Take 1 Tab by mouth every evening. 90 Tab 4   • losartan (COZAAR) 50 MG Tab Take 1 Tab by mouth every day. 100 Tab 4   • gemfibrozil (LOPID) 600 MG Tab Take 1 Tab by mouth every morning. 90 Tab 4   • levothyroxine (SYNTHROID) 200 MCG Tab Take 200 mcg by mouth see administration instructions. All days except Sunday  Brand Name     • pioglitazone (ACTOS) 15 MG Tab Take 15 mg by mouth every day.     • Pyridoxine HCl (VITAMIN B-6 PO) Take 1 Tab by mouth every day.     • LEVEMIR FLEXTOUCH 100 UNIT/ML injection PEN Inject 15 Units under the skin 2 times a  day. 7 UNITS THE FIRST TIME AND 8 UNITS THE SECOND TIME     • oxybutynin (DITROPAN XL) 15 MG CR tablet Take 15 mg by mouth every morning.  2   • allopurinol (ZYLOPRIM) 100 MG Tab Take 100 mg by mouth every bedtime.  2   • acetaminophen (TYLENOL) 650 MG CR tablet Take 1,300 mg by mouth 2 Times a Day.     • Cholecalciferol (VITAMIN D-3) 5000 units Tab Take 5,000 Units by mouth 2 Times a Day.     • traZODone (DESYREL) 100 MG Tab Take 50 mg by mouth every evening.     • levothyroxine (SYNTHROID) 175 MCG Tab Take 175 mcg by mouth every Sunday. Brand Name     • loratadine (CLARITIN) 10 MG Tab Take 10 mg by mouth every day.     • citalopram (CELEXA) 20 MG TABS Take 20 mg by mouth every day.     • [DISCONTINUED] nystatin (MYCOSTATIN) powder Nystop 100,000 unit/gram topical powder   APPLY TOPICALLY TO THE AFFECTED AREA THREE TIMES DAILY (Patient not taking: Reported on 4/13/2022)     • [DISCONTINUED] nystatin (MYCOSTATIN) 558750 UNIT/GM Cream topical cream nystatin 100,000 unit/gram topical cream   APPLY TOPICALLY TO THE AFFECTED AREA THREE TIMES DAILY (Patient not taking: Reported on 4/13/2022)     • [DISCONTINUED] fluconazole (DIFLUCAN) 200 MG Tab fluconazole 200 mg tablet   TAKE 1 TABLET BY MOUTH EVERY DAY IN CASE OF ANTIBIOTIC STARTS YEAST INFECTION (Patient not taking: Reported on 4/13/2022)       No facility-administered encounter medications on file as of 4/13/2022.     Review of Systems   Constitutional: Negative for fever and malaise/fatigue.   Respiratory: Positive for shortness of breath. Negative for cough.    Cardiovascular: Positive for leg swelling. Negative for chest pain, palpitations, orthopnea, claudication and PND.   Gastrointestinal: Negative for abdominal pain.   Musculoskeletal: Negative for myalgias.   Neurological: Negative for dizziness.   All other systems reviewed and are negative.       Objective:   /63 (BP Location: Left arm, Patient Position: Sitting, BP Cuff Size: Adult)   Pulse 96    "Ht 1.651 m (5' 5\")   Wt 120 kg (264 lb)   LMP 01/05/1987 (Approximate)   BMI 43.93 kg/m²     Physical Exam  Vitals reviewed.   Constitutional:       Appearance: She is well-developed.      Comments: Using a walker, BMI 43.93   HENT:      Head: Normocephalic and atraumatic.   Eyes:      Pupils: Pupils are equal, round, and reactive to light.   Neck:      Vascular: No JVD.   Cardiovascular:      Rate and Rhythm: Normal rate and regular rhythm.      Heart sounds: Normal heart sounds.   Pulmonary:      Effort: Pulmonary effort is normal. No respiratory distress.      Breath sounds: Normal breath sounds. No wheezing or rales.   Abdominal:      General: Bowel sounds are normal.      Palpations: Abdomen is soft.   Musculoskeletal:      Cervical back: Normal range of motion and neck supple.   Skin:     General: Skin is warm and dry.   Neurological:      Mental Status: She is alert and oriented to person, place, and time.   Psychiatric:         Behavior: Behavior normal.         Lab Results   Component Value Date/Time    CHOLSTRLTOT 111 07/07/2021 11:57 AM    LDL 39 07/07/2021 11:57 AM    HDL 59 07/07/2021 11:57 AM    TRIGLYCERIDE 63 07/07/2021 11:57 AM       Lab Results   Component Value Date/Time    SODIUM 140 12/20/2021 11:59 AM    POTASSIUM 4.4 12/20/2021 11:59 AM    CHLORIDE 105 12/20/2021 11:59 AM    CO2 25 12/20/2021 11:59 AM    GLUCOSE 89 12/20/2021 11:59 AM    BUN 34 (H) 12/20/2021 11:59 AM    CREATININE 1.15 12/20/2021 11:59 AM    CREATININE 0.6 01/27/2009 01:10 PM     Lab Results   Component Value Date/Time    ALKPHOSPHAT 89 08/30/2021 11:22 AM    ASTSGOT 21 08/30/2021 11:22 AM    ALTSGPT 12 08/30/2021 11:22 AM    TBILIRUBIN 0.3 08/30/2021 11:22 AM      Transthoracic Echo Report 11/10/2015  Difficult study.  No significant valve disease or flow abnormalities.   Normal left ventricular systolic function.   Trace to Small pericardial effusion without evidence of hemodynamic   compromise.  No prior study is " available for comparison.      Myocardial Perfusion Report 11/11/2015   IMPRESSIONS   No reversible defects that would indicate ischemia.      TREADMILL STRESS EKG AND MYOCARDIAL PERFUSION SCAN 11/11/2015     DESCRIPTION: The patient received IV lexiscan. The infusion was associated with no symptoms. The baseline electrocardiogram is abnormal Atrial fibrillation. During the infusion, there were no electrocardiographic changes diagnostic of ischemia.     Myocardial perfusion images are to be reported in separate report.     Cath 2/27/2019  POSTPROCEDURE DIAGNOSES:  1.  No angiographic evidence of coronary artery disease.  2.  Normal left ventricular systolic function with ejection fraction of 70%.  3.  Elevated left ventricular end-diastolic pressure.       Transthoracic Echo Report 11/14/2019  Compared to the images of the study done 11/10/15 - there has been no significant change.  Left ventricular ejection fraction is visually estimated to be 60%.  Aortic valve area calculated from the continuity equation is 1.1 cm2.  Mild aortic stenosis.  Right ventricular systolic pressure is estimated to be 30 mmHg.    Transthoracic Echo Report 12/9/2020  Compared to the images of the study done on 11/14/2019 there has been progression of aortic stenosis, previously mild.  Normal left ventricular size, wall thickness, and systolic function.  Left ventricular ejection fraction is visually estimated to be 60%.  Normal right ventricular systolic function.  Mild mitral regurgitation.  Moderate aortic stenosis.  Mild tricuspid regurgitation.  Estimated right ventricular systolic pressure  is 50 mmHg.     Assessment:     1. ACC/AHA stage C heart failure with preserved ejection fraction (HCC)     2. Heart failure, NYHA class 3 (HCC)     3. Longstanding persistent atrial fibrillation (HCC)     4. HTN (hypertension), malignant     5. Chronic anticoagulation     6. Stage 3a chronic kidney disease (HCC)     7. Type 2 diabetes mellitus  with hyperglycemia, with long-term current use of insulin ([facility])     8. Moderate aortic stenosis     9. High risk medication use     10. Dyslipidemia     11. KEN (obstructive sleep apnea)     12. Morbid obesity with BMI of 40.0-44.9, adult ([facility])         Medical Decision Making:  Today's Assessment / Status / Plan:   1. HFpEF, Stage C, Class 3, LVEF 60%: Patient continues to exhibit signs of volume overload, lower extremity edema  -Discussed trying to increase torsemide to 60 mg daily, patient hesitant due to urinary frequency  -She is currently taking 20 to 40 mg of potassium daily  -She reports she is trying to monitor her sodium intake, but does consume packaged foods.  -Reinforced 2000 mg sodium diet per day, discussed the importance of this and her symptoms.  Patient verbalized understanding  -Patient has sleep study Sunday. She is follow up with sleep medicine  -Reinforced s/sx of worsening heart failure with patient and weight monitoring. Pt verbalizes understanding. Pt to call office or RTC if present.   -pt does have a history of chronic venous insuffiiciency    2.  Hypertension: Stable  -Continue losartan 50 mg daily  -Continue metoprolol tartrate 50 mg twice a day    3.  Atrial fibrillation, hx unsuccessful cardioversion and prior amiodarone use:  -Continue warfarin, followed by the anticoagulation clinic, pt to get back in with anticoagulation clinic, phone number provided  -Continue metoprolol 50 mg twice a day    4.  Dyslipidemia: Last LDL 50 on 9/22/2020  -Continue rosuvastatin 20 mg daily  -Continue gemfibrozil 600 mg daily    5. Obesity: BMI 43.93  -Encouraged weight loss    6. Diabetes:  -Continue current regimen, followed by PCP  -Continue Finerenone 10 mg daily    7. Moderate Aortic Stenosis:  -Looks like on recent echo her AS has progressed from mild to moderate.    -Will follow  -Discussed with Dr. Jeffrey, will hold on referral to the structural heart clinic.     8. KEN:  -Using CPAP    Patient  encouraged to get labs done soon.  She states she has labs due for her PCP next week that she do her labs together.    FU in clinic in 3 months with Dr. Jeffrey and labs. Sooner if needed.    Patient verbalizes understanding and agrees with the plan of care.     PLEASE NOTE: This Note was created using voice recognition Software. I have made every reasonable attempt to correct obvious errors, but I expect that there are errors of grammar and possibly content that I did not discover before finalizing the note

## 2022-04-22 NOTE — PROGRESS NOTES
OP Anticoagulation Service Note    Date: 2022    Anticoagulation Summary  As of 2022    INR goal:  2.0-3.0   TTR:  60.1 % (6.7 y)   INR used for dosin.80 (2022)   Warfarin maintenance plan:  5 mg (5 mg x 1) every e, Fri; 2.5 mg (5 mg x 0.5) all other days   Weekly warfarin total:  22.5 mg   Plan last modified:  Becca Galvez, PharmD (2021)   Next INR check:  2022   Target end date:  Indefinite    Indications    Atrial fibrillation (HCC) (Resolved) [I48.91]  Chronic anticoagulation [Z79.01]  Secondary hypercoagulable state (HCC) [D68.69]             Anticoagulation Episode Summary     INR check location:  Anticoagulation Clinic    Preferred lab:      Send INR reminders to:      Comments:  karine      Anticoagulation Care Providers     Provider Role Specialty Phone number    Florian Carnes M.D. Referring Interventional Cardiology [phone]    [facility] Anticoagulation Services Responsible  [phone]        Anticoagulation Patient Findings          HPI:   The reason for today's call is to prevent morbidity and mortality from a blood clot and/or stroke and to reduce the risk of bleeding while on a anticoagulant.     PCP:  Daniel Fernández M.D.  [address]  [facility] [address]    Assessment:     • INR  therapeutic.       Current Outpatient Medications:   •  potassium chloride SA, 40 mEq, Oral, BID  •  Kerendia, 10 mg, Oral, DAILY  •  warfarin, Take 1/2-1 tab by mouth daily or as directed by anticoagulation clinic  •  torsemide, 60 mg, Oral, BID  •  metoprolol tartrate, TAKE 1 TABLET BY MOUTH TWICE DAILY  •  rosuvastatin, 20 mg, Oral, Q EVENING  •  losartan, 50 mg, Oral, DAILY  •  gemfibrozil, 600 mg, Oral, QAM  •  levothyroxine, 200 mcg, Oral, See Admin Instructions  •  pioglitazone, 15 mg, Oral, DAILY  •  Pyridoxine HCl (VITAMIN B-6 PO), 1 Tablet, Oral, DAILY  •  Levemir FlexTouch, 15 Units, Subcutaneous, BID  •  oxybutynin, 15 mg, Oral, QAM  •  allopurinol, 100 mg,  Oral, QHS  •  acetaminophen, 1,300 mg, Oral, BID  •  Vitamin D-3, 5,000 Units, Oral, BID  •  traZODone, 50 mg, Oral, Nightly  •  levothyroxine, 175 mcg, Oral, Q SUNDAY  •  loratadine, 10 mg, Oral, DAILY  •  citalopram, 20 mg, Oral, DAILY      Plan:     • Continue the same warfarin dose, as noted above.       Follow-up:     • Our protocol suggests we test in 4 weeks.        Additional information discussed with patient:     • Asked patient to please call the anticoagulation clinic if they have any signs/symptoms of bleeding and/or thrombosis or any changes to diet or medications.      National recommendations regarding anticoagulation therapy:     The CHEST guidelines recommends frequent INR monitoring at regular intervals (a few days up to a max of 12 weeks) to ensure patients are on the proper dose of warfarin, and patients are not having any complications from therapy.  INRs can dramatically change over a short time period due to diet, medications, and medical conditions.     [facility]  Phone [phone] fax [phone]    This note was created using voice recognition software (Dragon). The accuracy of the dictation is limited by the abilities of the software. I have reviewed the note prior to signing, however some errors in grammar and context are still possible. If you have any questions related to this note please do not hesitate to contact our office.

## 2022-05-11 NOTE — PROGRESS NOTES
Anticoagulation Summary  As of 2022    INR goal:  2.0-3.0   TTR:  60.2 % (6.8 y)   INR used for dosin.50 (5/10/2022)   Warfarin maintenance plan:  5 mg (5 mg x 1) every Tue, Fri; 2.5 mg (5 mg x 0.5) all other days   Weekly warfarin total:  22.5 mg   Plan last modified:  Brice Garcia PharmD (2022)   Next INR check:  2022   Target end date:  Indefinite    Indications    Atrial fibrillation (HCC) (Resolved) [I48.91]  Chronic anticoagulation [Z79.01]  Secondary hypercoagulable state (HCC) [D68.69]             Anticoagulation Episode Summary     INR check location:  Anticoagulation Clinic    Preferred lab:      Send INR reminders to:      Comments:  karine      Anticoagulation Care Providers     Provider Role Specialty Phone number    Florian Carnes M.D. Referring Interventional Cardiology [phone]    [facility] Anticoagulation Services Responsible  [phone]          Refer to Anticoagulation Patient Findings for HPI  Patient Findings     Negatives:  Signs/symptoms of thrombosis, Signs/symptoms of bleeding, Laboratory test error suspected, Change in health, Change in alcohol use, Change in activity, Upcoming invasive procedure, Emergency department visit, Upcoming dental procedure, Missed doses, Extra doses, Change in medications, Change in diet/appetite, Hospital admission, Bruising, Other complaints          Spoke with patient to report a therapeutic INR.      Pt instructed to continue with current warfarin dosing regimen, confirms dosing.   Will follow up in 4 week(s).     Ortega LacyD, BCACP

## 2022-05-25 NOTE — PATIENT INSTRUCTIONS
Complete overnight oximetry test through DME:  Delfino / nory [phone] / fx [phone]  After making any changes to your mask to check oxygen levels at night; send [employer] message once you complete test for results

## 2022-05-25 NOTE — PROGRESS NOTES
Virtual Visit: Established Patient   This visit was conducted via Zoom using secure and encrypted videoconferencing technology.   The patient was in their home in the [facility] [place].    The patient's identity was confirmed and verbal consent was obtained for this virtual visit.     Subjective:   CC:   Chief Complaint   Patient presents with   • Follow-Up   • Apnea       Rachelle Reina is a 71 y.o. female presenting for evaluation and management of:    KEN  Last OV 7/16/21    She notes improved use of her CPAP and sleeping better. She goes to bed 11/12pm and gets generally 6hrs of sleep. She wakes once to use restroom and may be awake for a little bit before resuming sleep. She notes her humidifier to run dry sometimes if she sleeps longer. She feels she is napping less.     Currently using CPAP 9cm. RESMED 2021.  Compliance report     SLEEP HX:  PSG 10/12/20 noted:  1.  Severe OAS with AHI of 118.2/hr and O2 blaise 76 %  2.  Decreased sleep efficiency   3.  Sleep related hypoxia  4.  PLMS  5. Afib  Dedicated titration study recommended.     Titration study 1/10/21 noted good response to CPAP at final pressure 9cm with reduced AHI 4.9/hr, mean spo2 93% and O2 blaise 87% with REM sleep achieved in left lying position.      Hx of diastolic HF with previous cardiac catheterization and HTN. Prior ECHO 12/9/20 noted progression of aortic stenosis since 11/14/19, LVEF 60%, moderate AS, mild MR, and RVSP 50mmHg. She had CardioMEMs procedure but device migrated and followed closely by Dr. Jeffrey. He is also managing chronic pedal edema and she is being managed at lymph clinic.    ROS   In HPI; otherwise negative    Current medicines (including changes today)  Current Outpatient Medications   Medication Sig Dispense Refill   • FEROSUL 325 (65 Fe) MG tablet TAKE 1 TABLET BY MOUTH TWICE DAILY TAKE WITH MEAL AND WITH VITAMIN C500MG     • potassium chloride SA (KDUR) 20 MEQ Tab CR Take 2 Tablets by mouth 2 times a day. 360  Tablet 1   • Finerenone (KERENDIA) 10 MG Tab Take 1 tablet by mouth every day. 30 Tablet 0   • warfarin (COUMADIN) 5 MG Tab Take 1/2-1 tab by mouth daily or as directed by anticoagulation clinic 90 Tablet 1   • torsemide (DEMADEX) 20 MG Tab Take 3 Tabs by mouth 2 times a day. 540 Tab 3   • metoprolol (LOPRESSOR) 50 MG Tab TAKE 1 TABLET BY MOUTH TWICE DAILY 180 Tab 4   • rosuvastatin (CRESTOR) 20 MG Tab Take 1 Tab by mouth every evening. 90 Tab 4   • losartan (COZAAR) 50 MG Tab Take 1 Tab by mouth every day. 100 Tab 4   • gemfibrozil (LOPID) 600 MG Tab Take 1 Tab by mouth every morning. 90 Tab 4   • levothyroxine (SYNTHROID) 200 MCG Tab Take 200 mcg by mouth see administration instructions. All days except Sunday  Brand Name     • pioglitazone (ACTOS) 15 MG Tab Take 15 mg by mouth every day.     • Pyridoxine HCl (VITAMIN B-6 PO) Take 1 Tab by mouth every day.     • LEVEMIR FLEXTOUCH 100 UNIT/ML injection PEN Inject 15 Units under the skin 2 times a day. 7 UNITS THE FIRST TIME AND 8 UNITS THE SECOND TIME     • oxybutynin (DITROPAN XL) 15 MG CR tablet Take 15 mg by mouth every morning.  2   • allopurinol (ZYLOPRIM) 100 MG Tab Take 100 mg by mouth every bedtime.  2   • acetaminophen (TYLENOL) 650 MG CR tablet Take 1,300 mg by mouth 2 Times a Day.     • Cholecalciferol (VITAMIN D-3) 5000 units Tab Take 5,000 Units by mouth 2 Times a Day.     • traZODone (DESYREL) 100 MG Tab Take 50 mg by mouth every evening.     • levothyroxine (SYNTHROID) 175 MCG Tab Take 175 mcg by mouth every Sunday. Brand Name     • loratadine (CLARITIN) 10 MG Tab Take 10 mg by mouth every day.     • citalopram (CELEXA) 20 MG TABS Take 20 mg by mouth every day.       No current facility-administered medications for this visit.       Patient Active Problem List    Diagnosis Date Noted   • Secondary hypercoagulable state (HCC) 10/28/2021   • Chronic insomnia 01/13/2021   • Obstructive sleep apnea 01/13/2021   • Research study patient 05/01/2020   •  "[facility] Billing 05/01/2020   • Left knee pain 01/01/2020   • Elevated troponin 12/31/2019   • Anemia 12/31/2019   • Insulin dependent diabetes mellitus 11/13/2019   • Localized edema 06/19/2019   • Chronic venous insufficiency 06/19/2019   • Chronic atrial fibrillation 02/19/2019   • Chronic anticoagulation 05/03/2018   • Dyslipidemia 02/21/2017   • Benign essential hypertension 07/01/2011   • Diastolic congestive heart failure 07/01/2011   • History of echocardiogram 04/22/2011   • History of cardiac catheterization 04/22/2010   • Morbid obesity (HCC) 10/28/2008   • History of hypothyroidism 08/29/2008        Objective:   Ht 1.651 m (5' 5\")   Wt 120 kg (265 lb)   LMP 01/05/1987 (Approximate)   BMI 44.10 kg/m²     Physical Exam:  Constitutional: Alert, no distress, well-groomed.  Skin: No rashes in visible areas.  Eye: Round. Conjunctiva clear, lids normal. No icterus.   ENMT: Lips pink without lesions, good dentition, moist mucous membranes. Phonation normal.  Neck: No masses, no thyromegaly. Moves freely without pain.  Respiratory: Unlabored respiratory effort, no cough or audible wheeze  Psych: Alert and oriented x3, normal affect and mood.     Assessment and Plan:   The following treatment plan was discussed:     1. Obstructive sleep apnea    2. Chronic diastolic congestive heart failure (HCC)    3. Benign essential hypertension    4. BMI 40.0-44.9, adult ([facility])  - HEIGHT AND WEIGHT    5. Nonsmoker    Other orders  - FEROSUL 325 (65 Fe) MG tablet; TAKE 1 TABLET BY MOUTH TWICE DAILY TAKE WITH MEAL AND WITH VITAMIN C500MG    Patient's compliance is improved and sleep apnea symptoms are more controlled. Continue CPAP 9cm nightly.   She would benefit from mask fit.  DME mask/supplies; mask fit now  Complete overnight oximetry through DME after mask fit/changes to verify if nocturnal hypoxia is occurring, if so we will make further adjustments in pressure vs completing testing for supplemental " O2  Encourage weight loss through diet/exercise  F/u with PCP and cardio as scheduled for HTN and hx of CHF    Follow-up: 6 mos for compliance review, sooner if needed. If stable with improved symptoms may go to annual visit.

## 2022-05-26 NOTE — PROGRESS NOTES
Anticoagulation Summary  As of 2022    INR goal:  2.0-3.0   TTR:  60.4 % (6.8 y)   INR used for dosin.20 (2022)   Warfarin maintenance plan:  5 mg (5 mg x 1) every Tue, Fri; 2.5 mg (5 mg x 0.5) all other days   Weekly warfarin total:  22.5 mg   Plan last modified:  Brice Garcia PharmD (2022)   Next INR check:  2022   Target end date:  Indefinite    Indications    Atrial fibrillation (HCC) (Resolved) [I48.91]  Chronic anticoagulation [Z79.01]  Secondary hypercoagulable state (HCC) [D68.69]             Anticoagulation Episode Summary     INR check location:  Anticoagulation Clinic    Preferred lab:      Send INR reminders to:      Comments:  karine      Anticoagulation Care Providers     Provider Role Specialty Phone number    Florian Carnes M.D. Referring Interventional Cardiology [phone]    [facility] Anticoagulation Services Responsible  [phone]          Refer to Anticoagulation Patient Findings for HPI  Patient Findings     Positives:  Change in medications (Pt started and completed course of Augmentin.)    Negatives:  Signs/symptoms of thrombosis, Signs/symptoms of bleeding, Laboratory test error suspected, Change in health, Change in alcohol use, Change in activity, Upcoming invasive procedure, Emergency department visit, Upcoming dental procedure, Missed doses, Extra doses, Change in diet/appetite, Hospital admission, Bruising, Other complaints          Spoke with patient to report a therapeutic INR.      Pt instructed to continue with current warfarin dosing regimen, confirms dosing.     Will follow up in 1 week(s).     Juan Jose Lyon, PharmD, BCACP

## 2022-06-08 PROBLEM — L03.90 CELLULITIS: Status: ACTIVE | Noted: 2022-01-01

## 2022-06-08 NOTE — ED PROVIDER NOTES
"ED Provider Note    CHIEF COMPLAINT  Chief Complaint   Patient presents with   • Wound Check     Chronic open sores to bilateral LE.   • Leg Swelling     Pt not taking her water pill x1 week.    • Rash     L lower abd   • Shortness of Breath       HPI  Rachelle Reina is a 71 y.o. female here for evaluation of bilateral lower extremity wounds.  The patient states that she has had increased swelling, and some increasing redness, over the last couple of weeks.  She does take a water pill, however she states \"it might not be working very well.\"  She has no chest pain or shortness of breath, and no vomiting.  She denies any fever chills.  Patient has no other medical concerns at this time.      ROS  See HPI for further details, o/w negative.     PAST MEDICAL HISTORY   has a past medical history of A-fib ([facility]), Arthritis (05/05/2020), Atrial fibrillation ([facility]), Benign essential hypertension (7/1/2011), Bowel habit changes, Breast cancer ([facility]), Bronchitis (2015), CAD (coronary artery disease), Cancer (HCC) (1987, 2000), Cataract (2018), Diabetes ([facility]), Diastolic congestive heart failure ([facility]) (2019), Disorder of thyroid, High cholesterol, History of cardiac catheterization (4/22/2010), History of echocardiogram (4/22/2011), History of hypothyroidism (8/29/2008), Hypercholesteremia (7/1/2011), Long term (current) use of anticoagulants (7/1/2011), Morbid obesity (HCC) (10/28/2008), Ovarian cancer ([facility]), Pain, Sleep apnea, Snoring, and Urinary incontinence.    SOCIAL HISTORY  Social History     Tobacco Use   • Smoking status: Never Smoker   • Smokeless tobacco: Never Used   Vaping Use   • Vaping Use: Never used   Substance and Sexual Activity   • Alcohol use: Yes     Comment: 1 or 2 drinks a month   • Drug use: No   • Sexual activity: Not on file       Family History  No bleeding disorders    SURGICAL HISTORY   has a past surgical history that includes abdominal hysterectomy total; mastectomy (partial); radiation " therapy plan simple; cholecystectomy (2001); cataract phaco with iol (Left, 1/8/2018); cataract phaco with iol (Right, 1/22/2018); other orthopedic surgery (Right, 2013); and [facility] cardiac cath (02/27/2019).    CURRENT MEDICATIONS  Home Medications    **Home medications have not yet been reviewed for this encounter**         ALLERGIES  Allergies   Allergen Reactions   • Sulfa Drugs Rash     8/2015 rash and hives         REVIEW OF SYSTEMS  See HPI for further details. Review of systems as above, otherwise all other systems are negative.     PHYSICAL EXAM  Constitutional: Well developed, well nourished.  Mild acute distress.  HEENT: Normocephalic, atraumatic. Posterior pharynx clear and moist.  Eyes:  EOMI. Normal sclera.  Neck: Supple, Full range of motion, nontender.  Chest/Pulmonary:   Diminished breath sounds, equal expansion  Cardio: Regular rate and rhythm with no murmur.   Abdomen: Soft, nontender. No peritoneal signs. No guarding. No palpable masses.  Musculoskeletal: No deformity, 2+ edema, neurovascular intact.  Lower extremities with surrounding erythema and small ulcerations.  Neurovascular tact distally.  Neuro: Clear speech, appropriate, cooperative, cranial nerves II-XII grossly intact.  Psych: Normal mood and affect    PROCEDURES     MEDICAL RECORD  I have reviewed patient's medical record and pertinent results are listed.    COURSE & MEDICAL DECISION MAKING  I have reviewed any medical record information, laboratory studies and radiographic results as noted above.    Results for orders placed or performed during the hospital encounter of 06/08/22   CBC with Differential   Result Value Ref Range    WBC 5.2 4.8 - 10.8 K/uL    RBC 4.91 4.20 - 5.40 M/uL    Hemoglobin 11.9 (L) 12.0 - 16.0 g/dL    Hematocrit 40.5 37.0 - 47.0 %    MCV 82.5 81.4 - 97.8 fL    MCH 24.2 (L) 27.0 - 33.0 pg    MCHC 29.4 (L) 33.6 - 35.0 g/dL    RDW 65.9 (H) 35.9 - 50.0 fL    Platelet Count 249 164 - 446 K/uL    MPV 9.2 9.0 - 12.9 fL     Neutrophils-Polys 58.20 44.00 - 72.00 %    Lymphocytes 25.60 22.00 - 41.00 %    Monocytes 7.90 0.00 - 13.40 %    Eosinophils 6.90 0.00 - 6.90 %    Basophils 1.00 0.00 - 1.80 %    Immature Granulocytes 0.40 0.00 - 0.90 %    Nucleated RBC 0.00 /100 WBC    Neutrophils (Absolute) 3.02 2.00 - 7.15 K/uL    Lymphs (Absolute) 1.33 1.00 - 4.80 K/uL    Monos (Absolute) 0.41 0.00 - 0.85 K/uL    Eos (Absolute) 0.36 0.00 - 0.51 K/uL    Baso (Absolute) 0.05 0.00 - 0.12 K/uL    Immature Granulocytes (abs) 0.02 0.00 - 0.11 K/uL    NRBC (Absolute) 0.00 K/uL    Anisocytosis 1+     Macrocytosis 1+    Comp Metabolic Panel   Result Value Ref Range    Sodium 142 135 - 145 mmol/L    Potassium 4.7 3.6 - 5.5 mmol/L    Chloride 109 96 - 112 mmol/L    Co2 24 20 - 33 mmol/L    Anion Gap 9.0 7.0 - 16.0    Glucose 86 65 - 99 mg/dL    Bun 25 (H) 8 - 22 mg/dL    Creatinine 1.12 0.50 - 1.40 mg/dL    Calcium 9.4 8.5 - 10.5 mg/dL    AST(SGOT) 27 12 - 45 U/L    ALT(SGPT) 19 2 - 50 U/L    Alkaline Phosphatase 75 30 - 99 U/L    Total Bilirubin 0.4 0.1 - 1.5 mg/dL    Albumin 3.6 3.2 - 4.9 g/dL    Total Protein 6.6 6.0 - 8.2 g/dL    Globulin 3.0 1.9 - 3.5 g/dL    A-G Ratio 1.2 g/dL   ESTIMATED GFR   Result Value Ref Range    GFR (CKD-EPI) 52 (A) >60 mL/min/1.73 m 2   PERIPHERAL SMEAR REVIEW   Result Value Ref Range    Peripheral Smear Review see below    PLATELET ESTIMATE   Result Value Ref Range    Plt Estimation Normal    MORPHOLOGY   Result Value Ref Range    RBC Morphology Present     Polychromia 1+     Poikilocytosis 1+     Ovalocytes 1+    DIFFERENTIAL COMMENT   Result Value Ref Range    Comments-Diff see below    EKG   Result Value Ref Range    Report       [facility] Emergency Dept.    Test Date:  2022  Pt Name:    RAUL RODRIGUES             Department: ER  MRN:        [id number]                      Room:  Gender:     Female                       Technician: [id number]  :        [id number]                   Requested  By:ER TRIAGE PROTOCOL  Order #:    [id number]                    Reading MD:    Measurements  Intervals                                Axis  Rate:       72                           P:  DC:                                      QRS:        113  QRSD:       104                          T:          8  QT:         373  QTc:        409    Interpretive Statements  Atrial fibrillation  Low voltage, precordial leads  Probable right ventricular hypertrophy  Borderline ST elevation, lateral leads  Compared to ECG 10/13/2021 12:59:31  ST (T wave) deviation now present        DX-CHEST-PORTABLE (1 VIEW)   Final Result         No significant interval change.      Stable cardiomegaly.          EKG; normal sinus rhythm at a rate of 72.  No ST elevation, no ST depression.  QTC is 409.  Compared to EKG from 10/13/2021.    Patient will be admitted to the hospital service for further evaluation.  Antibiotics have been started, and cultures drawn.    FINAL IMPRESSION  1. Cellulitis, unspecified cellulitis site         Electronically signed by: Cedric Hoang D.O., 6/8/2022 1:21 PM

## 2022-06-08 NOTE — PROGRESS NOTES
Inpatient Anticoagulation Service Note    Date: 6/8/2022  Reason for Anticoagulation: Atrial Fibrillation  Hemoglobin Value: (Abnormal) 11.9  Hematocrit Value: 40.5  Lab Platelet Value: 249  Target INR: 2.0 to 3.0  INR from last 7 days     Date/Time INR Value    06/08/22 1500 (Abnormal) 2.12        Dose from last 7 days     Date/Time Dose (mg)    06/08/22 1550 2.5        Average Dose (mg):  (5 mg Tu/Fri, 2.5 mg AOD)  Significant Interactions: Thyroid Medications, Statin, Other (Comments) (allopurinol, gemfibrozil, citalopram)  Bridge Therapy: No  Reversal Agent Administered: Not Applicable    A/P  Therapeutic INR upon admission.  DDI noted above and are stable from continuation from outpatient.  Continue home dosing and give warfarin 2.5 mg tonight.  INR tomorrow.    Education Material Provided?: No (chronic warfarin patient)  Pharmacist suggested discharge dosing: Likely can continue home dosing of warfarin 5 mg PO every Tu/Fri and 2.5 mg PO all other days.  Recommend a follow-up PT/INR within 48-72 hours of discharge.     Jolly Xie, PharmD, BCPS, BCCCP
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
## 2022-06-08 NOTE — ED TRIAGE NOTES
"Chief Complaint   Patient presents with   • Wound Check     Chronic open sores to bilateral LE.   • Leg Swelling     Pt not taking her water pill x1 week.    • Rash     L lower abd   • Shortness of Breath     BP (!) 142/73 Comment: Right forearm  Pulse 95   Temp 36.3 °C (97.4 °F) (Temporal)   Resp (!) 22   Ht 1.651 m (5' 5\")   Wt (!) 130 kg (287 lb)   LMP 01/05/1987 (Approximate)   SpO2 96%   BMI 47.76 kg/m²     Pt brought into triage by WC, mask in place. EKG ordered. NAD, encouraged to return to the triage nurse or tech with any new complaints or symptoms.  "

## 2022-06-09 NOTE — ED NOTES
Pt resting comfortably. Respirations even and unlabored. Remains on oxygen. All needs met at this time.

## 2022-06-09 NOTE — PROGRESS NOTES
Inpatient Anticoagulation Service Note    Date: 2022    Reason for Anticoagulation: Atrial Fibrillation   Target INR: 2.0 to 3.0  [id number] VASc Score: 4  HAS-BLED Score: 1   Hemoglobin Value: (!) 11.9  Hematocrit Value: 40.5  Lab Platelet Value: 249    INR from last 7 days     Date/Time INR Value    22 0634 2.41    22 1500 2.12        Dose from last 7 days     Date/Time Dose (mg)    22 1014 2.5    22 1550 2.5        Average Dose (mg):  (5 mg /Fri, 2.5 mg AOD)  Significant Interactions: Thyroid Medications, Statin, Other (Comments) (gemfibrozil, citalopram, allopurinol)  Bridge Therapy: No     Reversal Agent Administered: Not Applicable  Comments: INR therapeutic. Continue home dosing. No significant new interactions. Daily INR, consider three times per week if patient remains inpatient.    Plan:  2.5mg  Education Material Provided?: No (chronic warfarin patient)  Pharmacist suggested discharge dosin.5mg daily except 5mg twice per week. INR within 72 hours of discharge.      Arthur Matos, PharmD

## 2022-06-09 NOTE — PROGRESS NOTES
4 Eyes Skin Assessment Completed by LILLIE Alarcon and Ma, RN.    Head WDL  Ears WDL  Nose WDL  Mouth WDL  Neck WDL  Breast/Chest Moisture related redness  Shoulder Blades WDL  Spine WDL  (R) Arm/Elbow/Hand Bruising and Scab  (L) Arm/Elbow/Hand Redness  Abdomen Moisture related redness  Groin Moisture related redness  Scrotum/Coccyx/Buttocks WDL  (R) Leg Blistered, swelling, shiny, redness  (L) Leg Blistered, swelling, shiny, redness  (R) Heel/Foot/Toe Dry, scabbing  (L) Heel/Foot/Toe Dry, scabbing          Devices In Places Blood Pressure Cuff and Pulse Ox      Interventions In Place Waffle Overlay and Pillows    Possible Skin Injury Yes    Pictures Uploaded Into Epic Yes  Wound Consult Placed Yes  RN Wound Prevention Protocol Ordered Yes

## 2022-06-09 NOTE — ED NOTES
Report from Alisa MOREIRA, assuming care of pt. Pt in recliner at this time, awaiting medications from pharmacy.

## 2022-06-09 NOTE — DISCHARGE PLANNING
Note:  Received Voalte from Dr. Ma about plan to transfer pt to [facility], Dr. Bhatia accepting. [facility] aware. Dr. Mcwilliams and ED RN updated via Voalte. RN NATALEE spoke to pt at bedside. Received consent for transfer to [facility] and transportation. Per [facility], pending room assignment and transportation set up.     Addendum:  Facesheet, Approved Service and PCS faxed to [facility] by LILLIE Clement CM.  Per [facility], pt going to [facility]:  Room: [facility]-  Report to: [phone]  ETA [facility] : 1135 or earlier.  COBRA, transfer packet given to ED RN.
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
## 2022-06-09 NOTE — PROGRESS NOTES
0715 - Received report and assumed care of patient.    0800 - Assessment completed. Vital signs stable on 2L oxygen delivered via nasal cannula. Patient up to bathroom with CNA and FWW. Patient complains of headache, but no medication available at the time. Plan of care discussed. All questions answered. No needs currently.    0945 - MD notified pt has nothing available for pain. New orders placed.     1440 - report called to Ma at [facility]    1450 - [facility] arrived and patient transferred to [facility].

## 2022-06-09 NOTE — WOUND TEAM
[facility]  Inpatient Services  Initial Wound and Skin Care Evaluation    Admission Date: 6/8/2022     Last order of IP CONSULT TO WOUND CARE was found on 6/8/2022 from Hospital Encounter on 6/8/2022     HPI, PMH, SH: Reviewed    Past Surgical History:   Procedure Laterality Date   • ZZZ CARDIAC CATH  02/27/2019    normal coronaries   • CATARACT PHACO WITH IOL Right 1/22/2018    Procedure: CATARACT PHACO WITH IOL;  Surgeon: Santosh Holden M.D.;  Location: SURGERY SAME DAY [facility] ORS;  Service: Ophthalmology   • CATARACT PHACO WITH IOL Left 1/8/2018    Procedure: CATARACT PHACO WITH IOL;  Surgeon: Santosh Holden M.D.;  Location: SURGERY SAME DAY [facility] ORS;  Service: Ophthalmology   • OTHER ORTHOPEDIC SURGERY Right 2013    hip arthroplasty   • CHOLECYSTECTOMY  2001   • ABDOMINAL HYSTERECTOMY TOTAL      1987   • MASTECTOMY  partial   • ND RADIATION THERAPY PLAN SIMPLE       Social History     Tobacco Use   • Smoking status: Never Smoker   • Smokeless tobacco: Never Used   Substance Use Topics   • Alcohol use: Yes     Comment: 1 or 2 drinks a month     Chief Complaint   Patient presents with   • Wound Check     Chronic open sores to bilateral LE.   • Leg Swelling     Pt not taking her water pill x1 week.    • Rash     L lower abd   • Shortness of Breath     Diagnosis: Cellulitis [L03.90]    Unit where seen by Wound Team: [facility]     WOUND CONSULT/FOLLOW UP RELATED TO:  B leg wounds    WOUND HISTORY:  Pt presented to ED for wound check B legs.  Pt states legs have been swelling. Pt was admitted for swelling.  Hx of chronic a-fib.  Pt has been taking care of her wounds herself at home.  She lives with her  who has alziemer's.  They have caregivers that come into the home.      WOUND ASSESSMENT/LDA      Wound 06/08/22 Pretibial Proximal Bilateral Bilateral legs (Active)   Wound Image      06/09/22 1200   Site Assessment Red 06/09/22 1200   Periwound Assessment Edema;Excoriated;Satellite  lesions;Ecchymosis 06/09/22 1200   Drainage Amount Small 06/09/22 1200   Drainage Description Serous 06/09/22 1200   Dressing Options Hydrofiber Silver;Absorbent Abdominal Pad;Dry Roll Gauze 06/09/22 1200   Dressing Changed Changed 06/09/22 1200   Dressing Change/Treatment Frequency Every 48 hrs, and As Needed 06/09/22 1200   NEXT Dressing Change/Treatment Date 06/11/22 06/09/22 1200   NEXT Weekly Photo (Inpatient Only) 06/16/22 06/09/22 1200   Non-staged Wound Description Partial thickness 06/09/22 1200   Wound Odor None 06/09/22 1200   Exposed Structures None 06/09/22 1200   WOUND NURSE ONLY - Time Spent with Patient (mins) 60 06/09/22 1200   Number of days: 1        Vascular:    EARLE:   No results found.    Lab Values:    Lab Results   Component Value Date/Time    WBC 5.2 06/08/2022 10:04 AM    RBC 4.91 06/08/2022 10:04 AM    HEMOGLOBIN 11.9 (L) 06/08/2022 10:04 AM    HEMATOCRIT 40.5 06/08/2022 10:04 AM    SEDRATEWES 23 07/07/2021 12:00 PM    HBA1C 5.8 (H) 04/28/2022 08:19 AM        Culture Results show:  No results found for this or any previous visit (from the past 720 hour(s)).    Pain Level/Medicated:  No report of pain with wound care B legs       INTERVENTIONS BY WOUND TEAM:  . Performed standard wound care which includes appropriate positioning, dressing removal and non-selective debridement. Pictures and measurements obtained weekly if/when required.  Preparation for Dressing removal:   Non-selectively Debrided with:  saline and gauze.  Sharp debridement: na  Patrica wound: Cleansed with damp cloth, Prepped with   Primary Dressing: hydrofiber silver, ABD and rolled gauze  Secondary (Outer) Dressing:     Interdisciplinary consultation: Patient, Bedside RN (), Michelle Love RN WT    EVALUATION / RATIONALE FOR TREATMENT:  Most Recent Date:  : 6/9/22 Pt with PTWs due to blisters from fluid overload.  Wounds should resolve with wound care and diuresis.  Toes cool to the touch so ordered EARLE to r/o any arterial  insufficiency and will advance compression based on EARLE results.  Pt has had coban used on her legs in the past and said this product was uncomfortable.       Goals: Steady decrease in wound area and depth weekly.    WOUND TEAM PLAN OF CARE ([X] for frequency of wound follow up,):   Nursing to follow dressing orders written for wound care. Contact wound team if area fails to progress, deteriorates or with any questions/concerns if something comes up before next scheduled follow up (See below as to whether wound is following and frequency of wound follow up)  Dressing changes by wound team:                   Dressing changes by wound team:                   Follow up 3 times weekly:                NPWT change 3 times weekly:     Follow up 1-2 times weekly:      Follow up Bi-Monthly:                   Follow up as needed:   X  Other (explain):     NURSING PLAN OF CARE ORDERS (X):  Dressing changes: See Dressing Care orders: X  Skin care: See Skin Care orders:   RN Prevention Protocol:   Rectal tube care: See Rectal Tube Care orders:   Other orders:    RSKIN:   CURRENTLY IN PLACE (X), APPLIED THIS VISIT (A), ORDERED (O):   Q shift Matt:  X  Q shift pressure point assessments:  X    Surface/Positioning   Pressure redistribution mattress            Low Airloss          Bariatric foam      Bariatric DENA     Waffle cushion        Waffle Overlay     X     Reposition q 2 hours    X  TAPs Turning system     Z Roman Pillow     Offloading/Redistribution   Sacral Mepilex (Silicone dressing)     Heel Mepilex (Silicone dressing)         Heel float boots (Prevalon boot)             Float Heels off Bed with Pillows    X       Respiratory   Silicone O2 tubing         Gray Foam Ear protectors     Cannula fixation Device (Tender )          High flow offloading Clip    Elastic head band offloading device      Anchorfast                                                         Trach with Optifoam split foam              Containment/Moisture Prevention     Rectal tube or BMS    Purwick/Condom Cath        Leung Catheter    Barrier wipes           Barrier paste       Antifungal tx      Interdry        Mobilization       Up to chair        Ambulate      PT/OT      Nutrition       Dietician        Diabetes Education      PO     TF     TPN     NPO   # days     Other      Anticipated discharge plans:   LTACH:        SNF/Rehab:                  Home Health Care:    X pt will need ongoing wound care for B legs upon discharge       Outpatient Wound Center:            Self/Family Care:        Other:

## 2022-06-09 NOTE — PROGRESS NOTES
Received report from ED nurse at [facility]. Pt arrived to floor via [facility] escorted by IRVIN. Pt A&O x 4, on 6L NC. Pt assisted x2 to bed from [facility]. Denies any pain at this time. Fall precautions in place, all needs met at this time.

## 2022-06-09 NOTE — CARE PLAN
The patient is Stable - Low risk of patient condition declining or worsening    Shift Goals  Clinical Goals: Maintain stable blood sugar; mobility  Patient Goals: Rest    Progress made toward(s) clinical / shift goals:  Blood sugars have remained stable throughout the shift. Patient has not mobilized since this morning.

## 2022-06-09 NOTE — ASSESSMENT & PLAN NOTE
Acute on chronic, due to medication noncompliance   IV lasix   On torsemide at home   Echo pending   Daily weights   I&Os

## 2022-06-10 PROBLEM — I31.39 PERICARDIAL EFFUSION: Status: ACTIVE | Noted: 2022-01-01

## 2022-06-10 PROBLEM — I27.20 PULMONARY HYPERTENSION (HCC): Status: ACTIVE | Noted: 2022-01-01

## 2022-06-10 PROBLEM — I35.0 NONRHEUMATIC AORTIC VALVE STENOSIS: Status: ACTIVE | Noted: 2022-01-01

## 2022-06-10 NOTE — WOUND TEAM
Pt seen by wound team at [facility] on 6/9/22 for B leg wounds.  Pt has 2 PTWs on the R leg and 3 PTWs on the L leg.  Placed RN orders for wound care.  Pt has a hx of wounds.  Toes B are cool to the touch, ordered ABIs.  Wound team will follow up as necessary to advance wound care instructions.

## 2022-06-10 NOTE — PROGRESS NOTES
Received bedside patient report from LILLIE Alarcon and Ma, Nurse Apprentice. Patient resting comfortably in bed, no complaints at this time. Safety precautions in place. Will continue to monitor.

## 2022-06-10 NOTE — HEART FAILURE PROGRAM
Patient transferred from our [facility] for further workup of her -now- severe aortic stenosis. Dr. Carnes consulted     Discharge plan notes indicate that SNF will be the discharge destination. As such, we will leave the following appointments as is for now:    Your appointments  Jul 07, 2022 9:00 AM   Heart Failure New Patient with NICOLE Lozada   [facility] (--) [address]   [place] [address]   [phone]        Aug 02, 2022 2:00 PM   Heart Failure Established Patient with Ash Jeffrey M.D.   [facility] (--) [address]   [place] [address]   [phone]     Special Clinical Considerations Pertinent to HF    • Pneumococcal vaccine: prior  • Influenza vaccine: out of season  • Tobacco Status: never per h/p   • Documentation of tobacco cessation counseling: n/a  • DM dx: it's not being diagnosed in the h/p but patient is on insulin here and levemir at home. On Rosuvastatin at home as well  • Atrial arrhythmia dx: yes, on warfarin    Nurses: Please document HF education in the education tab and populate the new and improved HF Care Plan. These tools will guide day to day care of the HF patient.    Providers: below are Guideline Directed Medical Therapy (GDMT) for HFpEF. If any cannot be prescribed by discharge, would you please note the clinical reason for each in your discharge summary? Thanks! Britta  • Anticoagulation for atrial arrhythmia, if applicable  • Glycemic control for DM + HF, if applicable  • Lipid lowering medication for DM + HF, if applicable  • Pneumococcal vaccine, if not previously received, If refused PLEASE DOCUMENT  • Influenza vaccine for the current flu season, if not previously received If refused PLEASE DOCUMENT  • Documentation of smoking cessation counseling, if applicable  • Acute Referral to disease management program specializing in heart failure care-  asked that schedulers notify me if patient is not able to be seen at the Heart Failure Clinic  • 7 calendar day f/u appointment scheduled prior to discharge, appearing on signed after visit summary.

## 2022-06-10 NOTE — ASSESSMENT & PLAN NOTE
"Echo read by Dr. Oakley, called me and informed of severe aortic stenosis.  New onset.  \"Aortic Valve  The aortic valve is not well visualize en face. Severe aortic valve   stenosis. Vmax is 4.1 m/s. Transvalvular gradients are - Peak: 66 mmHg,   Mean: 38 mmHg. No aortic insufficiency.\"  - consulting cardiology      From outside cardiology notes, by RADHA Oseguera 4/13/22 visit:  Transthoracic Echo Report 11/14/2019  Compared to the images of the study done 11/10/15 - there has been no significant change.  Left ventricular ejection fraction is visually estimated to be 60%.  Aortic valve area calculated from the continuity equation is 1.1 cm2.  Mild aortic stenosis.  Right ventricular systolic pressure is estimated to be 30 mmHg.     Transthoracic Echo Report 12/9/2020  Compared to the images of the study done on 11/14/2019 there has been progression of aortic stenosis, previously mild.  Normal left ventricular size, wall thickness, and systolic function.  Left ventricular ejection fraction is visually estimated to be 60%.  Normal right ventricular systolic function.  Mild mitral regurgitation.  Moderate aortic stenosis.  Mild tricuspid regurgitation.  Estimated right ventricular systolic pressure  is 50 mmHg.  "

## 2022-06-10 NOTE — CONSULTS
Cardiology Initial Consultation    Date of Service  6/10/2022    Referring Physician  Nathanael Villa M.D.    Reason for Consultation  Aortic stenosis, pericardial effusion.    History of Presenting Illness  Rachelle Reina is a 71 y.o. female with a past medical history of congestive heart failure, atrial fibrillation, hypertension and hyperlipidemia who presented 6/9/2022 with shortness of breath, lower extremity edema.     Review of Systems  Review of Systems   Constitutional: Positive for fatigue. Negative for chills and fever.   HENT: Negative.  Negative for hearing loss.    Eyes: Negative.    Respiratory: Positive for shortness of breath. Negative for cough.    Cardiovascular: Positive for leg swelling. Negative for chest pain and palpitations.   Gastrointestinal: Negative.  Negative for abdominal pain, nausea and vomiting.   Genitourinary: Negative.  Negative for dysuria and urgency.   Musculoskeletal: Negative.  Negative for myalgias.   Skin: Negative.  Negative for rash.   Neurological: Negative.  Negative for dizziness, weakness and headaches.   Hematological: Does not bruise/bleed easily.   Psychiatric/Behavioral: Negative.  The patient is not nervous/anxious.        Past Medical History   has a past medical history of A-fib ([facility]), Arthritis (05/05/2020), Atrial fibrillation ([facility]), Benign essential hypertension (7/1/2011), Bowel habit changes, Breast cancer ([facility]), Bronchitis (2015), CAD (coronary artery disease), Cancer ([facility]) (1987, 2000), Cataract (2018), Diabetes ([facility]ly [facility]), Diastolic congestive heart failure ([facility]) (2019), Disorder of thyroid, High cholesterol, History of cardiac catheterization (4/22/2010), History of echocardiogram (4/22/2011), History of hypothyroidism (8/29/2008), Hypercholesteremia (7/1/2011), Long term (current) use of anticoagulants (7/1/2011), Morbid obesity ([facility]) (10/28/2008), Ovarian cancer ([facility]ly [facility]), Pain, Sleep apnea, Snoring, and Urinary incontinence.    Surgical  History   has a past surgical history that includes abdominal hysterectomy total; mastectomy (partial); pr radiation therapy plan simple; cholecystectomy (2001); cataract phaco with iol (Left, 1/8/2018); cataract phaco with iol (Right, 1/22/2018); other orthopedic surgery (Right, 2013); and zzz cardiac cath (02/27/2019).    Family History  family history includes Cancer in her maternal aunt; Diabetes in her brother; Heart Attack in her brother; Heart Disease in her brother, father, and mother; Thyroid in her brother.    Social History   reports that she has never smoked. She has never used smokeless tobacco. She reports current alcohol use. She reports that she does not use drugs.     Medications  Prior to Admission Medications   Prescriptions Last Dose Informant Patient Reported? Taking?   Cholecalciferol (VITAMIN D-3) 5000 units Tab 6/8/2022 at [facility] Patient Yes No   Sig: Take 5,000 Units by mouth 2 Times a Day.   FEROSUL 325 (65 Fe) MG tablet 6/8/2022 at [facility] Patient Yes No   Sig: Take 325 mg by mouth every morning.   Finerenone (KERENDIA) 10 MG Tab 6/8/2022 at [facility] Patient No No   Sig: Take 1 tablet by mouth every day.   LEVEMIR FLEXTOUCH 100 UNIT/ML injection PEN 6/7/2022 at [facility] Patient Yes No   Sig: Inject 10 Units under the skin at bedtime.   Pyridoxine HCl (VITAMIN B-6 PO) 6/8/2022 at [facility] Patient Yes No   Sig: Take 1 Tab by mouth every day.   acetaminophen (TYLENOL) 650 MG CR tablet 6/7/2022 at [facility] Patient Yes No   Sig: Take 1,300 mg by mouth at bedtime.   allopurinol (ZYLOPRIM) 100 MG Tab 6/7/2022 at [facility] Patient Yes No   Sig: Take 100 mg by mouth every bedtime.   amoxicillin-clavulanate (AUGMENTIN) 875-125 MG Tab 2 WEEKS AGO at [facility] Patient Yes No   Sig: Take 1 Tablet by mouth 2 times a day.   ascorbic acid (VITAMIN C) 500 MG tablet 6/8/2022 at [facility] Patient Yes No   Sig: Take 500 mg by mouth every day.   citalopram (CELEXA) 20 MG TABS 6/8/2022 at [facility] Patient Yes No   Sig: Take 20 mg by mouth every  morning.   gemfibrozil (LOPID) 600 MG Tab 6/8/2022 at 0730 Patient No No   Sig: Take 1 Tab by mouth every morning.   levothyroxine (SYNTHROID) 175 MCG Tab 6/5/2022 at [facility] Patient Yes No   Sig: Take 175 mcg by mouth every Sunday. Brand Name   levothyroxine (SYNTHROID) 200 MCG Tab 6/8/2022 at [facility] Patient Yes No   Sig: Take 200 mcg by mouth see administration instructions. Monday through Saturday  Brand Name   loratadine (CLARITIN) 10 MG Tab 6/8/2022 at 0730 Patient Yes No   Sig: Take 10 mg by mouth every morning.   losartan (COZAAR) 50 MG Tab 6/8/2022 at 0730 Patient Yes No   Sig: Take 25 mg by mouth every morning.   metoprolol (LOPRESSOR) 50 MG Tab 6/8/2022 at 0730 Patient No No   Sig: TAKE 1 TABLET BY MOUTH TWICE DAILY   Patient taking differently: Take 50 mg by mouth 2 times a day. TAKE 1 TABLET BY MOUTH TWICE DAILY   nystatin (MYCOSTATIN) 160464 UNIT/GM Cream topical cream UNK at [facility] Patient Yes No   Sig: Apply 1 Application topically 2 times a day.   nystatin (MYCOSTATIN) powder UNK at [facility] Patient Yes No   Sig: Apply 1 Application topically 2 times a day as needed.   oxybutynin (DITROPAN XL) 15 MG CR tablet 6/8/2022 at 0730 Patient Yes No   Sig: Take 15 mg by mouth every morning.   pioglitazone (ACTOS) 15 MG Tab 6/8/2022 at 0730 Patient Yes No   Sig: Take 15 mg by mouth every morning.   potassium chloride SA (KDUR) 20 MEQ Tab CR 6/8/2022 at 0730 Patient Yes No   Sig: Take 20-40 mEq by mouth every day.   rosuvastatin (CRESTOR) 20 MG Tab 6/7/2022 at [facility] Patient No No   Sig: Take 1 Tab by mouth every evening.   torsemide (DEMADEX) 20 MG Tab 1 WEEK AGO at [facility] Patient Yes No   Sig: Take 20-40 mg by mouth every day.   traZODone (DESYREL) 100 MG Tab 6/7/2022 at [facility] Patient Yes No   Sig: Take 50 mg by mouth every evening.   warfarin (COUMADIN) 5 MG Tab 6/7/2022 at 2200 Patient No No   Sig: TAKE 1/2 TO 1 TABLET BY MOUTH DAILY AS DIRECTED BY COAGULATION CLINIC   Patient taking differently: Take 2.5-5 mg by mouth every day.  5 mg on Tuesday and Friday 2.5 mg all other days      Facility-Administered Medications: None   (Medications reviewed.)    Allergies  Allergies   Allergen Reactions   • Sulfa Drugs Rash     8/2015 rash and hives         Vital signs in last 24 hours  Temp:  [36.4 °C (97.5 °F)-36.6 °C (97.9 °F)] 36.5 °C (97.7 °F)  Pulse:  [] 123  Resp:  [16-20] 16  BP: ()/(43-80) 115/51  SpO2:  [94 %-100 %] 94 %    Physical Exam  Physical Exam  Constitutional:       Appearance: Normal appearance. She is well-developed and normal weight.   HENT:      Head: Normocephalic and atraumatic.      Mouth/Throat:      Mouth: Mucous membranes are moist.   Eyes:      Extraocular Movements: Extraocular movements intact.      Conjunctiva/sclera: Conjunctivae normal.   Cardiovascular:      Rate and Rhythm: Normal rate and regular rhythm.      Pulses: Normal pulses.      Heart sounds: Murmur heard.   Pulmonary:      Effort: Pulmonary effort is normal.      Breath sounds: Normal breath sounds.   Abdominal:      General: Bowel sounds are normal.      Palpations: Abdomen is soft.   Musculoskeletal:         General: Normal range of motion.      Cervical back: Normal range of motion and neck supple.   Skin:     General: Skin is warm and dry.   Neurological:      General: No focal deficit present.      Mental Status: She is alert and oriented to person, place, and time. Mental status is at baseline.   Psychiatric:         Mood and Affect: Mood normal.         Behavior: Behavior normal.         Thought Content: Thought content normal.         Judgment: Judgment normal.         Lab Review  Lab Results   Component Value Date/Time    WBC 5.2 06/08/2022 10:04 AM    RBC 4.91 06/08/2022 10:04 AM    HEMOGLOBIN 11.9 (L) 06/08/2022 10:04 AM    HEMATOCRIT 40.5 06/08/2022 10:04 AM    MCV 82.5 06/08/2022 10:04 AM    MCH 24.2 (L) 06/08/2022 10:04 AM    MCHC 29.4 (L) 06/08/2022 10:04 AM    MPV 9.2 06/08/2022 10:04 AM      Lab Results   Component Value  Date/Time    SODIUM 142 06/08/2022 10:04 AM    POTASSIUM 4.7 06/08/2022 10:04 AM    CHLORIDE 109 06/08/2022 10:04 AM    CO2 24 06/08/2022 10:04 AM    GLUCOSE 86 06/08/2022 10:04 AM    BUN 25 (H) 06/08/2022 10:04 AM    CREATININE 1.12 06/08/2022 10:04 AM    CREATININE 0.6 01/27/2009 01:10 PM      Lab Results   Component Value Date/Time    ASTSGOT 27 06/08/2022 10:04 AM    ALTSGPT 19 06/08/2022 10:04 AM     Lab Results   Component Value Date/Time    CHOLSTRLTOT 115 04/28/2022 10:21 AM    LDL 50 04/28/2022 10:21 AM    HDL 54 04/28/2022 10:21 AM    TRIGLYCERIDE 55 04/28/2022 10:21 AM    TROPONINT 41 (H) 08/30/2021 07:37 PM       Recent Labs     06/08/22  1500 06/09/22  1136   NTPROBNP 1126* 1646*       Cardiac Imaging and Procedures Review  CARDIAC STUDIES/PROCEDURES:    CARDIAC CATHETERIZATION CONCLUSIONS (02/27/19)  1.  No angiographic evidence of coronary artery disease.  2.  Normal left ventricular systolic function with ejection fraction of 70%.  3.  Elevated left ventricular end-diastolic pressure.  (study result reviewed)     CARDIAC CATHETERIZATION (10/30/08)  Cardiac catheterization showing no angiographic evidence of coronary artery disease.     CALCIUM SCORING CT AT [facility] (12/18/18)  Mild evidence of plaque in the left circumflex artery.    CT OF CHEST (03/17/22)  1.  Negative for pulmonary embolism   2.  Small-moderate-sized pericardial effusion  3.  Dilation of both atria and probably left ventricle  4.  Mild atelectasis  5.  Hepatic steatosis  Abdominally aortic atherosclerotic plaque  (study result reviewed)    ECHOCARDIOGRAM CONCLUSIONS (06/10/22)  Compared to the prior echo on 12/09/2020, aortic stenosis is worse.  Technically difficult study due to patient body habitus and position.   The left ventricular ejection fraction is visually estimated to be 75%.  Normal regional wall motion.  Severe aortic valve stenosis.  Right ventricular systolic pressure is estimated to be 51 mmHg.  Large  pericardial effusion with early evidence of hemodynamic   compromise.  (study result reviewed)     ECHOCARDIOGRAM CONCLUSIONS (11/10/15)  No significant valve disease or flow abnormalities.   Normal left ventricular systolic function.   Trace to small pericardial effusion without evidence of hemodynamic compromise.  No prior study is available for comparison.      ECHOCARDIOGRAM CONCLUSIONS (09/02/08)  Echocardiogram showing normal left ventricular systolic function and mild mitral regurgitation.     EKG performed on (01/22/18) was reviewed: EKG shows atrial fibrillation.  EKG performed on (02/21/17) EKG shows atrial fibrillation.  EKG performed on (11/10/15) EKG shows atrial fibrillation.     Laboratory results of (11/11/15) Cholesterol profile of 118/165/39/46 noted.     MYOCARDIAL PERFUSION STUDY CONCLUSIONS at [facility] (02/06/19)  Reversible defect of the anterior wall and apex. Finding concerning for left anterior descending artery ischemia.  (study result reviewed)     MPI CONCLUSIONS (11/11/15)  No reversible defects that would indicate ischemia.     RIGHT HEART CATHETHERIZATION: by Aj Carbajal (05/06/20)  1) Successful implantation of Cardiomems device for remote monitor of intracardiac pressures.  2) Patient will be monitored as part of our protocol in our heart failure program to further reduce repeated heart failure hospitalization and also to improve overall quality of life.  Assessment/Plan  1. Severe aortic stenosis: Her aortic stenosis has reached severe status and she is symptomatic, NYHA class III/IV. We will be transferred back to [facility] for cardiac catheterization in the future. She understands the risks and benefits and agrees with plan.  2. Pericardial effusion- Large pericardial effusion was noted on her echocardiogram. She will be considered for pericardiocentesis.  3. Chronic atrial fibrillation on chronic anticoagulation therapy (warfarin) and unseccessful  electrical cardioversion on amiodarone on 01/27/09: She is doing well and the ventricular rate is well controlled. We will continue with current medical therapy.   4. History of diastolic congestive heart failure with Cardiomems implant: (Managed by [facility])  5. Hypertension: Blood pressure is well controlled.  6. Hyperlipidemia: She is doing well on statin therapy without myalgia symptoms.    Thank you for allowing me to participate in the care of this patient.    I will continue to follow this patient    Please contact me with any questions.    Florian Carnes M.D.   Cardiologist, [facility]  [phone]

## 2022-06-10 NOTE — DIETARY
NUTRITION SERVICES: BMI - Pt with BMI >40 (=Body mass index is 47.76 kg/m².), Class III obesity. Wt measured via bed scale. Note, pt admitted w/ 2+ pitting/weeping BLE edema per flowsheets. Weight loss counseling not appropriate in acute care setting.     RECOMMEND - If appropriate at DC please refer to outpatient nutrition services for weight management.     RD available PRN.

## 2022-06-10 NOTE — WOUND TEAM
Wound care assessed pt yesterday. Placed RN orders for wound care and ABIs ordered as well.  Wound team to f/u as necessary to advance care instructions.

## 2022-06-10 NOTE — PROGRESS NOTES
Pt received from [facility]. Tele monitor in place. VSS. Pt oriented to room. Educated on use of the call light. Pt demonstrated use of the call light. Discussed POC. All questions answered.
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
## 2022-06-10 NOTE — PROGRESS NOTES
[facility] TRIAGE OFFICER DIRECT ADMISSION REPORT  Transferring facility: [facility]  Transferring physician: Dr. Villa  Transferring facility/physician contact number: [facility]  Chief complaint: severe aortic stenosis and large pericardial effusion   Pertinent history & patient course: 71-year-old female with past medical history of atrial fibrillation on warfarin, CAD, diastolic heart failure, hypothyroidism, dyslipidemia, obesity class III, T2DM on insulin at home admitted on 6/9/2022 for lower extremity swelling with open sores, reportedly wounds have not been healing and progressively worsening. Echo showing severe aortic stenosis and large pericardial effusion. Dr. Carnes came to bedside and evaluated, recommended transfer back to [facility] as emergent transfer. As per transferring provider patient is stable for telemetry floor.  Pertinent imaging & lab results: as above  Code Status: Full code  Further work up or recommendations per triage officer prior to transfer: None  Consultants called prior to transfer and pertinent input from consultants: None  Patient accepted for transfer: Yes  Consultants to be called upon arrival: Cardiology possible ct surgery  Admission status: Inpatient.   Floor requested: tele  If ICU transfer, name of intensivist case discussed with and pertinent input from critical care: none      Please inform the triage officer upon arrival of the patient to [facility] for assignment of a hospitalist to perform admission.      For any question or concerns regarding the care of this patient, please reach out to the assigned hospitalist.

## 2022-06-10 NOTE — PROGRESS NOTES
Bedside report received.  Patient is resting, no signs of distress.  Safety precautions in place.

## 2022-06-10 NOTE — CARE PLAN
The patient is Stable - Low risk of patient condition declining or worsening    Shift Goals  Clinical Goals: (P) Monitor labs; Prevent further skin breakdown  Patient Goals: (P) Rest    Progress made toward(s) clinical / shift goals:  Monitoring labs, pt has had wound consult and dressings placed on bilateral legs. Progressing on other goals at this time.     Patient is not progressing towards the following goals:

## 2022-06-10 NOTE — PROGRESS NOTES
4 Eyes Skin Assessment Completed by LILLIE Almanza and LILLIE Azevedo.    Head WDL  Ears WDL  Nose WDL  Mouth WDL  Neck Redness  Breast/Chest Redness, Rash, Scar and Excoriation  Shoulder Blades WDL  Spine Redness  (R) Arm/Elbow/Hand Redness, Blanching, Bruising and Swelling  (L) Arm/Elbow/Hand Redness, Blanching, Bruising and Swelling  Abdomen Redness and Blanching  Groin Redness and Excoriation  Scrotum/Coccyx/Buttocks Redness, Blanching and Excoriation  (R) Leg Redness, Bruising and Edema  (L) Leg Redness, Bruising and Swelling  (R) Heel/Foot/Toe Redness, Boggy and Edema  (L) Heel/Foot/Toe Redness, Boggy and Edema          Devices In Places Tele Box, Blood Pressure Cuff and Pulse Ox      Interventions In Place InterDry, Waffle Overlay, TAP System, Pillows and Dri-Roman Pads    Possible Skin Injury Yes    Pictures Uploaded Into Epic Yes  Wound Consult Placed Yes  RN Wound Prevention Protocol Ordered Yes

## 2022-06-10 NOTE — CARE PLAN
The patient is Stable - Low risk of patient condition declining or worsening    Shift Goals  Clinical Goals: Free from falls and injuries, Rest and sleep, Pain Control  Patient Goals: Free from falls and injuries, Rest and sleep, Pain Control    Progress made toward(s) clinical / shift goals: Pain 1/10, Slept for over 5 hours, no falls or injuries    Problem: Knowledge Deficit - Standard  Goal: Patient and family/care givers will demonstrate understanding of plan of care, disease process/condition, diagnostic tests and medications  Outcome: Progressing     Problem: Care Map:  Admission Optimal Outcome for the Heart Failure Patient  Goal: Admission:  Optimal Care of the heart failure patient  Outcome: Progressing     Problem: Care Map:  Day 1 Optimal Outcome for the Heart Failure Patient  Goal: Day 1:  Optimal Care of the heart failure patient  Outcome: Progressing     Problem: Care Map:  Day 2 Optimal Outcome for the Heart Failure Patient  Goal: Day 2:  Optimal Care of the heart failure patient  Outcome: Progressing     Problem: Care Map:  Day 3 Optimal Outcome for the Heart Failure Patient  Goal: Day 3:  Optimal Care of the heart failure patient  Outcome: Progressing     Problem: Care Map:  Day Before Discharge Optimal Outcome for the Heart Failure Patient  Goal: Day Before Discharge:  Optimal Care of the heart failure patient  Outcome: Progressing     Problem: Care Map:  Day of Discharge Optimal Outcome for the Heart Failure Patient  Goal: Day of Discharge:  Optimal Care of the heart failure patient  Outcome: Progressing     Problem: Fall Risk  Goal: Patient will remain free from falls  Outcome: Progressing     Problem: Skin Integrity  Goal: Skin integrity is maintained or improved  Outcome: Progressing       Patient is not progressing towards the following goals:
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
## 2022-06-10 NOTE — CARE PLAN
The patient is Stable - Low risk of patient condition declining or worsening    Shift Goals  Clinical Goals: paitent will remain free from falls during shift  Patient Goals: rest comfortably    Progress made toward(s) clinical / shift goals:  Bed in low and locked position, call light within reach, patient call appropriately, bed alarm on      Patient is not progressing towards the following goals: NA    Problem: Knowledge Deficit - Standard  Goal: Patient and family/care givers will demonstrate understanding of plan of care, disease process/condition, diagnostic tests and medications  Outcome: Progressing     Problem: Care Map:  Day 2 Optimal Outcome for the Heart Failure Patient  Goal: Day 2:  Optimal Care of the heart failure patient  Outcome: Progressing     Problem: Care Map:  Day 3 Optimal Outcome for the Heart Failure Patient  Goal: Day 3:  Optimal Care of the heart failure patient  Outcome: Progressing     Problem: Care Map:  Day Before Discharge Optimal Outcome for the Heart Failure Patient  Goal: Day Before Discharge:  Optimal Care of the heart failure patient  Outcome: Progressing     Problem: Care Map:  Day of Discharge Optimal Outcome for the Heart Failure Patient  Goal: Day of Discharge:  Optimal Care of the heart failure patient  Outcome: Progressing     Problem: Fall Risk  Goal: Patient will remain free from falls  Outcome: Progressing

## 2022-06-10 NOTE — CARE PLAN
The patient is Watcher - Medium risk of patient condition declining or worsening         Progress made toward(s) clinical / shift goals: Progressing       Problem: Knowledge Deficit - Standard  Goal: Patient and family/care givers will demonstrate understanding of plan of care, disease process/condition, diagnostic tests and medications  Outcome: Progressing  Note: Pt updated on POC, no questions at this time      Problem: Fall Risk  Goal: Patient will remain free from falls  Outcome: Progressing  Note: All fall precautions in place

## 2022-06-10 NOTE — DISCHARGE PLANNING
Case Management Discharge Planning    Admission Date: 6/9/2022  GMLOS: 3.8  ALOS: 1    6-Clicks ADL Score: 18  6-Clicks Mobility Score: 16  PT and/or OT Eval ordered: Yes  Post-acute Referrals Ordered: NA  Post-acute Choice Obtained: NA  Has referral(s) been sent to post-acute provider:  ALINA      Anticipated Discharge Dispo: Discharge Disposition: D/T to SNF with Medicare cert in anticipation of skilled care (03)    DME Needed: No    Action(s) Taken: Updated Provider/Nurse on Discharge Plan and OTHER     Transfer to [facility] at 1445. COBRA placed in chart     Escalations Completed: None    Medically Clear: No    Next Steps: Transport to [facility]     Barriers to Discharge: Medical clearance    Is the patient up for discharge tomorrow: No

## 2022-06-11 NOTE — PROGRESS NOTES
Cardiology Follow Up Progress Note    Date of Service  6/11/2022    Attending Physician  Jennifer Mcwilliams D.O.    Chief Complaint     Volume overload, weight gain, worsening lower extremity edema    Reportedly patient's dry weight 262-264 #    HPI  Rachelle Reina is a 71 y.o. female admitted 6/10/2022 with valvular HF and large pericardial effusion currently no evidence of hemodynamic compromise.    PMH: HFpEF stage C class III, LVEF 60%, BMI 48, chronic lower extremity edema with chronic weeping wounds, KEN on CPAP, chronic A. fib on oral anticoagulation with warfarin, no coronary disease on coronary angiography 2019, insulin-dependent diabetes, CKD stage III, moderate AAS.  Hyperlipidemia, gout, ovarian and breast cancer, chronic venous insufficiency.    Interim Events    No overnight cardiac events.  Continue with IV furosemide 40 mg daily.  No evidence of hemodynamic compromise in the setting of large pericardial effusion.  INR~3, vitamin K 2.5 this morning.  We will plan for coronary angiography and possible pericardiocentesis tomorrow morning.  Keep n.p.o. after midnight.    Review of Systems  Review of Systems   Constitutional: Positive for fatigue.   Respiratory: Negative for apnea, cough, chest tightness, wheezing and stridor.    Cardiovascular: Positive for leg swelling. Negative for chest pain.       Vital signs in last 24 hours  Temp:  [36.5 °C (97.7 °F)-37.1 °C (98.8 °F)] 37.1 °C (98.8 °F)  Pulse:  [] 112  Resp:  [16-21] 18  BP: (102-136)/(62-84) 136/84  SpO2:  [90 %-98 %] 94 %    Physical Exam  Physical Exam  Cardiovascular:      Rate and Rhythm: Normal rate. Rhythm irregular.      Pulses: Normal pulses.      Heart sounds: Murmur heard.   Pulmonary:      Breath sounds: No wheezing.   Skin:     General: Skin is warm.   Neurological:      Mental Status: She is alert. Mental status is at baseline.   Psychiatric:         Mood and Affect: Mood normal.         Lab Review  Lab Results    Component Value Date/Time    WBC 5.2 06/08/2022 10:04 AM    RBC 4.91 06/08/2022 10:04 AM    HEMOGLOBIN 11.9 (L) 06/08/2022 10:04 AM    HEMATOCRIT 40.5 06/08/2022 10:04 AM    MCV 82.5 06/08/2022 10:04 AM    MCH 24.2 (L) 06/08/2022 10:04 AM    MCHC 29.4 (L) 06/08/2022 10:04 AM    MPV 9.2 06/08/2022 10:04 AM      Lab Results   Component Value Date/Time    SODIUM 142 06/08/2022 10:04 AM    POTASSIUM 4.7 06/08/2022 10:04 AM    CHLORIDE 109 06/08/2022 10:04 AM    CO2 24 06/08/2022 10:04 AM    GLUCOSE 86 06/08/2022 10:04 AM    BUN 25 (H) 06/08/2022 10:04 AM    CREATININE 1.12 06/08/2022 10:04 AM    CREATININE 0.6 01/27/2009 01:10 PM      Lab Results   Component Value Date/Time    ASTSGOT 27 06/08/2022 10:04 AM    ALTSGPT 19 06/08/2022 10:04 AM     Lab Results   Component Value Date/Time    CHOLSTRLTOT 115 04/28/2022 10:21 AM    LDL 50 04/28/2022 10:21 AM    HDL 54 04/28/2022 10:21 AM    TRIGLYCERIDE 55 04/28/2022 10:21 AM    TROPONINT 41 (H) 08/30/2021 07:37 PM       Recent Labs     06/08/22  1500 06/09/22  1136   NTPROBNP 1126* 1646*       Cardiac Imaging and Procedures Review  EKG: Atrial fibrillation rate controlled    Echocardiogram: 6/10/2022  Compared to the prior echo on 12/09/2020, aortic stenosis is worse.  Technically difficult study due to patient body habitus and position.   The left ventricular ejection fraction is visually estimated to be 75%.  Normal regional wall motion.  Severe aortic valve stenosis.  Right ventricular systolic pressure is estimated to be 51 mmHg.  Large pericardial effusion with early evidence of hemodynamic   compromise.        Transthoracic Echo Report 12/9/2020  Compared to the images of the study done on 11/14/2019 there has been progression of aortic stenosis, previously mild.  Normal left ventricular size, wall thickness, and systolic function.  Left ventricular ejection fraction is visually estimated to be 60%.  Normal right ventricular systolic function.  Mild mitral  regurgitation.  Moderate aortic stenosis.  Mild tricuspid regurgitation.  Estimated right ventricular systolic pressure  is 50 mmHg.      Cardiac Catheterization:    Cath 2/27/2019  POSTPROCEDURE DIAGNOSES:  1.  No angiographic evidence of coronary artery disease.  2.  Normal left ventricular systolic function with ejection fraction of 70%.  3.  Elevated left ventricular end-diastolic pressure.        Imaging  Chest X-Ray:      Stress Test:      Assessment/Plan    #New findings of large pericardial effusion, hemodynamically stable.  #Acute on chronic HFpEF, LVEF 60 to 75%.  #Poor compliance with torsemide secondary to frequent urination.  #Reports history of nosebleed on warfarin.  #Secondary pulmonary hypertension, RVSP 50 mmHg.  #Severe aortic stenosis based on recent echocardiogram.  #Chronic atrial fibrillation, rate well controlled.  #On OAC with warfarin, vitamin K2.5 this morning  #Class III severe obesity, therapeutic lifestyle changes.  #Hypertension  #Coronary angiography 2019 no evidence of CAD.    Recommendations:    #Continue with IV furosemide 40 mg IV daily.  #Strict intake and output and daily standing weight. Dry weight ~264. Standing weight pending.  #Hold warfarin, current INR~3.  #Administer vitamin K 2.5x1 time this morning.  #Plan for coronary angiography and evaluation of severe AS tomorrow morning.  #Plan for pericardiocentesis tomorrow morning.  #Keep n.p.o.    I personally spent a total of 12minutes which includes face-to-face time and non-face-to-face time spent on preparing to see the patient, reviewing hospital notes and tests, obtaining history from the patient, performing a medically appropriate exam, counseling and educating the patient, ordering medications/tests/procedures/referrals as clinically indicated, and documenting information in the electronic medical record.      Thank you for allowing me to participate in the care of this patient.      Please contact me with any  questions.    STACEY Saul.   Cardiologist, [facility]  [phone]

## 2022-06-11 NOTE — PROGRESS NOTES
NOC Cross Cover Progress Note      Ms. Reina admitted 6/10/2022 for severe aortic stenosis and large pericardial effusion.  Patient is chronically anticoagulated on Coumadin for her history of A. Fib with plan to bridge to heparin drip for inpatient thromboembolism prophylaxis.  INR has been therapeutic between 2.84 and 3.02 this admission.  I spoke with Nba, clinical pharmacist, who recommends holding heparin gtt at this time as patient remains in therapeutic range and may undergo pericardiocentesis later today.    Repeat INR scheduled for 1300 6/11/2022.  Consider vitamin K as needed prior to procedure.  Follow INR, and resume anticoagulation as indicated.      Linsey Wegener, APRN  NOC Hospitalist

## 2022-06-11 NOTE — ASSESSMENT & PLAN NOTE
On warfarin at home   Check PT/INR  Discussed with cardiology ok to restart warfarin, no heed for heparin bridge

## 2022-06-11 NOTE — PROGRESS NOTES
Bedside report received from day RN, pt care assumed, assessment completed. Pt is A&O4, pain 7/10, Afib on the monitor. Updated on POC, questions answered. Bed in lowest, locked position, treaded socks on, call light and belongings within reach. Fall precautions in place.

## 2022-06-11 NOTE — PROGRESS NOTES
Hospital Medicine Daily Progress Note    Date of Service  6/11/2022    Chief Complaint  Rachelle Reina is a 71 y.o. female admitted 6/8/2022 with lower extremity wounds.    Hospital Course  71 y.o. female obese, A. fib on warfarin who presented 6/8/2022 with bilateral lower extremity wounds that have not been healing and lower extremity swelling.  Patient reports she fell off scooter and suffered these wounds and been limited healing.  She also reports noncompliance with water pills. In the ER, she was given IV ancef for her bilateral lower extremity wounds and started on IV diuresis for fluid overload. Patient was originally admitted at [facility] ED and transferred to [facility] due to bed capacity. Echo performed showed severe aortic stenosis, pulmonary hypertension RVSP 55mmHg, and large pericardial effusion. Cardiology was consulted and recommended transfer back to [facility].       Interval Problem Update  Discussed with cardiology, INR too high pericardiocentesis rescheduled for tomorrow. Will give mephyton for INR reversal. Heparin ggt held overnight since INR therapeutic. Vitals significant for tachycardia and on 4 L NC. Patient reports her leg swelling has improved. Denies chest pain or dyspnea. Groin rash much improved.     I have personally seen and examined the patient at bedside. I discussed the plan of care with patient and bedside RN.    Consultants/Specialty  cardiology    Code Status  Full Code    Disposition  Patient is not medically cleared for discharge.   Anticipate discharge to to home with close outpatient follow-up.  I have placed the appropriate orders for post-discharge needs.    Review of Systems  Review of Systems   Constitutional: Positive for malaise/fatigue. Negative for chills and fever.   Respiratory: Positive for shortness of breath.    Cardiovascular: Positive for leg swelling. Negative for chest pain.   Gastrointestinal: Negative for abdominal pain, nausea  and vomiting.   Genitourinary: Negative for dysuria.   Musculoskeletal: Negative for myalgias.   Skin: Negative for rash.        Wounds b/l legs   Neurological: Positive for weakness. Negative for dizziness and headaches.   Psychiatric/Behavioral: Negative for depression.   All other systems reviewed and are negative.       Physical Exam  Temp:  [36.4 °C (97.6 °F)-37.1 °C (98.8 °F)] 36.8 °C (98.2 °F)  Pulse:  [] 103  Resp:  [16-21] 18  BP: (100-136)/(58-84) 116/58  SpO2:  [90 %-100 %] 100 %    Physical Exam  Vitals and nursing note reviewed.   Constitutional:       General: She is not in acute distress.     Appearance: She is obese. She is ill-appearing.   HENT:      Head: Normocephalic and atraumatic.   Eyes:      Conjunctiva/sclera: Conjunctivae normal.   Cardiovascular:      Rate and Rhythm: Regular rhythm. Tachycardia present.      Heart sounds: Normal heart sounds.   Pulmonary:      Effort: Pulmonary effort is normal. No respiratory distress.      Breath sounds: No wheezing.      Comments: On NC   Decreased breath sounds in the bases  Abdominal:      General: Abdomen is flat. There is no distension.      Palpations: Abdomen is soft.      Tenderness: There is no abdominal tenderness.   Musculoskeletal:         General: Normal range of motion.      Cervical back: Normal range of motion and neck supple.      Right lower leg: Edema present.      Left lower leg: Edema present.   Skin:     General: Skin is warm and dry.      Findings: Erythema (groin fold, minimal, improving) and lesion (multiple superficial lesions b/l LE, bandage in place c/d/i) present.   Neurological:      General: No focal deficit present.      Mental Status: She is alert and oriented to person, place, and time.      Cranial Nerves: No cranial nerve deficit.      Sensory: No sensory deficit.   Psychiatric:         Mood and Affect: Mood normal.         Behavior: Behavior normal.         Fluids    Intake/Output Summary (Last 24 hours) at  6/11/2022 1224  Last data filed at 6/11/2022 1211  Gross per 24 hour   Intake --   Output 1350 ml   Net -1350 ml       Laboratory          Recent Labs     06/10/22  1312 06/10/22  1512 06/11/22  0044 06/11/22  1059   APTT 50.5* 45.4*  --   --    INR 3.02* 2.84* 2.92* 2.81*               Imaging  No orders to display        Assessment/Plan  * Pericardial effusion- (present on admission)  Assessment & Plan  Echo showing large pericardial effusion   Cardiology consulted   Pericardiocentesis planned for tomorrow   Continue to monitor INR, currently therapeutic no need for heparin ggt  Giving mephyton x1  Monitor respiratory and hemodynamics closely     Cellulitis- (present on admission)  Assessment & Plan  Continue ancef   Wound care     Acute on chronic diastolic congestive heart failure (HCC)- (present on admission)  Assessment & Plan  Continue IV lasix   Cardiology consulted   Echo noted     Pulmonary hypertension (HCC)- (present on admission)  Assessment & Plan  Diuresis     Chronic atrial fibrillation- (present on admission)  Assessment & Plan  On warfarin at home   Check PT/INR  Bridge to heparin, gtt     Dyslipidemia- (present on admission)  Assessment & Plan  C.w statin and lopid    History of hypothyroidism- (present on admission)  Assessment & Plan  Resume home medications    Class 3 severe obesity due to excess calories with serious comorbidity and body mass index (BMI) of 40.0 to 44.9 in adult (HCC)- (present on admission)  Assessment & Plan   on dietary and lifestyle modification     Benign essential hypertension- (present on admission)  Assessment & Plan  Hold BP meds given acute pericardial effusion and risk of hemodynamic collapse.       VTE prophylaxis: therapeutic anticoagulation with warfarin    I have performed a physical exam and reviewed and updated ROS and Plan today (6/11/2022). In review of yesterday's note (6/10/2022), there are no changes except as documented above.

## 2022-06-11 NOTE — PROGRESS NOTES
Monitor Summary:   Rhythm: Afib  Rate:   Measurement: NA/.09/NA  Ectopy: occasional PVC's

## 2022-06-11 NOTE — PROGRESS NOTES
Bedside report received from [facility] RN Stephon, pt is resting in bed with no reports of pain or discomfort at this time. Bed is locked in lowest position with call light, belongings within reach, white board updated, and POC discussed including possible pericardial centesis and heart catheterization. All needs met at this time.

## 2022-06-11 NOTE — CARE PLAN
Problem: Knowledge Deficit - Standard  Goal: Patient and family/care givers will demonstrate understanding of plan of care, disease process/condition, diagnostic tests and medications  Outcome: Progressing     Problem: Skin Integrity  Goal: Skin integrity is maintained or improved  Outcome: Progressing     Problem: Fall Risk  Goal: Patient will remain free from falls  Outcome: Progressing     Problem: Pain - Standard  Goal: Alleviation of pain or a reduction in pain to the patient’s comfort goal  Outcome: Progressing   The patient is Stable - Low risk of patient condition declining or worsening    Shift Goals  Clinical Goals: abx, monitor bgl    Progress made toward(s) clinical / shift goals:  Progressing    Patient is not progressing towards the following goals:
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
## 2022-06-11 NOTE — CARE PLAN
The patient is Watcher - Medium risk of patient condition declining or worsening    Shift Goals  Clinical Goals: abx, monitor bgl    Progress made toward(s) clinical / shift goals:  Maintain comfort level, pressure injury prevention, IAD prevention     Patient is not progressing towards the following goals:      Problem: Skin Integrity  Goal: Skin integrity is maintained or improved  Outcome: Not Progressing  Note: Pt has moisture under pannus, interdry in place with nystatin powder. Dry flow and TAPS to prevent pressure injury in place. Waffle overlay in place and adequately inflated.       Problem: Fall Risk  Goal: Patient will remain free from falls  Outcome: Progressing     Problem: Pain - Standard  Goal: Alleviation of pain or a reduction in pain to the patient’s comfort goal  Outcome: Progressing     Problem: Knowledge Deficit - Standard  Goal: Patient and family/care givers will demonstrate understanding of plan of care, disease process/condition, diagnostic tests and medications  Outcome: Progressing

## 2022-06-11 NOTE — PROGRESS NOTES
Monitor Summary:    Rhythm:  Afib   Rate Range:  90's  Ectopy: Rare PVC's    Measurements:  -/0.12/-

## 2022-06-11 NOTE — ASSESSMENT & PLAN NOTE
Echo showing large pericardial effusion   Cardiology consulted   -repeat echo pending   Continue to monitor INR  Giving mephyton x1  Monitor respiratory and hemodynamics closely

## 2022-06-11 NOTE — PROGRESS NOTES
Notified cardiology RADHA Arenas of patients arrival to [facility] Telemetry floor.

## 2022-06-11 NOTE — PROGRESS NOTES
Monitor Summary:   Rhythm: afib  Rate: 117  Measurement: No measurements   Ectopy: pvc, hr up to 162

## 2022-06-12 NOTE — PROCEDURES
"CARDIAC CATHETERIZATION REPORT    REFERRING: Florian Carnes M.D.    PROCEDURE PHYSICIAN: Duncan Drake MD, [facility]  ASSISTANT: None    IMPRESSIONS:  1.  Nonobstructive, two-vessel coronary disease  2.  Severe elevation in left heart filling pressure with left atrial pressure of 32 mmHg  3.  Severe aortic stenosis by noninvasive evaluation, pullback gradient consistent with significant aortic stenosis    Recommendations:  Further recommendations per the rounding team    Pre-procedure diagnosis: Severe aortic stenosis  Post-procedure diagnosis: Same    Procedure performed  Selective coronary angiography  Left heart catheterization    Conscious sedation was supervised by myself and administered by trained personnel using fentanyl and versed between 1405 and 1417. The patient tolerated sedation without complication.     Procedure Description  1. Access: 5/6 Urdu right radial artery Micropuncture technique was utilized following local anesthesia with lidocaine.  A radial cocktail containing verapamil and saline was administered in the radial artery sheath    2. Diagnostic description: The catheter was passed to the central circulation with the aide of J tipped 0.35\" wire. A 5F TIG 4.0 was used to inject the coronary circulationand enter the left ventricle during invasive hemodynamic monitoring.     3. Closing: At completion of the procedure the relevant equipment was removed from the body and hemostasis achieved by Radial band    Findings   Hemodynamics:   Aorta: 135/87 mmHg  LVEDP: 32 mmHg  Aortic valve peak to peak gradient: 40 mmHg  Left atrial pressure 32 with prominent V waves    Coronary Anatomy   Left Main: Short, normal   LAD: Normal   LCx: Normal.  The OM1 has a proximal 25% stenosis   RCA: Dominant, Minimal luminal irregularities       Technical Factors  1. Complications: None  2. Estimated Blood Loss: <50 cc  3. Specimens: None  4. Contrast Volume: 13 ml  5. Medications: Radial cocktail (Verapamil 2.5 " mg, Nitroglycerin 100 mcg) Heparin 5000 Units  6. Radiation (air kerma): 259 mGy

## 2022-06-12 NOTE — PROGRESS NOTES
[facility] Cardiology Follow-up Note    Date of Service:    6/12/2022      Name:   Rachelle Reina   YOB: 1950  Age:   71 y.o.  female   MRN:   [id number]      Reason for cardiology consult: Severe AS, large pericardial effusion    Attending Provider: Dr Mcwilliams    HPI:  Rachelle Reina is a 71 y.o. female admitted 6/10/2022 with valvular HF, severe AS, and large pericardial effusion currently no evidence of hemodynamic compromise.     PMHx: HFpEF stage C class III, LVEF 60%, BMI 48, chronic lower extremity edema with chronic weeping wounds, KEN on CPAP, chronic A. fib on oral anticoagulation with warfarin, no coronary disease on coronary angiography 2019, insulin-dependent diabetes, CKD stage III, moderate AAS.  Hyperlipidemia, gout, ovarian and breast cancer, chronic venous insufficiency.     Interim Events:  - Personal Telemetry interpretation: Afib 's  - Overnight events: still a bit SOB,   - Vitals: 's, 5L O2  - Labs reviewed: INR 1.9     ROS  Constitutional: + fatigue.  Weight decrease less swelling in LE's.  Respiratory:  Denies shortness of breath, no cough.  Cardiovascular: denies chest pain. improved lower extremity edema.  Denies orthopnea or PND.  : + polyuria, no dysuria.  GI:  Denies nausea/vomiting.  No abdominal distention.  Neuro:  Denies dizziness, syncope.  Hem/lymph: Denies easy bleeding/bruising.      All other review of systems reviewed and negative.    Past medical, surgical, social, and family history reviewed and unchanged from admission except as noted in HPI.    Medications: Reviewed in MAR  Current Facility-Administered Medications   Medication Dose Frequency Provider Last Rate Last Admin   • guaiFENesin ER (MUCINEX) tablet 600 mg  600 mg Q12HRS Jennifer Mcwilliams D.O.   600 mg at 06/12/22 1010   • acetaminophen (TYLENOL) tablet 500 mg  500 mg Q6HRS PRN Nathanael Villa M.D.   500 mg at 06/12/22 0538   • allopurinol (ZYLOPRIM) tablet 100 mg  100  "mg QHS Nathanael Villa M.D.   100 mg at 06/11/22 2102   • ceFAZolin in dextrose (Ancef) IVPB premix 2 g  2 g Q8HRS Nathanael Villa M.D.   Stopped at 06/12/22 1011   • citalopram (CeleXA) tablet 20 mg  20 mg DAILY Nathanael Villa M.D.   20 mg at 06/12/22 0523   • furosemide (LASIX) injection 40 mg  40 mg Q DAY Nathanael Villa M.D.   40 mg at 06/12/22 0523   • gemfibrozil (LOPID) tablet 600 mg  600 mg QAM Nathanael Villa M.D.   600 mg at 06/12/22 0523   • insulin regular (HumuLIN R,NovoLIN R) injection  1-6 Units 4X/DAY GERARDO Villa M.D.        And   • dextrose 50% (D50W) injection 25 g  25 g Q15 MIN PRN Nathanael Villa M.D.       • levothyroxine (SYNTHROID) tablet 175 mcg  175 mcg Q SUNDAY Nathanael Villa M.D.   175 mcg at 06/12/22 0523    And   • levothyroxine (SYNTHROID) tablet 200 mcg  200 mcg Once per day on Mon Tue Wed Thu Fri Sat Nathanael Villa M.D.   200 mcg at 06/11/22 0514   • nystatin (MYCOSTATIN) powder   BID Nathanael Villa M.D.   1 Application at 06/12/22 0523   • ondansetron (ZOFRAN ODT) dispertab 4 mg  4 mg Q4HRS PRN Nathanael Villa M.D.       • oxyCODONE immediate-release (ROXICODONE) tablet 5 mg  5 mg Q4HRS PRN Nathanael Villa M.D.   5 mg at 06/11/22 2102   • rosuvastatin (CRESTOR) tablet 20 mg  20 mg Q EVENING Nathanael Villa M.D.   20 mg at 06/11/22 1751   • traZODone (DESYREL) tablet 50 mg  50 mg QHS PRN Nathanael Villa M.D.       Last reviewed on 6/10/2022 10:40 PM by Stephon Kuo R.N.    Allergies   Allergen Reactions   • Sulfa Drugs Rash     8/2015 rash and hives         Physical Exam  Body mass index is 47.69 kg/m². /70   Pulse (!) 126   Temp 36.8 °C (98.2 °F) (Temporal)   Resp 18   Ht 1.651 m (5' 5\")   Wt (!) 130 kg (286 lb 9.6 oz)   SpO2 95%    Vitals:    06/11/22 2344 06/12/22 0507 06/12/22 0800 06/12/22 1200   BP: 112/77 112/73 113/69 112/70   Pulse: (!) 127 (!) 125 (!) 126 (!) 126   Resp: (!) 21 18 18 18 "   Temp: 36.8 °C (98.2 °F) 36.4 °C (97.6 °F) 35.9 °C (96.6 °F) 36.8 °C (98.2 °F)   TempSrc: Temporal Temporal Temporal Temporal   SpO2: 95% 97% 96% 95%   Weight:       Height:        Oxygen Therapy:  Pulse Oximetry: 95 %, O2 (LPM): 5, O2 Delivery Device: Silicone Nasal Cannula    General: no acute distress, chronically appearing, BMI 47.7  Neck: unable to assess JVD d/t neck habitus, no bruits  Lungs: CTAB, normal effort. no wheezing, rales, or rhonchi  Heart: Irregularly irregular and tachycardic, murmur heard, no rub   EXT: trace lower extremity edema, wrapped bilaterally in roll gauze 2+ radial pulses. 2+ pedal pulses.   Abdomen: soft, non tender, non distended  Neurological: No focal deficits, no facial asymmetry.  Normal speech  Psychiatric: Appropriate affect, alert and oriented x 3  Skin: Warm and dry extremities, no rashes    Labs (personally reviewed):     Lab Results   Component Value Date/Time    SODIUM 141 06/12/2022 09:28 AM    POTASSIUM 3.6 06/12/2022 09:28 AM    CHLORIDE 102 06/12/2022 09:28 AM    CO2 28 06/12/2022 09:28 AM    GLUCOSE 125 (H) 06/12/2022 09:28 AM    BUN 22 06/12/2022 09:28 AM    CREATININE 1.00 06/12/2022 09:28 AM    CREATININE 0.6 01/27/2009 01:10 PM     Lab Results   Component Value Date/Time    ALKPHOSPHAT 75 06/08/2022 10:04 AM    ASTSGOT 27 06/08/2022 10:04 AM    ALTSGPT 19 06/08/2022 10:04 AM    TBILIRUBIN 0.4 06/08/2022 10:04 AM      Lab Results   Component Value Date/Time    CHOLSTRLTOT 115 04/28/2022 10:21 AM    LDL 50 04/28/2022 10:21 AM    HDL 54 04/28/2022 10:21 AM    TRIGLYCERIDE 55 04/28/2022 10:21 AM     Cardiac Imaging and Procedures Review:      EKG 6/8/2022: My Personal interpretation reveals A. fib 72    Echo 6/10/2022:  Compared to the prior echo on 12/09/2020, aortic stenosis is worse.  Technically difficult study due to patient body habitus and position.   The left ventricular ejection fraction is visually estimated to be 75%.  Normal regional wall motion.  Severe  aortic valve stenosis.  Right ventricular systolic pressure is estimated to be 51 mmHg.  Large pericardial effusion with early evidence of hemodynamic     [facility] 6/12/22:  IMPRESSIONS:  1.  Nonobstructive, two-vessel coronary disease  2.  Severe elevation in left heart filling pressure with left atrial pressure of 32 mmHg  3.  Severe aortic stenosis by noninvasive evaluation, pullback gradient consistent with significant aortic stenosis    Coronary Anatomy              Left Main: Short, normal              LAD: Normal              LCx: Normal.  The OM1 has a proximal 25% stenosis              RCA: Dominant, Minimal luminal irregularities                 Recommendations:  Further recommendations per the rounding team     Assessment and Medical Decision Making:    Pericardial effusion  -Per Dr. Drake pericardial effusion not significant for tamponade, currently not requiring pericardiocentesis  -Repeat limited echocardiogram to reassess size of pericardial effusion    Severe AS  -Underwent [facility] today for pre-TAVR work-up, nonobstructive two-vessel disease noted  -We will set up outpatient follow-up with TAVR team, referral placed  -Continue IV Lasix 40 mg    Chronic atrial fibrillation, chronic anticoagulation  -Continue to hold warfarin given patient may need pericardiocentesis, pending limited echo to follow-up size of effusion  -Restart home metoprolol tart. dose 25 mg bid for better rate control    Thank you for allowing me to participate in this patients care.  Please contact me with any questions or concerns.    Please see Dr. Phelps attestation for additions and further recommendations.    I personally spent a total of 12 minutes which includes face-to-face time and non-face-to-face time spent on preparing to see the patient, reviewing hospital notes and tests, obtaining history from the patient, performing a medically appropriate exam, counseling and educating the patient, ordering  medications/tests/procedures/referrals as clinically indicated, and documenting information in the electronic medical record.      PLEASE NOTE: Some of this dictation was created using voice recognition software. I have made every reasonable attempt to correct obvious errors, but I expect that there are errors of grammar and possibly content that I did not discover before finalizing the note.     MARY ELLEN Jose.   [facility]  [phone]

## 2022-06-12 NOTE — PROGRESS NOTES
Hospital Medicine Daily Progress Note    Date of Service  6/12/2022    Chief Complaint  Rachelle Reina is a 71 y.o. female admitted 6/8/2022 with lower extremity wounds.    Hospital Course  71 y.o. female obese, A. fib on warfarin who presented 6/8/2022 with bilateral lower extremity wounds that have not been healing and lower extremity swelling.  Patient reports she fell off scooter and suffered these wounds and been limited healing.  She also reports noncompliance with water pills. In the ER, she was given IV ancef for her bilateral lower extremity wounds and started on IV diuresis for fluid overload. Patient was originally admitted at [facility] ED and transferred to [facility] due to bed capacity. Echo performed showed severe aortic stenosis, pulmonary hypertension RVSP 55mmHg, and large pericardial effusion. Cardiology was consulted and recommended transfer back to [facility].       Interval Problem Update  6/11 Discussed with cardiology, INR too high pericardiocentesis rescheduled for tomorrow. Will give mephyton for INR reversal. Heparin ggt held overnight since INR therapeutic. Vitals significant for tachycardia and on 4 L NC. Patient reports her leg swelling has improved. Denies chest pain or dyspnea. Groin rash much improved.   6/12 INR 1.9, plan for pericardiocentesis and angiogram today. Patient complaining of congestion, denies dyspnea or chest pain. Labs pending. Glucose within goal.    I have personally seen and examined the patient at bedside. I discussed the plan of care with patient and bedside RN.    Consultants/Specialty  cardiology    Code Status  Full Code    Disposition  Patient is not medically cleared for discharge.   Anticipate discharge to to home with close outpatient follow-up.  I have placed the appropriate orders for post-discharge needs.    Review of Systems  Review of Systems   Constitutional: Positive for malaise/fatigue. Negative for chills and fever.    HENT: Positive for congestion.    Respiratory: Negative for cough and shortness of breath.    Cardiovascular: Positive for leg swelling. Negative for chest pain.   Gastrointestinal: Negative for abdominal pain, nausea and vomiting.   Genitourinary: Negative for dysuria.   Musculoskeletal: Negative for myalgias.   Skin: Negative for rash.        Wounds b/l legs   Neurological: Positive for weakness. Negative for dizziness and headaches.   Psychiatric/Behavioral: Negative for depression.   All other systems reviewed and are negative.       Physical Exam  Temp:  [35.9 °C (96.6 °F)-37.1 °C (98.8 °F)] 35.9 °C (96.6 °F)  Pulse:  [103-136] 126  Resp:  [18-21] 18  BP: (112-124)/(58-83) 113/69  SpO2:  [93 %-100 %] 96 %    Physical Exam  Vitals and nursing note reviewed.   Constitutional:       General: She is not in acute distress.     Appearance: She is obese. She is ill-appearing.   HENT:      Head: Normocephalic and atraumatic.   Eyes:      Conjunctiva/sclera: Conjunctivae normal.   Cardiovascular:      Rate and Rhythm: Regular rhythm. Tachycardia present.      Heart sounds: Normal heart sounds.   Pulmonary:      Effort: Pulmonary effort is normal. No respiratory distress.      Breath sounds: No wheezing.      Comments: On NC   Decreased breath sounds in the bases  Abdominal:      General: Abdomen is flat. There is no distension.      Palpations: Abdomen is soft.      Tenderness: There is no abdominal tenderness.   Musculoskeletal:         General: Normal range of motion.      Cervical back: Normal range of motion and neck supple.      Right lower leg: Edema present.      Left lower leg: Edema present.   Skin:     General: Skin is warm and dry.      Findings: Erythema (groin fold, minimal, improving) and lesion (multiple superficial lesions b/l LE, bandage in place c/d/i) present.   Neurological:      General: No focal deficit present.      Mental Status: She is alert and oriented to person, place, and time.       Cranial Nerves: No cranial nerve deficit.      Sensory: No sensory deficit.   Psychiatric:         Mood and Affect: Mood normal.         Behavior: Behavior normal.         Fluids    Intake/Output Summary (Last 24 hours) at 6/12/2022 0951  Last data filed at 6/12/2022 0842  Gross per 24 hour   Intake --   Output 2050 ml   Net -2050 ml       Laboratory          Recent Labs     06/10/22  1312 06/10/22  1512 06/11/22  0044 06/11/22  1059 06/12/22  0734   APTT 50.5* 45.4*  --   --   --    INR 3.02* 2.84* 2.92* 2.81* 1.90*               Imaging  CL-LEFT HEART CATHETERIZATION WITH POSSIBLE INTERVENTION    (Results Pending)   CL-PERICARDIOCENTESIS W/ DRAIN REMOVAL    (Results Pending)        Assessment/Plan  * Pericardial effusion- (present on admission)  Assessment & Plan  Echo showing large pericardial effusion   Cardiology consulted   Pericardiocentesis and angiogram today   Continue to monitor INR  Giving mephyton x1  Monitor respiratory and hemodynamics closely     Cellulitis- (present on admission)  Assessment & Plan  Continue ancef   Wound care     Acute on chronic diastolic congestive heart failure (HCC)- (present on admission)  Assessment & Plan  Continue IV lasix   Cardiology consulted   Echo noted     Pulmonary hypertension (HCC)- (present on admission)  Assessment & Plan  Diuresis     Chronic atrial fibrillation- (present on admission)  Assessment & Plan  On warfarin at home   Check PT/INR  Bridge to heparin, gtt     Dyslipidemia- (present on admission)  Assessment & Plan  C.w statin and lopid    History of hypothyroidism- (present on admission)  Assessment & Plan  Resume home medications    Class 3 severe obesity due to excess calories with serious comorbidity and body mass index (BMI) of 40.0 to 44.9 in adult (HCC)- (present on admission)  Assessment & Plan   on dietary and lifestyle modification     Benign essential hypertension- (present on admission)  Assessment & Plan  Hold BP meds given acute  pericardial effusion and risk of hemodynamic collapse.       VTE prophylaxis: therapeutic anticoagulation with warfarin    I have performed a physical exam and reviewed and updated ROS and Plan today (6/12/2022). In review of yesterday's note (6/11/2022), there are no changes except as documented above.

## 2022-06-12 NOTE — THERAPY
Missed Therapy     Patient Name: Rachelle Reina  Age:  71 y.o., Sex:  female  Medical Record #: [id number]  Today's Date: 6/12/2022    Discussed missed therapy with    06/12/22 1235   Initial Contact Note    Initial Contact Note Order Received and Verified, Physical Therapy Evaluation in Progress with Full Report to Follow.   Interdisciplinary Plan of Care Collaboration   IDT Collaboration with  Nursing   Collaboration Comments PT eval order received, chart reviewed, spoke with RN pt is on schedule for a heart cath for today and per RN will be transferring to IMU after. Will defer PT eval this date and follow up. RN notified.   Session Information   Date / Session Number  6/12- (EVAL)- attempt 6/12       Christal Carrington, PT,DPT

## 2022-06-12 NOTE — PROGRESS NOTES
Bedside report received from day RN, pt care assumed, assessment completed. Pt is A&O4, pain 0/10, Afib on the monitor. Updated on POC, questions answered. Bed in lowest, locked position, treaded socks on, call light and belongings within reach. Fall precautions in place.

## 2022-06-12 NOTE — PROGRESS NOTES
PT retrieved from cath lab, right radial site, no pain, tachy 110-130, placed on central monitoring, BP, temp and O2 stable on 4L NC.

## 2022-06-13 NOTE — PROGRESS NOTES
Hospital Medicine Daily Progress Note    Date of Service  6/13/2022    Chief Complaint  Rachelle Reina is a 71 y.o. female admitted 6/8/2022 with lower extremity wounds.    Hospital Course  71 y.o. female obese, A. fib on warfarin who presented 6/8/2022 with bilateral lower extremity wounds that have not been healing and lower extremity swelling.  Patient reports she fell off scooter and suffered these wounds and been limited healing.  She also reports noncompliance with water pills. In the ER, she was given IV ancef for her bilateral lower extremity wounds and started on IV diuresis for fluid overload. Patient was originally admitted at [facility] ED and transferred to [facility] due to bed capacity. Echo performed showed severe aortic stenosis, pulmonary hypertension RVSP 55mmHg, and large pericardial effusion. Cardiology was consulted and recommended transfer back to [facility].       Interval Problem Update  6/11 Discussed with cardiology, INR too high pericardiocentesis rescheduled for tomorrow. Will give mephyton for INR reversal. Heparin ggt held overnight since INR therapeutic. Vitals significant for tachycardia and on 4 L NC. Patient reports her leg swelling has improved. Denies chest pain or dyspnea. Groin rash much improved.   6/12 INR 1.9, plan for pericardiocentesis and angiogram today. Patient complaining of congestion, denies dyspnea or chest pain. Labs pending. Glucose within goal.  6/13 Cath yesterday confirmed severe AS, pending cardiology recommendations. Labs pending this morning. Echo and carotid US pending. Patient continues to be tachycardic and requiring 4 L NC.  Patient continues to have congestion though reports that it is improving with the Mucinex.  Also having shortness of breath especially when getting up. Leg swelling and erythema resolved, bandaging over leg wounds. INR 1.7, discussed with cardiology ok to restart warfarin. Pending PT/OT, suspect patient will  need SNF placement.     I have personally seen and examined the patient at bedside. I discussed the plan of care with patient and bedside RN.    Consultants/Specialty  cardiology    Code Status  Full Code    Disposition  Patient is not medically cleared for discharge.   Anticipate discharge to to skilled nursing facility.  I have placed the appropriate orders for post-discharge needs.    Review of Systems  Review of Systems   Constitutional: Positive for malaise/fatigue. Negative for chills and fever.   HENT: Positive for congestion.    Respiratory: Positive for shortness of breath. Negative for cough.    Cardiovascular: Negative for chest pain and leg swelling.   Gastrointestinal: Negative for abdominal pain, nausea and vomiting.   Genitourinary: Negative for dysuria.   Musculoskeletal: Negative for myalgias.   Skin: Negative for rash.        Wounds b/l legs   Neurological: Positive for weakness. Negative for dizziness and headaches.   Psychiatric/Behavioral: Negative for depression.   All other systems reviewed and are negative.       Physical Exam  Temp:  [36.2 °C (97.2 °F)-36.8 °C (98.2 °F)] 36.2 °C (97.2 °F)  Pulse:  [101-124] 124  Resp:  [16-20] 20  BP: (106-126)/(54-79) 111/73  SpO2:  [92 %-96 %] 93 %    Physical Exam  Vitals and nursing note reviewed.   Constitutional:       General: She is not in acute distress.     Appearance: She is obese. She is ill-appearing.   HENT:      Head: Normocephalic and atraumatic.   Eyes:      Conjunctiva/sclera: Conjunctivae normal.   Cardiovascular:      Rate and Rhythm: Regular rhythm. Tachycardia present.      Heart sounds: Normal heart sounds.   Pulmonary:      Effort: Pulmonary effort is normal. No respiratory distress.      Breath sounds: Rales present. No wheezing.      Comments: On NC   Abdominal:      General: Abdomen is flat. There is no distension.      Palpations: Abdomen is soft.      Tenderness: There is no abdominal tenderness.   Musculoskeletal:      Cervical  back: Normal range of motion and neck supple.      Right lower leg: No edema.      Left lower leg: No edema.   Skin:     General: Skin is warm and dry.      Findings: Lesion (multiple superficial lesions b/l LE, bandage in place c/d/i) present.   Neurological:      General: No focal deficit present.      Mental Status: She is alert and oriented to person, place, and time.      Cranial Nerves: No cranial nerve deficit.      Sensory: No sensory deficit.   Psychiatric:         Mood and Affect: Mood normal.         Behavior: Behavior normal.         Fluids    Intake/Output Summary (Last 24 hours) at 6/13/2022 1315  Last data filed at 6/13/2022 0900  Gross per 24 hour   Intake 200 ml   Output 1900 ml   Net -1700 ml       Laboratory  Recent Labs     06/12/22  0928 06/13/22  1212   WBC 7.1 6.8   RBC 4.55 4.65   HEMOGLOBIN 10.9* 11.2*   HEMATOCRIT 38.5 39.1   MCV 84.6 84.1   MCH 24.0* 24.1*   MCHC 28.3* 28.6*   RDW 66.6* 65.1*   PLATELETCT 209 203   MPV 9.2 9.0     Recent Labs     06/12/22  0928 06/13/22  1212   SODIUM 141 141   POTASSIUM 3.6 3.2*   CHLORIDE 102 99   CO2 28 32   GLUCOSE 125* 130*   BUN 22 27*   CREATININE 1.00 0.94   CALCIUM 9.3 9.2     Recent Labs     06/10/22  1512 06/11/22  0044 06/11/22  1059 06/12/22  0734 06/13/22  1107   APTT 45.4*  --   --   --   --    INR 2.84*   < > 2.81* 1.90* 1.74*    < > = values in this interval not displayed.               Imaging  CL-LEFT HEART CATHETERIZATION WITH POSSIBLE INTERVENTION    (Results Pending)   EC-ECHOCARDIOGRAM LTD W/O CONT    (Results Pending)   US-CAROTID DOPPLER BILAT    (Results Pending)        Assessment/Plan  * Pericardial effusion- (present on admission)  Assessment & Plan  Echo showing large pericardial effusion   Cardiology consulted   -repeat echo pending   Continue to monitor INR  Giving mephyton x1  Monitor respiratory and hemodynamics closely     Cellulitis- (present on admission)  Assessment & Plan  Continue ancef   Wound care     Acute on  chronic diastolic congestive heart failure (HCC)- (present on admission)  Assessment & Plan  Continue IV lasix   Cardiology consulted   Echo noted     Pulmonary hypertension (HCC)- (present on admission)  Assessment & Plan  Diuresis     Severe aortic stenosis  Assessment & Plan  Confirmed on cardiac cath 6/12   Slow IV diuresis   Cardiology following   Referral made to TAVR team for outpatient follow up    Chronic atrial fibrillation- (present on admission)  Assessment & Plan  On warfarin at home   Check PT/INR  Discussed with cardiology ok to restart warfarin, no heed for heparin bridge    Dyslipidemia- (present on admission)  Assessment & Plan  C.w statin and lopid    History of hypothyroidism- (present on admission)  Assessment & Plan  Resume home medications    Class 3 severe obesity due to excess calories with serious comorbidity and body mass index (BMI) of 40.0 to 44.9 in adult (HCC)- (present on admission)  Assessment & Plan   on dietary and lifestyle modification     Benign essential hypertension- (present on admission)  Assessment & Plan  Hold BP meds given acute pericardial effusion and risk of hemodynamic collapse.       VTE prophylaxis: therapeutic anticoagulation with warfarin    I have performed a physical exam and reviewed and updated ROS and Plan today (6/13/2022). In review of yesterday's note (6/12/2022), there are no changes except as documented above.

## 2022-06-13 NOTE — DIETARY
Nutrition Services: Cardiac Education Consult and BMI >40  Day 3 of admit.  Rachelle Reina is a 71 y.o. female with admitting DX of Severe aortic stenosis [I35.0]  Pericardial effusion [I31.3]    RD able to visit pt at bedside to provide cardiac education. Pt refused verbal education at this time though was agreeable to receiving a handout. RD encouraged pt to request an RD follow-up if questions arise. Pt shared that she has not had a bowel movement since 6/8. RD provided education on adequate fiber, fruit/vegetable and water intake.  RD able to answer all questions to patient's satisfaction.     Pt with BMI >40 (=Body mass index is 47.69 kg/m².), Class III obesity. Weight loss counseling not appropriate in acute care setting.    No other education needs identified at this time. Consider referral to outpatient nutrition services for continuation of education as indicated or per pt preferences.     Please re-consult RD as indicated.

## 2022-06-13 NOTE — PROGRESS NOTES
Cardiology Follow Up Progress Note    Date of Service  6/13/2022    Attending Physician  Jennifer Mcwilliams D.O.    Chief Complaint   Aortic stenosis, pericardial effusion.    HPI  Rachelle Reina is a 71 y.o. female admitted 6/10/2022 with above.    Interim Events  No significant changes noted from cardiac standpoint within the past 24 hours.    Review of Systems  Review of Systems   Constitutional: Positive for fever. Negative for chills.   HENT: Negative.  Negative for hearing loss.    Eyes: Negative.    Respiratory: Positive for shortness of breath. Negative for cough.    Cardiovascular: Negative.  Negative for chest pain, palpitations and leg swelling.   Gastrointestinal: Negative.  Negative for abdominal pain, nausea and vomiting.   Genitourinary: Negative.  Negative for dysuria and urgency.   Musculoskeletal: Negative.  Negative for myalgias.   Skin: Negative.  Negative for rash.   Neurological: Negative.  Negative for dizziness, weakness and headaches.   Hematological: Does not bruise/bleed easily.   Psychiatric/Behavioral: Negative.  The patient is not nervous/anxious.        Vital signs in last 24 hours  Temp:  [36.4 °C (97.5 °F)-36.8 °C (98.2 °F)] 36.4 °C (97.5 °F)  Pulse:  [101-126] 106  Resp:  [16-20] 18  BP: (106-126)/(54-79) 119/79  SpO2:  [92 %-96 %] 94 %    Physical Exam  Physical Exam  Constitutional:       Appearance: Normal appearance. She is well-developed and normal weight. She is ill-appearing.   HENT:      Head: Normocephalic and atraumatic.      Mouth/Throat:      Mouth: Mucous membranes are moist.   Eyes:      Extraocular Movements: Extraocular movements intact.      Conjunctiva/sclera: Conjunctivae normal.   Cardiovascular:      Rate and Rhythm: Normal rate. Rhythm irregular.      Pulses: Normal pulses.      Heart sounds: Murmur heard.   Pulmonary:      Effort: Pulmonary effort is normal.      Breath sounds: Normal breath sounds.   Abdominal:      General: Bowel sounds are normal.       Palpations: Abdomen is soft.   Musculoskeletal:         General: Swelling present. Normal range of motion.      Cervical back: Normal range of motion and neck supple.   Skin:     General: Skin is warm and dry.   Neurological:      General: No focal deficit present.      Mental Status: She is alert and oriented to person, place, and time. Mental status is at baseline.   Psychiatric:         Mood and Affect: Mood normal.         Behavior: Behavior normal.         Thought Content: Thought content normal.         Judgment: Judgment normal.         Lab Review  Lab Results   Component Value Date/Time    WBC 7.1 06/12/2022 09:28 AM    RBC 4.55 06/12/2022 09:28 AM    HEMOGLOBIN 10.9 (L) 06/12/2022 09:28 AM    HEMATOCRIT 38.5 06/12/2022 09:28 AM    MCV 84.6 06/12/2022 09:28 AM    MCH 24.0 (L) 06/12/2022 09:28 AM    MCHC 28.3 (L) 06/12/2022 09:28 AM    MPV 9.2 06/12/2022 09:28 AM      Lab Results   Component Value Date/Time    SODIUM 141 06/12/2022 09:28 AM    POTASSIUM 3.6 06/12/2022 09:28 AM    CHLORIDE 102 06/12/2022 09:28 AM    CO2 28 06/12/2022 09:28 AM    GLUCOSE 125 (H) 06/12/2022 09:28 AM    BUN 22 06/12/2022 09:28 AM    CREATININE 1.00 06/12/2022 09:28 AM    CREATININE 0.6 01/27/2009 01:10 PM      Lab Results   Component Value Date/Time    ASTSGOT 27 06/08/2022 10:04 AM    ALTSGPT 19 06/08/2022 10:04 AM     Lab Results   Component Value Date/Time    CHOLSTRLTOT 115 04/28/2022 10:21 AM    LDL 50 04/28/2022 10:21 AM    HDL 54 04/28/2022 10:21 AM    TRIGLYCERIDE 55 04/28/2022 10:21 AM    TROPONINT 41 (H) 08/30/2021 07:37 PM       No results for input(s): NTPROBNP in the last 72 hours.  (Above labs reviewed.)       Current Facility-Administered Medications:   •  guaiFENesin ER (MUCINEX) tablet 600 mg, 600 mg, Oral, Q12HRS, Jennifer Mcwilliams D.O., 600 mg at 06/13/22 0448  •  metoprolol tartrate (LOPRESSOR) tablet 25 mg, 25 mg, Oral, TWICE DAILY, NICOLE Jose, 25 mg at 06/13/22 0448  •  acetaminophen (TYLENOL)  tablet 500 mg, 500 mg, Oral, Q6HRS PRN, Nathanael Villa M.D., 500 mg at 06/12/22 0538  •  allopurinol (ZYLOPRIM) tablet 100 mg, 100 mg, Oral, QHS, Ntahanael Villa M.D., 100 mg at 06/12/22 2127  •  ceFAZolin in dextrose (Ancef) IVPB premix 2 g, 2 g, Intravenous, Q8HRS, Nathanael Villa M.D., Last Rate: 200 mL/hr at 06/13/22 0945, 2 g at 06/13/22 0945  •  citalopram (CeleXA) tablet 20 mg, 20 mg, Oral, DAILY, Nathanael Villa M.D., 20 mg at 06/13/22 0448  •  furosemide (LASIX) injection 40 mg, 40 mg, Intravenous, Q DAY, Nathanael Villa M.D., 40 mg at 06/13/22 0446  •  gemfibrozil (LOPID) tablet 600 mg, 600 mg, Oral, QAM, Nathanael Villa M.D., 600 mg at 06/13/22 0446  •  insulin regular (HumuLIN R,NovoLIN R) injection, 1-6 Units, Subcutaneous, 4X/DAY ACHS **AND** POC blood glucose manual result, , , Q AC AND BEDTIME(S) **AND** NOTIFY MD and PharmD, , , Once **AND** Administer 20 grams of glucose (approximately 8 ounces of fruit juice) every 15 minutes PRN FSBG less than 70 mg/dL, , , PRN **AND** dextrose 50% (D50W) injection 25 g, 25 g, Intravenous, Q15 MIN PRN, Nathanael Villa M.D.  •  levothyroxine (SYNTHROID) tablet 175 mcg, 175 mcg, Oral, Q SUNDAY, 175 mcg at 06/12/22 0523 **AND** levothyroxine (SYNTHROID) tablet 200 mcg, 200 mcg, Oral, Once per day on Mon Tue Wed Thu Fri Sat, Nathanael Villa M.D., 200 mcg at 06/13/22 0448  •  nystatin (MYCOSTATIN) powder, , Topical, BID, Nathanael Villa M.D., 1 Application at 06/13/22 0457  •  ondansetron (ZOFRAN ODT) dispertab 4 mg, 4 mg, Oral, Q4HRS PRN, Nathanael Villa M.D.  •  oxyCODONE immediate-release (ROXICODONE) tablet 5 mg, 5 mg, Oral, Q4HRS PRN, Nathanael Villa M.D., 5 mg at 06/11/22 2102  •  rosuvastatin (CRESTOR) tablet 20 mg, 20 mg, Oral, Q EVENING, Nathanael Villa M.D., 20 mg at 06/12/22 1750  •  traZODone (DESYREL) tablet 50 mg, 50 mg, Oral, QHS PRN, Nathanael Villa M.D., 50 mg at 06/12/22 2127  (Medications  reviewed.)    Cardiac Imaging and Procedures Review  CARDIAC STUDIES/PROCEDURES:    CARDIAC CATHETERIZATION CONCLUSIONS by Duncan Strickland (06/12/22)  1.  Nonobstructive, two-vessel coronary disease  2.  Severe elevation in left heart filling pressure with left atrial pressure of 32 mmHg  3.  Severe aortic stenosis by noninvasive evaluation, pullback gradient consistent with significant aortic stenosis  (study result reviewed)     CARDIAC CATHETERIZATION CONCLUSIONS (02/27/19)  1.  No angiographic evidence of coronary artery disease.  2.  Normal left ventricular systolic function with ejection fraction of 70%.  3.  Elevated left ventricular end-diastolic pressure.    CARDIAC CATHETERIZATION (10/30/08)  Cardiac catheterization showing no angiographic evidence of coronary artery disease.     CALCIUM SCORING CT AT [facility] (12/18/18)  Mild evidence of plaque in the left circumflex artery.     CT OF CHEST (03/17/22)  1.  Negative for pulmonary embolism   2.  Small-moderate-sized pericardial effusion  3.  Dilation of both atria and probably left ventricle  4.  Mild atelectasis  5.  Hepatic steatosis  Abdominally aortic atherosclerotic plaque    ECHOCARDIOGRAM CONCLUSIONS (06/10/22)  Compared to the prior echo on 12/09/2020, aortic stenosis is worse.  Technically difficult study due to patient body habitus and position.   The left ventricular ejection fraction is visually estimated to be 75%.  Normal regional wall motion.  Severe aortic valve stenosis.  Right ventricular systolic pressure is estimated to be 51 mmHg.  Large pericardial effusion with early evidence of hemodynamic   compromise.     ECHOCARDIOGRAM CONCLUSIONS (11/10/15)  No significant valve disease or flow abnormalities.   Normal left ventricular systolic function.   Trace to small pericardial effusion without evidence of hemodynamic compromise.  No prior study is available for comparison.      ECHOCARDIOGRAM CONCLUSIONS  (09/02/08)  Echocardiogram showing normal left ventricular systolic function and mild mitral regurgitation.    EKG performed on (06/08/22) was reviewed: EKG personally interpreted shows atrial fibrillation.  EKG performed on (01/22/18) EKG shows atrial fibrillation.  EKG performed on (02/21/17) EKG shows atrial fibrillation.  EKG performed on (11/10/15) EKG shows atrial fibrillation.     Laboratory results of (11/11/15) Cholesterol profile of 118/165/39/46 noted.     MYOCARDIAL PERFUSION STUDY CONCLUSIONS at [facility] (02/06/19)  Reversible defect of the anterior wall and apex. Finding concerning for left anterior descending artery ischemia.  (study result reviewed)     MPI CONCLUSIONS (11/11/15)  No reversible defects that would indicate ischemia.      RIGHT HEART CATHETHERIZATION: by Aj Carbajal (05/06/20)  1) Successful implantation of Cardiomems device for remote monitor of intracardiac pressures.  2) Patient will be monitored as part of our protocol in our heart failure program to further reduce repeated heart failure hospitalization and also to improve overall quality of life.    Assessment/Plan  1. Severe aortic stenosis: Her aortic stenosis has reached severe status and she is symptomatic, NYHA class IV. She is scheduled for outpatient evaluation by structural heart team when she has clinically improved.  2. Pericardial effusion without tamponade: The decision was mde by interventional team for observation without pericardiocentesis for now.    3. Chronic atrial fibrillation on chronic anticoagulation therapy (warfarin) and unseccessful electrical cardioversion on amiodarone on 01/27/09: She is doing well and the ventricular rate is well controlled. We will continue with current medical therapy.   4. History of diastolic congestive heart failure with Cardiomems implant: (Managed by [facility])    Thank you for allowing me to participate in the care of this  patient.  I will continue to follow this patient    Please contact me with any questions.    Florian Carnes M.D.   Cardiologist, [facility]  [phone]

## 2022-06-13 NOTE — CARE PLAN
Problem: Knowledge Deficit - Standard  Goal: Patient and family/care givers will demonstrate understanding of plan of care, disease process/condition, diagnostic tests and medications  Outcome: Progressing     Problem: Skin Integrity  Goal: Skin integrity is maintained or improved  Outcome: Progressing     Problem: Fall Risk  Goal: Patient will remain free from falls  Outcome: Progressing     Problem: Pain - Standard  Goal: Alleviation of pain or a reduction in pain to the patient’s comfort goal  Outcome: Progressing   The patient is Stable - Low risk of patient condition declining or worsening    Shift Goals  Clinical Goals: monitor HR, abx  Patient Goals: Rest  Family Goals: NA    Progress made toward(s) clinical / shift goals:  Progressing    Patient is not progressing towards the following goals:
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
## 2022-06-13 NOTE — DISCHARGE PLANNING
Case Management Discharge Planning    Admission Date: 6/10/2022  GMLOS: 5.2  ALOS: 3    6-Clicks ADL Score: 18  6-Clicks Mobility Score: 9  PT and/or OT Eval ordered: Yes  Post-acute Referrals Ordered: Yes  Post-acute Choice Obtained: Yes  Has referral(s) been sent to post-acute provider:  Yes      Anticipated Discharge Dispo: Discharge Disposition: D/T to SNF with Medicare cert in anticipation of skilled care (03)    DME Needed: No    Action(s) Taken: Spoke to pt at bedside, pt states she lives in a single story apt with her , Antione, who has early onset dementia. Pt states she does have a caretaker come to the home to assist her and her . Pt uses a FWW, scooter, and wheelchair at baseline. Pt is not currently on service with . Pt has been to SNF in the past and gave verbal choice for [facility]. Choice form faxed to DPA. Will obtain PASRR    Escalations Completed: None    Medically Clear: No    Next Steps: f/u with medical team for clearance, DPA for SNF acceptance    Barriers to Discharge: Medical clearance and Pending Placement

## 2022-06-13 NOTE — DISCHARGE PLANNING
Received Choice form at 161  Agency/Facility Name: [facility]   Referral sent per Choice form @ [phone]

## 2022-06-13 NOTE — PROGRESS NOTES
Monitor Summary:   Rhythm: Afib  Rate:   Measurement: .NA/.08/.BA  Ectopy: PVC's, some couplets and triplets

## 2022-06-13 NOTE — CARE PLAN
The patient is Watcher - Medium risk of patient condition declining or worsening    Shift Goals  Clinical Goals: Monitor HR, abx  Patient Goals: Rest  Family Goals: NA    Progress made toward(s) clinical / shift goals:  cath complete, no intervention, HR stabilized with beta blockers    Patient is not progressing towards the following goals:

## 2022-06-13 NOTE — PROGRESS NOTES
Inpatient Anticoagulation Service Note    Date: 6/13/2022    Reason for Anticoagulation: Atrial Fibrillation   Target INR: 2.0 to 3.0  [id number] VASc Score: 6  HAS-BLED Score: 1   Hemoglobin Value: (!) 11.2  Hematocrit Value: 39.1  Lab Platelet Value: 203    INR from last 7 days     Date/Time INR Value    06/13/22 1107 1.74    06/12/22 0734 1.9    06/11/22 1059 2.81    06/11/22 0044 2.92    06/10/22 1512 2.84        Dose from last 7 days     Date/Time Dose (mg)    06/13/22 1323 5        Average Dose (mg):  Home dose = warfarin 5 mg PO Tue/Fri and 2.5 mg AOD  Significant Interactions: Antibiotics, Statin, Thyroid Medications (Gemfibrozil, Citalopram)  Bridge Therapy: No  Reversal Agent Administered: Vitamin K By Mouth (6/11)    Comments: Warfarin resumed today from home as cardiology recommending medical management with no interventions. INR sub-therapeutic as expected s/p held doses and vitamin K. Will give bolus dose tonight of 5 mg then resume patient's home regimen starting tomorrow. INR ordered for tomorrow AM. DDIs noted and pretty stable from home.    Plan:  Warfarin 5 mg  Education Material Provided?: No (Chronic warfarin patient)  Pharmacist suggested discharge dosing: Warfarin 5 mg PO Tue/Fri and 2.5 mg AOD with close follow-up post-discharge with an INR within 48 to 72 hours.       Thank you!    Glendy Millan, PharmD, BCPS

## 2022-06-13 NOTE — ASSESSMENT & PLAN NOTE
Confirmed on cardiac cath 6/12   Slow IV diuresis   Cardiology following   Referral made to TAVR team for outpatient follow up

## 2022-06-14 NOTE — CARE PLAN
Problem: Knowledge Deficit - Standard  Goal: Patient and family/care givers will demonstrate understanding of plan of care, disease process/condition, diagnostic tests and medications  Outcome: Progressing     Problem: Skin Integrity  Goal: Skin integrity is maintained or improved  Outcome: Progressing     Problem: Fall Risk  Goal: Patient will remain free from falls  Outcome: Progressing     Problem: Pain - Standard  Goal: Alleviation of pain or a reduction in pain to the patient’s comfort goal  Outcome: Progressing   The patient is Stable - Low risk of patient condition declining or worsening    Shift Goals  Clinical Goals: monitor HR, resp status  Patient Goals: Rest  Family Goals: NA    Progress made toward(s) clinical / shift goals:  Progressing    Patient is not progressing towards the following goals:

## 2022-06-14 NOTE — DISCHARGE PLANNING
Agency/Facility Name: [facility]  Spoke To: Rut  Outcome: DPA requesting updated referral status, Rut to contact this DPA with updated referral status.     0920-  Agency/Facility Name: [facility]  Spoke To: Britta  Outcome: Referral is under review, Britta to contact this DPA with updated referral status.     1150-  Agency/Facility Name: [facility]   Spoke To: Mily   Outcome: DPA requesting update on bed status, Mily to follow up with Britta regarding possible transfer to SNF tomorrow.

## 2022-06-14 NOTE — PROGRESS NOTES
Inpatient Anticoagulation Service Note  Date: 6/14/2022  Reason for Anticoagulation: Atrial Fibrillation   [id number] VASc Score: 6  HAS-BLED Score: 1   Hemoglobin Value: 12.2  Hematocrit Value: 43  Lab Platelet Value: 248  Target INR: 2.0 to 3.0  INR from last 7 days     Date/Time INR Value    06/14/22 1159 1.56    06/13/22 1107 1.74    06/12/22 0734 1.9    06/11/22 1059 2.81    06/11/22 0044 2.92    06/10/22 1512 2.84        Dose from last 7 days     Date/Time Dose (mg)    06/14/22 1558 5    06/13/22 1323 5        Average Dose (mg): TBD (Home Dose: 5 mg Tu/Fr and 2.5 mg ROW per [facility])  Significant Interactions: Antibiotics, Statin, Thyroid Medications, Other (Comments) (SSRI)  Bridge Therapy: No   Reversal Agent Administered: Vitamin K By Mouth  Comments: INR below goal. Noted bleed concerns. Noted recent intervention and PO vitamin K use. H/H improved. PLT improved. Warfarin interactions noted. Will give warfarin 5 mg today. INR with AM labs.    Plan:  Warfarin 5 mg 6/14/22  Education Material Provided?: No (chronic warfarin patient)  Pharmacist suggested discharge dosing: warfarin 5 mg Tu/Fr and 2.5 mg all other days    Shaun Saunders, PharmD

## 2022-06-14 NOTE — PROGRESS NOTES
[facility] Medicine Daily Progress Note    Date of Service  6/14/2022    Chief Complaint  Rachelle Reina is a 71 y.o. female admitted 6/8/2022 with lower extremity wounds.     Hospital Course  71 y.o. female obese, A. fib on warfarin who presented 6/8/2022 with bilateral lower extremity wounds that have not been healing and lower extremity swelling.  Patient reports she fell off scooter and suffered these wounds and been limited healing.  She also reports noncompliance with water pills. In the ER, she was given IV ancef for her bilateral lower extremity wounds and started on IV diuresis for fluid overload. Patient was originally admitted at [facility] ED and transferred to [facility] due to bed capacity. Echo performed showed severe aortic stenosis, pulmonary hypertension RVSP 55mmHg, and large pericardial effusion. Cardiology was consulted and recommended transfer back to [facility].       Interval Problem Update  6/11 Discussed with cardiology, INR too high pericardiocentesis rescheduled for tomorrow. Will give mephyton for INR reversal. Heparin ggt held overnight since INR therapeutic. Vitals significant for tachycardia and on 4 L NC. Patient reports her leg swelling has improved. Denies chest pain or dyspnea. Groin rash much improved.   6/12 INR 1.9, plan for pericardiocentesis and angiogram today. Patient complaining of congestion, denies dyspnea or chest pain. Labs pending. Glucose within goal.  6/13 Cath yesterday confirmed severe AS, pending cardiology recommendations. Labs pending this morning. Echo and carotid US pending. Patient continues to be tachycardic and requiring 4 L NC.  Patient continues to have congestion though reports that it is improving with the Mucinex.  Also having shortness of breath especially when getting up. Leg swelling and erythema resolved, bandaging over leg wounds. INR 1.7, discussed with cardiology ok to restart warfarin. Pending PT/OT, suspect patient will  need SNF placement.      6/14/2022  Vitals remained stable.  Asymptomatic.  Dry on exam  On 5 L oxygen.  Saturating over 90  Titrate oxygen level  Labs unremarkable.  Patient was evaluated by cardiology.  Recommend outpatient follow-up  No any inpatient intervention required   assisting with placement    I have personally seen and examined the patient at bedside. I discussed the plan of care with patient and bedside RN.     Consultants/Specialty  cardiology     Code Status  Full Code     Disposition  Patient is not medically cleared for discharge.   Anticipate discharge to to skilled nursing facility.  I have placed the appropriate orders for post-discharge needs.  Review of Systems  Review of Systems   Constitutional: Positive for malaise/fatigue. Negative for fever.   HENT: Negative for hearing loss.    Eyes: Negative for blurred vision.   Respiratory: Negative for cough and shortness of breath.    Cardiovascular: Negative for chest pain.   Gastrointestinal: Negative for nausea and vomiting.   Genitourinary: Negative for dysuria.   Musculoskeletal: Negative for myalgias.   Skin: Negative for rash.   Neurological: Negative for dizziness.   Endo/Heme/Allergies: Does not bruise/bleed easily.        Physical Exam  Temp:  [36.2 °C (97.2 °F)-36.9 °C (98.4 °F)] 36.6 °C (97.9 °F)  Pulse:  [100-116] 110  Resp:  [18] 18  BP: (105-131)/(58-80) 126/80  SpO2:  [92 %-100 %] 93 %    Physical Exam  Constitutional:       General: She is not in acute distress.  HENT:      Head: Normocephalic and atraumatic.      Right Ear: Tympanic membrane normal.      Left Ear: Tympanic membrane normal.      Nose: Nose normal.      Mouth/Throat:      Mouth: Mucous membranes are moist.   Eyes:      Extraocular Movements: Extraocular movements intact.      Pupils: Pupils are equal, round, and reactive to light.   Cardiovascular:      Rate and Rhythm: Normal rate and regular rhythm.      Pulses: Normal pulses.   Pulmonary:      Effort:  Pulmonary effort is normal. No respiratory distress.   Abdominal:      General: Abdomen is flat. There is no distension.   Musculoskeletal:      Cervical back: Normal range of motion.      Right lower leg: No edema.      Left lower leg: No edema.   Skin:     General: Skin is warm.   Neurological:      General: No focal deficit present.      Mental Status: She is alert and oriented to person, place, and time. Mental status is at baseline.   Psychiatric:         Mood and Affect: Mood normal.         Fluids    Intake/Output Summary (Last 24 hours) at 6/14/2022 1225  Last data filed at 6/14/2022 0605  Gross per 24 hour   Intake 220 ml   Output 200 ml   Net 20 ml       Laboratory  Recent Labs     06/12/22  0928 06/13/22  1212   WBC 7.1 6.8   RBC 4.55 4.65   HEMOGLOBIN 10.9* 11.2*   HEMATOCRIT 38.5 39.1   MCV 84.6 84.1   MCH 24.0* 24.1*   MCHC 28.3* 28.6*   RDW 66.6* 65.1*   PLATELETCT 209 203   MPV 9.2 9.0     Recent Labs     06/12/22  0928 06/13/22  1212   SODIUM 141 141   POTASSIUM 3.6 3.2*   CHLORIDE 102 99   CO2 28 32   GLUCOSE 125* 130*   BUN 22 27*   CREATININE 1.00 0.94   CALCIUM 9.3 9.2     Recent Labs     06/12/22  0734 06/13/22  1107   INR 1.90* 1.74*               Imaging  US-CAROTID DOPPLER BILAT   Final Result      EC-ECHOCARDIOGRAM LTD W/O CONT   Final Result      CL-LEFT HEART CATHETERIZATION WITH POSSIBLE INTERVENTION    (Results Pending)        Assessment/Plan  * Pericardial effusion- (present on admission)  Assessment & Plan  Echo showing large pericardial effusion   Cardiology consulted   -repeat echo pending   Continue to monitor INR  Giving mephyton x1  Monitor respiratory and hemodynamics closely     Pulmonary hypertension (HCC)- (present on admission)  Assessment & Plan  Diuresis     Severe aortic stenosis  Assessment & Plan  Confirmed on cardiac cath 6/12   Slow IV diuresis   Cardiology following   Referral made to TAVR team for outpatient follow up    Cellulitis- (present on  admission)  Assessment & Plan  Continue ancef   Wound care     Chronic atrial fibrillation- (present on admission)  Assessment & Plan  On warfarin at home   Check PT/INR  Discussed with cardiology ok to restart warfarin, no heed for heparin bridge    Dyslipidemia- (present on admission)  Assessment & Plan  C.w statin and lopid    History of hypothyroidism- (present on admission)  Assessment & Plan  Resume home medications    Class 3 severe obesity due to excess calories with serious comorbidity and body mass index (BMI) of 40.0 to 44.9 in adult (HCC)- (present on admission)  Assessment & Plan   on dietary and lifestyle modification     Acute on chronic diastolic congestive heart failure (HCC)- (present on admission)  Assessment & Plan  Continue IV lasix   Cardiology consulted   Echo noted     Benign essential hypertension- (present on admission)  Assessment & Plan  Hold BP meds given acute pericardial effusion and risk of hemodynamic collapse.         VTE prophylaxis: SCDs/TEDs and therapeutic anticoagulation with warfarin    I have performed a physical exam and reviewed and updated ROS and Plan today (6/14/2022). In review of yesterday's note (6/13/2022), there are no changes except as documented above.

## 2022-06-14 NOTE — PROGRESS NOTES
Cardiology Follow Up Progress Note    Date of Service  6/14/2022    Attending Physician  Lucio Petty M.D.    Chief Complaint   Aortic stenosis, pericardial effusion.    HPI  Rachelle Reina is a 71 y.o. female admitted 6/10/2022 with above..    Interim Events  No significant changes noted from cardiac standpoint within the past 24 hours.    Review of Systems  Review of Systems   Constitutional: Positive for fatigue. Negative for chills and fever.   HENT: Negative.  Negative for hearing loss.    Eyes: Negative.    Respiratory: Positive for shortness of breath. Negative for cough.    Cardiovascular: Negative.  Negative for chest pain, palpitations and leg swelling.   Gastrointestinal: Negative.  Negative for abdominal pain, nausea and vomiting.   Genitourinary: Negative.  Negative for dysuria and urgency.   Musculoskeletal: Negative.  Negative for myalgias.   Skin: Negative.  Negative for rash.   Neurological: Negative.  Negative for dizziness, weakness and headaches.   Hematological: Does not bruise/bleed easily.   Psychiatric/Behavioral: Negative.  The patient is not nervous/anxious.        Vital signs in last 24 hours  Temp:  [36.2 °C (97.2 °F)-36.9 °C (98.4 °F)] 36.6 °C (97.9 °F)  Pulse:  [100-124] 100  Resp:  [18-20] 18  BP: (105-131)/(58-80) 119/80  SpO2:  [92 %-100 %] 100 %    Physical Exam  Physical Exam  Constitutional:       Appearance: She is well-developed and normal weight. She is ill-appearing.   HENT:      Head: Normocephalic and atraumatic.      Mouth/Throat:      Mouth: Mucous membranes are moist.   Eyes:      Extraocular Movements: Extraocular movements intact.      Conjunctiva/sclera: Conjunctivae normal.   Cardiovascular:      Rate and Rhythm: Normal rate. Rhythm irregular.      Pulses: Normal pulses.      Heart sounds: Murmur heard.   Pulmonary:      Effort: Pulmonary effort is normal.      Breath sounds: Normal breath sounds.   Abdominal:      General: Bowel sounds are normal.       Palpations: Abdomen is soft.   Musculoskeletal:         General: Normal range of motion.      Cervical back: Normal range of motion and neck supple.   Skin:     General: Skin is warm and dry.   Neurological:      General: No focal deficit present.      Mental Status: She is alert and oriented to person, place, and time. Mental status is at baseline.   Psychiatric:         Mood and Affect: Mood normal.         Behavior: Behavior normal.         Thought Content: Thought content normal.         Judgment: Judgment normal.         Lab Review  Lab Results   Component Value Date/Time    WBC 6.8 06/13/2022 12:12 PM    RBC 4.65 06/13/2022 12:12 PM    HEMOGLOBIN 11.2 (L) 06/13/2022 12:12 PM    HEMATOCRIT 39.1 06/13/2022 12:12 PM    MCV 84.1 06/13/2022 12:12 PM    MCH 24.1 (L) 06/13/2022 12:12 PM    MCHC 28.6 (L) 06/13/2022 12:12 PM    MPV 9.0 06/13/2022 12:12 PM      Lab Results   Component Value Date/Time    SODIUM 141 06/13/2022 12:12 PM    POTASSIUM 3.2 (L) 06/13/2022 12:12 PM    CHLORIDE 99 06/13/2022 12:12 PM    CO2 32 06/13/2022 12:12 PM    GLUCOSE 130 (H) 06/13/2022 12:12 PM    BUN 27 (H) 06/13/2022 12:12 PM    CREATININE 0.94 06/13/2022 12:12 PM    CREATININE 0.6 01/27/2009 01:10 PM      Lab Results   Component Value Date/Time    ASTSGOT 27 06/08/2022 10:04 AM    ALTSGPT 19 06/08/2022 10:04 AM     Lab Results   Component Value Date/Time    CHOLSTRLTOT 115 04/28/2022 10:21 AM    LDL 50 04/28/2022 10:21 AM    HDL 54 04/28/2022 10:21 AM    TRIGLYCERIDE 55 04/28/2022 10:21 AM    TROPONINT 41 (H) 08/30/2021 07:37 PM       No results for input(s): NTPROBNP in the last 72 hours.  (Above labs reviewed.)       Current Facility-Administered Medications:   •  MD Alert...Warfarin per Pharmacy, , Other, PHARMACY TO DOSE, Jennifer Mcwilliams D.O.  •  warfarin (COUMADIN) tablet 5 mg, 5 mg, Oral, Once per day on Tue Fri, Jennifer Mcwilliams D.O.  •  [START ON 6/15/2022] warfarin (COUMADIN) tablet 2.5 mg, 2.5 mg, Oral, Once per day on Sun  [facility] Sat, Jennifer Mcwilliams D.O.  •  guaiFENesin ER (MUCINEX) tablet 600 mg, 600 mg, Oral, Q12HRS, Jennifer Mcwilliams D.O., 600 mg at 06/14/22 0506  •  metoprolol tartrate (LOPRESSOR) tablet 25 mg, 25 mg, Oral, TWICE DAILY, NICOLE Jose, 25 mg at 06/14/22 0506  •  acetaminophen (TYLENOL) tablet 500 mg, 500 mg, Oral, Q6HRS PRN, Nathanael Villa M.D., 500 mg at 06/12/22 0538  •  allopurinol (ZYLOPRIM) tablet 100 mg, 100 mg, Oral, QHS, Nathanael Villa M.D., 100 mg at 06/13/22 2132  •  ceFAZolin in dextrose (Ancef) IVPB premix 2 g, 2 g, Intravenous, Q8HRS, Nathanael Villa M.D., Stopped at 06/14/22 0248  •  citalopram (CeleXA) tablet 20 mg, 20 mg, Oral, DAILY, Nathanael Villa M.D., 20 mg at 06/14/22 0506  •  furosemide (LASIX) injection 40 mg, 40 mg, Intravenous, Q DAY, Nathanael Villa M.D., 40 mg at 06/14/22 0506  •  gemfibrozil (LOPID) tablet 600 mg, 600 mg, Oral, QAM, Nathanael Villa M.D., 600 mg at 06/14/22 0506  •  insulin regular (HumuLIN R,NovoLIN R) injection, 1-6 Units, Subcutaneous, 4X/DAY ACHS **AND** POC blood glucose manual result, , , Q AC AND BEDTIME(S) **AND** NOTIFY MD and PharmD, , , Once **AND** Administer 20 grams of glucose (approximately 8 ounces of fruit juice) every 15 minutes PRN FSBG less than 70 mg/dL, , , PRN **AND** dextrose 50% (D50W) injection 25 g, 25 g, Intravenous, Q15 MIN PRN, Huffman V. Allen, M.D.  •  levothyroxine (SYNTHROID) tablet 175 mcg, 175 mcg, Oral, Q SUNDAY, 175 mcg at 06/12/22 0523 **AND** levothyroxine (SYNTHROID) tablet 200 mcg, 200 mcg, Oral, Once per day on Mon Tue Wed Thu Fri Sat, Nathanael Villa M.D., 200 mcg at 06/14/22 0506  •  nystatin (MYCOSTATIN) powder, , Topical, BID, Nathanael Villa M.D., 1 Application at 06/14/22 0505  •  ondansetron (ZOFRAN ODT) dispertab 4 mg, 4 mg, Oral, Q4HRS PRN, Nathanael Villa M.D.  •  oxyCODONE immediate-release (ROXICODONE) tablet 5 mg, 5 mg, Oral, Q4HRS PRN, Nathanael Villa,  M.D., 2.5 mg at 06/13/22 2131  •  rosuvastatin (CRESTOR) tablet 20 mg, 20 mg, Oral, Q EVENING, Nathanael Villa M.D., 20 mg at 06/13/22 1643  •  traZODone (DESYREL) tablet 50 mg, 50 mg, Oral, QHS PRN, Nathanael Villa M.D., 50 mg at 06/12/22 2127  (Medications reviewed.)    Cardiac Imaging and Procedures Review  CARDIAC STUDIES/PROCEDURES:     CARDIAC CATHETERIZATION CONCLUSIONS by Duncan Strickland (06/12/22)  1.  Nonobstructive, two-vessel coronary disease  2.  Severe elevation in left heart filling pressure with left atrial pressure of 32 mmHg  3.  Severe aortic stenosis by noninvasive evaluation, pullback gradient consistent with significant aortic stenosis     CARDIAC CATHETERIZATION CONCLUSIONS (02/27/19)  1.  No angiographic evidence of coronary artery disease.  2.  Normal left ventricular systolic function with ejection fraction of 70%.  3.  Elevated left ventricular end-diastolic pressure.     CARDIAC CATHETERIZATION (10/30/08)  Cardiac catheterization showing no angiographic evidence of coronary artery disease.     CALCIUM SCORING CT AT [facility] (12/18/18)  Mild evidence of plaque in the left circumflex artery.     CT OF CHEST (03/17/22)  1.  Negative for pulmonary embolism   2.  Small-moderate-sized pericardial effusion  3.  Dilation of both atria and probably left ventricle  4.  Mild atelectasis  5.  Hepatic steatosis  Abdominally aortic atherosclerotic plaque     ECHOCARDIOGRAM CONCLUSIONS (06/10/22)  Compared to the prior echo on 12/09/2020, aortic stenosis is worse.  Technically difficult study due to patient body habitus and position.   The left ventricular ejection fraction is visually estimated to be 75%.  Normal regional wall motion.  Severe aortic valve stenosis.  Right ventricular systolic pressure is estimated to be 51 mmHg.  Large pericardial effusion with early evidence of hemodynamic   compromise.     ECHOCARDIOGRAM CONCLUSIONS (11/10/15)  No significant valve disease  or flow abnormalities.   Normal left ventricular systolic function.   Trace to small pericardial effusion without evidence of hemodynamic compromise.  No prior study is available for comparison.      ECHOCARDIOGRAM CONCLUSIONS (09/02/08)  Echocardiogram showing normal left ventricular systolic function and mild mitral regurgitation.     EKG performed on (06/08/22) EKG shows atrial fibrillation.  EKG performed on (01/22/18) EKG shows atrial fibrillation.  EKG performed on (02/21/17) EKG shows atrial fibrillation.  EKG performed on (11/10/15) EKG shows atrial fibrillation.     Laboratory results of (11/11/15) Cholesterol profile of 118/165/39/46 noted.     MYOCARDIAL PERFUSION STUDY CONCLUSIONS at [facility] (02/06/19)  Reversible defect of the anterior wall and apex. Finding concerning for left anterior descending artery ischemia.  (study result reviewed)     MPI CONCLUSIONS (11/11/15)  No reversible defects that would indicate ischemia.      RIGHT HEART CATHETHERIZATION: by Aj Carbajal (05/06/20)  1) Successful implantation of Cardiomems device for remote monitor of intracardiac pressures.  2) Patient will be monitored as part of our protocol in our heart failure program to further reduce repeated heart failure hospitalization and also to improve overall quality of life.    Assessment/Plan  1. Severe aortic stenosis: Her aortic stenosis has reached severe status and she is symptomatic, NYHA class IV. We will schedule for outpatient consultation with structural heart team.  2. Pericardial effusion without tamponade: The decision was mde by interventional team for observation without pericardiocentesis for now.    3. Chronic atrial fibrillation on chronic anticoagulation therapy (warfarin) and unseccessful electrical cardioversion on amiodarone on 01/27/09: She is doing well and the ventricular rate is well controlled. We will continue with current medical therapy.   4. History of diastolic congestive  heart failure with Cardiomems implant: (Managed by [facility])    Thank you for allowing me to participate in the care of this patient.  Cardiology will sign off on this patient    Please contact me with any questions.    Florian Carnes M.D.   Cardiologist, [facility]  [phone]

## 2022-06-14 NOTE — PROGRESS NOTES
Monitor summary  Rhythm: afib  Ectopy: PVCr  HR:   -/.08/-  Per strip from monitor room

## 2022-06-14 NOTE — THERAPY
"Occupational Therapy   Initial Evaluation     Patient Name: Rachelle Reina  Age:  71 y.o., Sex:  female  Medical Record #: [id number]  Today's Date: 6/13/2022     Precautions: Fall Risk  Comments: fearful, anxious    Assessment    Patient is 71 y.o. female admitted with LE wounds and fluid overload, presents to OT nabil reporting she has not been out of bed in \"almost a week\". Pt very fearful and requires step-by-step explanation of plan/preparation for activity, including bed motion. Pt requires substantial assist for all ADLs at this time d/t weakness and anxiety, pt reported fear of falling forward while seated EOB with 2x assist. Pt is receptive to encouragement but functional activity participation is limited d/t fear. Recommend post-acute placement prior to DC home to [facility].     Plan    Recommend Occupational Therapy 3 times per week until therapy goals are met for the following treatments:  Adaptive Equipment, Self Care/Activities of Daily Living, Therapeutic Activities, and Therapeutic Exercises.    DC Equipment Recommendations:  (has all safety DME at home)  Discharge Recommendations: Recommend post-acute placement for additional occupational therapy services prior to discharge home     Subjective    \"Wait! I can't breathe!\" (Pt very anxious about the bed in flat position)     Objective     06/13/22 1045   Initial Contact Note    Initial Contact Note Order Received and Verified, Occupational Therapy Evaluation in Progress with Full Report to Follow.   Prior Living Situation   Prior Services Housekeeping / Homemaker Services   Housing / Facility Assisted Living Residence   Elevator Yes   Bathroom Set up Walk In Shower;Grab Bars;Shower Chair   Equipment Owned Front-Wheel Walker;Scooter;Hospital Bed;Tub / Shower Seat;Grab Bar(s) By Toilet;Grab Bar(s) In Tub / Shower;Bed Side Commode   Lives with - Patient's Self Care Capacity Spouse   Comments paid housekeepers, [facility] provides meals   Prior Level of ADL " Function   Self Feeding Independent   Grooming / Hygiene Independent   Bathing Independent   Dressing Independent   Toileting Independent   Comments per pt report   Prior Level of IADL Function   Medication Management Independent   Laundry Requires Assist   Kitchen Mobility Independent   Finances Independent   Home Management Requires Assist   Shopping Requires Assist   History of Falls   History of Falls Yes   Precautions   Precautions Fall Risk   Comments fearful, anxious   Pain 0 - 10 Group   Therapist Pain Assessment Nurse Notified;Post Activity Pain Same as Prior to Activity  (not rated)   Cognition    Level of Consciousness Alert   Comments self-limiting, fearful   Strength Upper Body   Upper Body Strength  X   Gross Strength Generalized Weakness, Equal Bilaterally.    Balance Assessment   Sitting Balance (Static) Fair -   Sitting Balance (Dynamic) Fair -   Standing Balance (Static) Trace +   Weight Shift Sitting Poor   Weight Shift Standing Absent   Comments FWW   Bed Mobility    Supine to Sit Maximal Assist   Sit to Supine Maximal Assist   Scooting Maximal Assist   ADL Assessment   Eating Supervision  (in bed)   Upper Body Dressing Moderate Assist   Lower Body Dressing Maximal Assist   Toileting Maximal Assist   Comments unable to tolerat/focus on seated ADLs d/t fear of falling at EOB   How much help from another person does the patient currently need...   Putting on and taking off regular lower body clothing? 2   Bathing (including washing, rinsing, and drying)? 2   Toileting, which includes using a toilet, bedpan, or urinal? 2   Putting on and taking off regular upper body clothing? 2   Taking care of personal grooming such as brushing teeth? 2   Eating meals? 4   6 Clicks Daily Activity Score 14   Functional Mobility   Sit to Stand Maximal Assist   Bed, Chair, Wheelchair Transfer Unable to Participate   Toilet Transfers Unable to Participate   Mobility sit>stand w/FWW   Activity Tolerance   Sitting in  Chair NT   Sitting Edge of Bed 8min   Standing 1min with maxA facilitation   Short Term Goals   Short Term Goal # 1 pt will complete seated grooming ADLs with set-up assist   Short Term Goal # 2 pt will complete UB dress with SPV   Short Term Goal # 3 pt will complete functional transfer with Olegario   Education Group   Education Provided Role of Occupational Therapist;Activities of Daily Living;Home Safety;Transfers   Role of Occupational Therapist Patient Response Patient;Acceptance;Explanation;Verbal Demonstration   Home Safety Patient Response Patient;Acceptance;Explanation;Verbal Demonstration   Transfers Patient Response Patient;Acceptance;Explanation;Verbal Demonstration   ADL Patient Response Patient;Acceptance;Explanation;Verbal Demonstration   Problem List   Problem List Decreased Active Daily Living Skills;Decreased Homemaking Skills;Decreased Upper Extremity Strength Left;Decreased Upper Extremity Strength Right;Decreased Functional Mobility;Decreased Activity Tolerance   Anticipated Discharge Equipment and Recommendations   DC Equipment Recommendations   (has all safety DME at home)   Discharge Recommendations Recommend post-acute placement for additional occupational therapy services prior to discharge home

## 2022-06-15 NOTE — DISCHARGE PLANNING
DC Transport Scheduled    Received request at: 0926    Transport Company Scheduled:  [employer]      Scheduled Date: 06/15/2022  Scheduled Time: 1300    Destination: [facility]     Notified care team of scheduled transport via Vo[employer].     If there are any changes needed to the DC transportation scheduled, please contact [facility] at ext. [phone] between the hours of 7032-8512 Mon-Fri. If outside those hours, contact the ED Case Manager at ext. [phone].

## 2022-06-15 NOTE — CARE PLAN
Problem: Care Map:  Day of Discharge Optimal Outcome for the Heart Failure Patient  Goal: Day of Discharge:  Optimal Care of the heart failure patient  Outcome: Progressing  Intervention: Start Heart Failure education booklet, provide writing utensils and notepad for questions  Note: Heart failure booklet discussed with patient and given to patient upon discharge  Intervention: For patient's with heart failure exacerbation, identify precipitant (diet, med compliance, etc.) and direct education towards lifestyle changes to prevent exacerbations.  Note: Discussed diet, daily weights, medication regimen, and diagnosis  Intervention: Instruct patient to write down weights on symptom tracker daily  Note: Daily weights completed and discussed with patient.  Intervention: Provide and explain the Stoplight Tool  Note: Discussed stop light tool   Intervention: ACE-I, ARB or ARNI prescribed on discharge if LVSD (EF = 40%).  Refer to HF/ AMI Discharge Checklist . Or if contraindicated, provider documentated why neither ACE-I, ARB or ARNI was not prescribed.  Note: EF 70%  Intervention: Evidence based beta blocker prescribed at discharge if LVSD (EF = 40%)(Metoprolol Succinate or Toprol XL , carvedilol, bisprolol). Refer to HF/ AMI Discharge Checklist.  Or if contraindicated, provider documented why not prescribed.  Note: Metoprolol Tartrate   Intervention: Aldosterone receptor antagonist prescribed at discharge for EF = 35%.  Refer to HF/AMI Discharge checklist. Or if contraindicated, provider documented why not prescribed.  Note: EF 70%  Intervention: Schedule HF follow up appointment within 7 days of discharge (date, time, location on AVS), unless patient is on hospice or transferring to SNF/LTACH  Note: Heart failure appointment scheduled, and patient going to skilled nursing facility   Intervention: Patient has hospital specific Helath Failure Education booklet at discharge  Note: Heart failure discussed with patient  "  Intervention: Utilize Discharge Checklist (\"Pink Sheet\")  Note: Pink sheet completed and given to charge RN   Intervention: Use the Discharge Navigator to add the hospital specific Heart Failure discharge instructions and address the following :  activity level, diet, discharge medications, weight monitoring and what do to if symptoms worsen.  Note: Heart failure discharge education completed   Intervention: If Meds to Beds initiated, make sure patient medications in hand prior to discharge  Note: Patient discharging to skilled nursing facility      Problem: Skin Integrity  Goal: Skin integrity is maintained or improved  Outcome: Progressing  Note: Assess skin and monitor for skin breakdown. Alleviate pressure to bony prominences and provide assistance with turning, repositioning, ROM and mobility as appropriate. Use of turning every two hours, barrier cream, waffle mattress, TAPs system, and purewick as needed. Continue to monitor.      The patient is Watcher - Medium risk of patient condition declining or worsening    Shift Goals: Increase mobility   Clinical Goals: monitoring I&O  Patient Goals: Rest  Family Goals: NA    Progress made toward(s) clinical / shift goals:  Patient allowing staff to turn her every two hours    Patient is not progressing towards the following goals: Incontinence, anxiety with mobility       "

## 2022-06-15 NOTE — PROGRESS NOTES
Report received from night shift nurseHolly. Patient is AOx4 and has no complain of pain at this moment. Plan of care is updated with patient. All questions answered. Call light and belongings are within reach. Bed is locked and in lowest position.

## 2022-06-15 NOTE — DISCHARGE PLANNING
Agency/Facility Name: [facility]  Outcome: Left v-mail regarding bed availability, requesting call back.

## 2022-06-15 NOTE — DISCHARGE SUMMARY
Discharge Summary    CHIEF COMPLAINT ON ADMISSION  No chief complaint on file.      Reason for Admission  Large pericardial effusion; severe*     CODE STATUS  Full Code    HPI & HOSPITAL COURSE  71 y.o. female obese, A. fib on warfarin who presented 6/8/2022 with bilateral lower extremity wounds that have not been healing and lower extremity swelling.  Patient reports she fell off scooter and suffered these wounds and been limited healing.  She also reports noncompliance with water pills. In the ER, she was given IV ancef for her bilateral lower extremity wounds and started on IV diuresis for fluid overload. Patient was originally admitted at [facility] ED and transferred to [facility] due to bed capacity. Echo performed showed severe aortic stenosis, pulmonary hypertension RVSP 55mmHg, and large pericardial effusion. Cardiology was consulted and recommended transfer back to [facility]. The decision was mde by interventional team for observation without pericardiocentesis for now.  Started on antibiotic for cellulitis.  Patient continued to require 3 to oxygen.  Chest x-ray consistent with atelectasis.  Incentive spirometry encouraged.  INR remain subtherapeutic 1.81.  Need to monitor INR at nursing facility.  Cardiology cleared patient for discharge with outpatient follow-up.  Need to follow-up with TAVR team as outpatient for aortic stenosis.      During hospital course patient remain  hemodynamically stable ,asymptomatic ,labs remain unremarkable .  Patient will be discharged with close follow up with PCP .  Patient accepted at skilled nursing facility.  Compliance.Discharge plan was discussed with patient in details .  Patient agreed with discharge plan  and  all questions answered.              Therefore, she is discharged in good and stable condition to skilled nursing facility.    The patient met 2-midnight criteria for an inpatient stay at the time of discharge.      FOLLOW UP ITEMS POST  DISCHARGE  Monitor INR    DISCHARGE DIAGNOSES  Principal Problem:    Pericardial effusion POA: Yes  Active Problems:    Benign essential hypertension (Chronic) POA: Yes    Acute on chronic diastolic congestive heart failure (HCC) POA: Yes    Class 3 severe obesity due to excess calories with serious comorbidity and body mass index (BMI) of 40.0 to 44.9 in adult (HCC) POA: Yes    History of hypothyroidism (Chronic) POA: Yes    Dyslipidemia POA: Yes    Chronic atrial fibrillation POA: Yes    Cellulitis POA: Yes    Severe aortic stenosis POA: Unknown    Pulmonary hypertension (HCC) POA: Yes  Resolved Problems:    * No resolved hospital problems. *      FOLLOW UP  Future Appointments   Date Time Provider Department Center   6/29/2022  3:40 PM Duncan Drake M.D. [facility] None   7/7/2022  9:00 AM NICOLE Lozada [facility] None   8/2/2022  2:00 PM Ash Jeffrey M.D. [facility] None     No follow-up provider specified.    MEDICATIONS ON DISCHARGE     Medication List      START taking these medications      Instructions   cephALEXin 500 MG Caps  Commonly known as: KEFLEX   Take 2 Capsules by mouth 4 times a day for 5 days.  Dose: 1,000 mg        CHANGE how you take these medications      Instructions   metoprolol tartrate 50 MG Tabs  What changed:   · how much to take  · how to take this  · when to take this  Commonly known as: LOPRESSOR   TAKE 1 TABLET BY MOUTH TWICE DAILY     warfarin 5 MG Tabs  What changed:   · how much to take  · how to take this  · when to take this  · additional instructions  Commonly known as: COUMADIN   Doctor's comments: This rx was submitted by a pharmacist working under a collaborative practice agreement.  TAKE 1/2 TO 1 TABLET BY MOUTH DAILY AS DIRECTED BY COAGULATION CLINIC        CONTINUE taking these medications      Instructions   acetaminophen 650 MG CR tablet  Commonly known as: TYLENOL   Take 1,300 mg by mouth at bedtime.  Dose: 1,300 mg     allopurinol 100 MG Tabs  Commonly known as:  ZYLOPRIM   Take 100 mg by mouth every bedtime.  Dose: 100 mg     ascorbic acid 500 MG tablet  Commonly known as: Vitamin C   Take 500 mg by mouth every day.  Dose: 500 mg     citalopram 20 MG Tabs  Commonly known as: CeleXA   Take 20 mg by mouth every morning.  Dose: 20 mg     FeroSul 325 (65 Fe) MG tablet  Generic drug: ferrous sulfate   Take 325 mg by mouth every morning.  Dose: 325 mg     gemfibrozil 600 MG Tabs  Commonly known as: LOPID   Take 1 Tab by mouth every morning.  Dose: 600 mg     Kerendia 10 MG Tabs  Generic drug: Finerenone   Doctor's comments: Please dispense a 30 day supply of samples  Take 1 tablet by mouth every day.  Dose: 10 mg     Levemir FlexTouch 100 UNIT/ML injection PEN  Generic drug: insulin detemir   Inject 10 Units under the skin at bedtime.  Dose: 10 Units     * levothyroxine 175 MCG Tabs  Commonly known as: SYNTHROID   Take 175 mcg by mouth every Sunday. Brand Name  Dose: 175 mcg     * levothyroxine 200 MCG Tabs  Commonly known as: SYNTHROID   Take 200 mcg by mouth see administration instructions. Monday through Saturday  Brand Name  Dose: 200 mcg     loratadine 10 MG Tabs  Commonly known as: CLARITIN   Take 10 mg by mouth every morning.  Dose: 10 mg     losartan 50 MG Tabs  Commonly known as: COZAAR   Take 25 mg by mouth every morning.  Dose: 25 mg     * nystatin 519760 UNIT/GM Crea topical cream  Commonly known as: MYCOSTATIN   Apply 1 Application topically 2 times a day.  Dose: 1 Application     * nystatin powder  Commonly known as: MYCOSTATIN   Apply 1 Application topically 2 times a day as needed.  Dose: 1 Application     oxybutynin 15 MG CR tablet  Commonly known as: DITROPAN XL   Take 15 mg by mouth every morning.  Dose: 15 mg     pioglitazone 15 MG Tabs  Commonly known as: ACTOS   Take 15 mg by mouth every morning.  Dose: 15 mg     potassium chloride SA 20 MEQ Tbcr  Commonly known as: Kdur   Take 20-40 mEq by mouth every day.  Dose: 20-40 mEq     rosuvastatin 20 MG  Tabs  Commonly known as: CRESTOR   Take 1 Tab by mouth every evening.  Dose: 20 mg     torsemide 20 MG Tabs  Commonly known as: DEMADEX   Take 20-40 mg by mouth every day.  Dose: 20-40 mg     traZODone 100 MG Tabs  Commonly known as: DESYREL   Take 50 mg by mouth every evening.  Dose: 50 mg     VITAMIN B-6 PO   Take 1 Tab by mouth every day.  Dose: 1 Tablet     Vitamin D-3 125 MCG (5000 UT) Tabs   Take 5,000 Units by mouth 2 Times a Day.  Dose: 5,000 Units         * This list has 4 medication(s) that are the same as other medications prescribed for you. Read the directions carefully, and ask your doctor or other care provider to review them with you.                Allergies  Allergies   Allergen Reactions   • Sulfa Drugs Rash     8/2015 rash and hives         DIET  Orders Placed This Encounter   Procedures   • Diet Order Diet: Cardiac     Standing Status:   Standing     Number of Occurrences:   1     Order Specific Question:   Diet:     Answer:   Cardiac [6]       ACTIVITY  As tolerated.  Weight bearing as tolerated    LINES, DRAINS, AND WOUNDS  This is an automated list. Peripheral IVs will be removed prior to discharge.  Peripheral IV 06/10/22 20 G Anterior;Right Forearm (Active)   Site Assessment Clean;Dry;Intact 06/14/22 0800   Dressing Type Transparent 06/14/22 0800   Line Status Flushed;Scrubbed the hub prior to access;Saline locked 06/14/22 0800   Dressing Status Clean;Dry;Intact 06/14/22 0800   Dressing Intervention N/A 06/14/22 0800   Date Primary Tubing Changed 06/14/22 06/14/22 2100   Date Secondary Tubing Changed 06/14/22 06/14/22 2100   Infiltration Grading ([facility]) 0 06/14/22 0800   Phlebitis Scale ([facility] Only) 0 06/14/22 0800       Wound 09/16/20 Pretibial Right anterior lower leg (Active)       Wound 09/16/20 Pretibial Left anterior superior lower leg (Active)       Wound 09/16/20 Pretibial Left anterior lower leg inferior (Active)       Wound 06/08/22 Pretibial Proximal Bilateral Bilateral  legs (Active)   Wound Image      06/09/22 1200   Site Assessment LIZ 06/14/22 0800   Periwound Assessment LIZ 06/14/22 0800   Margins LIZ 06/14/22 0800   Closure LIZ 06/12/22 2000   Drainage Amount None 06/14/22 0800   Drainage Description Serous 06/12/22 2000   Dressing Options Dry Gauze 06/14/22 0800   Dressing Changed Observed 06/12/22 1000   Dressing Status Dry;Intact;Old drainage 06/14/22 0800   Dressing Change/Treatment Frequency Every 48 hrs, and As Needed 06/13/22 0900   NEXT Dressing Change/Treatment Date 06/12/22 06/11/22 2000   NEXT Weekly Photo (Inpatient Only) 06/15/22 06/11/22 0740   Non-staged Wound Description Partial thickness 06/09/22 1200   Wound Odor None 06/09/22 1200   Exposed Structures None 06/09/22 1200   WOUND NURSE ONLY - Time Spent with Patient (mins) 60 06/09/22 1200       Wound 06/08/22 Bilateral LEGS (Active)   Site Assessment LIZ;Other (Comment) 06/15/22 0800   Periwound Assessment LIZ;Other (Comment) 06/15/22 0800   Margins LIZ 06/15/22 0800   Closure LIZ 06/15/22 0800   Drainage Amount LIZ 06/15/22 0800   Drainage Description LZI 06/15/22 0800   Treatments Cleansed 06/14/22 1723   Wound Cleansing Approved Wound Cleanser 06/14/22 1723   Dressing Options Absorbent Abdominal Pad;Dry Roll Gauze 06/14/22 1723   Dressing Changed Changed 06/14/22 1723   Dressing Status Clean;Dry;Intact 06/14/22 1723   Dressing Change/Treatment Frequency Every 48 hrs, and As Needed 06/14/22 1723   NEXT Dressing Change/Treatment Date 06/16/22 06/14/22 1723   NEXT Weekly Photo (Inpatient Only) 06/15/22 06/11/22 0740   Non-staged Wound Description Partial thickness 06/09/22 1800   Exposed Structures None 06/09/22 1800       Peripheral IV 06/10/22 20 G Anterior;Right Forearm (Active)   Site Assessment Clean;Dry;Intact 06/14/22 0800   Dressing Type Transparent 06/14/22 0800   Line Status Flushed;Scrubbed the hub prior to access;Saline locked 06/14/22 0800   Dressing Status Clean;Dry;Intact 06/14/22 0800    Dressing Intervention N/A 06/14/22 0800   Date Primary Tubing Changed 06/14/22 06/14/22 2100   Date Secondary Tubing Changed 06/14/22 06/14/22 2100   Infiltration Grading ([facility]) 0 06/14/22 0800   Phlebitis Scale ([facility] Only) 0 06/14/22 0800               MENTAL STATUS ON TRANSFER   A0 x 3          CONSULTATIONS  Cardiology         LABORATORY  Lab Results   Component Value Date    SODIUM 142 06/14/2022    POTASSIUM 3.6 06/14/2022    CHLORIDE 95 (L) 06/14/2022    CO2 36 (H) 06/14/2022    GLUCOSE 161 (H) 06/14/2022    BUN 30 (H) 06/14/2022    CREATININE 0.86 06/14/2022    CREATININE 0.6 01/27/2009        Lab Results   Component Value Date    WBC 8.0 06/14/2022    HEMOGLOBIN 12.2 06/14/2022    HEMATOCRIT 43.0 06/14/2022    PLATELETCT 248 06/14/2022        Total time of the discharge process exceeds  37  minutes.

## 2022-06-15 NOTE — DISCHARGE INSTRUCTIONS
Discharge Instructions    Discharged to other by medical transportation with escort. Discharged via wheelchair, hospital escort: Yes.  Special equipment needed: Oxygen    Be sure to schedule a follow-up appointment with your primary care doctor or any specialists as instructed.     Discharge Plan:   Diet Plan: Discussed  Activity Level: Discussed  Confirmed Follow up Appointment: Patient to Call and Schedule Appointment  Confirmed Symptoms Management: Discussed  Medication Reconciliation Updated: Yes    I understand that a diet low in cholesterol, fat, and sodium is recommended for good health. Unless I have been given specific instructions below for another diet, I accept this instruction as my diet prescription.   Other diet: Cardiac    Special Instructions:   HF Patient Discharge Instructions  Monitor your weight daily, and maintain a weight chart, to track your weight changes.   Activity as tolerated, unless your Doctor has ordered otherwise. Other activity order: as tolerated.  Follow a low fat, low cholesterol, low salt diet unless instructed otherwise by your Doctor. Read the labels on the back of food products and track your intake of fat, cholesterol and salt.   Fluid Restriction No. If a Fluid Restriction has been ordered by your Doctor, measure fluids with a measuring cup to ensure that you are not exceeding the restriction.   No smoking.  Oxygen Yes. If your Doctor has ordered that you wear Oxygen at home, it is important to wear it as ordered.  Did you receive an explanation from staff on the importance of taking each of your medications and why it is necessary to keep taking them unless your doctor says to stop? Yes  Were all of your questions answered about how to manage your heart failure and what to do if you have increased signs and symptoms after you go home? Yes  Do you feel like your heart failure care team involved you in the care treatment plan and allowed you to make decisions regarding your  care while in the hospital and addressed any discharge needs you might have? Yes    See the educational handout provided at discharge for more information on monitoring your daily weight, activity and diet. This also explains more about Heart Failure, symptoms of a flare-up and some of the tests that you have undergone.     Warning Signs of a Flare-Up include:  Swelling in the ankles or lower legs.  Shortness of breath, while at rest, or while doing normal activities.   Shortness of breath at night when in bed, or coughing in bed.   Requiring more pillows to sleep at night, or needing to sit up at night to sleep.  Feeling weak, dizzy or fatigued.     When to call your Doctor:  Call [facility] seven days a week from 8:00 a.m. to 8:00 p.m. for medical questions [phone].  Call your Primary Care Physician or Cardiologist if:   You experience any pain radiating to your jaw or neck.  You have any difficulty breathing.  You experience weight gain of 3 lbs in a day or 5 lbs in a week.   You feel any palpitations or irregular heartbeats.  You become dizzy or lose consciousness.   If you have had an angiogram or had a pacemaker or AICD placed, and experience:  Bleeding, drainage or swelling at the surgical / puncture site.  Fever greater than 100.0 F  Shock from internal defibrillator.  Cool and / or numb extremities.    Is patient discharged on Warfarin / Coumadin?   Yes    You are receiving the drug warfarin. Please understand the importance of monitoring warfarin with scheduled PT/INR blood draws.  Follow-up with a call to your personal Doctor's office in 3 days to schedule a PT/INR. .    IMPORTANT: HOW TO USE THIS INFORMATION:  This is a summary and does NOT have all possible information about this product. This information does not assure that this product is safe, effective, or appropriate for you. This information is not individual medical advice and does not substitute for the advice of your health  "care professional. Always ask your health care professional for complete information about this product and your specific health needs.      WARFARIN - ORAL (WARF-uh-rin)      COMMON BRAND NAME(S): Coumadin      WARNING:  Warfarin can cause very serious (possibly fatal) bleeding. This is more likely to occur when you first start taking this medication or if you take too much warfarin. To decrease your risk for bleeding, your doctor or other health care provider will monitor you closely and check your lab results (INR test) to make sure you are not taking too much warfarin. Keep all medical and laboratory appointments. Tell your doctor right away if you notice any signs of serious bleeding. See also Side Effects section.      USES:  This medication is used to treat blood clots (such as in deep vein thrombosis-DVT or pulmonary embolus-PE) and/or to prevent new clots from forming in your body. Preventing harmful blood clots helps to reduce the risk of a stroke or heart attack. Conditions that increase your risk of developing blood clots include a certain type of irregular heart rhythm (atrial fibrillation), heart valve replacement, recent heart attack, and certain surgeries (such as hip/knee replacement). Warfarin is commonly called a \"blood thinner,\" but the more correct term is \"anticoagulant.\" It helps to keep blood flowing smoothly in your body by decreasing the amount of certain substances (clotting proteins) in your blood.      HOW TO USE:  Read the Medication Guide provided by your pharmacist before you start taking warfarin and each time you get a refill. If you have any questions, ask your doctor or pharmacist. Take this medication by mouth with or without food as directed by your doctor or other health care professional, usually once a day. It is very important to take it exactly as directed. Do not increase the dose, take it more frequently, or stop using it unless directed by your doctor. Dosage is based on " your medical condition, laboratory tests (such as INR), and response to treatment. Your doctor or other health care provider will monitor you closely while you are taking this medication to determine the right dose for you. Use this medication regularly to get the most benefit from it. To help you remember, take it at the same time each day. It is important to eat a balanced, consistent diet while taking warfarin. Some foods can affect how warfarin works in your body and may affect your treatment and dose. Avoid sudden large increases or decreases in your intake of foods high in vitamin K (such as broccoli, cauliflower, cabbage, brussels sprouts, kale, spinach, and other green leafy vegetables, liver, green tea, certain vitamin supplements). If you are trying to lose weight, check with your doctor before you try to go on a diet. Cranberry products may also affect how your warfarin works. Limit the amount of cranberry juice (16 ounces/480 milliliters a day) or other cranberry products you may drink or eat.      SIDE EFFECTS:  Nausea, loss of appetite, or stomach/abdominal pain may occur. If any of these effects persist or worsen, tell your doctor or pharmacist promptly. Remember that your doctor has prescribed this medication because he or she has judged that the benefit to you is greater than the risk of side effects. Many people using this medication do not have serious side effects. This medication can cause serious bleeding if it affects your blood clotting proteins too much (shown by unusually high INR lab results). Even if your doctor stops your medication, this risk of bleeding can continue for up to a week. Tell your doctor right away if you have any signs of serious bleeding, including: unusual pain/swelling/discomfort, unusual/easy bruising, prolonged bleeding from cuts or gums, persistent/frequent nosebleeds, unusually heavy/prolonged menstrual flow, pink/dark urine, coughing up blood, vomit that is bloody  or looks like coffee grounds, severe headache, dizziness/fainting, unusual or persistent tiredness/weakness, bloody/black/tarry stools, chest pain, shortness of breath, difficulty swallowing. Tell your doctor right away if any of these unlikely but serious side effects occur: persistent nausea/vomiting, severe stomach/abdominal pain, yellowing eyes/skin. This drug rarely has caused very serious (possibly fatal) problems if its effects lead to small blood clots (usually at the beginning of treatment). This can lead to severe skin/tissue damage that may require surgery or amputation if left untreated. Patients with certain blood conditions (protein C or S deficiency) may be at greater risk. Get medical help right away if any of these rare but serious side effects occur: painful/red/purplish patches on the skin (such as on the toe, breast, abdomen), change in the amount of urine, vision changes, confusion, slurred speech, weakness on one side of the body. A very serious allergic reaction to this drug is rare. However, get medical help right away if you notice any symptoms of a serious allergic reaction, including: rash, itching/swelling (especially of the face/tongue/throat), severe dizziness, trouble breathing. This is not a complete list of possible side effects. If you notice other effects not listed above, contact your doctor or pharmacist. In the [place] - Call your doctor for medical advice about side effects. You may report side effects to FDA at 2-753-CYT-9440. In [place] - Call your doctor for medical advice about side effects. You may report side effects to [facility] [place] at 1-878.921.9779.      PRECAUTIONS:  Before taking warfarin, tell your doctor or pharmacist if you are allergic to it; or if you have any other allergies. This product may contain inactive ingredients, which can cause allergic reactions or other problems. Talk to your pharmacist for more details. Before using this medication, tell your doctor or  pharmacist your medical history, especially of: blood disorders (such as anemia, hemophilia), bleeding problems (such as bleeding of the stomach/intestines, bleeding in the brain), blood vessel disorders (such as aneurysms), recent major injury/surgery, liver disease, alcohol use, mental/mood disorders (including memory problems), frequent falls/injuries. It is important that all your doctors and dentists know that you take warfarin. Before having surgery or any medical/dental procedures, tell your doctor or dentist that you are taking this medication and about all the products you use (including prescription drugs, nonprescription drugs, and herbal products). Avoid getting injections into the muscles. If you must have an injection into a muscle (for example, a flu shot), it should be given in the arm. This way, it will be easier to check for bleeding and/or apply pressure bandages. This medication may cause stomach bleeding. Daily use of alcohol while using this medicine will increase your risk for stomach bleeding and may also affect how this medication works. Limit or avoid alcoholic beverages. If you have not been eating well, if you have an illness or infection that causes fever, vomiting, or diarrhea for more than 2 days, or if you start using any antibiotic medications, contact your doctor or pharmacist immediately because these conditions can affect how warfarin works. This medication can cause heavy bleeding. To lower the chance of getting cut, bruised, or injured, use great caution with sharp objects like safety razors and nail cutters. Use an electric razor when shaving and a soft toothbrush when brushing your teeth. Avoid activities such as contact sports. If you fall or injure yourself, especially if you hit your head, call your doctor immediately. Your doctor may need to check you. The Food & Drug Administration has stated that generic warfarin products are interchangeable. However, consult your doctor  "or pharmacist before switching warfarin products. Be careful not to take more than one medication that contains warfarin unless specifically directed by the doctor or health care provider who is monitoring your warfarin treatment. Older adults may be at greater risk for bleeding while using this drug. This medication is not recommended for use during pregnancy because of serious (possibly fatal) harm to an unborn baby. Discuss the use of reliable forms of birth control with your doctor. If you become pregnant or think you may be pregnant, tell your doctor immediately. If you are planning pregnancy, discuss a plan for managing your condition with your doctor before you become pregnant. Your doctor may switch the type of medication you use during pregnancy. Very small amounts of this medication may pass into breast milk but is unlikely to harm a nursing infant. Consult your doctor before breast-feeding.      DRUG INTERACTIONS:  Drug interactions may change how your medications work or increase your risk for serious side effects. This document does not contain all possible drug interactions. Keep a list of all the products you use (including prescription/nonprescription drugs and herbal products) and share it with your doctor and pharmacist. Do not start, stop, or change the dosage of any medicines without your doctor's approval. Warfarin interacts with many prescription, nonprescription, vitamin, and herbal products. This includes medications that are applied to the skin or inside the vagina or rectum. The interactions with warfarin usually result in an increase or decrease in the \"blood-thinning\" (anticoagulant) effect. Your doctor or other health care professional should closely monitor you to prevent serious bleeding or clotting problems. While taking warfarin, it is very important to tell your doctor or pharmacist of any changes in medications, vitamins, or herbal products that you are taking. Some products that " may interact with this drug include: capecitabine, imatinib, mifepristone. Aspirin, aspirin-like drugs (salicylates), and nonsteroidal anti-inflammatory drugs (NSAIDs such as ibuprofen, naproxen, celecoxib) may have effects similar to warfarin. These drugs may increase the risk of bleeding problems if taken during treatment with warfarin. Carefully check all prescription/nonprescription product labels (including drugs applied to the skin such as pain-relieving creams) since the products may contain NSAIDs or salicylates. Talk to your doctor about using a different medication (such as acetaminophen) to treat pain/fever. Low-dose aspirin and related drugs (such as clopidogrel, ticlopidine) should be continued if prescribed by your doctor for specific medical reasons such as heart attack or stroke prevention. Consult your doctor or pharmacist for more details. Many herbal products interact with warfarin. Tell your doctor before taking any herbal products, especially bromelains, coenzyme Q10, cranberry, danshen, dong quai, fenugreek, garlic, ginkgo biloba, ginseng, and Lakesha's wort, among others. This medication may interfere with a certain laboratory test to measure theophylline levels, possibly causing false test results. Make sure laboratory personnel and all your doctors know you use this drug.      OVERDOSE:  If overdose is suspected, contact a poison control center or emergency room immediately. [place] residents can call the [place] National Poison Hotline at 1-896.244.5194. [place] residents can call a provincial poison control center. Symptoms of overdose may include: bloody/black/tarry stools, pink/dark urine, unusual/prolonged bleeding.      NOTES:  Do not share this medication with others. Laboratory and/or medical tests (such as INR, complete blood count) must be performed periodically to monitor your progress or check for side effects. Consult your doctor for more details.      MISSED DOSE:  For the best possible  benefit, do not miss any doses. If you do miss a dose and remember on the same day, take it as soon as you remember. If you remember on the next day, skip the missed dose and resume your usual dosing schedule. Do not double the dose to catch up because this could increase your risk for bleeding. Keep a record of missed doses to give to your doctor or pharmacist. Contact your doctor or pharmacist if you miss 2 or more doses in a row.      STORAGE:  Store at room temperature away from light and moisture. Do not store in the bathroom. Keep all medications away from children and pets. Do not flush medications down the toilet or pour them into a drain unless instructed to do so. Properly discard this product when it is  or no longer needed. Consult your pharmacist or local waste disposal company for more details about how to safely discard your product.      MEDICAL ALERT:  Your condition and medication can cause complications in a medical emergency. For information about enrolling in [facility], call 1-555.271.3889 ([place]) or 1-120.444.7215 ([place]).      Information last revised 2010 Copyright(c) 2010 [employer]       Pericardial Effusion    Pericardial effusion is a buildup of fluid around the heart. The heart is surrounded by a thin, double-layered sac (pericardium). When fluid builds up in this sac, it can put pressure on the heart and cause problems.  When fluid builds up in the pericardial sac and pressure on the heart increases, it becomes harder for the heart to pump blood. The fluid can prevent the heart from pumping enough blood (cardiac tamponade). This can be life-threatening.  What are the causes?  Often, the cause of pericardial effusion is not known (idiopathic effusion). In some cases, the condition may be caused by:  Infections from a virus, fungus, parasite, or bacteria.  Damage to the pericardium from heart surgery or a heart attack.  Inflammatory diseases, such as rheumatoid  arthritis or lupus.  Kidney disease.  Thyroid disease.  Cancer or treatment for cancer, including radiation or chemotherapy.  Certain medicines, including medicines for tuberculosis or seizures.  Chest injury.  What are the signs or symptoms?  Pericardial effusion may not cause symptoms at first, especially if the fluid builds up slowly. In time, pressure on the heart may cause:  Chest pain.  Trouble with breathing.  Pain and shortness of breath that get worse when lying down.  Dizziness.  Fainting.  Cough.  Hiccups.  Skipped heartbeats (palpitations).  Anxiety and confusion.  A bluish skin color (cyanosis).  Swollen legs and ankles.  A feeling of fullness in the chest.  How is this diagnosed?  This condition is diagnosed based on your symptoms and testing, which may include:  A test that creates ultrasound images of your heart (echocardiogram).  A test to examine the electrical functions of your heart (electrocardiogram, ECG).  Chest X-ray.  CT scan.  MRI.  Blood tests.  How is this treated?  Treatment for this condition depends on the cause of your condition and how severe your symptoms are. Treatment may include:  Medicines, such as:  NSAIDs or other anti-inflammatory medicines such as steroids.  Antibiotic medicine.  Antifungal medicine.  Hospital treatment. This may be necessary for cardiac tamponade. Treatment in the hospital may include:  IV fluids.  Breathing support.  Surgery. This may be needed in severe cases. Surgery may include:  A procedure to remove fluid from the pericardium by placing a needle into it (pericardiocentesis).  A procedure to make a permanent opening in the pericardium (pericardial window).  Open heart surgery.  Follow these instructions at home:  Take over-the-counter and prescription medicines only as told by your health care provider.  If you were prescribed antibiotic medicine, take it as told by your health care provider. Do not stop taking the antibiotic even if you start to feel  better.  Rest as told by your health care provider. Ask your health care provider what activities are safe for you.  Keep all follow-up visits as told by your health care provider. This is important.  Contact a health care provider if:  You have a cough or hiccups that do not go away.  You have severe swelling in your legs or ankles.  Get help right away if:  You have fast or irregular heartbeats (palpitations).  You feel dizzy or light-headed.  You faint.  You have chest pain.  You have trouble breathing.  These symptoms may represent a serious problem that is an emergency. Do not wait to see if the symptoms will go away. Get medical help right away. Call your local emergency services (911 in the [place] Do not drive yourself to the hospital.  Summary  Pericardial effusion is a buildup of fluid around the heart. The fluid can eventually prevent the heart from pumping enough blood (cardiac tamponade), which can be life-threatening.  Pericardial effusion may not cause symptoms at first.  Treatment for pericardial effusion depends on the cause of your condition and how severe your symptoms are. In severe cases, hospital treatment or surgery may be required.  Rest as told by your health care provider. Ask your health care provider what activities are safe for you.  This information is not intended to replace advice given to you by your health care provider. Make sure you discuss any questions you have with your health care provider.  Document Released: 08/15/2006 Document Revised: 11/30/2018 Document Reviewed: 01/25/2018  [employer]vier Patient Education © 2020 [employer]      Aortic Valve Stenosis    Aortic valve stenosis is a narrowing of the aortic valve in the heart. The aortic valve opens and closes to regulate blood flow between the left side of the heart (left ventricle) and the artery that leads away from the heart (aorta). When the aortic valve becomes narrow, it is difficult for the heart to pump blood out to the  body, which causes the heart to work harder. The extra work can weaken the heart muscle over time.  Aortic valve stenosis can range from mild to severe. If it is not treated, it can become more severe over time and lead to heart failure.  What are the causes?  This condition may be caused by:  Buildup of calcium around and on the aortic valve. This can occur with aging. This is the most common cause of aortic valve stenosis.  A heart problem that developed in the womb (birth defect).  Rheumatic fever.  Radiation to the chest.  What increases the risk?  You may be more likely to develop this condition if:  You are older than age 65.  You were born with an abnormal bicuspid valve.  What are the signs or symptoms?  You may not have any symptoms until your condition becomes severe. It may take 10-20 years for mild or moderate aortic valve stenosis to become severe. Symptoms may include:  Shortness of breath. This may get worse during physical activity.  Feeling unusually weak and tired (fatigue).  Extreme discomfort in the chest, neck, or arm during physical activity (angina).  A heartbeat that is irregular or faster than normal (palpitations).  Dizziness or fainting. This may happen when you get physically tired or after you take certain heart medicines, such as nitroglycerin.  How is this diagnosed?  This condition may be diagnosed with:  A physical exam.  Echocardiogram. This is a type of imaging test that uses sound waves (ultrasound) to make images of your heart. There are two kinds of this test that may be used.  Transthoracic echocardiogram (TTE). For this type, a wand-like tool (transducer) is moved over your chest to create ultrasound images that are recorded by a computer.  Transesophageal echocardiogram (FRANCIS). For this type, a flexible tube (probe) is inserted down the part of the body that moves food from your mouth to your stomach (esophagus). The heart and the esophagus are close to each other. Your health  care provider will use the probe to take clear, detailed pictures of the heart.  Cardiac catheterization. For this procedure, a small, thin tube (catheter) is passed through a large vein in your neck, groin, or arm. The catheter is used to get information about arteries, structures, blood pressure, and oxygen levels in your heart.  Stress tests. These are tests that evaluate the blood supply to your heart and your heart's response to exercise.  You may work with a health care provider who specializes in the heart (cardiologist) for diagnosis and treatment.  How is this treated?  Treatment depends on how severe your condition is and what your symptoms are. You will need to have your heart checked regularly to make sure that your condition is not getting worse or causing serious problems. Treatment may also include:  Surgery to replace your aortic valve. This is the most common treatment for aortic valve stenosis, and it is the only treatment to cure the condition. Several types of surgeries are available. The surgery may be done:  Through a large incision over your heart (open-heart surgery).  Through small incisions, using a flexible tube called a catheter (transcatheter aortic valve replacement, TAVR).  Medicines that help to keep your heart rate regular.  Medicines that thin your blood (anticoagulants) to prevent blood clots.  Antibiotic medicines to help prevent infection.  If your condition is mild, you may only need regular follow-up visits for monitoring.  Follow these instructions at home:  Lifestyle  Limit alcohol intake to no more than 1 drink a day for nonpregnant women and 2 drinks a day for men. One drink equals 12 oz of beer, 5 oz of wine, or 1½ oz of hard liquor.  Do not use any products that contain nicotine or tobacco, such as cigarettes and e-cigarettes. If you need help quitting, ask your health care provider.  Work with your health care provider to manage your blood pressure and  cholesterol.  Maintain a healthy weight.  Eating and drinking    Eat a heart-healthy diet that includes plenty of fresh fruits and vegetables, whole grains, lean protein, and low-fat or nonfat dairy.  Limit how much caffeine you drink. Caffeine can affect your heart's rate and rhythm.  Avoid foods that are:  High in salt (sodium), saturated fat, or sugar.  Canned or highly processed.  Fried.  Follow instructions from your health care provider about any other eating or drinking restrictions.  Activity  Exercise regularly and return to your normal activities as told by your health care provider. Ask your health care provider what amount and type of physical activity is safe for you.  If your aortic valve stenosis is mild, you may only need to avoid very intense physical activity, such as heavy weight lifting.  The more severe your aortic valve stenosis is, the more activities you may need to avoid.  If you are taking blood thinners:  Before you take any medicines that contain aspirin or NSAIDs, talk with your health care provider. These medicines increase your risk for dangerous bleeding.  Take your medicine exactly as told, at the same time every day.  Avoid activities that could cause injury or bruising, and follow instructions about how to prevent falls.  Wear a medical alert bracelet or carry a card that lists what medicines you take.  General instructions  Take over-the-counter and prescription medicines only as told by your health care provider.  If you were prescribed an antibiotic, take it as told by your health care provider. Do not stop taking the antibiotic even if you start to feel better.  If you are a woman and you plan to become pregnant, talk with your health care provider before you become pregnant.  Before you have any type of medical or dental procedure or surgery, tell all health care providers that you have aortic valve stenosis. This may affect the treatment that you receive.  Keep all follow-up  visits as told by your health care provider. This is important.  Contact a health care provider if:  You have a fever.  Get help right away if:  You develop any of the following symptoms:  Chest pain.  Chest tightness.  Shortness of breath.  Trouble breathing.  You feel light-headed.  You feel like you might faint.  Your heartbeat is irregular or faster than normal.  These symptoms may represent a serious problem that is an emergency. Do not wait to see if the symptoms will go away. Get medical help right away. Call your local emergency services (911 in the [place] Do not drive yourself to the hospital.  Summary  Aortic valve stenosis is a narrowing of the aortic valve in the heart. The aortic valve opens and closes to regulate blood flow between the left side of the heart (left ventricle) and the artery that leads away from the heart (aorta).  Aortic valve stenosis can range from mild to severe. If it is not treated, it can become more severe over time and lead to heart failure.  Treatment depends on how severe your condition is and what your symptoms are. You will need to have your heart checked regularly to make sure that your condition is not getting worse or causing serious problems.  Exercise regularly and return to your normal activities as told by your health care provider. Ask your health care provider what amount and type of physical activity is safe for you.  This information is not intended to replace advice given to you by your health care provider. Make sure you discuss any questions you have with your health care provider.  Document Released: 09/15/2004 Document Revised: 11/30/2018 Document Reviewed: 09/20/2018  [employer] Patient Education © 2020 [employer]      Cephalexin tablets or capsules  What is this medicine?  CEPHALEXIN (sef a ANJALI in) is a cephalosporin antibiotic. It is used to treat certain kinds of bacterial infections It will not work for colds, flu, or other viral infections.  This  medicine may be used for other purposes; ask your health care provider or pharmacist if you have questions.  COMMON BRAND NAME(S): Biocef, Daxbia, Keflex, Keftab  What should I tell my health care provider before I take this medicine?  They need to know if you have any of these conditions:  kidney disease  stomach or intestine problems, especially colitis  an unusual or allergic reaction to cephalexin, other cephalosporins, penicillins, other antibiotics, medicines, foods, dyes or preservatives  pregnant or trying to get pregnant  breast-feeding  How should I use this medicine?  Take this medicine by mouth with a full glass of water. Follow the directions on the prescription label. This medicine can be taken with or without food. Take your medicine at regular intervals. Do not take your medicine more often than directed. Take all of your medicine as directed even if you think you are better. Do not skip doses or stop your medicine early.  Talk to your pediatrician regarding the use of this medicine in children. While this drug may be prescribed for selected conditions, precautions do apply.  Overdosage: If you think you have taken too much of this medicine contact a poison control center or emergency room at once.  NOTE: This medicine is only for you. Do not share this medicine with others.  What if I miss a dose?  If you miss a dose, take it as soon as you can. If it is almost time for your next dose, take only that dose. Do not take double or extra doses. There should be at least 4 to 6 hours between doses.  What may interact with this medicine?  probenecid  some other antibiotics  This list may not describe all possible interactions. Give your health care provider a list of all the medicines, herbs, non-prescription drugs, or dietary supplements you use. Also tell them if you smoke, drink alcohol, or use illegal drugs. Some items may interact with your medicine.  What should I watch for while using this  medicine?  Tell your doctor or health care provider if your symptoms do not begin to improve in a few days.  This medicine may cause serious skin reactions. They can happen weeks to months after starting the medicine. Contact your health care provider right away if you notice fevers or flu-like symptoms with a rash. The rash may be red or purple and then turn into blisters or peeling of the skin. Or, you might notice a red rash with swelling of the face, lips or lymph nodes in your neck or under your arms.  Do not treat diarrhea with over the counter products. Contact your doctor if you have diarrhea that lasts more than 2 days or if it is severe and watery.  If you have diabetes, you may get a false-positive result for sugar in your urine. Check with your doctor or health care provider.  What side effects may I notice from receiving this medicine?  Side effects that you should report to your doctor or health care professional as soon as possible:  allergic reactions like skin rash, itching or hives, swelling of the face, lips, or tongue  breathing problems  pain or trouble passing urine  redness, blistering, peeling or loosening of the skin, including inside the mouth  severe or watery diarrhea  unusually weak or tired  yellowing of the eyes, skin  Side effects that usually do not require medical attention (report to your doctor or health care professional if they continue or are bothersome):  gas or heartburn  genital or anal irritation  headache  joint or muscle pain  nausea, vomiting  This list may not describe all possible side effects. Call your doctor for medical advice about side effects. You may report side effects to FDA at 9-907-KAQ-5501.  Where should I keep my medicine?  Keep out of the reach of children.  Store at room temperature between 59 and 86 degrees F (15 and 30 degrees C). Throw away any unused medicine after the expiration date.  NOTE: This sheet is a summary. It may not cover all possible  information. If you have questions about this medicine, talk to your doctor, pharmacist, or health care provider.  © 2020 [employer] (2020-03-27 07:00:28)         Depression / Suicide Risk    As you are discharged from this [facility] facility, it is important to learn how to keep safe from harming yourself.    Recognize the warning signs:  Abrupt changes in personality, positive or negative- including increase in energy   Giving away possessions  Change in eating patterns- significant weight changes-  positive or negative  Change in sleeping patterns- unable to sleep or sleeping all the time   Unwillingness or inability to communicate  Depression  Unusual sadness, discouragement and loneliness  Talk of wanting to die  Neglect of personal appearance   Rebelliousness- reckless behavior  Withdrawal from people/activities they love  Confusion- inability to concentrate     If you or a loved one observes any of these behaviors or has concerns about self-harm, here's what you can do:  Talk about it- your feelings and reasons for harming yourself  Remove any means that you might use to hurt yourself (examples: pills, rope, extension cords, firearm)  Get professional help from the community (Mental Health, Substance Abuse, psychological counseling)  Do not be alone:Call your Safe Contact- someone whom you trust who will be there for you.  Call your local CRISIS HOTLINE 751-2829 or 605-589-9968  Call your local Children's Mobile Crisis Response Team [place] (784) 160-3551 or www.[place]  Call the toll free National Suicide Prevention Hotlines   National Suicide Prevention Lifeline 248-260-AXBM (6165)  National Hope Line Network 800-SUICIDE (422-2042)

## 2022-06-15 NOTE — DISCHARGE PLANNING
0815 - pt accepted to [facility], pt is medically cleared, spoke to pt at bedside who gave verbal auth for transfer today at 1300. 2nd IMM given at bedside, pt verbalized. Approved service auth obtained from Supervisor Jennifer, transport forms completed and faxed to Dr. Malinda Morrow and LILLIE Rodriguez notified of transfer and need for Rapid COVID swab.  Transfer packet completed and given to LILLIE Rodriguez

## 2022-06-15 NOTE — PROGRESS NOTES
Patient is transported to [facility]. Monitor box is removed. Monitor room notified. Rapid COVID test was negative. IV removed. Report given to LILLIE Cruz by Jennifer.

## 2022-06-15 NOTE — PROGRESS NOTES
Patient discharged to [facility] with T transportation. All personal belongings collected. IV access removed. Monitor removed, monitor room notified. Discharge instructions discussed. Medications reviewed. Follow up appointments scheduled. Patient escorted off unit via wheelchair without incident. Report given to LILLIE Cruz. All belongings sent with patient.

## 2022-06-15 NOTE — DISCHARGE PLANNING
Agency/Facility Name: [facility]  Spoke To: Britta  Outcome: Pt accepted to SNF with bed available today, SNF requesting transport set up for 1300. DPA to follow up with SNF once transport time is confirmed.     1020:   DPA confirmed transport time of 1300 via [facility] to SNF.

## 2022-06-22 NOTE — TELEPHONE ENCOUNTER
Spoke with Rachelle, who remains in residence at [facility], Clinical Pharmacist, CDE, CACP

## 2022-06-28 NOTE — PROGRESS NOTES
Anticoagulation Summary  As of 6/28/2022    INR goal:  2.0-3.0   TTR:  60.6 % (6.9 y)   INR used for dosing:  3.60 (6/19/2022)   Warfarin maintenance plan:  5 mg (5 mg x 1) every Tue, Fri; 2.5 mg (5 mg x 0.5) all other days   Weekly warfarin total:  22.5 mg   Plan last modified:  Ortega BrownD (4/21/2022)   Next INR check:  6/29/2022   Target end date:  Indefinite    Indications    Atrial fibrillation (HCC) (Resolved) [I48.91]  Chronic anticoagulation [Z79.01]  Secondary hypercoagulable state (HCC) [D68.69]             Anticoagulation Episode Summary     INR check location:  Anticoagulation Clinic    Preferred lab:      Send INR reminders to:      Comments:  karine      Anticoagulation Care Providers     Provider Role Specialty Phone number    Florian Carnes M.D. Referring Interventional Cardiology [phone]    [facility] Anticoagulation Services Responsible  [phone]        Anticoagulation Patient Findings     HPI:  Rachelle Reina, on anticoagulation therapy with warfarin for AFib  Changes to current medical/health status since last appt: none  Denies signs/symptoms of bleeding and/or thrombosis since the last appt.    Pt may have had some diet changes, but it's unclear.   Denies any interval changes to medications since last appt.   Denies any complications or cost restrictions with current therapy.     A/P   INR  is from >1 week ago.   Pt to repeat INR ASAP.     Next INR in 1 day.     Dennis Oakley, PharmD

## 2022-06-29 NOTE — PROGRESS NOTES
Anticoagulation Summary  As of 2022    INR goal:  2.0-3.0   TTR:  60.5 % (6.9 y)   INR used for dosin.40 (2022)   Warfarin maintenance plan:  5 mg (5 mg x 1) every Tue, Fri; 2.5 mg (5 mg x 0.5) all other days   Weekly warfarin total:  22.5 mg   Plan last modified:  Ortega BrownD (2022)   Next INR check:     Target end date:  Indefinite    Indications    Atrial fibrillation (HCC) (Resolved) [I48.91]  Chronic anticoagulation [Z79.01]  Secondary hypercoagulable state (HCC) [D68.69]             Anticoagulation Episode Summary     INR check location:  Anticoagulation Clinic    Preferred lab:      Send INR reminders to:      Comments:  karine      Anticoagulation Care Providers     Provider Role Specialty Phone number    Florian Carnes M.D. Referring Interventional Cardiology [phone]    [facility] Anticoagulation Services Responsible  [phone]        Anticoagulation Patient Findings    Patient currently admitted at [facility].   She is unsure if anyone is monitoring anticoagulation, but continues to test with her HM.  Suggested a bolus dose of 5mg today, then recheck INR in two days, but did express that facility should be managing and we will defer to their decision making while admitted.  Lázaro Santos, OrtegaD, BCACP

## 2022-07-07 NOTE — PROGRESS NOTES
Chief Complaint   Patient presents with   • Hypertension   • Congestive Heart Failure     F/v dx: ACC/AHA stage C heart failure with preserved ejection fraction (HCC)   • Atrial Fibrillation       Subjective:   Rachelle Reina is a 71 y.o. female who is following up on her heart failure.      Patient of Dr. Carnes and Dr. Jeffrey in the heart failure clinic.  She was last seen in clinic on 4/13/2022.  During that visit, no changes were made to her regimen.    Patient did have a recent hospitalization from 6/9/2022 through 6/15/2022.  She presented with lower extremity edema and open sores.  She was noncompliant with her diuretics.  She was found to have severe aortic stenosis, pulmonary hypertension, large pericardial effusion.  She was transferred to [facility].  Cardiology was consulted and recommended observation of her pericardial effusion.  No pericardiocentesis was done.  Patient was treated with antibiotics for her cellulitis.  She was referred to structural heart clinic for evaluation of her aortic stenosis.  She was discharged to skilled nursing facility.    She is currently staying at the [place] skilled nursing [facility].  She has been working on gaining strength and working on her ADLs.  She currently can stand for 5 minutes and transfer from the wheelchair to the toilet and is currently going to work on walking with a walker.  Prior to her hospitalization, she was using a scooter and was not as active as she should have been.    She does report slight lower extremity edema, dyspnea with exertion, occasional dizziness and she also mentions having nosebleeds, but not recently.  She denies chest pain, palpitations, orthopnea, PND.    She currently is using oxygen 1 L in the day and 2 L at night at SNF.  She typically uses a CPAP when she is at home.    She reports her weights at the SNF are up to 266 pounds.    She reports she is trying to maintain her sodium intake at this  skilled nursing facility as best she can.    She reports difficulties with her INR at the SNF.    Additonally, patient has the following medical problems:    -Atrial fibrillation: Taking warfarin, followed by anticoagulation clinic    -No coronary disease on angiogram in 2019    -Insulin-dependent diabetic: Followed by her PCP    -Hyperlipidemia: Taking rosuvastatin, gemfibrozil    -Gout: Taking allopurinol    -Depression: Taking Celexa    -Thyroid disease: Taking Synthroid    -History of ovarian and breast cancer    -Chronic venous insufficiency    Past Medical History:   Diagnosis Date   • A-fib ([facility])    • Arthritis 05/05/2020    knees and fingers   • Atrial fibrillation (HCC)    • Benign essential hypertension 7/1/2011   • Bowel habit changes     diarrhea   • Breast cancer ([facility])    • Bronchitis 2015    history of   • CAD (coronary artery disease)    • Cancer ([facility]) 1987, 2000    breast and ovary   • Cataract 2018    IOL bilat   • Diabetes ([facility])     oral medication, insulin   • Diastolic congestive heart failure ([facility]) 2019   • Disorder of thyroid    • High cholesterol    • History of cardiac catheterization 4/22/2010   • History of echocardiogram 4/22/2011   • History of hypothyroidism 8/29/2008   • Hypercholesteremia 7/1/2011   • Long term (current) use of anticoagulants 7/1/2011   • Morbid obesity ([facility]) 10/28/2008   • Ovarian cancer ([facility])    • Pain     knees   • Sleep apnea     pt does not use cpap   • Snoring     sleep study done   • Urinary incontinence      Past Surgical History:   Procedure Laterality Date   • Z CARDIAC CATH  02/27/2019    normal coronaries   • CATARACT PHACO WITH IOL Right 1/22/2018    Procedure: CATARACT PHACO WITH IOL;  Surgeon: Santosh Holden M.D.;  Location: SURGERY SAME DAY [facility];  Service: Ophthalmology   • CATARACT PHACO WITH IOL Left 1/8/2018    Procedure: CATARACT PHACO WITH IOL;  Surgeon: Santosh Holden M.D.;  Location: SURGERY SAME DAY [facility];  Service:  Ophthalmology   • OTHER ORTHOPEDIC SURGERY Right 2013    hip arthroplasty   • CHOLECYSTECTOMY  2001   • ABDOMINAL HYSTERECTOMY TOTAL      1987   • MASTECTOMY  partial   • AR RADIATION THERAPY PLAN SIMPLE       Family History   Problem Relation Age of Onset   • Heart Disease Mother         HEART VALVE REPLACEMENT.   • Heart Disease Father         CORONARY ARTERY BYPASS GRAFTS   • Cancer Maternal Aunt    • Diabetes Brother    • Thyroid Brother    • Heart Attack Brother    • Heart Disease Brother      Social History     Socioeconomic History   • Marital status:      Spouse name: Not on file   • Number of children: Not on file   • Years of education: Not on file   • Highest education level: Not on file   Occupational History   • Not on file   Tobacco Use   • Smoking status: Never Smoker   • Smokeless tobacco: Never Used   Vaping Use   • Vaping Use: Never used   Substance and Sexual Activity   • Alcohol use: Yes     Comment: 1 or 2 drinks a month   • Drug use: No   • Sexual activity: Not on file   Other Topics Concern   • Not on file   Social History Narrative   • Not on file     Social Determinants of Health     Financial Resource Strain: Not on file   Food Insecurity: Not on file   Transportation Needs: Not on file   Physical Activity: Not on file   Stress: Not on file   Social Connections: Not on file   Intimate Partner Violence: Not on file   Housing Stability: Not on file     Allergies   Allergen Reactions   • Sulfa Drugs Rash     8/2015 rash and hives       Outpatient Encounter Medications as of 7/7/2022   Medication Sig Dispense Refill   • atorvastatin (LIPITOR) 40 MG Tab Take 40 mg by mouth every evening.     • polyethylene glycol/lytes (MIRALAX) 17 g Pack Take 17 g by mouth every day.     • losartan (COZAAR) 50 MG Tab Take 25 mg by mouth every morning.     • potassium chloride SA (KDUR) 20 MEQ Tab CR Take 20 mEq by mouth every day.     • ascorbic acid (VITAMIN C) 500 MG tablet Take 500 mg by mouth every  day.     • nystatin (MYCOSTATIN) 080736 UNIT/GM Cream topical cream Apply 1 Application topically 2 times a day.     • nystatin (MYCOSTATIN) powder Apply 1 Application topically 2 times a day as needed.     • torsemide (DEMADEX) 20 MG Tab Take 20 mg by mouth every day.     • warfarin (COUMADIN) 5 MG Tab TAKE 1/2 TO 1 TABLET BY MOUTH DAILY AS DIRECTED BY COAGULATION CLINIC (Patient taking differently: Take 2.5-5 mg by mouth every day. 5 mg on Tuesday and Friday 2.5 mg all other days) 90 Tablet 1   • FEROSUL 325 (65 Fe) MG tablet Take 325 mg by mouth every morning.     • Finerenone (KERENDIA) 10 MG Tab Take 1 tablet by mouth every day. 30 Tablet 0   • metoprolol (LOPRESSOR) 50 MG Tab TAKE 1 TABLET BY MOUTH TWICE DAILY (Patient taking differently: Take 50 mg by mouth 2 times a day. TAKE 1 TABLET BY MOUTH TWICE DAILY) 180 Tab 4   • gemfibrozil (LOPID) 600 MG Tab Take 1 Tab by mouth every morning. 90 Tab 4   • levothyroxine (SYNTHROID) 200 MCG Tab Take 200 mcg by mouth see administration instructions. Monday through Saturday  Brand Name     • pioglitazone (ACTOS) 15 MG Tab Take 15 mg by mouth every morning.     • Pyridoxine HCl (VITAMIN B-6 PO) Take 1 Tab by mouth every day.     • LEVEMIR FLEXTOUCH 100 UNIT/ML injection PEN Inject 10 Units under the skin at bedtime.     • oxybutynin (DITROPAN XL) 15 MG CR tablet Take 15 mg by mouth every morning.  2   • allopurinol (ZYLOPRIM) 100 MG Tab Take 100 mg by mouth every bedtime.  2   • acetaminophen (TYLENOL) 650 MG CR tablet Take 1,300 mg by mouth at bedtime.     • Cholecalciferol (VITAMIN D-3) 5000 units Tab Take 5,000 Units by mouth 2 Times a Day.     • traZODone (DESYREL) 100 MG Tab Take 50 mg by mouth every evening.     • levothyroxine (SYNTHROID) 175 MCG Tab Take 175 mcg by mouth every Sunday. Brand Name     • loratadine (CLARITIN) 10 MG Tab Take 10 mg by mouth every morning.     • citalopram (CELEXA) 20 MG TABS Take 20 mg by mouth every morning.     • rosuvastatin  "(CRESTOR) 20 MG Tab Take 1 Tab by mouth every evening. 90 Tab 4     No facility-administered encounter medications on file as of 7/7/2022.     Review of Systems   Constitutional: Negative for fever and malaise/fatigue.   HENT: Positive for nosebleeds.    Respiratory: Positive for shortness of breath. Negative for cough.    Cardiovascular: Positive for leg swelling. Negative for chest pain, palpitations, orthopnea, claudication and PND.   Gastrointestinal: Negative for abdominal pain.   Musculoskeletal: Negative for myalgias.   Neurological: Positive for dizziness.   All other systems reviewed and are negative.       Objective:   /68 (BP Location: Right arm, Patient Position: Sitting, BP Cuff Size: Adult)   Pulse (!) 116   Resp 16   Ht 1.651 m (5' 5\")   Wt 121 kg (266 lb)   LMP 01/05/1987 (Approximate)   SpO2 95%   BMI 44.26 kg/m²     Physical Exam  Vitals reviewed.   Constitutional:       Appearance: She is well-developed.   HENT:      Head: Normocephalic and atraumatic.   Eyes:      Pupils: Pupils are equal, round, and reactive to light.   Neck:      Vascular: No JVD.   Cardiovascular:      Rate and Rhythm: Normal rate and regular rhythm.      Heart sounds: Murmur heard.    Systolic murmur is present with a grade of 3/6.  Pulmonary:      Effort: Pulmonary effort is normal. No respiratory distress.      Breath sounds: Normal breath sounds. No wheezing or rales.   Abdominal:      General: Bowel sounds are normal.      Palpations: Abdomen is soft.   Musculoskeletal:         General: Normal range of motion.      Cervical back: Normal range of motion and neck supple.      Right lower leg: Edema (Trace) present.      Left lower leg: Edema (Trace) present.   Skin:     General: Skin is warm and dry.   Neurological:      General: No focal deficit present.      Mental Status: She is alert and oriented to person, place, and time.   Psychiatric:         Behavior: Behavior normal.         Lab Results   Component " Value Date/Time    CHOLSTRLTOT 115 04/28/2022 10:21 AM    LDL 50 04/28/2022 10:21 AM    HDL 54 04/28/2022 10:21 AM    TRIGLYCERIDE 55 04/28/2022 10:21 AM       Lab Results   Component Value Date/Time    SODIUM 142 06/14/2022 11:59 AM    POTASSIUM 3.6 06/14/2022 11:59 AM    CHLORIDE 95 (L) 06/14/2022 11:59 AM    CO2 36 (H) 06/14/2022 11:59 AM    GLUCOSE 161 (H) 06/14/2022 11:59 AM    BUN 30 (H) 06/14/2022 11:59 AM    CREATININE 0.86 06/14/2022 11:59 AM    CREATININE 0.6 01/27/2009 01:10 PM     Lab Results   Component Value Date/Time    ALKPHOSPHAT 75 06/08/2022 10:04 AM    ASTSGOT 27 06/08/2022 10:04 AM    ALTSGPT 19 06/08/2022 10:04 AM    TBILIRUBIN 0.4 06/08/2022 10:04 AM      Transthoracic Echo Report 11/10/2015  Difficult study.  No significant valve disease or flow abnormalities.   Normal left ventricular systolic function.   Trace to Small pericardial effusion without evidence of hemodynamic   compromise.  No prior study is available for comparison.      Myocardial Perfusion Report 11/11/2015   IMPRESSIONS   No reversible defects that would indicate ischemia.      TREADMILL STRESS EKG AND MYOCARDIAL PERFUSION SCAN 11/11/2015     DESCRIPTION: The patient received IV lexiscan. The infusion was associated with no symptoms. The baseline electrocardiogram is abnormal Atrial fibrillation. During the infusion, there were no electrocardiographic changes diagnostic of ischemia.     Myocardial perfusion images are to be reported in separate report.     Cath 2/27/2019  POSTPROCEDURE DIAGNOSES:  1.  No angiographic evidence of coronary artery disease.  2.  Normal left ventricular systolic function with ejection fraction of 70%.  3.  Elevated left ventricular end-diastolic pressure.       Transthoracic Echo Report 11/14/2019  Compared to the images of the study done 11/10/15 - there has been no significant change.  Left ventricular ejection fraction is visually estimated to be 60%.  Aortic valve area calculated from the  continuity equation is 1.1 cm2.  Mild aortic stenosis.  Right ventricular systolic pressure is estimated to be 30 mmHg.    Transthoracic Echo Report 12/9/2020  Compared to the images of the study done on 11/14/2019 there has been progression of aortic stenosis, previously mild.  Normal left ventricular size, wall thickness, and systolic function.  Left ventricular ejection fraction is visually estimated to be 60%.  Normal right ventricular systolic function.  Mild mitral regurgitation.  Moderate aortic stenosis.  Mild tricuspid regurgitation.  Estimated right ventricular systolic pressure  is 50 mmHg.    Transthoracic Echo Report 6/10/2022  Compared to the prior echo on 12/09/2020, aortic stenosis is worse.  Technically difficult study due to patient body habitus and position.   The left ventricular ejection fraction is visually estimated to be 75%.  Normal regional wall motion.  Severe aortic valve stenosis.  Right ventricular systolic pressure is estimated to be 51 mmHg.  Large pericardial effusion with early evidence of hemodynamic   Compromise.    CARDIAC CATHETERIZATION REPORT 6/12/2022   REFERRING: Florian Carnes M.D.     PROCEDURE PHYSICIAN: Duncan Drake MD, Located within [facility]  ASSISTANT: None     IMPRESSIONS:  1.  Nonobstructive, two-vessel coronary disease  2.  Severe elevation in left heart filling pressure with left atrial pressure of 32 mmHg  3.  Severe aortic stenosis by noninvasive evaluation, pullback gradient consistent with significant aortic stenosis     Recommendations:  Further recommendations per the rounding team     Pre-procedure diagnosis: Severe aortic stenosis  Post-procedure diagnosis: Same     Procedure performed  Selective coronary angiography  Left heart catheterization           Transthoracic Echo Report 6/13/2022  Compared to the prior echo on 06/10/2022, no significant change.   Moderate size pericardial effusion with early signs of hemodynamic   compromise.     Assessment:     1. ACC/AHA  stage C heart failure with preserved ejection fraction ([facility])     2. Heart failure, NYHA class 3 ([facility])     3. Severe aortic stenosis     4. HTN (hypertension), malignant     5. Longstanding persistent atrial fibrillation ([facility])     6. Chronic anticoagulation     7. High risk medication use     8. Type 2 diabetes mellitus with hyperglycemia, with long-term current use of insulin ([facility])     9. Dyslipidemia     10. KEN (obstructive sleep apnea)     11. Morbid obesity with BMI of 40.0-44.9, adult ([facility])         Medical Decision Making:  Today's Assessment / Status / Plan:   HFpEF, Stage C, Class 3, LVEF 75 %: Patient has trace lower extremity edema  -Recent decompensation likely due to noncompliance with diuretics and worsening aortic stenosis  -Continue torsemide 20 mg daily  -Continue potassium 20 mEq daily  -Reinforced 2000 mg sodium diet per day, she states she is trying to do her best at the [facility]   -Reinforced s/sx of worsening heart failure with patient and weight monitoring. Pt verbalizes understanding. Pt to call office or RTC if present.   -pt does have a history of chronic venous insuffiiciency    Severe aortic stenosis:  -Patient referred over to structural heart clinic, patient to make an appointment and follow-up    Hypertension: Stable  -Continue losartan 25 mg daily, per MAR at [facility]  -Continue metoprolol tartrate 50 mg twice a day    Atrial fibrillation, hx unsuccessful cardioversion and prior amiodarone use:  -Continue warfarin, currently followed by [facility], patient to have another INR draw soon  -Continue metoprolol 50 mg twice a day    Dyslipidemia: Last LDL 50 on 4/28/2022  -Continue rosuvastatin 20 mg daily  -Continue gemfibrozil 600 mg daily    Obesity: BMI 44.26  -Encouraged weight loss  -Discussed continued therapy and increasing activity and building up strength    Diabetes:  -Continue current regimen, followed by PCP  -Continue Finerenone 10 mg daily    KEN:  -Using CPAP at home, currently using oxygen  at SNF, she will resume her device when she is discharged home    FU in clinic in 1 with Dr. Jeffrey and structural heart clinic as scheduled. Sooner if needed.    Patient verbalizes understanding and agrees with the plan of care.     PLEASE NOTE: This Note was created using voice recognition Software. I have made every reasonable attempt to correct obvious errors, but I expect that there are errors of grammar and possibly content that I did not discover before finalizing the note

## 2022-07-07 NOTE — TELEPHONE ENCOUNTER
Received VM from June returning my call. [phone].    Called June back, and unfortunately it is very difficult to arrange appts due to limitations in their availability. June transferred me to [facility] with social work to see when patient is planning on being discharged so we can potentially schedule workup post discharge.     Cancelled TAVR workup in the meantime.     Called patient to update.

## 2022-07-07 NOTE — Clinical Note
Could you get this patient scheduled for severe aortic stenosis with the structural heart clinic.  She was referred during her recent hospitalization.  Thank you!

## 2022-07-07 NOTE — TELEPHONE ENCOUNTER
Referral from: Audrey GARCIA    Patient called on 7/7/2022.    Discussed with patient consultation appointments, testing needed, and plan of care.    Patient given dates and times of testing and consultations. She is [facility] and doesn't know how long she will be there for therapy, but requested I call and arrange rides to the appts through the rehab.     Patient denies any symptoms associated with pericardial effusion including chest pain and shortness of breath. She has been weaning from her O2 since DC from the hospital and is currently on 1L NC when needed. She states she was in the office today with Audrey GARCIA without O2 and her SpO2 was 95%.    All questions answered.    Phone number given to patient for Structural Heart Clinic for any further questions or concerns.

## 2022-07-21 NOTE — TELEPHONE ENCOUNTER
Patient called and stated she is getting discharged from rehab 7/29/2022.    Scheduled limited echo and TAVR workup for patient 8/1, 8/3, 8/4.

## 2022-08-01 NOTE — PROGRESS NOTES
REFERRING PHYSICIAN: Florian Carnes MD.     CONSULTING PHYSICIAN: Sherly Hanna MD.    CHIEF COMPLAINT: Shortness of breath    HISTORY OF PRESENT ILLNESS: The patient is a 72 y.o. female with a past medical history of atrial fibrillation, CHF, pericardial effusion, HLD, morbid obesity, breast and ovarian cancer who presents to the office for evaluation of worsening shortness of breath with exertion.  She has associated lower extremity edema and productive cough.  She denies chest pain, dizziness, syncope, near syncope, palpitations.      She was recently admitted on 6/9/22 with CHF, fluid overload, venous stasis, and underwent echocardiogram.  She was found to have severe aortic stenosis and a large pericardial effusion.  She did not undergo pericardiocentesis during that admission.  After diuresis she was transferred to an acute rehab hospital prior to being discharged.     She uses a FWW for mobilization and has a capacity of a few blocks before needing to stop and rest.    PAST MEDICAL HISTORY:   Past Medical History:   Diagnosis Date   • A-fib ([facility])    • Arthritis 05/05/2020    knees and fingers   • Atrial fibrillation ([facility])    • Benign essential hypertension 7/1/2011   • Bowel habit changes     diarrhea   • Breast cancer ([facility])    • Bronchitis 2015    history of   • CAD (coronary artery disease)    • Cancer ([facility]) 1987, 2000    breast and ovary   • Cataract 2018    IOL bilat   • Diabetes ([facility])     oral medication, insulin   • Diastolic congestive heart failure ([facility]) 2019   • Disorder of thyroid    • High cholesterol    • History of cardiac catheterization 4/22/2010   • History of echocardiogram 4/22/2011   • History of hypothyroidism 8/29/2008   • Hypercholesteremia 7/1/2011   • Long term (current) use of anticoagulants 7/1/2011   • Morbid obesity ([facility]) 10/28/2008   • Ovarian cancer ([facility])    • Pain     knees   • Sleep apnea     pt does not use cpap   • Snoring     sleep study done   • Urinary incontinence         PAST SURGICAL HISTORY:   Past Surgical History:   Procedure Laterality Date   • ZZZ CARDIAC CATH  02/27/2019    normal coronaries   • CATARACT PHACO WITH IOL Right 1/22/2018    Procedure: CATARACT PHACO WITH IOL;  Surgeon: Santosh Holden M.D.;  Location: SURGERY SAME DAY [facility] ORS;  Service: Ophthalmology   • CATARACT PHACO WITH IOL Left 1/8/2018    Procedure: CATARACT PHACO WITH IOL;  Surgeon: Santosh Holden M.D.;  Location: SURGERY SAME DAY [facility] ORS;  Service: Ophthalmology   • OTHER ORTHOPEDIC SURGERY Right 2013    hip arthroplasty   • CHOLECYSTECTOMY  2001   • ABDOMINAL HYSTERECTOMY TOTAL      1987   • MASTECTOMY  partial   • HI RADIATION THERAPY PLAN SIMPLE         FAMILY HISTORY:   Family History   Problem Relation Age of Onset   • Heart Disease Mother         HEART VALVE REPLACEMENT.   • Heart Disease Father         CORONARY ARTERY BYPASS GRAFTS   • Cancer Maternal Aunt    • Diabetes Brother    • Thyroid Brother    • Heart Attack Brother    • Heart Disease Brother         SOCIAL HISTORY:   Social History     Socioeconomic History   • Marital status:      Spouse name: Not on file   • Number of children: Not on file   • Years of education: Not on file   • Highest education level: Not on file   Occupational History   • Not on file   Tobacco Use   • Smoking status: Never Smoker   • Smokeless tobacco: Never Used   Vaping Use   • Vaping Use: Never used   Substance and Sexual Activity   • Alcohol use: Yes     Comment: 1 or 2 drinks a month   • Drug use: No   • Sexual activity: Not on file   Other Topics Concern   • Not on file   Social History Narrative   • Not on file     Social Determinants of Health     Financial Resource Strain: Not on file   Food Insecurity: Not on file   Transportation Needs: Not on file   Physical Activity: Not on file   Stress: Not on file   Social Connections: Not on file   Intimate Partner Violence: Not on file   Housing Stability: Not on file       ALLERGIES:    Allergies   Allergen Reactions   • Sulfa Drugs Rash     8/2015 rash and hives          CURRENT MEDICATIONS:     Current Outpatient Medications:   •  atorvastatin (LIPITOR) 40 MG Tab, Take 40 mg by mouth every evening., Disp: , Rfl:   •  polyethylene glycol/lytes (MIRALAX) 17 g Pack, Take 17 g by mouth every day., Disp: , Rfl:   •  losartan (COZAAR) 50 MG Tab, Take 25 mg by mouth every morning., Disp: , Rfl:   •  potassium chloride SA (KDUR) 20 MEQ Tab CR, Take 20 mEq by mouth every day., Disp: , Rfl:   •  ascorbic acid (VITAMIN C) 500 MG tablet, Take 500 mg by mouth every day., Disp: , Rfl:   •  nystatin (MYCOSTATIN) 497190 UNIT/GM Cream topical cream, Apply 1 Application topically 2 times a day., Disp: , Rfl:   •  nystatin (MYCOSTATIN) powder, Apply 1 Application topically 2 times a day as needed., Disp: , Rfl:   •  torsemide (DEMADEX) 20 MG Tab, Take 20 mg by mouth every day., Disp: , Rfl:   •  warfarin (COUMADIN) 5 MG Tab, TAKE 1/2 TO 1 TABLET BY MOUTH DAILY AS DIRECTED BY COAGULATION CLINIC (Patient taking differently: Take 2.5-5 mg by mouth every day. 5 mg on Tuesday and Friday 2.5 mg all other days), Disp: 90 Tablet, Rfl: 1  •  FEROSUL 325 (65 Fe) MG tablet, Take 325 mg by mouth every morning., Disp: , Rfl:   •  metoprolol (LOPRESSOR) 50 MG Tab, TAKE 1 TABLET BY MOUTH TWICE DAILY (Patient taking differently: Take 50 mg by mouth 2 times a day. TAKE 1 TABLET BY MOUTH TWICE DAILY), Disp: 180 Tab, Rfl: 4  •  rosuvastatin (CRESTOR) 20 MG Tab, Take 1 Tab by mouth every evening., Disp: 90 Tab, Rfl: 4  •  gemfibrozil (LOPID) 600 MG Tab, Take 1 Tab by mouth every morning., Disp: 90 Tab, Rfl: 4  •  levothyroxine (SYNTHROID) 200 MCG Tab, Take 200 mcg by mouth see administration instructions. Monday through Saturday Brand Name, Disp: , Rfl:   •  pioglitazone (ACTOS) 15 MG Tab, Take 15 mg by mouth every morning., Disp: , Rfl:   •  Pyridoxine HCl (VITAMIN B-6 PO), Take 1 Tab by mouth every day., Disp: , Rfl:   •  LEVEMIR  FLEXTOUCH 100 UNIT/ML injection PEN, Inject 10 Units under the skin at bedtime., Disp: , Rfl:   •  oxybutynin (DITROPAN XL) 15 MG CR tablet, Take 15 mg by mouth every morning., Disp: , Rfl: 2  •  allopurinol (ZYLOPRIM) 100 MG Tab, Take 100 mg by mouth every bedtime., Disp: , Rfl: 2  •  acetaminophen (TYLENOL) 650 MG CR tablet, Take 1,300 mg by mouth at bedtime., Disp: , Rfl:   •  Cholecalciferol (VITAMIN D-3) 5000 units Tab, Take 5,000 Units by mouth 2 Times a Day., Disp: , Rfl:   •  traZODone (DESYREL) 100 MG Tab, Take 50 mg by mouth every evening., Disp: , Rfl:   •  levothyroxine (SYNTHROID) 175 MCG Tab, Take 175 mcg by mouth every Sunday. Brand Name, Disp: , Rfl:   •  loratadine (CLARITIN) 10 MG Tab, Take 10 mg by mouth every morning., Disp: , Rfl:   •  citalopram (CELEXA) 20 MG TABS, Take 20 mg by mouth every morning., Disp: , Rfl:      LABS REVIEWED:  Lab Results   Component Value Date/Time    SODIUM 142 06/14/2022 11:59 AM    POTASSIUM 3.6 06/14/2022 11:59 AM    CHLORIDE 95 (L) 06/14/2022 11:59 AM    CO2 36 (H) 06/14/2022 11:59 AM    GLUCOSE 161 (H) 06/14/2022 11:59 AM    BUN 30 (H) 06/14/2022 11:59 AM    CREATININE 0.86 06/14/2022 11:59 AM    CREATININE 0.6 01/27/2009 01:10 PM      Lab Results   Component Value Date/Time    PROTHROMBTM 20.4 (H) 06/15/2022 02:27 AM    INR 1.40 (A) 06/29/2022 12:00 AM      Lab Results   Component Value Date/Time    WBC 8.0 06/14/2022 11:59 AM    RBC 5.18 06/14/2022 11:59 AM    HEMOGLOBIN 12.2 06/14/2022 11:59 AM    HEMATOCRIT 43.0 06/14/2022 11:59 AM    MCV 83.0 06/14/2022 11:59 AM    MCH 23.6 (L) 06/14/2022 11:59 AM    MCHC 28.4 (L) 06/14/2022 11:59 AM    MPV 9.2 06/14/2022 11:59 AM    NEUTSPOLYS 58.20 06/08/2022 10:04 AM    LYMPHOCYTES 25.60 06/08/2022 10:04 AM    MONOCYTES 7.90 06/08/2022 10:04 AM    EOSINOPHILS 6.90 06/08/2022 10:04 AM    BASOPHILS 1.00 06/08/2022 10:04 AM    HYPOCHROMIA 2+ 01/06/2020 03:26 AM    ANISOCYTOSIS 1+ 06/08/2022 10:04 AM        IMAGING REVIEWED  AND INTERPRETED:    ECHOCARDIOGRAM:   Compared to the prior echo on 12/09/2020, aortic stenosis is worse.  Technically difficult study due to patient body habitus and position.   The left ventricular ejection fraction is visually estimated to be 75%.  Normal regional wall motion.  Severe aortic valve stenosis.  Right ventricular systolic pressure is estimated to be 51 mmHg.  Large pericardial effusion with early evidence of hemodynamic   Compromise.  The aortic valve is not well visualize en face. Severe aortic valve   stenosis. Vmax is 4.1 m/s. Transvalvular gradients are - Peak: 66 mmHg,   Mean: 38 mmHg. No aortic insufficiency.    Aorta  Normal aortic root for body surface area. The ascending aorta diameter   is 2.9 cm.    ANGIOGRAM:   1.  Nonobstructive, two-vessel coronary disease  2.  Severe elevation in left heart filling pressure with left atrial pressure of 32 mmHg  3.  Severe aortic stenosis by noninvasive evaluation, pullback gradient consistent with significant aortic stenosis       REVIEW OF SYSTEMS:   Review of Systems   Constitutional: Positive for malaise/fatigue. Negative for chills, fever and weight loss.   HENT: Negative for ear pain, nosebleeds and tinnitus.    Eyes: Negative for double vision, photophobia and pain.   Respiratory: Positive for shortness of breath. Negative for cough and hemoptysis.    Cardiovascular: Positive for orthopnea and leg swelling. Negative for chest pain, palpitations and PND.   Gastrointestinal: Negative for abdominal pain, blood in stool, nausea and vomiting.   Genitourinary: Negative for frequency, hematuria and urgency.   Musculoskeletal: Positive for joint pain.   Skin: Negative for rash.   Neurological: Negative for dizziness, tremors, speech change, focal weakness, seizures and headaches.   Endo/Heme/Allergies: Negative for polydipsia. Does not bruise/bleed easily.   Psychiatric/Behavioral: Negative for hallucinations and memory loss.         PHYSICAL  "EXAMINATION:   Physical Exam  Vitals reviewed.   Constitutional:       General: She is not in acute distress.     Appearance: Normal appearance. She is obese.      Comments: In a wheelchair for exam   HENT:      Head: Normocephalic and atraumatic.      Right Ear: External ear normal.      Left Ear: External ear normal.      Nose: Nose normal. No congestion.   Eyes:      General: No scleral icterus.     Extraocular Movements: Extraocular movements intact.      Conjunctiva/sclera: Conjunctivae normal.   Cardiovascular:      Rate and Rhythm: Normal rate and regular rhythm.   Pulmonary:      Effort: Pulmonary effort is normal. No respiratory distress.   Abdominal:      General: Abdomen is flat. There is no distension.   Musculoskeletal:         General: Normal range of motion.      Cervical back: Normal range of motion.      Right lower leg: Edema present.      Left lower leg: Edema present.   Skin:     General: Skin is warm and dry.   Neurological:      Mental Status: She is alert and oriented to person, place, and time. Mental status is at baseline.      Cranial Nerves: No cranial nerve deficit.   Psychiatric:         Mood and Affect: Mood normal.         Judgment: Judgment normal.       /68 (BP Location: Left arm, Patient Position: Sitting, BP Cuff Size: Adult long)   Pulse 84   Temp 36.2 °C (97.2 °F) (Temporal)   Ht 1.651 m (5' 5\")   Wt 116 kg (256 lb)   LMP 01/05/1987 (Approximate)   SpO2 90%   BMI 42.60 kg/m²        IMPRESSION:  71 yo woman with known conditions of atrial fibrillation on coumadin, chronic CHF with recent acute exacerbation requiring hospitalization and prolonged rehab, pericardial effusion, HLD, morbid obesity, and history of breast and ovarian cancer  She has severe aortic stenosis.    PLAN:  I recommend the patient is a good TAVR candidate based on work up so far due to poor mobility, fraility, advanced age and medical comorbidities.     The STS mortality risk score is 4.6% and the " morbidity and mortality risk score is 20%. The scores were discussed with patient.     Thank you for this very challenging consultation and participation in the patient’s care.  I will keep you apprised of all future developments.          Sincerely,       Sherly Hanna MD.

## 2022-08-01 NOTE — TELEPHONE ENCOUNTER
Received VM from patient stating she has her echo scheduled for today and is requesting a wheelchair be available for her sister to grab and assist her to the appt.    Called patient back and advised her to have her sister walk inside and request a wheelchair if there isn't one readily available. Patient verbalizes understanding.

## 2022-08-03 PROBLEM — R79.89 ELEVATED TROPONIN: Status: RESOLVED | Noted: 2019-12-31 | Resolved: 2022-01-01

## 2022-08-03 NOTE — PROGRESS NOTES
Structural heart Initial Consultation Note    Date of note:    8/3/2022      Patient Name: Rachelle Reina   YOB: 1950  MRN:              [id number]    Chief Complaint: Aortic stenosis    Rachelle Reina is a 72 y.o. female  patient presented today for evaluation of aortic stenosis. She recently hospitalized with decompensated heart failure, pericardial effusion. She was discharged to SNF. She back at assisted living facility. She is here with her sister. She has significant shortness of breath with exertion. Lower extremity edema is much better. Cellulitis is improving.        Past Medical History:   Diagnosis Date   • A-fib ([facility])    • Arthritis 05/05/2020    knees and fingers   • Atrial fibrillation ([facility])    • Benign essential hypertension 7/1/2011   • Bowel habit changes     diarrhea   • Breast cancer ([facility])    • Bronchitis 2015    history of   • CAD (coronary artery disease)    • Cancer ([facility]) 1987, 2000    breast and ovary   • Cataract 2018    IOL bilat   • Diabetes ([facility])     oral medication, insulin   • Diastolic congestive heart failure ([facility]) 2019   • Disorder of thyroid    • High cholesterol    • History of cardiac catheterization 4/22/2010   • History of echocardiogram 4/22/2011   • History of hypothyroidism 8/29/2008   • Hypercholesteremia 7/1/2011   • Long term (current) use of anticoagulants 7/1/2011   • Morbid obesity ([facility]) 10/28/2008   • Ovarian cancer ([facility])    • Pain     knees   • Sleep apnea     pt does not use cpap   • Snoring     sleep study done   • Urinary incontinence              Current Outpatient Medications   Medication Sig Dispense Refill   • atorvastatin (LIPITOR) 40 MG Tab Take 40 mg by mouth every evening.     • polyethylene glycol/lytes (MIRALAX) 17 g Pack Take 17 g by mouth every day.     • losartan (COZAAR) 50 MG Tab Take 25 mg by mouth every morning.     • potassium chloride SA (KDUR) 20 MEQ Tab CR Take 20 mEq by mouth every day.     • ascorbic acid  (VITAMIN C) 500 MG tablet Take 500 mg by mouth every day.     • nystatin (MYCOSTATIN) 582640 UNIT/GM Cream topical cream Apply 1 Application topically 2 times a day.     • nystatin (MYCOSTATIN) powder Apply 1 Application topically 2 times a day as needed.     • torsemide (DEMADEX) 20 MG Tab Take 20 mg by mouth every day.     • warfarin (COUMADIN) 5 MG Tab TAKE 1/2 TO 1 TABLET BY MOUTH DAILY AS DIRECTED BY COAGULATION CLINIC (Patient taking differently: Take 2.5-5 mg by mouth every day. 5 mg on Tuesday and Friday 2.5 mg all other days) 90 Tablet 1   • FEROSUL 325 (65 Fe) MG tablet Take 325 mg by mouth every morning.     • metoprolol (LOPRESSOR) 50 MG Tab TAKE 1 TABLET BY MOUTH TWICE DAILY (Patient taking differently: Take 50 mg by mouth 2 times a day. TAKE 1 TABLET BY MOUTH TWICE DAILY) 180 Tab 4   • rosuvastatin (CRESTOR) 20 MG Tab Take 1 Tab by mouth every evening. 90 Tab 4   • gemfibrozil (LOPID) 600 MG Tab Take 1 Tab by mouth every morning. 90 Tab 4   • levothyroxine (SYNTHROID) 200 MCG Tab Take 200 mcg by mouth see administration instructions. Monday through Saturday  Brand Name     • pioglitazone (ACTOS) 15 MG Tab Take 15 mg by mouth every morning.     • Pyridoxine HCl (VITAMIN B-6 PO) Take 1 Tab by mouth every day.     • LEVEMIR FLEXTOUCH 100 UNIT/ML injection PEN Inject 10 Units under the skin at bedtime.     • oxybutynin (DITROPAN XL) 15 MG CR tablet Take 15 mg by mouth every morning.  2   • allopurinol (ZYLOPRIM) 100 MG Tab Take 100 mg by mouth every bedtime.  2   • acetaminophen (TYLENOL) 650 MG CR tablet Take 1,300 mg by mouth at bedtime.     • Cholecalciferol (VITAMIN D-3) 5000 units Tab Take 5,000 Units by mouth 2 Times a Day.     • traZODone (DESYREL) 100 MG Tab Take 50 mg by mouth every evening.     • levothyroxine (SYNTHROID) 175 MCG Tab Take 175 mcg by mouth every Sunday. Brand Name     • loratadine (CLARITIN) 10 MG Tab Take 10 mg by mouth every morning.     • citalopram (CELEXA) 20 MG TABS Take 20  "mg by mouth every morning.     • Finerenone (KERENDIA) 10 MG Tab Take 1 tablet by mouth every day. (Patient not taking: Reported on 8/3/2022) 30 Tablet 0     No current facility-administered medications for this visit.             Physical Exam:  Ambulatory Vitals  /70 (BP Location: Left arm, Patient Position: Sitting, BP Cuff Size: Adult)   Pulse 90   Resp 16   Ht 1.651 m (5' 5\")   Wt 121 kg (266 lb)   SpO2 92%    Oxygen Therapy:  Pulse Oximetry: 92 %  BP Readings from Last 4 Encounters:   22 100/70   22 102/68   06/15/22 116/73   06/10/22 100/61       Weight/BMI: Body mass index is 44.26 kg/m².  Wt Readings from Last 4 Encounters:   22 121 kg (266 lb)   22 121 kg (266 lb)   06/15/22 120 kg (264 lb 5.3 oz)   22 (!) 130 kg (287 lb)           General: Well appearing and in no apparent distress  Neck: carotid bruits absent  Lungs: respiratory sounds  normal  Heart: irregularly irregular rhythm,  No palpable thrills on palpation, murmurs 3/6 systolic murmur, no rubs,   Lower extremity edema 2+.     Hospital notes reviewed.  Echo 6/10/22, 22 reviewed and independently interpreted.      Medical Decision Makin year old female patient with HTN, morbid obesity, chronic atrial fibrillation, hypothyroidism, Congestive heart failure, NYHA III C, Severe aortic stenosis.  - Patient is at high risk for intervention but a reasonable candidate for transcatheter aortic valve replacement.  I discussed the risks and benefits of transcatheter aortic valve replacement.  Discussed the possibility of needing rehab after the procedure.  Patient agrees and would like to proceed.  Plan discussed personally with .    This note was dictated using Dragon speech recognition software.    Lenin SQUIRES  Interventional cardiologist  [facility], [facility]  [address] [address]  Phone: " [phone]  Fax: [phone]

## 2022-08-03 NOTE — TELEPHONE ENCOUNTER
----- Message from Gisel Villegas R.N. sent at 8/3/2022  2:29 PM PDT -----  Regarding: Warfarin  Hello, our mutual patient has been instructed to hold her warfarin 5 days prior to her TAVR procedure tentatively scheduled 8/15/2022. Per Dr. Oakley, no need for bridging therapy.    Please reach out if you have any questions.    Thank you,  Gisel MOREIRA

## 2022-08-03 NOTE — PROGRESS NOTES
Valve Program Consultation: 8/3/2022 for tentative TAVR 8/15/2022    Mental Status Assessment:  Appearance: normal  Behavior: normal  Mood/Affect: normal  Thought process/content: normal  Cognition: normal  Functional ability: normal  Dental Concerns: natural teeth, patient denies s/s of active oral infection  Further mental assessment needed: no    Post-op Plan of Care:  Support systems: sister Wilma, family  Patient understands discharge plan: yes  DME: wheelchair/scooter/walker, CPAP, O2, shower chair, raised toilet seat  Home health warranted prior to procedure: currently receiving HHC from [facility] (DC'd from SNF 8/29/2022  Social concerns for discharge: no  PCP alerted of social concerns: n/a  POLST: will bring copy  Advance directive: will bring copy  Flu/PNA/COVID vaccines completed: UTD  Post-op goal: improve symptoms  [place]  Medication instructions: hold vitamin C and warfarin x5 days (staff message sent to anticoagulation clinic, no need to bridge per Dr. Oakley), cut Levemir dose in half the night before, ok for levothyroxine AM of procedure    Concerns prior to procedure: mobility concerns, but patient states she walks with a walker    Met with patient during New TAVR consult.     All patient's questions and concerns were addressed during this visit. They understood pre-operative and post-operative plan of care.    Reviewed patient TAVR education packet explaining that information is provided regarding preparation for TAVR the night prior, what to expect during the hospital stay, average LOS, and what to look out for post TAVR discharge. Explained that patient will require SBE prophylactic antibiotic prior to any dental treatment post TAVR.     Explained that patient should not eat or drink anything past midnight the day of the procedure. Encouraged patient to wear something clean and comfortable, easy to get on/off to check in Monday morning, time TBD. Patient may have  friends and family in the pre-operational area until patient is taken to the operating room, at which time family and friends will be asked to wait on floor 1 [facility] surgical waiting area. On completion of TAVR, heart team will update family. Once update is given, there is some time before family/friends may visit patient in their assigned room. Length of stay on average is one night, however patient may stay longer depending on specific needs at that time. Patient given printed instructions sheet with all above stated instructions. Patient states understanding of all material and education presented today and has no further questions at this time. Encouraged patient again, to contact me with any questions or concerns during this work-up process. Patient states understanding.

## 2022-08-03 NOTE — TELEPHONE ENCOUNTER
S/w pt - she is now d/c'd from [facility].  She will check her INR asap. She is aware of her upcoming TAVR procedure and will need to stop warfarin 5 days prior without bridging.    Lucinda Honeycutt, PharmD

## 2022-08-04 NOTE — PROGRESS NOTES
Valve Program Functional Assessment: Pre TAVR     [id number]   1a) Showering/bathin  1b) Walking 1 block on ground: 1  1c) Hurrying or joggin  2) Swellin  3) Fatigue: 6  4) Shortness of breath: 3  5) Sleep sitting up: 5  6) Limited enjoyment of life: 3  7) Spend the rest of your life with HF: 3  8a) Hobbies, recreational activities:3  8b) Working or doing household chores:2  8c) Visiting family or friends: 2    5 meter walk test  Pt unstable to walk      Strength   1) _12_____ kg  2) _10_____ kg  3) _10_____ kg  AVG:__10____    LOPEZ ADLs  Patient independently preforms...   - Bathing: Yes   - Dressing: Yes   - Toileting: Yes   - Transferring: Yes   - Continence: Yes   - Feeding: Yes   Total Score: _6_/6    Living Situation  Patient lives: alone    Mobility Aids   Patient uses:  walker, wheelchair      FRAILTY SCORE: _2_/ 4

## 2022-08-09 NOTE — PREPROCEDURE INSTRUCTIONS
Pre admit appointment complete, reports received medication and Warfarin instructions from [facility] Nurse Navigator.

## 2022-08-09 NOTE — PROGRESS NOTES
[facility] TAVR review: Consider a 26 S3 Ultra either femoral approach. 70/30, 80/20, 90/10 placement provided do to patient’s age.  • LVH  • Annular calcium extending into the LVOT  • Slight horizontal arch (63 deg.)  • L15, Ca9

## 2022-08-10 PROBLEM — C50.919 BREAST CANCER (HCC): Status: ACTIVE | Noted: 2022-01-01

## 2022-08-10 PROBLEM — C56.9 OVARIAN CANCER (HCC): Status: ACTIVE | Noted: 2022-01-01

## 2022-08-10 NOTE — TELEPHONE ENCOUNTER
Patient notified of procedure date/time and check in process.     All questions were answered. Patient has direct extension for any further questions/concerns prior to the procedure.

## 2022-08-10 NOTE — TELEPHONE ENCOUNTER
Valve Conference Plan of Care: 8/10/2022 for TAVR 8/15/2022           TAVR Candidate: Yes  Access: right femoral  Valve Size: 23mm vs 26mm  Anesthesia or Moderate Sedation: GA with FRANCIS  Unit: Telemetry  Incidental findings to be discussed with PCP: RLL opacity-concerning for pneumonia.   Clearance needed prior to procedure: No    Check in time of  0530 8/15/2022.     Pre-op appointment completed: yes 8/9/2022    NPO after midnight. No medications the morning of the procedure except levothyroxine. Hold vitamin C and warfarin x5 days prior. Cut Levemir dose in half the night before.

## 2022-08-11 NOTE — PROGRESS NOTES
Anticoagulation Summary  As of 8/10/2022      INR goal:  2.0-3.0   TTR:  60.4 % (7 y)   INR used for dosin.53 (2022)   Warfarin maintenance plan:  5 mg (5 mg x 1) every Tue, Fri; 2.5 mg (5 mg x 0.5) all other days   Weekly warfarin total:  22.5 mg   Plan last modified:  Ortega BrownD (2022)   Next INR check:  2022   Target end date:  Indefinite    Indications    Atrial fibrillation (HCC) (Resolved) [I48.91]  Chronic anticoagulation [Z79.01]  Secondary hypercoagulable state (HCC) [D68.69]                 Anticoagulation Episode Summary       INR check location:  Anticoagulation Clinic    Preferred lab:      Send INR reminders to:      Comments:  karine          Anticoagulation Care Providers       Provider Role Specialty Phone number    Florian Carnes M.D. Referring Interventional Cardiology [phone]    [facility] Anticoagulation Services Responsible  [phone]          Anticoagulation Patient Findings  Patient Findings       Positives:  Upcoming invasive procedure (upcoming TAVR on 8/15 - needs to hold warfairn 5 days prior without bridging)    Negatives:  Signs/symptoms of thrombosis, Signs/symptoms of bleeding, Laboratory test error suspected, Change in health, Change in alcohol use, Change in activity, Emergency department visit, Upcoming dental procedure, Missed doses, Extra doses, Change in medications, Change in diet/appetite, Hospital admission, Bruising, Other complaints            Left voicemail message to report a therapeutic INR of 2.53.      Pt has an upcoming TAVR on 8/15 - instructed pt to HOLD warfarin starting today, then resume warfarin regimen immediately post-op.    Requested pt contact the clinic for any s/s of unusual bleeding, bruising, clotting or any changes to diet or medication.    FU INR in 2 week(s).    Lucinda Honeycutt, OrtegaD

## 2022-08-14 NOTE — ANESTHESIA PREPROCEDURE EVALUATION
Case: [id number] Date/Time: 08/15/22 0715    Procedures:       TRANSCATHETER AORTIC VALVE REPLACEMENT WITH POTENTIAL TRANSOSOPHAGEAL ECHOCARDIOGRAM      ECHOCARDIOGRAM, TRANSESOPHAGEAL, INTRAOPERATIVE    Pre-op diagnosis: SEVERE AORTIC STENOSIS    Location: [facility] / SURGERY [facility]    Surgeons: Lenin Oakley M.D.        71 yo woman with severe, symptomatic AS.   - recent hospitalization for CHF and pericardial effusion on 6/10-15.  - KEN non compliant with CPAP  - Afib on warfarin 5mg. Last dose: 8/9. INR 2.53 on 8/9  - non-obs CAD  - HTN  - DM 2  - BMI>40    Cardiac Cath report 6/12/22  1.  Nonobstructive, two-vessel coronary disease  2.  Severe elevation in left heart filling pressure with left atrial pressure of 32 mmHg  3.  Severe aortic stenosis by noninvasive evaluation, pullback gradient consistent with significant aortic stenosis    TTE 8/1/22  CONCLUSIONS  Limited echo to evaluate pericardial effusion and aortic stenosis.   The left ventricular ejection fraction is visually estimated to be 65%.  Gradients not well measured, likely severe aortic stenosis.  Small pericardial effusion without evidence of hemodynamic compromise    CT CAP Pre-TAVR 8/4/22  Limited exam due to body habitus and streak artifact.  1. Tricuspid aortic valve with moderate calcifications.  Annulus area: 448 mm?  Annulus diameter: 21.2 x 27.0 mm  Diameter at the level of the sinuses: 30.8 x 31.6 x 34.3 mm       Relevant Problems   ANESTHESIA   (positive) Obstructive sleep apnea      NEURO   (positive) History of hypothyroidism      CARDIAC   (positive) Acute on chronic diastolic congestive heart failure (HCC)   (positive) Benign essential hypertension   (positive) Chronic atrial fibrillation   (positive) Chronic venous insufficiency   (positive) Pulmonary hypertension (HCC)   (positive) Severe aortic stenosis       Physical Exam    Airway   Mallampati: II  TM distance: >3 FB  Neck ROM: full       Cardiovascular - normal  exam  Rhythm: regular  Rate: normal  (-) murmur     Dental - normal exam           Pulmonary - normal exam  Breath sounds clear to auscultation     Abdominal    Neurological - normal exam                 Anesthesia Plan    ASA 4   ASA physical status 4 criteria: severe valve dysfunction    Plan - general       Airway plan will be ETT  FRANCIS Planned        Induction: intravenous    Postoperative Plan: Postoperative administration of opioids is intended.    Pertinent diagnostic labs and testing reviewed    Informed Consent:    Anesthetic plan and risks discussed with patient.    Use of blood products discussed with: patient whom consented to blood products.

## 2022-08-15 PROBLEM — Z79.4 TYPE 2 DIABETES MELLITUS WITHOUT COMPLICATION, WITH LONG-TERM CURRENT USE OF INSULIN (HCC): Status: ACTIVE | Noted: 2019-11-13

## 2022-08-15 PROBLEM — R60.0 LOCALIZED EDEMA: Status: RESOLVED | Noted: 2019-06-19 | Resolved: 2022-01-01

## 2022-08-15 PROBLEM — L03.90 CELLULITIS: Status: RESOLVED | Noted: 2022-01-01 | Resolved: 2022-01-01

## 2022-08-15 PROBLEM — M25.562 LEFT KNEE PAIN: Status: RESOLVED | Noted: 2020-01-01 | Resolved: 2022-01-01

## 2022-08-15 PROBLEM — E11.9 TYPE 2 DIABETES MELLITUS WITHOUT COMPLICATION, WITH LONG-TERM CURRENT USE OF INSULIN (HCC): Status: ACTIVE | Noted: 2019-11-13

## 2022-08-15 PROBLEM — Z95.2 S/P TAVR (TRANSCATHETER AORTIC VALVE REPLACEMENT): Status: ACTIVE | Noted: 2022-01-01

## 2022-08-15 PROBLEM — D68.69 SECONDARY HYPERCOAGULABLE STATE (HCC): Status: RESOLVED | Noted: 2021-01-01 | Resolved: 2022-01-01

## 2022-08-15 PROBLEM — I25.10 CORONARY ARTERY DISEASE INVOLVING NATIVE CORONARY ARTERY OF NATIVE HEART WITHOUT ANGINA PECTORIS: Status: ACTIVE | Noted: 2022-01-01

## 2022-08-15 NOTE — ANESTHESIA PROCEDURE NOTES
FRANCIS    Date/Time: 8/15/2022 7:48 AM  Performed by: Britta Armando M.D.  Authorized by: Britta Armando M.D.     Start Time:8/15/2022 7:48 AM  Preanesthetic Checklist: patient identified, IV checked, site marked, risks and benefits discussed, surgical consent, monitors and equipment checked, pre-op evaluation and timeout performed    Indication for FRANCIS: diagnostic   Patient Location: OR  Intubated: Yes  Bite Block: Yes  Heart Visualized: Yes  Insertion: atraumatic    **See FULL FRANCIS report in patient's chart via CV Synapse**

## 2022-08-15 NOTE — ANESTHESIA PROCEDURE NOTES
Airway    Date/Time: 8/15/2022 7:38 AM  Performed by: Britta Armando M.D.  Authorized by: Britta Armando M.D.     Location:  OR  Urgency:  Elective  Difficult Airway: No    Indications for Airway Management:  Anesthesia      Spontaneous Ventilation: absent    Sedation Level:  Deep  Preoxygenated: Yes    Patient Position:  Sniffing  Mask Difficulty Assessment:  2 - vent by mask + OA or adjuvant +/- NMBA  Final Airway Type:  Endotracheal airway  Final Endotracheal Airway:  ETT  Cuffed: Yes    Technique Used for Successful ETT Placement:  Direct laryngoscopy    Insertion Site:  Oral  Blade Type:  Mary  Laryngoscope Blade/Videolaryngoscope Blade Size:  3  ETT Size (mm):  7.5  Measured from:  Teeth  ETT to Teeth (cm):  21  Placement Verified by: auscultation and capnometry    Cormack-Lehane Classification:  Grade I - full view of glottis  Number of Attempts at Approach:  1

## 2022-08-15 NOTE — OR SURGEON
OPERATIVE REPORT    PreOp Diagnosis: Severe symptomatic aortic stenosis      PostOp Diagnosis: Severe symptomatic aortic stenosis      Procedure(s):  TRANSCATHETER AORTIC VALVE REPLACEMENT (TAVR 26 mm S3 Ultra Burton bovine pericardial valve) and intraoperative transesophageal echocardiography    Surgeon(s):  LIBERTAD English M.D.    Anesthesiologist/Type of Anesthesia:  Anesthesiologist: Britta Armando M.D./General    Surgical Staff:  Circulator: Piotr Howe R.N.  Perfusionist: Garrick Appiah  Scrub Person: Britta Nielson  Radiology Technologist: Shanice Sandra  Cath Lab Tech: Sean Chavez    Specimens removed if any: None    Estimated Blood Loss: Minimal    Findings: Severe aortic stenosis    Complications: None    Indications:  Ms. Reina is a 72-year-old female with severe symptomatic aortic stenosis.    Procedure:  The patient was brought to the operating room and placed on the operating room table in the supine position.  After successful induction of general anesthesia endotracheal intubation, the patient was prepped and draped in the usual sterile fashion.  Ultrasound-guided right radial arterial, right femoral arterial and left femoral venous access was obtained.  A temporary transvenous pacemaker was placed in the right ventricle and a pigtail catheter in the right coronary sinus.  The deployment angle was determined.  A 1 cm incision was made in the right groin and 2 Perclose sutures were deployed.  A 14 Swedish eSheath was then placed in the right common femoral artery and its tip was positioned in the abdominal aorta.  The aortic valve was crossed with a wire.  A deployment catheter with a 26 mm S3 Ultra Burton bovine pericardial valve was then placed across the aortic valve annulus.  The implantation balloon was inflated to a peak pressure of 7 alejo.  Wires and catheters were removed.  Intraoperative transesophageal echocardiography did not show any  paravalvular or central leaks.  The right femoral eSheath was removed and the Perclose sutures were tightened down.  An 8 Martiniquais Angio-Seal was required to obtain hemostasis.  Dermabond was applied over the small right groin incision.  The remainder of the operation will be dictated by Dr. Oakley.  There were no apparent complications.  The patient tolerated the procedure well and left the operating room in stable condition.      8/15/2022 8:55 AM Deisy Dixon M.D.

## 2022-08-15 NOTE — PROGRESS NOTES
Inpatient Anticoagulation Service Note    Date: 8/15/2022    Reason for Anticoagulation: Atrial Fibrillation   Target INR: 2.0 to 3.0  [id number] VASc Score: 4  HAS-BLED Score: 2   Hemoglobin Value: (!) 10.7  Hematocrit Value: (!) 35.4  Lab Platelet Value: 205    INR from last 7 days       Date/Time INR Value    08/15/22 1205 1.27          Dose from last 7 days       Date/Time Dose (mg)    08/15/22 1319 3          Average Dose (mg): 3.2  Significant Interactions: Not Applicable  Bridge Therapy: No  Comments: Opting out of bridge, allowing INR to become therapeutic 2-3.  Home dosing of 5 mg Tues/Friday and 2.5 mg ROW, continue with 3 mg for now since this is the average dose.  Trend INR.  Education Material Provided?: No  Pharmacist suggested discharge dosing: home regimen and INR check within 48 hours of discharge.     Richa Denney, PharmD  [id number]

## 2022-08-15 NOTE — ANESTHESIA POSTPROCEDURE EVALUATION
Patient: Rachelle Reina    Procedure Summary     Date: 08/15/22 Room / Location: [place] [address] / SURGERY [facility]    Anesthesia Start: 0730 Anesthesia Stop: 0904    Procedures:       TRANSCATHETER AORTIC VALVE REPLACEMENT (Bilateral: Groin)      ECHOCARDIOGRAM, TRANSESOPHAGEAL, INTRAOPERATIVE (Throat) Diagnosis: (SEVERE AORTIC STENOSIS)    Surgeons: Lenin Oakley M.D. Responsible Provider: Britta Armando M.D.    Anesthesia Type: general ASA Status: 4          Final Anesthesia Type: general  Last vitals  BP   Blood Pressure : 117/76, Arterial BP: (!) 185/107    Temp   (!) 35.7 °C (96.3 °F)    Pulse   89   Resp   16    SpO2   98 %      Anesthesia Post Evaluation    Patient location during evaluation: PACU  Patient participation: complete - patient participated  Level of consciousness: awake and alert  Pain score: 2    Airway patency: patent  Anesthetic complications: no  Cardiovascular status: hemodynamically stable  Respiratory status: acceptable and nasal cannula  Hydration status: euvolemic    PONV: none          No notable events documented.     Nurse Pain Score: 6 (NPRS)

## 2022-08-15 NOTE — ANESTHESIA PROCEDURE NOTES
Arterial Line  Performed by: Britta Armando M.D.  Authorized by: Britta Armando M.D.     Start Time:  8/15/2022 7:43 AM

## 2022-08-15 NOTE — PROGRESS NOTES
Med rec updated and complete. Per pt   Allergies reviewed, per pt   Interviewed pt with son at bedside with permission from pt.

## 2022-08-15 NOTE — PROCEDURES
DATE OF PROCEDURE: 8/15/22    REFERRING PHYSICIAN:     PROCEDURES performed:  1. Transcatheter aortic valve replacement.  2. Insertion and removal of temporary pacer wire    PRE PROCEDURAL DIAGNOSIS:  1. Severe symptomatic aortic stenosis, NYHA III.    Operators:  CT Surgeon:   Interventional cardiologist: Dr. Oakley    DESCRIPTION OF PROCEDURE:  After informed consent was signed by the   patient, the patient was brought into the operating room, was prepped and draped in   usual sterile manner.  Timeout was performed.   General anesthesia and   Transesophagealechocardiogram was provided by .    Primary Arterial access:   right femoral artery was cannulated using modified Seldinger technique under ultrasound guidance, a 6 Jamaican sheath was inserted.  2 Perclose sutures were placed, arteriotomy was serially dilated,14Fr sheath was inserted in the femoral artery over a stiff Lunderquist wire.    Secondary arterial access:   right femoral artery was cannulated with 6 Jamaican sheath.  A pigtail catheter was advanced through 6 Jamaican sheath, aortic root angiogram was performed to determine coplanar view.    Venous Access:   left femoral vein was cannulated with 6 Jamaican sheath, a temporary pacer wire was advanced to RV apex in usual fashion.    Through Burton self-expandable sheath, AL-1 catheter was advanced to aortic root, straight wire was used to cross the aortic valve, a stiff Amplatz wire was placed in the LV apex and AL-1 catheter was withdrawn. 26 mm Burton Cheikh 3 derick was prepped in usual fashion, orientation was confirmed.  Sapient 3 valve was slowly advanced over a stiff Amplatz wire to aortic position.  Rapid pacing was achieved using temporary pacer wire, aortic root angiogram was performed and after confirming good positioning, valve was slowly deployed under fluoroscopic guidance. Post procedure echocardiogram showed no significant paravalvular leak, good valve  positioning.    The patient tolerated the procedure well. At the end of procedure, all pacemaker wire, pigtail catheters and sheaths were removed.  The primary arterial access site was closed with the PreClose system and an angioseal.  The secondary arterial access was closed via vasc-band The patient was then extubated and transferred to PACU in stable condition.    Complications: none  Specimens: none  Estimated blood loss: <50cc      IMPRESSION:  Successful transcatheter aortic valve replacement using 26 mm Burton peggy 3 Ultra valve    RECOMMENDATION:   Guideline directed medical therapy.    Lenin Oakley  Interventional cardiologist

## 2022-08-15 NOTE — ANESTHESIA TIME REPORT
Anesthesia Start and Stop Event Times     Date Time Event    8/15/2022 0721 Ready for Procedure     0730 Anesthesia Start     0904 Anesthesia Stop        Responsible Staff  08/15/22    Name Role Begin End    Britta Armando M.D. Anesth 0730 0904        Overtime Reason:  no overtime (within assigned shift)    Comments:

## 2022-08-15 NOTE — ANESTHESIA PROCEDURE NOTES
Arterial Line  Performed by: Britta Armando M.D.  Authorized by: Britta Armando M.D.     Start Time:  8/15/2022 7:42 AM  End Time:  8/15/2022 7:45 AM  Localization: ultrasound guidance and surface landmarks  Image captured, interpreted and electronically stored.  Patient Location:  OR  Indication: continuous blood pressure monitoring        Catheter Size:  20 G  Seldinger Technique?: Yes    Laterality:  Left  Site:  Radial artery  Line Secured:  Antimicrobial disc, tape and transparent dressing  Events: patient tolerated procedure well with no complications

## 2022-08-15 NOTE — OR NURSING
Patient awake and alert and tolerating water without issue. VSS. Dressing to left and right groin is CDI and both soft to touch. Pt has extreme excoriation under panis. Pt states she uses powder regularly. Pt right radial TR band in place and pt denies numbness and tingling at this time. No hematoma present.     Pt pain is improving with pain medication. No nausea reported. Pt son has been called and updated on plan of care.     Report called to Parvez MOREIRA. Pt transported to room in stable condition with monitor and ACLS RN.

## 2022-08-15 NOTE — PROGRESS NOTES
Patient transported from PACU. Patient A&Ox4. Plan of care discussed with patient. Family at bedside. Patient oriented to floor and surroundings. Patient currently on room air. VSS. Patient currently resting in bed, tele ,monitored. Patient is complaining of pain 2/10 headache. Tele box placed. Monitor room notified. 2 rn skin check performed. Patient educated to call for assistance before ambulating. Patient education regarding fall risk. Call light within reach. Fall precautions in place.

## 2022-08-16 NOTE — PROGRESS NOTES
Received Bedside report. Assumed care at 1900. This pt is AOx4, ambulatory with x1 assistance and a FWW, voiding via purewick, 0/10 pain. Patient and RN discussed plan of care: questions answered. Labs noted, assessment complete, patient tolerating cardiac diet. Tele box in place. Pt is on 2 L of O2 via nasal cannula. Call light in place, fall precautions in place, patient educated on importance of calling for assistance. No additional needs at this time. VSS

## 2022-08-16 NOTE — FACE TO FACE
"Face to Face Note  -  Durable Medical Equipment    NICOLE Wolff - NPI: [id number]  I certify that this patient is under my care and that they have had a durable medical equipment(DME)face to face encounter by myself that meets the physician DME face-to-face encounter requirements with this patient on:    Date of encounter:   Patient:                    MRN:                       YOB: 2022  Rachelle Reina  [id number]  1950     The encounter with the patient was in whole, or in part, for the following medical condition, which is the primary reason for durable medical equipment:  CHF, Wound Care, Cardiac Disease, and Post-Op Surgery    I certify that, based on my findings, the following durable medical equipment is medically necessary:  Oxygen   HOME O2 Saturation Measurements:(Values must be present for Home Oxygen orders)  Room air sat at rest: 92  Room air sat with amb: 80  With liters of O2: 2, O2 sat at rest with O2: 90  With Liters of O2: 2, O2 sat with amb with O2 : 88  Is the patient mobile?: Yes  If patient feels more short of breath, they can go up to 6 liters per minute and contact healthcare provider.    Supporting Symptoms: The patient requires supplemental oxygen, as the following interventions have been tried with limited or no improvement: \"Ambulation with oximetry and \"Incentive spirometry   and Walkers.        ------------------------------------------------------------------------------------------------------------------    Face to Face Supporting Documentation - Home Health    The encounter with this patient was in whole or in part the primary reason for home health admission.    Date of encounter:   Patient:                    MRN:                       YOB: 2022  Rachelle Reina  [id number]  1950     Home health to see patient for:  Skilled Nursing care for assessment, interventions & education, Wound Care, " Registered dietitian consult, Medical social work consult, Home health aide, Physical Therapy evaluation and treatment, and Occupational therapy evaluation and treatment    Skilled need for:  Exacerbation of Chronic Disease State chronic heart failure, Surgical Aftercare post TAVR, and Medication Management for heart failure, diabetes    Skilled nursing interventions to include:  Wound Care    Homebound evidenced status by:  Need the aid of supportive devices such as crutches, canes, wheelchairs or walkers or Needs the assistance of another person in order to leave the home. Leaving home must require a considerable and taxing effort. There must exist a normal inability to leave the home.    Community Physician to provide follow up care: NICK Berry     Optional Interventions    Wound information & treatment:    Home Infusion Therapy orders:    Line/Drain/Airway:    I certify the face to face encounter for this home care referral meets the CMS requirements and the encounter/clinical assessment with the patient was, in whole, or in part, for the medical condition(s) listed above, which is the primary reason for home health care. Based on my clinical findings: the service(s) are medically necessary, support the need for home health care, and the homebound criteria are met.  I certify that this patient has had a face to face encounter by myself.  Astrid Rivas A.P.R.N. - NPI: [id number]    *Debility, frailty and advanced age in the absence of an acute deterioration or exacerbation of a condition do not qualify a patient for home health.

## 2022-08-16 NOTE — DIETARY
NUTRITION SERVICES: BMI - Pt with BMI >40 (=Body mass index is 45.97 kg/m².), Class III obesity. Weight loss counseling not appropriate in acute care setting. RECOMMEND - If appropriate at DC please refer to outpatient nutrition services for weight management.

## 2022-08-16 NOTE — THERAPY
"Occupational Therapy   Initial Evaluation     Patient Name: Rachelle Reina  Age:  72 y.o., Sex:  female  Medical Record #: [id number]  Today's Date: 8/16/2022     Precautions  Precautions: Fall Risk, Cardiac Precautions (See Comments)  Comments: s/p TAVR    Assessment  Patient is 72 y.o. female who presents to acute s/p TAVR. PMH includes acute on chronic diastolic heart failure, HLD, w/ non obstructive CAD, LE edema, chronic anemia, chronic A-fib, and DM2. Pt required min A for LB dressing, SPV for seated grooming, SBA for functional mobility w/ FWW. Pt primarily limited by poor activity tolerance. Will continue to follow while in house to assist with safe transition home.     Plan    Recommend Occupational Therapy 3 times per week until therapy goals are met for the following treatments:  Adaptive Equipment, Neuro Re-Education / Balance, Self Care/Activities of Daily Living, Therapeutic Activities, and Therapeutic Exercises.    DC Equipment Recommendations: (P) None  Discharge Recommendations: (P) Recommend home health for continued occupational therapy services     Subjective    \"My friend is going to come over for the next 10 days to help me\"     Objective       08/16/22 0916   Charge Group   OT Evaluation OT Evaluation Low   Total Time Spent   OT Time Spent Yes   OT Evaluation (Minutes) 25   OT Total Time Spent (Calculated) 25   Initial Contact Note    Initial Contact Note Order Received and Verified, Occupational Therapy Evaluation in Progress with Full Report to Follow.   Prior Living Situation   Prior Services None   Housing / Facility 2 Story Apartment / Condo   Bathroom Set up Walk In Shower;Shower Chair;Grab Bars   Equipment Owned Front-Wheel Walker;4-Wheel Walker;Wheelchair;Tub / Shower Seat;Reacher  (w/c does not fit in the bathroom)   Lives with - Patient's Self Care Capacity Alone and Able to Care For Self   Comments Pt lives alone, reports she has a friend to stay with her for 10 days following " admit. Pt reports after that 10 day period she has a caregiver that can come and assist as needed.   Prior Level of ADL Function   Self Feeding Independent   Grooming / Hygiene Independent   Bathing Independent   Dressing Independent   Toileting Independent   Prior Level of IADL Function   Medication Management Independent   Laundry Independent   Kitchen Mobility Independent   Finances Independent   Home Management Independent   Prior Level Of Mobility Independent With Device in Community;Independent With Device in Home   Precautions   Precautions Fall Risk;Cardiac Precautions (See Comments)   Comments s/p TAVR 8/15   Vitals   O2 (LPM) 2   O2 Delivery Device Nasal Cannula   Pain 0 - 10 Group   Therapist Pain Assessment Post Activity Pain Same as Prior to Activity;Nurse Notified  (c/o back pain with prolongued standing, agreeable, not quantified, resolved with movement)   Cognition    Cognition / Consciousness WDL   Level of Consciousness Alert   Comments pleasent and cooperative   Active ROM Upper Body   Active ROM Upper Body  WDL   Strength Upper Body   Upper Body Strength  WDL   Coordination Upper Body   Coordination WDL   Balance Assessment   Sitting Balance (Static) Fair   Sitting Balance (Dynamic) Fair -   Standing Balance (Static) Fair -   Standing Balance (Dynamic) Fair -   Weight Shift Sitting Fair   Weight Shift Standing Fair   Comments w/ FWW, no dionne LOB   Bed Mobility    Supine to Sit   (in chair pre/post)   Scooting Standby Assist   ADL Assessment   Grooming Supervision;Seated   Upper Body Dressing Supervision   Lower Body Dressing Minimal Assist  (to don underpants, typically uses reacher)   Toileting   (incont of urine at baseline)   How much help from another person does the patient currently need...   Putting on and taking off regular lower body clothing? 3   Bathing (including washing, rinsing, and drying)? 3   Toileting, which includes using a toilet, bedpan, or urinal? 3   Putting on and taking  off regular upper body clothing? 3   Taking care of personal grooming such as brushing teeth? 3   Eating meals? 4   6 Clicks Daily Activity Score 19   Functional Mobility   Sit to Stand Standby Assist   Bed, Chair, Wheelchair Transfer Standby Assist   Transfer Method Stand Step   Mobility within room w/ fww   Activity Tolerance   Sitting in Chair functional, (up pre/post)   Sitting Edge of Bed NT   Standing 5 min   Comments Pt limited by SOB   Patient / Family Goals   Patient / Family Goal #1 To continue feeling better   Short Term Goals   Short Term Goal # 1 Pt will demo LB dressing using AE w/ SPV   Short Term Goal # 2 Pt will demo toilet txf w/ SPV   Education Group   Role of Occupational Therapist Patient Response Patient;Acceptance;Explanation;Demonstration;Verbal Demonstration;Action Demonstration   Problem List   Problem List Decreased Active Daily Living Skills;Decreased Homemaking Skills;Decreased Functional Mobility;Decreased Activity Tolerance;Impaired Postural Control / Balance   Anticipated Discharge Equipment and Recommendations   DC Equipment Recommendations None   Discharge Recommendations Recommend home health for continued occupational therapy services   Interdisciplinary Plan of Care Collaboration   IDT Collaboration with  Nursing;Physical Therapist   Patient Position at End of Therapy Seated;Chair Alarm On;Call Light within Reach;Tray Table within Reach;Phone within Reach   Collaboration Comments report given   Session Information   Date / Session Number  8/16, 1 (1/3, 8/22)   Priority 2

## 2022-08-16 NOTE — DISCHARGE PLANNING
Received Choice form at [phone]  Agency/Facility Name: [facility]  Referral sent per Choice form @ [phone]  Agency/Facility Name: [facility]   Spoke To: Missy   Outcome: Pt a resumption with agency and has been accepted.

## 2022-08-16 NOTE — PROGRESS NOTES
Cardiology Follow Up Progress Note    Date of Service  8/16/2022    Attending Physician  Lenin Oakley M.D.    Chief Complaint   Elective TAVR    HPI  Rachelle Reina is a 72 y.o. female admitted 8/15/2022 for elective TAVR    Hx of severe symptomatic AS now S/P TAVR, acute on chronic diastolic heart failure, HLD with non-obstructive CAD, morbid obesity with rash under her groin, HTN, LE edema with chronic venous insufficiency, chronic anemia, chronic afib on chronic anticoagulation, KEN on CPAP with PAH, DM type II on insulin therapy, hypothyroidism, and breast and ovarian cancer (remission).    Interim Events  POD1: sitting in chair, refuses SNF placement-prefers HH for discharge, rash to groin painful, working on ambulating more with PT/OT today    Review of Systems  Review of Systems   Constitutional:  Positive for fatigue. Negative for activity change.   Respiratory:  Positive for shortness of breath. Negative for apnea and chest tightness.    Cardiovascular:  Positive for leg swelling. Negative for chest pain and palpitations.   Genitourinary:  Positive for frequency.   Musculoskeletal:  Positive for arthralgias and gait problem.   Skin:  Positive for rash and wound.        Groin redness and pain     Neurological:  Negative for dizziness.     Vital signs in last 24 hours  Temp:  [35.7 °C (96.3 °F)-36.9 °C (98.4 °F)] 36 °C (96.8 °F)  Pulse:  [] 109  Resp:  [16-24] 18  BP: ()/(54-85) 122/64  SpO2:  [91 %-99 %] 93 %    Physical Exam  Physical Exam  Vitals and nursing note reviewed.   Constitutional:       Appearance: Normal appearance. She is obese.   HENT:      Head: Normocephalic and atraumatic.   Cardiovascular:      Rate and Rhythm: Normal rate. Rhythm irregularly irregular.      Pulses: Normal pulses.      Heart sounds: Normal heart sounds.   Pulmonary:      Effort: Pulmonary effort is normal.      Breath sounds: Examination of the right-lower field reveals decreased breath sounds.  Examination of the left-lower field reveals decreased breath sounds. Decreased breath sounds present.   Musculoskeletal:         General: Normal range of motion.      Right lower leg: Edema present.      Left lower leg: Edema present.   Skin:     General: Skin is warm and dry.      Capillary Refill: Capillary refill takes less than 2 seconds.      Findings: Erythema and rash present.             Comments: Erythematous rash under groin and pannus area   Neurological:      General: No focal deficit present.      Mental Status: She is alert and oriented to person, place, and time. Mental status is at baseline.   Psychiatric:         Mood and Affect: Mood normal.         Behavior: Behavior normal.         Thought Content: Thought content normal.         Judgment: Judgment normal.       Lab Review  Lab Results   Component Value Date/Time    WBC 5.7 08/16/2022 04:54 AM    RBC 3.85 (L) 08/16/2022 04:54 AM    HEMOGLOBIN 9.6 (L) 08/16/2022 04:54 AM    HEMATOCRIT 33.2 (L) 08/16/2022 04:54 AM    MCV 86.2 08/16/2022 04:54 AM    MCH 24.9 (L) 08/16/2022 04:54 AM    MCHC 28.9 (L) 08/16/2022 04:54 AM    MPV 9.4 08/16/2022 04:54 AM      Lab Results   Component Value Date/Time    SODIUM 137 08/16/2022 04:54 AM    POTASSIUM 5.2 08/16/2022 04:54 AM    CHLORIDE 106 08/16/2022 04:54 AM    CO2 25 08/16/2022 04:54 AM    GLUCOSE 92 08/16/2022 04:54 AM    BUN 29 (H) 08/16/2022 04:54 AM    CREATININE 1.00 08/16/2022 04:54 AM    CREATININE 0.6 01/27/2009 01:10 PM      Lab Results   Component Value Date/Time    ASTSGOT 21 08/16/2022 04:54 AM    ALTSGPT 7 08/16/2022 04:54 AM     Lab Results   Component Value Date/Time    CHOLSTRLTOT 115 04/28/2022 10:21 AM    LDL 50 04/28/2022 10:21 AM    HDL 54 04/28/2022 10:21 AM    TRIGLYCERIDE 55 04/28/2022 10:21 AM    TROPONINT 41 (H) 08/30/2021 07:37 PM       Recent Labs     08/15/22  0920 08/16/22  0454   NTPROBNP 2028* 3050*       Cardiac Imaging and Procedures Review  EKG:    EKG dated 8/16/22 afib rate  in 90's    FRANCIS 8/15/22   s/p TAVR with 26mm Burton Cheikh 3 Ultra valve for severe aortic   stenosis.   Prosthetic valve is well positioned and functioning appropriately. No   evidence of paravalvular leak.  Mean gradient 5 mmHg. EOA of 2.23cm2 by continuity equation.   Moderate MR.  Biatrial enlargement.   Normal biventricular function.  Small to moderate pericardial effusion.     Cardiac Catheterization:  6/1/2/22:  IMPRESSIONS:  1.  Nonobstructive, two-vessel coronary disease  2.  Severe elevation in left heart filling pressure with left atrial pressure of 32 mmHg  3.  Severe aortic stenosis by noninvasive evaluation, pullback gradient consistent with significant aortic stenosis    Chest X-Ray:  8/16/22   1.  Pulmonary infiltrates, greater on the right, overall stable since prior.  2.  Cardiomegaly     Assessment/Plan  1. S/P TAVR  -doing well post-op  -coumadin for OAC  -groins CDI with mild bruising and small nodule with notable yeast rash-see below, wound consult  -SBE prophylaxis understands  -echo 1 month with structural heart follow up  -reviewed hospital imaging, labs, and EKG  -cardiac rehab referral placed, needs PT/OT  -EKG shows afib rate in 90's    2. Diastolic heart failure due to obesity  -recommend increase diuretic therapy  -lasix IV 40 mg BID  -cont K 20 mEq QD  -follow labs  -marilyn hose to legs, leg elevation    3. HLD with non-obstructive CAD  -no angina, mild chronic LENNON  -cont statin, gemfibrozil    4. Morbid obesity with DM type II  -insulin management, actos  -sugars controlled  -follow home regimen  -dietary consult with home health referral  -cardiac diet    5. LE edema with chronic venous insufficiency  -marilyn hose, diuresis, leg elevation    6. Rash, groin  -nystatin powder TID   -wound consult for further recs  -work on urinary incontinence measures  -cont oxybutynin  -keep area clean and dry    7. Chronic afib on chronic anticoagulation  -rates increasing  -add toprol 12.5 mg QPM  -cont  warfarin per pharmacy    8. KEN on CPAP with PAH  -tolerating and compliant  -cont to follow    9. Pericardial effusion  -small on FRANCIS  -cont diuresis  -follow    10. HTN  -good control  -cont losartan 50 QD  -add toprol 12.5 mg QPM    11. Gout  -cont allopurinol per home dosing    12. Hypothyroidism  -cont synthroid per home dosing    Thank you for allowing me to participate in the care of this patient.  I will continue to follow this patient    Please contact me with any questions.    Astrid Rivas DNP, APRN, AGAP-BC  [facility], [facility]  [phone]

## 2022-08-16 NOTE — THERAPY
"Physical Therapy   Initial Evaluation     Patient Name: Racehlle Reina  Age:  72 y.o., Sex:  female  Medical Record #: [id number]  Today's Date: 8/16/2022     Precautions  Precautions: Fall Risk;Cardiac Precautions (See Comments)  Comments: s/p TAVR    Assessment    Pt is a 72 y.o. woman who presented to acute with severe symptomatic aortic stenosis and is s/p elective TAVR. EF is 65% per echo and is not steady on BB.   PMHx significant for CAD, DM, HTN, HLD, sedentary lifestyle, obesity, breast CA, and KEN. HDR (BP, HR respectively): 125/69, 100 (seated pre activity); 139/76, 111 (seated post activity). Pt performed STS x2 and ambulation to the door with FWW and SBA. Pt c/o SOB at end of activity. Provided patient education regarding cardiac risk factors, appropriate activity progression, and self monitoring via talk test; patient receptive to education and demonstrated understanding. Pt is likely mobilizing at baseline level and is well adapted at home to compensate for prior deficits, but would likely benefit from  PT to address limitations in home environment.     Plan    Recommend Physical Therapy for Evaluation only.    DC Equipment Recommendations: None (pt owns FWW, 4WW, and WC)  Discharge Recommendations: Recommend home health for continued physical therapy services       Subjective    \"I already feel so much better.\"      Objective       08/16/22 0910   Cosignature   Documentation Review Approved with modifications made by preceptor in flowsheet   Charge Group   PT Evaluation PT Evaluation Mod  (25 min)   Initial Contact Note    Initial Contact Note Order Received and Verified, Evaluation Only - Patient Does Not Require Further Acute Physical Therapy at this Time.  However, May Benefit from Post Acute Therapy for Higher Level Functional Deficits.   Precautions   Precautions Fall Risk;Cardiac Precautions (See Comments)   Comments s/p TAVR   Vitals   O2 (LPM) 2   O2 Delivery Device Silicone Nasal " "Cannula   Vitals Comments HDR - BP, HR respectively: 125/69, 100 sitting pre activity; 139/76, 111 sitting post activity   Pain 0 - 10 Group   Therapist Pain Assessment   (pt did not report pain during session)   Prior Living Situation   Prior Services Home-Independent   Housing / Facility 2 Story Apartment / Condo   Steps Into Home 0   Steps In Home 0   Elevator Yes   Equipment Owned Front-Wheel Walker;4-Wheel Walker;Wheelchair   Lives with - Patient's Self Care Capacity Alone and Able to Care For Self   Comments pt lives alone but will have friend stay for up to 10 days upon DC. Per RN patient's spouse is currently hospitalized   Prior Level of Functional Mobility   Bed Mobility Independent   Transfer Status Independent   Ambulation Independent   Distance Ambulation (Feet)   (household with FWW; uses WC for community level distances)   Assistive Devices Used Front-Wheel Walker   Wheelchair Required Assist   Comments pt independent with functional mobility at home, but required assist with community level distances   Cognition    Cognition / Consciousness WDL   Level of Consciousness Alert   Comments cooperative   Passive ROM Lower Body   Passive ROM Lower Body WDL   Comments not formally tested but functional for mobility   Active ROM Lower Body    Active ROM Lower Body  WDL   Comments as above   Strength Lower Body   Lower Body Strength  WDL   Comments as above, anticipate at baseline. Patient reported \"knees giving out\" and limited standing tolerance; unclear if due to pain or weakness. demonstrated decreased endurance   Sensation Lower Body   Lower Extremity Sensation   Not Tested   Lower Body Muscle Tone   Lower Body Muscle Tone  Not Tested   Coordination Lower Body    Coordination Lower Body  WDL   Comments not formally tested but functional for mobility   Balance Assessment   Sitting Balance (Static) Good   Sitting Balance (Dynamic) Fair   Standing Balance (Static) Fair   Standing Balance (Dynamic) Fair "   Weight Shift Sitting Fair   Weight Shift Standing Fair   Comments with FWW, no overt LOB   Gait Analysis   Gait Level Of Assist Standby Assist   Assistive Device Front Wheel Walker   Distance (Feet) 20   # of Times Distance was Traveled 1   Deviation   (decreased step length, decreased andrez, slight forward flexed posture)   Weight Bearing Status no restrictions   Vision Deficits Impacting Mobility NT   Comments pt limited by weakness and fatigue. c/o SOB at end of ambulation   Bed Mobility    Supine to Sit   (not observed, pt in chair pre session)   Sit to Supine   (as above)   Scooting Standby Assist  (seated)   Comments patient reported no difficulty in house or concerns with home set up   Functional Mobility   Sit to Stand Standby Assist   Bed, Chair, Wheelchair Transfer Standby Assist   Transfer Method Stand Step   Mobility STS x2 with FWW, ambulation to door with FWW, back to chair   How much difficulty does the patient currently have...   Turning over in bed (including adjusting bedclothes, sheets and blankets)? 3   Sitting down on and standing up from a chair with arms (e.g., wheelchair, bedside commode, etc.) 3   Moving from lying on back to sitting on the side of the bed? 3   How much help from another person does the patient currently need...   Moving to and from a bed to a chair (including a wheelchair)? 3   Need to walk in a hospital room? 3   Climbing 3-5 steps with a railing? 2   6 clicks Mobility Score 17   Activity Tolerance   Sitting in Chair   (pt in chair pre and post session)   Standing 5 mins   Comments pt limited by weakness and fatigue   Education Group   Education Provided Cardiac Precautions;Role of Physical Therapist   Cardiac Precautions Patient Response Patient;Acceptance;Explanation;Verbal Demonstration;Handout   Role of Physical Therapist Patient Response Patient;Acceptance;Explanation;Verbal Demonstration   Problem List    Problems Impaired Ambulation;Decreased Activity  Tolerance;Limited Knowledge of Post-Op Precautions   Anticipated Discharge Equipment and Recommendations   DC Equipment Recommendations None  (pt owns FWW, 4WW, and WC)   Discharge Recommendations Recommend home health for continued physical therapy services   Interdisciplinary Plan of Care Collaboration   IDT Collaboration with  Occupational Therapist;Nursing   Patient Position at End of Therapy Seated;Call Light within Reach;Tray Table within Reach;Phone within Reach   Collaboration Comments with OT. RN aware of visit, response. Discussed DC with CM   Session Information   Date / Session Number  8/16 - 1x only

## 2022-08-16 NOTE — DISCHARGE PLANNING
Case Management Discharge Planning    Admission Date: 8/15/2022  GMLOS: 1.7  ALOS: 1    6-Clicks ADL Score: 17  6-Clicks Mobility Score: 17  PT and/or OT Eval ordered: Yes  Post-acute Referrals Ordered: No  Post-acute Choice Obtained: No  Has referral(s) been sent to post-acute provider:  No      Anticipated Discharge Dispo: Discharge Disposition: D/T to SNF with Medicare cert in anticipation of skilled care (03)    DME Needed: Yes    DME Ordered: No - pending oxygen needs at DC - pt has 2L baselne with (DME company undetermined at this time)    Action(s) Taken: Completed PASSR/LOC and OTHER    Escalations Completed: None    Medically Clear: No    Next Steps: f/u with pt and medical team to discuss dc needs and barriers.    Barriers to Discharge: Medical clearance and Pending PT Evaluation    [facility] # [id number]

## 2022-08-16 NOTE — DISCHARGE INSTRUCTIONS
Transcatheter Aortic Valve Replacement (TAVR)    General Instructions:  Take Medication as directed.  You will likely need to take aspirin and another blood thinner (antiplatelet medication) for a period.  You'll need to take the aspirin for the rest of your life.  Be sure your doctor knows about all other medicines you take, including over-the-counter medicines and dietary supplements.    Incision Care Instructions:  Look and feel the site(s) of your TAVR TODAY so you can recognize changes that should be called to your doctor (see below).  It's normal for your incision to be bruised, itchy, or sore while it's healing.  Your incision may take a week or more to heal.  Bruising may occur.  Some of the discoloration may travel down the leg.  As it resolves, the color should change from blue to green to yellow-brown.  A small, round lump under the TAVR site may remain for up to 6 weeks.  Wash your incision site every day with warm water and soap.  Gently pat it dry.  Don't put powder, lotion, or ointment on the incision until it's healed.    Activity:  No driving for one week  If you must take a long car ride (not as a ), stop every hour and walk around the car.  No lifting or pulling objects over 5 pounds for 4-5 days.  Warm showers or baths are permitted after the bandage is removed.  Walk regularly.  One of the best ways to get stronger is to walk.  Walk a little more each day.  Take someone with you until you feel OK to walk alone.    When to call your health care provider:  You develop a fever.  Redness, swelling, bleeding, warmth, or fluid draining at the incision site.  Bruising appears to be new or does not look like it is getting better.  The small, round lump in the groin in the area of the TAVR site increases in size.    Seek immediate medical help if you have any of the following symptoms:  You experience any leg numbness, aching, or discomfort  Chest pain or trouble breathing (call 911)  Sudden  numbness or weakness in your face, arms, or legs (call 911)  Bowel movement that is bright red  Dizziness or fainting  Weight gain of more than 2 pounds in 24 hours or more than 5 pounds in 1 week  Swelling in your hands, feet, or ankles  Shortness of breath that doesn't get better when you rest  Pain that gets worse or doesn't go away  Fast or irregular pulse

## 2022-08-16 NOTE — CARE PLAN
Problem: Fall Risk  Goal: Patient will remain free from falls  Outcome: Progressing  Note: Patient has remained free from falls/injury.  Fall precautions in place. Patient has used call light appropriately throughout the night to call for assistance.       Problem: Hemodynamics  Goal: Patient's hemodynamics, fluid balance and neurologic status will be stable or improve  Outcome: Progressing  Note: MD Harvey was notified due to patient's HR going up to the 180s.  Patient's HR has improved significantly post administration of Metoprolol.     The patient is Watcher - Medium risk of patient condition declining or worsening    Shift Goals  Clinical Goals: Monitor patient's HR and rhythm, monitor bilat groin sites, take off patient's TR band  Patient Goals: Rest and comfort  Family Goals: LIZ. No family present    Progress made toward(s) clinical / shift goals:   MD Harvey was notified due to patient's HR going up to the 180s.  Patient's HR has improved significantly post administration of Metoprolol. Patient has remained free from falls/injury.  Fall precautions in place. Patient has used call light appropriately throughout the night to call for assistance.      Patient is not progressing towards the following goals: Patient has been on 2 L throughout the night which is her baseline at night.

## 2022-08-17 NOTE — PROGRESS NOTES
4 Eyes Skin Assessment Completed by LILLIE Hannon and LILLIE Healy.    Head WDL  Ears WDL  Nose WDL  Mouth WDL  Neck WDL  Breast/Chest WDL  Shoulder Blades WDL  Spine WDL  (R) Arm/Elbow/Hand Redness and Blanching  (L) Arm/Elbow/Hand Redness and Blanching  Abdomen Redness and Blanching  Groin Redness, Blanching, Excoriation, and Incision  Scrotum/Coccyx/Buttocks Redness and Blanching  (R) Leg Redness and Blanching  (L) Leg WDL  (R) Heel/Foot/Toe WDL  (L) Heel/Foot/Toe WDL          Devices In Places Tele Box, Blood Pressure Cuff, and Pulse Ox      Interventions In Place Gray Ear Foams and Pillows    Possible Skin Injury No    Pictures Uploaded Into Epic N/A  Wound Consult Placed N/A  RN Wound Prevention Protocol Ordered No

## 2022-08-17 NOTE — DISCHARGE PLANNING
Case Management Discharge Planning    Admission Date: 8/15/2022  GMLOS: 1.7  ALOS: 2    6-Clicks ADL Score: 19  6-Clicks Mobility Score: 17  PT and/or OT Eval ordered: Yes  Post-acute Referrals Ordered: Yes  Post-acute Choice Obtained: Yes  Has referral(s) been sent to post-acute provider:  Yes      Anticipated Discharge Dispo: Discharge Disposition: D/T to home under [facility] care in anticipation of covered skilled care (06)  -[facility] has accepted pt    DME Needed: Yes    DME Ordered: Yes - [facility] D[facility] for oxygen    Action(s) Taken: OTHER    Escalations Completed: None    Medically Clear: No    Next Steps: f/u with pt and medical team to discuss dc needs and barriers.    Barriers to Discharge: Medical clearance

## 2022-08-17 NOTE — PROGRESS NOTES
Inpatient Anticoagulation Service Note    Date: 8/16/2022    Reason for Anticoagulation: Atrial Fibrillation   Target INR: 2.0 to 3.0  [id number] VASc Score: 4  HAS-BLED Score: 2   Hemoglobin Value: (!) 9.6  Hematocrit Value: (!) 33.2  Lab Platelet Value: 174    INR from last 7 days       Date/Time INR Value    08/16/22 0337 1.45    08/15/22 1205 1.27          Dose from last 7 days       Date/Time Dose (mg)    08/15/22 1319 3          Average Dose (mg): 3.2  Significant Interactions: Other (Comments), Thyroid Medications (Allopurinol, citalopram, trazodone)  Bridge Therapy: No     Assessment: No interval changes in overall clinical status, diet, or DDI.  H/H low and decreased, no overt s/sx of bleeding.  INR moving toward therapeutic range.      Plan:  Continue warfarin 3 mg daily.  Follow up INR in the AM.  Education Material Provided?: No  Pharmacist suggested discharge dosing: home warfarin regimen and follow up INR within 2 days of discharge.     Megan Ruiz, PharmD, BCPS

## 2022-08-17 NOTE — DISCHARGE PLANNING
RNCM spoke to [facility] HH liaison; they will be out to see pt tomorrow. Per bedside RN, pt son to transport pt [facility].

## 2022-08-17 NOTE — PROGRESS NOTES
Patient discharged to home with [facility]. Telemetry monitor removed. PIV removed. Patient AxO x4 and son at bedside. Discharge instructions given and explained to patient. All questions answered. Prescription medications given to patient. Belongings in patient's possession. Patient's transported on 3L NC to family car via wheelchair by nursing staff.

## 2022-08-17 NOTE — WOUND TEAM
[facility]  Inpatient Services  Wound and Skin Care Brief Evaluation    Admission Date: 8/15/2022     Last order of IP CONSULT TO WOUND CARE was found on 8/16/2022 from Hospital Encounter on 8/4/2022     HPI, PMH, SH: Reviewed    No chief complaint on file.    Diagnosis: Severe aortic stenosis [I35.0]    Unit where seen by Wound Team: T734/02     Wound consult placed regarding abdomen. Chart and images reviewed. This RN in to assess patient. Pt pleasant and agreeable. MASD with malodor to panus and groin. Small open excoriated area on R panus. Areas cleansed with foam cleanser and washcloth then nystatin powder and interdry applied. Patient provided education on importance of keeping areas dry to prevent further infection. No pressure injuries or advanced wound care needs identified. Wound consult completed. Wound team signing off, re-consult if patient has any further advanced wound care needs.            RSKIN:   CURRENTLY IN PLACE (X), APPLIED THIS VISIT (A), ORDERED (O):   Q shift Matt:  X  Q shift pressure point assessments:  X    Surface/Positioning   Pressure redistribution mattress        X    Low Airloss          Bariatric foam      Bariatric DENA     Waffle cushion        Waffle Overlay        X  Reposition q 2 hours    Turns independently  TAPs Turning system     Z Roman Pillow     Offloading/Redistribution NA  Sacral Mepilex (Silicone dressing)     Heel Mepilex (Silicone dressing)         Heel float boots (Prevalon boot)             Float Heels off Bed with Pillows           Respiratory NA on RA  Silicone O2 tubing         Gray Foam Ear protectors     Cannula fixation Device (Tender )          High flow offloading Clip    Elastic head band offloading device      Anchorfast                                                         Trach with Optifoam split foam             Containment/Moisture Prevention     Rectal tube or BMS    Purwick/Condom Cath      X  Leung Catheter    Barrier wipes            Barrier paste       Antifungal tx    A  Interdry    A    Mobilization       Up to chair      X  Ambulate      PT/OT  X OT    Nutrition       Dietician        Diabetes Education      PO  X   TF     TPN     NPO   # days

## 2022-08-17 NOTE — DISCHARGE SUMMARY
PRIMARY DISCHARGE DIAGNOSIS: Status post transcatheter aortic valve replacement.    DISCHARGE DIAGNOSIS:  S/P TAVR    PROCEDURES/TESTIN. Successful transcatheter aortic valve replacement (TAVR) with #26 Burton Cheikh 3 ultra valve, transfemoral approach under general anesthesia on 8/15/22  2. Intraoperative transesophageal echocardiogram showing s/p TAVR with 26mm Burton Cheikh 3 Ultra valve for severe aortic   stenosis.   Prosthetic valve is well positioned and functioning appropriately. No   evidence of paravalvular leak.  Mean gradient 5 mmHg. EOA of 2.23cm2 by continuity equation.   Moderate MR.  Biatrial enlargement.   Normal biventricular function.  Small to moderate pericardial effusion.  3. CXR on 8/15/22 showing   1.  Pulmonary infiltrates, greater on the right, overall stable since prior.  2.  Cardiomegaly  4. EKG on 22 showing   ATRIAL FIBRILLATION rate in 90's  LOW VOLTAGE THROUGHOUT   PROBABLE RIGHT VENTRICULAR HYPERTROPHY   BORDERLINE T ABNORMALITIES, INFERIOR LEADS    5. Labs   Lab Results   Component Value Date/Time    SODIUM 137 2022 04:35 AM    POTASSIUM 4.3 2022 04:35 AM    CHLORIDE 104 2022 04:35 AM    CO2 26 2022 04:35 AM    GLUCOSE 85 2022 04:35 AM    BUN 34 (H) 2022 04:35 AM    CREATININE 1.10 2022 04:35 AM    CREATININE 0.6 2009 01:10 PM       Lab Results   Component Value Date/Time    PROTHROMBTM 18.6 (H) 2022 04:35 AM    INR 1.59 (H) 2022 04:35 AM       Lab Results   Component Value Date/Time    WBC 4.2 (L) 2022 04:35 AM    RBC 3.68 (L) 2022 04:35 AM    HEMOGLOBIN 9.2 (L) 2022 04:35 AM    HEMATOCRIT 31.8 (L) 2022 04:35 AM    MCV 86.4 2022 04:35 AM    MCH 25.0 (L) 2022 04:35 AM    MCHC 28.9 (L) 2022 04:35 AM    MPV 9.8 2022 04:35 AM    NEUTSPOLYS 54.40 2022 03:06 PM    LYMPHOCYTES 30.70 2022 03:06 PM    MONOCYTES 5.30 2022 03:06 PM    EOSINOPHILS 7.90  (H) 08/09/2022 03:06 PM    BASOPHILS 1.70 08/09/2022 03:06 PM    HYPOCHROMIA 2+ 01/06/2020 03:26 AM    ANISOCYTOSIS 1+ 08/09/2022 03:06 PM       HOSPITAL COURSE: The patient is a pleasant 72 year old female with severe symptomatic AS now S/P TAVR, acute on chronic diastolic heart failure due to valvular dysfunction, HLD with non-obstructive CAD, morbid obesity with perineal moisture associated skin damage, HTN, LE edema with chronic venous insufficiency, chronic anemia, chronic afib on chronic anticoagulation, KEN on CPAP with PAH, DM type II on insulin therapy, debility, hypothyroidism, and breast and ovarian cancer (remission). Due to the patient's symptoms, the patient underwent successful TAVR described as above. Post-procedure, the patient did well. They did require IV diuresis during their stay and will continue on oral diuretics post-operatively. She showed signs of volume overload with need for oxygen post-operatively, pericardial effusion on FRANCIS, LE edema, and congestion on exam. They were able to ambulate with the help of a walker, which is her baseline. PT/OT/wound consultations were completed with no concerns for safe home discharge, she will have a friend with her at home for help post-operatively and home health and oxygen has been ordered. She is refusing SNF at discharge despite my concerns of her needing more mobility post-operative management. No further events were noted during their stay.    DISCHARGE MEDICATIONS:      Medication List        CHANGE how you take these medications        Instructions   FeroSul 325 (65 Fe) MG tablet  What changed: when to take this  Generic drug: ferrous sulfate   Take 1 Tablet by mouth 2 times a day.  Dose: 325 mg     metoprolol tartrate 50 MG Tabs  What changed:   how much to take  how to take this  when to take this  Commonly known as: LOPRESSOR   TAKE 1 TABLET BY MOUTH TWICE DAILY     nystatin powder  What changed:   when to take this  reasons to take  this  Another medication with the same name was removed. Continue taking this medication, and follow the directions you see here.  Commonly known as: MYCOSTATIN   Apply 1 g topically 3 times a day. Indications: Skin Infection due to Candida Yeast  Dose: 1 Application     warfarin 3 MG Tabs  What changed:   medication strength  how much to take  how to take this  when to take this  additional instructions  Commonly known as: COUMADIN   Take 1 Tablet by mouth every day.  Dose: 3 mg            CONTINUE taking these medications        Instructions   acetaminophen 650 MG CR tablet  Commonly known as: TYLENOL   Take 1,300 mg by mouth at bedtime. Indications: Pain  Dose: 1,300 mg     allopurinol 100 MG Tabs  Commonly known as: ZYLOPRIM   Take 100 mg by mouth every bedtime.  Dose: 100 mg     ascorbic acid 500 MG tablet  Commonly known as: Vitamin C   Take 500 mg by mouth every day.  Dose: 500 mg     citalopram 20 MG Tabs  Commonly known as: CeleXA   Take 20 mg by mouth every morning.  Dose: 20 mg     gemfibrozil 600 MG Tabs  Commonly known as: LOPID   Take 1 Tab by mouth every morning.  Dose: 600 mg     Kerendia 10 MG Tabs  Generic drug: Finerenone   Take 10 mg by mouth every day.  Dose: 10 mg     Levemir FlexTouch 100 UNIT/ML injection PEN  Generic drug: insulin detemir   Inject 10 Units under the skin at bedtime. Pt took 5 units for surgery today (8/15/2022)  Dose: 10 Units     * levothyroxine 175 MCG Tabs  Commonly known as: SYNTHROID   Take 175 mcg by mouth every Sunday. Brand Name  Dose: 175 mcg     * levothyroxine 200 MCG Tabs  Commonly known as: SYNTHROID   Take 200 mcg by mouth see administration instructions. Monday through Saturday  Brand Name  Dose: 200 mcg     loratadine 10 MG Tabs  Commonly known as: CLARITIN   Take 10 mg by mouth every morning. Indications: Hayfever  Dose: 10 mg     losartan 50 MG Tabs  Commonly known as: COZAAR   Take 50 mg by mouth at bedtime. Indications: High Blood Pressure Disorder  Dose:  50 mg     oxybutynin 15 MG CR tablet  Commonly known as: DITROPAN XL   Take 15 mg by mouth every morning. Indications: Urinary Incontinence  Dose: 15 mg     pioglitazone 15 MG Tabs  Commonly known as: ACTOS   Take 15 mg by mouth every morning.  Dose: 15 mg     polyethylene glycol/lytes 17 g Pack  Commonly known as: MIRALAX   Take 17 g by mouth as needed. Indications: Constipation  Dose: 17 g     potassium chloride SA 20 MEQ Tbcr  Commonly known as: Kdur   Take 20 mEq by mouth every evening.  Dose: 20 mEq     rosuvastatin 20 MG Tabs  Commonly known as: CRESTOR   Take 1 Tab by mouth every evening.  Dose: 20 mg     torsemide 20 MG Tabs  Commonly known as: DEMADEX   Take 20 mg by mouth every morning. Indications: Edema, Cardiac Failure  Dose: 20 mg     traZODone 100 MG Tabs  Commonly known as: DESYREL   Take 50 mg by mouth every evening.  Dose: 50 mg     VITAMIN B-6 PO   Take 1 Tab by mouth every day.  Dose: 1 Tablet     Vitamin D-3 125 MCG (5000 UT) Tabs   Take 5,000 Units by mouth 2 Times a Day.  Dose: 5,000 Units           * This list has 2 medication(s) that are the same as other medications prescribed for you. Read the directions carefully, and ask your doctor or other care provider to review them with you.                DISCHARGE INSTRUCTIONS: They are given discharge instructions on potential post-operative complications and symptoms to watch out for. Their groin sites were checked and were clean, dry, and intact. Patient or family to notify us for any complications noted on the discharge instructions. They will follow up with myself, Astrid GARCIA, on Friday in our cardiology office. They will not get labs before their follow up appointment. They will then follow up with Dr. Oakley with a repeat echocardiogram in one month for post TAVR assessment.      Discharge time spent with patient was 15 minutes.

## 2022-08-17 NOTE — CARE PLAN
The patient is Stable - Low risk of patient condition declining or worsening    Shift Goals  Clinical Goals: Maintain neuro status, pulses palpable, ambulate  Patient Goals: Rest and go home  Family Goals: go home    Progress made toward(s) clinical / shift goals:  Patient being discharged

## 2022-08-19 NOTE — PROGRESS NOTES
Chief Complaint   Patient presents with    Aortic Stenosis     F/V Dx: S/P TAVR (transcatheter aortic valve replacement)     Subjective     Rachelle Reina is a 72 y.o. female who presents today for 1 week post TAVR.    Hx of severe symptomatic AS now S/P TAVR, acute on chronic diastolic heart failure, HLD with non-obstructive CAD, morbid obesity with rash under her groin, HTN, LE edema with chronic venous insufficiency, chronic anemia, chronic afib on chronic anticoagulation, KEN on CPAP with PAH, DM type II on insulin therapy, hypothyroidism, and breast and ovarian cancer (remission).    She is here with her friend today. In a wheelchair. Off her O2, uses it when moving around at home.    No big complaints except some mild LENNON when she didn't have her O2 on.    She remains in afib today on EKG. Rate controlled.    She has her compression stockings on for mild LE edema.    Her rash remains in her pannus folds, she continues with  nursing to help her with management.    He has no chest pain, edema, dizziness/lightheadedness, or palpitations.    Past Medical History:   Diagnosis Date    A-fib (HCC)     Anemia     Aortic stenosis     Arthritis     knees and fingers    Atrial fibrillation (HCC)     Benign essential hypertension 07/01/2011    Breast cancer (HCC)     Breath shortness     Bronchitis 2015    history of    CAD (coronary artery disease)     Cancer (HCC) 1987, 2000    left breast and cysts on ovaries cancerous    Cataract 2018    IOL bilat    Depression     Diabetes (HCC)     oral medication, insulin    Diastolic congestive heart failure (HCC) 2019    Disorder of thyroid     hypothyroid    High cholesterol     History of cardiac catheterization 04/22/2010    History of echocardiogram 04/22/2011    History of hypothyroidism 08/29/2008    Hypercholesteremia 07/01/2011    Long term (current) use of anticoagulants 07/01/2011    Morbid obesity (HCC) 10/28/2008    Neuropathy     fingers and feet    Ovarian  "cancer (HCC)     Oxygen dependent     2liter @ HS    Pain 08/09/2022    lower back, \"joints\", 2/10    Sleep apnea 08/09/2022    pt does not use cpap at this time, O2 2 liters at HS    Snoring     sleep study done    Urinary incontinence      Past Surgical History:   Procedure Laterality Date    TRANSCATHETER AORTIC VALVE REPLACEMENT Bilateral 8/15/2022    Procedure: TRANSCATHETER AORTIC VALVE REPLACEMENT;  Surgeon: Lenin Oakley M.D.;  Location: SURGERY [facility];  Service: Cardiac    ECHOCARDIOGRAM, TRANSESOPHAGEAL, INTRAOPERATIVE N/A 8/15/2022    Procedure: ECHOCARDIOGRAM, TRANSESOPHAGEAL, INTRAOPERATIVE;  Surgeon: Lenin Oakley M.D.;  Location: SURGERY [facility];  Service: Cardiac    ZZZ CARDIAC CATH  02/27/2019    normal coronaries    CATARACT PHACO WITH IOL Right 01/22/2018    Procedure: CATARACT PHACO WITH IOL;  Surgeon: Santosh Holden M.D.;  Location: [facility]ERY [facility];  Service: Ophthalmology    CATARACT PHACO WITH IOL Left 01/08/2018    Procedure: CATARACT PHACO WITH IOL;  Surgeon: Santosh Holden M.D.;  Location: [facility]ERY [facility];  Service: Ophthalmology    OTHER ORTHOPEDIC SURGERY Right 2013    hip arthroplasty    CHOLECYSTECTOMY  2001    ABDOMINAL HYSTERECTOMY TOTAL      1987    MASTECTOMY Left partial    MN RADIATION THERAPY PLAN SIMPLE       Family History   Problem Relation Age of Onset    Heart Disease Mother         HEART VALVE REPLACEMENT.    Heart Disease Father         CORONARY ARTERY BYPASS GRAFTS    Cancer Maternal Aunt     Diabetes Brother     Thyroid Brother     Heart Attack Brother     Heart Disease Brother      Social History     Socioeconomic History    Marital status:      Spouse name: Not on file    Number of children: Not on file    Years of education: Not on file    Highest education level: Not on file   Occupational History    Not on file   Tobacco Use    Smoking status: Never    Smokeless tobacco: Never   Vaping Use    Vaping Use: " Never used   Substance and Sexual Activity    Alcohol use: Yes     Comment: 1 or 2 drinks a month    Drug use: Not Currently    Sexual activity: Not on file   Other Topics Concern    Not on file   Social History Narrative    Not on file     Social Determinants of Health     Financial Resource Strain: Not on file   Food Insecurity: Not on file   Transportation Needs: Not on file   Physical Activity: Not on file   Stress: Not on file   Social Connections: Not on file   Intimate Partner Violence: Not on file   Housing Stability: Not on file     Allergies   Allergen Reactions    Sulfa Drugs Rash     8/2015 rash and hives       Outpatient Encounter Medications as of 8/19/2022   Medication Sig Dispense Refill    FEROSUL 325 (65 Fe) MG tablet Take 1 Tablet by mouth 2 times a day. 60 Tablet 11    nystatin (MYCOSTATIN) powder Apply 1 g topically 3 times a day. Indications: Skin Infection due to Candida Yeast 15 g 1    warfarin (COUMADIN) 3 MG Tab Take 1 Tablet by mouth every day. 30 Tablet 3    polyethylene glycol/lytes (MIRALAX) 17 g Pack Take 17 g by mouth as needed. Indications: Constipation      losartan (COZAAR) 50 MG Tab Take 50 mg by mouth at bedtime. Indications: High Blood Pressure Disorder      potassium chloride SA (KDUR) 20 MEQ Tab CR Take 20 mEq by mouth every evening.      ascorbic acid (VITAMIN C) 500 MG tablet Take 500 mg by mouth every day.      torsemide (DEMADEX) 20 MG Tab Take 20 mg by mouth every morning. Indications: Edema, Cardiac Failure      metoprolol (LOPRESSOR) 50 MG Tab TAKE 1 TABLET BY MOUTH TWICE DAILY 180 Tab 4    rosuvastatin (CRESTOR) 20 MG Tab Take 1 Tab by mouth every evening. 90 Tab 4    gemfibrozil (LOPID) 600 MG Tab Take 1 Tab by mouth every morning. 90 Tab 4    levothyroxine (SYNTHROID) 200 MCG Tab Take 200 mcg by mouth see administration instructions. Monday through Saturday  Brand Name      pioglitazone (ACTOS) 15 MG Tab Take 15 mg by mouth every morning.      Pyridoxine HCl (VITAMIN  "B-6 PO) Take 1 Tab by mouth every day.      LEVEMIR FLEXTOUCH 100 UNIT/ML injection PEN Inject 10 Units under the skin at bedtime. Pt took 5 units for surgery today (8/15/2022)      oxybutynin (DITROPAN XL) 15 MG CR tablet Take 15 mg by mouth every morning. Indications: Urinary Incontinence  2    allopurinol (ZYLOPRIM) 100 MG Tab Take 100 mg by mouth every bedtime.  2    acetaminophen (TYLENOL) 650 MG CR tablet Take 1,300 mg by mouth at bedtime. Indications: Pain      Cholecalciferol (VITAMIN D-3) 5000 units Tab Take 5,000 Units by mouth 2 Times a Day.      traZODone (DESYREL) 100 MG Tab Take 50 mg by mouth every evening.      levothyroxine (SYNTHROID) 175 MCG Tab Take 175 mcg by mouth every Sunday. Brand Name      loratadine (CLARITIN) 10 MG Tab Take 10 mg by mouth every morning. Indications: Hayfever      citalopram (CELEXA) 20 MG TABS Take 20 mg by mouth every morning.      [DISCONTINUED] Finerenone (KERENDIA) 10 MG Tab Take 10 mg by mouth every day. (Patient not taking: Reported on 8/19/2022)       No facility-administered encounter medications on file as of 8/19/2022.     Review of Systems   Constitutional:  Positive for malaise/fatigue. Negative for fever.   Respiratory:  Positive for shortness of breath. Negative for cough.    Cardiovascular:  Positive for leg swelling. Negative for chest pain, palpitations, orthopnea, claudication and PND.   Gastrointestinal:  Negative for abdominal pain.   Musculoskeletal:  Negative for myalgias.   Skin:  Positive for rash.   Neurological:  Negative for dizziness.            Objective     /84 (BP Location: Right arm, Patient Position: Sitting, BP Cuff Size: Adult)   Pulse 84   Resp 16   Ht 1.651 m (5' 5\")   Wt 116 kg (256 lb)   LMP  (LMP Unknown)   SpO2 90%   BMI 42.60 kg/m²     Physical Exam  Vitals and nursing note reviewed.   Constitutional:       Appearance: Normal appearance. She is well-developed. She is obese.   HENT:      Head: Normocephalic and " atraumatic.   Neck:      Vascular: No JVD.   Cardiovascular:      Rate and Rhythm: Normal rate. Rhythm irregularly irregular.      Pulses: Normal pulses.      Heart sounds: Normal heart sounds.   Pulmonary:      Effort: Pulmonary effort is normal.      Breath sounds: Normal breath sounds.   Musculoskeletal:      Right lower leg: Edema present.      Left lower leg: Edema present.   Skin:     General: Skin is warm and dry.      Capillary Refill: Capillary refill takes less than 2 seconds.   Neurological:      General: No focal deficit present.      Mental Status: She is alert and oriented to person, place, and time. Mental status is at baseline.   Psychiatric:         Mood and Affect: Mood normal.         Behavior: Behavior normal.         Thought Content: Thought content normal.         Judgment: Judgment normal.              Assessment & Plan     1. S/P TAVR (transcatheter aortic valve replacement)  EKG    CBC WITHOUT DIFFERENTIAL      2. Acute on chronic diastolic congestive heart failure (HCC)  Basic Metabolic Panel    proBrain Natriuretic Peptide, NT    CBC WITHOUT DIFFERENTIAL      3. Chronic atrial fibrillation  CBC WITHOUT DIFFERENTIAL      4. Type 2 diabetes mellitus without complication, with long-term current use of insulin ([facility])        5. Pericardial effusion        6. Pulmonary hypertension ([facility])        7. Coronary artery disease involving native coronary artery of native heart without angina pectoris        8. Class 3 severe obesity due to excess calories with serious comorbidity and body mass index (BMI) of 40.0 to 44.9 in adult ([facility])        9. Chronic anticoagulation        10. Dyslipidemia        11. Chronic venous insufficiency        12. Obstructive sleep apnea          Medical Decision Making: Today's Assessment/Status/Plan:      1. S/P TAVR  -doing well post-op  -cont warfarin, no ASA  -groins CDI with mild bruising and small nodule but notable groin rash remains-she will follow with PCP on this  and wound clinic  -SBE prophylaxis understands  -echo 1 month with structural heart follow up  -reviewed hospital imaging, labs, and EKG  -EKG shows afib rate controlled    2. Diastolic heart failure  -stable and chronic LE edema and O2 use  -cont torsemide 20 mg QD  -follow symptoms, cont compression stockings and reduce Na intake  -echo 1 month as planned  -labs in 1 month    3. HLD with non-obstructive CAD  -no angina  -cont statin, no ASA with warfarin    4. Morbid obesity with DM type II  -insulin management, actos  -sugars controlled  -follow home regimen  -dietary consult with home health referral  -cardiac diet     5. LE edema with chronic venous insufficiency  -marilyn hose, diuresis, leg elevation     6. Rash, groin  -nystatin powder TID   - nurse for wound management and PCP for nystatin prescription refills  -work on urinary incontinence measures  -cont oxybutynin  -keep area clean and dry     7. Chronic afib on chronic anticoagulation  -rates increasing  -cont metoprolol 50 mg BID  -cont warfarin per anticoagulation clinic     8. KEN on CPAP with PAH  -tolerating and compliant  -cont to follow     9. Pericardial effusion  -small on FRANCIS, repeat echo 1 month  -cont diuresis  -follow     10. HTN  -good control  -cont losartan 50 QD, metoprolol 50 mg BID  -follow at home      11. Gout  -cont allopurinol per home dosing     12. Hypothyroidism  -cont synthroid per home dosing    Patient is to follow up with Dr. Oakley in 1 month with echo and labs.

## 2022-08-26 NOTE — TELEPHONE ENCOUNTER
Spoke to patient over phone. Patient states she gulped iced coffee several days ago and had difficulty breathing. Since then she has had to keep her O2 on more than usual. On 2-3 L O2 sat is 99%. When she exercises sat drops below 88%, but quickly comes back up. She occasionally has some SOB, but denies chest discomfort. Recommended patient reach out to pulmonologist to discuss further and inquire about portable O2 tank. Patient verbalizes understanding.    Patient states she has also felt dizzy and fatigued recently. She admits that she does not really drink any water and has felt dehydrated. Encouraged adequate water intake to help with symptoms. Patient verbalizes understanding.     Per patient, BP has been ranging 115/60, HR <90. She has not weighed herself, but states she only has minimal swelling to the right leg that is not new. Patient is wearing compression stockings daily and elevating legs when able. She states she is taking all medications as prescribed. Recommended she continue to monitor vitals.    Advised to reach out with any questions or concerns. Patient verbalizes understanding.

## 2022-08-26 NOTE — TELEPHONE ENCOUNTER
[place]    Caller: Letty    Name and Department: [facility]    Topic/Issue: Oxygen    Pt needed to run some errands and while she was out she did not take any O2, pt states that if she does not have O2 she begins to feel dizzy and light headed. Letty states that pt needs a script for portable O2. Please advise.    Thank you,  Ronnell B[place]MINGHAM    Callback Number or Extension: [phone] (home)

## 2022-08-27 NOTE — TELEPHONE ENCOUNTER
Sushila Guaman, Med Ass't  Marialuisa Acevedo R.N.      Please send Kerendia Rx to [facility] per patient request.   Please advise on other Rx's   Thank you     __________________________________________________________________________    Will follow up with Sushila regarding pharmacy.

## 2022-08-29 NOTE — PROGRESS NOTES
Anticoagulation Summary  As of 8/29/2022      INR goal:  2.0-3.0   TTR:  60.3 % (7.1 y)   INR used for dosing:  3.20 (8/29/2022)   Warfarin maintenance plan:  5 mg (5 mg x 1) every Tue, Fri; 2.5 mg (5 mg x 0.5) all other days   Weekly warfarin total:  22.5 mg   Plan last modified:  Brice Garcia PharmD (4/21/2022)   Next INR check:  9/12/2022   Target end date:  Indefinite    Indications    Atrial fibrillation (HCC) (Resolved) [I48.91]  Chronic anticoagulation [Z79.01]  Secondary hypercoagulable state (HCC) (Resolved) [D68.69]                 Anticoagulation Episode Summary       INR check location:  Anticoagulation Clinic    Preferred lab:      Send INR reminders to:      Comments:  karine          Anticoagulation Care Providers       Provider Role Specialty Phone number    Florian Carnes M.D. Referring Interventional Cardiology [phone]    [facility] Anticoagulation Services Responsible  [phone]          Anticoagulation Patient Findings       HPI:  Rachelle Reina, on anticoagulation therapy with warfarin for AFib.   Changes to current medical/health status since last appt: none  Denies signs/symptoms of bleeding and/or thrombosis since the last appt.    Denies any interval changes to diet  Denies any interval changes to medications since last appt.   Denies any complications or cost restrictions with current therapy.     A/P   INR  SUPRA-therapeutic.   Hold warfarin today then Pt is to continue with current warfarin dosing regimen.     Next INR in 2 week(s).    Dennis Oakley, PharmD

## 2022-09-14 NOTE — PROGRESS NOTES
Anticoagulation Summary  As of 9/14/2022      INR goal:  2.0-3.0   TTR:  60.0 % (7.1 y)   INR used for dosing:  3.20 (9/14/2022)   Warfarin maintenance plan:  5 mg (5 mg x 1) every Thu; 2.5 mg (5 mg x 0.5) all other days   Weekly warfarin total:  20 mg   Plan last modified:  Cosmo Crews (9/14/2022)   Next INR check:  9/28/2022   Target end date:  Indefinite    Indications    Atrial fibrillation (HCC) (Resolved) [I48.91]  Chronic anticoagulation [Z79.01]  Secondary hypercoagulable state (HCC) (Resolved) [D68.69]                 Anticoagulation Episode Summary       INR check location:  Anticoagulation Clinic    Preferred lab:      Send INR reminders to:      Comments:  karine          Anticoagulation Care Providers       Provider Role Specialty Phone number    Florian Carnes M.D. Referring Interventional Cardiology [phone]    [facility] Anticoagulation Services Responsible  [phone]            Refer to Anticoagulation Patient Findings for HPI      Spoke with pt.  INR is supra-therapeutic.     Pt verifies warfarin weekly dosing.     Pt is NOT on antiplatelet therapy     Will have pt skip todays dose as a one time dose decrease and decrease weekly dose to 5 mg on thur and 2.5 mg all other days    Repeat INR in 2 week(s).     Cosmo Crews   Discussed with Juan Jose Barlow

## 2022-09-17 PROBLEM — B37.9 YEAST INFECTION: Status: ACTIVE | Noted: 2022-01-01

## 2022-09-17 PROBLEM — E65 ABDOMINAL PANNUS: Status: RESOLVED | Noted: 2022-01-01 | Resolved: 2022-01-01

## 2022-09-17 PROBLEM — I45.5 SINUS PAUSE: Status: ACTIVE | Noted: 2022-01-01

## 2022-09-17 PROBLEM — I95.9 HYPOTENSION: Status: ACTIVE | Noted: 2022-01-01

## 2022-09-17 PROBLEM — R09.02 HYPOXIA: Status: ACTIVE | Noted: 2022-01-01

## 2022-09-17 PROBLEM — E65 ABDOMINAL PANNUS: Status: ACTIVE | Noted: 2022-01-01

## 2022-09-17 NOTE — PROGRESS NOTES
Atrial fibrillation with controlled ventricular response.   HR 90s, irregular  P waves undistinguishable/ not present.   QRS 0.10

## 2022-09-17 NOTE — ASSESSMENT & PLAN NOTE
Anemia of chronic disease  Last iron studies in April indicate mixed anemia of chronic disease and iron deficiency   No signs of bleeding    -Continue home dose of oral iron

## 2022-09-17 NOTE — ED TRIAGE NOTES
"Chief Complaint   Patient presents with    Shortness of Breath     X3 weeks, pt reports SOB is getting worse and she is retaining fluid. Pt has hx of CHF.  Pt wears 2L NC chronic.      Pt BIB [facility] for above complaint. Pt states she takes lasix everyother day because some days it is hard to get up and use the restroom. Pt lives at home alone but currently has a friend living with her since her TAVR procedure in August.    /67   Pulse (!) 107   Temp 37.1 °C (98.7 °F) (Temporal)   Resp 17   Ht 1.651 m (5' 5\")   Wt 125 kg (275 lb)   LMP  (LMP Unknown)   SpO2 93%   BMI 45.76 kg/m²     "

## 2022-09-17 NOTE — PROGRESS NOTES
"RN and CNA staff were assisting Rachelle back to bed from chair.  Rachelle started to reported, \"I can't do this\", and exhibited anxiety about transferring over.  After staff got her back into bed, and laid back, staff noticed that she passed out.  Monitor check was being called over head.  Monitor tech states that patient had 10 second sinus arrest/asystole.    Vitals taken; pulse ox sat was 82% on her 2L, so staff increased oxygen to 8L and switched to oxymask.    B/p stable.  Rachelle does not remember event.   She is now A&O X4, somewhat anxious.  Pads placed on Rachelle's chest in case event repeats.  MD paged and notified.      "

## 2022-09-17 NOTE — H&P
Hospital Medicine History & Physical Note    Date of Service  9/17/2022    Primary Care Physician  NICK Berry    Consultants  none    Specialist Names: none    Code Status  DNAR/DNI    Chief Complaint  Chief Complaint   Patient presents with    Shortness of Breath     X3 weeks, pt reports SOB is getting worse and she is retaining fluid. Pt has hx of CHF.  Pt wears 2L NC chronic.        History of Presenting Illness  Rachelle Reina is a 72 y.o. female with past medical history of CHF, A. Fib, DM, S/p TAVR who presented 9/16/2022 with shortness of breath and bilateral lower extremity swelling. She reports she drank a cold drink and later suffered a brain freeze and felt like she could not breath. She reports she has been non complaint with her water pills. She reports using oxygen at home 2 liters. She denies any fever or chills. No other relieving or exacerbating factors were noted.     In the ER, CXR showed stable cardiomegaly with increased interstitial markings concerning for mild fluid overload. She also has a malodorous infection beneath her large pannus.     I discussed the plan of care with patient.    Review of Systems  Review of Systems   Constitutional:  Negative for chills and fever.   HENT:  Negative for hearing loss and tinnitus.    Eyes:  Negative for blurred vision and double vision.   Respiratory:  Positive for shortness of breath. Negative for cough and hemoptysis.    Cardiovascular:  Positive for leg swelling. Negative for chest pain and palpitations.   Gastrointestinal:  Negative for heartburn and nausea.   Genitourinary:  Negative for dysuria and urgency.   Musculoskeletal:  Negative for myalgias and neck pain.   Skin:  Negative for rash.   Neurological:  Negative for dizziness and headaches.   Endo/Heme/Allergies:  Does not bruise/bleed easily.   Psychiatric/Behavioral:  Negative for depression and suicidal ideas.      Past Medical History   has a past medical history of  A-fib (HCC), Anemia, Aortic stenosis, Arthritis, Atrial fibrillation (HCC), Benign essential hypertension (07/01/2011), Breast cancer (HCC), Breath shortness, Bronchitis (2015), CAD (coronary artery disease), Cancer (HCC) (1987, 2000), Cataract (2018), Depression, Diabetes (HCC), Diastolic congestive heart failure (HCC) (2019), Disorder of thyroid, High cholesterol, History of cardiac catheterization (04/22/2010), History of echocardiogram (04/22/2011), History of hypothyroidism (08/29/2008), Hypercholesteremia (07/01/2011), Long term (current) use of anticoagulants (07/01/2011), Morbid obesity (HCC) (10/28/2008), Neuropathy, Ovarian cancer (HCC), Oxygen dependent, Pain (08/09/2022), Sleep apnea (08/09/2022), Snoring, and Urinary incontinence.    Surgical History   has a past surgical history that includes abdominal hysterectomy total; mastectomy (Left, partial); pr radiation therapy plan simple; cholecystectomy (2001); cataract phaco with iol (Left, 01/08/2018); cataract phaco with iol (Right, 01/22/2018); other orthopedic surgery (Right, 2013); z cardiac cath (02/27/2019); transcatheter aortic valve replacement (Bilateral, 8/15/2022); and echocardiogram, transesophageal, intraoperative (N/A, 8/15/2022).     Family History  family history includes Cancer in her maternal aunt; Diabetes in her brother; Heart Attack in her brother; Heart Disease in her brother, father, and mother; Thyroid in her brother.   Family history reviewed with patient. There is no family history that is pertinent to the chief complaint.     Social History   reports that she has never smoked. She has never used smokeless tobacco. She reports current alcohol use. She reports that she does not currently use drugs.    Allergies  Allergies   Allergen Reactions    Sulfa Drugs Rash     8/2015 rash and hives         Medications  Prior to Admission Medications   Prescriptions Last Dose Informant Patient Reported? Taking?   Cholecalciferol (VITAMIN  D-3) 5000 units Tab 9/16/2022 at AM Patient Yes No   Sig: Take 5,000 Units by mouth 2 Times a Day.   FEROSUL 325 (65 Fe) MG tablet 9/16/2022 at AM Patient No No   Sig: Take 1 Tablet by mouth 2 times a day.   Patient taking differently: Take 325 mg by mouth every day.   Finerenone (KERENDIA) 10 MG Tab >1 month at [facility] Patient No No   Sig: Take 10 mg by mouth every day.   LEVEMIR FLEXTOUCH 100 UNIT/ML injection PEN 9/15/2022 at PM Patient Yes No   Sig: Inject 10 Units under the skin at bedtime. Pt took 5 units for surgery today (8/15/2022)   Pyridoxine HCl (VITAMIN B-6 PO) 9/16/2022 at AM Patient Yes No   Sig: Take 1 Tab by mouth every day.   acetaminophen (TYLENOL) 650 MG CR tablet PRN at PRN Patient Yes No   Sig: Take 1,300 mg by mouth at bedtime. Indications: Pain   allopurinol (ZYLOPRIM) 100 MG Tab 9/16/2022 at PM Patient Yes No   Sig: Take 100 mg by mouth every bedtime.   ascorbic acid (VITAMIN C) 500 MG tablet 9/16/2022 at AM Patient Yes No   Sig: Take 500 mg by mouth every day.   citalopram (CELEXA) 20 MG TABS 9/16/2022 at AM Patient Yes No   Sig: Take 20 mg by mouth every morning.   gemfibrozil (LOPID) 600 MG Tab 9/16/2022 at AM Patient No No   Sig: Take 1 Tab by mouth every morning.   levothyroxine (SYNTHROID) 175 MCG Tab 9/11/2022 at AM Patient Yes No   Sig: Take 175 mcg by mouth every Sunday. Brand Name   levothyroxine (SYNTHROID) 200 MCG Tab 9/16/2022 at AM Patient Yes No   Sig: Take 200 mcg by mouth see administration instructions. Monday through Saturday  Brand Name   loratadine (CLARITIN) 10 MG Tab 9/16/2022 at AM Patient Yes No   Sig: Take 10 mg by mouth every morning. Indications: Hayfever   losartan (COZAAR) 50 MG Tab 9/16/2022 at PM Patient Yes No   Sig: Take 50 mg by mouth at bedtime. Indications: High Blood Pressure Disorder   metoprolol (LOPRESSOR) 50 MG Tab 9/16/2022 at PM Patient No No   Sig: TAKE 1 TABLET BY MOUTH TWICE DAILY   nystatin (MYCOSTATIN) powder PRN at PRN Patient No No   Sig: Apply  1 g topically 3 times a day. Indications: Skin Infection due to Candida Yeast   oxybutynin (DITROPAN XL) 15 MG CR tablet 9/16/2022 at AM Patient Yes No   Sig: Take 15 mg by mouth every morning. Indications: Urinary Incontinence   pioglitazone (ACTOS) 15 MG Tab 9/16/2022 at AM Patient Yes No   Sig: Take 15 mg by mouth every morning.   polyethylene glycol/lytes (MIRALAX) 17 g Pack PRN at PRN Patient Yes No   Sig: Take 17 g by mouth as needed. Indications: Constipation   potassium chloride SA (KDUR) 20 MEQ Tab CR 9/16/2022 at PM Patient Yes No   Sig: Take 20 mEq by mouth every evening.   rosuvastatin (CRESTOR) 20 MG Tab 9/16/2022 at PM Patient No No   Sig: Take 1 Tab by mouth every evening.   torsemide (DEMADEX) 20 MG Tab 9/15/2022 at AM Patient Yes No   Sig: Take 20 mg by mouth every morning. Indications: Edema, Cardiac Failure   traZODone (DESYREL) 100 MG Tab 9/16/2022 at PM Patient Yes No   Sig: Take 50 mg by mouth every evening.   warfarin (COUMADIN) 2.5 MG Tab 9/16/2022 at PM Patient Yes Yes   Sig: Take 2.5-5 mg by mouth every day. 5 mg (2 tablets) on Tuesdays, all other days 2.5 mg (1 tablet)      Facility-Administered Medications: None       Physical Exam  Temp:  [37.1 °C (98.7 °F)] 37.1 °C (98.7 °F)  Pulse:  [] 96  Resp:  [17-18] 18  BP: (101-112)/(65-78) 101/65  SpO2:  [91 %-99 %] 91 %  Blood Pressure : 101/65   Temperature: 37.1 °C (98.7 °F)   Pulse: 96   Respiration: 18   Pulse Oximetry: 91 %       Physical Exam  Vitals and nursing note reviewed.   Constitutional:       Comments: Morbidely obese    HENT:      Head: Normocephalic and atraumatic.      Right Ear: Tympanic membrane normal.      Left Ear: Tympanic membrane normal.      Nose: Nose normal.      Mouth/Throat:      Mouth: Mucous membranes are moist.      Pharynx: Oropharynx is clear.   Eyes:      Extraocular Movements: Extraocular movements intact.      Pupils: Pupils are equal, round, and reactive to light.   Cardiovascular:      Rate and  Rhythm: Normal rate and regular rhythm.      Pulses: Normal pulses.      Heart sounds: Normal heart sounds.      Comments: Distant heart sounds due to body habitus  Pulmonary:      Effort: Pulmonary effort is normal.      Breath sounds: Normal breath sounds.      Comments: Scattered crackles   Abdominal:      General: Bowel sounds are normal. There is no distension.      Palpations: Abdomen is soft. There is no mass.      Comments: Large pannus with malodor      Musculoskeletal:         General: Normal range of motion.      Cervical back: Neck supple.      Right lower leg: Edema (3+) present.      Left lower leg: Edema (3+) present.      Comments: Bilateral compression wraps to lower extremities    Skin:     General: Skin is warm.      Capillary Refill: Capillary refill takes less than 2 seconds.   Neurological:      General: No focal deficit present.      Mental Status: She is alert and oriented to person, place, and time. Mental status is at baseline.      Cranial Nerves: No cranial nerve deficit.      Sensory: No sensory deficit.   Psychiatric:         Mood and Affect: Mood normal.         Behavior: Behavior normal.       Laboratory:  Recent Labs     09/16/22  2310   WBC 4.8   RBC 3.99*   HEMOGLOBIN 10.1*   HEMATOCRIT 34.6*   MCV 86.7   MCH 25.3*   MCHC 29.2*   RDW 61.1*   PLATELETCT 206   MPV 10.1     Recent Labs     09/17/22  0029   SODIUM 140   POTASSIUM 4.8   CHLORIDE 107   CO2 24   GLUCOSE 118*   BUN 45*   CREATININE 1.37   CALCIUM 9.4     Recent Labs     09/17/22  0029   ALTSGPT 15   ASTSGOT 26   ALKPHOSPHAT 87   TBILIRUBIN 0.2   GLUCOSE 118*         Recent Labs     09/17/22  0029   NTPROBNP 2231*         Recent Labs     09/17/22  0029   TROPONINT 58*       Imaging:  DX-CHEST-PORTABLE (1 VIEW)   Final Result      1.  Stable cardiomegaly with pericardial effusion.   2.  Bibasilar atelectasis versus consolidations and suspected right pleural effusion.          X-Ray:  I have personally reviewed the images and  compared with prior images.  EKG:  I have personally reviewed the images and compared with prior images.    Assessment/Plan:  Justification for Admission Status  I anticipate this patient is appropriate for observation status at this time because mild fluid overload requiring IV diuretics    Patient will need a Telemetry bed on MEDICAL service .  The need is secondary to see above.    * Acute on chronic diastolic congestive heart failure (HCC)- (present on admission)  Assessment & Plan  Telemetry   IV Lasix 40 mg BID   C.w ARB/BB/statin  8/22 Echo EF 60%    Abdominal pannus  Assessment & Plan  Topical nystatin TID  Wound care       Hypoxia- (present on admission)  Assessment & Plan  Chronic on home oxygen 2L    Pericardial effusion- (present on admission)  Assessment & Plan  Known small pericardial effusion     Obstructive sleep apnea- (present on admission)  Assessment & Plan  Nocturnal BIPAP/CPAP    Type 2 diabetes mellitus without complication, with long-term current use of insulin ([facility])- (present on admission)  Assessment & Plan  Long acting insulin/Lispro/SSI  Accuchecks and hypoglycemia protocol     Chronic anticoagulation- (present on admission)  Assessment & Plan  Warfarin per pharmacy   PT/INR    Dyslipidemia- (present on admission)  Assessment & Plan  Crestor and Lopid     History of hypothyroidism- (present on admission)  Assessment & Plan  Resume Levothyroxine     Class 3 severe obesity due to excess calories with serious comorbidity and body mass index (BMI) of 40.0 to 44.9 in adult ([facility])- (present on admission)  Assessment & Plan  Educate on lifestyle and dietary modification     Benign essential hypertension- (present on admission)  Assessment & Plan  Resume home ARB and BB      VTE prophylaxis: therapeutic anticoagulation with coumadin

## 2022-09-17 NOTE — ASSESSMENT & PLAN NOTE
Hypotensive in the setting of fluid volume overload  Decreased dose of home ARB   Beta blocker discontinued    -We will increase as tolerated

## 2022-09-17 NOTE — WOUND TEAM
[facility]  Inpatient Services  Wound and Skin Care Brief Evaluation    Admission Date: 9/16/2022     Last order of IP CONSULT TO WOUND CARE was found on 9/17/2022 from Hospital Encounter on 9/16/2022     HPI, PMH, SH: Reviewed    Chief Complaint   Patient presents with    Shortness of Breath     X3 weeks, pt reports SOB is getting worse and she is retaining fluid. Pt has hx of CHF.  Pt wears 2L NC chronic.      Diagnosis: Hypoxia [R09.02]    Unit where seen by Wound Team: S171/02     Wound consult placed regarding pannus. Chart and images reviewed. This RN in to assess patient. Pt pleasant and agreeable. Non-selectively debrided with moist warm washcloth and no rinse foam soap. Pannus and groin intact with some redness. Pannus and groin also moist and malodorous. Nystatin powder and interdry cloths already ordered. No pressure injuries or advanced wound care needs identified. Wound consult completed. No further follow up unless indicated and consulted.           RSKIN:   CURRENTLY IN PLACE (X), APPLIED THIS VISIT (A), ORDERED (O):   Q shift Matt:  X  Q shift pressure point assessments:  X    Surface/Positioning   Pressure redistribution mattress    X        Low Airloss          Bariatric foam      Bariatric DENA     Waffle cushion        Waffle Overlay          Reposition q 2 hours      TAPs Turning system     Z Roman Pillow     Offloading/Redistribution not assessed  Sacral Mepilex (Silicone dressing)     Heel Mepilex (Silicone dressing)         Heel float boots (Prevalon boot)             Float Heels off Bed with Pillows           Respiratory   Silicone O2 tubing       X  Gray Foam Ear protectors     Cannula fixation Device (Tender )          High flow offloading Clip    Elastic head band offloading device      Anchorfast                                                         Trach with Optifoam split foam             Containment/Moisture Prevention   Rectal tube or BMS    Purwick/Condom Cath     X    Leung Catheter    Barrier wipes           Barrier paste       Antifungal tx    X  Interdry    O    Mobilization not assessed  Up to chair        Ambulate      PT/OT      Nutrition   Dietician        Diabetes Education      PO X    TF     TPN     NPO   # days     Other

## 2022-09-17 NOTE — ASSESSMENT & PLAN NOTE
Chronic on home oxygen 2L  Patient's respiratory status worsened now on 5 L.  She appears to be more fluid overloaded.  Increasing Lasix to 40 mg IV twice daily.  cxr

## 2022-09-17 NOTE — HOSPITAL COURSE
"\"Rachelle Reina is a 72 y.o. female with past medical history of CHF, A. Fib, DM, S/p TAVR on 8/15/2022 on warfarin who presented 9/16/2022 with shortness of breath and bilateral lower extremity swelling. She reports she drank a cold drink and later suffered a brain freeze and felt like she could not breath. She reports she has been non complaint with her water pills. She reports using oxygen at home 2 liters. She denies any fever or chills. No other relieving or exacerbating factors were noted.      In the ER, CXR showed stable cardiomegaly with increased interstitial markings concerning for mild fluid overload. She also has a malodorous infection beneath her large pannus.\"    Patient was admitted under observation status for management of her acute on chronic diastolic congestive heart failure.  "

## 2022-09-17 NOTE — ASSESSMENT & PLAN NOTE
Atrial fibrillation rate controlled on telemetry  Metoprolol discontinued given 10-second sinus pause    Status post pacemaker.  Patient started on warfarin

## 2022-09-17 NOTE — PROGRESS NOTES
----- Message from David Leggett MD sent at 5/2/2017  9:14 AM CDT -----  LV function is now normal.   4 Eyes Skin Assessment Completed by LILLIE Shirley and LILLIE Figueroa.    Head WDL  Ears WDL  Nose WDL  Mouth WDL  Neck WDL  Breast/Chest excoriation under bilateral breasts.   Shoulder Blades WDL  Spine WDL  (R) Arm/Elbow/Hand Bruising  (L) Arm/Elbow/Hand Bruising  Abdomen - scattered scratch marks. Excoriation to abdominal folds.   Groin Redness to folds  Scrotum/Coccyx/Buttocks WDL  (R) Leg Edema  (L) Leg Edema  (R) Heel/Foot/Toe dry and flaking  (L) Heel/Foot/Toe dry and flaking          Devices In Places Tele Box and Nasal Cannula      Interventions In Place Waffle Overlay, Pillows, and Q2 Turns    Possible Skin Injury No    Pictures Uploaded Into Epic N/A  Wound Consult Placed N/A  RN Wound Prevention Protocol Ordered No

## 2022-09-17 NOTE — ED PROVIDER NOTES
"ED Provider Note     Scribed for Lena Harvey D.O. by Maya Lees. 9/16/2022, 10:34 PM.     Primary care provider: NICK Berry  Means of arrival: EMS         History obtained from: Patient  History limited by: None     CHIEF COMPLAINT  Chief Complaint   Patient presents with    Shortness of Breath     X3 weeks, pt reports SOB is getting worse and she is retaining fluid. Pt has hx of CHF.  Pt wears 2L NC chronic.      HPI  Rachelle Reina is a 72 y.o. female who presents to the emergency Department for shortness of breath onset 3 weeks ago. The patient reports a history of CHF. She states she got an aortic valve replacement 8/15/22 and was discharged 8/17/22. She notes that initially following the surgery she was doing fine. However, 3 weeks ago she drank a cold drink too fast and got the sensation of a brain freeze, as well as the feeling of \"my chest was frozen.\" She notes that she is now on 2 L of supplemental O2 24/7, when her baseline is only needing O2 at night. She reports symptoms of a sore throat, slight lightheadedness, leg swelling, water retention, and knee weakness. The patient denies symptoms of a fever. She states that she currently has a yeast infection along her pannus and leaking leg wounds. No alleviating or exacerbating factors noted.     REVIEW OF SYSTEMS  Pertinent positives include shortness of breath, sore throat, slight lightheadedness, leg swelling, water retention, and knee weakness. Pertinent negatives include no fever.   See HPI for further details. All other systems are negative.    PAST MEDICAL HISTORY  Past Medical History:   Diagnosis Date    A-fib (HCC)     Anemia     Aortic stenosis     Arthritis     knees and fingers    Atrial fibrillation (HCC)     Benign essential hypertension 07/01/2011    Breast cancer (HCC)     Breath shortness     Bronchitis 2015    history of    CAD (coronary artery disease)     Cancer (HCC) 1987, 2000    left breast and cysts " "on ovaries cancerous    Cataract 2018    IOL bilat    Depression     Diabetes (HCC)     oral medication, insulin    Diastolic congestive heart failure (HCC) 2019    Disorder of thyroid     hypothyroid    High cholesterol     History of cardiac catheterization 04/22/2010    History of echocardiogram 04/22/2011    History of hypothyroidism 08/29/2008    Hypercholesteremia 07/01/2011    Long term (current) use of anticoagulants 07/01/2011    Morbid obesity (HCC) 10/28/2008    Neuropathy     fingers and feet    Ovarian cancer (HCC)     Oxygen dependent     2liter @ HS    Pain 08/09/2022    lower back, \"joints\", 2/10    Sleep apnea 08/09/2022    pt does not use cpap at this time, O2 2 liters at HS    Snoring     sleep study done    Urinary incontinence      FAMILY HISTORY  Family History   Problem Relation Age of Onset    Heart Disease Mother         HEART VALVE REPLACEMENT.    Heart Disease Father         CORONARY ARTERY BYPASS GRAFTS    Cancer Maternal Aunt     Diabetes Brother     Thyroid Brother     Heart Attack Brother     Heart Disease Brother      SOCIAL HISTORY  Social History     Tobacco Use    Smoking status: Never    Smokeless tobacco: Never   Vaping Use    Vaping Use: Never used   Substance Use Topics    Alcohol use: Yes     Comment: 1 or 2 drinks a month    Drug use: Not Currently      Social History     Substance and Sexual Activity   Drug Use Not Currently     SURGICAL HISTORY  Past Surgical History:   Procedure Laterality Date    TRANSCATHETER AORTIC VALVE REPLACEMENT Bilateral 8/15/2022    Procedure: TRANSCATHETER AORTIC VALVE REPLACEMENT;  Surgeon: Lenin Oakley M.D.;  Location: SURGERY [facility];  Service: Cardiac    ECHOCARDIOGRAM, TRANSESOPHAGEAL, INTRAOPERATIVE N/A 8/15/2022    Procedure: ECHOCARDIOGRAM, TRANSESOPHAGEAL, INTRAOPERATIVE;  Surgeon: Lenin Oakley M.D.;  Location: SURGERY [facility];  Service: Cardiac    ZZZ CARDIAC CATH  02/27/2019    normal coronaries    CATARACT " PHACO WITH IOL Right 01/22/2018    Procedure: CATARACT PHACO WITH IOL;  Surgeon: Santosh Holden M.D.;  Location: SURGERY SAME DAY [facility];  Service: Ophthalmology    CATARACT PHACO WITH IOL Left 01/08/2018    Procedure: CATARACT PHACO WITH IOL;  Surgeon: Santosh Holden M.D.;  Location: SURGERY SAME DAY [facility];  Service: Ophthalmology    OTHER ORTHOPEDIC SURGERY Right 2013    hip arthroplasty    CHOLECYSTECTOMY  2001    ABDOMINAL HYSTERECTOMY TOTAL      1987    MASTECTOMY Left partial    SD RADIATION THERAPY PLAN SIMPLE       CURRENT MEDICATIONS    Current Facility-Administered Medications:     allopurinol (ZYLOPRIM) tablet 100 mg, 100 mg, Oral, QHS, Vipul Ybarra M.D.    citalopram (CeleXA) tablet 20 mg, 20 mg, Oral, QAM, Vipul Ybarra M.D.    ferrous sulfate tablet 325 mg, 325 mg, Oral, BID, Vipul Ybarra M.D.    gemfibrozil (LOPID) tablet 600 mg, 600 mg, Oral, QAM, Vipul Ybarra M.D.    levothyroxine (SYNTHROID) tablet 200 mcg, 200 mcg, Oral, Once per day on Mon Tue Wed Thu Fri Sat, Vipul Ybarra M.D.    losartan (COZAAR) tablet 50 mg, 50 mg, Oral, QHS, Vipul Ybarra M.D.    nystatin (MYCOSTATIN) powder 100,000 Units, 1 Application, Topical, TID, Vipul Ybarra M.D.    rosuvastatin (CRESTOR) tablet 20 mg, 20 mg, Oral, Q EVENING, Vipul Ybarra M.D.    insulin GLARGINE (Lantus,Semglee) injection, 0.2 Units/kg/day, Subcutaneous, Q EVENING **AND** insulin lispro (AdmeLOG,HumaLOG) injection, 0.2 Units/kg/day, Subcutaneous, TID AC **AND** insulin lispro (AdmeLOG,HumaLOG) injection, 1-6 Units, Subcutaneous, 4X/DAY ACHS **AND** POC blood glucose manual result, , , Q AC AND BEDTIME(S) **AND** NOTIFY MD and PharmD, , , Once **AND** Administer 20 grams of glucose (approximately 8 ounces of fruit juice) every 15 minutes PRN FSBG less than 70 mg/dL, , , PRN **AND** dextrose 50% (D50W) injection 25 g, 25 g, Intravenous, Q15 MIN PRN, Vipul Ybarra M.D.    furosemide (LASIX) injection 40 mg, 40 mg,  "Intravenous, BID DIURETIC, Vipul Ybarra M.D. MD Alert...Warfarin per Pharmacy, , Other, PHARMACY TO DOSE, Vipul Ybarra M.D.    metoprolol tartrate (LOPRESSOR) tablet 50 mg, 50 mg, Oral, TWICE DAILY, Vipul Ybarra M.D.    [START ON 9/18/2022] levothyroxine (SYNTHROID) tablet 175 mcg, 175 mcg, Oral, Once per day on Sun, Vipul Ybarra M.D.    ALLERGIES  Allergies   Allergen Reactions    Sulfa Drugs Rash     8/2015 rash and hives       PHYSICAL EXAM  VITAL SIGNS: /67   Pulse (!) 107   Temp 37.1 °C (98.7 °F) (Temporal)   Resp 17   Ht 1.651 m (5' 5\")   Wt 125 kg (275 lb)   LMP  (LMP Unknown)   SpO2 93%   BMI 45.76 kg/m²     Constitutional: Patient is morbidly obese. Non-toxic appearing.  Mild respiratory distress  HENT: Normocephalic, Nose normal with no mucosal edema or drainage. Oropharynx moist without erythema or exudates.  Eyes: PERRL, EOMI, Conjunctiva without erythema or exudates.   Neck: Supple   Lymphatic: No lymphadenopathy noted.   Cardiovascular: Normal heart rate and Regular rhythm with frequent ectopy. No murmur  Thorax & Lungs: Lungs diminished at the bases. No respiratory distress, no rhonchi, wheezing or rales. No chest tenderness  Abdomen: Obese, nontender, no rebound , guarding, palpable masses.  Patient has a extremely large pannus with increased erythema below it and very malodorous yeast infection.  Skin: Warm, Dry  Back: No cervical, thoracic, or lumbosacral tenderness. No CVA tenderness.   Extremities: 3+ bilateral lower extremity edema. Neurovascularly intact. Venous stasis dermatitis changed on lower extremities.   Musculoskeletal: Normal range of motion in all major joints. No tenderness to palpation or major deformities noted.   Neurologic: Alert & oriented x 3, Normal motor function, Normal sensory function  Psychiatric: Affect normal, Judgment normal, Mood normal.     DIAGNOSTICS/PROCEDURES    LABS  Results for orders placed or performed during the hospital " encounter of 09/16/22   CBC WITH DIFFERENTIAL   Result Value Ref Range    WBC 4.8 4.8 - 10.8 K/uL    RBC 3.99 (L) 4.20 - 5.40 M/uL    Hemoglobin 10.1 (L) 12.0 - 16.0 g/dL    Hematocrit 34.6 (L) 37.0 - 47.0 %    MCV 86.7 81.4 - 97.8 fL    MCH 25.3 (L) 27.0 - 33.0 pg    MCHC 29.2 (L) 33.6 - 35.0 g/dL    RDW 61.1 (H) 35.9 - 50.0 fL    Platelet Count 206 164 - 446 K/uL    MPV 10.1 9.0 - 12.9 fL    Neutrophils-Polys 56.70 44.00 - 72.00 %    Lymphocytes 21.70 (L) 22.00 - 41.00 %    Monocytes 10.10 0.00 - 13.40 %    Eosinophils 10.10 (H) 0.00 - 6.90 %    Basophils 1.00 0.00 - 1.80 %    Immature Granulocytes 0.40 0.00 - 0.90 %    Nucleated RBC 0.00 /100 WBC    Neutrophils (Absolute) 2.74 2.00 - 7.15 K/uL    Lymphs (Absolute) 1.05 1.00 - 4.80 K/uL    Monos (Absolute) 0.49 0.00 - 0.85 K/uL    Eos (Absolute) 0.49 0.00 - 0.51 K/uL    Baso (Absolute) 0.05 0.00 - 0.12 K/uL    Immature Granulocytes (abs) 0.02 0.00 - 0.11 K/uL    NRBC (Absolute) 0.00 K/uL    Hypochromia 1+     Anisocytosis 1+     Macrocytosis 1+     Microcytosis 1+    PERIPHERAL SMEAR REVIEW   Result Value Ref Range    Peripheral Smear Review see below    PLATELET ESTIMATE   Result Value Ref Range    Plt Estimation Normal    MORPHOLOGY   Result Value Ref Range    RBC Morphology Present    DIFFERENTIAL COMMENT   Result Value Ref Range    Comments-Diff see below    Comp Metabolic Panel   Result Value Ref Range    Sodium 140 135 - 145 mmol/L    Potassium 4.8 3.6 - 5.5 mmol/L    Chloride 107 96 - 112 mmol/L    Co2 24 20 - 33 mmol/L    Anion Gap 9.0 7.0 - 16.0    Glucose 118 (H) 65 - 99 mg/dL    Bun 45 (H) 8 - 22 mg/dL    Creatinine 1.37 0.50 - 1.40 mg/dL    Calcium 9.4 8.5 - 10.5 mg/dL    AST(SGOT) 26 12 - 45 U/L    ALT(SGPT) 15 2 - 50 U/L    Alkaline Phosphatase 87 30 - 99 U/L    Total Bilirubin 0.2 0.1 - 1.5 mg/dL    Albumin 3.2 3.2 - 4.9 g/dL    Total Protein 5.9 (L) 6.0 - 8.2 g/dL    Globulin 2.7 1.9 - 3.5 g/dL    A-G Ratio 1.2 g/dL   proBrain Natriuretic Peptide,  NT   Result Value Ref Range    NT-proBNP 2231 (H) 0 - 125 pg/mL   TROPONIN   Result Value Ref Range    Troponin T 58 (H) 6 - 19 ng/L   Blood Culture,Hold   Result Value Ref Range    Blood Culture Hold Collected    ESTIMATED GFR   Result Value Ref Range    GFR (CKD-EPI) 41 (A) >60 mL/min/1.73 m 2       Labs reviewed by me    RADIOLOGY/PROCEDURES  DX-CHEST-PORTABLE (1 VIEW)   Final Result      1.  Stable cardiomegaly with pericardial effusion.   2.  Bibasilar atelectasis versus consolidations and suspected right pleural effusion.        Results and radiologist interpretation reviewed by me.     COURSE & MEDICAL DECISION MAKING  Pertinent Labs & Imaging studies reviewed. (See chart for details)    10:35 PM - Patient seen and evaluated at bedside. Ordered for DX-Chest, ProBrain Natriuretic Peptide, Troponin, CMP, and CBC w/ diff. to evaluate. Differential diagnoses include, but are not limited to, CHF vs Yeast Infection vs UTI.    Patient's laboratories revealed a normal white blood cell count with a hemoglobin of 10.1 and hematocrit 34.6.  There is no significant left shift.  Her BNP was 2231 and her troponin is elevated at 58.  BUN is 45 with a creatinine of 1.37.  Patient was treated with Lasix 40 mg IV push, chest x-ray shows basilar atelectasis with a right pleural effusion and cardiomegaly with a pericardial effusion.  Urinalysis is still pending.    11:59 PM - Patient will be treated with 40 mg Lasix injection.   I spoke with Dr. Ybarra and the patient will be admitted into the hospital for further treatment.  She is currently in guarded condition.    FINAL IMPRESSION  1. Acute on chronic congestive heart failure, unspecified heart failure type (HCC)    2. Shortness of breath    3. Hypoxia    4.  Yeast infection  Maya CUEVAS (Omi), am scribing for, and in the presence of, Lena Harvey D.O..    Electronically signed by: Maya Lees (Omi), 9/16/2022    Lena CUEVAS D.O. personally  performed the services described in this documentation, as scribed by Maya Lees in my presence, and it is both accurate and complete.    The note accurately reflects work and decisions made by me.  Lena Harvey D.O.  9/17/2022  3:16 AM

## 2022-09-17 NOTE — CARE PLAN
The patient is Stable - Low risk of patient condition declining or worsening         Progress made toward(s) clinical / shift goals:  rest and comfort

## 2022-09-17 NOTE — ASSESSMENT & PLAN NOTE
Fluid overloaded on exam, chest x-ray indicative of bilateral pleural effusions, elevated BNP  Remains on losartan at half her dose 25 mg  Metoprolol discontinued given 10-second sinus pause  Echocardiogram shows HFpEF, EF 55% and RVSP 55 mmHg  Patient titrated off of midodrine.  Tolerating 40 mg IV Lasix BID  Clinically improving today, less dyspneic     -Monitor hypotension  -Monitor urine output

## 2022-09-17 NOTE — ASSESSMENT & PLAN NOTE
On warfarin status post TAVR 8/15/2022  INR goal 2.5 post TAVR  Warfarin per pharmacy    -Held yesterday pending pacemaker insertion tomorrow  -Discussed with cardiology today, recommend low-dose warfarin  -Discussed with pharmacy, will administer 2 mg of warfarin tonight  Restarted on warfarin

## 2022-09-17 NOTE — ASSESSMENT & PLAN NOTE
10 second sinus pause 9/17 on telemetry with witnessed syncopal episode  Patient recovered, /54    -Discontinued metoprolol  -Cardiology following, appreciate recommendations  -Continue on telemetry monitoring  NPO awaiting Micra

## 2022-09-17 NOTE — ED NOTES
Med rec completed per patient  Allergies reviewed  No PO Antibiotics in the last 30 days     Patient is on Warfarin 5 mg on Tuesdays, 2.5 mg on all other days.     Patient is on Kerendia 10 mg daily, she states she has been out for a month and just picked up a new prescription for it today.

## 2022-09-17 NOTE — ASSESSMENT & PLAN NOTE
On 8/15/2022  On warfarin, goal INR 2.5  Echo 9/17 EF 55%, aortic gradient appropriate, RVSP 55 mmHg

## 2022-09-17 NOTE — PROGRESS NOTES
Inpatient Anticoagulation Service Note    Date: 9/17/2022    Reason for Anticoagulation: Atrial Fibrillation   Target INR: 2.0 to 3.0  [id number] VASc Score: 3  HAS-BLED Score: 1   Hemoglobin Value: (!) 10.1  Hematocrit Value: (!) 34.6  Lab Platelet Value: 206    INR from last 7 days       Date/Time INR Value    09/17/22 0815 3.29          Dose from last 7 days       Date/Time Dose (mg)    09/17/22 1555 1    09/17/22 0411 2.5          Average Dose (mg):  (5mg on Tuesdays and 2.5mg all other days of the week)  Significant Interactions: Other (Comments) (Gemfibrozil (home med))  Bridge Therapy: No     Reversal Agent Administered: Not Applicable  Comments: Patient's INR is slightly supratherapeutic this morning (INR 3.29). Will give a lower dose of warfarin 1mg today then resume home dosing of warfarin 5mg on Tuesday and 2.5mg all other days, if INR is therapeutic tomorrow. No concern for bleeding. Pharmacy will follow.    Plan:  warfarin 1mg tonight  Education Material Provided?: No  Pharmacist suggested discharge dosing: Resume home dosing on discharge. Recommend follow up for repeat INR within 48 hours of discharge.      Carolann Meyer, PharmD

## 2022-09-17 NOTE — PROGRESS NOTES
Inpatient Anticoagulation Service Note    Date: 9/17/2022  Reason for Anticoagulation: Atrial Fibrillation   [id number] VASc Score: 4  HAS-BLED Score: 1    Hemoglobin Value: (!) 10.1  Hematocrit Value: (!) 34.6  Lab Platelet Value: 206  Target INR: 2.0 to 3.0    INR from last 7 days       9/14/2022                                                              3.20          Dose from last 7 days       Date/Time Dose (mg)    09/17/22 0411 2.5          Average Dose (mg):  (5mg on Tuesdays and 2.5mg all other days of the week)  Significant Interactions: Other (Comments) (Gemfibrozil (home med))  Bridge Therapy: No     Reversal Agent Administered: Not Applicable  Comments: Patient presenting with aeCHF. On warfarin at home for A-Fib. Recent INR on 9/14 was supra-therapeutic at 3.20 and patient was instructed to skip a dose and begin a reduced dosing regimen of 5mg on Tuesdays and 2.5mg all other days. No overt signs of bleeding noted in the chart and CBC appropriate. Resuming home regimen.    Plan:  Resume home regimen with 2.5mg today    INR still pending for AM labs, reordering home regimen. Dayshift to follow up on morning INR and assess if home regimen is still appropriate.    Education Material Provided?: No  Pharmacist suggested discharge dosing: Recommend home regimen of 5mg on Tuesdays and 2.5mg all other days with follow up INR within 48-72 hours of discharge.     Yahir Gamble, PharmD

## 2022-09-18 NOTE — PROGRESS NOTES
Telemetry monitoring:  A.fib  10 second event of asystole, followed by intermittent A.fib and further pauses/sinus arrest events, then back into A.fib.

## 2022-09-18 NOTE — PROGRESS NOTES
Atrial fibrillation with controlled ventricular response. One PVC is present on strip page 3 of 3.   HR 80s, irregular  P waves not present/ undistinguishable  QRS 0.10

## 2022-09-18 NOTE — PROGRESS NOTES
Cardiology Follow Up Progress Note    Date of Service  9/18/2022    Attending Physician  LUIS Boogie*    Chief Complaint   Cardiology consultation for syncope with pauses and bradycardia    HPI  Rachelle Reina is a 72 y.o. female with PMH significant for TAVR 8/15/2022, type 2 diabetes, permanent A. fib on warfarin, morbid obesity admitted 9/16/2022 with shortness of breath and HFpEF exacerbation.    Interim Events    10-second event of asystole 9/17/2022 at around 8 PM  Currently in A. fib, no recurrent long pauses  Tentatively plan for Micra leadless pacemaker 9/19/2022  Check INR tomorrow morning  /51  NT proBNP 2200, continue IV furosemide 40 twice daily  Review of Systems  Review of Systems   Respiratory:  Negative for apnea, cough, choking, chest tightness, shortness of breath, wheezing and stridor.      Vital signs in last 24 hours  Temp:  [36 °C (96.8 °F)-36.6 °C (97.9 °F)] 36 °C (96.8 °F)  Pulse:  [] 104  Resp:  [18-20] 18  BP: ()/(41-89) 106/51  SpO2:  [92 %-100 %] 96 %    Physical Exam  Physical Exam  Cardiovascular:      Rate and Rhythm: Rhythm irregular.      Pulses: Normal pulses.   Skin:     General: Skin is warm.   Neurological:      Mental Status: She is alert. Mental status is at baseline.   Psychiatric:         Mood and Affect: Mood normal.       Lab Review  Lab Results   Component Value Date/Time    WBC 4.7 (L) 09/18/2022 01:44 AM    RBC 3.98 (L) 09/18/2022 01:44 AM    HEMOGLOBIN 10.0 (L) 09/18/2022 01:44 AM    HEMATOCRIT 35.2 (L) 09/18/2022 01:44 AM    MCV 88.4 09/18/2022 01:44 AM    MCH 25.1 (L) 09/18/2022 01:44 AM    MCHC 28.4 (L) 09/18/2022 01:44 AM    MPV 9.8 09/18/2022 01:44 AM      Lab Results   Component Value Date/Time    SODIUM 140 09/18/2022 01:44 AM    POTASSIUM 4.3 09/18/2022 01:44 AM    CHLORIDE 105 09/18/2022 01:44 AM    CO2 28 09/18/2022 01:44 AM    GLUCOSE 97 09/18/2022 01:44 AM    BUN 40 (H) 09/18/2022 01:44 AM    CREATININE 1.27 09/18/2022  01:44 AM    CREATININE 0.6 01/27/2009 01:10 PM      Lab Results   Component Value Date/Time    ASTSGOT 32 09/18/2022 01:44 AM    ALTSGPT 15 09/18/2022 01:44 AM     Lab Results   Component Value Date/Time    CHOLSTRLTOT 115 04/28/2022 10:21 AM    LDL 50 04/28/2022 10:21 AM    HDL 54 04/28/2022 10:21 AM    TRIGLYCERIDE 55 04/28/2022 10:21 AM    TROPONINT 59 (H) 09/17/2022 08:15 AM       Recent Labs     09/17/22  0029   NTPROBNP 2231*       Cardiac Imaging and Procedures Review  EKG:  My personal interpretation of the EKG dated  is     Echocardiogram:    9/17/2022  LVEF 55%  Bioprosthetic aortic valve with appropriate gradients  RVSP 55 mmHg  Mild to moderate posterior pericardial effusion without evidence of tamponade    Cardiac Catheterization: Not applicable    Imaging  Chest X-Ray: Stable cardiomegaly with pericardial effusion.  2.  Bibasilar atelectasis versus consolidations and suspected right pleural effusion.      Stress Test: Not applicable    Assessment/Plan    #Syncope with pauses with bradycardia  #HFpEF acute on chronic  #TAVR 8/2022  #Permanent A. fib  #On OAC with warfarin  #Pannus yeast infection  #KEN  #Class III obesity      Recommendations  -Plan for leadless Micra pacemaker tentatively 9/19/2022  -Continue furosemide 40 IV twice daily  -Strict intake and output  -Hold warfarin  -Current INR 3.34  -Repeat INR tomorrow morning  -NPO after MN      Thank you for allowing me to participate in the care of this patient.  I will continue to follow this patient    Please contact me with any questions.    STACEY Saul.   Cardiologist, [facility]  [phone]

## 2022-09-18 NOTE — CARE PLAN
The patient is Unstable - High likelihood or risk of patient condition declining or worsening    Shift Goals  Clinical Goals: Pain management and maintain oxygen saturation WNL.  Patient Goals: Rest in bed comfortably.    Progress made toward(s) clinical / shift goals:  After midodrine started, b/p was stable enough to administer lower dose of IV lasix.  Only requiring her baseline of 2L O2 nasal cannula.    Patient is not progressing towards the following goals:  Still fluid overloaded; was not tolerating IV lasix at desired dose due to low b/p; 10+ second event of asystole today after staff got Rachelle back into bed from chair.  MD wrote orders for pacemaker placement; awaiting to be performed.       Problem: Psychosocial  Goal: Patient's level of anxiety will decrease  Outcome: Not Progressing   Rachelle exhibits anxiety, mostly when attempts to lay down and reposition.    Problem: Hemodynamics  Goal: Patient's hemodynamics, fluid balance and neurologic status will be stable or improve  Outcome: Not Progressing   B/p too low this morning, so morning scheduled lasix held and MD notified.  MD started on midodrine, and b/p increased so that IV lasix was able to be given.  Asystole event today; cardiology consulted and STAT echo performed, showing mild-moderate pericardial effusion. A.fib on telemetry (minus cardiac event). Generalized 2+ pitting edema; 3+ pitting to BLE with weeping wounds; abdomen firm from edema.    Problem: Respiratory  Goal: Patient will achieve/maintain optimum respiratory ventilation and gas exchange  Outcome: Not Progressing   Remains on her baseline 2L O2 nasal cannula with pulse ox sats maintaining in mid 90s.  Does not report SOB until staff lays head of bed lower for repositioning.  Crackles to both lungs. No cough noted.     Problem: Fluid Volume  Goal: Fluid volume balance will be maintained  Outcome: Not Progressing   Generalized 2+ pitting edema; 3+ pitting to BLE with weeping wounds;  abdomen firm from edema.      Problem: Skin Integrity  Goal: Skin integrity is maintained or improved  Outcome: Progressing   Redness under pannus, to groin, under breasts.  Weeping wounds to both legs from swelling.  DTI to buttocks.    Problem: Fall Risk  Goal: Patient will remain free from falls  Outcome: Progressing   No falls or injuries; precautions engaged.    Problem: Nutrition  Goal: Patient's nutritional and fluid intake will be adequate or improve  Outcome: Progressing   Reports that she isn't as hungry as usual due to not feeling well; tired.    Problem: Mobility  Goal: Patient's capacity to carry out activities will improve  Outcome: Progressing   Up with 2 person assist and walker to chair today.  Had cardiac event (asystole) after staff got her back into bed from chair.    Problem: Wound/ / Incision Healing  Goal: Patient's wound/surgical incision will decrease in size and heals properly  Outcome: Progressing

## 2022-09-18 NOTE — PROGRESS NOTES
"Hospital Medicine Daily Progress Note    Date of Service  9/18/2022    Chief Complaint  Rachelle Reina is a 72 y.o. female admitted 9/16/2022 with shortness of breath    Hospital Course  \"Rachelle Reina is a 72 y.o. female with past medical history of CHF, A. Fib, DM, S/p TAVR on 8/15/2022 on warfarin who presented 9/16/2022 with shortness of breath and bilateral lower extremity swelling. She reports she drank a cold drink and later suffered a brain freeze and felt like she could not breath. She reports she has been non complaint with her water pills. She reports using oxygen at home 2 liters. She denies any fever or chills. No other relieving or exacerbating factors were noted.      In the ER, CXR showed stable cardiomegaly with increased interstitial markings concerning for mild fluid overload. She also has a malodorous infection beneath her large pannus.\"    Patient was admitted under observation status for management of her acute on chronic diastolic congestive heart failure.    Interval Problem Update  9/18/2022: Patient seen and examined.  States that she feels kind of dizzy but also did not get a lot of sleep.  Eating about half her meals.  Wondering about her pacemaker procedure tomorrow.    -Vital signs stable, normotensive  -Reviewed overnight telemetry events, remained in Afib 80s  -Tolerating 20 mg IV lasix  -7 patient tolerated 20 mg and is still hypervolemic, increased midodrine and Lasix to 40 mg IV BID  -Labs reviewed, Mag 1.9, replaced   -Cards following, appreciate recommendations  -Tentatively scheduled for pacemaker placement Monday  -Holding warfarin per cards rec, INR 3.0 today, repeat 3.34  -Discussed with pharmacy and cardiology NP, INR needs to be below 2 for procedure, IV vitamin K ordered and will recheck in am    I have discussed this patient's plan of care and discharge plan at IDT rounds today with Case Management, Nursing, Nursing leadership, and other members of the " IDT team.    Consultants/Specialty  cardiology    Code Status  DNAR/DNI    Disposition  Patient is not medically cleared for discharge.   Anticipate discharge to to home with close outpatient follow-up.  I have placed the appropriate orders for post-discharge needs.    Review of Systems  Review of Systems   Constitutional:  Positive for malaise/fatigue. Negative for chills and fever.   HENT:  Negative for hearing loss.    Eyes:  Negative for blurred vision and double vision.   Respiratory:  Positive for shortness of breath. Negative for cough.    Cardiovascular:  Positive for leg swelling. Negative for chest pain and palpitations.   Gastrointestinal:  Negative for abdominal pain and diarrhea.   Genitourinary:  Negative for dysuria and urgency.   Musculoskeletal:  Positive for back pain.   Skin:  Positive for rash.   Neurological:  Positive for weakness. Negative for dizziness and headaches.   Endo/Heme/Allergies:  Bruises/bleeds easily.   Psychiatric/Behavioral:  Negative for depression. The patient is nervous/anxious.    All other systems reviewed and are negative.     Physical Exam  Temp:  [35.8 °C (96.5 °F)-36.6 °C (97.9 °F)] 36.6 °C (97.9 °F)  Pulse:  [60-89] 88  Resp:  [16-20] 20  BP: ()/(33-98) 116/67  SpO2:  [82 %-100 %] 100 %    Physical Exam  Vitals and nursing note reviewed.   Constitutional:       Appearance: She is obese. She is ill-appearing.   HENT:      Head: Normocephalic and atraumatic.      Right Ear: External ear normal.      Left Ear: External ear normal.      Nose: Nose normal.      Mouth/Throat:      Mouth: Mucous membranes are dry.      Pharynx: Oropharynx is clear.   Eyes:      General: No scleral icterus.  Cardiovascular:      Rate and Rhythm: Normal rate. Rhythm irregular.      Pulses: Normal pulses.      Comments: Muffled heart sounds  Pulmonary:      Comments: Diminished with bibasilar crackles  Abdominal:      Tenderness: There is no abdominal tenderness.      Comments: Obese,  "taut, yeast infection under pannus   Musculoskeletal:      Cervical back: Normal range of motion and neck supple.      Comments: Edema to bilateral thigh level  Ace wraps around bilateral shins, per patient this is to manage her \"water blisters\"   Skin:     General: Skin is dry.      Capillary Refill: Capillary refill takes less than 2 seconds.      Coloration: Skin is pale.      Findings: Rash present.   Neurological:      Mental Status: She is alert and oriented to person, place, and time.   Psychiatric:         Mood and Affect: Mood is anxious.       Fluids    Intake/Output Summary (Last 24 hours) at 9/18/2022 0702  Last data filed at 9/17/2022 2100  Gross per 24 hour   Intake 100 ml   Output 1250 ml   Net -1150 ml       Laboratory  Recent Labs     09/16/22  2310 09/18/22  0144   WBC 4.8 4.7*   RBC 3.99* 3.98*   HEMOGLOBIN 10.1* 10.0*   HEMATOCRIT 34.6* 35.2*   MCV 86.7 88.4   MCH 25.3* 25.1*   MCHC 29.2* 28.4*   RDW 61.1* 61.8*   PLATELETCT 206 152*   MPV 10.1 9.8       Recent Labs     09/17/22  0029 09/18/22  0144   SODIUM 140 140   POTASSIUM 4.8 4.3   CHLORIDE 107 105   CO2 24 28   GLUCOSE 118* 97   BUN 45* 40*   CREATININE 1.37 1.27   CALCIUM 9.4 9.4       Recent Labs     09/17/22  0815 09/18/22  0144   APTT 44.5*  --    INR 3.29* 3.00*               Imaging  EC-ECHOCARDIOGRAM COMPLETE W/O CONT   Final Result      DX-CHEST-PORTABLE (1 VIEW)   Final Result      1.  Stable cardiomegaly with pericardial effusion.   2.  Bibasilar atelectasis versus consolidations and suspected right pleural effusion.      CL-PACEMAKER MICRA LEADLESS PACING SYSTEM    (Results Pending)          Assessment/Plan  * Acute on chronic diastolic congestive heart failure (HCC)- (present on admission)  Assessment & Plan  Fluid overloaded on exam, chest x-ray indicative of bilateral pleural effusions, elevated BNP  Tolerated 20 mg IV twice daily Lasix with midodrine overnight  Remains on losartan at half her dose 25 mg  Metoprolol " discontinued given 10-second sinus pause yesterday  Echocardiogram shows HFpEF, EF 55% and RVSP 55 mmHg    -Increase midodrine to 10 mg 3 times daily just so that patient can tolerate diuresis  -Increased Lasix to 40 mg twice daily e as tolerated   -Monitor urine output    Hypotension- (present on admission)  Assessment & Plan  Midodrine 10 mg 3 times daily  Anticipate will improve when fluid volume status improves    Sinus pause  Assessment & Plan  10 second sinus pause 9/17 on telemetry with witnessed syncopal episode  Patient recovered, /54    -Discontinued metoprolol  -Cardiology following, appreciate recommendations  -Continue on telemetry monitoring  -Tentatively scheduled for Micra insertion 9/19/2022    Yeast infection  Assessment & Plan  In abdominal pannus  Topical nystatin TID  Wound team consulted    Hypoxia- (present on admission)  Assessment & Plan  Chronic on home oxygen 2L    Pericardial effusion- (present on admission)  Assessment & Plan  Known small pericardial effusion     S/P TAVR (transcatheter aortic valve replacement)- (present on admission)  Assessment & Plan  On 8/15/2022  On warfarin, goal INR 2.5  Echo 9/17 EF 55%, aortic gradient appropriate, RVSP 55 mmHg    Obstructive sleep apnea- (present on admission)  Assessment & Plan  Nocturnal BIPAP/CPAP    Anemia- (present on admission)  Assessment & Plan  Anemia of chronic disease  Last iron studies in April indicate mixed anemia of chronic disease and iron deficiency   No signs of bleeding    -Continue home dose of oral iron    Type 2 diabetes mellitus without complication, with long-term current use of insulin ([facility])- (present on admission)  Assessment & Plan  Long acting insulin/Lispro/SSI  Accuchecks and hypoglycemia protocol     Chronic atrial fibrillation- (present on admission)  Assessment & Plan  Atrial fibrillation rate controlled on telemetry  Metoprolol discontinued given 10-second sinus pause  Scheduled for pacemaker insertion  tomorrow, warfarin held    -We will resume medications per cardiology postprocedure    Chronic anticoagulation- (present on admission)  Assessment & Plan  On warfarin status post TAVR 8/15/2022  INR goal 2.5 post TAVR  INR today 3.34  Warfarin per pharmacy    -Held today pending pacemaker insertion tomorrow  -Resume when ok by cardiology    Dyslipidemia- (present on admission)  Assessment & Plan  Continue home dose of Crestor and Lopid     History of hypothyroidism- (present on admission)  Assessment & Plan  Resume Levothyroxine     Class 3 severe obesity due to excess calories with serious comorbidity and body mass index (BMI) of 40.0 to 44.9 in adult (HCC)- (present on admission)  Assessment & Plan  Educate on lifestyle and dietary modification     Benign essential hypertension- (present on admission)  Assessment & Plan  Hypotensive in the setting of fluid volume overload  Decreased dose of home ARB   Beta blocker discontinued    -We will increase as tolerated       VTE prophylaxis: therapeutic anticoagulation with warfarin    ..My total time spent caring for the patient on the day of the encounter was 49 minutes. This does not include time spent on separately billable procedures/tests.     I have performed a physical exam and reviewed and updated ROS and Plan today (9/18/2022). In review of yesterday's note (9/17/2022), there are no changes except as documented above.

## 2022-09-18 NOTE — CONSULTS
"Cardiology Initial Consult Note    DOS: 9/17/2022    Referring physician: Dr Pulido    Chief complaint/Reason for consult: Bradycardia    HPI: 73 y/o F with AS s/p TAVR, HFpEF, permanent atrial fibrillation, on coumadin, morbid obesity, admitted with SOB and HFpEF exacerbation. Notably today had an episode of bradycardia with a significant pause on telemetry associated with brief LOC. Pt reports feeling like this once before, after her TAVR. Otherwise, dealing with a pannus yeast infection.     ROS (+ highlighted in bold):  Constitutional: Fevers/chills/fatigue/weightloss  HEENT: Blurry vision/eye pain/sore throat/hearing loss  Respiratory: Shortness of breath/cough  Cardiovascular: Chest pain/palpitations/edema/orthopnea/syncope  GI: Nausea/vomitting/diarrhea  MSK: Arthralgias/myagias/muscle weakness  Skin: Rash/sores  Neurological: Numbness/tremors/vertigo  Endocrine: Excessive thirst/polyuria/cold intolerance/heat intolerance  Psych: Depression/anxiety    Past Medical History:   Diagnosis Date    A-fib (HCC)     Anemia     Aortic stenosis     Arthritis     knees and fingers    Atrial fibrillation (HCC)     Benign essential hypertension 07/01/2011    Breast cancer (HCC)     Breath shortness     Bronchitis 2015    history of    CAD (coronary artery disease)     Cancer (HCC) 1987, 2000    left breast and cysts on ovaries cancerous    Cataract 2018    IOL bilat    Depression     Diabetes (HCC)     oral medication, insulin    Diastolic congestive heart failure (HCC) 2019    Disorder of thyroid     hypothyroid    High cholesterol     History of cardiac catheterization 04/22/2010    History of echocardiogram 04/22/2011    History of hypothyroidism 08/29/2008    Hypercholesteremia 07/01/2011    Long term (current) use of anticoagulants 07/01/2011    Morbid obesity (HCC) 10/28/2008    Neuropathy     fingers and feet    Ovarian cancer (HCC)     Oxygen dependent     2liter @ HS    Pain 08/09/2022    lower back, \"joints\", " 2/10    Sleep apnea 08/09/2022    pt does not use cpap at this time, O2 2 liters at HS    Snoring     sleep study done    Urinary incontinence        Past Surgical History:   Procedure Laterality Date    TRANSCATHETER AORTIC VALVE REPLACEMENT Bilateral 8/15/2022    Procedure: TRANSCATHETER AORTIC VALVE REPLACEMENT;  Surgeon: Lenin Oakley M.D.;  Location: SURGERY [facility];  Service: Cardiac    ECHOCARDIOGRAM, TRANSESOPHAGEAL, INTRAOPERATIVE N/A 8/15/2022    Procedure: ECHOCARDIOGRAM, TRANSESOPHAGEAL, INTRAOPERATIVE;  Surgeon: Lenin Oakley M.D.;  Location: SURGERY [facility];  Service: Cardiac    ZZZ CARDIAC CATH  02/27/2019    normal coronaries    CATARACT PHACO WITH IOL Right 01/22/2018    Procedure: CATARACT PHACO WITH IOL;  Surgeon: Santosh Holden M.D.;  Location: SURGERY SAME DAY [facility];  Service: Ophthalmology    CATARACT PHACO WITH IOL Left 01/08/2018    Procedure: CATARACT PHACO WITH IOL;  Surgeon: Santosh Holden M.D.;  Location: SURGERY SAME DAY [facility];  Service: Ophthalmology    OTHER ORTHOPEDIC SURGERY Right 2013    hip arthroplasty    CHOLECYSTECTOMY  2001    ABDOMINAL HYSTERECTOMY TOTAL      1987    MASTECTOMY Left partial    MS RADIATION THERAPY PLAN SIMPLE         Social History     Socioeconomic History    Marital status:      Spouse name: Not on file    Number of children: Not on file    Years of education: Not on file    Highest education level: Not on file   Occupational History    Not on file   Tobacco Use    Smoking status: Never    Smokeless tobacco: Never   Vaping Use    Vaping Use: Never used   Substance and Sexual Activity    Alcohol use: Yes     Comment: 1 or 2 drinks a month    Drug use: Not Currently    Sexual activity: Not on file   Other Topics Concern    Not on file   Social History Narrative    Not on file     Social Determinants of Health     Financial Resource Strain: Not on file   Food Insecurity: Not on file   Transportation Needs: Not on  file   Physical Activity: Not on file   Stress: Not on file   Social Connections: Not on file   Intimate Partner Violence: Not on file   Housing Stability: Not on file       Family History   Problem Relation Age of Onset    Heart Disease Mother         HEART VALVE REPLACEMENT.    Heart Disease Father         CORONARY ARTERY BYPASS GRAFTS    Cancer Maternal Aunt     Diabetes Brother     Thyroid Brother     Heart Attack Brother     Heart Disease Brother        Allergies   Allergen Reactions    Sulfa Drugs Rash     8/2015 rash and hives         Current Facility-Administered Medications   Medication Dose Route Frequency Provider Last Rate Last Admin    allopurinol (ZYLOPRIM) tablet 100 mg  100 mg Oral QHS Vipul Ybarra M.D.        citalopram (CeleXA) tablet 20 mg  20 mg Oral QAFRANSISCO Ybarra M.D.   20 mg at 09/17/22 0633    ferrous sulfate tablet 325 mg  325 mg Oral BID Vipul Ybarra M.D.   325 mg at 09/17/22 1709    gemfibrozil (LOPID) tablet 600 mg  600 mg Oral JANET Ybarra M.D.        levothyroxine (SYNTHROID) tablet 200 mcg  200 mcg Oral Once per day on Mon Tue Wed Thu Fri Sat Vipul Ybarra M.D.   200 mcg at 09/17/22 0632    nystatin (MYCOSTATIN) powder 100,000 Units  1 Application Topical TID Vipul Ybarra M.D.        rosuvastatin (CRESTOR) tablet 20 mg  20 mg Oral Q EVENING Vipul Ybarra M.D.   20 mg at 09/17/22 1709    MD Alert...Warfarin per Pharmacy   Other PHARMACY TO DOSE Vipul Ybarra M.D.        [START ON 9/18/2022] levothyroxine (SYNTHROID) tablet 175 mcg  175 mcg Oral Once per day on Sun Vipul Ybarra M.D.        insulin GLARGINE (Lantus,Semglee) injection  5 Units Subcutaneous Q EVENING Vipul Ybarra M.D.        And    insulin lispro (AdmeLOG,HumaLOG) injection  1-6 Units Subcutaneous Q6HRS Vipul Ybarra M.D.        And    dextrose 10 % BOLUS 25 g  25 g Intravenous Q15 MIN PRN Vipul Ybarra M.D.        losartan (COZAAR) tablet 25 mg  25 mg Oral QHS Salena Pulido  TALIB.P.RShanthiN.        midodrine (PROAMATINE) tablet 5 mg  5 mg Oral TID WITH MEALS LINDSEY BoogieRShanthiN.   5 mg at 09/17/22 1709    furosemide (LASIX) injection 20 mg  20 mg Intravenous BID DIURETIC LINDSEY BoogieRShanthiN.        [START ON 9/18/2022] warfarin (COUMADIN) tablet 2.5 mg  2.5 mg Oral Once per day on Sun Mon Wed Thu Fri Sat LINDSEY BoogieRROE        And    [START ON 9/20/2022] warfarin (COUMADIN) tablet 5 mg  5 mg Oral Once per day on Tue LINDSEY BoogieRBACILIO.        warfarin (COUMADIN) tablet 1 mg  1 mg Oral ONCE AT 1800 LUIS BoogieP.RROE           Physical Exam:  Vitals:    09/17/22 1423 09/17/22 1436 09/17/22 1512 09/17/22 1532   BP: 109/40 (!) 99/33 (!) 93/53 103/51   Pulse:  85 84 88   Resp:  16 18 20   Temp:    35.9 °C (96.6 °F)   TempSrc:    Temporal   SpO2:  99% 94% 100%   Weight:       Height:         General appearance: NAD, conversant   Eyes: anicteric sclerae, moist conjunctivae; no lid-lag; PERRLA  HENT: Atraumatic; oropharynx clear with moist mucous membranes and no mucosal ulcerations; normal hard and soft palate  Neck: Trachea midline; FROM, supple, no thyromegaly or lymphadenopathy  Lungs: CTA, with normal respiratory effort and no intercostal retractions  CV: Irregular, no MRGs, no JVD   Abdomen: Soft, non-tender; no masses or HSM  Extremities: 4+ peripheral edema no  extremity lymphadenopathy  Skin: infected pannus skin  Psych: Appropriate affect, alert and oriented to person, place and time    Data:  Lipids:   Lab Results   Component Value Date/Time    CHOLSTRLTOT 115 04/28/2022 10:21 AM    TRIGLYCERIDE 55 04/28/2022 10:21 AM    HDL 54 04/28/2022 10:21 AM    LDL 50 04/28/2022 10:21 AM        BMP:  Lab Results   Component Value Date/Time    SODIUM 140 09/17/2022 0029    POTASSIUM 4.8 09/17/2022 0029    CHLORIDE 107 09/17/2022 0029    CO2 24 09/17/2022 0029    GLUCOSE 118 (H) 09/17/2022 0029    BUN 45 (H) 09/17/2022 0029    CREATININE 1.37  09/17/2022 0029    CALCIUM 9.4 09/17/2022 0029    ANION 9.0 09/17/2022 0029        TSH:   Lab Results   Component Value Date/Time    TSHULTRASEN 1.740 04/28/2022 0819        THYROXINE (T4):   No results found for: JOSEPHIR     CBC:   Lab Results   Component Value Date/Time    WBC 4.8 09/16/2022 11:10 PM    RBC 3.99 (L) 09/16/2022 11:10 PM    HEMOGLOBIN 10.1 (L) 09/16/2022 11:10 PM    HEMATOCRIT 34.6 (L) 09/16/2022 11:10 PM    MCV 86.7 09/16/2022 11:10 PM    MCH 25.3 (L) 09/16/2022 11:10 PM    MCHC 29.2 (L) 09/16/2022 11:10 PM    RDW 61.1 (H) 09/16/2022 11:10 PM    PLATELETCT 206 09/16/2022 11:10 PM    MPV 10.1 09/16/2022 11:10 PM    NEUTSPOLYS 56.70 09/16/2022 11:10 PM    LYMPHOCYTES 21.70 (L) 09/16/2022 11:10 PM    MONOCYTES 10.10 09/16/2022 11:10 PM    EOSINOPHILS 10.10 (H) 09/16/2022 11:10 PM    BASOPHILS 1.00 09/16/2022 11:10 PM    IMMGRAN 0.40 09/16/2022 11:10 PM    NRBC 0.00 09/16/2022 11:10 PM    NEUTS 2.74 09/16/2022 11:10 PM    LYMPHS 1.05 09/16/2022 11:10 PM    MONOS 0.49 09/16/2022 11:10 PM    EOS 0.49 09/16/2022 11:10 PM    BASO 0.05 09/16/2022 11:10 PM    IMMGRANAB 0.02 09/16/2022 11:10 PM    NRBCAB 0.00 09/16/2022 11:10 PM        CBC w/o DIFF  Lab Results   Component Value Date/Time    WBC 4.8 09/16/2022 11:10 PM    RBC 3.99 (L) 09/16/2022 11:10 PM    HEMOGLOBIN 10.1 (L) 09/16/2022 11:10 PM    MCV 86.7 09/16/2022 11:10 PM    MCH 25.3 (L) 09/16/2022 11:10 PM    MCHC 29.2 (L) 09/16/2022 11:10 PM    RDW 61.1 (H) 09/16/2022 11:10 PM    MPV 10.1 09/16/2022 11:10 PM       Prior echo/stress results reviewed: Normal EF, chronic stable posterior effusion    EKG interpreted by me: Afib    Telemetry review: bradycardia with 10 sec pause    Impression/Plan:  1. Acute on chronic congestive heart failure, unspecified heart failure type (HCC)        2. Shortness of breath        3. Hypoxia          HFpEF  AS s/p TAVR  Permanent Afib  Syncope with bradycardia    - Syncope with pause is concerning, QRS not wide at  baseline so may have been vagal in nature but it is hard to say for sure with her in atrial fibrillation, regardless with the TAVR in situ I favor being cautious and implanting PPM. Given permanent Afib and infectious issues I recommend leadless Micra pacemaker.  - The risk, benefits, and alternatives to pacemaker placement were discussed in great detail, specific risks mentioned including bleeding, infection, cardiac perforation with possible tamponade requiring pericardiocentesis or open heart surgery.  In addition the possibility of lead dislodgment, pneumothorax, hemothorax were discussed. Also mentioned were the possibility of death, stroke, and myocardial infarction. The patient verbalized understanding of these potential complications and wishes to proceed with this procedure.   - Hold OAC for now, supratherapeutic INR and plan for micra insert as above  - Diuresis as tolerated for HFpEF, although it seems she is third-spacing and may not be as volume-up intravascularly.     Will tentatively plan on Micra on Monday if we can secure anesthesia      Shaun Lacey MD  Cardiac Electrophysiology

## 2022-09-18 NOTE — PROGRESS NOTES
Inpatient Anticoagulation Service Note    Date: 9/18/2022    Reason for Anticoagulation: Atrial Fibrillation   Target INR: 2.0 to 3.0  [id number] VASc Score: 3  HAS-BLED Score: 1   Hemoglobin Value: (!) 10  Hematocrit Value: (!) 35.2  Lab Platelet Value: (!) 152    INR from last 7 days       Date/Time INR Value    09/18/22 0144 3    09/17/22 0815 3.29          Dose from last 7 days       Date/Time Dose (mg)    09/18/22 1044 0    09/17/22 1555 1    09/17/22 0411 2.5          Average Dose (mg):  (5mg on Tuesdays and 2.5mg all other days of the week)  Significant Interactions: Not Applicable  Bridge Therapy: No     Reversal Agent Administered: Not Applicable  Comments: Patient's warfarin has been held for PPM planned tentatively for Monday. Will monitor for appropriate restart of AC for A.fib    Plan:  Hold warfarin today   Education Material Provided?: No  Pharmacist suggested discharge dosing: Resume home dosing of 5mg Tuesday and 2.5mg all other days. Follow up for repeat INR within 48 hrs of discharge      Carolann Meyer, PharmD

## 2022-09-18 NOTE — PROGRESS NOTES
Tele monitored:     A fib   HR 81-95  0/.10/0    See previous note from Roxana MOREIRA regarding asystole event

## 2022-09-19 PROBLEM — F41.9 ANXIETY: Status: ACTIVE | Noted: 2022-01-01

## 2022-09-19 PROBLEM — I50.9 EDEMA DUE TO CONGESTIVE HEART FAILURE (HCC): Status: ACTIVE | Noted: 2022-01-01

## 2022-09-19 PROBLEM — R79.1 SUPRATHERAPEUTIC INR: Status: ACTIVE | Noted: 2022-01-01

## 2022-09-19 NOTE — DIETARY
NUTRITION SERVICES: BMI - Pt with BMI >40 (=Body mass index is 50.52 kg/m².), Class III obesity. Weight loss counseling not appropriate in acute care setting. RECOMMEND - If appropriate at DC please refer to outpatient nutrition services for weight management.

## 2022-09-19 NOTE — ASSESSMENT & PLAN NOTE
INR 3.34 9/18  S/P IV Vitamin K x 1  INR 1.75 today    -Discussed with cardiology, okay to have INR up to 2.5 for pacemaker insertion tomorrow  -One 2 mg dose this evening  -Follow a.m. labs  -Restart when clinically able

## 2022-09-19 NOTE — PROGRESS NOTES
Inpatient Anticoagulation Service Note    Date: 9/19/2022    Reason for Anticoagulation: Atrial Fibrillation   Target INR: 2.0 to 3.0  [id number] VASc Score: 3  HAS-BLED Score: 1   Hemoglobin Value: (!) 10.5  Hematocrit Value: (!) 36.5  Lab Platelet Value: 181    INR from last 7 days       Date/Time INR Value    09/19/22 0213 1.75    09/18/22 1357 3.34    09/18/22 0144 3    09/17/22 0815 3.29          Dose from last 7 days       Date/Time Dose (mg)    09/19/22 1438 2    09/18/22 1044 0    09/17/22 1555 1    09/17/22 0411 2.5          Average Dose (mg):  (5mg Tuesday, 2.5mg all other days)  Significant Interactions: Not Applicable  Bridge Therapy: No     Reversal Agent Administered: Not Applicable  Comments: Patient's warfarin was held last night and reversed with Vitamin K x1 to target an INR <2 for PPM placement. PPM placement moved to tomorrow. INR today is 1.75, but cards is okay with patient getting a small dose of warfarin tonight before procedure. Will give warfarin 1mg tonight. H/h stable. No bleeding noted. Pharmacy will continue to monitor.    Plan:  warfarin 1mg tonight. Recheck INR tomorrow   Education Material Provided?: No  Pharmacist suggested discharge dosing: Resume home dosing of warfarin 5mg on Tuesday and 2.5mg all other days. Patient to follow for repeat INR within 48 hrs of discharge.      Carolann Meyer, PharmD

## 2022-09-19 NOTE — PROGRESS NOTES
0800: Saw patient this morning and needed to give morning meds. Patient was alert and orientated but when patient needed to be repositioned and moved up in bed to take med, patient started to get very anxious and respiratory rate started to increase. Patient had a panic attack and heart increased to 170 but did not sustain. Patient was able to be calmed down after staying with patient for 20 minutes. Patient was cleaned and repositioned.    1000: NP was made aware of elevated heart rate and NP was also notified that cath lab had to push pacemaker procedure for patient back to tomorrow and said she would talk to cardiology and cath lab. NP also aware that patient was having lots of anxiety and NP said she would order something for patient.      1130: Patient had another small anxiety attack and but patient's heart rate remained stable at this time. Patient was able to be cleaned and had a complete linen change. Patient did refuse for us to lower her head though, making it difficult to turn and reposition her.    1430: The patient consented to give her son Jules a call and to be updated on plan of care. Son was notified that procedure for pacemaker would be taking place tomorrow at 1230 and he said he would be by tomorrow morning.     1600: Patient refused further repositionings since pure-wic was working well at this time. Patient was able to take a nap and patient had no other issues at this time.

## 2022-09-19 NOTE — ASSESSMENT & PLAN NOTE
Patient suffers from anxiety attacks  HR will elevate to Afib RVR in the 170s    -PRN vistaril  -Deep breathing exercises

## 2022-09-19 NOTE — PROGRESS NOTES
Cardiology Follow Up Progress Note    Date of Service  9/19/2022    Attending Physician  LUIS Boogie*    HPI  Rachelle Reina is a 72 y.o. female admitted 9/16/2022 with a past medical history of TAVR 8/15/2022, permanent atrial fibrillation, anticoagulation on warfarin, HFpEF, hypertension, dyslipidemia, morbid obesity, KEN on CPAP admitted with symptomatic bradycardia with syncope with 10 seconds of documented asystole 9/17/2022 and acute on chronic HFpEF.    Interim Events  9/19: No cardiac events, easily short of breath, no chest pain, functionally limited very sedentary    Review of Systems  Review of Systems   Respiratory:  Positive for shortness of breath. Negative for chest tightness.    Cardiovascular:  Negative for palpitations.   Gastrointestinal:  Negative for abdominal pain and nausea.   Neurological:  Negative for dizziness and headaches.     Vital signs in last 24 hours  Temp:  [36 °C (96.8 °F)-36.9 °C (98.5 °F)] 36.9 °C (98.5 °F)  Pulse:  [100-122] 106  Resp:  [18-20] 19  BP: (103-116)/(49-70) 103/57  SpO2:  [91 %-96 %] 94 %    Physical Exam  Physical Exam  Constitutional:       General: She is not in acute distress.  HENT:      Head: Normocephalic.   Eyes:      General: No scleral icterus.     Extraocular Movements: Extraocular movements intact.   Cardiovascular:      Rate and Rhythm: Tachycardia present. Rhythm irregular.      Heart sounds: Normal heart sounds, S1 normal and S2 normal. No murmur heard.    No friction rub. No gallop.   Pulmonary:      Effort: Pulmonary effort is normal.      Breath sounds: Normal breath sounds. No wheezing, rhonchi or rales.   Musculoskeletal:      Right lower leg: Edema present.      Left lower leg: Edema present.      Comments: Minimal pretibial edema.   Skin:     General: Skin is warm and dry.   Neurological:      Mental Status: She is alert and oriented to person, place, and time.   Psychiatric:         Behavior: Behavior normal.       Lab  Review  Lab Results   Component Value Date/Time    WBC 5.7 09/19/2022 02:13 AM    RBC 4.10 (L) 09/19/2022 02:13 AM    HEMOGLOBIN 10.5 (L) 09/19/2022 02:13 AM    HEMATOCRIT 36.5 (L) 09/19/2022 02:13 AM    MCV 89.0 09/19/2022 02:13 AM    MCH 25.6 (L) 09/19/2022 02:13 AM    MCHC 28.8 (L) 09/19/2022 02:13 AM    MPV 9.6 09/19/2022 02:13 AM      Lab Results   Component Value Date/Time    SODIUM 144 09/19/2022 02:13 AM    POTASSIUM 3.6 09/19/2022 02:13 AM    CHLORIDE 106 09/19/2022 02:13 AM    CO2 30 09/19/2022 02:13 AM    GLUCOSE 106 (H) 09/19/2022 02:13 AM    BUN 35 (H) 09/19/2022 02:13 AM    CREATININE 1.02 09/19/2022 02:13 AM    CREATININE 0.6 01/27/2009 01:10 PM      Lab Results   Component Value Date/Time    ASTSGOT 32 09/18/2022 01:44 AM    ALTSGPT 15 09/18/2022 01:44 AM     Lab Results   Component Value Date/Time    CHOLSTRLTOT 115 04/28/2022 10:21 AM    LDL 50 04/28/2022 10:21 AM    HDL 54 04/28/2022 10:21 AM    TRIGLYCERIDE 55 04/28/2022 10:21 AM    TROPONINT 59 (H) 09/17/2022 08:15 AM       Recent Labs     09/17/22  0029   NTPROBNP 2231*       Cardiac Imaging and Procedures Review  EKG:  My personal interpretation of the EKG dated 9/19/2022 is atrial fibrillation, rate 95, LAFB, right ventricular hypertrophy, anterior infarction    Echocardiogram: 9/70/2022  Normal left ventricular systolic function.  The left ventricular ejection fraction is visually estimated to be 55%.  Normal right ventricular size and systolic function.  Pt dispalys a bioprosthetic aortic valve with appropriate gradients.  Right ventricular systolic pressure is estimated to be 55 mmHg.  Mild to moderate posterior pericardial effusion without evidence of   tamponade.    CARDIAC CATHETERIZATION 10/30/08  Cardiac catheterization showing no angiographic evidence of coronary artery disease.    CARDIAC CATHETERIZATION 02/27/19  1.  No angiographic evidence of coronary artery disease.  2.  Normal left ventricular systolic function with ejection  fraction of 70%.  3.  Elevated left ventricular end-diastolic pressure.    CARDIAC CATHETERIZATION 6/10/2022  1.  Nonobstructive, two-vessel coronary disease  2.  Severe elevation in left heart filling pressure with left atrial pressure of 32 mmHg  3.  Severe aortic stenosis by noninvasive evaluation, pullback gradient consistent with significant aortic stenosis    Imaging  Chest X-Ray: 9/16/2022  1.  Stable cardiomegaly with pericardial effusion.  2.  Bibasilar atelectasis versus consolidations and suspected right pleural effusion.    Assessment  HFpEF  Atrial fibrillation  Syncope with bradycardia, documented 10-second pause  CAD  TAVR 8/15/2022  KEN  Morbid obesity  Anticoagulation on warfarin  Diabetes mellitus  Hypothyroidism    Recommendation Discussion  For HFpEF, continue IV Lasix, monitor I/O, monitor BMP, continue losartan, add empagliflozin as outpatient, unclear why patient on midodrine, once pacemaker in place can add metoprolol and/or digoxin for better rate control if tolerated  For atrial fibrillation, warfarin on hold for PPM  For CAD, continue rosuvastatin, gemfibrozil, warfarin  For bradycardia, Micra pacemaker per EP    Thank you for allowing me to participate in the care of this patient.      Please contact me with any questions.    Yousif Anand M.D.   Cardiologist, [facility]  [phone]

## 2022-09-19 NOTE — CARE PLAN
The patient is Watcher - Medium risk of patient condition declining or worsening    Shift Goals  Clinical Goals: monitor fluids intake  Patient Goals: sleep  Family Goals: LIZ    Progress made toward(s) clinical / shift goals:    Problem: Depression  Goal: Patient and family/caregiver will verbalize accurate information about at least two of the possible causes of depression, three-four of the signs and symptoms of depression  Outcome: Not Progressing  Note: Pt became very worrisome during linen change and could not idenify the cause of her stress, and had trouble controlling her breathing. She also could not identify methods to help her calm becky, and was not participating in calming methods suggested by staff.        Patient is not progressing towards the following goals:      Problem: Depression  Goal: Patient and family/caregiver will verbalize accurate information about at least two of the possible causes of depression, three-four of the signs and symptoms of depression  Outcome: Not Progressing  Note: Pt became very worrisome during linen change and could not idenify the cause of her stress, and had trouble controlling her breathing. She also could not identify methods to help her calm becky, and was not participating in calming methods suggested by staff.

## 2022-09-19 NOTE — PROGRESS NOTES
Cardiology Follow Up Progress Note    Date of Service  9/19/2022    Attending Physician  LUIS Boogie*    Chief Complaint/reason for Consult:  Bradycardia    HPI  Rachelle Reina is a 72 y.o. female admitted 9/16/2022 with a past medical history significant for for severe AS S/P TAVR (8/15/22), permanent atrial fibrillation, HFpEF, chronic anticoagulation with warfarin.  She was admission with shortness of breath and HFpEF exacerbation.      She was noted to have bradycardia and a significant pause  (10 sec) with associated LOC on telemetry.  Her betablocker was held and EP asked to evaluate in regards to pacing.     Interim Events  Reports panic attack this Am, reports feels calm now.  CV: IRR, mildly tachy, low 100s.  Did have an episode of RVR this am in the setting of anxiety attack.   Diuresing, neg 2.29 L last 24/hr, neg 3.4 L/admission.      Review of Systems  Review of Systems   Constitutional:  Negative for chills, fatigue and fever.   HENT:  Negative for nosebleeds and trouble swallowing.    Eyes: Negative.    Respiratory:  Negative for cough, chest tightness, shortness of breath and wheezing.    Cardiovascular:  Positive for palpitations (early this AM). Negative for chest pain and leg swelling.   Gastrointestinal:  Negative for abdominal pain, blood in stool, nausea and vomiting.   Genitourinary:  Negative for hematuria.   Skin: Negative.    Neurological:  Negative for dizziness, syncope, speech difficulty, weakness, light-headedness and numbness.   Hematological: Negative.    Psychiatric/Behavioral:  The patient is nervous/anxious.      Vital signs in last 24 hours  Temp:  [36 °C (96.8 °F)-36.9 °C (98.5 °F)] 36.6 °C (97.8 °F)  Pulse:  [] 122  Resp:  [18-20] 20  BP: (106-116)/(49-89) 113/49  SpO2:  [91 %-97 %] 91 %    Physical Exam  Physical Exam  Vitals and nursing note reviewed.   Constitutional:       Appearance: Normal appearance.   HENT:      Head: Normocephalic and  atraumatic.      Nose: No congestion.   Eyes:      Extraocular Movements: Extraocular movements intact.      Conjunctiva/sclera: Conjunctivae normal.      Pupils: Pupils are equal, round, and reactive to light.   Cardiovascular:      Rate and Rhythm: Normal rate. Rhythm irregularly irregular.      Pulses: Normal pulses.      Heart sounds: Normal heart sounds. No murmur heard.    No friction rub. No gallop.   Pulmonary:      Effort: Pulmonary effort is normal.      Breath sounds: Normal breath sounds. No wheezing, rhonchi or rales.   Musculoskeletal:         General: Normal range of motion.      Cervical back: Normal range of motion.      Comments: Generalized, taught LE edema.  RLE with some open blisters   Skin:     General: Skin is warm and dry.   Neurological:      Mental Status: She is alert and oriented to person, place, and time.      Gait: Gait normal.   Psychiatric:         Mood and Affect: Mood normal.         Behavior: Behavior normal.         Thought Content: Thought content normal.         Judgment: Judgment normal.       Lab Review  Lab Results   Component Value Date/Time    WBC 5.7 09/19/2022 02:13 AM    RBC 4.10 (L) 09/19/2022 02:13 AM    HEMOGLOBIN 10.5 (L) 09/19/2022 02:13 AM    HEMATOCRIT 36.5 (L) 09/19/2022 02:13 AM    MCV 89.0 09/19/2022 02:13 AM    MCH 25.6 (L) 09/19/2022 02:13 AM    MCHC 28.8 (L) 09/19/2022 02:13 AM    MPV 9.6 09/19/2022 02:13 AM      Lab Results   Component Value Date/Time    SODIUM 144 09/19/2022 02:13 AM    POTASSIUM 3.6 09/19/2022 02:13 AM    CHLORIDE 106 09/19/2022 02:13 AM    CO2 30 09/19/2022 02:13 AM    GLUCOSE 106 (H) 09/19/2022 02:13 AM    BUN 35 (H) 09/19/2022 02:13 AM    CREATININE 1.02 09/19/2022 02:13 AM    CREATININE 0.6 01/27/2009 01:10 PM      Lab Results   Component Value Date/Time    ASTSGOT 32 09/18/2022 01:44 AM    ALTSGPT 15 09/18/2022 01:44 AM     Lab Results   Component Value Date/Time    CHOLSTRLTOT 115 04/28/2022 10:21 AM    LDL 50 04/28/2022 10:21 AM     HDL 54 04/28/2022 10:21 AM    TRIGLYCERIDE 55 04/28/2022 10:21 AM    TROPONINT 59 (H) 09/17/2022 08:15 AM       Recent Labs     09/17/22  0029   NTPROBNP 2231*       Cardiac Imaging and Procedures Review  Echocardiogram:  9/17/22  CONCLUSIONS  Normal left ventricular systolic function.  The left ventricular ejection fraction is visually estimated to be 55%.  Normal right ventricular size and systolic function.  Pt dispalys a bioprosthetic aortic valve with appropriate gradients.  Right ventricular systolic pressure is estimated to be 55 mmHg.  Mild to moderate posterior pericardial effusion without evidence of   tamponade.  Imaging    Assessment/Plan  Bradycardia with pause and LOC  HFpEF, acute on chronic  Permenent AFib  OAC with warfarin    Please contact me with any questions.  Bradycardia with pause resulting in brief LOC  - possible vagal in nature, however with history of recent TAVR and Atrial Fib, recommendation for PPM implant for SSS.  Per Dr Lacey, recommendation for MICRA PM.  Unable to implant PPM today secondary to schedule, she will be scheduled with Dr. Lacey and Anesthesia tomorrow.  I have discussed this with Rachelle this AM and she verbalizes understanding.  All questions at this time answered.    - Betablocker held, anticipate resuming post ppm.   - Please Keep NPO at MN, ok for diet order today.     2. HFpEF  - I/O neg 2 L last 24 hrs.  Suspect bed weight this AM is inaccurate.  Attempt to get standing weight if able.   - Continue strict I/0s.   - Plan to continue IV lasix though today, possible transition to oral torsemide tomorrow, pending volume status assessment.     3. AF:  - Decent rates currently.  Did have RVR with anxiety attack this AM.  Monitor.   - Resume BB post PPM.     4. Chronic anticoagulation:  - INR revered yesterday.  INR 1.75 this AM.  Consider low dose coumadin tonight.  Discussed with primary team.     EP will continue to follow .  Please call with any questions.      MARY ELLEN Antunez.   [facility]  [phone]

## 2022-09-19 NOTE — THERAPY
Occupational Therapy Contact Note      Patient Name: Rachelle Reina  Age:  72 y.o., Sex:  female  Medical Record #: [id number]  Today's Date: 9/19/2022 09/19/22 0915   Interdisciplinary Plan of Care Collaboration   Collaboration Comments OT nabil received, HOLD pending pace maker planned for today, will follow up as able

## 2022-09-19 NOTE — PROGRESS NOTES
This RN and Anthony RN went darren pt room to take pictures of pt's legs after removing her own dressings she had placed PTA. During this encounter pt asked to change linens. Sat pt to edge of bed and she began to have a panic attack. HR at it's highest was 147 bpm and O2 sat dropped to 69%. Oxymask applied at 15L and O2 sat anna back to 100%. Pt's HR gradually lowered back to 100s once linen change was complete. Call light within reach and pt instructed to alert staff for new onset SOB or feelings of anxiety she cannot handle on her own.  Pt now resting at this time.

## 2022-09-19 NOTE — CARE PLAN
The patient is Stable - Low risk of patient condition declining or worsening    Shift Goals: Monitor I&Os; monitor heart rate and rhythm; monitor 02 levels; prepare for upcoming pacemaker procedure; monitor anxiety  Clinical Goals: monitor fluids intake  Patient Goals: sleep and rest  Family Goals: LIZ    Progress made toward(s) clinical / shift goals:  see above    Problem: Knowledge Deficit - Standard  Goal: Patient and family/care givers will demonstrate understanding of plan of care, disease process/condition, diagnostic tests and medications  Outcome: Progressing     Problem: Skin Integrity  Goal: Skin integrity is maintained or improved  Outcome: Progressing     Problem: Respiratory  Goal: Patient will achieve/maintain optimum respiratory ventilation and gas exchange  Outcome: Progressing       Patient is not progressing towards the following goals:      Problem: Psychosocial  Goal: Patient's level of anxiety will decrease  Outcome: Not Progressing

## 2022-09-19 NOTE — PROGRESS NOTES
"Hospital Medicine Daily Progress Note    Date of Service  9/19/2022    Chief Complaint  Rachelle Reina is a 72 y.o. female admitted 9/16/2022 with shortness of breath    Hospital Course  \"Rachelle Reina is a 72 y.o. female with past medical history of CHF, A. Fib, DM, S/p TAVR on 8/15/2022 on warfarin who presented 9/16/2022 with shortness of breath and bilateral lower extremity swelling. She reports she drank a cold drink and later suffered a brain freeze and felt like she could not breath. She reports she has been non complaint with her water pills. She reports using oxygen at home 2 liters. She denies any fever or chills. No other relieving or exacerbating factors were noted.      In the ER, CXR showed stable cardiomegaly with increased interstitial markings concerning for mild fluid overload. She also has a malodorous infection beneath her large pannus.\"    Patient was admitted under observation status for management of her acute on chronic diastolic congestive heart failure.    Interval Problem Update  9/19/2022: Patient seen and examined.  Tells me that she had a panic attack this morning but feels much better now.  Reports that she feels like the Lasix is working and she is not as short of breath.    -Was NPO for pacemaker today, rescheduled until tomorrow  -Reviewed overnight telemetry events, patient had a panic attack and HR increased to Afib 170s, otherwise in 100s  -BP remaining in 110s, tolerating 40 mg IV lasix  -Urine output 1200 ml  -Labs reviewed, Mag 1.7 and K 3.6, replaced  -Cards following, appreciate recommendations  -Discussed with cardiology, okay to give low-dose Coumadin tonight, pharmacy notified  -INR this am 1.75 s/p IV Vitamin K x 1    I have discussed this patient's plan of care and discharge plan at IDT rounds today with Case Management, Nursing, Nursing leadership, and other members of the IDT team.    Consultants/Specialty  cardiology    Code " Status  DNAR/DNI    Disposition  Patient is not medically cleared for discharge.   Anticipate discharge to to home with close outpatient follow-up.  I have placed the appropriate orders for post-discharge needs.    Review of Systems  Review of Systems   Constitutional:  Positive for malaise/fatigue. Negative for chills and fever.   HENT:  Negative for hearing loss.    Eyes:  Negative for blurred vision and double vision.   Respiratory:  Positive for shortness of breath. Negative for cough.    Cardiovascular:  Positive for leg swelling. Negative for chest pain and palpitations.   Gastrointestinal:  Negative for abdominal pain and diarrhea.   Genitourinary:  Negative for dysuria and urgency.   Musculoskeletal:  Positive for back pain.   Skin:  Positive for rash.   Neurological:  Positive for weakness. Negative for dizziness and headaches.   Endo/Heme/Allergies:  Bruises/bleeds easily.   Psychiatric/Behavioral:  Negative for depression. The patient is nervous/anxious.    All other systems reviewed and are negative.     Physical Exam  Temp:  [36 °C (96.8 °F)-36.9 °C (98.5 °F)] 36.9 °C (98.4 °F)  Pulse:  [] 112  Resp:  [18-20] 20  BP: ()/(51-89) 110/51  SpO2:  [93 %-100 %] 93 %    Physical Exam  Vitals and nursing note reviewed.   Constitutional:       Appearance: She is obese. She is ill-appearing.   HENT:      Head: Normocephalic and atraumatic.      Right Ear: External ear normal.      Left Ear: External ear normal.      Nose: Nose normal.      Mouth/Throat:      Mouth: Mucous membranes are dry.      Pharynx: Oropharynx is clear.   Eyes:      General: No scleral icterus.  Cardiovascular:      Rate and Rhythm: Normal rate. Rhythm irregular.      Pulses: Normal pulses.      Comments: Muffled heart sounds  Pulmonary:      Comments: Diminished with bibasilar crackles  Abdominal:      Tenderness: There is no abdominal tenderness.      Comments: Obese, tautness improving, yeast infection under pannus    Musculoskeletal:      Cervical back: Normal range of motion and neck supple.      Comments: Erythema and edema to bilateral thigh level R>L  Several blisters on right calf   Skin:     General: Skin is dry.      Capillary Refill: Capillary refill takes less than 2 seconds.      Coloration: Skin is pale.      Findings: Rash present.   Neurological:      Mental Status: She is alert and oriented to person, place, and time.   Psychiatric:         Mood and Affect: Mood is anxious.       Fluids    Intake/Output Summary (Last 24 hours) at 9/19/2022 0706  Last data filed at 9/19/2022 0059  Gross per 24 hour   Intake 60 ml   Output 1200 ml   Net -1140 ml         Laboratory  Recent Labs     09/16/22  2310 09/18/22  0144 09/19/22  0213   WBC 4.8 4.7* 5.7   RBC 3.99* 3.98* 4.10*   HEMOGLOBIN 10.1* 10.0* 10.5*   HEMATOCRIT 34.6* 35.2* 36.5*   MCV 86.7 88.4 89.0   MCH 25.3* 25.1* 25.6*   MCHC 29.2* 28.4* 28.8*   RDW 61.1* 61.8* 62.4*   PLATELETCT 206 152* 181   MPV 10.1 9.8 9.6       Recent Labs     09/17/22  0029 09/18/22  0144 09/19/22  0213   SODIUM 140 140 144   POTASSIUM 4.8 4.3 3.6   CHLORIDE 107 105 106   CO2 24 28 30   GLUCOSE 118* 97 106*   BUN 45* 40* 35*   CREATININE 1.37 1.27 1.02   CALCIUM 9.4 9.4 9.5       Recent Labs     09/17/22  0815 09/18/22  0144 09/18/22  1357 09/19/22  0213   APTT 44.5*  --   --   --    INR 3.29* 3.00* 3.34* 1.75*                 Imaging  EC-ECHOCARDIOGRAM COMPLETE W/O CONT   Final Result      DX-CHEST-PORTABLE (1 VIEW)   Final Result      1.  Stable cardiomegaly with pericardial effusion.   2.  Bibasilar atelectasis versus consolidations and suspected right pleural effusion.      CL-PACEMAKER MICRA LEADLESS PACING SYSTEM    (Results Pending)   CL-PACEMAKER MICRA LEADLESS PACING SYSTEM    (Results Pending)          Assessment/Plan  * Acute on chronic diastolic congestive heart failure (HCC)- (present on admission)  Assessment & Plan  Fluid overloaded on exam, chest x-ray indicative of  bilateral pleural effusions, elevated BNP  Remains on losartan at half her dose 25 mg  Metoprolol discontinued given 10-second sinus pause  Echocardiogram shows HFpEF, EF 55% and RVSP 55 mmHg  Increased midodrine to 10 mg 3 times daily just so that patient can tolerate diuresis  Tolerating 40 mg IV Lasix BID  Clinically improving today, less dyspneic     -Monitor hypotension  -Monitor urine output    Anxiety  Assessment & Plan  Patient suffers from anxiety attacks  HR will elevate to Afib RVR in the 170s    -PRN vistaril  -Deep breathing exercises    Edema due to congestive heart failure (HCC)  Assessment & Plan  Generalized edema secondary to HFpEF  On warfarin for Afib and TAVR  Right greater than left lower extremity swelling  Per patient, right leg is chronically larger  EARLE in 6/22: 1.63 right, 1.33 left  Right ankle pressures are not accurately measured due to calcification and noncompressibility of the tibial vessels    -Continue Lasix 40 mg IV twice daily  -Monitor fluid volume status    Supratherapeutic INR  Assessment & Plan  INR 3.34 9/18  S/P IV Vitamin K x 1  INR 1.75 today    -Discussed with cardiology, okay to have INR up to 2.5 for pacemaker insertion tomorrow  -One 2 mg dose this evening  -Follow a.m. labs  -Restart when clinically able    Hypotension- (present on admission)  Assessment & Plan  Midodrine 10 mg 3 times daily  Anticipate will improve when fluid volume status improves    Sinus pause  Assessment & Plan  10 second sinus pause 9/17 on telemetry with witnessed syncopal episode  Patient recovered, /54    -Discontinued metoprolol  -Cardiology following, appreciate recommendations  -Continue on telemetry monitoring  -Tentatively scheduled for Micra insertion 9/20/2022    Yeast infection  Assessment & Plan  In abdominal pannus  Topical nystatin TID  Wound team consulted    Hypoxia- (present on admission)  Assessment & Plan  Chronic on home oxygen 2L    Pericardial effusion- (present on  admission)  Assessment & Plan  Known small pericardial effusion     S/P TAVR (transcatheter aortic valve replacement)- (present on admission)  Assessment & Plan  On 8/15/2022  On warfarin, goal INR 2.5  Echo 9/17 EF 55%, aortic gradient appropriate, RVSP 55 mmHg    Obstructive sleep apnea- (present on admission)  Assessment & Plan  Nocturnal BIPAP/CPAP    Hypomagnesemia  Assessment & Plan  Mag 1.7   Already on oral replacement therapy    -2 G IV mag today  -Follow BMP    Anemia- (present on admission)  Assessment & Plan  Anemia of chronic disease  Last iron studies in April indicate mixed anemia of chronic disease and iron deficiency   No signs of bleeding    -Continue home dose of oral iron    Hypokalemia  Assessment & Plan  K 3.6  Replaced    -Follow BMP    Type 2 diabetes mellitus without complication, with long-term current use of insulin (HCC)- (present on admission)  Assessment & Plan  Long acting insulin/Lispro/SSI  Accuchecks and hypoglycemia protocol     Chronic atrial fibrillation- (present on admission)  Assessment & Plan  Atrial fibrillation rate controlled on telemetry  Metoprolol discontinued given 10-second sinus pause  Scheduled for pacemaker insertion tomorrow    -We will resume medications per cardiology postprocedure    Chronic anticoagulation- (present on admission)  Assessment & Plan  On warfarin status post TAVR 8/15/2022  INR goal 2.5 post TAVR  Warfarin per pharmacy    -Held yesterday pending pacemaker insertion tomorrow  -Discussed with cardiology today, recommend low-dose warfarin  -Discussed with pharmacy, will administer 2 mg of warfarin tonight  -Resume full dosing when ok by cardiology    Dyslipidemia- (present on admission)  Assessment & Plan  Continue home dose of Crestor and Lopid     History of hypothyroidism- (present on admission)  Assessment & Plan  Resume Levothyroxine     Class 3 severe obesity due to excess calories with serious comorbidity and body mass index (BMI) of 40.0 to  44.9 in adult (HCC)- (present on admission)  Assessment & Plan  Educate on lifestyle and dietary modification     Benign essential hypertension- (present on admission)  Assessment & Plan  Hypotensive in the setting of fluid volume overload  Decreased dose of home ARB   Beta blocker discontinued    -We will increase as tolerated       VTE prophylaxis: therapeutic anticoagulation with warfarin    ..My total time spent caring for the patient on the day of the encounter was 49 minutes. This does not include time spent on separately billable procedures/tests.     I have performed a physical exam and reviewed and updated ROS and Plan today (9/19/2022). In review of yesterday's note (9/18/2022), there are no changes except as documented above.

## 2022-09-19 NOTE — ASSESSMENT & PLAN NOTE
Generalized edema secondary to HFpEF  On warfarin for Afib and TAVR  Right greater than left lower extremity swelling  Per patient, right leg is chronically larger  EARLE in 6/22: 1.63 right, 1.33 left  Right ankle pressures are not accurately measured due to calcification and noncompressibility of the tibial vessels    -Continue Lasix 40 mg IV twice daily  -Monitor fluid volume status

## 2022-09-19 NOTE — THERAPY
Missed Therapy     Patient Name: Rachelle Reina  Age:  72 y.o., Sex:  female  Medical Record #: [id number]  Today's Date: 9/19/2022    Discussed missed therapy with RN       09/19/22 0901   Interdisciplinary Plan of Care Collaboration   IDT Collaboration with  Nursing   Collaboration Comments PPM placment planned for today, hold.   Session Information   Date / Session Number  9/19-hold, PPM placement today  (EVAL)

## 2022-09-19 NOTE — DISCHARGE PLANNING
Chart review was performed to assist with this discharge note. Pt is currently being treated for severe AS S/P TAVR (8/15/22), permanent atrial fibrillation, HFpEF, chronic anticoagulation with warfarin. Pt is scheduled to receive a pacemaker on 9/20/2022 at 10:30am. Pt will be evaluated for a walker by PT. Pt is on home o2. Pt will likely DC to SNF when medically cleared. RN CM will continue to discuss with D/C team as events evolve.

## 2022-09-19 NOTE — PROGRESS NOTES
Atrial fibrillation with rapid ventricular response. One PVC is present on report page 2 of 4. Page 4 of 4 is showing Afib RVR with heart rate up to 170s. There is significant artifact in leads III and V1.  HR 110s, irregular  P waves not present  QRS 0.10

## 2022-09-20 PROBLEM — J96.21 ACUTE ON CHRONIC RESPIRATORY FAILURE WITH HYPOXIA (HCC): Status: ACTIVE | Noted: 2022-01-01

## 2022-09-20 NOTE — PROGRESS NOTES
Cardiology Follow Up Progress Note    Date of Service  9/20/2022    Attending Physician  LUIS Boogie*    HPI  Rachelle Reina is a 72 y.o. female admitted 9/16/2022 with a past medical history of TAVR 8/15/2022, permanent atrial fibrillation, anticoagulation on warfarin, HFpEF, hypertension, dyslipidemia, morbid obesity, KEN on CPAP admitted with symptomatic bradycardia with syncope with 10 seconds of documented asystole 9/17/2022 and acute on chronic HFpEF.    Interim Events  9/19: No cardiac events, easily short of breath, no chest pain, functionally limited very sedentary  9/20: No cardiac events, unable to lie flat, no chest pain.    Review of Systems  Review of Systems   Respiratory:  Positive for shortness of breath. Negative for chest tightness.    Cardiovascular:  Negative for palpitations.   Gastrointestinal:  Negative for abdominal pain and nausea.   Neurological:  Negative for dizziness and headaches.     Vital signs in last 24 hours  Temp:  [36.6 °C (97.9 °F)-37.2 °C (98.9 °F)] 36.8 °C (98.2 °F)  Pulse:  [105-119] 113  Resp:  [16-19] 18  BP: ()/(31-58) 105/46  SpO2:  [92 %-99 %] 99 %    Physical Exam  Physical Exam  Constitutional:       General: She is not in acute distress.  HENT:      Head: Normocephalic.   Eyes:      General: No scleral icterus.     Extraocular Movements: Extraocular movements intact.   Cardiovascular:      Rate and Rhythm: Tachycardia present. Rhythm irregular.      Heart sounds: Normal heart sounds, S1 normal and S2 normal. No murmur heard.    No friction rub. No gallop.   Pulmonary:      Effort: Pulmonary effort is normal.      Breath sounds: Normal breath sounds. No wheezing, rhonchi or rales.   Musculoskeletal:      Right lower leg: No edema.      Left lower leg: No edema.      Comments: Minimal pretibial edema.   Skin:     General: Skin is warm and dry.   Neurological:      Mental Status: She is alert and oriented to person, place, and time.    Psychiatric:         Behavior: Behavior normal.       Lab Review  Lab Results   Component Value Date/Time    WBC 5.6 09/20/2022 08:01 AM    RBC 3.84 (L) 09/20/2022 08:01 AM    HEMOGLOBIN 9.8 (L) 09/20/2022 08:01 AM    HEMATOCRIT 33.6 (L) 09/20/2022 08:01 AM    MCV 87.5 09/20/2022 08:01 AM    MCH 25.5 (L) 09/20/2022 08:01 AM    MCHC 29.2 (L) 09/20/2022 08:01 AM    MPV 9.5 09/20/2022 08:01 AM      Lab Results   Component Value Date/Time    SODIUM 146 (H) 09/20/2022 08:01 AM    POTASSIUM 3.6 09/20/2022 08:01 AM    CHLORIDE 103 09/20/2022 08:01 AM    CO2 33 09/20/2022 08:01 AM    GLUCOSE 114 (H) 09/20/2022 08:01 AM    BUN 28 (H) 09/20/2022 08:01 AM    CREATININE 0.90 09/20/2022 08:01 AM    CREATININE 0.6 01/27/2009 01:10 PM      Lab Results   Component Value Date/Time    ASTSGOT 32 09/18/2022 01:44 AM    ALTSGPT 15 09/18/2022 01:44 AM     Lab Results   Component Value Date/Time    CHOLSTRLTOT 115 04/28/2022 10:21 AM    LDL 50 04/28/2022 10:21 AM    HDL 54 04/28/2022 10:21 AM    TRIGLYCERIDE 55 04/28/2022 10:21 AM    TROPONINT 59 (H) 09/17/2022 08:15 AM       No results for input(s): NTPROBNP in the last 72 hours.      Cardiac Imaging and Procedures Review  EKG:  My personal interpretation of the EKG dated 9/19/2022 is atrial fibrillation, rate 95, LAFB, right ventricular hypertrophy, anterior infarction    Echocardiogram: 9/70/2022  Normal left ventricular systolic function.  The left ventricular ejection fraction is visually estimated to be 55%.  Normal right ventricular size and systolic function.  Pt dispalys a bioprosthetic aortic valve with appropriate gradients.  Right ventricular systolic pressure is estimated to be 55 mmHg.  Mild to moderate posterior pericardial effusion without evidence of   tamponade.    CARDIAC CATHETERIZATION 10/30/08  Cardiac catheterization showing no angiographic evidence of coronary artery disease.    CARDIAC CATHETERIZATION 02/27/19  1.  No angiographic evidence of coronary artery  disease.  2.  Normal left ventricular systolic function with ejection fraction of 70%.  3.  Elevated left ventricular end-diastolic pressure.    CARDIAC CATHETERIZATION 6/10/2022  1.  Nonobstructive, two-vessel coronary disease  2.  Severe elevation in left heart filling pressure with left atrial pressure of 32 mmHg  3.  Severe aortic stenosis by noninvasive evaluation, pullback gradient consistent with significant aortic stenosis    Imaging  Chest X-Ray: 9/16/2022  1.  Stable cardiomegaly with pericardial effusion.  2.  Bibasilar atelectasis versus consolidations and suspected right pleural effusion.    Assessment  HFpEF  Atrial fibrillation  Syncope with bradycardia, documented 10-second pause  CAD  TAVR 8/15/2022  KEN  Morbid obesity  Anticoagulation on warfarin  Diabetes mellitus  Hypothyroidism    Recommendation Discussion  For HFpEF, UO 1900 cc, still short of breath with some orthopnea, will give additional furosemide 40 mg IV, continue IV furosemide 40 mg twice daily, monitor I/O, monitor BMP, continue losartan, add empagliflozin as outpatient, unclear why patient on midodrine, once pacemaker in place can add metoprolol and/or digoxin for better rate control if tolerated  For atrial fibrillation, warfarin on hold for PPM  For CAD, continue rosuvastatin, gemfibrozil, warfarin  For bradycardia, Micra pacemaker per EP    Discussed treatment plan and additional IV Lasix with bedside RN    Thank you for allowing me to participate in the care of this patient.    Please contact me with any questions.    Yousif Anand M.D.   Cardiologist, [facility]  [phone]

## 2022-09-20 NOTE — ANESTHESIA POSTPROCEDURE EVALUATION
Patient: Rachelle Reina    Procedure Summary     Date: 09/20/22 Room / Location: [facility] - CATH LAB [facility]    Anesthesia Start: 1328 Anesthesia Stop: 1520    Procedures:       CL CATH LAB 60      CL-PERMANENT PACEMAKER INSERTION Diagnosis:       Hypoxia      Hypotension      Hypoxia      Hypotension      Hypoxia      Hypotension      (See Assoicated Dx)    Scheduled Providers: Yousif Angel M.D.; Shaun Lacey M.D. Responsible Provider: Yousif Angel M.D.    Anesthesia Type: general ASA Status: 4          Final Anesthesia Type: general  Last vitals  BP   Blood Pressure : 104/64    Temp   36.1 °C (97 °F)    Pulse   (!) 130   Resp   20    SpO2   93 %      Anesthesia Post Evaluation    Patient location during evaluation: PACU  Patient participation: complete - patient participated  Level of consciousness: awake and alert  Pain score: 0    Airway patency: patent  Anesthetic complications: no (Patient would not tolerate lying flat, converted from MAC to GETA)  Cardiovascular status: hemodynamically stable  Respiratory status: acceptable  Hydration status: euvolemic    PONV: none          No notable events documented.     Nurse Pain Score: 0 (NPRS)

## 2022-09-20 NOTE — OR NURSING
Pt arrives from Cath Lab to PACU at 1513. Pt identification verified by team, pt placed on all monitors with alarms audible, report and care of pt received from Anesthesiologist and RN. Assessment completed, pt changed into hospital gown and provided with warm blankets. Purewick placed.    1537- Call placed to EKG tech.     1550- Xray at bedside.     1555- Call placed to pt son (Jules), update provided.     1630- Call placed again to EKG tech.

## 2022-09-20 NOTE — ANESTHESIA TIME REPORT
Anesthesia Start and Stop Event Times     Date Time Event    9/20/2022 1328 Anesthesia Start     1520 Anesthesia Stop        Responsible Staff  09/20/22    Name Role Begin End    Yousif Angel M.D. Anesth 1328 1520        Overtime Reason:  overtime    Comments:

## 2022-09-20 NOTE — CARE PLAN
The patient is Watcher - Medium risk of patient condition declining or worsening    Shift Goals  Clinical Goals: monitor HR, NPO at midnight for pacemaker, reduce anxiety  Patient Goals: reduce anxiety, sleep  Family Goals: n/a    Progress made toward(s) clinical / shift goals:    Problem: Knowledge Deficit - Standard  Goal: Patient and family/care givers will demonstrate understanding of plan of care, disease process/condition, diagnostic tests and medications  Outcome: Progressing  Note: Patient understands need for NPO status d/t pacemaker placement today.     Problem: Psychosocial  Goal: Patient's level of anxiety will decrease  Outcome: Progressing  Note: Patient reported some anxiety about getting her pacemaker.  Offered patient a PRN anxiety medication to help, patient accepted.  Patient reported medication helped with anxiety and patient able to sleep peacefully.

## 2022-09-20 NOTE — PROGRESS NOTES
"EP consult followup note    DOS: 9/20/2022    Chief complaint/Reason for consult: Syncope    Interval History: 73 y/o F with TAVR in situ, pauses seen on telemetry, permanent afib, for pacemaker. Given an active yeast infection of her pannus the risk of leadless pacemaker through her groin is significant as such will plan PPM transvenous. Pt understanding however she notes she is unable to lie flat without anesthesia.    ROS (+ highlighted in bold):  Constitutional: Fevers/chills/fatigue/weightloss  HEENT: Blurry vision/eye pain/sore throat/hearing loss  Respiratory: Shortness of breath/cough  Cardiovascular: Chest pain/palpitations/edema/orthopnea/syncope  GI: Nausea/vomitting/diarrhea  MSK: Arthralgias/myagias/muscle weakness  Skin: Rash/sores  Neurological: Numbness/tremors/vertigo  Endocrine: Excessive thirst/polyuria/cold intolerance/heat intolerance  Psych: Depression/anxiety    Past Medical History:   Diagnosis Date    A-fib (HCC)     Anemia     Aortic stenosis     Arthritis     knees and fingers    Atrial fibrillation (HCC)     Benign essential hypertension 07/01/2011    Breast cancer (HCC)     Breath shortness     Bronchitis 2015    history of    CAD (coronary artery disease)     Cancer (HCC) 1987, 2000    left breast and cysts on ovaries cancerous    Cataract 2018    IOL bilat    Depression     Diabetes (HCC)     oral medication, insulin    Diastolic congestive heart failure (HCC) 2019    Disorder of thyroid     hypothyroid    High cholesterol     History of cardiac catheterization 04/22/2010    History of echocardiogram 04/22/2011    History of hypothyroidism 08/29/2008    Hypercholesteremia 07/01/2011    Long term (current) use of anticoagulants 07/01/2011    Morbid obesity (HCC) 10/28/2008    Neuropathy     fingers and feet    Ovarian cancer (HCC)     Oxygen dependent     2liter @ HS    Pain 08/09/2022    lower back, \"joints\", 2/10    Sleep apnea 08/09/2022    pt does not use cpap at this time, O2 2 " liters at     Snoring     sleep study done    Urinary incontinence        Past Surgical History:   Procedure Laterality Date    TRANSCATHETER AORTIC VALVE REPLACEMENT Bilateral 8/15/2022    Procedure: TRANSCATHETER AORTIC VALVE REPLACEMENT;  Surgeon: Lenin Oakley M.D.;  Location: SURGERY [facility];  Service: Cardiac    ECHOCARDIOGRAM, TRANSESOPHAGEAL, INTRAOPERATIVE N/A 8/15/2022    Procedure: ECHOCARDIOGRAM, TRANSESOPHAGEAL, INTRAOPERATIVE;  Surgeon: Lenin Oakley M.D.;  Location: SURGERY [facility];  Service: Cardiac    ZZZ CARDIAC CATH  02/27/2019    normal coronaries    CATARACT PHACO WITH IOL Right 01/22/2018    Procedure: CATARACT PHACO WITH IOL;  Surgeon: Santosh Holden M.D.;  Location: SURGERY SAME DAY [facility];  Service: Ophthalmology    CATARACT PHACO WITH IOL Left 01/08/2018    Procedure: CATARACT PHACO WITH IOL;  Surgeon: Santosh Holden M.D.;  Location: SURGERY SAME DAY [facility];  Service: Ophthalmology    OTHER ORTHOPEDIC SURGERY Right 2013    hip arthroplasty    CHOLECYSTECTOMY  2001    ABDOMINAL HYSTERECTOMY TOTAL      1987    MASTECTOMY Left partial    MI RADIATION THERAPY PLAN SIMPLE         Social History     Socioeconomic History    Marital status:      Spouse name: Not on file    Number of children: Not on file    Years of education: Not on file    Highest education level: Not on file   Occupational History    Not on file   Tobacco Use    Smoking status: Never    Smokeless tobacco: Never   Vaping Use    Vaping Use: Never used   Substance and Sexual Activity    Alcohol use: Yes     Comment: 1 or 2 drinks a month    Drug use: Not Currently    Sexual activity: Not on file   Other Topics Concern    Not on file   Social History Narrative    Not on file     Social Determinants of Health     Financial Resource Strain: Not on file   Food Insecurity: Not on file   Transportation Needs: Not on file   Physical Activity: Not on file   Stress: Not on file   Social  Connections: Not on file   Intimate Partner Violence: Not on file   Housing Stability: Not on file       Family History   Problem Relation Age of Onset    Heart Disease Mother         HEART VALVE REPLACEMENT.    Heart Disease Father         CORONARY ARTERY BYPASS GRAFTS    Cancer Maternal Aunt     Diabetes Brother     Thyroid Brother     Heart Attack Brother     Heart Disease Brother        Allergies   Allergen Reactions    Sulfa Drugs Rash     8/2015 rash and hives         Current Facility-Administered Medications   Medication Dose Route Frequency Provider Last Rate Last Admin    potassium chloride SA (Kdur) tablet 40 mEq  40 mEq Oral DAILY Salena Pulido, A.P.R.N.   40 mEq at 09/20/22 0444    hydrOXYzine HCl (ATARAX) tablet 50 mg  50 mg Oral TID PRN Salena Pulido A.P.R.N.   50 mg at 09/20/22 0503    magnesium oxide tablet 400 mg  400 mg Oral DAILY Salena Pulido A.P.R.N.   400 mg at 09/20/22 0445    furosemide (LASIX) injection 40 mg  40 mg Intravenous BID DIURETIC Salena Pulido A.P.R.N.   40 mg at 09/20/22 0445    midodrine (PROAMATINE) tablet 10 mg  10 mg Oral TID WITH MEALS TALIB Boogie.P.R.N.   10 mg at 09/20/22 0808    allopurinol (ZYLOPRIM) tablet 100 mg  100 mg Oral QHS Vipul Ybarra M.D.   100 mg at 09/19/22 2012    citalopram (CeleXA) tablet 20 mg  20 mg Oral JANET Ybarra M.D.   20 mg at 09/20/22 0445    ferrous sulfate tablet 325 mg  325 mg Oral BID Vipul Ybarra M.D.   325 mg at 09/20/22 0444    gemfibrozil (LOPID) tablet 600 mg  600 mg Oral JANET Ybarra M.D.   600 mg at 09/20/22 0444    levothyroxine (SYNTHROID) tablet 200 mcg  200 mcg Oral Once per day on Mon Tue Wed Thu Fri Sat Vipul Ybarra M.D.   200 mcg at 09/20/22 0444    nystatin (MYCOSTATIN) powder 100,000 Units  1 Application Topical TID Vipul Ybarra M.D.   100,000 Units at 09/20/22 0504    rosuvastatin (CRESTOR) tablet 20 mg  20 mg Oral Q EVENING Vipul Ybarra M.D.   20 mg at  09/19/22 1717    MD Alert...Warfarin per Pharmacy   Other PHARMACY TO DOSE Vipul Ybarra M.D.        levothyroxine (SYNTHROID) tablet 175 mcg  175 mcg Oral Once per day on Sun Vipul Ybarra M.D.   175 mcg at 09/18/22 0525    insulin GLARGINE (Lantus,Semglee) injection  5 Units Subcutaneous Q EVENING Vipul Ybarra M.D.   5 Units at 09/18/22 1726    And    insulin lispro (AdmeLOG,HumaLOG) injection  1-6 Units Subcutaneous Q6HRS Vipul Ybarra M.D.        And    dextrose 10 % BOLUS 25 g  25 g Intravenous Q15 MIN PRN Vipul Ybarra M.D.   25 g at 09/18/22 1220    [Held by provider] losartan (COZAAR) tablet 25 mg  25 mg Oral QHS Salena Pulido, A.P.R.N.   25 mg at 09/18/22 2006       Physical Exam:  Vitals:    09/19/22 1949 09/19/22 2341 09/20/22 0403 09/20/22 0732   BP: (!) 99/51 104/54 118/58 105/46   Pulse: (!) 114 (!) 105 (!) 117 (!) 113   Resp: 18 16 18 18   Temp: 37.2 °C (98.9 °F) 36.6 °C (97.9 °F) 36.6 °C (97.9 °F) 36.8 °C (98.2 °F)   TempSrc: Temporal Temporal Temporal Temporal   SpO2: 95% 95% 92% 99%   Weight:       Height:         General appearance: NAD, conversant   Eyes: anicteric sclerae, moist conjunctivae; no lid-lag; PERRLA  HENT: Atraumatic; oropharynx clear with moist mucous membranes and no mucosal ulcerations; normal hard and soft palate  Neck: Trachea midline; FROM, supple, no thyromegaly or lymphadenopathy  Lungs: CTA, with normal respiratory effort and no intercostal retractions  CV: RRR, no MRGs, no JVD  Abdomen: Soft, non-tender; no masses or HSM  Extremities: No peripheral edema or extremity lymphadenopathy  Skin: Normal temperature, turgor and texture; no rash, ulcers or subcutaneous nodules  Psych: Appropriate affect, alert and oriented to person, place and time    Data:  Lipids:   Lab Results   Component Value Date/Time    CHOLSTRLTOT 115 04/28/2022 10:21 AM    TRIGLYCERIDE 55 04/28/2022 10:21 AM    HDL 54 04/28/2022 10:21 AM    LDL 50 04/28/2022 10:21 AM        BMP:  Lab  Results   Component Value Date/Time    SODIUM 146 (H) 09/20/2022 0801    POTASSIUM 3.6 09/20/2022 0801    CHLORIDE 103 09/20/2022 0801    CO2 33 09/20/2022 0801    GLUCOSE 114 (H) 09/20/2022 0801    BUN 28 (H) 09/20/2022 0801    CREATININE 0.90 09/20/2022 0801    CALCIUM 9.4 09/20/2022 0801    ANION 10.0 09/20/2022 0801        TSH:   Lab Results   Component Value Date/Time    TSHULTRASEN 1.740 04/28/2022 0819        THYROXINE (T4):   No results found for: FREEDIR     CBC:   Lab Results   Component Value Date/Time    WBC 5.6 09/20/2022 08:01 AM    RBC 3.84 (L) 09/20/2022 08:01 AM    HEMOGLOBIN 9.8 (L) 09/20/2022 08:01 AM    HEMATOCRIT 33.6 (L) 09/20/2022 08:01 AM    MCV 87.5 09/20/2022 08:01 AM    MCH 25.5 (L) 09/20/2022 08:01 AM    MCHC 29.2 (L) 09/20/2022 08:01 AM    RDW 59.8 (H) 09/20/2022 08:01 AM    PLATELETCT 171 09/20/2022 08:01 AM    MPV 9.5 09/20/2022 08:01 AM    NEUTSPOLYS 56.70 09/16/2022 11:10 PM    LYMPHOCYTES 21.70 (L) 09/16/2022 11:10 PM    MONOCYTES 10.10 09/16/2022 11:10 PM    EOSINOPHILS 10.10 (H) 09/16/2022 11:10 PM    BASOPHILS 1.00 09/16/2022 11:10 PM    IMMGRAN 0.40 09/16/2022 11:10 PM    NRBC 0.00 09/16/2022 11:10 PM    NEUTS 2.74 09/16/2022 11:10 PM    LYMPHS 1.05 09/16/2022 11:10 PM    MONOS 0.49 09/16/2022 11:10 PM    EOS 0.49 09/16/2022 11:10 PM    BASO 0.05 09/16/2022 11:10 PM    IMMGRANAB 0.02 09/16/2022 11:10 PM    NRBCAB 0.00 09/16/2022 11:10 PM        CBC w/o DIFF  Lab Results   Component Value Date/Time    WBC 5.6 09/20/2022 08:01 AM    RBC 3.84 (L) 09/20/2022 08:01 AM    HEMOGLOBIN 9.8 (L) 09/20/2022 08:01 AM    MCV 87.5 09/20/2022 08:01 AM    MCH 25.5 (L) 09/20/2022 08:01 AM    MCHC 29.2 (L) 09/20/2022 08:01 AM    RDW 59.8 (H) 09/20/2022 08:01 AM    MPV 9.5 09/20/2022 08:01 AM       Prior echo/stress reviewed: EF 55%    EKG interpreted by me: Serafin    Impression/Plan:  1. Acute on chronic congestive heart failure, unspecified heart failure type (HCC)        2. Shortness of breath         3. Hypoxia          TAVR in situ  Transient heart block  Pulmonary HTN    - Plan pacemaker, will implant transvenous instead of Micra given yeast infection of groin.  - The risk, benefits, and alternatives to pacemaker placement were discussed in great detail, specific risks mentioned including bleeding, infection, cardiac perforation with possible tamponade requiring pericardiocentesis or open heart surgery.  In addition the possibility of lead dislodgment, pneumothorax, hemothorax were discussed. Also mentioned were the possibility of death, stroke, and myocardial infarction. The patient verbalized understanding of these potential complications and wishes to proceed with this procedure.       Shaun Lacey MD  Cardiac Electrophysiology     Information: Selecting Yes will display possible errors in your note based on the variables you have selected. This validation is only offered as a suggestion for you. PLEASE NOTE THAT THE VALIDATION TEXT WILL BE REMOVED WHEN YOU FINALIZE YOUR NOTE. IF YOU WANT TO FAX A PRELIMINARY NOTE YOU WILL NEED TO TOGGLE THIS TO 'NO' IF YOU DO NOT WANT IT IN YOUR FAXED NOTE.

## 2022-09-20 NOTE — PROGRESS NOTES
"Hospital Medicine Daily Progress Note    Date of Service  9/20/2022    Chief Complaint  Rachelle Reina is a 72 y.o. female admitted 9/16/2022 with shortness of breath    Hospital Course  \"Rachelle Reina is a 72 y.o. female with past medical history of CHF, A. Fib, DM, S/p TAVR on 8/15/2022 on warfarin who presented 9/16/2022 with shortness of breath and bilateral lower extremity swelling. She reports she drank a cold drink and later suffered a brain freeze and felt like she could not breath. She reports she has been non complaint with her water pills. She reports using oxygen at home 2 liters. She denies any fever or chills. No other relieving or exacerbating factors were noted.      In the ER, CXR showed stable cardiomegaly with increased interstitial markings concerning for mild fluid overload. She also has a malodorous infection beneath her large pannus.\"    Patient was admitted under observation status for management of her acute on chronic diastolic congestive heart failure.    Interval Problem Update  9/19/2022: Patient seen and examined.  Tells me that she had a panic attack this morning but feels much better now.  Reports that she feels like the Lasix is working and she is not as short of breath.    -Was NPO for pacemaker today, rescheduled until tomorrow  -Reviewed overnight telemetry events, patient had a panic attack and HR increased to Afib 170s, otherwise in 100s  -BP remaining in 110s, tolerating 40 mg IV lasix  -Urine output 1200 ml  -Labs reviewed, Mag 1.7 and K 3.6, replaced  -Cards following, appreciate recommendations  -Discussed with cardiology, okay to give low-dose Coumadin tonight, pharmacy notified  -INR this am 1.75 s/p IV Vitamin K x 1    9/20: N.p.o. awaiting pacer placement.  Anticipate discharge tomorrow.  May require a new O2 evaluation on discharge.    I have discussed this patient's plan of care and discharge plan at IDT rounds today with Case Management, Nursing, " Nursing leadership, and other members of the IDT team.    Consultants/Specialty  cardiology    Code Status  DNAR/DNI    Disposition  Patient is not medically cleared for discharge.   Anticipate discharge to to home with close outpatient follow-up.  I have placed the appropriate orders for post-discharge needs.    Review of Systems  Review of Systems   Constitutional:  Positive for malaise/fatigue. Negative for chills and fever.   HENT:  Negative for hearing loss.    Eyes:  Negative for blurred vision and double vision.   Respiratory:  Positive for shortness of breath. Negative for cough.    Cardiovascular:  Positive for leg swelling. Negative for chest pain and palpitations.   Gastrointestinal:  Negative for abdominal pain and diarrhea.   Genitourinary:  Negative for dysuria and urgency.   Musculoskeletal:  Positive for back pain.   Skin:  Positive for rash.   Neurological:  Positive for weakness. Negative for dizziness and headaches.   Endo/Heme/Allergies:  Bruises/bleeds easily.   Psychiatric/Behavioral:  Negative for depression. The patient is nervous/anxious.    All other systems reviewed and are negative.     Physical Exam  Temp:  [36.5 °C (97.7 °F)-37.2 °C (98.9 °F)] 37 °C (98.6 °F)  Pulse:  [101-119] 111  Resp:  [16-18] 18  BP: ()/(31-58) 110/55  SpO2:  [92 %-99 %] 98 %    Physical Exam  Vitals and nursing note reviewed.   Constitutional:       General: She is not in acute distress.     Appearance: She is obese. She is ill-appearing.   HENT:      Head: Normocephalic and atraumatic.      Right Ear: External ear normal.      Left Ear: External ear normal.      Nose: Nose normal.      Mouth/Throat:      Pharynx: Oropharynx is clear.   Eyes:      General: No scleral icterus.  Cardiovascular:      Rate and Rhythm: Normal rate. Rhythm irregular.      Pulses: Normal pulses.      Comments: Muffled heart sounds  Pulmonary:      Effort: No respiratory distress.      Comments: Diminished with bibasilar crackles.  On 4L NC  Abdominal:      Tenderness: There is no abdominal tenderness.      Comments: Obese, tautness improving, yeast infection under pannus   Musculoskeletal:      Cervical back: Normal range of motion and neck supple.      Right lower leg: Edema present.      Left lower leg: Edema present.      Comments: Erythema and edema to bilateral thigh level R>L  Several blisters on right calf   Skin:     General: Skin is dry.      Capillary Refill: Capillary refill takes less than 2 seconds.      Coloration: Skin is pale.      Findings: Rash present.   Neurological:      Mental Status: She is alert and oriented to person, place, and time.   Psychiatric:         Mood and Affect: Mood is anxious.         Thought Content: Thought content normal.       Fluids    Intake/Output Summary (Last 24 hours) at 9/20/2022 1310  Last data filed at 9/19/2022 1700  Gross per 24 hour   Intake --   Output 700 ml   Net -700 ml         Laboratory  Recent Labs     09/18/22  0144 09/19/22  0213 09/20/22  0801   WBC 4.7* 5.7 5.6   RBC 3.98* 4.10* 3.84*   HEMOGLOBIN 10.0* 10.5* 9.8*   HEMATOCRIT 35.2* 36.5* 33.6*   MCV 88.4 89.0 87.5   MCH 25.1* 25.6* 25.5*   MCHC 28.4* 28.8* 29.2*   RDW 61.8* 62.4* 59.8*   PLATELETCT 152* 181 171   MPV 9.8 9.6 9.5       Recent Labs     09/18/22  0144 09/19/22  0213 09/20/22  0801   SODIUM 140 144 146*   POTASSIUM 4.3 3.6 3.6   CHLORIDE 105 106 103   CO2 28 30 33   GLUCOSE 97 106* 114*   BUN 40* 35* 28*   CREATININE 1.27 1.02 0.90   CALCIUM 9.4 9.5 9.4       Recent Labs     09/18/22  1357 09/19/22  0213 09/20/22  0801   INR 3.34* 1.75* 1.29*                 Imaging  EC-ECHOCARDIOGRAM COMPLETE W/O CONT   Final Result      DX-CHEST-PORTABLE (1 VIEW)   Final Result      1.  Stable cardiomegaly with pericardial effusion.   2.  Bibasilar atelectasis versus consolidations and suspected right pleural effusion.      CL-PERMANENT PACEMAKER INSERTION    (Results Pending)          Assessment/Plan  * Acute on chronic  diastolic congestive heart failure (HCC)- (present on admission)  Assessment & Plan  Fluid overloaded on exam, chest x-ray indicative of bilateral pleural effusions, elevated BNP  Remains on losartan at half her dose 25 mg  Metoprolol discontinued given 10-second sinus pause  Echocardiogram shows HFpEF, EF 55% and RVSP 55 mmHg  Increased midodrine to 10 mg 3 times daily just so that patient can tolerate diuresis  Tolerating 40 mg IV Lasix BID  Clinically improving today, less dyspneic     -Monitor hypotension  -Monitor urine output    Acute on chronic respiratory failure with hypoxia (HCC)- (present on admission)  Assessment & Plan  Chronic on home oxygen 2L    Anxiety  Assessment & Plan  Patient suffers from anxiety attacks  HR will elevate to Afib RVR in the 170s    -PRN vistaril  -Deep breathing exercises    Edema due to congestive heart failure (HCC)  Assessment & Plan  Generalized edema secondary to HFpEF  On warfarin for Afib and TAVR  Right greater than left lower extremity swelling  Per patient, right leg is chronically larger  EARLE in 6/22: 1.63 right, 1.33 left  Right ankle pressures are not accurately measured due to calcification and noncompressibility of the tibial vessels    -Continue Lasix 40 mg IV twice daily  -Monitor fluid volume status    Supratherapeutic INR  Assessment & Plan  INR 3.34 9/18  S/P IV Vitamin K x 1  INR 1.75 today    -Discussed with cardiology, okay to have INR up to 2.5 for pacemaker insertion tomorrow  -One 2 mg dose this evening  -Follow a.m. labs  -Restart when clinically able    Hypotension- (present on admission)  Assessment & Plan  Midodrine 10 mg 3 times daily  Anticipate will improve when fluid volume status improves    Sinus pause  Assessment & Plan  10 second sinus pause 9/17 on telemetry with witnessed syncopal episode  Patient recovered, /54    -Discontinued metoprolol  -Cardiology following, appreciate recommendations  -Continue on telemetry monitoring  NPO  awaiting Micra    Yeast infection  Assessment & Plan  In abdominal pannus  Topical nystatin TID  Wound team consulted    Pericardial effusion- (present on admission)  Assessment & Plan  Known small pericardial effusion     S/P TAVR (transcatheter aortic valve replacement)- (present on admission)  Assessment & Plan  On 8/15/2022  On warfarin, goal INR 2.5  Echo 9/17 EF 55%, aortic gradient appropriate, RVSP 55 mmHg    Obstructive sleep apnea- (present on admission)  Assessment & Plan  Nocturnal BIPAP/CPAP    Hypomagnesemia  Assessment & Plan  Mag 1.7   Already on oral replacement therapy    -2 G IV mag today  -Follow BMP    Anemia- (present on admission)  Assessment & Plan  Anemia of chronic disease  Last iron studies in April indicate mixed anemia of chronic disease and iron deficiency   No signs of bleeding    -Continue home dose of oral iron    Hypokalemia  Assessment & Plan  K 3.6  Replaced    -Follow BMP    Type 2 diabetes mellitus without complication, with long-term current use of insulin ([facility])- (present on admission)  Assessment & Plan  Long acting insulin/Lispro/SSI  Accuchecks and hypoglycemia protocol     Chronic atrial fibrillation- (present on admission)  Assessment & Plan  Atrial fibrillation rate controlled on telemetry  Metoprolol discontinued given 10-second sinus pause  Scheduled for pacemaker insertion tomorrow    -We will resume medications per cardiology postprocedure    Chronic anticoagulation- (present on admission)  Assessment & Plan  On warfarin status post TAVR 8/15/2022  INR goal 2.5 post TAVR  Warfarin per pharmacy    -Held yesterday pending pacemaker insertion tomorrow  -Discussed with cardiology today, recommend low-dose warfarin  -Discussed with pharmacy, will administer 2 mg of warfarin tonight  -Resume full dosing when ok by cardiology    Dyslipidemia- (present on admission)  Assessment & Plan  Continue home dose of Crestor and Lopid     History of hypothyroidism- (present on  admission)  Assessment & Plan  Resume Levothyroxine     Class 3 severe obesity due to excess calories with serious comorbidity and body mass index (BMI) of 40.0 to 44.9 in adult (HCC)- (present on admission)  Assessment & Plan  Educate on lifestyle and dietary modification     Benign essential hypertension- (present on admission)  Assessment & Plan  Hypotensive in the setting of fluid volume overload  Decreased dose of home ARB   Beta blocker discontinued    -We will increase as tolerated         VTE prophylaxis: therapeutic anticoagulation with warfarin    ..My total time spent caring for the patient on the day of the encounter was 49 minutes. This does not include time spent on separately billable procedures/tests.     I have performed a physical exam and reviewed and updated ROS and Plan today (9/20/2022). In review of yesterday's note (9/19/2022), there are no changes except as documented above.

## 2022-09-20 NOTE — ANESTHESIA PREPROCEDURE EVALUATION
Date/Time: 09/20/22 1130    Scheduled Providers: Yousif Angel M.D.; Shaun Lacey M.D.    Procedures:       CL CATH LAB 60      CL-PERMANENT PACEMAKER INSERTION    Diagnosis:       Hypoxia [R09.02]      Hypotension [I95.9]      Hypoxia [R09.02]      Hypotension [I95.9]      Hypoxia [R09.02]      Hypotension [I95.9]    Indications: See Assoicated Dx    Location: [facility] IMAGING - CATH LAB - [facility]          Relevant Problems   ANESTHESIA   (positive) Obstructive sleep apnea      NEURO   (positive) History of hypothyroidism      CARDIAC   (positive) Acute on chronic diastolic congestive heart failure (HCC)   (positive) Benign essential hypertension   (positive) Chronic atrial fibrillation   (positive) Chronic venous insufficiency   (positive) Coronary artery disease involving native coronary artery of native heart without angina pectoris   (positive) Pulmonary hypertension (HCC)   (positive) Sinus pause      ENDO   (positive) Type 2 diabetes mellitus without complication, with long-term current use of insulin (HCC)       Physical Exam    Airway   Mallampati: III       Cardiovascular   Rhythm: irregular  Rate: abnormal  (+) weak pulses, peripheral edema     Dental - normal exam           Pulmonary   (+) decreased breath sounds, rales     Abdominal   (+) obese     Neurological              Anesthesia Plan    ASA 4   ASA physical status 4 criteria: respiratory failure    Plan - general       Airway plan will be ETT          Induction: intravenous      Pertinent diagnostic labs and testing reviewed    Informed Consent:    Anesthetic plan and risks discussed with patient.    Use of blood products discussed with: patient whom.       Rachelle Reina is a 72 y.o. female here for pacemaker with Dr Lacey.  Their history is remarkable for morbid obesity, acute heart failure, KEN, pulmonary HTN, diastolic heart failure, severe AS s/p TAVR, unable to Lie flat and desaturates to 70s with movement. Plan was  MAC but patient didn't tolerate positioning, severely SOB and painful.  Converted to GA with preinduction A line.      The plan is general anesthesia.  We spoke about the common and rare risks associated with anesthesia.  The patient expressed understanding and wishes to proceed.    Yousif Angel MD  [facility]

## 2022-09-20 NOTE — OP REPORT
PROCEDURE PERFORMED: Permanent Pacemaker Implantation    [facility]    DATE OF SERVICE: 9/20/2022    : Shaun Lacey MD    ASSISTANT: None    ANESTHESIA:   General anesthesia by Dr Angel    MEDICATIONS:  3g Ancef    EBL: 20 cc    SPECIMENS: None    STATEMENT OF MEDICAL NECESSITY:  Intermittent high degree AV block    DESCRIPTION OF PROCEDURE:  After informed written consent, the patient was brought to the electrophysiology lab in the fasting, unsedated state. The patient was prepped and draped in the usual sterile fashion. The procedure was performed under moderate sedation with local anesthetic. A right upper extremity venogram was performed, demonstrating a patent subclavian vein. A right infraclavicular incision was made with a scalpel and the pectoral device pocket was created using a combination of blunt dissection and electrocautery. The modified Seldinger technique was used to gain access to the right axillary vein. One peel-away hemostasis sheath was placed in the vein. Under fluoroscopic guidance, the pacemaker lead was introduced into the heart. The ventricular lead was advanced to the RVOT and then lowered into position at the RV apical septum. The lead was tested and had satisfactory sensing and pacing parameters. High output ventricular pacing did not produce extracardiac stimulation. The lead was sutured to the underlying pectoral muscle with interrupted silk over a silastic suture sleeve. The device pocket was irrigated with antibiotic solution, inspected, and no bleeding was seen. The lead was connected to the pacemaker pulse generator and the device was inserted into the pocket. The wound was closed with three layers of absorbable sutures. Following recovery from sedation, the patient was transferred to a monitored bed in good condition.     IMPLANTED DEVICE INFORMATION:  Pulse generator is a Medtronic model W3SR01  Serial # [id number]    LEAD INFORMATION:  1)Right  ventricular lead is a Medtronic  model #5076  , serial #[id number] ,R wave 8.1 millivolts, threshold 1.0 Volts at 0.5 milliseconds, pacing impedance 703 Ohms.    DEVICE PROGRAMMING:  VVIR 60    FLUOROSCOPY TIME: 1.5 min    IMPRESSIONS:  1. Successful single chamber pacemaker implantation    RECOMMENDATIONS:  1. Transfer to monitored bed  2. PA and lateral chest x-ray  3. Device interrogation prior to hospital discharge  4. IV antibiotics for 2 doses  5. Followup in device clinic

## 2022-09-20 NOTE — PROGRESS NOTES
Patient refused turns overnight d/t anxiety of having another cardiac pause.  Patient adamantly refused despite education.  Patient refuses to lay flat to have linens changed, will only lay about 20 degrees.

## 2022-09-20 NOTE — ANESTHESIA PROCEDURE NOTES
Airway    Date/Time: 9/20/2022 2:10 PM  Performed by: Yousif Angel M.D.  Authorized by: Yousif Angel M.D.     Location:  OR  Urgency:  Elective  Difficult Airway: No    Indications for Airway Management:  Anesthesia      Spontaneous Ventilation: absent    Sedation Level:  Deep  Preoxygenated: Yes    Patient Position:  Sniffing  Mask Difficulty Assessment:  0 - not attempted  Final Airway Type:  Endotracheal airway  Final Endotracheal Airway:  ETT  Cuffed: Yes    Technique Used for Successful ETT Placement:  Direct laryngoscopy    Insertion Site:  Oral  Blade Type:  Glide  Laryngoscope Blade/Videolaryngoscope Blade Size:  3  ETT Size (mm):  7.0  Measured from:  Teeth  ETT to Teeth (cm):  22  Placement Verified by: auscultation and capnometry    Cormack-Lehane Classification:  Grade I - full view of glottis  Number of Attempts at Approach:  1

## 2022-09-20 NOTE — THERAPY
"PT contact note    Patient Name: Rachelle Reina  Age:  72 y.o., Sex:  female  Medical Record #: [id number]  Today's Date: 9/20/2022    Discussed missed therapy with RN; pt with anxiety issues last night refusing turns etc, given meds this am. Pt able to answer questions re PLF, appears that pt has been taking her scooter to meals at her assisted living facility due to unable to walk that far. Still awaiting pacemaker. PT to complete eval once pacemaker plan is clear.       09/20/22 0924   Charge Group   PT Self Care / Home Evaluation  1   Total Time Spent   PT Total Time Yes   PT Self Care/Home Evaluation Time Spent (Mins) 8   PT Total Time Spent (Calculated) 8   Initial Contact Note    Initial Contact Note Order Received and Verified, Physical Therapy Evaluation in Progress with Full Report to Follow.   Prior Living Situation   Prior Services Other (Comments)  (paid laundry assist, meals provided)   Housing / Facility Assisted Living Residence  ([facility])   Steps Into Home 0   Steps In Home 0   Elevator Yes   Equipment Owned Front-Wheel Walker;4-Wheel Walker;Scooter   Lives with - Patient's Self Care Capacity Other (Comments)   Prior Level of Functional Mobility   Bed Mobility Independent   Transfer Status Independent   Ambulation Independent   Distance Ambulation (Feet)   (short household lately)   Assistive Devices Used Front-Wheel Walker   Comments pt reports taking scooter to meals lately due to unable to walk that far.   History of Falls   History of Falls No  (pt denies falls, \"I'm very aware and careful\")   Cognition    Comments alert   Interdisciplinary Plan of Care Collaboration   IDT Collaboration with  Nursing   Collaboration Comments Per  nsg, pacemaker not yet placed, plan is to increase lasix to take more fluid off. Pt with ongoing anxiety issues, serere last night, given meds this am. PLF obtained, will follow up once PPM is placed.   Session Information   Date / Session Number  9/20-hold, " awaiting PPM

## 2022-09-20 NOTE — ANESTHESIA PROCEDURE NOTES
Arterial Line  Performed by: Yousif Angel M.D.  Authorized by: Yousif Angle M.D.     Start Time:  9/20/2022 2:00 PM  End Time:  9/20/2022 2:05 PM  Localization: ultrasound guidance  Image captured, interpreted and electronically stored.  Patient Location:  OR  Indication: continuous blood pressure monitoring        Catheter Size:  20 G  Seldinger Technique?: Yes    Laterality:  Right  Site:  Radial artery  Line Secured:  E-cath device and transparent dressing  Events: patient tolerated procedure well with no complications

## 2022-09-21 NOTE — PROGRESS NOTES
Inpatient Anticoagulation Service Note    Date: 9/21/2022    Reason for Anticoagulation: Atrial Fibrillation & TAVR   Target INR: 2.0 to 3.0    [id number] VASc Score: 3  HAS-BLED Score: 1     Hemoglobin Value: (!) 10.1  Hematocrit Value: (!) 34.5  Lab Platelet Value: 168    INR from last 7 days       Date/Time INR Value    09/21/22 0224 1.35    09/20/22 0801 1.29    09/19/22 0213 1.75    09/18/22 1357 3.34    09/18/22 0144 3    09/17/22 0815 3.29          Dose from last 7 days       Date/Time Dose (mg)    09/20/22 1715 5    09/19/22 1438 2    09/18/22 1044 0    09/17/22 1555 1    09/17/22 0411 2.5            HPI: 71 yo female admitted with aeCHF + Syncope with pauses & h/o Afib. Cardiology consulted, heart block diagnosis - underwent ppm placement this afternoon 9/20/22. Patient is chronically anticoagulated with warfarin therapy and is managed outpatient by the [facility]. Per records, patient is prescribed Warfarin 5 mg po every Thursday and Warfarin 2.5 mg po on all other days. This was a very recent regimen reduction by [facility] on 9/14/22 d/t SUPRAtherapeutic INRs in clinic. Average time in therapeutic range reported outpatient is 60%.   Patient was previously on warfarin 5 mg po twice a week and warfarin 2.5 mg po on all other days.   Reversal agents administered: Vitamin K 10 mg IV x one given 9/18/22 in preparation for ppm placement.   Bridge therapy: No, not indicated      Assessment: INR remains SUBtherapeutic, anticipate a few days to regain therapeutic range. Warfarin 5 mg dose last night not yet fully reflected in todays INR.   Potential drug-drug interactions identified with acute inpatient medications:   Potential drug-drug interactions identified with chronic home medications: Allopurinol & Celexa which should be established interactions with warfarin therapy.   Inpatient Diet: Cardiac      Plan: Warfarin 2.5 mg po daily. Continue INR monitoring daily for now.      Education Material Provided?:  No (Established warfarin patient)     Pharmacist suggested discharge dosing: Warfarin 5 mg po every Thursday and Warfarin 2.5 mg po on all other days     Skyla Rocha PharmD, BCPS

## 2022-09-21 NOTE — PROGRESS NOTES
Cardiology Follow Up Progress Note    Date of Service  9/21/2022    Attending Physician  LUIS Boogie*    HPI  Rachelle Reina is a 72 y.o. female admitted 9/16/2022 with a past medical history of TAVR 8/15/2022, permanent atrial fibrillation, anticoagulation on warfarin, HFpEF, hypertension, dyslipidemia, morbid obesity, KNE on CPAP admitted with symptomatic bradycardia with syncope with 10 seconds of documented asystole 9/17/2022 and acute on chronic HFpEF.    Interim Events  9/19: No cardiac events, easily short of breath, no chest pain, functionally limited very sedentary  9/20: No cardiac events, unable to lie flat, no chest pain.  9/21: No cardiac symptoms, no SOB, tolerated PPM procedure yesterday    Review of Systems  Review of Systems   Respiratory:  Negative for chest tightness and shortness of breath.    Cardiovascular:  Negative for palpitations.   Gastrointestinal:  Negative for abdominal pain and nausea.   Neurological:  Negative for dizziness and headaches.     Vital signs in last 24 hours  Temp:  [36.1 °C (97 °F)-37.4 °C (99.3 °F)] 37.4 °C (99.3 °F)  Pulse:  [] 103  Resp:  [15-20] 18  BP: ()/(42-73) 110/55  SpO2:  [92 %-98 %] 95 %    Physical Exam  Physical Exam  Constitutional:       General: She is not in acute distress.  HENT:      Head: Normocephalic.   Cardiovascular:      Rate and Rhythm: Tachycardia present. Rhythm irregular.      Heart sounds: Normal heart sounds, S1 normal and S2 normal. No murmur heard.    No friction rub. No gallop.   Pulmonary:      Effort: Pulmonary effort is normal.      Breath sounds: Normal breath sounds. No wheezing, rhonchi or rales.   Chest:      Comments: Right subclavian PPM site with hematoma  Musculoskeletal:      Right lower leg: No edema.      Left lower leg: No edema.      Comments: Minimal pretibial edema.   Skin:     General: Skin is warm and dry.   Neurological:      Mental Status: She is alert and oriented to person, place,  and time.   Psychiatric:         Behavior: Behavior normal.       Lab Review  Lab Results   Component Value Date/Time    WBC 6.0 09/21/2022 02:24 AM    RBC 3.94 (L) 09/21/2022 02:24 AM    HEMOGLOBIN 10.1 (L) 09/21/2022 02:24 AM    HEMATOCRIT 34.5 (L) 09/21/2022 02:24 AM    MCV 87.6 09/21/2022 02:24 AM    MCH 25.6 (L) 09/21/2022 02:24 AM    MCHC 29.3 (L) 09/21/2022 02:24 AM    MPV 9.4 09/21/2022 02:24 AM      Lab Results   Component Value Date/Time    SODIUM 143 09/21/2022 02:24 AM    POTASSIUM 3.7 09/21/2022 02:24 AM    CHLORIDE 96 09/21/2022 02:24 AM    CO2 39 (H) 09/21/2022 02:24 AM    GLUCOSE 159 (H) 09/21/2022 02:24 AM    BUN 28 (H) 09/21/2022 02:24 AM    CREATININE 1.12 09/21/2022 02:24 AM    CREATININE 0.6 01/27/2009 01:10 PM      Lab Results   Component Value Date/Time    ASTSGOT 32 09/18/2022 01:44 AM    ALTSGPT 15 09/18/2022 01:44 AM     Lab Results   Component Value Date/Time    CHOLSTRLTOT 115 04/28/2022 10:21 AM    LDL 50 04/28/2022 10:21 AM    HDL 54 04/28/2022 10:21 AM    TRIGLYCERIDE 55 04/28/2022 10:21 AM    TROPONINT 59 (H) 09/17/2022 08:15 AM       No results for input(s): NTPROBNP in the last 72 hours.      Cardiac Imaging and Procedures Review  EKG:  My personal interpretation of the EKG dated 9/19/2022 is atrial fibrillation, rate 95, LAFB, right ventricular hypertrophy, anterior infarction    Echocardiogram: 9/17/2022  Normal left ventricular systolic function.  The left ventricular ejection fraction is visually estimated to be 55%.  Normal right ventricular size and systolic function.  Pt dispalys a bioprosthetic aortic valve with appropriate gradients.  Right ventricular systolic pressure is estimated to be 55 mmHg.  Mild to moderate posterior pericardial effusion without evidence of   tamponade.    CARDIAC CATHETERIZATION 10/30/08  Cardiac catheterization showing no angiographic evidence of coronary artery disease.    CARDIAC CATHETERIZATION 02/27/19  1.  No angiographic evidence of coronary  artery disease.  2.  Normal left ventricular systolic function with ejection fraction of 70%.  3.  Elevated left ventricular end-diastolic pressure.    CARDIAC CATHETERIZATION 6/10/2022  1.  Nonobstructive, two-vessel coronary disease  2.  Severe elevation in left heart filling pressure with left atrial pressure of 32 mmHg  3.  Severe aortic stenosis by noninvasive evaluation, pullback gradient consistent with significant aortic stenosis    Imaging  Chest X-Ray: 9/16/2022  1.  Stable cardiomegaly with pericardial effusion.  2.  Bibasilar atelectasis versus consolidations and suspected right pleural effusion.    Assessment  HFpEF  Atrial fibrillation  Syncope with bradycardia, documented 10-second pause  CAD  TAVR 8/15/2022  KEN  Morbid obesity  Anticoagulation on warfarin  Diabetes mellitus  Hypothyroidism    Recommendation Discussion  For HFpEF, improved, will hold lasix and adjust prn, off losartan for low BP, will try to titrate off midodrine  For atrial fibrillation, rate remains elevated, will start IV digoxin and po metoprolol for more rigorous rate control now that PPM in, restart warfarin, will get follow up EKG  For CAD, continue rosuvastatin, gemfibrozil, warfarin  For ppm, defer to EP    Discussed treatment plan with Dr Parth Burton as well as the patient    Thank you for allowing me to participate in the care of this patient.    Please contact me with any questions.    Yousif Anand M.D.   Cardiologist, [facility]  [phone]

## 2022-09-21 NOTE — ADDENDUM NOTE
Addendum  created 09/20/22 1707 by Yousif Angel M.D.    Intraprocedure Meds edited, Review and Sign - Ready for Procedure

## 2022-09-21 NOTE — CARE PLAN
The patient is Watcher - Medium risk of patient condition declining or worsening    Shift Goals  Clinical Goals: monitor pacemaker insertion site for any new bleeding; turn patient  Patient Goals: keep anxiety at a low level  Family Goals: n/a    Progress made toward(s) clinical / shift goals:    Problem: Knowledge Deficit - Standard  Goal: Patient and family/care givers will demonstrate understanding of plan of care, disease process/condition, diagnostic tests and medications  Outcome: Progressing     Problem: Psychosocial  Goal: Patient's level of anxiety will decrease  Outcome: Progressing  Note: Verbalized little to no anxiety overnight.       Patient is not progressing towards the following goals:      Problem: Skin Integrity  Goal: Skin integrity is maintained or improved  Outcome: Not Progressing  Note: Patient refuses turns and repositioning.  Redness in rohan area not improving.

## 2022-09-21 NOTE — DISCHARGE PLANNING
Case Management Discharge Planning    Admission Date: 9/16/2022  GMLOS: 3.8  ALOS: 4    6-Clicks ADL Score: 19  6-Clicks Mobility Score: 11  PT and/or OT Eval ordered: Yes  Post-acute Referrals Ordered: No  Post-acute Choice Obtained: No  Has referral(s) been sent to post-acute provider:  No      Anticipated Discharge Dispo:      DME Needed: No    Action(s) Taken: Updated Provider/Nurse on Discharge Plan    Escalations Completed: None    Medically Clear: No    Next Steps: RN CM received a call from Missy George ([facility]  @ [phone]) and she said they will resume care if she gets Dc'd home. RN CM will speak with team and f/u with discharge plan as it evolves.    Barriers to Discharge: Medical clearance    Is the patient up for discharge tomorrow: No    11:55am - RN CM spoke to pt and family at bedside. Pt said she is awaiting medical clearance and to receive a pacemaker consult. Pt has all DME at home. Pt has a preferred SNF, if she is Dc'd to a SNF instead of home with . RN CM will speak with team and f/u with discharge plan as it evolves.

## 2022-09-21 NOTE — PROGRESS NOTES
Discussed with Dr. Yousif Anand about the need for midodrine in conjunction with the pt's other medications such as digoxin, spironolactone, and metoprolol. Per MD order, midodrine dose will be halfed and slowly tapered off.

## 2022-09-21 NOTE — CARE PLAN
The patient is Stable - Low risk of patient condition declining or worsening    Shift Goals  Clinical Goals: pacemaker  Patient Goals: minimize anxiety  Family Goals: n/a    Progress made toward(s) clinical / shift goals:  pacemaker placed on R chest, drainage marked on dressing when pt returned from PACU. Denied pain. VSS.

## 2022-09-21 NOTE — PROGRESS NOTES
"Hospital Medicine Daily Progress Note    Date of Service  9/21/2022    Chief Complaint  Rachelle Reina is a 72 y.o. female admitted 9/16/2022 with shortness of breath    Hospital Course  \"Rachelle Reina is a 72 y.o. female with past medical history of CHF, A. Fib, DM, S/p TAVR on 8/15/2022 on warfarin who presented 9/16/2022 with shortness of breath and bilateral lower extremity swelling. She reports she drank a cold drink and later suffered a brain freeze and felt like she could not breath. She reports she has been non complaint with her water pills. She reports using oxygen at home 2 liters. She denies any fever or chills. No other relieving or exacerbating factors were noted.      In the ER, CXR showed stable cardiomegaly with increased interstitial markings concerning for mild fluid overload. She also has a malodorous infection beneath her large pannus.\"    Patient was admitted under observation status for management of her acute on chronic diastolic congestive heart failure.    Interval Problem Update  9/19/2022: Patient seen and examined.  Tells me that she had a panic attack this morning but feels much better now.  Reports that she feels like the Lasix is working and she is not as short of breath.    -Was NPO for pacemaker today, rescheduled until tomorrow  -Reviewed overnight telemetry events, patient had a panic attack and HR increased to Afib 170s, otherwise in 100s  -BP remaining in 110s, tolerating 40 mg IV lasix  -Urine output 1200 ml  -Labs reviewed, Mag 1.7 and K 3.6, replaced  -Cards following, appreciate recommendations  -Discussed with cardiology, okay to give low-dose Coumadin tonight, pharmacy notified  -INR this am 1.75 s/p IV Vitamin K x 1    9/20: N.p.o. awaiting pacer placement.  Anticipate discharge tomorrow.  May require a new O2 evaluation on discharge.  9/21: Pacemaker placed yesterday.  Patient reportedly had A. fib and was suspected to be A. fib with aberrancy today.  " Awaiting cardiology evaluation.  Patient reports feeling better today.  She continues to endorse some lower extremity weakness, PT and OT evaluations pending.    I have discussed this patient's plan of care and discharge plan at IDT rounds today with Case Management, Nursing, Nursing leadership, and other members of the IDT team.    Consultants/Specialty  cardiology    Code Status  DNAR/DNI    Disposition  Patient is not medically cleared for discharge.   Anticipate discharge to to home with close outpatient follow-up.  I have placed the appropriate orders for post-discharge needs.    Review of Systems  Review of Systems   Constitutional:  Positive for malaise/fatigue. Negative for chills and fever.   HENT:  Negative for hearing loss.    Eyes:  Negative for blurred vision and double vision.   Respiratory:  Positive for shortness of breath. Negative for cough.    Cardiovascular:  Negative for chest pain, palpitations and leg swelling.   Gastrointestinal:  Negative for abdominal pain and diarrhea.   Genitourinary:  Negative for dysuria and urgency.   Musculoskeletal:  Positive for back pain.   Neurological:  Positive for weakness. Negative for dizziness and headaches.   Endo/Heme/Allergies:  Bruises/bleeds easily.   Psychiatric/Behavioral:  Negative for depression. The patient is nervous/anxious.    All other systems reviewed and are negative.     Physical Exam  Temp:  [36.1 °C (97 °F)-37.4 °C (99.3 °F)] 37.4 °C (99.3 °F)  Pulse:  [] 103  Resp:  [15-20] 18  BP: ()/(42-73) 110/55  SpO2:  [92 %-95 %] 95 %    Physical Exam  Vitals and nursing note reviewed.   Constitutional:       General: She is not in acute distress.     Appearance: She is obese. She is ill-appearing.   HENT:      Head: Normocephalic and atraumatic.      Right Ear: External ear normal.      Left Ear: External ear normal.      Nose: Nose normal.      Mouth/Throat:      Pharynx: Oropharynx is clear.   Eyes:      General: No scleral  icterus.  Cardiovascular:      Rate and Rhythm: Normal rate. Rhythm irregular.      Pulses: Normal pulses.      Comments: Muffled heart sounds  Pulmonary:      Effort: No respiratory distress.      Comments: Diminished with bibasilar crackles. On 4L NC  Abdominal:      Tenderness: There is no abdominal tenderness.      Comments: Obese, tautness improving, yeast infection under pannus   Musculoskeletal:         General: No tenderness.      Cervical back: Normal range of motion and neck supple.      Right lower leg: Edema present.      Left lower leg: Edema present.      Comments: Erythema and edema to bilateral thigh level R>L  Several blisters on right calf   Skin:     General: Skin is dry.      Capillary Refill: Capillary refill takes less than 2 seconds.      Coloration: Skin is pale.      Findings: Rash present.   Neurological:      Mental Status: She is alert and oriented to person, place, and time.   Psychiatric:         Mood and Affect: Mood is anxious.         Thought Content: Thought content normal.       Fluids    Intake/Output Summary (Last 24 hours) at 9/21/2022 1220  Last data filed at 9/21/2022 0500  Gross per 24 hour   Intake 900 ml   Output 765 ml   Net 135 ml         Laboratory  Recent Labs     09/19/22 0213 09/20/22  0801 09/21/22  0224   WBC 5.7 5.6 6.0   RBC 4.10* 3.84* 3.94*   HEMOGLOBIN 10.5* 9.8* 10.1*   HEMATOCRIT 36.5* 33.6* 34.5*   MCV 89.0 87.5 87.6   MCH 25.6* 25.5* 25.6*   MCHC 28.8* 29.2* 29.3*   RDW 62.4* 59.8* 59.7*   PLATELETCT 181 171 168   MPV 9.6 9.5 9.4       Recent Labs     09/19/22 0213 09/20/22  0801 09/21/22  0224   SODIUM 144 146* 143   POTASSIUM 3.6 3.6 3.7   CHLORIDE 106 103 96   CO2 30 33 39*   GLUCOSE 106* 114* 159*   BUN 35* 28* 28*   CREATININE 1.02 0.90 1.12   CALCIUM 9.5 9.4 9.3       Recent Labs     09/19/22  0213 09/20/22  0801 09/21/22  0224   INR 1.75* 1.29* 1.35*                 Imaging  DX-CHEST-PORTABLE (1 VIEW)   Final Result      1.  No evidence of  pneumothorax   2.  Persistently enlarged cardiac silhouette      DX-CHEST-PORTABLE (1 VIEW)   Final Result      1.  There is a right sided pacemaker in good position with its distal leads overlying the right ventricle.      2.  Marked cardiomegaly.      3.  Small bilateral pleural effusions.      4.  Previous TAVR.      EC-ECHOCARDIOGRAM COMPLETE W/O CONT   Final Result      DX-CHEST-PORTABLE (1 VIEW)   Final Result      1.  Stable cardiomegaly with pericardial effusion.   2.  Bibasilar atelectasis versus consolidations and suspected right pleural effusion.      CL-PERMANENT PACEMAKER INSERTION    (Results Pending)          Assessment/Plan  * Acute on chronic diastolic congestive heart failure (HCC)- (present on admission)  Assessment & Plan  Fluid overloaded on exam, chest x-ray indicative of bilateral pleural effusions, elevated BNP  Remains on losartan at half her dose 25 mg  Metoprolol discontinued given 10-second sinus pause  Echocardiogram shows HFpEF, EF 55% and RVSP 55 mmHg  Increased midodrine to 10 mg 3 times daily just so that patient can tolerate diuresis  Tolerating 40 mg IV Lasix BID  Clinically improving today, less dyspneic     -Monitor hypotension  -Monitor urine output    Acute on chronic respiratory failure with hypoxia (HCC)- (present on admission)  Assessment & Plan  Chronic on home oxygen 2L    Anxiety  Assessment & Plan  Patient suffers from anxiety attacks  HR will elevate to Afib RVR in the 170s    -PRN vistaril  -Deep breathing exercises    Edema due to congestive heart failure (HCC)  Assessment & Plan  Generalized edema secondary to HFpEF  On warfarin for Afib and TAVR  Right greater than left lower extremity swelling  Per patient, right leg is chronically larger  EARLE in 6/22: 1.63 right, 1.33 left  Right ankle pressures are not accurately measured due to calcification and noncompressibility of the tibial vessels    -Continue Lasix 40 mg IV twice daily  -Monitor fluid volume  status    Supratherapeutic INR  Assessment & Plan  INR 3.34 9/18  S/P IV Vitamin K x 1  INR 1.75 today    -Discussed with cardiology, okay to have INR up to 2.5 for pacemaker insertion tomorrow  -One 2 mg dose this evening  -Follow a.m. labs  -Restart when clinically able    Hypotension- (present on admission)  Assessment & Plan  Currently on midodrine 10 mg 3 times daily   Decreased to 5 mg 3 times daily    Sinus pause  Assessment & Plan  10 second sinus pause 9/17 on telemetry with witnessed syncopal episode  Patient recovered, /54    -Discontinued metoprolol  -Cardiology following, appreciate recommendations  -Continue on telemetry monitoring  NPO awaiting Micra    Yeast infection  Assessment & Plan  In abdominal pannus  Topical nystatin TID  Wound team consulted    Pericardial effusion- (present on admission)  Assessment & Plan  Known small pericardial effusion     S/P TAVR (transcatheter aortic valve replacement)- (present on admission)  Assessment & Plan  On 8/15/2022  On warfarin, goal INR 2.5  Echo 9/17 EF 55%, aortic gradient appropriate, RVSP 55 mmHg    Obstructive sleep apnea- (present on admission)  Assessment & Plan  Nocturnal BIPAP/CPAP    Hypomagnesemia  Assessment & Plan  Mag 1.7   Already on oral replacement therapy    -2 G IV mag today  -Follow BMP    Anemia- (present on admission)  Assessment & Plan  Anemia of chronic disease  Last iron studies in April indicate mixed anemia of chronic disease and iron deficiency   No signs of bleeding    -Continue home dose of oral iron    Hypokalemia  Assessment & Plan  K 3.6  Replaced    -Follow BMP    Type 2 diabetes mellitus without complication, with long-term current use of insulin ([facility])- (present on admission)  Assessment & Plan  Long acting insulin/Lispro/SSI  Accuchecks and hypoglycemia protocol     Chronic atrial fibrillation- (present on admission)  Assessment & Plan  Atrial fibrillation rate controlled on telemetry  Metoprolol discontinued given  10-second sinus pause    Status post pacemaker.  Patient started on warfarin    Chronic anticoagulation- (present on admission)  Assessment & Plan  On warfarin status post TAVR 8/15/2022  INR goal 2.5 post TAVR  Warfarin per pharmacy    -Held yesterday pending pacemaker insertion tomorrow  -Discussed with cardiology today, recommend low-dose warfarin  -Discussed with pharmacy, will administer 2 mg of warfarin tonight  Restarted on warfarin    Dyslipidemia- (present on admission)  Assessment & Plan  Continue home dose of Crestor and Lopid     History of hypothyroidism- (present on admission)  Assessment & Plan  Resume Levothyroxine     Class 3 severe obesity due to excess calories with serious comorbidity and body mass index (BMI) of 40.0 to 44.9 in adult (HCC)- (present on admission)  Assessment & Plan  Educate on lifestyle and dietary modification     Benign essential hypertension- (present on admission)  Assessment & Plan  Hypotensive in the setting of fluid volume overload  Decreased dose of home ARB   Beta blocker discontinued    -We will increase as tolerated         VTE prophylaxis: therapeutic anticoagulation with warfarin    ..My total time spent caring for the patient on the day of the encounter was 49 minutes. This does not include time spent on separately billable procedures/tests.     I have performed a physical exam and reviewed and updated ROS and Plan today (9/21/2022). In review of yesterday's note (9/20/2022), there are no changes except as documented above.

## 2022-09-21 NOTE — PROGRESS NOTES
Inpatient Anticoagulation Service Note    Date: 9/20/2022    Reason for Anticoagulation: Atrial Fibrillation & TAVR   Target INR: 2.0 to 3.0    [id number] VASc Score: 3  HAS-BLED Score: 1     Hemoglobin Value: (!) 9.8  Hematocrit Value: (!) 33.6  Lab Platelet Value: 171    INR from last 7 days       Date/Time INR Value    09/20/22 0801 1.29    09/19/22 0213 1.75    09/18/22 1357 3.34    09/18/22 0144 3    09/17/22 0815 3.29          Dose from last 7 days       Date/Time Dose (mg)    09/20/22  5    09/19/22  2    09/18/22  0    09/17/22  1    09/17/22 2.5            HPI: 73 yo female admitted with aeCHF + Syncope with pauses & h/o Afib. Cardiology consulted, heart block diagnosis - underwent ppm placement this afternoon 9/20/22. Patient is chronically anticoagulated with warfarin therapy and is managed outpatient by the [facility]. Per records, patient is prescribed Warfarin 5 mg po every Thursday and Warfarin 2.5 mg po on all other days. This was a very recent regimen reduction by [facility] on 9/14/22 d/t SUPRAtherapeutic INRs in clinic. Average time in therapeutic range reported outpatient is 60%.   Patient was previously on warfarin 5 mg po twice a week and warfarin 2.5 mg po on all other days.     Assessment: INR SUBtherapeutic. Vitamin K 10 mg IV x one given 9/18/22 in preparation for ppm placement. No Bridge therapy indicated.   Potential drug-drug interactions identified with acute inpatient medications: Antibiotics, Cefazolin not anticipated to have a profound effect on INR.   Potential drug-drug interactions identified with chronic home medications: Allopurinol & Celexa which should be established interactions with warfarin therapy.   Inpatient Diet: Cardiac     Plan: Warfarin 5 mg po x one tonight followed by warfarin 2.5 mg po daily. INR monitoring daily for now.     Education Material Provided?: No (Established warfarin patient)    Pharmacist suggested discharge dosing: Warfarin 5 mg po every Thursday  and Warfarin 2.5 mg po on all other days     Skyla Rocha, PharmD, BCPS

## 2022-09-21 NOTE — PROGRESS NOTES
"Tele monitor room called at 12:00 (noon) to inform that the pt had 11 beats of V-tach that \"looked like V-fib\". RN was in the pt's room at the time and pt was awake, alert, oriented x4, and denies any abnormal symptoms. Tele monitor  said she went back into atrial fibrillation with a heart rate of 81. Dr. Burton and Dr. Anand (cardiologist) notified. RN will continue to monitor. Tele monitor  stated that they will put the heart monitor strip into epic for the MD to review   "

## 2022-09-21 NOTE — PROGRESS NOTES
Monitor summary:     Rhythm : AFIB  Rate :   Ectopy : RARE PVC'S    CA=.-  QRS=.07  QT=.-        Per telemetry strip summary from monitor room.

## 2022-09-21 NOTE — PROGRESS NOTES
Brief EP Note    S/P single chamber Medtronic PPM for intermittent high degree AV block 9/20/22 by Dr. Lacey    IMPLANTED DEVICE INFORMATION:  Pulse generator is a Medtronic model W3SR01  Serial # [id number]     LEAD INFORMATION:  1)Right ventricular lead is a Medtronic  model #5076  , serial #[id number] ,R wave 8.1 millivolts, threshold 1.0 Volts at 0.5 milliseconds, pacing impedance 703 Ohms.     DEVICE PROGRAMMING:  VVIR 60     Subjective  Patient laying in bed, no complaints of pain or swelling in right chest. Understands pacer instructions (no showering, limited right arm movement).     Objective  Rhythm this morning if AF, rates 120s overnight, rates <100 since about 5am this morning.     Pacemaker present in right  upper chest. Ecchymosis in surround area. There is scant dried blood on bandage, no swelling or erythema.    A/P  S/P successful  single chamber Medtronic pacemaker implantation     - CXR shows no late signs of PTX, leads appear to be in correct placement.   - EKG and telemetry monitor shows atrial fibrillation  - Pacemaker interrogation today showed normal sensing and function.  - Left upper pacemaker site CDI with tegaderm/gauze, no edema, erythema, or hematoma present.   - Patient has ambulated in hallway without difficulties.  - continue warfarin, INR 1.35 today, no bridging please  - Discussed pacemaker discharge education and care with patient at bedside per below. All pt questions/concerns were answered, patient verbalized understanding.   - Patient will follow up with pacemaker clinic in 1 week for pacemaker check.   Patient will follow up in 1 week at our pacemaker clinic for pacemaker check or sooner if needed. Patient instructed to contact the office with any questions or concerns. Thank you for allowing me to participate in the care of this patient. EP will sign off on this patient.    Future Appointments   Date Time Provider Department Center   9/28/2022  1:45 PM PACER CHECK-[facility]  None   10/12/2022  8:40 AM Lenin Oakley M.D. [facility] None

## 2022-09-21 NOTE — DISCHARGE INSTRUCTIONS
Pacemaker Discharge Instructions/[facility]  1.  No showers for one week; may take sponge bath.  Keep dressing dry & in place until seen at for you follow up visit at the cardiology office.   Report s/s of infection such as warmth/redness/drainage/swelling at site or fever/chills.  2.  No lifting over 10 lbs for six weeks.  3.  Do not raise affected arm above shoulder level or behind head for six weeks.  4.  Avoid excessive pushing, pulling, or twisting for six weeks.  5.  No driving for the first week.  6.  Call our office ([phone]) if you notice any increased swelling, redness, warmth, or drainage at the implant site.  7.  Call our office if you develop fever > 101F or uncontrolled pain.  8.  No MRI's for 6 weeks if you have a MRI compatible device.  9.  No dental work or cleanings for 3 months.  10.  May remove arm sling after one day, but please wear if you have trouble remembering to keep your arm down.  11. Do not place cell phones or mobile devices directly over implanted device.   12. Your follow-up appointments will be done at approximately 1 week, 6 weeks, then every 3-4 months.

## 2022-09-22 NOTE — PROGRESS NOTES
Cardiology Follow Up Progress Note    Date of Service  9/22/2022    Attending Physician  LUIS Boogie*    HPI  Rachelle Reina is a 72 y.o. female admitted 9/16/2022 with a past medical history of TAVR 8/15/2022, permanent atrial fibrillation, anticoagulation on warfarin, HFpEF, hypertension, dyslipidemia, morbid obesity, KEN on CPAP admitted with symptomatic bradycardia with syncope with 10 seconds of documented asystole 9/17/2022 and acute on chronic HFpEF.    Interim Events  9/19: No cardiac events, easily short of breath, no chest pain, functionally limited very sedentary  9/20: No cardiac events, unable to lie flat, no chest pain.  9/21: No cardiac symptoms, no SOB, tolerated PPM procedure yesterday  9/22: /55.  HR 62.  Ambulated slightly yesterday.  No worsening shortness of breath.    Review of Systems  Review of Systems   Respiratory:  Negative for chest tightness and shortness of breath.    Cardiovascular:  Negative for palpitations.   Gastrointestinal:  Negative for abdominal pain and nausea.   Neurological:  Negative for dizziness and headaches.     Vital signs in last 24 hours  Temp:  [35.8 °C (96.5 °F)-36.9 °C (98.5 °F)] 36.1 °C (96.9 °F)  Pulse:  [62-98] 62  Resp:  [18-19] 18  BP: (102-124)/(44-65) 105/55  SpO2:  [91 %-99 %] 99 %    Physical Exam  Physical Exam  Constitutional:       General: She is not in acute distress.  HENT:      Head: Normocephalic.   Cardiovascular:      Rate and Rhythm: Normal rate. Rhythm irregular.      Heart sounds: Normal heart sounds, S1 normal and S2 normal. No murmur heard.    No friction rub. No gallop.   Pulmonary:      Effort: Pulmonary effort is normal.      Breath sounds: Normal breath sounds. No wheezing, rhonchi or rales.   Chest:      Comments: Right subclavian PPM site with hematoma  Musculoskeletal:      Right lower leg: No edema.      Left lower leg: No edema.      Comments:     Skin:     General: Skin is warm and dry.   Neurological:       Mental Status: She is alert and oriented to person, place, and time.   Psychiatric:         Behavior: Behavior normal.       Lab Review  Lab Results   Component Value Date/Time    WBC 7.9 09/22/2022 01:05 AM    RBC 4.17 (L) 09/22/2022 01:05 AM    HEMOGLOBIN 10.7 (L) 09/22/2022 01:05 AM    HEMATOCRIT 38.1 09/22/2022 01:05 AM    MCV 91.4 09/22/2022 01:05 AM    MCH 25.7 (L) 09/22/2022 01:05 AM    MCHC 28.1 (L) 09/22/2022 01:05 AM    MPV 9.5 09/22/2022 01:05 AM      Lab Results   Component Value Date/Time    SODIUM 142 09/22/2022 01:05 AM    POTASSIUM 4.9 09/22/2022 01:05 AM    CHLORIDE 97 09/22/2022 01:05 AM    CO2 36 (H) 09/22/2022 01:05 AM    GLUCOSE 113 (H) 09/22/2022 01:05 AM    BUN 31 (H) 09/22/2022 01:05 AM    CREATININE 1.13 09/22/2022 01:05 AM    CREATININE 0.6 01/27/2009 01:10 PM      Lab Results   Component Value Date/Time    ASTSGOT 30 09/22/2022 01:05 AM    ALTSGPT 5 09/22/2022 01:05 AM     Lab Results   Component Value Date/Time    CHOLSTRLTOT 115 04/28/2022 10:21 AM    LDL 50 04/28/2022 10:21 AM    HDL 54 04/28/2022 10:21 AM    TRIGLYCERIDE 55 04/28/2022 10:21 AM    TROPONINT 59 (H) 09/17/2022 08:15 AM       No results for input(s): NTPROBNP in the last 72 hours.      Cardiac Imaging and Procedures Review  EKG:  My personal interpretation of the EKG dated 9/19/2022 is atrial fibrillation, rate 95, LAFB, right ventricular hypertrophy, anterior infarction    Echocardiogram: 9/17/2022  Normal left ventricular systolic function.  The left ventricular ejection fraction is visually estimated to be 55%.  Normal right ventricular size and systolic function.  Pt dispalys a bioprosthetic aortic valve with appropriate gradients.  Right ventricular systolic pressure is estimated to be 55 mmHg.  Mild to moderate posterior pericardial effusion without evidence of   tamponade.    CARDIAC CATHETERIZATION 10/30/08  Cardiac catheterization showing no angiographic evidence of coronary artery disease.    CARDIAC  "CATHETERIZATION 02/27/19  1.  No angiographic evidence of coronary artery disease.  2.  Normal left ventricular systolic function with ejection fraction of 70%.  3.  Elevated left ventricular end-diastolic pressure.    CARDIAC CATHETERIZATION 6/10/2022  1.  Nonobstructive, two-vessel coronary disease  2.  Severe elevation in left heart filling pressure with left atrial pressure of 32 mmHg  3.  Severe aortic stenosis by noninvasive evaluation, pullback gradient consistent with significant aortic stenosis    Imaging  Chest X-Ray: 9/16/2022  1.  Stable cardiomegaly with pericardial effusion.  2.  Bibasilar atelectasis versus consolidations and suspected right pleural effusion.    Assessment  HFpEF  Atrial fibrillation  Syncope with bradycardia, documented 10-second pause  CAD  TAVR 8/15/2022  KEN  Morbid obesity  Anticoagulation on warfarin  Diabetes mellitus  Hypothyroidism  Anemia     Recommendation Discussion  For HFpEF, improved, seems euvolemic, off midodrine, losartan still held due to relatively low BP, continuing spironolactone, metoprolol, digoxin  For atrial fibrillation, heart rate now well controlled, intermittent V pacing appropriate capture and sensing, continue metoprolol, digoxin, warfarin,  For CAD, continue rosuvastatin, gemfibrozil, warfarin  For ppm, functioning normally with intermittent V pacing, consistent capture and appropriate sensing.  Recommend PT for deconditioning  Cardiology will sign off    Noted: There is been repeated rhythm interpretation by the monitor room indicating \"PVCs\" and \"idioventricular rhythm\" which are actually normal functioning V paced beats and rhythm, 1 strip concerning for ventricular tachycardia or \"V. fib\" which was artifact.    Discussed treatment plan with Dr Parth Burton as well as the patient    Thank you for allowing me to participate in the care of this patient.    Please contact me with any questions.    Yousif Anand M.D.   Cardiologist, [facility]  [phone] - [phone]

## 2022-09-22 NOTE — PROGRESS NOTES
0800 Tele monitor called to report that pt had 4 beats of Vtach with bigemeny couplets. Pt is awake and alert and eating breakfast with no complaints. Notified Dr. Anand, Cardiologist.    0900 - per Dr. Bowman, the beats are ventricular paced beats which come from the ventricle and are of no concern.

## 2022-09-22 NOTE — CARE PLAN
The patient is Stable - Low risk of patient condition declining or worsening    Shift Goals  Clinical Goals: turn patient, maintain adequate oxygenation  Patient Goals: sleep  Family Goals: n/a    Progress made toward(s) clinical / shift goals:    Problem: Skin Integrity  Goal: Skin integrity is maintained or improved  Outcome: Progressing  Note: Q2 turns and repositioning.  Waffle overlay in place.     Problem: Psychosocial  Goal: Patient's level of anxiety will decrease  Outcome: Progressing  Note: No reported anxiety overnight

## 2022-09-22 NOTE — CARE PLAN
The patient is Stable - Low risk of patient condition declining or worsening    Shift Goals  Clinical Goals: monitor pacemaker insertion site, turn, maintain O2 saturation  Patient Goals: rest  Family Goals: n/a    Progress made toward(s) clinical / shift goals:  Pt sleeping this morning, but is easily arousable. After waking, Pt is awake and alert and oriented. Pt was repositioned in bed and sat up to have breakfast. Upon exertion and repositioning, pt's O2 saturation dropped to 88%. Pt's O2 was increased to 4L NC. Pt denies shortness of breathe and any other complications. Pt is now eating breakfast and sitting up in bed. Pt in stable condition    Patient is not progressing towards the following goals:      Problem: Knowledge Deficit - Standard  Goal: Patient and family/care givers will demonstrate understanding of plan of care, disease process/condition, diagnostic tests and medications  Outcome: Progressing     Problem: Skin Integrity  Goal: Skin integrity is maintained or improved  Outcome: Progressing     Problem: Depression  Goal: Patient and family/caregiver will verbalize accurate information about at least two of the possible causes of depression, three-four of the signs and symptoms of depression  Outcome: Progressing     Problem: Fall Risk  Goal: Patient will remain free from falls  Outcome: Progressing     Problem: Psychosocial  Goal: Patient's level of anxiety will decrease  Outcome: Progressing     Problem: Hemodynamics  Goal: Patient's hemodynamics, fluid balance and neurologic status will be stable or improve  Outcome: Progressing     Problem: Respiratory  Goal: Patient will achieve/maintain optimum respiratory ventilation and gas exchange  Outcome: Progressing     Problem: Fluid Volume  Goal: Fluid volume balance will be maintained  Outcome: Progressing     Problem: Nutrition  Goal: Patient's nutritional and fluid intake will be adequate or improve  Outcome: Progressing     Problem: Mobility  Goal:  Patient's capacity to carry out activities will improve  Outcome: Progressing     Problem: Wound/ / Incision Healing  Goal: Patient's wound/surgical incision will decrease in size and heals properly  Outcome: Progressing     Problem: Pain - Standard  Goal: Alleviation of pain or a reduction in pain to the patient’s comfort goal  Outcome: Progressing

## 2022-09-22 NOTE — DISCHARGE PLANNING
Case Management Discharge Planning    Admission Date: 9/16/2022  GMLOS: 5.2  ALOS: 5    6-Clicks ADL Score: 16  6-Clicks Mobility Score: 14  PT and/or OT Eval ordered: Yes  Post-acute Referrals Ordered: Yes  Post-acute Choice Obtained: Yes  Has referral(s) been sent to post-acute provider:  Yes      Anticipated Discharge Dispo:      DME Needed: No    Action(s) Taken: Choice obtained    Escalations Completed: None    Medically Clear: No    Next Steps: Chart review was performed to assist with this discharge note. Pt is currently being treated for severe AS S/P TAVR (8/15/22), permanent atrial fibrillation, HFpEF, chronic anticoagulation with warfarin. Pt is s/p pacemaker implantation. No DME needs, as pt reports having “everything at home.” Pt is on home o2 @ 2LPM, but will likely need another o2 eval. SNF referral placed. RN CM met pt and family at bedside and given SNF choice form.  [facility], [facility]. RN CM will continue to discuss with D/C team as events evolve.    Barriers to Discharge: Medical clearance    Is the patient up for discharge tomorrow: No    4:02pm - RN CM confirmed that pt was accepted by [facility]. RN CM will set up [facility] ride for tomorrow @ 1pm.  LILLIE ALBRIGHT will continue to discuss with D/C team as events evolve.

## 2022-09-22 NOTE — DISCHARGE PLANNING
Received Choice form at 1130  Agency/Facility Name: [facility]  Referral sent per Choice form @ 1140     1515:  Agency[facility]Facility Name: [facility]  Outcome: DPA left a voicemail for [facility] in admissions regarding referral status. DPA requesting a call back.     1540:  Agency/Facility Name: [facility]  Spoke To: Ceci  Outcome: DPA was notified Pt has been accepted to SNF. SNF states they will not have bed available for Pt until next week but suggested to call sister SN[facility].     1545:  Agency/Facility Name: [facility]  Spoke To: Kunal  Outcome: DPA was notified Pt has been accepted. [facility] states she may have a bed available tomororw and will call this DPA around 1000. DPA to follow up.     RN CM notified.     1555:  Agency/Facility Name: [facility]  Spoke To: Kunal  Outcome: DPA was notified SNF will have bed available for Pt tomorrow, 9/23. SNF requesting PASARR be completed before transport. SNF requesting a transport time of 1300.     RN NATALEE notified.     1610:  Agency/Facility Name: [facility]  Spoke To: Kunal  Outcome: DPA notified SNF Pt has PASARR completed. DPA to touchbase in the morning regarding confirmed transport time.     RN CM notified.

## 2022-09-22 NOTE — PROGRESS NOTES
Inpatient Anticoagulation Service Note    Date: 2022    Reason for Anticoagulation: Atrial Fibrillation, s/p TAVR 8/15/2022  Target INR: 2.0 to 3.0  [id number] VASc Score: 6  HAS-BLED Score: 2   Hemoglobin Value: (!) 10.7  Hematocrit Value: 38.1  Lab Platelet Value: 178    INR from last 7 days       Date/Time INR Value    22 0105 1.86    22 0224 1.35    22 0801 1.29    22 0213 1.75    22 1357 3.34    22 0144 3    22 0815 3.29          Dose from last 7 days       Date/Time Dose (mg)    22 0944 2.5    22 1715 5    22 1438 2    22 1044 0    22 1555 1    22 0411 2.5          Average Dose (mg):  (Home dose per med rec: 5 mg on Tues, 2.5 mg all other days)  Significant Interactions: Other (Comments) (gemfibrozil)  Bridge Therapy: No       Comments:   INR with steady increase towards therapeutic. Hgb/hct are stable. Cardiac diet order in place. DDI noted, stable from home. Will resume home regimen and repeat INR in AM.    Plan: 5 mg on , 2.5 mg all other days. INR in AM.   Education Material Provided?: No  Pharmacist suggested discharge dosin mg on , 2.5 mg all other days.     Karis Harvey, PharmD

## 2022-09-22 NOTE — PROGRESS NOTES
"Hospital Medicine Daily Progress Note    Date of Service  9/22/2022    Chief Complaint  Rachelle Reina is a 72 y.o. female admitted 9/16/2022 with shortness of breath    Hospital Course  \"Rachelle Reina is a 72 y.o. female with past medical history of CHF, A. Fib, DM, S/p TAVR on 8/15/2022 on warfarin who presented 9/16/2022 with shortness of breath and bilateral lower extremity swelling. She reports she drank a cold drink and later suffered a brain freeze and felt like she could not breath. She reports she has been non complaint with her water pills. She reports using oxygen at home 2 liters. She denies any fever or chills. No other relieving or exacerbating factors were noted.      In the ER, CXR showed stable cardiomegaly with increased interstitial markings concerning for mild fluid overload. She also has a malodorous infection beneath her large pannus.\"    Patient was admitted under observation status for management of her acute on chronic diastolic congestive heart failure.    Interval Problem Update  9/19/2022: Patient seen and examined.  Tells me that she had a panic attack this morning but feels much better now.  Reports that she feels like the Lasix is working and she is not as short of breath.    -Was NPO for pacemaker today, rescheduled until tomorrow  -Reviewed overnight telemetry events, patient had a panic attack and HR increased to Afib 170s, otherwise in 100s  -BP remaining in 110s, tolerating 40 mg IV lasix  -Urine output 1200 ml  -Labs reviewed, Mag 1.7 and K 3.6, replaced  -Cards following, appreciate recommendations  -Discussed with cardiology, okay to give low-dose Coumadin tonight, pharmacy notified  -INR this am 1.75 s/p IV Vitamin K x 1    9/20: N.p.o. awaiting pacer placement.  Anticipate discharge tomorrow.  May require a new O2 evaluation on discharge.  9/21: Pacemaker placed yesterday.  Patient reportedly had A. fib and was suspected to be A. fib with aberrancy today.  " Awaiting cardiology evaluation.  Patient reports feeling better today.  She continues to endorse some lower extremity weakness, PT and OT evaluations pending.  9/22: Patient was thought to have ectopy: PVCs, idioventricular rhythm, V. Tach.  However after evaluation by cardiology this is not the case with a wrist thumb is actually from the pacemaker.  Patient weaned off of midodrine and she is maintaining her pressures.  Awaiting placement to SNF.    I have discussed this patient's plan of care and discharge plan at IDT rounds today with Case Management, Nursing, Nursing leadership, and other members of the IDT team.    Consultants/Specialty  cardiology    Code Status  DNAR/DNI    Disposition  Patient is not medically cleared for discharge.   Anticipate discharge to to home with close outpatient follow-up.  I have placed the appropriate orders for post-discharge needs.    Review of Systems  Review of Systems   Constitutional:  Negative for chills, fever and malaise/fatigue.   HENT:  Negative for hearing loss.    Eyes:  Negative for blurred vision and double vision.   Respiratory:  Positive for shortness of breath. Negative for cough.    Cardiovascular:  Negative for chest pain, palpitations and leg swelling.   Gastrointestinal:  Negative for abdominal pain and diarrhea.   Genitourinary:  Negative for dysuria and urgency.   Musculoskeletal:  Positive for back pain.   Neurological:  Positive for weakness. Negative for dizziness and headaches.   Endo/Heme/Allergies:  Bruises/bleeds easily.   Psychiatric/Behavioral:  Negative for depression. The patient is nervous/anxious.    All other systems reviewed and are negative.     Physical Exam  Temp:  [35.8 °C (96.5 °F)-36.9 °C (98.5 °F)] 36.1 °C (96.9 °F)  Pulse:  [62-98] 62  Resp:  [18-19] 18  BP: (102-124)/(44-65) 105/55  SpO2:  [91 %-99 %] 99 %    Physical Exam  Vitals and nursing note reviewed.   Constitutional:       General: She is not in acute distress.      Appearance: She is obese. She is ill-appearing.   HENT:      Head: Normocephalic and atraumatic.      Right Ear: External ear normal.      Left Ear: External ear normal.   Eyes:      General: No scleral icterus.  Cardiovascular:      Rate and Rhythm: Normal rate. Rhythm irregular.      Pulses: Normal pulses.      Comments: Muffled heart sounds  Pulmonary:      Effort: No respiratory distress.      Comments: Diminished with bibasilar crackles. On 4L NC  Abdominal:      General: There is no distension.      Tenderness: There is no abdominal tenderness.      Comments: Obese, tautness improving, yeast infection under pannus   Musculoskeletal:         General: No tenderness.      Cervical back: Normal range of motion and neck supple.      Right lower leg: Edema present.      Left lower leg: Edema present.      Comments: Erythema and edema to bilateral thigh level R>L  Several blisters on right calf   Skin:     General: Skin is dry.      Capillary Refill: Capillary refill takes less than 2 seconds.      Coloration: Skin is pale.      Findings: Rash present.   Neurological:      Mental Status: She is alert and oriented to person, place, and time.   Psychiatric:         Mood and Affect: Mood is anxious.         Thought Content: Thought content normal.       Fluids    Intake/Output Summary (Last 24 hours) at 9/22/2022 1310  Last data filed at 9/22/2022 0500  Gross per 24 hour   Intake 250 ml   Output 700 ml   Net -450 ml         Laboratory  Recent Labs     09/20/22  0801 09/21/22  0224 09/22/22  0105   WBC 5.6 6.0 7.9   RBC 3.84* 3.94* 4.17*   HEMOGLOBIN 9.8* 10.1* 10.7*   HEMATOCRIT 33.6* 34.5* 38.1   MCV 87.5 87.6 91.4   MCH 25.5* 25.6* 25.7*   MCHC 29.2* 29.3* 28.1*   RDW 59.8* 59.7* 62.4*   PLATELETCT 171 168 178   MPV 9.5 9.4 9.5       Recent Labs     09/20/22  0801 09/21/22  0224 09/22/22  0105   SODIUM 146* 143 142   POTASSIUM 3.6 3.7 4.9   CHLORIDE 103 96 97   CO2 33 39* 36*   GLUCOSE 114* 159* 113*   BUN 28* 28* 31*    CREATININE 0.90 1.12 1.13   CALCIUM 9.4 9.3 9.6       Recent Labs     09/20/22  0801 09/21/22  0224 09/22/22  0105   INR 1.29* 1.35* 1.86*                 Imaging  DX-CHEST-PORTABLE (1 VIEW)   Final Result      1.  No evidence of pneumothorax   2.  Persistently enlarged cardiac silhouette      DX-CHEST-PORTABLE (1 VIEW)   Final Result      1.  There is a right sided pacemaker in good position with its distal leads overlying the right ventricle.      2.  Marked cardiomegaly.      3.  Small bilateral pleural effusions.      4.  Previous TAVR.      EC-ECHOCARDIOGRAM COMPLETE W/O CONT   Final Result      DX-CHEST-PORTABLE (1 VIEW)   Final Result      1.  Stable cardiomegaly with pericardial effusion.   2.  Bibasilar atelectasis versus consolidations and suspected right pleural effusion.      CL-PERMANENT PACEMAKER INSERTION    (Results Pending)          Assessment/Plan  * Acute on chronic diastolic congestive heart failure (HCC)- (present on admission)  Assessment & Plan  Fluid overloaded on exam, chest x-ray indicative of bilateral pleural effusions, elevated BNP  Remains on losartan at half her dose 25 mg  Metoprolol discontinued given 10-second sinus pause  Echocardiogram shows HFpEF, EF 55% and RVSP 55 mmHg  Increased midodrine to 10 mg 3 times daily just so that patient can tolerate diuresis  Tolerating 40 mg IV Lasix BID  Clinically improving today, less dyspneic     -Monitor hypotension  -Monitor urine output    Acute on chronic respiratory failure with hypoxia (HCC)- (present on admission)  Assessment & Plan  Chronic on home oxygen 2L    Anxiety  Assessment & Plan  Patient suffers from anxiety attacks  HR will elevate to Afib RVR in the 170s    -PRN vistaril  -Deep breathing exercises    Edema due to congestive heart failure (HCC)  Assessment & Plan  Generalized edema secondary to HFpEF  On warfarin for Afib and TAVR  Right greater than left lower extremity swelling  Per patient, right leg is chronically  larger  EARLE in 6/22: 1.63 right, 1.33 left  Right ankle pressures are not accurately measured due to calcification and noncompressibility of the tibial vessels    -Continue Lasix 40 mg IV twice daily  -Monitor fluid volume status    Supratherapeutic INR  Assessment & Plan  INR 3.34 9/18  S/P IV Vitamin K x 1  INR 1.75 today    -Discussed with cardiology, okay to have INR up to 2.5 for pacemaker insertion tomorrow  -One 2 mg dose this evening  -Follow a.m. labs  -Restart when clinically able    Hypotension- (present on admission)  Assessment & Plan  Currently on midodrine 10 mg 3 times daily   Decreased to 5 mg 3 times daily    Sinus pause  Assessment & Plan  10 second sinus pause 9/17 on telemetry with witnessed syncopal episode  Patient recovered, /54    -Discontinued metoprolol  -Cardiology following, appreciate recommendations  -Continue on telemetry monitoring  NPO awaiting Micra    Yeast infection  Assessment & Plan  In abdominal pannus  Topical nystatin TID  Wound team consulted    Pericardial effusion- (present on admission)  Assessment & Plan  Known small pericardial effusion     S/P TAVR (transcatheter aortic valve replacement)- (present on admission)  Assessment & Plan  On 8/15/2022  On warfarin, goal INR 2.5  Echo 9/17 EF 55%, aortic gradient appropriate, RVSP 55 mmHg    Obstructive sleep apnea- (present on admission)  Assessment & Plan  Nocturnal BIPAP/CPAP    Hypomagnesemia  Assessment & Plan  Mag 1.7   Already on oral replacement therapy    -2 G IV mag today  -Follow BMP    Anemia- (present on admission)  Assessment & Plan  Anemia of chronic disease  Last iron studies in April indicate mixed anemia of chronic disease and iron deficiency   No signs of bleeding    -Continue home dose of oral iron    Hypokalemia  Assessment & Plan  K 3.6  Replaced    -Follow BMP    Type 2 diabetes mellitus without complication, with long-term current use of insulin (HCC)- (present on admission)  Assessment &  Plan  Long acting insulin/Lispro/SSI  Accuchecks and hypoglycemia protocol     Chronic atrial fibrillation- (present on admission)  Assessment & Plan  Atrial fibrillation rate controlled on telemetry  Metoprolol discontinued given 10-second sinus pause    Status post pacemaker.  Patient started on warfarin    Chronic anticoagulation- (present on admission)  Assessment & Plan  On warfarin status post TAVR 8/15/2022  INR goal 2.5 post TAVR  Warfarin per pharmacy    -Held yesterday pending pacemaker insertion tomorrow  -Discussed with cardiology today, recommend low-dose warfarin  -Discussed with pharmacy, will administer 2 mg of warfarin tonight  Restarted on warfarin    Dyslipidemia- (present on admission)  Assessment & Plan  Continue home dose of Crestor and Lopid     History of hypothyroidism- (present on admission)  Assessment & Plan  Resume Levothyroxine     Class 3 severe obesity due to excess calories with serious comorbidity and body mass index (BMI) of 40.0 to 44.9 in adult (HCC)- (present on admission)  Assessment & Plan  Educate on lifestyle and dietary modification     Benign essential hypertension- (present on admission)  Assessment & Plan  Hypotensive in the setting of fluid volume overload  Decreased dose of home ARB   Beta blocker discontinued    -We will increase as tolerated         VTE prophylaxis: therapeutic anticoagulation with warfarin    ..My total time spent caring for the patient on the day of the encounter was 49 minutes. This does not include time spent on separately billable procedures/tests.     I have performed a physical exam and reviewed and updated ROS and Plan today (9/22/2022). In review of yesterday's note (9/21/2022), there are no changes except as documented above.

## 2022-09-22 NOTE — THERAPY
"Occupational Therapy   Initial Evaluation     Patient Name: Rachelle Reina  Age:  72 y.o., Sex:  female  Medical Record #: [id number]  Today's Date: 9/21/2022    Precautions: Fall Risk  Comments: PPM 9/20/22    Assessment  Patient is 72 y.o. female with a diagnosis of admitted w/ SOB and LE edema.  Hx of CHF, 2L O2 at home, Afib, DM, TAVR.  Found to have fluid overload.  S/p PPM 9/20/2022.  Pt seen today with her son & sister present.  Pt was educated on PPM precautions which pt could repeat back.  Pt was anxious & fearful about moving & required a lot of encouragement to participate.  Pt refused to sit up in chair for meal.  Pt required Mod A for supine to sit EOB.  Pt stood with CGA & was able to take side steps with FWW.  Pt & family encouraged to have pt sit up at least at the EOB for all meals.  Pt will need Post Acute services prior to D/C home.      Plan    Recommend Occupational Therapy 3 times per week until therapy goals are met for the following treatments:  Adaptive Equipment, Neuro Re-Education / Balance, Self Care/Activities of Daily Living, Therapeutic Activities, and Therapeutic Exercises.    DC Equipment Recommendations: Unable to determine at this time  Discharge Recommendations: Recommend post-acute placement for additional occupational therapy services prior to discharge home     Subjective    \"I Code Blued the other day, I don't want to do that again\"     Objective       09/21/22 1117   Prior Living Situation   Prior Services Intermittent Physical Support for ADL Per Service   Housing / Facility Assisted Living Residence   Steps Into Home 0   Elevator Yes   Bathroom Set up Walk In Shower;Grab Bars   Equipment Owned Front-Wheel Walker;Wheelchair;4-Wheel Walker;Oxygen;Scooter   Lives with - Patient's Self Care Capacity Other (Comments)   Comments pt reported she has had a friend staying with her to help her throughout the day as needed   Prior Level of ADL Function   Self Feeding Independent "   Grooming / Hygiene Independent   Bathing Requires Assist   Dressing Requires Assist   Toileting Requires Assist   Prior Level of IADL Function   Medication Management Requires Assist   Laundry Requires Assist   Kitchen Mobility Requires Assist   Finances Requires Assist   Home Management Requires Assist   Shopping Requires Assist   Cognition    Comments Pt becomes anxious with the thought of any movement.  Pt also reports increased anxiety over the thought of having to get OOB & then BTB.  Pt's fear & anxiety is a limiting factor.   Passive ROM Upper Body   Comments pt with recent PPM RUE   Active ROM Upper Body   Comments Pt educated on PPM precautions to avoid lifting RUE over head   Balance Assessment   Sitting Balance (Static) Fair +   Sitting Balance (Dynamic) Fair +   Standing Balance (Static) Fair -   Standing Balance (Dynamic) Fair -   Weight Shift Sitting Poor   Weight Shift Standing Poor   Comments pt limited by body habitus   Bed Mobility    Supine to Sit Moderate Assist   Sit to Supine   (pt left seated EOB)   Scooting Minimal Assist   Rolling Moderate Assist to Rt.   ADL Assessment   Eating Modified Independent   Grooming Supervision;Seated   Upper Body Dressing Minimal Assist   Lower Body Dressing Maximal Assist   Toileting Maximal Assist   Functional Mobility   Sit to Stand Contact Guard Assist   Bed, Chair, Wheelchair Transfer Refused   Patient / Family Goals   Patient / Family Goal #1 To go to a SNF   Short Term Goals   Short Term Goal # 1 Pt will be SBA for BSC transfers   Short Term Goal # 2 Pt will groom standing at the sink for 10 min with supervision   Short Term Goal # 3 Pt will sit up in chair for 2 meals/day

## 2022-09-23 NOTE — DISCHARGE PLANNING
DC Transport Scheduled    Received request at: [id number]    Transport Company Scheduled:  [employer]  Spoke with Sonya at [facility] to schedule transport.    Scheduled Date: 09/23/2022  Scheduled Time: 1300    Destination: [facility]    Notified care team of scheduled transport via [employer].     If there are any changes needed to the DC transportation scheduled, please contact [facility] at ext. [phone] between the hours of 7189-3274 Mon-Fri. If outside those hours, contact the ED Case Manager at ext. [phone].

## 2022-09-23 NOTE — DISCHARGE PLANNING
Agency/Facility Name: [facility]  Spoke To: Kunal  Outcome: DPA confirmed a transport time of 1300 for today, 9/23, via [facility].     RN CM notified.     1220:  Agency/Facility Name: [facility]  Spoke To: Kunal  Outcome: DPA was notified SNF is still waiting on d/c summary to be completed. DPA to follow up.     RN CM notified.     1240:  Agency/Facility Name: [facility]  Spoke To: Kunal  Outcome: DPA notified SNF d/c summary has been completed.     1450:  Agency/Facility Name: [facility]  Spoke To: Kunal  Outcome: DPA notified SNF Pt cannot transfer to SNF today, 9/23, as they are not medically clear. DPA to follow up on Monday, 9/26.

## 2022-09-23 NOTE — PROGRESS NOTES
Inpatient Anticoagulation Service Note    Date: 9/23/2022    Reason for Anticoagulation: Atrial Fibrillation & TAVR   Target INR: 2.0 to 3.0    [id number] VASc Score: 6  HAS-BLED Score: 2     Hemoglobin Value: (!) 10.7  Hematocrit Value: 38.1  Lab Platelet Value: 178    INR from last 7 days       Date/Time INR Value    09/23/22 0222 2.31    09/22/22 0105 1.86    09/21/22 0224 1.35    09/20/22 0801 1.29    09/19/22 0213 1.75    09/18/22 1357 3.34    09/18/22 0144 3    09/17/22 0815 3.29          Dose from last 7 days       Date/Time Dose (mg)    09/23/22  2.5    09/22/22  2.5    09/20/22  5    09/19/22  2    09/18/22  0    09/17/22 1    09/17/22 2.5          HPI: 71 yo female admitted with aeCHF + Syncope with pauses & h/o Afib. Cardiology consulted, heart block diagnosis - underwent ppm placement 9/20/22. Patient is chronically anticoagulated with warfarin therapy and is managed outpatient by the [facility]. Per records, patient is prescribed Warfarin 5 mg po every Thursday and Warfarin 2.5 mg po on all other days. This was a very recent regimen reduction by [facility] on 9/14/22 d/t SUPRAtherapeutic INRs in clinic. Average time in therapeutic range reported outpatient is 60%.   Patient was previously on warfarin 5 mg po twice a week and warfarin 2.5 mg po on all other days.   Reversal agents administered: Vitamin K 10 mg IV x one given 9/18/22 in preparation for ppm placement.   Bridge therapy: No, not indicated      Assessment: INR in therapeutic range today.   Potential drug-drug interactions identified with acute inpatient medications:   Potential drug-drug interactions identified with chronic home medications: Allopurinol & Celexa which should be established interactions with warfarin therapy.   Inpatient Diet: Cardiac      Plan: Continue regimen as ordered, mimics recent change by [facility] outpatient. One day a week of 5 mg and 2.5 mg AOD. Watch INR daily a few more days. Patient may be able to maintain  therapeutic range on warfarin 2.5 mg daily.      Education Material Provided?: No (Established warfarin patient)     Pharmacist suggested discharge dosing: Warfarin 5 mg po every Tuesday and Warfarin 2.5 mg po on all other days     Skyla Rocha PharmD, BCPS

## 2022-09-23 NOTE — DISCHARGE SUMMARY
"Discharge Summary    CHIEF COMPLAINT ON ADMISSION  Chief Complaint   Patient presents with    Shortness of Breath     X3 weeks, pt reports SOB is getting worse and she is retaining fluid. Pt has hx of CHF.  Pt wears 2L NC chronic.        Reason for Admission  EMS    Admission Date  9/16/2022     CODE STATUS  DNAR/DNI    HPI & HOSPITAL COURSE     \"Rachelle Reina is a 72 y.o. female with past medical history of CHF, A. Fib, DM, S/p TAVR on 8/15/2022 on warfarin who presented 9/16/2022 with shortness of breath and bilateral lower extremity swelling. She reports she drank a cold drink and later suffered a brain freeze and felt like she could not breath. She reports she has been non complaint with her water pills. She reports using oxygen at home 2 liters. She denies any fever or chills. No other relieving or exacerbating factors were noted.      In the ER, CXR showed stable cardiomegaly with increased interstitial markings concerning for mild fluid overload. She also has a malodorous infection beneath her large pannus.\"    Patient was admitted under observation status for management of her acute on chronic diastolic congestive heart failure.    Patient had a pacemaker placed on 9/20.  Which was successful.  Patient initially on midodrine which was weaned off since this was causing the patient ectopy which resolved after the midodrine was discontinued.  Unable to restart the ARB due to blood pressure.  She is tolerating the spironolactone and the beta-blocker.  If her blood pressure does improve would recommend starting the patient on a low-dose of an ARB as an outpatient.  Physical therapy found that the patient was needing additional therapy and she will discharge to a skilled nursing facility.    Therefore, she is discharged in good and stable condition to home with close outpatient follow-up.    The patient met 2-midnight criteria for an inpatient stay at the time of discharge.    Discharge date " 9/23/2022    FOLLOW UP ITEMS POST DISCHARGE  STL-vdtqby-ll with cardiology  Atrial cropsttafhqd-olqbxm-eb with cardiology  Pacemaker bwreqdoxy-oaajvz-tu with cardiology    DISCHARGE DIAGNOSES  Principal Problem:    Acute on chronic diastolic congestive heart failure (HCC) POA: Yes  Active Problems:    Acute on chronic respiratory failure with hypoxia (HCC) POA: Yes    Benign essential hypertension (Chronic) POA: Yes    Class 3 severe obesity due to excess calories with serious comorbidity and body mass index (BMI) of 40.0 to 44.9 in adult (HCC) POA: Yes    History of hypothyroidism (Chronic) POA: Yes    Dyslipidemia POA: Yes    Chronic anticoagulation POA: Yes    Chronic atrial fibrillation POA: Yes    Type 2 diabetes mellitus without complication, with long-term current use of insulin (HCC) POA: Yes    Hypokalemia POA: Unknown    Anemia POA: Yes    Hypomagnesemia POA: Unknown    Obstructive sleep apnea (Chronic) POA: Yes    S/P TAVR (transcatheter aortic valve replacement) POA: Yes      Overview: 8/15/22: 26 mm Burton Cheikh 3 Ultra transcatheter aortic valve       replacement    Pericardial effusion POA: Yes    Yeast infection POA: Unknown    Sinus pause POA: Unknown    Hypotension POA: Yes    Supratherapeutic INR POA: Unknown    Edema due to congestive heart failure (HCC) POA: Unknown    Anxiety POA: Unknown  Resolved Problems:    Abdominal pannus POA: Unknown      FOLLOW UP  Future Appointments   Date Time Provider Department Center   9/28/2022  1:45 PM PACER CHECK-[facility] B [facility] None   10/12/2022  8:40 AM Lenin Oakley M.D. [facility] None     [facility]  [address] [place] [id number]  [phone]          MEDICATIONS ON DISCHARGE     Medication List        START taking these medications        Instructions   digoxin 250 MCG Tabs  Start taking on: September 24, 2022  Commonly known as: LANOXIN   Take 1 Tablet by mouth every morning.  Dose: 250 mcg     spironolactone 25 MG Tabs  Start taking on:  September 24, 2022  Commonly known as: ALDACTONE   Take 1 Tablet by mouth every day.  Dose: 25 mg            CHANGE how you take these medications        Instructions   FeroSul 325 (65 Fe) MG tablet  What changed: when to take this  Generic drug: ferrous sulfate   Take 1 Tablet by mouth 2 times a day.  Dose: 325 mg     * levothyroxine 200 MCG Tabs  What changed: Another medication with the same name was changed. Make sure you understand how and when to take each.  Commonly known as: SYNTHROID   Take 200 mcg by mouth see administration instructions. Monday through Saturday  Brand Name  Dose: 200 mcg     * levothyroxine 175 MCG Tabs  Start taking on: September 25, 2022  What changed: additional instructions  Commonly known as: SYNTHROID   Take 1 Tablet by mouth every Sunday.  Dose: 175 mcg     metoprolol tartrate 25 MG Tabs  What changed:   medication strength  how much to take  how to take this  when to take this  additional instructions  Commonly known as: LOPRESSOR   Take 1 Tablet by mouth 3 times a day.  Dose: 25 mg           * This list has 2 medication(s) that are the same as other medications prescribed for you. Read the directions carefully, and ask your doctor or other care provider to review them with you.                CONTINUE taking these medications        Instructions   acetaminophen 650 MG CR tablet  Commonly known as: TYLENOL   Take 1,300 mg by mouth at bedtime. Indications: Pain  Dose: 1,300 mg     allopurinol 100 MG Tabs  Commonly known as: ZYLOPRIM   Take 100 mg by mouth every bedtime.  Dose: 100 mg     citalopram 20 MG Tabs  Commonly known as: CeleXA   Take 20 mg by mouth every morning.  Dose: 20 mg     gemfibrozil 600 MG Tabs  Commonly known as: LOPID   Take 1 Tab by mouth every morning.  Dose: 600 mg     rosuvastatin 20 MG Tabs  Commonly known as: CRESTOR   Take 1 Tab by mouth every evening.  Dose: 20 mg     warfarin 2.5 MG Tabs  Commonly known as: COUMADIN   Take 2.5-5 mg by mouth every day. 5  mg (2 tablets) on Tuesdays, all other days 2.5 mg (1 tablet)  Dose: 2.5-5 mg            STOP taking these medications      ascorbic acid 500 MG tablet  Commonly known as: Vitamin C     Kerendia 10 MG Tabs  Generic drug: Finerenone     Levemir FlexTouch 100 UNIT/ML injection PEN  Generic drug: insulin detemir     loratadine 10 MG Tabs  Commonly known as: CLARITIN     losartan 50 MG Tabs  Commonly known as: COZAAR     nystatin powder  Commonly known as: MYCOSTATIN     oxybutynin 15 MG CR tablet  Commonly known as: DITROPAN XL     pioglitazone 15 MG Tabs  Commonly known as: ACTOS     polyethylene glycol/lytes 17 g Pack  Commonly known as: MIRALAX     potassium chloride SA 20 MEQ Tbcr  Commonly known as: Kdur     torsemide 20 MG Tabs  Commonly known as: DEMADEX     traZODone 100 MG Tabs  Commonly known as: DESYREL     VITAMIN B-6 PO     Vitamin D-3 125 MCG (5000 UT) Tabs              Allergies  Allergies   Allergen Reactions    Sulfa Drugs Rash     8/2015 rash and hives         DIET  Orders Placed This Encounter   Procedures    Diet Order Diet: Cardiac     Standing Status:   Standing     Number of Occurrences:   1     Order Specific Question:   Diet:     Answer:   Cardiac [6]       ACTIVITY  As tolerated and directed by skilled nursing.  Weight bearing as tolerated    LINES, DRAINS, AND WOUNDS  This is an automated list. Peripheral IVs will be removed prior to discharge.  Peripheral IV 09/17/22 18 G Right Antecubital (Active)   Site Assessment Clean;Dry;Intact 09/22/22 2000   Dressing Type Transparent 09/22/22 2000   Line Status Flushed;Saline locked;Scrubbed the hub prior to access;No blood return 09/22/22 2000   Dressing Status Clean;Dry;Intact 09/22/22 2000   Dressing Intervention N/A 09/21/22 2000   Date Primary Tubing Changed 09/17/22 09/17/22 2100   Infiltration Grading ([facility] 0 09/22/22 0747   Phlebitis Scale ([facility] Only) 0 09/22/22 0747       Moisture Associated Skin Damage 08/17/22 Groin;Panus (Active)        Moisture Associated Skin Damage 09/17/22 Panus;Groin (Active)   Wound Image   09/17/22 1000   NEXT Weekly Photo (Inpatient Only) 09/24/22 09/17/22 1000   Drainage Amount None 09/22/22 2000   Periwound Assessment Pink;Red 09/22/22 2000   IAD Cleansing Foam Cleanser/Washcloth 09/17/22 1000   Periwound Protectant Antifungal Therapy 09/17/22 1000   IAD Containment Device Interdry Cloth;Purewick 09/17/22 1000   WOUND NURSE ONLY - Time Spent with Patient (mins) 15 09/17/22 1000       Wound 09/16/20 Pretibial Right anterior lower leg (Active)       Wound 09/16/20 Pretibial Left anterior superior lower leg (Active)       Wound 09/16/20 Pretibial Left anterior lower leg inferior (Active)       Wound 06/08/22 Pretibial Proximal Bilateral Bilateral legs (Active)       Wound 06/08/22 Bilateral LEGS (Active)       Wound 08/15/22 Incision Groin Right Perclose x2, angioseal (Active)       Wound 08/15/22 Incision Groin Left 4x4, tegaderm (Active)       Wound 08/15/22 Incision Wrist Right TR band - 14 mL air in (Active)       Wound 09/20/22 Incision Sternum Right gauze and tegaderm (Active)   Site Assessment LIZ 09/22/22 2000   Periwound Assessment Ecchymosis 09/22/22 2000   Margins LIZ 09/20/22 1700   Closure LIZ 09/20/22 1700   Drainage Amount Small 09/20/22 1700   Dressing Options Dry Gauze;Transparent Film 09/21/22 2000   Dressing Changed Observed 09/21/22 2000   Dressing Status Old drainage 09/21/22 2000       Peripheral IV 09/17/22 18 G Right Antecubital (Active)   Site Assessment Clean;Dry;Intact 09/22/22 2000   Dressing Type Transparent 09/22/22 2000   Line Status Flushed;Saline locked;Scrubbed the hub prior to access;No blood return 09/22/22 2000   Dressing Status Clean;Dry;Intact 09/22/22 2000   Dressing Intervention N/A 09/21/22 2000   Date Primary Tubing Changed 09/17/22 09/17/22 2100   Infiltration Grading ([facility] 0 09/22/22 0747   Phlebitis Scale ([facility] Only) 0 09/22/22 0747               MENTAL STATUS ON  TRANSFER             CONSULTATIONS  Cardio-Dr Lacey    PROCEDURES  9/20: Prior to pacemaker placement    LABORATORY  Lab Results   Component Value Date    SODIUM 142 09/22/2022    POTASSIUM 4.9 09/22/2022    CHLORIDE 97 09/22/2022    CO2 36 (H) 09/22/2022    GLUCOSE 113 (H) 09/22/2022    BUN 31 (H) 09/22/2022    CREATININE 1.13 09/22/2022    CREATININE 0.6 01/27/2009        Lab Results   Component Value Date    WBC 7.9 09/22/2022    HEMOGLOBIN 10.7 (L) 09/22/2022    HEMATOCRIT 38.1 09/22/2022    PLATELETCT 178 09/22/2022        Total time of the discharge process exceeds 34 minutes.

## 2022-09-23 NOTE — HEART FAILURE PROGRAM
Discharge Order Quality Check-Up - HFpEF    Patient has the below appointment which is appropriate for anticipated discharge to SNF today.    HF Education Documented? No messaged bedside RNAndre.    Where we stand right now on HFpEF MEASURES per review of most recent notes and discharge med rec.    Anticoagulation for atrial arrhythmia, if applicable: warfarin  Glycemic control for DM + HF: insulin  Lipid lowering medication for DM + HF: rosuvastatin  Pneumococcal vaccine: previous  Influenza vaccine for the current flu season: out of season  Documentation of tobacco cessation counseling: never used per h/p  Referral to disease management program specializing in heart failure care: patient follows primarily with the structural heart program, have asked schedulers to arrange HF Clinic.      Thank you, Britta Cardio RN Navigator [phone]

## 2022-09-23 NOTE — PROGRESS NOTES
"Hospital Medicine Daily Progress Note    Date of Service  9/23/2022    Chief Complaint  Rachelle Reina is a 72 y.o. female admitted 9/16/2022 with shortness of breath    Hospital Course  \"Rachelle Reina is a 72 y.o. female with past medical history of CHF, A. Fib, DM, S/p TAVR on 8/15/2022 on warfarin who presented 9/16/2022 with shortness of breath and bilateral lower extremity swelling. She reports she drank a cold drink and later suffered a brain freeze and felt like she could not breath. She reports she has been non complaint with her water pills. She reports using oxygen at home 2 liters. She denies any fever or chills. No other relieving or exacerbating factors were noted.      In the ER, CXR showed stable cardiomegaly with increased interstitial markings concerning for mild fluid overload. She also has a malodorous infection beneath her large pannus.\"    Patient was admitted under observation status for management of her acute on chronic diastolic congestive heart failure.    Interval Problem Update  9/19/2022: Patient seen and examined.  Tells me that she had a panic attack this morning but feels much better now.  Reports that she feels like the Lasix is working and she is not as short of breath.    -Was NPO for pacemaker today, rescheduled until tomorrow  -Reviewed overnight telemetry events, patient had a panic attack and HR increased to Afib 170s, otherwise in 100s  -BP remaining in 110s, tolerating 40 mg IV lasix  -Urine output 1200 ml  -Labs reviewed, Mag 1.7 and K 3.6, replaced  -Cards following, appreciate recommendations  -Discussed with cardiology, okay to give low-dose Coumadin tonight, pharmacy notified  -INR this am 1.75 s/p IV Vitamin K x 1    9/20: N.p.o. awaiting pacer placement.  Anticipate discharge tomorrow.  May require a new O2 evaluation on discharge.  9/21: Pacemaker placed yesterday.  Patient reportedly had A. fib and was suspected to be A. fib with aberrancy today.  " Awaiting cardiology evaluation.  Patient reports feeling better today.  She continues to endorse some lower extremity weakness, PT and OT evaluations pending.  9/22: Patient was thought to have ectopy: PVCs, idioventricular rhythm, V. Tach.  However after evaluation by cardiology this is not the case with a wrist thumb is actually from the pacemaker.  Patient weaned off of midodrine and she is maintaining her pressures.  Awaiting placement to SNF.  9/23: Patient slightly more hypoxic today.  We will restart the patient on Lasix for mild diuresis.  Also patient seems to do better with the facemask she appears to be predominantly breathing through her mouth.    I have discussed this patient's plan of care and discharge plan at IDT rounds today with Case Management, Nursing, Nursing leadership, and other members of the IDT team.    Consultants/Specialty  cardiology    Code Status  DNAR/DNI    Disposition  Patient is not medically cleared for discharge.   Anticipate discharge to to home with close outpatient follow-up.  I have placed the appropriate orders for post-discharge needs.    Review of Systems  Review of Systems   Constitutional:  Negative for chills, fever and malaise/fatigue.   HENT:  Negative for hearing loss.    Eyes:  Negative for blurred vision and double vision.   Respiratory:  Positive for shortness of breath. Negative for cough.    Cardiovascular:  Negative for chest pain, palpitations and leg swelling.   Gastrointestinal:  Negative for abdominal pain and diarrhea.   Genitourinary:  Negative for dysuria and urgency.   Musculoskeletal:  Positive for back pain.   Neurological:  Positive for weakness. Negative for dizziness and headaches.   Endo/Heme/Allergies:  Bruises/bleeds easily.   Psychiatric/Behavioral:  Negative for depression. The patient is nervous/anxious.    All other systems reviewed and are negative.     Physical Exam  Temp:  [36 °C (96.8 °F)-36.6 °C (97.8 °F)] 36.3 °C (97.4 °F)  Pulse:   [62-74] 74  Resp:  [18-24] 20  BP: ()/(47-78) 122/49  SpO2:  [69 %-100 %] 95 %    Physical Exam  Vitals and nursing note reviewed.   Constitutional:       General: She is not in acute distress.     Appearance: She is obese. She is ill-appearing.   HENT:      Head: Normocephalic and atraumatic.      Right Ear: External ear normal.      Left Ear: External ear normal.   Eyes:      General: No scleral icterus.  Cardiovascular:      Rate and Rhythm: Normal rate. Rhythm irregular.      Pulses: Normal pulses.      Comments: Muffled heart sounds  Pulmonary:      Effort: No respiratory distress.      Comments: Diminished with bibasilar crackles. On 4L NC  Abdominal:      General: There is no distension.      Tenderness: There is no abdominal tenderness.      Comments: Obese, tautness improving, yeast infection under pannus   Musculoskeletal:         General: No tenderness.      Cervical back: Normal range of motion and neck supple.      Right lower leg: Edema present.      Left lower leg: Edema present.      Comments: Erythema and edema to bilateral thigh level R>L  Several blisters on right calf   Skin:     General: Skin is dry.      Capillary Refill: Capillary refill takes less than 2 seconds.      Coloration: Skin is pale.      Findings: Rash present.   Neurological:      Mental Status: She is alert and oriented to person, place, and time.   Psychiatric:         Mood and Affect: Mood is anxious.         Thought Content: Thought content normal.       Fluids    Intake/Output Summary (Last 24 hours) at 9/23/2022 1449  Last data filed at 9/23/2022 0338  Gross per 24 hour   Intake --   Output 200 ml   Net -200 ml         Laboratory  Recent Labs     09/21/22 0224 09/22/22  0105   WBC 6.0 7.9   RBC 3.94* 4.17*   HEMOGLOBIN 10.1* 10.7*   HEMATOCRIT 34.5* 38.1   MCV 87.6 91.4   MCH 25.6* 25.7*   MCHC 29.3* 28.1*   RDW 59.7* 62.4*   PLATELETCT 168 178   MPV 9.4 9.5       Recent Labs     09/21/22 0224 09/22/22  0105   SODIUM  143 142   POTASSIUM 3.7 4.9   CHLORIDE 96 97   CO2 39* 36*   GLUCOSE 159* 113*   BUN 28* 31*   CREATININE 1.12 1.13   CALCIUM 9.3 9.6       Recent Labs     09/21/22  0224 09/22/22  0105 09/23/22  0222   INR 1.35* 1.86* 2.31*                 Imaging  DX-CHEST-PORTABLE (1 VIEW)   Final Result      1.  No evidence of pneumothorax   2.  Persistently enlarged cardiac silhouette      DX-CHEST-PORTABLE (1 VIEW)   Final Result      1.  There is a right sided pacemaker in good position with its distal leads overlying the right ventricle.      2.  Marked cardiomegaly.      3.  Small bilateral pleural effusions.      4.  Previous TAVR.      EC-ECHOCARDIOGRAM COMPLETE W/O CONT   Final Result      DX-CHEST-PORTABLE (1 VIEW)   Final Result      1.  Stable cardiomegaly with pericardial effusion.   2.  Bibasilar atelectasis versus consolidations and suspected right pleural effusion.      CL-PERMANENT PACEMAKER INSERTION    (Results Pending)          Assessment/Plan  * Acute on chronic diastolic congestive heart failure (HCC)- (present on admission)  Assessment & Plan  Fluid overloaded on exam, chest x-ray indicative of bilateral pleural effusions, elevated BNP  Remains on losartan at half her dose 25 mg  Metoprolol discontinued given 10-second sinus pause  Echocardiogram shows HFpEF, EF 55% and RVSP 55 mmHg  Patient titrated off of midodrine.  Tolerating 40 mg IV Lasix BID  Clinically improving today, less dyspneic     -Monitor hypotension  -Monitor urine output    Acute on chronic respiratory failure with hypoxia (HCC)- (present on admission)  Assessment & Plan  Chronic on home oxygen 2L    Anxiety  Assessment & Plan  Patient suffers from anxiety attacks  HR will elevate to Afib RVR in the 170s    -PRN vistaril  -Deep breathing exercises    Edema due to congestive heart failure (HCC)  Assessment & Plan  Generalized edema secondary to HFpEF  On warfarin for Afib and TAVR  Right greater than left lower extremity swelling  Per  patient, right leg is chronically larger  EARLE in 6/22: 1.63 right, 1.33 left  Right ankle pressures are not accurately measured due to calcification and noncompressibility of the tibial vessels    -Continue Lasix 40 mg IV twice daily  -Monitor fluid volume status    Supratherapeutic INR  Assessment & Plan  INR 3.34 9/18  S/P IV Vitamin K x 1  INR 1.75 today    -Discussed with cardiology, okay to have INR up to 2.5 for pacemaker insertion tomorrow  -One 2 mg dose this evening  -Follow a.m. labs  -Restart when clinically able    Hypotension- (present on admission)  Assessment & Plan  Patient titrated off midodrine currently normotensive.    Sinus pause  Assessment & Plan  10 second sinus pause 9/17 on telemetry with witnessed syncopal episode  Patient recovered, /54    -Discontinued metoprolol  -Cardiology following, appreciate recommendations  -Continue on telemetry monitoring  NPO awaiting Micra    Yeast infection  Assessment & Plan  In abdominal pannus  Topical nystatin TID  Wound team consulted    Pericardial effusion- (present on admission)  Assessment & Plan  Known small pericardial effusion     S/P TAVR (transcatheter aortic valve replacement)- (present on admission)  Assessment & Plan  On 8/15/2022  On warfarin, goal INR 2.5  Echo 9/17 EF 55%, aortic gradient appropriate, RVSP 55 mmHg    Obstructive sleep apnea- (present on admission)  Assessment & Plan  Nocturnal BIPAP/CPAP    Hypomagnesemia  Assessment & Plan  Mag 1.7   Already on oral replacement therapy    -2 G IV mag today  -Follow BMP    Anemia- (present on admission)  Assessment & Plan  Anemia of chronic disease  Last iron studies in April indicate mixed anemia of chronic disease and iron deficiency   No signs of bleeding    -Continue home dose of oral iron    Hypokalemia  Assessment & Plan  K 3.6  Replaced    -Follow BMP    Type 2 diabetes mellitus without complication, with long-term current use of insulin (HCC)- (present on  admission)  Assessment & Plan  Long acting insulin/Lispro/SSI  Accuchecks and hypoglycemia protocol     Chronic atrial fibrillation- (present on admission)  Assessment & Plan  Atrial fibrillation rate controlled on telemetry  Metoprolol discontinued given 10-second sinus pause    Status post pacemaker.  Patient started on warfarin    Chronic anticoagulation- (present on admission)  Assessment & Plan  On warfarin status post TAVR 8/15/2022  INR goal 2.5 post TAVR  Warfarin per pharmacy    -Held yesterday pending pacemaker insertion tomorrow  -Discussed with cardiology today, recommend low-dose warfarin  -Discussed with pharmacy, will administer 2 mg of warfarin tonight  Restarted on warfarin    Dyslipidemia- (present on admission)  Assessment & Plan  Continue home dose of Crestor and Lopid     History of hypothyroidism- (present on admission)  Assessment & Plan  Resume Levothyroxine     Class 3 severe obesity due to excess calories with serious comorbidity and body mass index (BMI) of 40.0 to 44.9 in adult (HCC)- (present on admission)  Assessment & Plan  Educate on lifestyle and dietary modification     Benign essential hypertension- (present on admission)  Assessment & Plan  Hypotensive in the setting of fluid volume overload  Decreased dose of home ARB   Beta blocker discontinued    -We will increase as tolerated         VTE prophylaxis: therapeutic anticoagulation with warfarin    ..My total time spent caring for the patient on the day of the encounter was 49 minutes. This does not include time spent on separately billable procedures/tests.     I have performed a physical exam and reviewed and updated ROS and Plan today (9/23/2022). In review of yesterday's note (9/22/2022), there are no changes except as documented above.

## 2022-09-23 NOTE — CARE PLAN
Problem: Knowledge Deficit - Standard  Goal: Patient and family/care givers will demonstrate understanding of plan of care, disease process/condition, diagnostic tests and medications  Outcome: Not Progressing   Patient throughout the shift has been uninterested in participating in her care. She is fatigued, lethargic, and has a flat affect. Does not demonstrate knowledge of plan of care or disease process, outside of her new PPM.    Problem: Skin Integrity  Goal: Skin integrity is maintained or improved  Outcome: Not Progressing   Patient still has significant skin breakdown from moisture. She is incontinent of urine and due to body habitus, a PureWick does not fully contain her urine. Thorough rohan care performed, cream and antifungal powder applied, interdry replaced.     Problem: Respiratory  Goal: Patient will achieve/maintain optimum respiratory ventilation and gas exchange  Outcome: Not Progressing     Problem: Mobility  Goal: Patient's capacity to carry out activities will improve  Outcome: Not Progressing     Problem: Depression  Goal: Patient and family/caregiver will verbalize accurate information about at least two of the possible causes of depression, three-four of the signs and symptoms of depression  Outcome: Progressing     Problem: Fall Risk  Goal: Patient will remain free from falls  Outcome: Progressing     Problem: Psychosocial  Goal: Patient's level of anxiety will decrease  Outcome: Progressing     Problem: Hemodynamics  Goal: Patient's hemodynamics, fluid balance and neurologic status will be stable or improve  Outcome: Progressing     Problem: Fluid Volume  Goal: Fluid volume balance will be maintained  Outcome: Progressing     Problem: Nutrition  Goal: Patient's nutritional and fluid intake will be adequate or improve  Outcome: Progressing     Problem: Pain - Standard  Goal: Alleviation of pain or a reduction in pain to the patient’s comfort goal  Outcome: Progressing      The patient is  Watcher - Medium risk of patient condition declining or worsening    Shift Goals  Clinical Goals: Monitor and maintain O2 sat, rest  Patient Goals: rest  Family Goals: NA    Progress made toward(s) clinical / shift goals:  Skin care, Q2H turns, etc. To prevent further skin breakdown.    Patient is not progressing towards the following goals:

## 2022-09-24 PROBLEM — Z66 DNR (DO NOT RESUSCITATE): Status: ACTIVE | Noted: 2022-01-01

## 2022-09-24 PROBLEM — J96.12 CHRONIC RESPIRATORY ACIDOSIS (HCC): Status: ACTIVE | Noted: 2022-01-01

## 2022-09-24 NOTE — PROGRESS NOTES
"Hospital Medicine Daily Progress Note    Date of Service  9/24/2022    Chief Complaint  Rachelle Reina is a 72 y.o. female admitted 9/16/2022 with shortness of breath    Hospital Course  \"Rachelle Reina is a 72 y.o. female with past medical history of CHF, A. Fib, DM, S/p TAVR on 8/15/2022 on warfarin who presented 9/16/2022 with shortness of breath and bilateral lower extremity swelling. She reports she drank a cold drink and later suffered a brain freeze and felt like she could not breath. She reports she has been non complaint with her water pills. She reports using oxygen at home 2 liters. She denies any fever or chills. No other relieving or exacerbating factors were noted.      In the ER, CXR showed stable cardiomegaly with increased interstitial markings concerning for mild fluid overload. She also has a malodorous infection beneath her large pannus.\"    Patient was admitted under observation status for management of her acute on chronic diastolic congestive heart failure.    Interval Problem Update  9/19/2022: Patient seen and examined.  Tells me that she had a panic attack this morning but feels much better now.  Reports that she feels like the Lasix is working and she is not as short of breath.    -Was NPO for pacemaker today, rescheduled until tomorrow  -Reviewed overnight telemetry events, patient had a panic attack and HR increased to Afib 170s, otherwise in 100s  -BP remaining in 110s, tolerating 40 mg IV lasix  -Urine output 1200 ml  -Labs reviewed, Mag 1.7 and K 3.6, replaced  -Cards following, appreciate recommendations  -Discussed with cardiology, okay to give low-dose Coumadin tonight, pharmacy notified  -INR this am 1.75 s/p IV Vitamin K x 1    9/20: N.p.o. awaiting pacer placement.  Anticipate discharge tomorrow.  May require a new O2 evaluation on discharge.  9/21: Pacemaker placed yesterday.  Patient reportedly had A. fib and was suspected to be A. fib with aberrancy today.  " Awaiting cardiology evaluation.  Patient reports feeling better today.  She continues to endorse some lower extremity weakness, PT and OT evaluations pending.  9/22: Patient was thought to have ectopy: PVCs, idioventricular rhythm, V. Tach.  However after evaluation by cardiology this is not the case with a wrist thumb is actually from the pacemaker.  Patient weaned off of midodrine and she is maintaining her pressures.  Awaiting placement to SNF.  9/23: Patient slightly more hypoxic today.  We will restart the patient on Lasix for mild diuresis.  Also patient seems to do better with the facemask she appears to be predominantly breathing through her mouth.  9/24: Patient more hypoxic now on 5 L.  Chest x-ray ordered Lasix increased to 40 mg IV twice daily.  Will fluid restrict 1.2 L.  Incentive spirometer use    I have discussed this patient's plan of care and discharge plan at IDT rounds today with Case Management, Nursing, Nursing leadership, and other members of the IDT team.    Consultants/Specialty  cardiology    Code Status  DNAR/DNI    Disposition  Patient is not medically cleared for discharge.   Anticipate discharge to to home with close outpatient follow-up.  I have placed the appropriate orders for post-discharge needs.    Review of Systems  Review of Systems   Constitutional:  Negative for chills, fever and malaise/fatigue.   HENT:  Negative for hearing loss.    Eyes:  Negative for blurred vision and double vision.   Respiratory:  Positive for shortness of breath. Negative for cough.    Cardiovascular:  Positive for leg swelling. Negative for chest pain and palpitations.   Gastrointestinal:  Negative for abdominal pain and diarrhea.   Genitourinary:  Negative for dysuria and urgency.   Musculoskeletal:  Positive for back pain.   Neurological:  Positive for weakness. Negative for dizziness and headaches.   Endo/Heme/Allergies:  Bruises/bleeds easily.   Psychiatric/Behavioral:  Negative for depression. The  patient is nervous/anxious.    All other systems reviewed and are negative.     Physical Exam  Temp:  [36.2 °C (97.1 °F)-36.4 °C (97.6 °F)] 36.2 °C (97.1 °F)  Pulse:  [66-75] 66  Resp:  [20-24] 24  BP: (100-151)/(39-71) 121/57  SpO2:  [87 %-95 %] 95 %    Physical Exam  Vitals and nursing note reviewed.   Constitutional:       General: She is not in acute distress.     Appearance: She is obese. She is ill-appearing.   HENT:      Head: Normocephalic and atraumatic.      Right Ear: External ear normal.      Left Ear: External ear normal.   Eyes:      General: No scleral icterus.        Right eye: No discharge.         Left eye: No discharge.   Cardiovascular:      Rate and Rhythm: Normal rate. Rhythm irregular.      Heart sounds: Normal heart sounds.      Comments: Muffled heart sounds  Pulmonary:      Effort: No respiratory distress.      Comments: Diminished with bibasilar crackles. On 5L NC  Abdominal:      General: There is no distension.      Tenderness: There is no abdominal tenderness. There is no guarding.      Comments: Obese, tautness improving, yeast infection under pannus   Musculoskeletal:         General: No tenderness.      Cervical back: Normal range of motion and neck supple.      Right lower leg: Edema present.      Left lower leg: Edema present.      Comments: Erythema and edema to bilateral thigh level R>L  Several blisters on right calf   Skin:     General: Skin is dry.      Capillary Refill: Capillary refill takes less than 2 seconds.      Coloration: Skin is pale.      Findings: Rash present.   Neurological:      Mental Status: She is alert and oriented to person, place, and time.      Cranial Nerves: No cranial nerve deficit.   Psychiatric:         Behavior: Behavior normal.         Thought Content: Thought content normal.       Fluids  No intake or output data in the 24 hours ending 09/24/22 1207      Laboratory  Recent Labs     09/22/22  0105 09/24/22  1138   WBC 7.9 8.2   RBC 4.17* 4.33    HEMOGLOBIN 10.7* 11.1*   HEMATOCRIT 38.1 39.4   MCV 91.4 91.0   MCH 25.7* 25.6*   MCHC 28.1* 28.2*   RDW 62.4* 59.7*   PLATELETCT 178 154*   MPV 9.5 9.4       Recent Labs     09/22/22  0105   SODIUM 142   POTASSIUM 4.9   CHLORIDE 97   CO2 36*   GLUCOSE 113*   BUN 31*   CREATININE 1.13   CALCIUM 9.6       Recent Labs     09/22/22  0105 09/23/22  0222 09/24/22  0156   INR 1.86* 2.31* 2.51*                 Imaging  DX-CHEST-PORTABLE (1 VIEW)   Final Result      1.  No evidence of pneumothorax   2.  Persistently enlarged cardiac silhouette      DX-CHEST-PORTABLE (1 VIEW)   Final Result      1.  There is a right sided pacemaker in good position with its distal leads overlying the right ventricle.      2.  Marked cardiomegaly.      3.  Small bilateral pleural effusions.      4.  Previous TAVR.      EC-ECHOCARDIOGRAM COMPLETE W/O CONT   Final Result      DX-CHEST-PORTABLE (1 VIEW)   Final Result      1.  Stable cardiomegaly with pericardial effusion.   2.  Bibasilar atelectasis versus consolidations and suspected right pleural effusion.      CL-PERMANENT PACEMAKER INSERTION    (Results Pending)   DX-CHEST-PORTABLE (1 VIEW)    (Results Pending)          Assessment/Plan  * Acute on chronic diastolic congestive heart failure (HCC)- (present on admission)  Assessment & Plan  Fluid overloaded on exam, chest x-ray indicative of bilateral pleural effusions, elevated BNP  Remains on losartan at half her dose 25 mg  Metoprolol discontinued given 10-second sinus pause  Echocardiogram shows HFpEF, EF 55% and RVSP 55 mmHg  Patient titrated off of midodrine.  Tolerating 40 mg IV Lasix BID  Clinically improving today, less dyspneic     -Monitor hypotension  -Monitor urine output    Acute on chronic respiratory failure with hypoxia (HCC)- (present on admission)  Assessment & Plan  Chronic on home oxygen 2L  Patient's respiratory status worsened now on 5 L.  She appears to be more fluid overloaded.  Increasing Lasix to 40 mg IV twice  daily.  cxr    Anxiety  Assessment & Plan  Patient suffers from anxiety attacks  HR will elevate to Afib RVR in the 170s    -PRN vistaril  -Deep breathing exercises    Edema due to congestive heart failure (HCC)  Assessment & Plan  Generalized edema secondary to HFpEF  On warfarin for Afib and TAVR  Right greater than left lower extremity swelling  Per patient, right leg is chronically larger  EARLE in 6/22: 1.63 right, 1.33 left  Right ankle pressures are not accurately measured due to calcification and noncompressibility of the tibial vessels    -Continue Lasix 40 mg IV twice daily  -Monitor fluid volume status    Supratherapeutic INR  Assessment & Plan  INR 3.34 9/18  S/P IV Vitamin K x 1  INR 1.75 today    -Discussed with cardiology, okay to have INR up to 2.5 for pacemaker insertion tomorrow  -One 2 mg dose this evening  -Follow a.m. labs  -Restart when clinically able    Hypotension- (present on admission)  Assessment & Plan  Patient titrated off midodrine currently normotensive.    Sinus pause  Assessment & Plan  10 second sinus pause 9/17 on telemetry with witnessed syncopal episode  Patient recovered, /54    -Discontinued metoprolol  -Cardiology following, appreciate recommendations  -Continue on telemetry monitoring  NPO awaiting Micra    Yeast infection  Assessment & Plan  In abdominal pannus  Topical nystatin TID  Wound team consulted    Pericardial effusion- (present on admission)  Assessment & Plan  Known small pericardial effusion     S/P TAVR (transcatheter aortic valve replacement)- (present on admission)  Assessment & Plan  On 8/15/2022  On warfarin, goal INR 2.5  Echo 9/17 EF 55%, aortic gradient appropriate, RVSP 55 mmHg    Obstructive sleep apnea- (present on admission)  Assessment & Plan  Nocturnal BIPAP/CPAP    Hypomagnesemia  Assessment & Plan  Mag 1.7   Already on oral replacement therapy    -2 G IV mag today  -Follow BMP    Anemia- (present on admission)  Assessment & Plan  Anemia of  chronic disease  Last iron studies in April indicate mixed anemia of chronic disease and iron deficiency   No signs of bleeding    -Continue home dose of oral iron    Hypokalemia  Assessment & Plan  K 3.6  Replaced    -Follow BMP    Type 2 diabetes mellitus without complication, with long-term current use of insulin ([facility])- (present on admission)  Assessment & Plan  Long acting insulin/Lispro/SSI  Accuchecks and hypoglycemia protocol     Chronic atrial fibrillation- (present on admission)  Assessment & Plan  Atrial fibrillation rate controlled on telemetry  Metoprolol discontinued given 10-second sinus pause    Status post pacemaker.  Patient started on warfarin    Chronic anticoagulation- (present on admission)  Assessment & Plan  On warfarin status post TAVR 8/15/2022  INR goal 2.5 post TAVR  Warfarin per pharmacy    -Held yesterday pending pacemaker insertion tomorrow  -Discussed with cardiology today, recommend low-dose warfarin  -Discussed with pharmacy, will administer 2 mg of warfarin tonight  Restarted on warfarin    Dyslipidemia- (present on admission)  Assessment & Plan  Continue home dose of Crestor and Lopid     History of hypothyroidism- (present on admission)  Assessment & Plan  Resume Levothyroxine     Class 3 severe obesity due to excess calories with serious comorbidity and body mass index (BMI) of 40.0 to 44.9 in adult ([facility])- (present on admission)  Assessment & Plan  Educate on lifestyle and dietary modification     Benign essential hypertension- (present on admission)  Assessment & Plan  Hypotensive in the setting of fluid volume overload  Decreased dose of home ARB   Beta blocker discontinued    -We will increase as tolerated         VTE prophylaxis: therapeutic anticoagulation with warfarin    ..My total time spent caring for the patient on the day of the encounter was 49 minutes. This does not include time spent on separately billable procedures/tests.     I have performed a physical exam and  reviewed and updated ROS and Plan today (9/24/2022). In review of yesterday's note (9/23/2022), there are no changes except as documented above.

## 2022-09-24 NOTE — CARE PLAN
The patient is Stable - Low risk of patient condition declining or worsening    Shift Goals  Clinical Goals: maintain O2  Patient Goals: rest  Family Goals: NA    Progress made toward(s) clinical / shift goals:    Problem: Knowledge Deficit - Standard  Goal: Patient and family/care givers will demonstrate understanding of plan of care, disease process/condition, diagnostic tests and medications  Outcome: Progressing  Note: Pt educated on plan of care     Problem: Fall Risk  Goal: Patient will remain free from falls  Outcome: Progressing  Note: Fall protocal in place pt educated       Patient is not progressing towards the following goals:

## 2022-09-24 NOTE — PROGRESS NOTES
Notified MD of pt status. After turning in bed and exerting and getting upset with staff not letting her ambulate, pt's oxygen saturation drops to 84% and requires a bump to 15L of oxygen to bring her up to 90-91%. IV lasix acknowledged from provider but pt's iv access was discontinued yesterday, working on replacing access.

## 2022-09-24 NOTE — PROGRESS NOTES
During frequent rounding of patient I notice pt keeps pulling down her oxygen mask and claims she does not feel good. When pt is aware I can see her she places mask back on. Pt is completely oriented during assessments. Educated pt on need to keep mask on multiple times, pt verbalizes understanding every time.

## 2022-09-24 NOTE — CARE PLAN
The patient is Stable - Low risk of patient condition declining or worsening    Shift Goals  Clinical Goals: maintain oxygen sat, reduce anxiety  Patient Goals: rst, decrease anxiety  Family Goals: update    Progress made toward(s) clinical / shift goals:  oxygen requirement    Problem: Knowledge Deficit - Standard  Goal: Patient and family/care givers will demonstrate understanding of plan of care, disease process/condition, diagnostic tests and medications  Outcome: Progressing     Problem: Skin Integrity  Goal: Skin integrity is maintained or improved  Outcome: Progressing     Problem: Depression  Goal: Patient and family/caregiver will verbalize accurate information about at least two of the possible causes of depression, three-four of the signs and symptoms of depression  Outcome: Progressing     Problem: Fall Risk  Goal: Patient will remain free from falls  Outcome: Progressing     Problem: Psychosocial  Goal: Patient's level of anxiety will decrease  Outcome: Progressing     Problem: Hemodynamics  Goal: Patient's hemodynamics, fluid balance and neurologic status will be stable or improve  Outcome: Progressing     Problem: Respiratory  Goal: Patient will achieve/maintain optimum respiratory ventilation and gas exchange  Outcome: Progressing     Problem: Fluid Volume  Goal: Fluid volume balance will be maintained  Outcome: Progressing     Problem: Nutrition  Goal: Patient's nutritional and fluid intake will be adequate or improve  Outcome: Progressing     Problem: Mobility  Goal: Patient's capacity to carry out activities will improve  Outcome: Progressing     Problem: Wound/ / Incision Healing  Goal: Patient's wound/surgical incision will decrease in size and heals properly  Outcome: Progressing     Problem: Pain - Standard  Goal: Alleviation of pain or a reduction in pain to the patient’s comfort goal  Outcome: Progressing       Patient is not progressing towards the following goals:

## 2022-09-24 NOTE — PROGRESS NOTES
Inpatient Anticoagulation Service Note    Date: 9/24/2022  Reason for Anticoagulation: Atrial Fibrillation, Bioprosthetic Valve Replacement   [id number] VASc Score: 6  HAS-BLED Score: 2    Hemoglobin Value: (!) 10.7  Hematocrit Value: 38.1  Lab Platelet Value: 178  Target INR: 2.0 to 3.0    INR from last 7 days       Date/Time INR Value    09/24/22 0156 2.51    09/23/22 0222 2.31    09/22/22 0105 1.86    09/21/22 0224 1.35    09/20/22 0801 1.29    09/19/22 0213 1.75    09/18/22 1357 3.34    09/18/22 0144 3          Dose from last 7 days       Date/Time Dose (mg)    09/24/22 1019 2.5    09/23/22 0957 2.5    09/22/22 0944 2.5    09/20/22 1715 5    09/19/22 1438 2    09/18/22 1044 0    09/17/22 1555 1          Average Dose (mg):  (Home dose per med rec: 5 mg on Tues, 2.5 mg all other days)  Significant Interactions: Other (Gemfibrozil)  Bridge Therapy: No     Reversal Agent Administered: Not Applicable  Comments: Patient on warfarin at home for A-fib and TAVR. Presenting with aeCHF requiring O2 support and ppm placement on 9/19. Warfarin was resumed 9/20. INR therapeutic today at 2.51. No overt signs of bleeding noted in the chart. No CBC today but has been stable.    Plan:  2.5mg tonight  Education Material Provided?: No  Pharmacist suggested discharge dosing: Recommend resuming home dose of 5mg on Tuesdays and 2.5mg all other days with follow up INR within 48-72 hours of discharge.     Yahir Gamble, OrtegaD

## 2022-09-25 NOTE — ASSESSMENT & PLAN NOTE
Discussed with patient and son (DPOA), confirmed DNR/DNI  Updated son yesterday to patient's worsening status despite all aggressive measures and therapies.  I also educated him to her irreversible medical conditions.  He demonstrates understanding.  He would like to have some time with his mom and adhere to her wishes.

## 2022-09-25 NOTE — CARE PLAN
Bipap with full face mask, 12/8 Fio2 50%      Problem: Ventilation  Goal: Ability to achieve and maintain unassisted ventilation or tolerate decreased levels of ventilator support  Description: Target End Date:  4 days     Document on Vent flowsheet    1.  Support and monitor invasive and noninvasive mechanical ventilation  2.  Monitor ventilator weaning response  3.  Perform ventilator associated pneumonia prevention interventions  4.  Manage ventilation therapy by monitoring diagnostic test results  Outcome: Progressing

## 2022-09-25 NOTE — HOSPITAL COURSE
9/16 - admitted with HFpEF exacerbation  9/21 - PPM placed    9/24 - Transferred to ICU for rescue BiPAP  9/25 - intermittent BiPAP/HFNC, lasix with excellent diuresis  9/26 - intermittent BiPAP/HFNC, serum bicarb at 47, stopped lasix, started Diamox  9/27 - intermittent BiPAP/HFNC, serum bicarb >50, continued Diamox, decreased UOP throughout day  9/28 - intermittent BiPAP/HFNC, serum bicarb > 50, stopped Diamox, OK UOP, poor motivation/participation with PT/RT  9/29 - intermittent BiPAP/HFNC, serum bicarb > 50, restarted Diamox 500mg IV every 6 hours, oliguria, poor participation. Started Flolan with BiPAP

## 2022-09-25 NOTE — CONSULTS
ICU Consult Note    Date of Service: 09/24/22     Reason for Consult: acute on chronic hypoxia    Chief Complaint: shortness of breath    Attending physician: Parth Burton D.O.    HPI:   Rachelle Reina is a very pleasant 72 y.o. female with PMH of HFpEF, afib, Type 2 DM, s/p TAVR 8/15/22 on warfarin and s/p pacemaker for symptomatic bradycardia 9/21/22 who initially presented 9/16/2022 with shortness of breath and bilateral lower extremity edema and was found to have acute on chronic hypoxic respiratory failure secondary to acute on chronic CHF.    Rapid response was called around 6pm due to acute shortness of breath. She was having tachypnea and her oxygen requirements increased to nonrebreather, was requiring 6 to 15L throughout today whereas yesterday it was 3-7L. She was then placed on CPAP with 15L bleed in.   Son was at bedside and stated that these episodes occur frequently and attributed them to anxiety.    Patient stated that she is not currently feeling short of breath and denies chest pain or pain anywhere else in her body.  She does state that she is feeling anxious, though less so than she was previously.    Prior to transfer to ICU patient and son both confirmed the DNR/DNI CODE STATUS.    Past Medical History:   Diagnosis Date    A-fib (HCC)     Anemia     Aortic stenosis     Arthritis     knees and fingers    Atrial fibrillation (HCC)     Benign essential hypertension 07/01/2011    Breast cancer (HCC)     Breath shortness     Bronchitis 2015    history of    CAD (coronary artery disease)     Cancer (HCC) 1987, 2000    left breast and cysts on ovaries cancerous    Cataract 2018    IOL bilat    Depression     Diabetes (HCC)     oral medication, insulin    Diastolic congestive heart failure (HCC) 2019    Disorder of thyroid     hypothyroid    High cholesterol     History of cardiac catheterization 04/22/2010    History of echocardiogram 04/22/2011    History of hypothyroidism 08/29/2008     "Hypercholesteremia 07/01/2011    Long term (current) use of anticoagulants 07/01/2011    Morbid obesity (HCC) 10/28/2008    Neuropathy     fingers and feet    Ovarian cancer (HCC)     Oxygen dependent     2liter @ HS    Pain 08/09/2022    lower back, \"joints\", 2/10    Sleep apnea 08/09/2022    pt does not use cpap at this time, O2 2 liters at HS    Snoring     sleep study done    Urinary incontinence      Past Surgical History:   Procedure Laterality Date    TRANSCATHETER AORTIC VALVE REPLACEMENT Bilateral 8/15/2022    Procedure: TRANSCATHETER AORTIC VALVE REPLACEMENT;  Surgeon: Lenin Oakley M.D.;  Location: SURGERY [facility];  Service: Cardiac    ECHOCARDIOGRAM, TRANSESOPHAGEAL, INTRAOPERATIVE N/A 8/15/2022    Procedure: ECHOCARDIOGRAM, TRANSESOPHAGEAL, INTRAOPERATIVE;  Surgeon: Lenin Oakley M.D.;  Location: SURGERY [facility];  Service: Cardiac    ZZZ CARDIAC CATH  02/27/2019    normal coronaries    CATARACT PHACO WITH IOL Right 01/22/2018    Procedure: CATARACT PHACO WITH IOL;  Surgeon: Santosh Holden M.D.;  Location: SURGERY SAME DAY [facility] ORS;  Service: Ophthalmology    CATARACT PHACO WITH IOL Left 01/08/2018    Procedure: CATARACT PHACO WITH IOL;  Surgeon: Santosh Holden M.D.;  Location: SURGERY SAME DAY [facility] ORS;  Service: Ophthalmology    OTHER ORTHOPEDIC SURGERY Right 2013    hip arthroplasty    CHOLECYSTECTOMY  2001    ABDOMINAL HYSTERECTOMY TOTAL      1987    MASTECTOMY Left partial    IA RADIATION THERAPY PLAN SIMPLE         Social History     Socioeconomic History    Marital status:      Spouse name: Not on file    Number of children: Not on file    Years of education: Not on file    Highest education level: Bachelor's degree (e.g., BA, AB, BS)   Occupational History    Not on file   Tobacco Use    Smoking status: Never    Smokeless tobacco: Never   Vaping Use    Vaping Use: Never used   Substance and Sexual Activity    Alcohol use: Yes     Comment: 1 or 2 drinks a " month    Drug use: Not Currently    Sexual activity: Not on file   Other Topics Concern    Not on file   Social History Narrative    Not on file     Social Determinants of Health     Financial Resource Strain: Low Risk     Difficulty of Paying Living Expenses: Not hard at all   Food Insecurity: No Food Insecurity    Worried About Running Out of Food in the Last Year: Never true    Ran Out of Food in the Last Year: Never true   Transportation Needs: No Transportation Needs    Lack of Transportation (Medical): No    Lack of Transportation (Non-Medical): No   Physical Activity: Inactive    Days of Exercise per Week: 0 days    Minutes of Exercise per Session: 0 min   Stress: Stress Concern Present    Feeling of Stress : Rather much   Social Connections: Moderately Integrated    Frequency of Communication with Friends and Family: Twice a week    Frequency of Social Gatherings with Friends and Family: Twice a week    Attends [employer] Services: 1 to 4 times per year    Active Member of Clubs or Organizations: No    Attends Club or Organization Meetings: More than 4 times per year    Marital Status:    Intimate Partner Violence: Not on file   Housing Stability: Low Risk     Unable to Pay for Housing in the Last Year: No    Number of Places Lived in the Last Year: 1    Unstable Housing in the Last Year: No          Family History   Problem Relation Age of Onset    Heart Disease Mother         HEART VALVE REPLACEMENT.    Heart Disease Father         CORONARY ARTERY BYPASS GRAFTS    Cancer Maternal Aunt     Diabetes Brother     Thyroid Brother     Heart Attack Brother     Heart Disease Brother        No current facility-administered medications on file prior to encounter.     Current Outpatient Medications on File Prior to Encounter   Medication Sig Dispense Refill    warfarin (COUMADIN) 2.5 MG Tab Take 2.5-5 mg by mouth every day. 5 mg (2 tablets) on Tuesdays, all other days 2.5 mg (1 tablet)      FEROSUL 325 (65 Fe)  "MG tablet Take 1 Tablet by mouth 2 times a day. 60 Tablet 11    rosuvastatin (CRESTOR) 20 MG Tab Take 1 Tab by mouth every evening. 90 Tab 4    gemfibrozil (LOPID) 600 MG Tab Take 1 Tab by mouth every morning. 90 Tab 4    levothyroxine (SYNTHROID) 200 MCG Tab Take 200 mcg by mouth see administration instructions. Monday through Saturday  Brand Name      allopurinol (ZYLOPRIM) 100 MG Tab Take 100 mg by mouth every bedtime.  2    acetaminophen (TYLENOL) 650 MG CR tablet Take 1,300 mg by mouth at bedtime. Indications: Pain      citalopram (CELEXA) 20 MG TABS Take 20 mg by mouth every morning.         Allergies: Sulfa drugs      ROS:   As noted in HPI. 10 point ROS otherwise negative.    Vitals:  /62   Pulse (!) 55   Temp 36.7 °C (98.1 °F) (Temporal)   Resp (!) 22   Ht 1.651 m (5' 5\")   Wt (!) 138 kg (303 lb 9.6 oz)   SpO2 90%     Physical Exam:  Physical Exam  Constitutional:       Appearance: Normal appearance. She is obese. She is not toxic-appearing.   HENT:      Head: Normocephalic and atraumatic.      Nose: Nose normal.      Mouth/Throat:      Comments: CPAP mask on  Eyes:      General: No scleral icterus.     Conjunctiva/sclera: Conjunctivae normal.   Cardiovascular:      Rate and Rhythm: Normal rate.   Pulmonary:      Breath sounds: No stridor. No wheezing.      Comments: Poor respiratory effort. Symmetric chest rise with breath sounds auscultated on both sides.  Musculoskeletal:      Right lower leg: Edema present.      Left lower leg: Edema present.   Neurological:      Mental Status: She is alert and oriented to person, place, and time.   Psychiatric:      Comments: Somewhat anxious       Laboratory Data:  ABG at 1:20pm 7.34/79.1/64.7/42         ASSESSMENT  #Acute on chronic hypoxic and hypercarbic respiratory failure  #Acute respiratory acidosis with compensatory metabolic alkalosis  #Acute on chronic decompensated diastolic heart failure  #Hx of symptomatic bradycardia s/p PPM 9/21  #Hx severe " AS, s/p TAVR 8/2022  #Permanent atrial fibrillation on digoxin  #Therapeutically anticoagulated on warfarin  #Pericardial effusion w/o evidence of tamponade  #IDDM2, A1c 5.8%  #Hypothyroidism  #Acute toxic/metabolic encephalopathy    -hypoxia due to pulmonary edema/anasarca in the setting of lapse of diuretics due to losing IV access.   -PE unlikely on therapeutic AC  -Chronic compensated hypercapnia  -STAT limited echo w/o evidence of tamponade, effusion overall smaller compared to prior echo, RVSP 65 -> 85, LVEF 70%  -CXR from today compared to 9/21 also shows increased interstitial opacities as well as obscuration of the right hemidiaphragm (hypoventilation vs pleural effusion?)    PLAN:  - transfer to ICU for rescue BIPAP  - reestablish IV access  - TTE reassuring  - increase lasix  to 60 IV q8H  - ABG in AM  - DNR/DNI confirmed    Discussed plan with attending physician Dr. Arian Saldaña and RADHA Almanza MD  [facility] PGY3

## 2022-09-25 NOTE — PROGRESS NOTES
Inpatient Anticoagulation Service Note    Date: 9/25/2022    Reason for Anticoagulation: Atrial Fibrillation, Bioprosthetic Valve Replacement   Target INR: 2.0 to 3.0  [id number] VASc Score: 6  HAS-BLED Score: 2   Hemoglobin Value: (!) 10.4  Hematocrit Value: (!) 36.9  Lab Platelet Value: 175    INR from last 7 days       Date/Time INR Value    09/25/22 0225 4    09/24/22 0156 2.51    09/23/22 0222 2.31    09/22/22 0105 1.86    09/21/22 0224 1.35    09/20/22 0801 1.29    09/19/22 0213 1.75          Dose from last 7 days       Date/Time Dose (mg)    09/25/22 1415 0    09/24/22 1019 2.5    09/23/22 0957 2.5    09/22/22 0944 2.5    09/20/22 1715 5    09/19/22 1438 2          Average Dose (mg):  (Home dose per med rec: 5 mg on Tues, 2.5 mg all other days)  Significant Interactions:  (gemfibrozin, thyroid medication, statin, citalopram, allopurinol)  Bridge Therapy: No   Reversal Agent Administered: Not Applicable    Assessment: Transferred to the ICU over night for increased oxygen requirements.  No change to diet or DDI.  H/H low, platelets WNL.  No overt s/sx of bleeding.  INR increased and is supra-therapeutic.      Plan:  Hold warfarin tonight and follow up INR in the AM.  Education Material Provided?: No  Pharmacist suggested discharge dosing: Can likely resume home regimen once clinical status has returned to baseline.     Megan Ruiz, PharmD, BCPS

## 2022-09-25 NOTE — ASSESSMENT & PLAN NOTE
Appears large, obscuring cardiac silhouette on CXR  No tamponade on stat TTE, obtained prior to aggressive diuresis to ensure no concern of tamponade in the setting of hypoxia/transient hypotension

## 2022-09-25 NOTE — CONSULTS
Critical Care Consultation    Date of consult: 9/24/2022    Referring Physician  Parth Burton D.O.    Reason for Consultation  Acute on chronic hypoxemic respiratory failure    History of Presenting Illness  72 y.o. female who presented 9/16/2022 with complaints of shortness of breath and bilateral lower extremity edema and anasarca she has a past medical history of a recent TAVR in 8/2022, permanent atrial fibrillation on warfarin, heart failure with preserved ejection fraction, morbid obesity, KEN on CPAP, chronic hypoxemic respiratory failure on 2 LPM ATC.  She was admitted for decompensated diastolic heart failure.  Hospital course complicated by symptomatic bradycardia with syncope x10-second episode of asystole.  Underwent PPM placement on 9/21/2022.  This evening, had an acute episode of shortness of breath while being turned.  She became briefly drowsy, never lost consciousness.  Tachypneic.  CXR showed stable large cardiac silhouette due to known pericardial effusion and cardiomegaly and ABG demonstrated a chronically compensated respiratory acidosis with an elevated AA gradient.  She was placed on her home BiPAP and given diuretics however continues to have increased work of breathing and was transferred to the ICU for rescue BiPAP.  DNR/DNI status was confirmed with both the patient and her son at bedside.    Code Status  DNAR/DNI    Review of Systems  Review of Systems   Respiratory:  Negative for shortness of breath.    Cardiovascular:  Positive for orthopnea and leg swelling. Negative for chest pain and palpitations.   Psychiatric/Behavioral:  The patient is nervous/anxious.      Past Medical History   has a past medical history of A-fib (HCC), Anemia, Aortic stenosis, Arthritis, Atrial fibrillation (HCC), Benign essential hypertension (07/01/2011), Breast cancer ([facility]), Breath shortness, Bronchitis (2015), CAD (coronary artery disease), Cancer (HCC) (1987, 2000), Cataract (2018), Depression,  Diabetes (HCC), Diastolic congestive heart failure (HCC) (2019), Disorder of thyroid, High cholesterol, History of cardiac catheterization (04/22/2010), History of echocardiogram (04/22/2011), History of hypothyroidism (08/29/2008), Hypercholesteremia (07/01/2011), Long term (current) use of anticoagulants (07/01/2011), Morbid obesity (HCC) (10/28/2008), Neuropathy, Ovarian cancer (HCC), Oxygen dependent, Pain (08/09/2022), Sleep apnea (08/09/2022), Snoring, and Urinary incontinence.    Surgical History   has a past surgical history that includes abdominal hysterectomy total; mastectomy (Left, partial); pr radiation therapy plan simple; cholecystectomy (2001); cataract phaco with iol (Left, 01/08/2018); cataract phaco with iol (Right, 01/22/2018); other orthopedic surgery (Right, 2013); [facility] cardiac cath (02/27/2019); transcatheter aortic valve replacement (Bilateral, 8/15/2022); and echocardiogram, transesophageal, intraoperative (N/A, 8/15/2022).    Family History  family history includes Cancer in her maternal aunt; Diabetes in her brother; Heart Attack in her brother; Heart Disease in her brother, father, and mother; Thyroid in her brother.    Social History   reports that she has never smoked. She has never used smokeless tobacco. She reports current alcohol use. She reports that she does not currently use drugs.    Medications  Home Medications       Reviewed by Carolina Rodriguez R.N. (Registered Nurse) on 09/17/22 at 0355  Med List Status: Complete     Medication Last Dose Status   acetaminophen (TYLENOL) 650 MG CR tablet PRN Active   allopurinol (ZYLOPRIM) 100 MG Tab 9/16/2022 Active   ascorbic acid (VITAMIN C) 500 MG tablet 9/16/2022 Active   Cholecalciferol (VITAMIN D-3) 5000 units Tab 9/16/2022 Active   citalopram (CELEXA) 20 MG TABS 9/16/2022 Active   FEROSUL 325 (65 Fe) MG tablet 9/16/2022 Active   Finerenone (KERENDIA) 10 MG Tab >1 month Active   gemfibrozil (LOPID) 600 MG Tab 9/16/2022 Active   LEVEMIR  FLEXTOUCH 100 UNIT/ML injection PEN 9/15/2022 Active   levothyroxine (SYNTHROID) 175 MCG Tab 9/11/2022 Active   levothyroxine (SYNTHROID) 200 MCG Tab 9/16/2022 Active   loratadine (CLARITIN) 10 MG Tab 9/16/2022 Active   losartan (COZAAR) 50 MG Tab 9/16/2022 Active   metoprolol (LOPRESSOR) 50 MG Tab 9/16/2022 Active   nystatin (MYCOSTATIN) powder PRN Active   oxybutynin (DITROPAN XL) 15 MG CR tablet 9/16/2022 Active   pioglitazone (ACTOS) 15 MG Tab 9/16/2022 Active   polyethylene glycol/lytes (MIRALAX) 17 g Pack PRN Active   potassium chloride SA (KDUR) 20 MEQ Tab CR 9/16/2022 Active   Pyridoxine HCl (VITAMIN B-6 PO) 9/16/2022 Active   rosuvastatin (CRESTOR) 20 MG Tab 9/16/2022 Active   torsemide (DEMADEX) 20 MG Tab 9/15/2022 Active   traZODone (DESYREL) 100 MG Tab 9/16/2022 Active   warfarin (COUMADIN) 2.5 MG Tab 9/16/2022 Active                  Current Facility-Administered Medications   Medication Dose Route Frequency Provider Last Rate Last Admin    furosemide (LASIX) injection 40 mg  40 mg Intravenous BID DIURETIC Parth Burton D.O.   40 mg at 09/24/22 1732    Respiratory Therapy Consult   Nebulization Continuous RT Parth Burton D.O.        ALPRAZolam (XANAX) tablet 0.25 mg  0.25 mg Oral 4X/DAY PRN Chelsea Alexandre A.P.R.NShanthi   0.25 mg at 09/24/22 1827    warfarin (COUMADIN) tablet 2.5 mg  2.5 mg Oral Once per day on Sun Mon Wed Thu Fri Sat Parth Burton D.O.   2.5 mg at 09/24/22 1733    [START ON 9/27/2022] warfarin (COUMADIN) tablet 5 mg  5 mg Oral Once per day on Tue Parth Burton D.O.        metoprolol tartrate (LOPRESSOR) tablet 25 mg  25 mg Oral TID Yousif Anand M.D.   25 mg at 09/24/22 1733    digoxin (LANOXIN) tablet 250 mcg  250 mcg Oral QAM Yousif Anand M.D.   250 mcg at 09/24/22 0504    spironolactone (ALDACTONE) tablet 25 mg  25 mg Oral Q DAY Yousif Anand M.D.   25 mg at 09/24/22 0503    acetaminophen (TYLENOL) tablet 500 mg  500 mg Oral Q6HRS PRN Parth Burton,  D.O.        insulin lispro (AdmeLOG,HumaLOG) injection  1-6 Units Subcutaneous 4X/DAY CAMERONS Parth Burton D.O.        And    insulin GLARGINE (Lantus,Semglee) injection  5 Units Subcutaneous Q EVENING Parth Burton D.O.   5 Units at 09/24/22 1734    And    dextrose 10 % BOLUS 25 g  25 g Intravenous Q15 MIN PRN Parth Burton D.O.        magnesium oxide tablet 400 mg  400 mg Oral DAILY Salena Pulido A.P.R.NShanthi   400 mg at 09/24/22 0504    allopurinol (ZYLOPRIM) tablet 100 mg  100 mg Oral QHS Vipul Ybarra M.D.   100 mg at 09/24/22 2205    citalopram (CeleXA) tablet 20 mg  20 mg Oral QAFRANSISCO Ybarra M.D.   20 mg at 09/24/22 0504    ferrous sulfate tablet 325 mg  325 mg Oral BID Vipul Ybarra M.D.   325 mg at 09/24/22 1733    gemfibrozil (LOPID) tablet 600 mg  600 mg Oral QAFRANSISCO Ybarra M.D.   600 mg at 09/24/22 0504    levothyroxine (SYNTHROID) tablet 200 mcg  200 mcg Oral Once per day on Mon Tue Wed Thu Fri Sat Vipul Ybarra M.D.   200 mcg at 09/24/22 0503    rosuvastatin (CRESTOR) tablet 20 mg  20 mg Oral Q EVENING Vipul Ybarra M.D.   20 mg at 09/24/22 1732    MD Alert...Warfarin per Pharmacy   Other PHARMACY TO DOSE Vipul Ybarra M.D.        levothyroxine (SYNTHROID) tablet 175 mcg  175 mcg Oral Once per day on Sun Vipul Ybarra M.D.   175 mcg at 09/18/22 0525       Allergies  Allergies   Allergen Reactions    Sulfa Drugs Rash     8/2015 rash and hives         Vital Signs last 24 hours  Temp:  [36.2 °C (97.1 °F)-36.7 °C (98.1 °F)] 36.7 °C (98.1 °F)  Pulse:  [55-75] 59  Resp:  [17-24] 17  BP: (109-151)/(39-71) 124/57  SpO2:  [85 %-96 %] 92 %    Physical Exam  Physical Exam  Vitals and nursing note reviewed.   Constitutional:       General: She is not in acute distress.     Appearance: Normal appearance. She is well-developed. She is obese. She is ill-appearing. She is not diaphoretic.   Eyes:      General: No scleral icterus.        Right eye: No discharge.         Left eye: No  discharge.      Conjunctiva/sclera: Conjunctivae normal.      Pupils: Pupils are equal, round, and reactive to light.   Neck:      Thyroid: No thyromegaly.      Vascular: No JVD.   Cardiovascular:      Rate and Rhythm: Normal rate and regular rhythm.      Heart sounds: Murmur heard.     No gallop.   Pulmonary:      Effort: Respiratory distress present.      Breath sounds: Normal breath sounds. No wheezing or rales.      Comments: Distant bilateral breath sounds  Abdominal:      General: There is no distension.      Palpations: Abdomen is soft.      Tenderness: There is no abdominal tenderness. There is no guarding.   Musculoskeletal:         General: No tenderness.      Right lower leg: Edema present.      Left lower leg: Edema present.      Comments: +3 pitting edema above the knees bilaterally   Lymphadenopathy:      Cervical: No cervical adenopathy.   Skin:     General: Skin is warm.      Capillary Refill: Capillary refill takes less than 2 seconds.      Findings: No erythema or rash.   Neurological:      Mental Status: She is alert and oriented to person, place, and time.      Cranial Nerves: No cranial nerve deficit.      Sensory: No sensory deficit.      Motor: No abnormal muscle tone.   Psychiatric:         Behavior: Behavior normal.     Fluids    Intake/Output Summary (Last 24 hours) at 9/24/2022 2214  Last data filed at 9/24/2022 1800  Gross per 24 hour   Intake 500 ml   Output 850 ml   Net -350 ml       Laboratory  Recent Results (from the past 48 hour(s))   POCT glucose device results    Collection Time: 09/22/22 10:17 PM   Result Value Ref Range    POC Glucose, Blood 95 65 - 99 mg/dL   Prothrombin Time    Collection Time: 09/23/22  2:22 AM   Result Value Ref Range    PT 24.7 (H) 12.0 - 14.6 sec    INR 2.31 (H) 0.87 - 1.13   POCT glucose device results    Collection Time: 09/23/22  7:52 AM   Result Value Ref Range    POC Glucose, Blood 97 65 - 99 mg/dL   SNF COVID Discharge (DRY NASAL SWAB IS REQUIRED)     Collection Time: 09/23/22 10:10 AM   Result Value Ref Range    SARS-CoV-2 Source Nasal Swab     SARS-COV ANTIGEN ZUHAIR NotDetected NotDetected   POCT glucose device results    Collection Time: 09/23/22 11:16 AM   Result Value Ref Range    POC Glucose, Blood 92 65 - 99 mg/dL   POCT glucose device results    Collection Time: 09/23/22  4:29 PM   Result Value Ref Range    POC Glucose, Blood 118 (H) 65 - 99 mg/dL   POCT glucose device results    Collection Time: 09/23/22  8:26 PM   Result Value Ref Range    POC Glucose, Blood 96 65 - 99 mg/dL   Prothrombin Time    Collection Time: 09/24/22  1:56 AM   Result Value Ref Range    PT 26.3 (H) 12.0 - 14.6 sec    INR 2.51 (H) 0.87 - 1.13   POCT glucose device results    Collection Time: 09/24/22  7:38 AM   Result Value Ref Range    POC Glucose, Blood 104 (H) 65 - 99 mg/dL   CBC WITHOUT DIFFERENTIAL    Collection Time: 09/24/22 11:38 AM   Result Value Ref Range    WBC 8.2 4.8 - 10.8 K/uL    RBC 4.33 4.20 - 5.40 M/uL    Hemoglobin 11.1 (L) 12.0 - 16.0 g/dL    Hematocrit 39.4 37.0 - 47.0 %    MCV 91.0 81.4 - 97.8 fL    MCH 25.6 (L) 27.0 - 33.0 pg    MCHC 28.2 (L) 33.6 - 35.0 g/dL    RDW 59.7 (H) 35.9 - 50.0 fL    Platelet Count 154 (L) 164 - 446 K/uL    MPV 9.4 9.0 - 12.9 fL   Basic Metabolic Panel    Collection Time: 09/24/22 11:38 AM   Result Value Ref Range    Sodium 138 135 - 145 mmol/L    Potassium 4.9 3.6 - 5.5 mmol/L    Chloride 93 (L) 96 - 112 mmol/L    Co2 36 (H) 20 - 33 mmol/L    Glucose 134 (H) 65 - 99 mg/dL    Bun 37 (H) 8 - 22 mg/dL    Creatinine 0.94 0.50 - 1.40 mg/dL    Calcium 10.0 8.5 - 10.5 mg/dL    Anion Gap 9.0 7.0 - 16.0   ESTIMATED GFR    Collection Time: 09/24/22 11:38 AM   Result Value Ref Range    GFR (CKD-EPI) 64 >60 mL/min/1.73 m 2   POCT glucose device results    Collection Time: 09/24/22 12:48 PM   Result Value Ref Range    POC Glucose, Blood 124 (H) 65 - 99 mg/dL   ABG - LAB    Collection Time: 09/24/22  1:20 PM   Result Value Ref Range    Ph 7.34 (L)  7.40 - 7.50    Pco2 79.1 (HH) 26.0 - 37.0 mmHg    Po2 64.7 64.0 - 87.0 mmHg    O2 Saturation 91.7 (L) 93.0 - 99.0 %    Hco3 42 (H) 17 - 25 mmol/L    Base Excess 13 (H) -4 - 3 mmol/L    Body Temp 36.4 Centigrade    O2 Therapy 15.0 (H) 2.0 - 10.0 L/min   POCT glucose device results    Collection Time: 09/24/22  5:31 PM   Result Value Ref Range    POC Glucose, Blood 124 (H) 65 - 99 mg/dL   EC-ECHOCARDIOGRAM LTD W/O CONT    Collection Time: 09/24/22  8:50 PM   Result Value Ref Range    Left Ventrical Ejection Fraction 70    POCT glucose device results    Collection Time: 09/24/22  8:53 PM   Result Value Ref Range    POC Glucose, Blood 132 (H) 65 - 99 mg/dL   TROPONIN    Collection Time: 09/24/22  9:30 PM   Result Value Ref Range    Troponin T 61 (H) 6 - 19 ng/L     Imaging    EC-ECHOCARDIOGRAM LTD W/O CONT   Final Result      IR-US GUIDED PIV   Final Result    Ultrasound-guided PERIPHERAL IV INSERTION performed by    qualified nursing staff as above.      DX-CHEST-PORTABLE (1 VIEW)   Final Result      No brain marked cardiomegaly with marked congestive heart failure and bilateral pleural effusions.      DX-CHEST-PORTABLE (1 VIEW)   Final Result      1.  No evidence of pneumothorax   2.  Persistently enlarged cardiac silhouette      DX-CHEST-PORTABLE (1 VIEW)   Final Result      1.  There is a right sided pacemaker in good position with its distal leads overlying the right ventricle.      2.  Marked cardiomegaly.      3.  Small bilateral pleural effusions.      4.  Previous TAVR.      EC-ECHOCARDIOGRAM COMPLETE W/O CONT   Final Result      DX-CHEST-PORTABLE (1 VIEW)   Final Result      1.  Stable cardiomegaly with pericardial effusion.   2.  Bibasilar atelectasis versus consolidations and suspected right pleural effusion.      CL-PERMANENT PACEMAKER INSERTION    (Results Pending)     Assessment/Plan    * Acute on chronic respiratory failure with hypoxia (HCC)- (present on admission)  Assessment & Plan  Due to pulm  edema/anasarca in the setting of lapse of diuretics due to losing IV access.   Chronic hypoventilation/hypoxia due to restrictive physiology from body habitus  No tamponade on TTE  Increase lasix 60mg IV q8H, trend VBG and diamox PRN contraction alkalosis  Tranfer to ICU, rescue BIPAP as failing CPAP on floor    Acute on chronic diastolic congestive heart failure (HCC)- (present on admission)  Assessment & Plan  Continue goal-directed management therapy with MTP, spironolactone, diuretics  Floridly fluid overloaded, recheck BMP  TTE preserved LVEF, no evidence of tamponade  Cardiology following  Telemetry    Chronic respiratory acidosis  Assessment & Plan  Compensated  Monitor for contraction alkalosis with diuretics    Sinus pause  Assessment & Plan  s/p PPM 9/21/22  Paced  Monitor on telemetry    Pericardial effusion- (present on admission)  Assessment & Plan  Appears large, obscuring cardiac silhouette on CXR  No tamponade on stat TTE, obtained prior to aggressive diuresis to ensure no concern of tamponade in the setting of hypoxia/transient hypotension    S/P TAVR (transcatheter aortic valve replacement)- (present on admission)  Assessment & Plan  s/p TAVR 8/2022  TTE showing normal valve gradient/function    KEN on CPAP  Assessment & Plan  Has machine  Failed on floor  Cont BIPAP while in ICU    Chronic atrial fibrillation- (present on admission)  Assessment & Plan  Rates controlled  On MTP, digoxin  Check dig level in a.m.  Continue warfarin, therapeutic INR    Morbid obesity with BMI of 50.0-59.9, adult ([facility])- (present on admission)  Assessment & Plan  Patient counseled about the importance of weight loss for long-term health risk reduction.   Will dose-adjust medications appropriately based on larger volume of drug distribution.    DNR (do not resuscitate)  Assessment & Plan  Discussed with patient and son (DPOA), confirmed DNR/DNI    Type 2 diabetes mellitus without complication, with long-term current use  of insulin (HCC)- (present on admission)  Assessment & Plan  A1c 5.8%  Fingerstick blood glucose within normal limits  Continue Lantus/sliding scale  Goal FSBG 140-180    Discussed patient condition and risk of morbidity and/or mortality with Family, RN, RT, Patient, and RRT RN .      The patient remains critically ill.  Critical care time = 95 minutes in directly providing and coordinating critical care and extensive data review.  No time overlap and excludes procedures.    This note was generated using voice recognition software which has a chance of producing errors of grammar and content.  I have made every reasonable attempt to find and correct any errors, but it should be expected that some may not be found prior to finalization of this note.  __________  Arian Saldaña MD  Pulmonary and Critical Care Medicine  [facility]

## 2022-09-25 NOTE — ASSESSMENT & PLAN NOTE
Continue goal-directed management therapy with MTP, spironolactone, diuretics  Floridly fluid overloaded, recheck BMP  TTE preserved LVEF, no evidence of tamponade  Cardiology following  Telemetry

## 2022-09-25 NOTE — ASSESSMENT & PLAN NOTE
Secondary to pulmonary edema   Chronic hypoventilation also leading to CO2 retention  ECHO without evidence of tamponade  Cont Diamox 500mg every 6 hours due to worsening ABG/mental status  Continuous BiPAP, pt BiPAP dependent at this point  Noted RVSP at 85-->cont max Flolan

## 2022-09-25 NOTE — ASSESSMENT & PLAN NOTE
A1c 5.8%  Fingerstick blood glucose within normal limits  Continue Lantus/sliding scale  Goal FSBG 140-180

## 2022-09-25 NOTE — PROGRESS NOTES
Pt gets overwhelmed after attempting to administer PO medications. Pt states she cant breathe enough to take them. Pt instructed to breathe deeply and get good breaths. Pt is shallow breathing and respiratory rate increases to 30 and oxygen saturation remains at 85%. Informed charge nurse and asked if appropriate to call a rapid. Rapid is called by charge and on call hospitalist calls and is updated on pt status. Rapid response reports to bedside after we place pt on nonrebreather. Pt's oxygen increases to 89%. New orders are placed after hospitalist reports to bedside. Pt is started on home CPAP by RT. Oxygen improved momentarily.

## 2022-09-25 NOTE — PROGRESS NOTES
NOC APRN CROSS COVER NOTE      Responded to rapid response for increasing oxygen demand and SOB. Patient placed on 15 L NRB from 5 L oxymask.    Patient admitted with shortness of breath and bilateral lower extremity swelling. Patient with a past medical history of CHF, atrial fibrillation, HTN, morbid obesity, DM and TAVR on 8/15/22. Patient wears 2 L oxygen at home and utilizes home CPap. During her hospitalization, she required pacemaker placement for symptomatic bradycardia and 10 second arrest.    Upon arrival to bedside, patient is drowsy but anxious but is oriented x 4. Bilateral lung sounds diminished with bibasilar fine crackles. Breathing is tachypneic and shallow. Bilateral lower extremity edema 3-4+ pitting.     Patient with similar presentation this afternoon.  Ronald HANLEY ordered ABG and chest x-ray.  ABG reveals hypercapnia with PCO2 of 79.1, however patient is compensating well with pH of 7.34 and HCO3 of 42.  Chest x-ray from 1319 demonstrates no brain marked cardiomegaly with marked congestive heart failure and bilateral pleural effusions.    Patient given additional 40 mg IV lasix and placed on home CPap with 15 L O2. Unfortunately, patient not able to fully maintain oxygen saturation.    I spoke with Dr. Saldaña from ICU who came to bedside to assess the patient.  She will be transferred to the ICU for rescue BiPAP.     Confirmed with both patient and her son her DNR/DNI status.    Chelsea Alexandre ACN-AG, NOC Hospitalist RADHA

## 2022-09-25 NOTE — PROGRESS NOTES
"Critical Care Progress Note    Date of admission  9/16/2022    Chief Complaint  \"72 y.o. female who presented 9/16/2022 with complaints of shortness of breath and bilateral lower extremity edema and anasarca she has a past medical history of a recent TAVR in 8/2022, permanent atrial fibrillation on warfarin, heart failure with preserved ejection fraction, morbid obesity, KEN on CPAP, chronic hypoxemic respiratory failure on 2 LPM ATC.  She was admitted for decompensated diastolic heart failure.  Hospital course complicated by symptomatic bradycardia with syncope x10-second episode of asystole.  Underwent PPM placement on 9/21/2022.  This evening, had an acute episode of shortness of breath while being turned.  She became briefly drowsy, never lost consciousness.  Tachypneic.  CXR showed stable large cardiac silhouette due to known pericardial effusion and cardiomegaly and ABG demonstrated a chronically compensated respiratory acidosis with an elevated AA gradient.  She was placed on her home BiPAP and given diuretics however continues to have increased work of breathing and was transferred to the ICU for rescue BiPAP.  DNR/DNI status was confirmed with both the patient and her son at bedside.\" Dr Saldaña 9/24    Hospital Course  9/16 - admitted with HFpEF exacerbation  9/21 - PPM placed    9/24 - Transferred to ICU for rescue BiPAP    Interval Problem Update  Reviewed last 24 hour events:  Tmax: Afebrile  Diet: NPO  Vasopressors: NA  I/O: -835 yesterday, -7.4L for admission  Mobility: Level 1  Antibx: NA    Review of Systems   Review of Systems   Constitutional:  Negative for chills, fever and malaise/fatigue.   HENT:  Negative for congestion and sore throat.    Eyes: Negative.    Respiratory:  Positive for sputum production. Negative for shortness of breath.    Cardiovascular:  Positive for orthopnea and leg swelling. Negative for chest pain and palpitations.   Gastrointestinal:  Negative for abdominal pain, nausea " and vomiting.   Genitourinary: Negative.    Musculoskeletal: Negative.    Skin: Negative.    Neurological:  Negative for focal weakness and headaches.   All other systems reviewed and are negative.     Vital Signs for last 24 hours   Temp:  [36.7 °C (98.1 °F)] 36.7 °C (98.1 °F)  Pulse:  [55-72] 66  Resp:  [15-37] 25  BP: ()/(51-63) 117/56  SpO2:  [90 %-97 %] 93 %    Hemodynamic parameters for last 24 hours       Respiratory Information for the last 24 hours       Physical Exam    Physical Exam  Vitals and nursing note reviewed. Exam conducted with a chaperone present.   Constitutional:       Appearance: She is obese.   HENT:      Head: Normocephalic.      Mouth/Throat:      Mouth: Mucous membranes are moist.   Eyes:      Extraocular Movements: Extraocular movements intact.   Cardiovascular:      Rate and Rhythm: Normal rate. Rhythm irregular.      Pulses: Normal pulses.   Pulmonary:      Effort: Respiratory distress present.      Breath sounds: Rhonchi present.   Abdominal:      General: There is no distension.      Palpations: Abdomen is soft.      Tenderness: There is no abdominal tenderness. There is no guarding or rebound.   Musculoskeletal:         General: Normal range of motion.      Cervical back: Normal range of motion and neck supple.   Skin:     General: Skin is warm and dry.      Capillary Refill: Capillary refill takes less than 2 seconds.   Neurological:      General: No focal deficit present.      Mental Status: She is alert and oriented to person, place, and time. Mental status is at baseline.       Medications  Current Facility-Administered Medications   Medication Dose Route Frequency Provider Last Rate Last Admin    furosemide (LASIX) injection 80 mg  80 mg Intravenous Q8HRS Arian Jain M.D.        polyethylene glycol/lytes (MIRALAX) PACKET 1 Packet  1 Packet Oral DAILY Arian Jain M.D.        And    senna-docusate (PERICOLACE or SENOKOT S) 8.6-50 MG per tablet 2 Tablet  2 Tablet Oral  BID Arian Jain M.D.        And    magnesium hydroxide (MILK OF MAGNESIA) suspension 30 mL  30 mL Oral QDAY PRN Arian Jain M.D.        And    bisacodyl (DULCOLAX) suppository 10 mg  10 mg Rectal QDAY PRN Arian Jain M.D.        acetaZOLAMIDE (DIAMOX) injection 500 mg  500 mg Intravenous Once Arian Jain M.D.        Respiratory Therapy Consult   Nebulization Continuous RT Parth Burton D.O.        ALPRAZolam (XANAX) tablet 0.25 mg  0.25 mg Oral 4X/DAY PRN Chelsea Alexandre, A.P.R.N.   0.25 mg at 09/25/22 1005    warfarin (COUMADIN) tablet 2.5 mg  2.5 mg Oral Once per day on Sun Mon Wed Thu Fri Sat Parth Burton D.O.   2.5 mg at 09/24/22 1733    [START ON 9/27/2022] warfarin (COUMADIN) tablet 5 mg  5 mg Oral Once per day on Tue Parth Burton D.O.        metoprolol tartrate (LOPRESSOR) tablet 25 mg  25 mg Oral TID Yousif Anand M.D.   25 mg at 09/25/22 0549    digoxin (LANOXIN) tablet 250 mcg  250 mcg Oral QAM Yousif Anand M.D.   250 mcg at 09/25/22 0550    spironolactone (ALDACTONE) tablet 25 mg  25 mg Oral Q DAY Yousif Anand M.D.   25 mg at 09/25/22 0550    acetaminophen (TYLENOL) tablet 500 mg  500 mg Oral Q6HRS PRN Parth Burton D.O.        insulin lispro (AdmeLOG,HumaLOG) injection  1-6 Units Subcutaneous 4X/DAY ACHS Parth Burton D.O.        And    insulin GLARGINE (Lantus,Semglee) injection  5 Units Subcutaneous Q EVENING Parth Burton D.O.   5 Units at 09/24/22 1734    And    dextrose 10 % BOLUS 25 g  25 g Intravenous Q15 MIN PRN Parth Burton D.O.        magnesium oxide tablet 400 mg  400 mg Oral DAILY Salena Pulido, A.P.R.N.   400 mg at 09/25/22 0547    allopurinol (ZYLOPRIM) tablet 100 mg  100 mg Oral QHS Vipul Ybarra M.D.   100 mg at 09/24/22 2205    citalopram (CeleXA) tablet 20 mg  20 mg Oral QA Vipul Ybarra M.D.   20 mg at 09/25/22 0550    ferrous sulfate tablet 325 mg  325 mg Oral BID Vipul Ybarra M.D.   325 mg at 09/25/22 0514     gemfibrozil (LOPID) tablet 600 mg  600 mg Oral QAM Vipul Ybarra M.D.   600 mg at 09/25/22 0547    levothyroxine (SYNTHROID) tablet 200 mcg  200 mcg Oral Once per day on Mon Tue Wed Thu Fri Sat Vipul Ybarra M.D.   200 mcg at 09/24/22 0503    rosuvastatin (CRESTOR) tablet 20 mg  20 mg Oral Q EVENING Vipul Ybarra M.D.   20 mg at 09/24/22 1732    MD Alert...Warfarin per Pharmacy   Other PHARMACY TO DOSE Vipul Ybarra M.D.        levothyroxine (SYNTHROID) tablet 175 mcg  175 mcg Oral Once per day on Sun Vipul Ybarra M.D.   175 mcg at 09/25/22 0550       Fluids    Intake/Output Summary (Last 24 hours) at 9/25/2022 1318  Last data filed at 9/25/2022 0600  Gross per 24 hour   Intake 745 ml   Output 2575 ml   Net -1830 ml       Laboratory  Recent Labs     09/24/22  1320 09/25/22  0511   NCURG41V 7.34*  --    SVPDKA151L 79.1*  --    QZGVE996L 64.7  --    MTOO0HWR 91.7*  --    ARTHCO3 42*  --    N1JODJHZN 15.0*  --    ARTBE 13*  --    ISTATAPH  --  7.430   ISTATAPCO2  --  78.4*   ISTATAPO2  --  64   ISTATATCO2  --  >50*   LKJTDOJ6QRN  --  91*   ISTATARTHCO3  --  52.1*   ISTATARTBE  --  23*   ISTATTEMP  --  97.0 F   ISTATFIO2  --  50   ISTATSPEC  --  Arterial   ISTATAPHTC  --  7.444   YTOSKRIB2WZ  --  60*         Recent Labs     09/24/22  1138 09/25/22  0225   SODIUM 138 141   POTASSIUM 4.9 4.2   CHLORIDE 93* 92*   CO2 36* 43*   BUN 37* 37*   CREATININE 0.94 0.91   MAGNESIUM  --  2.1   CALCIUM 10.0 9.9     Recent Labs     09/24/22  1138 09/25/22  0225   GLUCOSE 134* 119*     Recent Labs     09/24/22 1138 09/25/22 0225   WBC 8.2 7.1     Recent Labs     09/23/22 0222 09/24/22  0156 09/24/22 1138 09/25/22 0225   RBC  --   --  4.33 4.06*   HEMOGLOBIN  --   --  11.1* 10.4*   HEMATOCRIT  --   --  39.4 36.9*   PLATELETCT  --   --  154* 175   PROTHROMBTM 24.7* 26.3*  --  37.5*   INR 2.31* 2.51*  --  4.00*       Imaging  Echo:   Reviewed    Assessment/Plan  * Acute on chronic respiratory failure with hypoxia (HCC)-  (present on admission)  Assessment & Plan  Secondary to pulmonary edema, had been of lasix for a time   Chronic hypoventilation leading to CO2 retention  ECHO without evidence of tamponade    Lasix 80mg q8  Diamox today for alkalosis  Follow gas    Continue intermittent BiPAP, breaks off BiPAP --> HFNC    Chronic respiratory acidosis  Assessment & Plan  Has also been diuresed heavily  Will give 1x dose diamox today    Edema due to congestive heart failure (HCC)  Assessment & Plan  Continue aggressive lasix  She is DNR/DNI and on BiPAP  Diamox today for alkalosis    Supratherapeutic INR  Assessment & Plan  Pharmacy following    Pericardial effusion- (present on admission)  Assessment & Plan  No tamponade on TTE  Continues to diurese well  No hemodynamic complications thus far  Cardiology has seen her  She is on coumadin, should things change and she require emergent pericardiocentesis would need reversal    KEN on CPAP  Assessment & Plan  BiPAP qhs    Chronic atrial fibrillation- (present on admission)  Assessment & Plan  Rates controlled  On MTP, digoxin  Check dig level in a.m.  Continue warfarin, therapeutic INR    DNR (do not resuscitate)  Assessment & Plan  Discussed with patient and son (DPOA), confirmed DNR/DNI    Sinus pause  Assessment & Plan  s/p PPM 9/21/22  Paced  Monitor on telemetry    S/P TAVR (transcatheter aortic valve replacement)- (present on admission)  Assessment & Plan  s/p TAVR 8/2022  TTE showing normal valve gradient/function    Hypomagnesemia  Assessment & Plan  Check and replace daily    Hypokalemia  Assessment & Plan  Check and replace daily  On lasix    Type 2 diabetes mellitus without complication, with long-term current use of insulin ([facility])- (present on admission)  Assessment & Plan  A1c is not elevated  Sugars have been fine  DC insulin    Chronic anticoagulation- (present on admission)  Assessment & Plan  Check INR daily  Pharmacy dosing coumadin    Dyslipidemia- (present on  admission)  Assessment & Plan  Continue statin    History of hypothyroidism- (present on admission)  Assessment & Plan  Synthroid    Morbid obesity with BMI of 50.0-59.9, adult (HCC)- (present on admission)  Assessment & Plan  Patient counseled about the importance of weight loss for long-term health risk reduction.   Will dose-adjust medications appropriately based on larger volume of drug distribution.    Acute on chronic diastolic congestive heart failure (HCC)- (present on admission)  Assessment & Plan  Significant fluid overload  Continue diuresis  Diamox today for contraction alkalosis on top of chronic CO2 retention and renal compensation         VTE:  Coumadin  Ulcer: Not Indicated  Lines: Leung Catheter  Ongoing indication addressed    I have performed a physical exam and reviewed and updated ROS and Plan today (9/25/2022). In review of yesterday's note (9/24/2022), there are no changes except as documented above.     Discussed patient condition and risk of morbidity and/or mortality with Family, RN, RT, Pharmacy, Code status disscussed, Charge nurse / hot rounds, and Patient    The patient remains critically ill.  Critical care time = 58 minutes in directly providing and coordinating critical care and extensive data review.  No time overlap and excludes procedures.

## 2022-09-25 NOTE — ASSESSMENT & PLAN NOTE
Patient counseled about the importance of weight loss for long-term health risk reduction.   Likely contributing to her co-morbidities  Will dose-adjust medications appropriately based on larger volume of drug distribution  PT/OT.

## 2022-09-25 NOTE — ASSESSMENT & PLAN NOTE
No tamponade on TTE  Continues to diurese well  No hemodynamic complications thus far  Cardiology has seen her  She is on coumadin, should things change and she require emergent pericardiocentesis would need reversal.

## 2022-09-25 NOTE — RESPIRATORY CARE
Patient placed on BiPAP per MD order. ST 16/8, 80% Fio2. Skin barrier in place. Patient demonstrated mask removal.

## 2022-09-25 NOTE — ASSESSMENT & PLAN NOTE
Due to pulm edema/anasarca in the setting of lapse of diuretics due to losing IV access.   Chronic hypoventilation/hypoxia due to restrictive physiology from body habitus  No tamponade on TTE  Increase lasix 60mg IV q8H, trend VBG and diamox PRN contraction alkalosis  Tranfer to ICU, rescue BIPAP as failing CPAP on floor

## 2022-09-25 NOTE — ASSESSMENT & PLAN NOTE
Patient counseled about the importance of weight loss for long-term health risk reduction.   Will dose-adjust medications appropriately based on larger volume of drug distribution.

## 2022-09-26 NOTE — DISCHARGE PLANNING
Chart review and assessment completed. Patient was transferred to ICU for higher level of care on 09/24. Referrals for SNF have been sent and she has been accepted at a couple of locations. She is not medically cleared for SNF at this time. DPA notified.     Care Transition Team Assessment    Information Source  Orientation Level: Disoriented to situation  Information Given By: Other (Comments)  Informant's Name: EMR  Who is responsible for making decisions for patient? : Adult child    Readmission Evaluation  Is this a readmission?: No    Elopement Risk  Legal Hold: No  Ambulatory or Self Mobile in Wheelchair: No-Not an Elopement Risk  Disoriented: No  Psychiatric Symptoms: None  History of Wandering: No  Elopement this Admit: No  Vocalizing Wanting to Leave: No  Displays Behaviors, Body Language Wanting to Leave: No-Not at Risk for Elopement  Elopement Risk: Not at Risk for Elopement    Interdisciplinary Discharge Planning  Lives with - Patient's Self Care Capacity: Other (Comments)  Patient or legal guardian wants to designate a caregiver: Yes  Caregiver name: Nita Crisostomo  Caregiver contact info: 0   Support Systems: Friends / Neighbors, Family Member(s)  Housing / Facility: Assisted Living Residence  Name of Care Facility: [facility]  Prior Services: Intermittent Physical Support for ADL Per Service  Durable Medical Equipment: Walker    Discharge Preparedness  What is your plan after discharge?: Uncertain - pending medical team collaboration  What are your discharge supports?: Child    Finances  Financial Barriers to Discharge: No  Prescription Coverage: Yes    Vision / Hearing Impairment  Vision Impairment : No  Right Eye Vision: Impaired, Wears Glasses  Left Eye Vision: Impaired, Wears Glasses  Hearing Impairment : No    Domestic Abuse  Have you ever been the victim of abuse or violence?: No  Physical Abuse or Sexual Abuse: No  Verbal Abuse or Emotional Abuse: No  Possible Abuse/Neglect Reported  to:: Not Applicable    Discharge Risks or Barriers  Discharge risks or barriers?: Post-acute placement / services, Complex medical needs    Anticipated Discharge Information  Discharge Disposition: D/T to SNF with Medicare cert in anticipation of skilled care (03)

## 2022-09-26 NOTE — THERAPY
Occupational Therapy  Daily Treatment     Patient Name: Rachelle Reina  Age:  72 y.o., Sex:  female  Medical Record #: [id number]  Today's Date: 9/26/2022     Precautions  Precautions: (P) Fall Risk  Comments: PPM 9/20/22    Assessment    Pt seen for follow up OT tx session following ICU admission due to increased supplemental oxygen needs, pt limited by generalized weakness and poor activity tolerance; pts son at bedside and able to assist in encouraging patient to participate. Will continue to see for skilled therapy while admitted as well as recommend post-acute placement.    Plan    Continue current treatment plan.    DC Equipment Recommendations: (P) Unable to determine at this time  Discharge Recommendations: (P) Recommend post-acute placement for additional occupational therapy services prior to discharge home    Objective       09/26/22 1124   Precautions   Precautions Fall Risk   Pain 0 - 10 Group   Therapist Pain Assessment Post Activity Pain Same as Prior to Activity;Nurse Notified   Cognition    Level of Consciousness Alert   Comments limited volition, distracted by anxiety   Balance   Sitting Balance (Static) Fair   Sitting Balance (Dynamic) Fair   Weight Shift Sitting Fair   Skilled Intervention Verbal Cuing;Facilitation   Bed Mobility    Supine to Sit Moderate Assist   Sit to Supine Moderate Assist   Scooting Minimal Assist   Rolling Moderate Assist to Rt.   Activities of Daily Living   Upper Body Dressing Moderate Assist   Lower Body Dressing Maximal Assist   Toileting Maximal Assist   Skilled Intervention Verbal Cuing;Facilitation   How much help from another person does the patient currently need...   Putting on and taking off regular lower body clothing? 2   Bathing (including washing, rinsing, and drying)? 2   Toileting, which includes using a toilet, bedpan, or urinal? 2   Putting on and taking off regular upper body clothing? 2   Taking care of personal grooming such as brushing teeth? 2    Eating meals? 4   6 Clicks Daily Activity Score 14   Functional Mobility   Sit to Stand Refused   Bed, Chair, Wheelchair Transfer Refused   Toilet Transfers Refused   Mobility bed mobility, EOB only, returned to supine   Activity Tolerance   Sitting Edge of Bed 10 min   Patient / Family Goals   Patient / Family Goal #1 To go to a SNF   Goal #1 Outcome Progressing as expected   Short Term Goals   Short Term Goal # 1 Pt will be SBA for BSC transfers   Goal Outcome # 1 Progressing slower than expected   Short Term Goal # 2 Pt will groom standing at the sink for 10 min with supervision   Goal Outcome # 2 Progressing slower than expected   Short Term Goal # 3 Pt will sit up in chair for 2 meals/day   Goal Outcome # 3 Goal met   Education Group   Education Provided Role of Occupational Therapist   Role of Occupational Therapist Patient Response Patient;Family;Acceptance;Explanation;Verbal Demonstration   Anticipated Discharge Equipment and Recommendations   DC Equipment Recommendations Unable to determine at this time   Discharge Recommendations Recommend post-acute placement for additional occupational therapy services prior to discharge home   Interdisciplinary Plan of Care Collaboration   IDT Collaboration with  Nursing;Family / Caregiver   Patient Position at End of Therapy In Bed;Bed Alarm On;Call Light within Reach;Tray Table within Reach;Phone within Reach   Collaboration Comments RN updated

## 2022-09-26 NOTE — PROGRESS NOTES
Inpatient Anticoagulation Service Note    Date: 9/26/2022    Reason for Anticoagulation: Atrial Fibrillation, Bioprosthetic Valve Replacement   Target INR: 2.0 to 3.0  [id number] VASc Score: 6  HAS-BLED Score: 2   Hemoglobin Value: (!) 10.4  Hematocrit Value: (!) 36.1  Lab Platelet Value: 170    INR from last 7 days       Date/Time INR Value    09/26/22 0350 4.08    09/25/22 0225 4    09/24/22 0156 2.51    09/23/22 0222 2.31    09/22/22 0105 1.86    09/21/22 0224 1.35    09/20/22 0801 1.29          Dose from last 7 days       Date/Time Dose (mg)    09/26/22 1300 0    09/25/22 1415 0    09/24/22 1019 2.5    09/23/22 0957 2.5    09/22/22 0944 2.5    09/20/22 1715 5    09/19/22 1438 2          Average Dose (mg):  (Home dose per med rec: 5 mg on Tues, 2.5 mg all other days)  Significant Interactions:  (gemfibrozin, thyroid medication, statin, citalopram, allopurinol)  Bridge Therapy: No   Reversal Agent Administered: Not Applicable    Assessment: Patient continues on aggressive diuresis.  No changes to diet or DDI status.  INR remains supra-therapeutic.  H/H low but stable, platelets WNL.  No overt s/sx of bleeding.  Continue to hold warfarin, follow up INR in the AM.    Plan:  Continue to hold warfarin, follow up INR in the AM.  Education Material Provided?: No  Pharmacist suggested discharge dosing: Can likely resume home regimen once clinical status has returned to baseline.  Recommend follow up INR within 2 days of discharge.        Megan Ruiz, PharmD, BCPS

## 2022-09-26 NOTE — CARE PLAN
The patient is Stable - Low risk of patient condition declining or worsening    Shift Goals  Clinical Goals: tolerate bipap, maintain SpO2> 90  Patient Goals: none stated        Problem: Knowledge Deficit - Standard  Goal: Patient and family/care givers will demonstrate understanding of plan of care, disease process/condition, diagnostic tests and medications  Outcome: Not Met     Problem: Skin Integrity  Goal: Skin integrity is maintained or improved  Outcome: Not Met     Problem: Depression  Goal: Patient and family/caregiver will verbalize accurate information about at least two of the possible causes of depression, three-four of the signs and symptoms of depression  Outcome: Not Met     Problem: Fall Risk  Goal: Patient will remain free from falls  Outcome: Not Met

## 2022-09-26 NOTE — PROGRESS NOTES
Linda Kent NP notified of Critical lab report of a CO2 of 47.     Digoxin level 2.7. plan to hold morning digoxin dose per Linda Kent NP

## 2022-09-26 NOTE — CARE PLAN
Problem: Ventilation  Goal: Ability to achieve and maintain unassisted ventilation or tolerate decreased levels of ventilator support  Description: Target End Date:  4 days     Document on Vent flowsheet    1.  Support and monitor invasive and noninvasive mechanical ventilation  2.  Monitor ventilator weaning response  3.  Perform ventilator associated pneumonia prevention interventions  4.  Manage ventilation therapy by monitoring diagnostic test results  Outcome: Not Met    Pt switching between HF and BiPAP as tolerated.   BiPAP 12/8, 50%  HF 40/60%.

## 2022-09-26 NOTE — PROGRESS NOTES
"Critical Care Progress Note    Date of admission  9/16/2022    Chief Complaint  \"72 y.o. female who presented 9/16/2022 with complaints of shortness of breath and bilateral lower extremity edema and anasarca she has a past medical history of a recent TAVR in 8/2022, permanent atrial fibrillation on warfarin, heart failure with preserved ejection fraction, morbid obesity, KEN on CPAP, chronic hypoxemic respiratory failure on 2 LPM ATC.  She was admitted for decompensated diastolic heart failure.  Hospital course complicated by symptomatic bradycardia with syncope x10-second episode of asystole.  Underwent PPM placement on 9/21/2022.  This evening, had an acute episode of shortness of breath while being turned.  She became briefly drowsy, never lost consciousness.  Tachypneic.  CXR showed stable large cardiac silhouette due to known pericardial effusion and cardiomegaly and ABG demonstrated a chronically compensated respiratory acidosis with an elevated AA gradient.  She was placed on her home BiPAP and given diuretics however continues to have increased work of breathing and was transferred to the ICU for rescue BiPAP.  DNR/DNI status was confirmed with both the patient and her son at bedside.\" Dr Saldaña 9/24    Hospital Course  9/16 - admitted with HFpEF exacerbation  9/21 - PPM placed    9/24 - Transferred to ICU for rescue BiPAP    Interval Problem Update  Reviewed last 24 hour events:   - no issues overnight   - no sedation, RASS -1 to 0   - follows commands, lowers 2/5   - no pain   - Tmax 97.9   - paced at 60s   - -130s   - no drips   - 1200cc fluid restriction   - BM pta, prior to 9/16   - Leung with 2.5L overnight   - level 1-2 mobility   - BiPAP 12/8 at 50%, high flow 40/60%   - INR at 4   - no abx   - bicarb is 47   - Lasix 80mg IV every 8 hours   - Phos 1.5   - dig at 2.7   - k 3.8       Yesterday's Events:  Tmax: Afebrile  Diet: NPO  Vasopressors: NA  I/O: -835 yesterday, -7.4L for " admission  Mobility: Level 1  Antibx: NA    Review of Systems   Review of Systems   Constitutional:  Negative for chills, fever and malaise/fatigue.   HENT:  Negative for congestion and sore throat.    Eyes: Negative.    Respiratory:  Positive for sputum production. Negative for shortness of breath.    Cardiovascular:  Positive for orthopnea and leg swelling. Negative for chest pain and palpitations.   Gastrointestinal:  Negative for abdominal pain, nausea and vomiting.   Genitourinary: Negative.    Musculoskeletal: Negative.    Skin: Negative.    Neurological:  Negative for focal weakness and headaches.   All other systems reviewed and are negative.     Vital Signs for last 24 hours   Pulse:  [60-79] 61  Resp:  [11-37] 17  BP: (102-143)/(49-86) 111/55  SpO2:  [92 %-97 %] 93 %    Hemodynamic parameters for last 24 hours       Respiratory Information for the last 24 hours       Physical Exam    Physical Exam  Vitals and nursing note reviewed.   Constitutional:       General: She is not in acute distress.     Appearance: She is obese. She is ill-appearing. She is not toxic-appearing.   HENT:      Head: Normocephalic and atraumatic.      Right Ear: External ear normal.      Left Ear: External ear normal.      Nose: Nose normal. No rhinorrhea.      Mouth/Throat:      Mouth: Mucous membranes are moist.      Pharynx: Oropharynx is clear.   Eyes:      General: No scleral icterus.     Conjunctiva/sclera: Conjunctivae normal.      Pupils: Pupils are equal, round, and reactive to light.   Cardiovascular:      Rate and Rhythm: Normal rate and regular rhythm.      Pulses:           Radial pulses are 2+ on the right side and 2+ on the left side.        Dorsalis pedis pulses are 1+ on the right side and 1+ on the left side.      Heart sounds: Heart sounds are distant. No murmur heard.  Pulmonary:      Breath sounds: No wheezing.      Comments: Clear anteriorly, diminished bases  Abdominal:      Palpations: Abdomen is soft.       Tenderness: There is abdominal tenderness. There is no guarding or rebound.      Comments: Tender epigastrum   Musculoskeletal:         General: Normal range of motion.      Cervical back: Normal range of motion and neck supple.      Right lower le+ Edema present.      Left lower le+ Edema present.   Lymphadenopathy:      Cervical: No cervical adenopathy.   Skin:     General: Skin is warm and dry.      Capillary Refill: Capillary refill takes 2 to 3 seconds.      Findings: No rash.   Neurological:      Mental Status: She is alert and oriented to person, place, and time.      Cranial Nerves: No cranial nerve deficit.      Sensory: No sensory deficit.      Motor: No weakness.   Psychiatric:         Thought Content: Thought content normal.      Comments: Sleepy/lethargic       Medications  Current Facility-Administered Medications   Medication Dose Route Frequency Provider Last Rate Last Admin    furosemide (LASIX) injection 80 mg  80 mg Intravenous Q8HRS Arian Jain M.D.   80 mg at 22 0613    polyethylene glycol/lytes (MIRALAX) PACKET 1 Packet  1 Packet Oral DAILY Arian Jain M.D.   1 Packet at 22 06    And    senna-docusate (PERICOLACE or SENOKOT S) 8.6-50 MG per tablet 2 Tablet  2 Tablet Oral BID Arian Jain M.D.   2 Tablet at 22 0612    And    magnesium hydroxide (MILK OF MAGNESIA) suspension 30 mL  30 mL Oral QDAY PRN Arian Jain M.D.        And    bisacodyl (DULCOLAX) suppository 10 mg  10 mg Rectal QDAY PRN Arian Jain M.D.        nystatin (MYCOSTATIN) powder   Topical BID PRN Arian Jain M.D.   Given at 22 0036    Respiratory Therapy Consult   Nebulization Continuous RT Parth Burton D.O.        ALPRAZolam (XANAX) tablet 0.25 mg  0.25 mg Oral 4X/DAY PRN LINDSEY CardenasRShanthiNShanthi   0.25 mg at 22 1734    metoprolol tartrate (LOPRESSOR) tablet 25 mg  25 mg Oral TID Yousif Anand M.D.   25 mg at 22 0615    digoxin (LANOXIN) tablet 250 mcg   250 mcg Oral QAM Yousif Anand M.D.   250 mcg at 09/25/22 0550    spironolactone (ALDACTONE) tablet 25 mg  25 mg Oral Q DAY Yousif Anand M.D.   25 mg at 09/26/22 0613    acetaminophen (TYLENOL) tablet 500 mg  500 mg Oral Q6HRS PRN Parth Burton D.O.        insulin lispro (AdmeLOG,HumaLOG) injection  1-6 Units Subcutaneous 4X/DAY ACHS Parth Burton D.O.        And    insulin GLARGINE (Lantus,Semglee) injection  5 Units Subcutaneous Q EVENING Parth Burton D.O.   5 Units at 09/24/22 1734    And    dextrose 10 % BOLUS 25 g  25 g Intravenous Q15 MIN PRN Parth Burton D.O.        magnesium oxide tablet 400 mg  400 mg Oral DAILY Salena Pulido A.P.R.N.   400 mg at 09/26/22 0612    allopurinol (ZYLOPRIM) tablet 100 mg  100 mg Oral QHS Vipul Ybarra M.D.   100 mg at 09/25/22 2129    citalopram (CeleXA) tablet 20 mg  20 mg Oral ANASTACIOM Vipul Ybarra M.D.   20 mg at 09/26/22 0612    ferrous sulfate tablet 325 mg  325 mg Oral BID Vipul Ybarra M.D.   325 mg at 09/26/22 0612    gemfibrozil (LOPID) tablet 600 mg  600 mg Oral JANET Ybarra M.D.   600 mg at 09/26/22 0613    levothyroxine (SYNTHROID) tablet 200 mcg  200 mcg Oral Once per day on Mon Tue Wed Thu Fri Sat Vipul Ybarra M.D.   200 mcg at 09/26/22 0613    rosuvastatin (CRESTOR) tablet 20 mg  20 mg Oral Q EVENING Vipul Ybarra M.D.   20 mg at 09/25/22 1718    MD Alert...Warfarin per Pharmacy   Other PHARMACY TO DOSE Vipul Ybarra M.D.        levothyroxine (SYNTHROID) tablet 175 mcg  175 mcg Oral Once per day on Sun Vipul Ybarra M.D.   175 mcg at 09/25/22 0550       Fluids    Intake/Output Summary (Last 24 hours) at 9/26/2022 0656  Last data filed at 9/26/2022 0630  Gross per 24 hour   Intake 565 ml   Output 4550 ml   Net -3985 ml         Laboratory  Recent Labs     09/24/22  1320 09/25/22  0511   AHIOH33W 7.34*  --    [id number] 79.1*  --    [id number] 64.7  --    JAXM8KJD 91.7*  --    ARTHCO3 42*  --    U4YMEPLOX 15.0*  --     ARTBE 13*  --    ISTATAPH  --  7.430   ISTATAPCO2  --  78.4*   ISTATAPO2  --  64   ISTATATCO2  --  >50*   BHOPDWU3AII  --  91*   ISTATARTHCO3  --  52.1*   ISTATARTBE  --  23*   ISTATTEMP  --  97.0 F   ISTATFIO2  --  50   ISTATSPEC  --  Arterial   ISTATAPHTC  --  7.444   KAVXVBMH4NJ  --  60*           Recent Labs     09/24/22 1138 09/25/22 0225 09/26/22  0350   SODIUM 138 141 142   POTASSIUM 4.9 4.2 3.8   CHLORIDE 93* 92* 88*   CO2 36* 43* 47*   BUN 37* 37* 35*   CREATININE 0.94 0.91 0.79   MAGNESIUM  --  2.1 1.8   PHOSPHORUS  --   --  1.5*   CALCIUM 10.0 9.9 9.8       Recent Labs     09/24/22 1138 09/25/22 0225 09/26/22  0350   ALTSGPT  --   --  <5   ASTSGOT  --   --  25   ALKPHOSPHAT  --   --  71   TBILIRUBIN  --   --  0.7   GLUCOSE 134* 119* 103*       Recent Labs     09/24/22 1138 09/25/22 0225 09/26/22  0350   WBC 8.2 7.1 9.1   NEUTSPOLYS  --   --  73.60*   LYMPHOCYTES  --   --  13.90*   MONOCYTES  --   --  10.20   EOSINOPHILS  --   --  1.30   BASOPHILS  --   --  0.30   ASTSGOT  --   --  25   ALTSGPT  --   --  <5   ALKPHOSPHAT  --   --  71   TBILIRUBIN  --   --  0.7       Recent Labs     09/24/22  0156 09/24/22 1138 09/25/22 0225 09/26/22  0350   RBC  --  4.33 4.06* 4.08*   HEMOGLOBIN  --  11.1* 10.4* 10.4*   HEMATOCRIT  --  39.4 36.9* 36.1*   PLATELETCT  --  154* 175 170   PROTHROMBTM 26.3*  --  37.5* 38.0*   INR 2.51*  --  4.00* 4.08*         Imaging  Echo:   Reviewed    Assessment/Plan  * Acute on chronic respiratory failure with hypoxia (HCC)- (present on admission)  Assessment & Plan  Secondary to pulmonary edema, had been of lasix for a time   Chronic hypoventilation leading to CO2 retention  ECHO without evidence of tamponade    Lasix 80mg q8  S/p Diamox on 9/25  Follow gas    Continue intermittent BiPAP, breaks off BiPAP --> HFNC    DNR (do not resuscitate)  Assessment & Plan  Discussed with patient and son (DPOA), confirmed DNR/DNI    Chronic respiratory acidosis  Assessment & Plan  Has also  been diuresed heavily  Will give 1x dose diamox today    Edema due to congestive heart failure (HCC)  Assessment & Plan  Continue aggressive lasix  She is DNR/DNI and on BiPAP  Diamox 9/25 for alkalosis    Supratherapeutic INR  Assessment & Plan  Pharmacy following    Sinus pause  Assessment & Plan  s/p PPM 9/21/22  Paced  Monitor on telemetry    Pericardial effusion- (present on admission)  Assessment & Plan  No tamponade on TTE  Continues to diurese well  No hemodynamic complications thus far  Cardiology has seen her  She is on coumadin, should things change and she require emergent pericardiocentesis would need reversal.    S/P TAVR (transcatheter aortic valve replacement)- (present on admission)  Assessment & Plan  s/p TAVR 8/2022  TTE showing normal valve gradient/function    KEN on CPAP  Assessment & Plan  BiPAP through most of day, breaks with HFNC    Hypomagnesemia  Assessment & Plan  Check and replace daily    Hypokalemia  Assessment & Plan  Check and replace daily  On lasix    Type 2 diabetes mellitus without complication, with long-term current use of insulin ([facility])- (present on admission)  Assessment & Plan  A1c is not elevated  Sugars have been fine  DC insulin    Chronic atrial fibrillation- (present on admission)  Assessment & Plan  Rate controlled  On MTP, digoxin  Check dig level in a.m.  Continue warfarin, therapeutic INR    Chronic anticoagulation- (present on admission)  Assessment & Plan  Check INR daily, still > 4  Pharmacy dosing coumadin    Dyslipidemia- (present on admission)  Assessment & Plan  Continue statin    History of hypothyroidism- (present on admission)  Assessment & Plan  Synthroid.    Morbid obesity with BMI of 50.0-59.9, adult ([facility])- (present on admission)  Assessment & Plan  Patient counseled about the importance of weight loss for long-term health risk reduction.   Will dose-adjust medications appropriately based on larger volume of drug distribution    Acute on chronic  diastolic congestive heart failure (HCC)- (present on admission)  Assessment & Plan  Significant fluid overload  Continue diuresis with lasix 80mg TID  S/p one dose of Diamox, may need to switch to Diamox for hypercarbia and stop lasix         VTE:  Coumadin  Ulcer: Not Indicated  Lines: Leung Catheter  Ongoing indication addressed    I have performed a physical exam and reviewed and updated ROS and Plan today (9/26/2022). In review of yesterday's note (9/25/2022), there are no changes except as documented above.     Discussed patient condition and risk of morbidity and/or mortality with Family, RN, RT, Pharmacy, Code status disscussed, Charge nurse / hot rounds, and Patient    The patient remains critically ill requiring titration of diuresis as well as BiPAP settings for acute on chronic hypoxic respiratory failure due to decompensated heart failure.  I have assessed and reassessed the blood pressure, hemodynamics, cardiovascular status. This patient remains at high risk for worsening cardiopulmonary dysfunction and death without the above critical care interventions.  The patient remains at high risk of clinical deterioration, worsening vital organ dysfunction, and death without the above critical care interventions.    Critical care time = 65 minutes in directly providing and coordinating critical care and extensive data review.  No time overlap and excludes procedures.

## 2022-09-26 NOTE — THERAPY
Physical Therapy   Daily Treatment     Patient Name: Rachelle Reina  Age:  72 y.o., Sex:  female  Medical Record #: [id number]  Today's Date: 9/26/2022     Precautions: Fall Risk  Comments: PPM 9/20, ICU transfer 9/24    Assessment    Pt seen for PT tx session, transferred to ICU on 9/24 2/2 increased O2 needs. Pt conts to present with generalized weakness. Today she is grossly limited by anxiety and SOB with exertion. She conts to require mod A for bed mob. Once EOB, her sitting balance is fair however she declined to attempt standing today. SpO2 in high 80s with exertion however increased with cues to slow breathing and through nose vs mouth. Educated on importance of OOB activity to improve respiratory status. PT to cont at 3x/wk.    Plan    Continue current treatment plan.    DC Equipment Recommendations: Unable to determine at this time  Discharge Recommendations: Recommend post-acute placement for additional physical therapy services prior to discharge home     Objective    Vitals   O2 (LPM) 40   O2 Delivery Device Heated High Flow Nasal Cannula   Vitals Comments Occasional O2 desats to high 80s with exertion   Cognition    Cognition / Consciousness X   Level of Consciousness Alert   Sequencing Impaired   Initiation Impaired   Comments Self-directing, poor initiation, requires encouragement, very anxious   Passive ROM Lower Body   Passive ROM Lower Body X   Comments grossly limited by body habitus   Active ROM Lower Body    Active ROM Lower Body  X   Comments as above   Strength Lower Body   Lower Body Strength  X   Gross Strength Generalized Weakness, Equal Bilaterally   Comments unable to fully extend knees EOB, declined attempt to stand   Neurological Concerns   Neurological Concerns No   Balance   Sitting Balance (Static) Fair   Sitting Balance (Dynamic) Fair -   Weight Shift Sitting Poor   Skilled Intervention Verbal Cuing;Sequencing;Postural Facilitation   Gait Analysis   Gait Level Of Assist Unable  to Participate   Bed Mobility    Supine to Sit Moderate Assist   Sit to Supine Maximal Assist   Scooting Moderate Assist   Skilled Intervention Verbal Cuing;Sequencing;Postural Facilitation   Functional Mobility   Sit to Stand Refused   Bed, Chair, Wheelchair Transfer Refused   Mobility EOB   ICU Target Mobility Level   ICU Mobility - Targeted Level Level 2   Short Term Goals    Short Term Goal # 1 Pt to move supine to/from eob w/ spv and w/o use of bed features in 6 visits to improve fxl indep   Goal Outcome # 1 Progressing slower than expected   Short Term Goal # 2 Pt to ambulate 10 ft w/ fww and spv in 6 visits to improve fxl indep   Goal Outcome # 2 Goal not met

## 2022-09-26 NOTE — CARE PLAN
Problem: Knowledge Deficit - Standard  Goal: Patient and family/care givers will demonstrate understanding of plan of care, disease process/condition, diagnostic tests and medications  Outcome: Progressing     Problem: Skin Integrity  Goal: Skin integrity is maintained or improved  Outcome: Progressing   The patient is Watcher - Medium risk of patient condition declining or worsening    Shift Goals  Clinical Goals: Oxygen stability  Patient Goals: Rest  Family Goals: Join rounds tomorrow        :

## 2022-09-27 NOTE — PROGRESS NOTES
Inpatient Anticoagulation Service Note    Date: 9/27/2022    Reason for Anticoagulation: Atrial Fibrillation, Bioprosthetic Valve Replacement   Target INR: 2.0 to 3.0  [id number] VASc Score: 6  HAS-BLED Score: 2   Hemoglobin Value: (!) 10.5  Hematocrit Value: 37.7  Lab Platelet Value: 165    INR from last 7 days       Date/Time INR Value    09/27/22 0330 3.61    09/26/22 0350 4.08    09/25/22 0225 4    09/24/22 0156 2.51    09/23/22 0222 2.31    09/22/22 0105 1.86    09/21/22 0224 1.35          Dose from last 7 days       Date/Time Dose (mg)    09/27/22 1446 1    09/26/22 1300 0    09/25/22 1415 0    09/24/22 1019 2.5    09/23/22 0957 2.5    09/22/22 0944 2.5    09/20/22 1715 5          Average Dose (mg):  (Home dose per med rec: 5 mg on Tues, 2.5 mg all other days)  Significant Interactions:  (gemfibrozin, thyroid medication, statin, citalopram, allopurinol)  Bridge Therapy: No   Reversal Agent Administered: Not Applicable    Assessment: Adjusting diuresis based on acid-base assessment.  Respiratory status remains tenuous.  INR decreased today, remains supra-therapeutic.  No overt s/sx of bleeding.      Plan:  Will give warfarin 1 mg x 1 tonight to avoid fluctuating INRs in the future.  Follow up INR in the AM.  Education Material Provided?: No  Pharmacist suggested discharge dosing: Can likely resume home regimen once clinical status has returned to baseline.  Recommend follow up INR within 2 days of discharge.     Megan Ruiz, PharmD, BCPS

## 2022-09-27 NOTE — PROGRESS NOTES
Jacki from Lab called with critical CO2 result of >50 at 0411. Critical lab result read back to Jacki.   Asia Kent notified of critical lab result at 0412.  Critical lab result read back by ALIX Kent.

## 2022-09-27 NOTE — PROGRESS NOTES
"Critical Care Progress Note    Date of admission  9/16/2022    Chief Complaint  \"72 y.o. female who presented 9/16/2022 with complaints of shortness of breath and bilateral lower extremity edema and anasarca she has a past medical history of a recent TAVR in 8/2022, permanent atrial fibrillation on warfarin, heart failure with preserved ejection fraction, morbid obesity, KEN on CPAP, chronic hypoxemic respiratory failure on 2 LPM ATC.  She was admitted for decompensated diastolic heart failure.  Hospital course complicated by symptomatic bradycardia with syncope x10-second episode of asystole.  Underwent PPM placement on 9/21/2022.  This evening, had an acute episode of shortness of breath while being turned.  She became briefly drowsy, never lost consciousness.  Tachypneic.  CXR showed stable large cardiac silhouette due to known pericardial effusion and cardiomegaly and ABG demonstrated a chronically compensated respiratory acidosis with an elevated AA gradient.  She was placed on her home BiPAP and given diuretics however continues to have increased work of breathing and was transferred to the ICU for rescue BiPAP.  DNR/DNI status was confirmed with both the patient and her son at bedside.\" Dr Saldaña 9/24    Hospital Course  9/16 - admitted with HFpEF exacerbation  9/21 - PPM placed    9/24 - Transferred to ICU for rescue BiPAP  9/25 - intermittent BiPAP/HFNC, lasix with excellent diuresis  9/26 - intermittent BiPAP/HFNC, serum bicarb at 47    Interval Problem Update  Reviewed last 24 hour events:   - Pt was a little hypotensive and got 250cc NS bolus last night   - A/Ox4   - SR 60-70s   - SBP 80-100s   - no drips   - cardiac diet   - last BM pta   - 2 PIVs   - mobilize to chair    - BiPAP 12/8 at    - HFNC at 40/80%   - CXR(reviewed): hypoinflated, cardiomegaly   - INR 3.61, warfarin   - no abx   - K 3.9   - bicarb > 50   - digoxin 2.2   - -140s    Yesterday's Events:   - no issues overnight   - no " sedation, RASS -1 to 0   - follows commands, lowers 2/5   - no pain   - Tmax 97.9   - paced at 60s   - -130s   - no drips   - 1200cc fluid restriction   - BM pta, prior to 9/16   - Leung with 2.5L overnight   - level 1-2 mobility   - BiPAP 12/8 at 50%, high flow 40/60%   - INR at 4   - no abx   - bicarb is 47   - Lasix 80mg IV every 8 hours   - Phos 1.5   - dig at 2.7   - k 3.8      Review of Systems   Review of Systems   Constitutional:  Negative for chills, fever and malaise/fatigue.   HENT:  Negative for congestion and sore throat.    Eyes: Negative.    Respiratory:  Positive for sputum production. Negative for shortness of breath.    Cardiovascular:  Positive for orthopnea and leg swelling. Negative for chest pain and palpitations.   Gastrointestinal:  Negative for abdominal pain, nausea and vomiting.   Genitourinary: Negative.    Musculoskeletal: Negative.    Skin: Negative.    Neurological:  Negative for focal weakness and headaches.   All other systems reviewed and are negative.     Vital Signs for last 24 hours   Temp:  [35.9 °C (96.6 °F)-36.7 °C (98.1 °F)] 35.9 °C (96.6 °F)  Pulse:  [59-68] 60  Resp:  [15-28] 17  BP: ()/(42-66) 97/47  SpO2:  [90 %-98 %] 93 %    Hemodynamic parameters for last 24 hours       Respiratory Information for the last 24 hours       Physical Exam    Physical Exam  Vitals and nursing note reviewed.   Constitutional:       General: She is not in acute distress.     Appearance: She is obese. She is ill-appearing. She is not toxic-appearing.   HENT:      Head: Normocephalic and atraumatic.      Right Ear: External ear normal.      Left Ear: External ear normal.      Nose: Nose normal. No rhinorrhea.      Mouth/Throat:      Mouth: Mucous membranes are moist.      Pharynx: Oropharynx is clear.   Eyes:      General: No scleral icterus.     Conjunctiva/sclera: Conjunctivae normal.      Pupils: Pupils are equal, round, and reactive to light.   Cardiovascular:      Rate and  Rhythm: Normal rate. Rhythm irregularly irregular.      Pulses:           Radial pulses are 2+ on the right side and 2+ on the left side.        Dorsalis pedis pulses are 1+ on the right side and 1+ on the left side.      Heart sounds: Heart sounds are distant. No murmur heard.  Pulmonary:      Breath sounds: No wheezing.      Comments: Clear anteriorly, diminished bases  Abdominal:      Palpations: Abdomen is soft.      Tenderness: There is no abdominal tenderness. There is no guarding or rebound.   Musculoskeletal:         General: Normal range of motion.      Cervical back: Normal range of motion and neck supple.      Right lower le+ Edema present.      Left lower le+ Edema present.   Lymphadenopathy:      Cervical: No cervical adenopathy.   Skin:     General: Skin is warm and dry.      Capillary Refill: Capillary refill takes 2 to 3 seconds.      Findings: No rash.   Neurological:      Mental Status: She is alert and oriented to person, place, and time.      Cranial Nerves: No cranial nerve deficit.      Sensory: No sensory deficit.      Motor: No weakness.   Psychiatric:         Thought Content: Thought content normal.       Medications  Current Facility-Administered Medications   Medication Dose Route Frequency Provider Last Rate Last Admin    acetaZOLAMIDE (DIAMOX) injection 500 mg  500 mg Intravenous Q8HRS Maria Teresa Jasso M.D.        ondansetron (ZOFRAN) syringe/vial injection 4 mg  4 mg Intravenous Q4HRS PRN Maria Teresa Jasso M.D.   4 mg at 22 1256    MD Alert...ICU Electrolyte Replacement per Pharmacy   Other PHARMACY TO DOSE Maria Teresa Jasso M.D.        polyethylene glycol/lytes (MIRALAX) PACKET 1 Packet  1 Packet Oral DAILY Arian Jain M.D.   1 Packet at 22 0524    And    senna-docusate (PERICOLACE or SENOKOT S) 8.6-50 MG per tablet 2 Tablet  2 Tablet Oral BID Arian Jain M.D.   2 Tablet at 22 0525    And    magnesium hydroxide (MILK OF MAGNESIA) suspension 30 mL  30 mL  Oral QDAY PRN Arian Jain M.D.   30 mL at 09/26/22 1257    And    bisacodyl (DULCOLAX) suppository 10 mg  10 mg Rectal QDAY PRN Arian Jain M.D.        nystatin (MYCOSTATIN) powder   Topical BID PRN Arian Jain M.D.   Given at 09/26/22 0036    Respiratory Therapy Consult   Nebulization Continuous RT Parth Burton D.O.        ALPRAZolam (XANAX) tablet 0.25 mg  0.25 mg Oral 4X/DAY PRN Chelsea Alexandre, TALIB.P.R.NShanthi   0.25 mg at 09/26/22 1828    metoprolol tartrate (LOPRESSOR) tablet 25 mg  25 mg Oral TID Yousif Anand M.D.   25 mg at 09/26/22 1712    spironolactone (ALDACTONE) tablet 25 mg  25 mg Oral Q DAY Yousif Anand M.D.   25 mg at 09/26/22 0613    acetaminophen (TYLENOL) tablet 500 mg  500 mg Oral Q6HRS PRN Parth Burton D.O.        insulin lispro (AdmeLOG,HumaLOG) injection  1-6 Units Subcutaneous 4X/DAY ACHS Parth Burton D.O.        And    insulin GLARGINE (Lantus,Semglee) injection  5 Units Subcutaneous Q EVENING Parth Burton D.O.   5 Units at 09/24/22 1734    And    dextrose 10 % BOLUS 25 g  25 g Intravenous Q15 MIN PRN Parth Burton D.O.        magnesium oxide tablet 400 mg  400 mg Oral DAILY Salena Pulido, A.P.R.N.   400 mg at 09/26/22 0612    allopurinol (ZYLOPRIM) tablet 100 mg  100 mg Oral QHS Vipul Ybarra M.D.   100 mg at 09/26/22 2108    citalopram (CeleXA) tablet 20 mg  20 mg Oral JANET Ybarra M.D.   20 mg at 09/27/22 0524    ferrous sulfate tablet 325 mg  325 mg Oral BID Vipul Ybarra M.D.   325 mg at 09/27/22 0524    gemfibrozil (LOPID) tablet 600 mg  600 mg Oral ERIN RenoD.   600 mg at 09/27/22 0524    levothyroxine (SYNTHROID) tablet 200 mcg  200 mcg Oral Once per day on Mon Tue Wed Thu Fri Sat Vipul Ybarra M.D.   200 mcg at 09/27/22 0525    rosuvastatin (CRESTOR) tablet 20 mg  20 mg Oral Q EVENING Vipul Ybarra M.D.   20 mg at 09/26/22 1704    MD Alert...Warfarin per Pharmacy   Other PHARMACY TO DOSE Vipul Ybarra M.D.         levothyroxine (SYNTHROID) tablet 175 mcg  175 mcg Oral Once per day on Dominga Ybarra M.D.   175 mcg at 09/25/22 0550       Fluids    Intake/Output Summary (Last 24 hours) at 9/27/2022 0658  Last data filed at 9/27/2022 0251  Gross per 24 hour   Intake 120 ml   Output 2875 ml   Net -2755 ml         Laboratory  Recent Labs     09/24/22  1320 09/25/22  0511   WSZEZ85D 7.34*  --    RCXIOD106O 79.1*  --    JAWRG557S 64.7  --    NIVZ5IRZ 91.7*  --    ARTHCO3 42*  --    N3XKIBLTE 15.0*  --    ARTBE 13*  --    ISTATAPH  --  7.430   ISTATAPCO2  --  78.4*   ISTATAPO2  --  64   ISTATATCO2  --  >50*   [id number]  --  91*   ISTATARTHCO3  --  52.1*   ISTATARTBE  --  23*   ISTATTEMP  --  97.0 F   ISTATFIO2  --  50   ISTATSPEC  --  Arterial   ISTATAPHTC  --  7.444   [id number]  --  60*           Recent Labs     09/25/22 0225 09/26/22  0350 09/27/22  0330   SODIUM 141 142 143   POTASSIUM 4.2 3.8 3.9   CHLORIDE 92* 88* 88*   CO2 43* 47* >50*   BUN 37* 35* 37*   CREATININE 0.91 0.79 0.82   MAGNESIUM 2.1 1.8 2.6*   PHOSPHORUS  --  1.5* 4.7*   CALCIUM 9.9 9.8 9.6       Recent Labs     09/25/22 0225 09/26/22  0350 09/27/22  0330   ALTSGPT  --  <5 5   ASTSGOT  --  25 23   ALKPHOSPHAT  --  71 72   TBILIRUBIN  --  0.7 0.7   GLUCOSE 119* 103* 115*       Recent Labs     09/25/22 0225 09/26/22  0350 09/27/22  0330   WBC 7.1 9.1 9.2   NEUTSPOLYS  --  73.60* 69.60   LYMPHOCYTES  --  13.90* 16.10*   MONOCYTES  --  10.20 10.20   EOSINOPHILS  --  1.30 2.50   BASOPHILS  --  0.30 0.40   ASTSGOT  --  25 23   ALTSGPT  --  <5 5   ALKPHOSPHAT  --  71 72   TBILIRUBIN  --  0.7 0.7       Recent Labs     09/25/22  0225 09/26/22  0350 09/27/22  0330   RBC 4.06* 4.08* 4.15*   HEMOGLOBIN 10.4* 10.4* 10.5*   HEMATOCRIT 36.9* 36.1* 37.7   PLATELETCT 175 170 165   PROTHROMBTM 37.5* 38.0* 34.6*   INR 4.00* 4.08* 3.61*         Imaging  Echo:   Reviewed    Assessment/Plan  * Acute on chronic respiratory failure with hypoxia (HCC)- (present on  admission)  Assessment & Plan  Secondary to pulmonary edema, had been of lasix for a time   Chronic hypoventilation leading to CO2 retention  ECHO without evidence of tamponade    Cont Diamox 500mg TID IV  Follow gas    Continue intermittent BiPAP, breaks off BiPAP --> HFNC    DNR (do not resuscitate)  Assessment & Plan  Discussed with patient and son (MADALYN), confirmed DNR/DNI    Chronic respiratory acidosis  Assessment & Plan  Has also been diuresed heavily and now with alkalosis  Continue Diamox    Edema due to congestive heart failure (HCC)  Assessment & Plan  She is DNR/DNI and on BiPAP  Diamox 500mg TID      Supratherapeutic INR  Assessment & Plan  Improving   Pharmacy following    Sinus pause  Assessment & Plan  s/p PPM 9/21/22  Paced  Monitor on telemetry    Pericardial effusion- (present on admission)  Assessment & Plan  No tamponade on TTE  Continues to diurese well  No hemodynamic complications thus far  Cardiology has seen her  She is on coumadin, should things change and she require emergent pericardiocentesis would need reversal.    S/P TAVR (transcatheter aortic valve replacement)- (present on admission)  Assessment & Plan  s/p TAVR 8/2022  TTE showing normal valve gradient/function    KEN on CPAP  Assessment & Plan  BiPAP through most of day, breaks with HFNC    Hypomagnesemia  Assessment & Plan  Check and replace daily    Hypokalemia  Assessment & Plan  Check and replace daily      Type 2 diabetes mellitus without complication, with long-term current use of insulin ([facility])- (present on admission)  Assessment & Plan  A1c is not elevated  Sugars have been fine  DC insulin    Chronic atrial fibrillation- (present on admission)  Assessment & Plan  Rate controlled  Cont metoprolol 25mg TID  S/p digoxin  Follow digoxin levels  Continue warfarin, therapeutic INR    Chronic anticoagulation- (present on admission)  Assessment & Plan  Check INR daily, INR at 3.61  Pharmacy dosing coumadin    Dyslipidemia-  (present on admission)  Assessment & Plan  Continue statin    History of hypothyroidism- (present on admission)  Assessment & Plan  Cont Synthroid.    Morbid obesity with BMI of 50.0-59.9, adult (HCC)- (present on admission)  Assessment & Plan  Patient counseled about the importance of weight loss for long-term health risk reduction.   Will dose-adjust medications appropriately based on larger volume of drug distribution  PT/OT    Acute on chronic diastolic congestive heart failure (HCC)- (present on admission)  Assessment & Plan  Significant fluid overload  Continue diuresis with Diamox 500mg IV TID due to significant alkalosis with lasix         VTE:  Coumadin  Ulcer: Not Indicated  Lines: Leung Catheter  Ongoing indication addressed    I have performed a physical exam and reviewed and updated ROS and Plan today (9/27/2022). In review of yesterday's note (9/26/2022), there are no changes except as documented above.     Discussed patient condition and risk of morbidity and/or mortality with Family, RN, RT, Pharmacy, Code status disscussed, Charge nurse / hot rounds, and Patient    Patient remains critically ill requiring titration of diuresis as well as BiPAP settings for acute on chronic hypoxic respiratory failure due to decompensated heart failure and alkalosis.  I have assessed and reassessed the patient's blood pressure, hemodynamics, cardiovascular status, and neurological status.  Patient remains at high risk for worsening cardiopulmonary dysfunction and death without the above critical care interventions.    Critical care time = 70 minutes in directly providing and coordinating critical care and extensive data review.  No time overlap and excludes procedures.

## 2022-09-27 NOTE — CARE PLAN
The patient is Watcher - Medium risk of patient condition declining or worsening    Shift Goals  Clinical Goals: Maintain O2  Patient Goals: none stated  Family Goals: Join rounds tomorrow    Progress made toward(s) clinical / shift goals:      Problem: Knowledge Deficit - Standard  Goal: Patient and family/care givers will demonstrate understanding of plan of care, disease process/condition, diagnostic tests and medications  Outcome: Progressing     Problem: Skin Integrity  Goal: Skin integrity is maintained or improved  Outcome: Progressing     Problem: Hemodynamics  Goal: Patient's hemodynamics, fluid balance and neurologic status will be stable or improve  Outcome: Progressing     Problem: Respiratory  Goal: Patient will achieve/maintain optimum respiratory ventilation and gas exchange  Outcome: Progressing       Patient is not progressing towards the following goals:

## 2022-09-27 NOTE — CARE PLAN
The patient is Watcher - Medium risk of patient condition declining or worsening    Shift Goals  Clinical Goals: stable hemodynamics,  Patient Goals: comfort and rest  Family Goals: no family present    Progress made toward(s) clinical / shift goals:    Problem: Knowledge Deficit - Standard  Goal: Patient and family/care givers will demonstrate understanding of plan of care, disease process/condition, diagnostic tests and medications  Outcome: Progressing     Problem: Skin Integrity  Goal: Skin integrity is maintained or improved  Outcome: Progressing     Problem: Depression  Goal: Patient and family/caregiver will verbalize accurate information about at least two of the possible causes of depression, three-four of the signs and symptoms of depression  Outcome: Progressing     Problem: Fall Risk  Goal: Patient will remain free from falls  Outcome: Progressing     Problem: Nutrition  Goal: Patient's nutritional and fluid intake will be adequate or improve  Outcome: Progressing       Patient is not progressing towards the following goals:

## 2022-09-27 NOTE — DIETARY
"Nutrition services: Day 10 of admit.  Rachelle Reina is a 72 y.o. female with admitting DX of Hypoxia and hypotension.    Consult received for check on PO intake which appears poor with intake mainly 0-25%.       Assessment:  Height: 165.1 cm (5' 5\")  Weight: 123 kg (271 lb 9.7 oz) - bed scale  Body mass index is 45.2 kg/m²., BMI classification: obesity class III  Diet/Intake: cardiac with 1200 ml fluid restriction, PO intake     Evaluation:   Pt and son seen at bedside, discussed food preferences and pt is agreeable to chobani smoothie she had other day. Grabbed from nourishment room and pt liked it.   PO intake has been 0-50% of meals throughout 11 day admit.   Pt observed at bedside, is adequately nourished.   Weight trend during admit is down 14 kg and pt is -14.4L fluids per I/O, in line with weight loss.  Skin: incision to sternum, MASD to groin  Labs: glucose 115, BUN 37, Phos 4.7, Mag 2.6  Meds: allopurinol, iron, aldactone, bowel protocol,   Last BM: 9/27    Malnutrition Risk: Pt is at risk with PO intake <50% of meals for 10 days, does not meet ASPEN criteria.    Recommendations/Plan:   Diet per MD. Ordered chobani smoothies with meals.  Encourage intake of meals.  Document intake of all meals as % taken in ADL's to provide interdisciplinary communication across all shifts.   Monitor weight.  Nutrition rep will continue to see patient for ongoing meal and snack preferences.     RD following.        "

## 2022-09-28 NOTE — CARE PLAN
The patient is Stable - Low risk of patient condition declining or worsening    Shift Goals  Clinical Goals: hemodynamic stability  Patient Goals: rest  Family Goals: LIZ    Progress made toward(s) clinical / shift goals:   Problem: Knowledge Deficit - Standard  Goal: Patient and family/care givers will demonstrate understanding of plan of care, disease process/condition, diagnostic tests and medications  Outcome: Progressing     Problem: Skin Integrity  Goal: Skin integrity is maintained or improved  Outcome: Progressing     Problem: Depression  Goal: Patient and family/caregiver will verbalize accurate information about at least two of the possible causes of depression, three-four of the signs and symptoms of depression  Outcome: Progressing     Problem: Fall Risk  Goal: Patient will remain free from falls  Outcome: Progressing     Problem: Psychosocial  Goal: Patient's level of anxiety will decrease  Outcome: Progressing     Problem: Hemodynamics  Goal: Patient's hemodynamics, fluid balance and neurologic status will be stable or improve  Outcome: Progressing

## 2022-09-28 NOTE — CARE PLAN
Problem: Ventilation  Goal: Ability to achieve and maintain unassisted ventilation or tolerate decreased levels of ventilator support  Description: Target End Date:  4 days     Document on Vent flowsheet    1.  Support and monitor invasive and noninvasive mechanical ventilation  2.  Monitor ventilator weaning response  3.  Perform ventilator associated pneumonia prevention interventions  4.  Manage ventilation therapy by monitoring diagnostic test results  Outcome: Not Met    Pt switching between HF and BiPAP as tolerated.   BiPAP 12/6, 50%  HF 40/60%.

## 2022-09-28 NOTE — DISCHARGE PLANNING
Case Management Discharge Planning    Admission Date: 9/16/2022  GMLOS: 5.2  ALOS: 11    6-Clicks ADL Score: 14  6-Clicks Mobility Score: 7  PT and/or OT Eval ordered: Yes  Post-acute Referrals Ordered: Yes  Post-acute Choice Obtained: Yes  Has referral(s) been sent to post-acute provider:  Yes      Anticipated Discharge Dispo: Discharge Disposition: D/T to SNF with Medicare cert in anticipation of skilled care (03)- PT previously approved at several, may need re eval prior to discharge     DME Needed: No    Action(s) Taken: Updated Provider/Nurse on Discharge Plan and OTHER    Escalations Completed: None    Medically Clear: No    Next Steps: Discussed in IDT rounds: Pt was a little hypotensive and got 250cc NS bolus last night, BiPAP 12/8 at night, - HFNC at 40/80%   HCM to continue to follow with medical team for discharge needs.     Barriers to Discharge: Medical clearance and Pending Placement-     Is the patient up for discharge tomorrow: No    Michelle BOATENGN, RN [facility]   Care Coordinator

## 2022-09-28 NOTE — PROGRESS NOTES
Inpatient Anticoagulation Service Note    Date: 2022    Reason for Anticoagulation: Atrial Fibrillation, Bioprosthetic Valve Replacement   Target INR: 2.0 to 3.0  [id number] VASc Score: 6  HAS-BLED Score: 1   Hemoglobin Value: (!) 9.5  Hematocrit Value: (!) 35  Lab Platelet Value: (!) 162    INR from last 7 days       Date/Time INR Value    22 0419 3.1    22 0330 3.61    22 0350 4.08    22 0225 4    22 0156 2.51    22 0222 2.31    22 0105 1.86          Dose from last 7 days       Date/Time Dose (mg)    22 1359 1    22 1446 0    22 1300 0    22 1415 0    22 1019 2.5    22 0957 2.5    22 0944 2.5          Average Dose (mg):  (Home dose per med rec: 5 mg on Tu, 2.5 mg all other days)  Significant Interactions: Other (Comments) (gemfibrozil, citalopram, levothyroxine, rosuvastatin)  Bridge Therapy: No     Reversal Agent Administered: Not Applicable  Comments: INR trending down, but still elevated from goal. Will give 1mg dose to slowly stop downtrend after holding doses. Following daily INR.    Plan:  1mg  Education Material Provided?: No  Pharmacist suggested discharge dosin.5mg daily. INR within 72 hours of discharge.      Arthur Matos, PharmD

## 2022-09-28 NOTE — THERAPY
Physical Therapy   Daily Treatment     Patient Name: Rachelle Reina  Age:  72 y.o., Sex:  female  Medical Record #: [id number]  Today's Date: 9/28/2022     Precautions  Precautions: Fall Risk  Comments: PPM 9/20, ICU transfer 9/24    Assessment    Pt seen for PT treatment session. Pt somewhat lethargic throughout session, but wakes to voice and is participatory with delay. Today, pt required Max - Total A x 1-2 people to complete mobility as detailed below. Pt was soiled with BM upon PT arrival and required multiple rest breaks with HOB elevated to recover SpO2 during rolling for pericare/linen change. Pt demonstrates good sitting balance once transitioned to EOB, but required Max A x2 people for partial stands at EOB again for pericare prior to attempting transfer to bedside chair. Performed sit <> stand x3-4 trials, pt very fearful of falling and occasionally resisting stand without cues for sequencing. Pt's kyphosis severely limiting standing balance and overall posture/respiratory effort while EOB or sitting upright in chair position of bed. Encouraged RN to have pt in chair position of bed or trial cardiac chair to allow pt to sit more upright; however, discomfort due to kyphosis may limit upright tolerance.     Plan    Continue current treatment plan.    DC Equipment Recommendations: Unable to determine at this time  Discharge Recommendations: Recommend post-acute placement for additional physical therapy services prior to discharge home       09/28/22 1134   Precautions   Precautions Fall Risk   Comments PPM 9/20, ICU transfer 9/24   Vitals   O2 Delivery Device Heated High Flow Nasal Cannula   Cognition    Level of Consciousness Responds to voice   Sequencing Impaired   Initiation Impaired   Comments frequently falls back asleep during session or requires multiple prompts to maintain attention to task and encourage pt to initiate mobility on her own   Strength Lower Body   Gross Strength Generalized  Weakness, Equal Bilaterally   Comments grossly assessed at 3+/5, fatigues quickly   Balance   Sitting Balance (Static) Fair   Sitting Balance (Dynamic) Fair   Standing Balance (Static) Poor   Standing Balance (Dynamic) Poor -   Weight Shift Sitting Fair   Weight Shift Standing Poor   Skilled Intervention Compensatory Strategies;Sequencing;Tactile Cuing;Verbal Cuing   Comments very kyphotic posture limiting pt balance and overall stability upon standing. At EOB, pt able to support herself well   Gait Analysis   Gait Level Of Assist Unable to Participate   Bed Mobility    Supine to Sit Maximal Assist   Sit to Supine Total Assist   Scooting Total Assist   Rolling Maximal Assist to Rt.;Maximum Assist to Lt.   Skilled Intervention Verbal Cuing;Tactile Cuing;Sequencing;Compensatory Strategies   Comments multiple rolls R <> L in supine for pericare and linen change following multiple BMs. Desats into high 70s/low 80s while in sidelie   Functional Mobility   Sit to Stand Maximal Assist  (x2 people, x3-4 trails)   Bed, Chair, Wheelchair Transfer Unable to Participate   Mobility utilized unit step to allow pt to stand from EOB with greater ease, pt appeared most limited by fear of falling   Comments limited hip and thoracic extension in standing due to fear of falling. heavy cues for upright posture. fatigued with each stand with repeated BM's making transfer to bedside chair difficult.   ICU Target Mobility Level   ICU Mobility - Targeted Level Level 3A   How much difficulty does the patient currently have...   Turning over in bed (including adjusting bedclothes, sheets and blankets)? 1   Sitting down on and standing up from a chair with arms (e.g., wheelchair, bedside commode, etc.) 1   Moving from lying on back to sitting on the side of the bed? 1   How much help from another person does the patient currently need...   Moving to and from a bed to a chair (including a wheelchair)? 2   Need to walk in a hospital room? 1    Climbing 3-5 steps with a railing? 1   6 clicks Mobility Score 7   Activity Tolerance   Sitting Edge of Bed 15-20 min total   Standing 2-3 min total, fatigues quickly   Comments pt required intermittent rest breaks between supine rolling and sit <> stand to recover SpO2   Short Term Goals    Short Term Goal # 1 Pt to move supine to/from eob w/ spv and w/o use of bed features in 6 visits to improve fxl indep   Goal Outcome # 1 goal not met   Short Term Goal # 2 Pt to ambulate 10 ft w/ fww and spv in 6 visits to improve fxl indep   Goal Outcome # 2 Goal not met   Short Term Goal # 3 Pt will perform sit <> stand and functional transfers with LRAD and Min A to improve mobility independence in 6 visits   Education Group   Role of Physical Therapist Patient Response Patient;Acceptance;Explanation;Verbal Demonstration   Anticipated Discharge Equipment and Recommendations   DC Equipment Recommendations Unable to determine at this time   Discharge Recommendations Recommend post-acute placement for additional physical therapy services prior to discharge home

## 2022-09-28 NOTE — PROGRESS NOTES
"Critical Care Progress Note    Date of admission  9/16/2022    Chief Complaint  \"72 y.o. female who presented 9/16/2022 with complaints of shortness of breath and bilateral lower extremity edema and anasarca she has a past medical history of a recent TAVR in 8/2022, permanent atrial fibrillation on warfarin, heart failure with preserved ejection fraction, morbid obesity, KEN on CPAP, chronic hypoxemic respiratory failure on 2 LPM ATC.  She was admitted for decompensated diastolic heart failure.  Hospital course complicated by symptomatic bradycardia with syncope x10-second episode of asystole.  Underwent PPM placement on 9/21/2022.  This evening, had an acute episode of shortness of breath while being turned.  She became briefly drowsy, never lost consciousness.  Tachypneic.  CXR showed stable large cardiac silhouette due to known pericardial effusion and cardiomegaly and ABG demonstrated a chronically compensated respiratory acidosis with an elevated AA gradient.  She was placed on her home BiPAP and given diuretics however continues to have increased work of breathing and was transferred to the ICU for rescue BiPAP.  DNR/DNI status was confirmed with both the patient and her son at bedside.\" Dr Saldaña 9/24    Hospital Course  9/16 - admitted with HFpEF exacerbation  9/21 - PPM placed    9/24 - Transferred to ICU for rescue BiPAP  9/25 - intermittent BiPAP/HFNC, lasix with excellent diuresis  9/26 - intermittent BiPAP/HFNC, serum bicarb at 47, stopped lasix, started Diamox  9/27 - intermittent BiPAP/HFNC, serum bicarb >50, continued Diamox, decreased UOP throughout day    Interval Problem Update  Reviewed last 24 hour events:   - no overnight events   - RASS -1 to 0   - a/ox4   - weak as a kitten, not participating well with IS and PT   - afebrile   - a-fib 60-70s   - SBP 110s   - cardiac diet, fluid restriction   - BM last night x 2   - UOP of 450cc overnight, Leung   - BiPAP overnight   - HFNC at 40/60%   - " warfarin, INR at 3   - no abx   - bicarb > 50   - digoxin at 2    Yesterday's Events:   - Pt was a little hypotensive and got 250cc NS bolus last night   - A/Ox4   - SR 60-70s   - SBP 80-100s   - no drips   - cardiac diet   - last BM pta   - 2 PIVs   - mobilize to chair    - BiPAP 12/8 at    - HFNC at 40/80%   - CXR(reviewed): hypoinflated, cardiomegaly   - INR 3.61, warfarin   - no abx   - K 3.9   - bicarb > 50   - digoxin 2.2   - -140s      Review of Systems   Review of Systems   Constitutional:  Negative for chills, fever and malaise/fatigue.   HENT:  Negative for congestion and sore throat.    Eyes: Negative.    Respiratory:  Positive for sputum production. Negative for shortness of breath.    Cardiovascular:  Positive for orthopnea and leg swelling. Negative for chest pain and palpitations.   Gastrointestinal:  Negative for abdominal pain, nausea and vomiting.   Genitourinary: Negative.    Musculoskeletal: Negative.    Skin: Negative.    Neurological:  Negative for focal weakness and headaches.   All other systems reviewed and are negative.     Vital Signs for last 24 hours   Pulse:  [60-75] 61  Resp:  [11-26] 18  BP: ()/(44-58) 113/56  SpO2:  [91 %-98 %] 93 %    Hemodynamic parameters for last 24 hours       Respiratory Information for the last 24 hours       Physical Exam    Physical Exam  Vitals and nursing note reviewed.   Constitutional:       General: She is not in acute distress.     Appearance: She is obese. She is ill-appearing. She is not toxic-appearing.   HENT:      Head: Normocephalic and atraumatic.      Right Ear: External ear normal.      Left Ear: External ear normal.      Nose: Nose normal. No rhinorrhea.      Mouth/Throat:      Mouth: Mucous membranes are moist.      Pharynx: Oropharynx is clear.   Eyes:      General: No scleral icterus.     Conjunctiva/sclera: Conjunctivae normal.      Pupils: Pupils are equal, round, and reactive to light.   Cardiovascular:      Rate and Rhythm:  Normal rate. Rhythm irregularly irregular.      Pulses:           Radial pulses are 2+ on the right side and 2+ on the left side.        Dorsalis pedis pulses are 1+ on the right side and 1+ on the left side.      Heart sounds: Heart sounds are distant. No murmur heard.  Pulmonary:      Breath sounds: No wheezing.      Comments: Clear anteriorly, diminished bases  Abdominal:      Palpations: Abdomen is soft.      Tenderness: There is no abdominal tenderness. There is no guarding or rebound.   Genitourinary:     Comments: Leung in place  Musculoskeletal:         General: Normal range of motion.      Cervical back: Normal range of motion and neck supple.      Right lower le+ Edema present.      Left lower le+ Edema present.   Lymphadenopathy:      Cervical: No cervical adenopathy.   Skin:     General: Skin is warm and dry.      Capillary Refill: Capillary refill takes 2 to 3 seconds.      Findings: No rash.   Neurological:      Mental Status: She is alert and oriented to person, place, and time.      Cranial Nerves: No cranial nerve deficit.      Sensory: No sensory deficit.      Motor: No weakness.      Comments: Pt is very lethargic and not really motivated   Psychiatric:         Thought Content: Thought content normal.       Medications  Current Facility-Administered Medications   Medication Dose Route Frequency Provider Last Rate Last Admin    insulin lispro (AdmeLOG,HumaLOG) injection  1-6 Units Subcutaneous 4X/DAY ACHS Maria Teresa Jasso M.D.        And    insulin GLARGINE (Lantus,Semglee) injection  5 Units Subcutaneous Q EVENING Maria Teresa Jasso M.D.   5 Units at 22 1807    And    dextrose 50% (D50W) injection 25 g  25 g Intravenous Q15 MIN PRN Maria Teresa Jasso M.D.        ondansetron (ZOFRAN) syringe/vial injection 4 mg  4 mg Intravenous Q4HRS PRN Maria Teresa Jasso M.D.   4 mg at 22 1256    MD Alert...ICU Electrolyte Replacement per Pharmacy   Other PHARMACY TO DOSE Maria Teresa Jasso M.D.         polyethylene glycol/lytes (MIRALAX) PACKET 1 Packet  1 Packet Oral DAILY Arian Jain M.D.   1 Packet at 09/28/22 0603    And    senna-docusate (PERICOLACE or SENOKOT S) 8.6-50 MG per tablet 2 Tablet  2 Tablet Oral BID Arian Jain M.D.   2 Tablet at 09/28/22 0603    And    magnesium hydroxide (MILK OF MAGNESIA) suspension 30 mL  30 mL Oral QDAY PRN Arian Jain M.D.   30 mL at 09/26/22 1257    And    bisacodyl (DULCOLAX) suppository 10 mg  10 mg Rectal QDAY PRN Arian Jain M.D.        nystatin (MYCOSTATIN) powder   Topical BID PRN Arian Jain M.D.   Given at 09/26/22 0036    Respiratory Therapy Consult   Nebulization Continuous RT Parth Burton D.O.        ALPRAZolam (XANAX) tablet 0.25 mg  0.25 mg Oral 4X/DAY PRN Chelsea Alexandre, LUISP.R.NShanthi   0.25 mg at 09/26/22 1828    metoprolol tartrate (LOPRESSOR) tablet 25 mg  25 mg Oral TID Yousif Anand M.D.   25 mg at 09/27/22 1753    spironolactone (ALDACTONE) tablet 25 mg  25 mg Oral Q DAY Yousif Anand M.D.   25 mg at 09/28/22 0603    acetaminophen (TYLENOL) tablet 500 mg  500 mg Oral Q6HRS PRN Parth Burton D.O.        magnesium oxide tablet 400 mg  400 mg Oral DAILY Salena Pulido, A.P.RShanthiN.   400 mg at 09/28/22 0602    allopurinol (ZYLOPRIM) tablet 100 mg  100 mg Oral QHS Vipul Ybarra M.D.   100 mg at 09/27/22 2112    citalopram (CeleXA) tablet 20 mg  20 mg Oral JANET Ybarra M.D.   20 mg at 09/28/22 0604    ferrous sulfate tablet 325 mg  325 mg Oral BID Vipul Ybarra M.D.   325 mg at 09/28/22 0603    gemfibrozil (LOPID) tablet 600 mg  600 mg Oral QAM Vipul Ybarra M.D.   600 mg at 09/27/22 0524    levothyroxine (SYNTHROID) tablet 200 mcg  200 mcg Oral Once per day on Mon Tue Wed Thu Fri Sat Vipul Ybarra M.D.   200 mcg at 09/28/22 0445    rosuvastatin (CRESTOR) tablet 20 mg  20 mg Oral Q EVENING Vipul Ybarra M.D.   20 mg at 09/27/22 1753    MD Alert...Warfarin per Pharmacy   Other PHARMACY TO DOSE Vipul CRAROLL  LIBERTAD Ybarra        levothyroxine (SYNTHROID) tablet 175 mcg  175 mcg Oral Once per day on Sun Vipul CARLY Ybarra M.D.   175 mcg at 09/25/22 0550       Fluids    Intake/Output Summary (Last 24 hours) at 9/28/2022 0656  Last data filed at 9/28/2022 0600  Gross per 24 hour   Intake --   Output 1020 ml   Net -1020 ml         Laboratory            Recent Labs     09/26/22  0350 09/27/22  0330 09/27/22  0740 09/27/22  1400 09/28/22  0419   SODIUM 142 143  --  142 143   POTASSIUM 3.8 3.9  --  4.1 3.9   CHLORIDE 88* 88*  --  89* 89*   CO2 47* >50*  --  50* >50*   BUN 35* 37*  --  39* 41*   CREATININE 0.79 0.82  --  0.94 0.97   MAGNESIUM 1.8 2.6*  --   --  2.7*   PHOSPHORUS 1.5* 4.7* 4.4  --  4.0   CALCIUM 9.8 9.6  --  9.2 9.3       Recent Labs     09/26/22  0350 09/27/22  0330 09/27/22  1400 09/28/22 0419   ALTSGPT <5 5  --  5   ASTSGOT 25 23  --  26   ALKPHOSPHAT 71 72  --  71   TBILIRUBIN 0.7 0.7  --  0.7   GLUCOSE 103* 115* 146* 102*       Recent Labs     09/26/22  0350 09/27/22  0330 09/28/22  0419   WBC 9.1 9.2 8.8   NEUTSPOLYS 73.60* 69.60 68.90   LYMPHOCYTES 13.90* 16.10* 15.00*   MONOCYTES 10.20 10.20 11.50   EOSINOPHILS 1.30 2.50 3.00   BASOPHILS 0.30 0.40 0.50   ASTSGOT 25 23 26   ALTSGPT <5 5 5   ALKPHOSPHAT 71 72 71   TBILIRUBIN 0.7 0.7 0.7       Recent Labs     09/26/22  0350 09/27/22  0330 09/28/22  0419   RBC 4.08* 4.15* 3.71*   HEMOGLOBIN 10.4* 10.5* 9.5*   HEMATOCRIT 36.1* 37.7 35.0*   PLATELETCT 170 165 162*   PROTHROMBTM 38.0* 34.6* 30.8*   INR 4.08* 3.61* 3.10*         Imaging  Echo:   Reviewed    Assessment/Plan  * Acute on chronic respiratory failure with hypoxia (HCC)- (present on admission)  Assessment & Plan  Secondary to pulmonary edema, had been of lasix for a time   Chronic hypoventilation leading to CO2 retention  ECHO without evidence of tamponade  Stopping Diamox as it does not seem to be doing anything the past couple of days  Continue intermittent BiPAP, breaks off BiPAP --> HFNC    DNR (do  not resuscitate)  Assessment & Plan  Discussed with patient and son (DPOA), confirmed DNR/DNI    Chronic respiratory acidosis  Assessment & Plan  Has also been diuresed heavily and now with alkalosis  Will hold diamox today    Edema due to congestive heart failure ([facility])  Assessment & Plan  She is DNR/DNI and on BiPAP  Holding diuresis      Supratherapeutic INR  Assessment & Plan  Improving   Pharmacy following    Sinus pause  Assessment & Plan  s/p PPM 9/21/22  Paced  Monitor on telemetry    Pericardial effusion- (present on admission)  Assessment & Plan  No tamponade on TTE  Continues to diurese well  No hemodynamic complications thus far  Cardiology has seen her  She is on coumadin, should things change and she require emergent pericardiocentesis would need reversal.    S/P TAVR (transcatheter aortic valve replacement)- (present on admission)  Assessment & Plan  s/p TAVR 8/2022  TTE showing normal valve gradient/function    KEN on CPAP  Assessment & Plan  BiPAP through most of day, breaks with HFNC    Hypomagnesemia  Assessment & Plan  Check and replace daily    Hypokalemia  Assessment & Plan  Check and replace daily      Type 2 diabetes mellitus without complication, with long-term current use of insulin ([facility])- (present on admission)  Assessment & Plan  A1c is not elevated  Sugars have been fine  DC insulin    Chronic atrial fibrillation- (present on admission)  Assessment & Plan  Rate controlled  Cont metoprolol 25mg TID  S/p digoxin  Follow digoxin levels  Continue warfarin, therapeutic INR.    Chronic anticoagulation- (present on admission)  Assessment & Plan  Check INR daily, INR at 3.61  Pharmacy dosing coumadin    Dyslipidemia- (present on admission)  Assessment & Plan  Continue statin    History of hypothyroidism- (present on admission)  Assessment & Plan  Cont Synthroid.    Morbid obesity with BMI of 50.0-59.9, adult ([facility])- (present on admission)  Assessment & Plan  Patient counseled about the importance  of weight loss for long-term health risk reduction.   Will dose-adjust medications appropriately based on larger volume of drug distribution  PT/OT.    Acute on chronic diastolic congestive heart failure (HCC)- (present on admission)  Assessment & Plan  Significant fluid overload-->seems improved  Will give diuresis holiday due to significant alkalosis         VTE:  Coumadin  Ulcer: Not Indicated  Lines: Leung Catheter  Ongoing indication addressed    I have performed a physical exam and reviewed and updated ROS and Plan today (9/28/2022). In review of yesterday's note (9/27/2022), there are no changes except as documented above.     Discussed patient condition and risk of morbidity and/or mortality with Family, RN, RT, Pharmacy, Code status disscussed, Charge nurse / hot rounds, and Patient    The patient remains critically ill requiring titration of BiPAP settings as well as high flow settings for acute on chronic hypoxic respiratory failure from decompensated heart failure.  The patient has significant alkalosis from overdiuresis and we will give her a diuretic holiday.  I have assessed and reassessed the patient's blood pressure, hemodynamics, cardiovascular status, and neurologic status.  Currently, the patient seems fairly unmotivated; however we will continue to try to push her with PT and respiratory therapy.  The patient remains at high risk of clinical deterioration, worsening vital organ dysfunction, and death without the above critical care interventions.    Critical care time = 65 minutes in directly providing and coordinating critical care and extensive data review.  No time overlap and excludes procedures.

## 2022-09-28 NOTE — DOCUMENTATION QUERY
[facility]                                                                       Query Response Note      PATIENT:               RAUL RODRIGUES  ACCT #:                  [id number]  MRN:                     [id number]  :                      1950  ADMIT DATE:       2022 10:10 PM  DISCH DATE:          RESPONDING  PROVIDER #:        [id number]           QUERY TEXT:    71 y/o morbidly obese female on chronic 2L home oxygen and nocturnal BiPAP/CPAP for KEN admitted for acute on chronic CHF.  On  patient had increasing work of breathing and transferred to ICU for rescue BiPAP.  Per  Pulmonary consult -  Chronic hypoventilation/hypoxia due to restrictive physiology from body habitus.   ABG - Ph 7.34, Pco2 79.1, Po2 64.7.  - PN's document chronic hypoventilation leading to CO2 retention.  Based on the diagnostic and clinical indicators, can a diagnosis be made?    NOTE: If an appropriate response is not listed below, please respond with a new note.      The patient's clinical indicators include:   ABG - Ph 7.34, Pco2 79.1, Po2 64.7     Critical Care Consult -  increased work of breathing  and was transferred to the ICU for rescue BiPAP.   Pulmonary consult - Chronic hypoventilation/hypoxia due to restrictive physiology from body habitus  - PN's - Chronic hypoventilation leading to CO2 retention    Treatment - BiPAP; IV Lasix; transfer to ICU; echocardiogram; repeat labs and monitoring    Risk factors - BMI  50.0-59.9, acute on chronic respiratory failure with hypoxia and hypercapnia    Thank you,  Jo SINGH  Clinical   Connect via [employer]  Options provided:   -- Morbid obesity with alveolar hypoventilation syndrome is present   -- Findings of no clinical significance   -- Other explanation, (please specify the other explanation)   -- Unable to  determine      Query created by: oJ Lobato on 9/28/2022 9:27 AM    RESPONSE TEXT:    Morbid obesity with alveolar hypoventilation syndrome is present          Electronically signed by:  DANIEL MORRIS MD 9/28/2022 1:03 PM               seat belt/side air bag/front air bag/shoulder harness

## 2022-09-29 PROBLEM — J96.22 ACUTE ON CHRONIC RESPIRATORY FAILURE WITH HYPOXIA AND HYPERCAPNIA (HCC): Status: ACTIVE | Noted: 2022-01-01

## 2022-09-29 NOTE — CARE PLAN
The patient is Watcher - Medium risk of patient condition declining or worsening  Problem: Hemodynamics  Goal: Patient's hemodynamics, fluid balance and neurologic status will be stable or improve  Outcome: Progressing     Problem: Pain - Standard  Goal: Alleviation of pain or a reduction in pain to the patient’s comfort goal  Outcome: Progressing  Flowsheets  Taken 9/28/2022 1835 by Stephon Call R.N.  OB Pain Intervention: Rest  Taken 9/28/2022 1600 by Stephon Call R.N.  Pain Rating Scale (NPRS): 0  Taken 9/22/2022 1206 by Dennis Oconnor R.N.  Michele-Ontiveros Scale: 2   Critical-Care Pain Observation Score: 0  OB Pain Level: 0-No Pain       Problem: Knowledge Deficit - Standard  Goal: Patient and family/care givers will demonstrate understanding of plan of care, disease process/condition, diagnostic tests and medications  Outcome: Not Progressing     Problem: Skin Integrity  Goal: Skin integrity is maintained or improved  Outcome: Not Progressing     Problem: Psychosocial  Goal: Patient's level of anxiety will decrease  Outcome: Not Progressing  Flowsheets (Taken 9/28/2022 1600)  Patient Behaviors: Anxious     Problem: Respiratory  Goal: Patient will achieve/maintain optimum respiratory ventilation and gas exchange  Outcome: Not Progressing  Flowsheets  Taken 9/28/2022 1600 by Stephon Call R.N.  O2 Delivery Device: Heated High Flow Nasal Cannula  Taken 9/27/2022 2000 by Rachel Shin RROE  Deep Breathe and Cough: Needs Coaching  Taken 9/25/2022 1800 by Nita Ro RShanthiNShanthi  Incentive Spirometer: Not Applicable

## 2022-09-29 NOTE — ASSESSMENT & PLAN NOTE
Severe with last ECHO showing RVSP at 85  Cont to diurese as best as possible  Cont inhaled Flolan  BiPAP

## 2022-09-29 NOTE — PROGRESS NOTES
"Critical Care Progress Note    Date of admission  9/16/2022    Chief Complaint  \"72 y.o. female who presented 9/16/2022 with complaints of shortness of breath and bilateral lower extremity edema and anasarca she has a past medical history of a recent TAVR in 8/2022, permanent atrial fibrillation on warfarin, heart failure with preserved ejection fraction, morbid obesity, KEN on CPAP, chronic hypoxemic respiratory failure on 2 LPM ATC.  She was admitted for decompensated diastolic heart failure.  Hospital course complicated by symptomatic bradycardia with syncope x10-second episode of asystole.  Underwent PPM placement on 9/21/2022.  This evening, had an acute episode of shortness of breath while being turned.  She became briefly drowsy, never lost consciousness.  Tachypneic.  CXR showed stable large cardiac silhouette due to known pericardial effusion and cardiomegaly and ABG demonstrated a chronically compensated respiratory acidosis with an elevated AA gradient.  She was placed on her home BiPAP and given diuretics however continues to have increased work of breathing and was transferred to the ICU for rescue BiPAP.  DNR/DNI status was confirmed with both the patient and her son at bedside.\" Dr Saldaña 9/24    Hospital Course  9/16 - admitted with HFpEF exacerbation  9/21 - PPM placed    9/24 - Transferred to ICU for rescue BiPAP  9/25 - intermittent BiPAP/HFNC, lasix with excellent diuresis  9/26 - intermittent BiPAP/HFNC, serum bicarb at 47, stopped lasix, started Diamox  9/27 - intermittent BiPAP/HFNC, serum bicarb >50, continued Diamox, decreased UOP throughout day  9/28 - intermittent BiPAP/HFNC, serum bicarb > 50, stopped Diamox, OK UOP, poor motivation/participation with PT/RT    Interval Problem Update  Reviewed last 24 hour events:   - no events overnight   - continues to be weak and lethargic   - a-fib 60-90s   - SBP 80-110s   - afebrile   - HFNC at 50/100%   - poor intake   - Leung with 300cc " overnight   - no drips   - BM this am   - BiPAP 12/8 at 100%   - HFNC at 50/100%   - warfarin, INR at 2.77   - Mg 2.7   - Creat 1.05   - serum bicarb >50   - repeat AB.22/122/59-->back on BiPAP     Yesterday's Events:   - no overnight events   - RASS -1 to 0   - a/ox4   - weak as a kitten, not participating well with IS and PT   - afebrile   - a-fib 60-70s   - SBP 110s   - cardiac diet, fluid restriction   - BM last night x 2   - UOP of 450cc overnight, Leung   - BiPAP overnight   - HFNC at 40/60%   - warfarin, INR at 3   - no abx   - bicarb > 50   - digoxin at 2    Review of Systems   Review of Systems   Constitutional:  Negative for chills, fever and malaise/fatigue.   HENT:  Negative for congestion and sore throat.    Eyes: Negative.    Respiratory:  Positive for sputum production. Negative for shortness of breath.    Cardiovascular:  Positive for orthopnea and leg swelling. Negative for chest pain and palpitations.   Gastrointestinal:  Negative for abdominal pain, nausea and vomiting.   Genitourinary: Negative.    Musculoskeletal: Negative.    Skin: Negative.    Neurological:  Negative for focal weakness and headaches.   All other systems reviewed and are negative.       Vital Signs for last 24 hours   Temp:  [35.6 °C (96.1 °F)-36.2 °C (97.2 °F)] 36.2 °C (97.2 °F)  Pulse:  [60-82] 70  Resp:  [11-35] 31  BP: ()/(46-64) 117/55  SpO2:  [89 %-98 %] 90 %    Hemodynamic parameters for last 24 hours       Respiratory Information for the last 24 hours       Physical Exam    Physical Exam  Vitals and nursing note reviewed.   Constitutional:       General: She is not in acute distress.     Appearance: She is obese. She is ill-appearing. She is not toxic-appearing.   HENT:      Head: Normocephalic and atraumatic.      Right Ear: External ear normal.      Left Ear: External ear normal.      Nose: Nose normal. No rhinorrhea.      Mouth/Throat:      Mouth: Mucous membranes are moist.      Pharynx: Oropharynx is  clear.   Eyes:      General: No scleral icterus.     Conjunctiva/sclera: Conjunctivae normal.      Pupils: Pupils are equal, round, and reactive to light.   Cardiovascular:      Rate and Rhythm: Normal rate. Rhythm irregularly irregular.      Pulses:           Radial pulses are 2+ on the right side and 2+ on the left side.        Dorsalis pedis pulses are 1+ on the right side and 1+ on the left side.      Heart sounds: Heart sounds are distant. No murmur heard.  Pulmonary:      Breath sounds: No wheezing.      Comments: Clear anteriorly, diminished bases  Abdominal:      Palpations: Abdomen is soft.      Tenderness: There is no abdominal tenderness. There is no guarding or rebound.   Genitourinary:     Comments: Leung in place  Musculoskeletal:         General: Normal range of motion.      Cervical back: Normal range of motion and neck supple.      Right lower le+ Edema present.      Left lower le+ Edema present.   Lymphadenopathy:      Cervical: No cervical adenopathy.   Skin:     General: Skin is warm and dry.      Capillary Refill: Capillary refill takes 2 to 3 seconds.      Findings: No rash.   Neurological:      Mental Status: She is alert and oriented to person, place, and time.      Cranial Nerves: No cranial nerve deficit.      Sensory: No sensory deficit.      Motor: No weakness.      Comments: Pt is very lethargic and not really motivated   Psychiatric:         Thought Content: Thought content normal.   No change to exam    Medications  Current Facility-Administered Medications   Medication Dose Route Frequency Provider Last Rate Last Admin    warfarin (COUMADIN) tablet 1 mg  1 mg Oral DAILY AT 1800 Maria Teresa Jasso M.D.   1 mg at 22 1707    insulin lispro (AdmeLOG,HumaLOG) injection  1-6 Units Subcutaneous 4X/DAY ACHS Maria Teresa Jasso M.D.        And    insulin GLARGINE (Lantus,Semglee) injection  5 Units Subcutaneous Q EVENING Maria Teresa Jasso M.D.   5 Units at 22 1708    And     dextrose 50% (D50W) injection 25 g  25 g Intravenous Q15 MIN PRN Maria Teresa Jasso M.D.        ondansetron (ZOFRAN) syringe/vial injection 4 mg  4 mg Intravenous Q4HRS PRN Maria Teresa Jasso M.D.   4 mg at 09/26/22 1256    MD Alert...ICU Electrolyte Replacement per Pharmacy   Other PHARMACY TO DOSE Maria Teresa Jasso M.D.        polyethylene glycol/lytes (MIRALAX) PACKET 1 Packet  1 Packet Oral DAILY Arian Jain M.D.   1 Packet at 09/28/22 0603    And    senna-docusate (PERICOLACE or SENOKOT S) 8.6-50 MG per tablet 2 Tablet  2 Tablet Oral BID Arian Jain M.D.   2 Tablet at 09/28/22 0603    And    magnesium hydroxide (MILK OF MAGNESIA) suspension 30 mL  30 mL Oral QDAY PRN Arian Jain M.D.   30 mL at 09/26/22 1257    And    bisacodyl (DULCOLAX) suppository 10 mg  10 mg Rectal QDAY PRN Arian Jain M.D.        nystatin (MYCOSTATIN) powder   Topical BID PRN Arian Jain M.D.   Given at 09/26/22 0036    Respiratory Therapy Consult   Nebulization Continuous RT Parth Burton D.O.        ALPRAZolam (XANAX) tablet 0.25 mg  0.25 mg Oral 4X/DAY PRN Chelsea Alexandre, LUISP.R.NShanthi   0.25 mg at 09/26/22 1828    metoprolol tartrate (LOPRESSOR) tablet 25 mg  25 mg Oral TID Yousif Anand M.D.   25 mg at 09/29/22 0540    spironolactone (ALDACTONE) tablet 25 mg  25 mg Oral Q DAY Yousif Anand M.D.   25 mg at 09/29/22 0540    acetaminophen (TYLENOL) tablet 500 mg  500 mg Oral Q6HRS PRN Parth Burton D.O.        magnesium oxide tablet 400 mg  400 mg Oral DAILY Salena Pulido A.P.R.N.   400 mg at 09/29/22 0540    allopurinol (ZYLOPRIM) tablet 100 mg  100 mg Oral QHS Vipul Ybarra M.D.   100 mg at 09/28/22 2115    citalopram (CeleXA) tablet 20 mg  20 mg Oral JANET Ybarra M.D.   20 mg at 09/29/22 0539    ferrous sulfate tablet 325 mg  325 mg Oral BID Vipul Ybarra M.D.   325 mg at 09/29/22 0540    gemfibrozil (LOPID) tablet 600 mg  600 mg Oral JANET Ybarra M.D.   600 mg at 09/29/22 0505     levothyroxine (SYNTHROID) tablet 200 mcg  200 mcg Oral Once per day on Mon Tue Wed Thu Fri Sat Vipul Ybarra M.D.   200 mcg at 09/29/22 0432    rosuvastatin (CRESTOR) tablet 20 mg  20 mg Oral Q EVENING Vipul Ybarra M.D.   20 mg at 09/28/22 1707    MD Alert...Warfarin per Pharmacy   Other PHARMACY TO DOSE Vipul Ybarra M.D.        levothyroxine (SYNTHROID) tablet 175 mcg  175 mcg Oral Once per day on Sun Vipul Ybarra M.D.   175 mcg at 09/25/22 0550       Fluids    Intake/Output Summary (Last 24 hours) at 9/29/2022 0712  Last data filed at 9/29/2022 0600  Gross per 24 hour   Intake 690 ml   Output 800 ml   Net -110 ml         Laboratory            Recent Labs     09/27/22  0330 09/27/22  0740 09/27/22 1400 09/28/22 0419 09/29/22  0430   SODIUM 143  --  142 143 142   POTASSIUM 3.9  --  4.1 3.9 4.0   CHLORIDE 88*  --  89* 89* 88*   CO2 >50*  --  50* >50* >50*   BUN 37*  --  39* 41* 45*   CREATININE 0.82  --  0.94 0.97 1.05   MAGNESIUM 2.6*  --   --  2.7* 2.7*   PHOSPHORUS 4.7* 4.4  --  4.0 3.4   CALCIUM 9.6  --  9.2 9.3 9.6       Recent Labs     09/27/22  0330 09/27/22 1400 09/28/22  0419 09/29/22  0430   ALTSGPT 5  --  5 6   ASTSGOT 23  --  26 26   ALKPHOSPHAT 72  --  71 78   TBILIRUBIN 0.7  --  0.7 0.6   GLUCOSE 115* 146* 102* 87       Recent Labs     09/27/22 0330 09/28/22 0419 09/29/22  0430   WBC 9.2 8.8 8.1   NEUTSPOLYS 69.60 68.90 64.80   LYMPHOCYTES 16.10* 15.00* 18.60*   MONOCYTES 10.20 11.50 10.90   EOSINOPHILS 2.50 3.00 3.60   BASOPHILS 0.40 0.50 0.60   ASTSGOT 23 26 26   ALTSGPT 5 5 6   ALKPHOSPHAT 72 71 78   TBILIRUBIN 0.7 0.7 0.6       Recent Labs     09/27/22  0330 09/28/22  0419 09/29/22  0430   RBC 4.15* 3.71* 3.79*   HEMOGLOBIN 10.5* 9.5* 9.8*   HEMATOCRIT 37.7 35.0* 35.7*   PLATELETCT 165 162* 160*   PROTHROMBTM 34.6* 30.8* 28.3*   INR 3.61* 3.10* 2.77*         Imaging  Echo:   Reviewed    Assessment/Plan  * Acute on chronic respiratory failure with hypoxia and hypercapnia (HCC)-  (present on admission)  Assessment & Plan  Secondary to pulmonary edema   Chronic hypoventilation also leading to CO2 retention  ECHO without evidence of tamponade  Restart Diamox 500mg every 6 hours due to worsening ABG/mental status  Restart BiPAP and continue  Noted RVSP at 85-->trial of Flolan    DNR (do not resuscitate)  Assessment & Plan  Discussed with patient and son (MADALYN), confirmed DNR/DNI    Chronic respiratory acidosis  Assessment & Plan  Has also been diuresed heavily and now with alkalosis  Diamox    Edema due to congestive heart failure (HCC)  Assessment & Plan  She is DNR/DNI and on BiPAP  Diamox       Supratherapeutic INR  Assessment & Plan  Improving   Pharmacy following    Sinus pause  Assessment & Plan  s/p PPM 9/21/22  Paced  Monitor on telemetry    Pulmonary hypertension (HCC)- (present on admission)  Assessment & Plan  Severe with last ECHO showing RVSP at 85  Cont to diurese as best as possible  Trial of inhaled Flolan  BiPAP     Pericardial effusion- (present on admission)  Assessment & Plan  No tamponade on TTE  Continues to diurese well  No hemodynamic complications thus far  Cardiology has seen her  She is on coumadin, should things change and she require emergent pericardiocentesis would need reversal.    S/P TAVR (transcatheter aortic valve replacement)- (present on admission)  Assessment & Plan  s/p TAVR 8/2022  TTE showing normal valve gradient/function    KEN on CPAP  Assessment & Plan  BiPAP through most of day, breaks with HFNC.    Hypomagnesemia  Assessment & Plan  Check and replace daily    Hypokalemia  Assessment & Plan  Check and replace daily      Type 2 diabetes mellitus without complication, with long-term current use of insulin ([facility])- (present on admission)  Assessment & Plan  A1c is not elevated  Sugars have been fine  DC insulin    Chronic atrial fibrillation- (present on admission)  Assessment & Plan  Rate controlled  Cont metoprolol 25mg TID  S/p digoxin  Continue  warfarin, therapeutic INR.    Chronic anticoagulation- (present on admission)  Assessment & Plan  Check INR daily, INR at 3.61  Pharmacy dosing coumadin    Dyslipidemia- (present on admission)  Assessment & Plan  Continue statin    History of hypothyroidism- (present on admission)  Assessment & Plan  Cont Synthroid.    Morbid obesity with BMI of 50.0-59.9, adult (HCC)- (present on admission)  Assessment & Plan  Patient counseled about the importance of weight loss for long-term health risk reduction.   Likely contributing to her co-morbidities  Will dose-adjust medications appropriately based on larger volume of drug distribution  PT/OT.    Acute on chronic diastolic congestive heart failure (HCC)- (present on admission)  Assessment & Plan  Restart Diamox 500mg every 6 hours IV   Continue BiPAP therapy continuous         VTE:  Coumadin  Ulcer: Not Indicated  Lines: Leung Catheter  Ongoing indication addressed    I have performed a physical exam and reviewed and updated ROS and Plan today (9/29/2022). In review of yesterday's note (9/28/2022), there are no changes except as documented above.     Discussed patient condition and risk of morbidity and/or mortality with Family, RN, RT, Pharmacy, Code status disscussed, Charge nurse / hot rounds, and Patient    The patient remains critically ill requiring titration of BiPAP settings that are currently needed for continuous due to worsening ABG and mental status.  In review of the patient's ABG she is partially compensated.  We will restart the patient's Diamox.  Due to her hypoxemia we will trial Flolan since she has a history of pulmonary hypertension.  I have assessed and reassessed this patient's blood pressure, hemodynamics, neurologic status, and cardiovascular status.  The patient remains at high risk of clinical deterioration, worsening vital organ dysfunction, and death without the above critical care interventions.    Critical care time = 95 minutes in directly  providing and coordinating critical care and extensive data review.  No time overlap and excludes procedures.

## 2022-09-29 NOTE — CARE PLAN
The patient is Watcher - Medium risk of patient condition declining or worsening    Shift Goals  Clinical Goals: pulm hygiene, BiPAP @ pm, rest  Patient Goals: comfort  Family Goals: LIZ    Progress made toward(s) clinical / shift goals:    Problem: Skin Integrity  Goal: Skin integrity is maintained or improved  Outcome: Progressing     Problem: Fall Risk  Goal: Patient will remain free from falls  Outcome: Progressing     Problem: Psychosocial  Goal: Patient's level of anxiety will decrease  Outcome: Progressing     Problem: Hemodynamics  Goal: Patient's hemodynamics, fluid balance and neurologic status will be stable or improve  Outcome: Progressing     Problem: Respiratory  Goal: Patient will achieve/maintain optimum respiratory ventilation and gas exchange  Outcome: Progressing     Problem: Pain - Standard  Goal: Alleviation of pain or a reduction in pain to the patient’s comfort goal  Outcome: Progressing       Patient is not progressing towards the following goals:      Problem: Nutrition  Goal: Patient's nutritional and fluid intake will be adequate or improve  Outcome: Not Progressing     Problem: Mobility  Goal: Patient's capacity to carry out activities will improve  Outcome: Not Progressing

## 2022-09-29 NOTE — CARE PLAN
Problem: Ventilation  Goal: Ability to achieve and maintain unassisted ventilation or tolerate decreased levels of ventilator support  Description: Target End Date:  4 days     Document on Vent flowsheet    1.  Support and monitor invasive and noninvasive mechanical ventilation  2.  Monitor ventilator weaning response  3.  Perform ventilator associated pneumonia prevention interventions  4.  Manage ventilation therapy by monitoring diagnostic test results  Outcome: Progressing   HF 40L/60%  Bipap 12/6/65% nocturnal and prn

## 2022-09-29 NOTE — CARE PLAN
The patient is Watcher - Medium risk of patient condition declining or worsening    Shift Goals  Clinical Goals: Stable oxygenation  Patient Goals: Rest  Family Goals: Updates    Progress made toward(s) clinical / shift goals:    Plan of care discussed during IDT roundsand explained to family, hemodynamically stable after pacemaker, pain managed per MAR and appropriate interventions.    Problem: Knowledge Deficit - Standard  Goal: Patient and family/care givers will demonstrate understanding of plan of care, disease process/condition, diagnostic tests and medications  Outcome: Progressing     Problem: Hemodynamics  Goal: Patient's hemodynamics, fluid balance and neurologic status will be stable or improve  Outcome: Progressing     Problem: Pain - Standard  Goal: Alleviation of pain or a reduction in pain to the patient’s comfort goal  Outcome: Progressing       Patient is not progressing towards the following goals:  Patient oxygen requirements increasing.    Problem: Respiratory  Goal: Patient will achieve/maintain optimum respiratory ventilation and gas exchange  Outcome: Not Progressing

## 2022-09-29 NOTE — PROGRESS NOTES
Inpatient Anticoagulation Service Note    Date: 2022    Reason for Anticoagulation: Atrial Fibrillation, Bioprosthetic Valve Replacement   Target INR: 2.0 to 3.0  [id number] VASc Score: 6  HAS-BLED Score: 1   Hemoglobin Value: (!) 9.8  Hematocrit Value: (!) 35.7  Lab Platelet Value: (!) 160    INR from last 7 days       Date/Time INR Value    22 0430 2.77    22 0419 3.1    22 0330 3.61    22 0350 4.08    22 0225 4    22 0156 2.51    22 0222 2.31          Dose from last 7 days       Date/Time Dose (mg)    22 1123 1    22 1359 1    22 1446 0    22 1300 0    22 1415 0    22 1019 2.5    22 0957 2.5          Average Dose (mg):  (Home dose per med rec: 5 mg on Tu, 2.5 mg all other days)  Significant Interactions: Other (Comments) (gemfibrozil, rosuvastatin, citalopram, levothyroxine)  Bridge Therapy: No     Reversal Agent Administered: Not Applicable  Comments: INR trending down, now therapeutic. Will continue 1mg dose to slow decrease. No bleeding noted, but worsening respiratory status.    Plan:  1mg  Education Material Provided?: No  Pharmacist suggested discharge dosin.5mg daily. INR within 72 hours of discharge.      Arthur Matos, PharmD

## 2022-09-30 NOTE — DISCHARGE SUMMARY
Death Summary    Cause of Death  Acute on chronic hypoxic and hypercapnic respiratory failure due to acute cardiogenic pulmonary edema due to acute on chronic diastolic heart failure     Comorbid Conditions at the Time of Death  Principal Problem:    Acute on chronic respiratory failure with hypoxia and hypercapnia (HCC) POA: Yes  Active Problems:    Acute on chronic diastolic congestive heart failure (HCC) POA: Yes    Morbid obesity with BMI of 50.0-59.9, adult (HCC) POA: Yes    History of hypothyroidism (Chronic) POA: Yes    Dyslipidemia POA: Yes    Chronic anticoagulation POA: Yes    Chronic atrial fibrillation POA: Yes    Type 2 diabetes mellitus without complication, with long-term current use of insulin (HCC) POA: Yes    Hypokalemia POA: Unknown    Hypomagnesemia POA: Unknown    KEN on CPAP POA: Unknown    S/P TAVR (transcatheter aortic valve replacement) POA: Yes      Overview: 8/15/22: 26 mm Burton Cheikh 3 Ultra transcatheter aortic valve       replacement    Pericardial effusion POA: Yes    Pulmonary hypertension (HCC) POA: Yes    Sinus pause POA: Unknown    Supratherapeutic INR POA: Unknown    Edema due to congestive heart failure (HCC) POA: Unknown    Chronic respiratory acidosis POA: Unknown    DNR (do not resuscitate) POA: Unknown  Resolved Problems:    Abdominal pannus POA: Unknown      History of Presenting Illness and Hospital Course  Ms. Reina is a 72 year old lady with the past medical history significant for morbid obesity with a BMI of > 50, recent TAVR on 8/2022, permanent atrial fibrillation on warfarin, chronic diastolic heart failure, KEN on CPAP, chronic hypoxic respiratory failure on 2 lpm O2, type-II DM, hypothyroidism, and severe pulmonary hypertension with an RVSP at 85 mmHg who was admitted on 9/16/2022 with complaints of worsening shortness of breath and leg swelling.  The patient was on the telemetry floor and was started on aggressive diuresis.  Her hospital course was  complicated by symptomatic bradycardia with syncope and a 10-second episode of asystole.  The patient had a permanent pacemaker placed on 9/21/2022.  The patient remained on the telemetry floor.  Unfortunately, the patient's conditioned worsened on 9/24 due to worsening mental status and worsening shortness of breath.  Her ABG demonstrated chronically compensate respiratory acidosis and a chest x-ray with pulmonary edema and a pericardial effusion.  There was no evidence of tamponade on physical exam as well as on ECHO.  She was transferred to the ICU and started on rescue BiPAP.  The patient was started on very aggressive diuresis who initially responded very well to this with great UOP on Lasix.  The patient was then transitioned from BiPAP to HFNC and this was alternated for several days while being diuresed.  Unfortunately, after 3 days of aggressive Lasix therapy the patient's serum bicarbonate levels began to worsen.  She was switched to Diamox with some help to her serum bicarbonate, but relatively poor diuresis.  The patient continued to be less motivated with participating with physical therapy and expressed to several nursing staff that she was ready to die.  The patient's son was updated throughout her ICU stay.  Unfortunately, despite aggressive therapies and then starting inhaled Flolan the patient ultimately expressed her wishes to be comfortable and allow a natural death.  She was on continuous BiPAP at this point and was awake/alert enough to express her wishes to her son who wanted to honor her wishes.  The patient was then transitioned to comfort focused care.  She passed away peacefully with her son and daughter-in-law at the bedside.  I offered my condolences prior to them leaving.    Death Date: 09/30/22   Death Time: 1031         Pronounced By (RN1): Lucy Rose  Pronounced By (RN2): Astrid Bautista

## 2022-09-30 NOTE — CARE PLAN
Problem: Ventilation  Goal: Ability to achieve and maintain unassisted ventilation or tolerate decreased levels of ventilator support  Description: Target End Date:  4 days     Document on Vent flowsheet    1.  Support and monitor invasive and noninvasive mechanical ventilation  2.  Monitor ventilator weaning response  3.  Perform ventilator associated pneumonia prevention interventions  4.  Manage ventilation therapy by monitoring diagnostic test results  Outcome: Not Progressing     Bipap 16/6 70%  Flolan 12ML/HR

## 2022-09-30 NOTE — CARE PLAN
The patient is Unstable - High likelihood or risk of patient condition declining or worsening    Shift Goals  Clinical Goals: BiPAP @ PM, decrease O2 needs  Patient Goals: sleep, come off mask  Family Goals: LIZ    Progress made toward(s) clinical / shift goals:    Problem: Skin Integrity  Goal: Skin integrity is maintained or improved  Outcome: Progressing     Problem: Fall Risk  Goal: Patient will remain free from falls  Outcome: Progressing       Patient is not progressing towards the following goals:      Problem: Depression  Goal: Patient and family/caregiver will verbalize accurate information about at least two of the possible causes of depression, three-four of the signs and symptoms of depression  Outcome: Not Progressing     Problem: Respiratory  Goal: Patient will achieve/maintain optimum respiratory ventilation and gas exchange  Outcome: Not Progressing     Problem: Fluid Volume  Goal: Fluid volume balance will be maintained  Outcome: Not Progressing     Problem: Nutrition  Goal: Patient's nutritional and fluid intake will be adequate or improve  Outcome: Not Progressing     Problem: Mobility  Goal: Patient's capacity to carry out activities will improve  Outcome: Not Progressing

## 2022-09-30 NOTE — CARE PLAN
Pt. Is on Bipap full Face mask all night.  SO2 92%.    Bipap 16/6 Fio2 70%      Problem: Ventilation  Goal: Ability to achieve and maintain unassisted ventilation or tolerate decreased levels of ventilator support  Description: Target End Date:  4 days     Document on Vent flowsheet    1.  Support and monitor invasive and noninvasive mechanical ventilation  2.  Monitor ventilator weaning response  3.  Perform ventilator associated pneumonia prevention interventions  4.  Manage ventilation therapy by monitoring diagnostic test results  Outcome: Not Progressing

## 2022-09-30 NOTE — PROGRESS NOTES
Pt. And family decided to go [facility] only. Dr. Jasso to put orders in. Pt. Understands and family has been explained the process.

## 2022-09-30 NOTE — PROGRESS NOTES
"Critical Care Progress Note    Date of admission  9/16/2022    Chief Complaint  \"72 y.o. female who presented 9/16/2022 with complaints of shortness of breath and bilateral lower extremity edema and anasarca she has a past medical history of a recent TAVR in 8/2022, permanent atrial fibrillation on warfarin, heart failure with preserved ejection fraction, morbid obesity, KEN on CPAP, chronic hypoxemic respiratory failure on 2 LPM ATC.  She was admitted for decompensated diastolic heart failure.  Hospital course complicated by symptomatic bradycardia with syncope x10-second episode of asystole.  Underwent PPM placement on 9/21/2022.  This evening, had an acute episode of shortness of breath while being turned.  She became briefly drowsy, never lost consciousness.  Tachypneic.  CXR showed stable large cardiac silhouette due to known pericardial effusion and cardiomegaly and ABG demonstrated a chronically compensated respiratory acidosis with an elevated AA gradient.  She was placed on her home BiPAP and given diuretics however continues to have increased work of breathing and was transferred to the ICU for rescue BiPAP.  DNR/DNI status was confirmed with both the patient and her son at bedside.\" Dr Saldaña 9/24    Hospital Course  9/16 - admitted with HFpEF exacerbation  9/21 - PPM placed    9/24 - Transferred to ICU for rescue BiPAP  9/25 - intermittent BiPAP/HFNC, lasix with excellent diuresis  9/26 - intermittent BiPAP/HFNC, serum bicarb at 47, stopped lasix, started Diamox  9/27 - intermittent BiPAP/HFNC, serum bicarb >50, continued Diamox, decreased UOP throughout day  9/28 - intermittent BiPAP/HFNC, serum bicarb > 50, stopped Diamox, OK UOP, poor motivation/participation with PT/RT  9/29 - intermittent BiPAP/HFNC, serum bicarb > 50, restarted Diamox 500mg IV every 6 hours, oliguria, poor participation. Started Flolan with BiPAP    Interval Problem Update  Reviewed last 24 hour events:   - remained on BiPAP " overnight   - maxed Flolan   - very lethargic this morning, A/Ox2-3   - a-fib 60-70s   - SBP 80-110s   - afebrile   - poor appetite, cardiac diet   - UOP of 300cc overnight, Leung   - BM two days   - BiPAP 12/8 at 100%   - warfarin, INR 2.83   - serum bicarb 48   - k 4   - creat 1.15    Yesterday's Events:   - no events overnight   - continues to be weak and lethargic   - a-fib 60-90s   - SBP 80-110s   - afebrile   - HFNC at 50/100%   - poor intake   - Leung with 300cc overnight   - no drips   - BM this am   - BiPAP 12/8 at 100%   - HFNC at 50/100%   - warfarin, INR at 2.77   - Mg 2.7   - Creat 1.05   - serum bicarb >50   - repeat AB.22/122/59-->back on BiPAP     Review of Systems   Review of Systems   Constitutional:  Negative for chills, fever and malaise/fatigue.   HENT:  Negative for congestion and sore throat.    Eyes: Negative.    Respiratory:  Positive for sputum production. Negative for shortness of breath.    Cardiovascular:  Positive for orthopnea and leg swelling. Negative for chest pain and palpitations.   Gastrointestinal:  Negative for abdominal pain, nausea and vomiting.   Genitourinary: Negative.    Musculoskeletal: Negative.    Skin: Negative.    Neurological:  Negative for focal weakness and headaches.   All other systems reviewed and are negative.       Vital Signs for last 24 hours   Temp:  [36.1 °C (97 °F)-36.6 °C (97.8 °F)] 36.2 °C (97.2 °F)  Pulse:  [60-75] 60  Resp:  [12-30] 22  BP: ()/(45-57) 117/53  SpO2:  [87 %-97 %] 96 %    Hemodynamic parameters for last 24 hours       Respiratory Information for the last 24 hours       Physical Exam    Physical Exam  Vitals and nursing note reviewed.   Constitutional:       General: She is not in acute distress.     Appearance: She is obese. She is ill-appearing. She is not toxic-appearing.   HENT:      Head: Normocephalic and atraumatic.      Right Ear: External ear normal.      Left Ear: External ear normal.      Nose: Nose normal. No  rhinorrhea.      Mouth/Throat:      Mouth: Mucous membranes are moist.      Pharynx: Oropharynx is clear.   Eyes:      General: No scleral icterus.     Conjunctiva/sclera: Conjunctivae normal.      Pupils: Pupils are equal, round, and reactive to light.   Cardiovascular:      Rate and Rhythm: Normal rate. Rhythm irregularly irregular.      Pulses:           Radial pulses are 2+ on the right side and 2+ on the left side.        Dorsalis pedis pulses are 1+ on the right side and 1+ on the left side.      Heart sounds: Heart sounds are distant. No murmur heard.  Pulmonary:      Breath sounds: No wheezing.      Comments: Clear anteriorly, diminished bases  Abdominal:      Palpations: Abdomen is soft.      Tenderness: There is no abdominal tenderness. There is no guarding or rebound.   Genitourinary:     Comments: Leung in place  Musculoskeletal:         General: Normal range of motion.      Cervical back: Normal range of motion and neck supple.      Right lower le+ Edema present.      Left lower le+ Edema present.   Lymphadenopathy:      Cervical: No cervical adenopathy.   Skin:     General: Skin is warm and dry.      Capillary Refill: Capillary refill takes 2 to 3 seconds.      Findings: No rash.   Neurological:      Mental Status: She is alert and oriented to person, place, and time.      Cranial Nerves: No cranial nerve deficit.      Sensory: No sensory deficit.      Motor: No weakness.      Comments: Pt is very lethargic today.  Patient is waiting to talk to son.   Psychiatric:         Thought Content: Thought content normal.       Medications  Current Facility-Administered Medications   Medication Dose Route Frequency Provider Last Rate Last Admin    acetaZOLAMIDE (DIAMOX) injection 500 mg  500 mg Intravenous Q6HRS Maria Teresa Jasso M.D.   500 mg at 22 0512    epoprostenol (FLOLAN) 1.5 mg in glycine diluent for flolan 50 mL for Inhalation  0.01-0.05 mcg/kg/min Inhalation Continuous Maria Teresa Jasso  M.D. 12 mL/hr at 09/30/22 0546 0.05 mcg/kg/min at 09/30/22 0546    warfarin (COUMADIN) tablet 1 mg  1 mg Oral DAILY AT 1800 Maria Teresa Jasso M.D.   1 mg at 09/29/22 1754    insulin lispro (AdmeLOG,HumaLOG) injection  1-6 Units Subcutaneous 4X/DAY ACHS Maria Teresa Jasso M.D.        And    insulin GLARGINE (Lantus,Semglee) injection  5 Units Subcutaneous Q EVENING Maria Teresa Jasso M.D.   5 Units at 09/28/22 1708    And    dextrose 50% (D50W) injection 25 g  25 g Intravenous Q15 MIN PRN Maria Teresa Jasso M.D.        ondansetron (ZOFRAN) syringe/vial injection 4 mg  4 mg Intravenous Q4HRS PRN Maria Teresa Jasso M.D.   4 mg at 09/26/22 1256    MD Alert...ICU Electrolyte Replacement per Pharmacy   Other PHARMACY TO DOSE Maria Teresa Jasso M.D.        polyethylene glycol/lytes (MIRALAX) PACKET 1 Packet  1 Packet Oral DAILY Arian Jain M.D.   1 Packet at 09/28/22 0603    And    senna-docusate (PERICOLACE or SENOKOT S) 8.6-50 MG per tablet 2 Tablet  2 Tablet Oral BID Arian Jain M.D.   2 Tablet at 09/28/22 0603    And    magnesium hydroxide (MILK OF MAGNESIA) suspension 30 mL  30 mL Oral QDAY PRN Arian Jain M.D.   30 mL at 09/26/22 1257    And    bisacodyl (DULCOLAX) suppository 10 mg  10 mg Rectal QDAY PRN Arian Jain M.D.        nystatin (MYCOSTATIN) powder   Topical BID PRN Arian Jain M.D.   Given at 09/26/22 0036    Respiratory Therapy Consult   Nebulization Continuous RT Parth Burton D.O.        ALPRAZolam (XANAX) tablet 0.25 mg  0.25 mg Oral 4X/DAY PRN NICOLE Cardenas   0.25 mg at 09/26/22 1828    metoprolol tartrate (LOPRESSOR) tablet 25 mg  25 mg Oral TID Yousif Anand M.D.   25 mg at 09/30/22 0511    acetaminophen (TYLENOL) tablet 500 mg  500 mg Oral Q6HRS PRN Parth Burton D.O.        allopurinol (ZYLOPRIM) tablet 100 mg  100 mg Oral QHS Vipul Ybarra M.D.   100 mg at 09/29/22 2159    citalopram (CeleXA) tablet 20 mg  20 mg Oral QA Vipul Ybarra M.D.   20 mg at  09/30/22 0511    ferrous sulfate tablet 325 mg  325 mg Oral BID Vipul Ybarra M.D.   325 mg at 09/30/22 0511    gemfibrozil (LOPID) tablet 600 mg  600 mg Oral QAM Vipul Ybarra M.D.   600 mg at 09/30/22 0511    levothyroxine (SYNTHROID) tablet 200 mcg  200 mcg Oral Once per day on Mon Tue Wed Thu Fri Sat Vipul Ybarra M.D.   200 mcg at 09/30/22 0512    rosuvastatin (CRESTOR) tablet 20 mg  20 mg Oral Q EVENING Vipul Ybarra M.D.   20 mg at 09/29/22 1754    MD Alert...Warfarin per Pharmacy   Other PHARMACY TO DOSE Vipul Ybarra M.D.        levothyroxine (SYNTHROID) tablet 175 mcg  175 mcg Oral Once per day on Sun Vipul Ybarra M.D.   175 mcg at 09/25/22 0550       Fluids    Intake/Output Summary (Last 24 hours) at 9/30/2022 0709  Last data filed at 9/30/2022 0600  Gross per 24 hour   Intake 825 ml   Output 550 ml   Net 275 ml         Laboratory  Recent Labs     09/29/22  1018   ISTATAPH 7.287*   ISTATAPCO2 >100.0*   ISTATAPO2 60*   ISTATATCO2 >50*   [id number] 83*   ISTATARTHCO3 58.8*   ISTATARTBE 26*   ISTATTEMP 98.2 F   ISTATFIO2 100   ISTATSPEC Arterial   ISTATAPHTC 7.290*   NOGEZOSK2OR 59*           Recent Labs     09/28/22  0419 09/29/22  0430 09/30/22  0425   SODIUM 143 142 141   POTASSIUM 3.9 4.0 4.0   CHLORIDE 89* 88* 87*   CO2 >50* >50* 48*   BUN 41* 45* 51*   CREATININE 0.97 1.05 1.15   MAGNESIUM 2.7* 2.7* 2.8*   PHOSPHORUS 4.0 3.4 3.7   CALCIUM 9.3 9.6 9.9       Recent Labs     09/28/22  0419 09/29/22  0430 09/30/22  0425   ALTSGPT 5 6 8   ASTSGOT 26 26 28   ALKPHOSPHAT 71 78 85   TBILIRUBIN 0.7 0.6 0.7   GLUCOSE 102* 87 103*       Recent Labs     09/28/22 0419 09/29/22 0430 09/30/22 0425   WBC 8.8 8.1 9.8   NEUTSPOLYS 68.90 64.80 70.40   LYMPHOCYTES 15.00* 18.60* 11.00*   MONOCYTES 11.50 10.90 13.40   EOSINOPHILS 3.00 3.60 1.10   BASOPHILS 0.50 0.60 0.50   ASTSGOT 26 26 28   ALTSGPT 5 6 8   ALKPHOSPHAT 71 78 85   TBILIRUBIN 0.7 0.6 0.7       Recent Labs     09/28/22 0419 09/29/22 0430  09/30/22  0425   RBC 3.71* 3.79* 3.87*   HEMOGLOBIN 9.5* 9.8* 9.9*   HEMATOCRIT 35.0* 35.7* 37.5   PLATELETCT 162* 160* 184   PROTHROMBTM 30.8* 28.3* 28.8*   INR 3.10* 2.77* 2.83*         Imaging  Echo:   Reviewed    Assessment/Plan  * Acute on chronic respiratory failure with hypoxia and hypercapnia (HCC)- (present on admission)  Assessment & Plan  Secondary to pulmonary edema   Chronic hypoventilation also leading to CO2 retention  ECHO without evidence of tamponade  Cont Diamox 500mg every 6 hours due to worsening ABG/mental status  Continuous BiPAP, pt BiPAP dependent at this point  Noted RVSP at 85-->cont max Flolan    DNR (do not resuscitate)  Assessment & Plan  Discussed with patient and son ([id number]), confirmed DNR/DNI  Updated son yesterday to patient's worsening status despite all aggressive measures and therapies.  I also educated him to her irreversible medical conditions.  He demonstrates understanding.  He would like to have some time with his mom and adhere to her wishes.    Chronic respiratory acidosis  Assessment & Plan  Has also been diuresed heavily and now with alkalosis  Diamox    Edema due to congestive heart failure (HCC)  Assessment & Plan  She is DNR/DNI and on BiPAP  Diamox       Supratherapeutic INR  Assessment & Plan  Improving   Pharmacy following    Sinus pause  Assessment & Plan  s/p PPM 9/21/22  Paced  Monitor on telemetry    Pulmonary hypertension (HCC)- (present on admission)  Assessment & Plan  Severe with last ECHO showing RVSP at 85  Cont to diurese as best as possible  Cont inhaled Flolan  BiPAP     Pericardial effusion- (present on admission)  Assessment & Plan  No tamponade on TTE  Continues to diurese well  No hemodynamic complications thus far  Cardiology has seen her  She is on coumadin, should things change and she require emergent pericardiocentesis would need reversal.    S/P TAVR (transcatheter aortic valve replacement)- (present on admission)  Assessment & Plan  s/p TAVR  8/2022  TTE showing normal valve gradient/function    KEN on CPAP  Assessment & Plan  BiPAP through most of day, breaks with HFNC.    Hypomagnesemia  Assessment & Plan  Check and replace daily    Hypokalemia  Assessment & Plan  Check and replace daily      Type 2 diabetes mellitus without complication, with long-term current use of insulin (HCC)- (present on admission)  Assessment & Plan  A1c is not elevated  Sugars have been fine  DC insulin    Chronic atrial fibrillation- (present on admission)  Assessment & Plan  Rate controlled  Cont metoprolol 25mg TID  S/p digoxin  Continue warfarin, therapeutic INR.    Chronic anticoagulation- (present on admission)  Assessment & Plan  Check INR daily, INR at 3.61  Pharmacy dosing coumadin    Dyslipidemia- (present on admission)  Assessment & Plan  Continue statin    History of hypothyroidism- (present on admission)  Assessment & Plan  Cont Synthroid.    Morbid obesity with BMI of 50.0-59.9, adult (HCC)- (present on admission)  Assessment & Plan  Patient counseled about the importance of weight loss for long-term health risk reduction.   Likely contributing to her co-morbidities  Will dose-adjust medications appropriately based on larger volume of drug distribution  PT/OT.    Acute on chronic diastolic congestive heart failure (HCC)- (present on admission)  Assessment & Plan  Continue Diamox 500mg every 6 hours IV   Continue BiPAP therapy continuous         VTE:  Coumadin  Ulcer: Not Indicated  Lines: Leung Catheter  Ongoing indication addressed    I have performed a physical exam and reviewed and updated ROS and Plan today (9/30/2022). In review of yesterday's note (9/29/2022), there are no changes except as documented above.     Discussed patient condition and risk of morbidity and/or mortality with Family, RN, RT, Pharmacy, Code status disscussed, Charge nurse / hot rounds, and Patient    The patient remains critically ill requiring active titration of BiPAP settings as  well as continuous inhaled Flolan to support the patient's oxygenation and ventilation.  Patient seems to be responding to aggressive Diamox dosing as well.  I have assessed and reassessed the patient's blood pressure, hemodynamics, neurological status, and cardiovascular status.  Patient remains at high risk of clinical deterioration, worsening vital organ dysfunction, and death without the above critical care interventions.    Critical care time = 90 minutes in directly providing and coordinating critical care and extensive data review.  No time overlap and excludes procedures.

## 2022-09-30 NOTE — PROGRESS NOTES
Pt. Prononounced dead by 2 RN, Lucy Rose and Astrid Madden, per protocol. Donor network and  called. Family has all questions

## 2023-10-13 NOTE — PROGRESS NOTES
Pulmonary/Critical Care Medicine   Progress Note    Date of service: 9/30/2022  Time: 0900    Called to the bedside of the patient by the son who spoke to the patient.  The patient expressed that she was ready to die to her son and daughter-in-law.  The son wanted to honor his mother's wishes.  She was transitioned to comfort focused care.    Maria Teresa Jasso MD  Pulmonary and Critical Care Medicine         CHIEF COMPLAINT:  patient condition unchanged patient had low-grade temperature and 1 AM but currently she is afebrile discussed with ACP team regarding her discharge planning after placing midline and no need for any NICK which was requested by wound team that is almost impossible to be done due to severe contracture I was able to maneuver her right knee to open up more than 40 but it was very painful will ask physical therapy inpatient stay longer to do better PT for her to prevent further contracture and improve her wound  SUBJECTIVE:     REVIEW OF SYSTEMS:    CONSTITUTIONAL: (x  )  weakness,  (  ) fevers or chills  EYES/ENT: (  )visual changes;     NECK: (  ) pain or stiffness  RESPIRATORY:   (  )cough, wheezing, hemoptysis;  (  ) shortness of breath  CARDIOVASCULAR:  (  )chest pain or palpitations  GASTROINTESTINAL:   (  )abdominal or epigastric pain.  (  ) nausea, vomiting, or hematemesis;   (   ) diarrhea or constipation.   GENITOURINARY:   (    ) dysuria, frequency or hematuria  NEUROLOGICAL:  (   ) numbness or weakness   All other review of systems is negative unless indicated above    Vital Signs Last 24 Hrs  T(C): 37 (13 Oct 2023 09:30), Max: 38.1 (13 Oct 2023 01:17)  T(F): 98.6 (13 Oct 2023 09:30), Max: 100.5 (13 Oct 2023 01:17)  HR: 97 (13 Oct 2023 09:30) (86 - 104)  BP: 138/91 (13 Oct 2023 09:30) (126/79 - 143/76)  BP(mean): --  RR: 18 (13 Oct 2023 09:30) (16 - 18)  SpO2: 100% (13 Oct 2023 09:30) (96% - 100%)    Parameters below as of 13 Oct 2023 09:30  Patient On (Oxygen Delivery Method): room air        I&O's Summary      CAPILLARY BLOOD GLUCOSE      POCT Blood Glucose.: 78 mg/dL (13 Oct 2023 12:09)  POCT Blood Glucose.: 70 mg/dL (13 Oct 2023 12:04)  POCT Blood Glucose.: 81 mg/dL (13 Oct 2023 05:18)  POCT Blood Glucose.: 90 mg/dL (13 Oct 2023 00:51)      PHYSICAL EXAM:    Constitutional:  (  x ) NAD,   ( x  )awake and  verbal  HEENT: PERR, EOMI,    Neck: Soft and supple, No LAD, No JVD  Respiratory:  (  x  Breath sounds are clear bilaterally,    (   ) wheezing, rales or rhonchi  Cardiovascular:     ( x  )S1 and S2, regular rate and rhythm, no Murmurs, gallops or rubs  Gastrointestinal:  ( x  )Bowel Sounds present, soft,   (  )nontender, nondistended, + peg   Extremities:    (  ) peripheral edema  Vascular: 2+ peripheral pulses  Neurological:    (  x  )A/O x  1,   (  ) focal deficits  Musculoskeletal:    (  x )  normal strength b/l upper  (     x)  severe contracturel  lower extremities  Skin: multiple skin lesion    MEDICATIONS:  MEDICATIONS  (STANDING):  ascorbic acid 500 milliGRAM(s) Oral daily  ceFAZolin   IVPB 2000 milliGRAM(s) IV Intermittent every 8 hours  collagenase Ointment 1 Application(s) Topical two times a day  Dakins Solution - 1/4 Strength 1 Application(s) Topical two times a day  ferrous    sulfate Liquid 220 milliGRAM(s) Oral every 12 hours  magnesium hydroxide Suspension 5 milliLiter(s) Oral daily  mirtazapine 15 milliGRAM(s) Oral at bedtime  pantoprazole   Suspension 40 milliGRAM(s) Oral daily  silver sulfADIAZINE 1% Cream 1 Application(s) Topical every 12 hours  viokace 49345 units 1 Tablet(s) 1 Tablet(s) Enteral Tube once      LABS: All Labs Reviewed:                        8.3    5.74  )-----------( 366      ( 13 Oct 2023 05:15 )             28.1     10-13    134<L>  |  101  |  15  ----------------------------<  73  4.4   |  24  |  0.45<L>    Ca    8.7      13 Oct 2023 05:15  Phos  3.2     10-13  Mg     2.30     10-13            Blood Culture:   Urine Culture      RADIOLOGY/EKG:    ASSESSMENT AND PLAN:  78 yo F PMH Alzheimer's dementia s/p PEG 8/2023, GE flap valve, grade B esophagitis, Multiple Myeloma (previously on Revlimid), cataracts, chronic venous ulcers b/l LE, OA knee, HTN, primary pulmonary hypertension, MDD sent from NH given hypotension documented at 87/56. Unable to obtain reliable history although per documentation patient has experienced cough, dysuria, and abdominal pain. Labs significant for anemia to 6.2 s/p PRBC without active bleeding and Na 124 likely iatrogenic as patient was on D5 1/2 NS and standing free water flushes at nursing home. Patient with fevers (T 100.3) and leukocytosis to 13.3 concerning for sepsis likely 2/2 urinary source - alternatively consider lower extremity wounds. Patient with hx hematoma following initial PEG placement, would f/u CT abdomen r/o expansion.      Problem/Plan - 1:  ·  Problem: Sepsis.   ·  Plan: -patient with fever to 100.3, leukocytosis to 13.3, hypotension 87/56-->92/56 concerning for sepsis 2/2 urinary source, less likely in setting of lower extremity wounds, lactic acid 2.4  -s/p vanc and zosyn in the ED, s/p 1L LR  -f/u BCx, UCx, UA turbid 100 protein moderate blood, large LE, WBC 2921 significant for UTI, per documentation patient has experienced dysuria  -CXR clear  -will order ceftriaxone and vancomycin  -monitor BP, f/u repeat lactic acid.    Problem/Plan - 2:  ·  Problem: Hyponatremia.   ·  Plan: -Na 124  -likely iatrogenic given patient was on D5 1/2 NS and free water flushes at facility  -will hold D5 NS and free water flushes  -f/u urine sodium urine osm, serum osm  -monitor BMP q8h.    Problem/Plan - 3:  ·  Problem: Anemia.   ·  Plan: -Hgb 6.6  -s/p 1u PRBC in ED, f/u repeat CBC, monitor CBC q8h  -f/u iron, ferritin, TIBC  -continue home iron  -monitor CBC, maintain active type and screen, transfuse for Hgb<7.0  -patient with hx hematoma following initial PEG placement, f/u CT A/P consider expansion.    Problem/Plan - 4:  ·  Problem: Alzheimers disease.   ·  Plan: -would obtain collateral on baseline mental status, attempted to call emergency contact [facility] was full  -patient is alert, conversant, although A&Ox1 believes she is in [facility].    Problem/Plan - 5:  ·  Problem: Multiple myeloma.   ·  Plan: -patient previously on revlimid per documentation       Problem/Plan - 6:  ·  Problem: Rheumatoid arthritis.   ·  Plan: -patient with ulnar deviation of b/l UE digits, documented RA  -would obtain records.    Problem/Plan - 7:  ·  Problem: Chronic cutaneous venous stasis ulcer.   ·  Plan: -patient with b/l LE ulcers, in setting of chronic venous stasis  -f/u wound care eval.    Problem/Plan - 8:  ·  Problem: History of esophagitis.   ·  Plan: -patient with grade B esophagitis on EGD 8/2023, also with Hill grade 3 gastroesophageal flap, patient was pending ?outpatient w/u with EUS  -home omeprazole not on formulary will order pantoprazole daily.    Problem/Plan - 9:  ·  Problem: Need for prophylactic measure.   ·  Plan: -DVT ppx: patient with documented hx hematoma after initial PEG placement, SCD for now, will hold pharmacologic ppx  -Diet: f/u nutrition consult for tube feeds, patient on Jevity at facility.    -9/23/2023 ID and IR note and consult appreciated due to positive blood culture recommended to remove right upper port   her antibiotic was changed to Ancef  2 grams every 8 hours as per ID recommendation  -9/24/2023 condition stable waiting for removal of right upper chest sided port  -9/25/2023 right-sided chest port was removed by interventional radiology condition stable continue Ancef 2 g every 8 hours  -9/26/2023 continue Ancef 2 g with Hours for the next 4 weeks patient needs PICC line/midline after negative blood culture echo result noted mild aortic stenosis and severe MR no other  invasivel intervention need to be done due to her general condition and terminal dementia  -9/27/2023 blood culture from 9/25 /2023 Negative Pl. PICC line in a.m. and discharge plan discussed with a ACP  Team discussed with ID about the plan  9/28/2023 D/C back to SNF   with midline  to complet IV antibiotic    -9/30/2023 patient with right upper extremity DVT due to midline start on heparin continue Ancef 2 g every 8 hours discussed with AC P Team   -10/1/2023 condition improving with start  Eliquis in a.m. duration usually between 3-6 months will ask vascular recommendation  10/2/2023   continue  IV Ancef   ID follow-up    -10/3/2023 discontinue heparin due to low hemoglobin and multiple ecchymotic area when asked to be seen by vascular for follow-up and hematology if she has persistent low hemoglobin    -10/4/2023 continue IV antibiotic hematology consult pending off heparin due to  ecchymotic area  -10/5/2023 continue Ancef 2 g every 8 hours hematology consult noted will perform all the testing requested  -10/6/2023 discussed with ACP in detail regarding patient condition hematology follow-up rehabilitation follow-up and vascular regarding anticoagulation   -10/7/2023 patient condition remain stable except reported positive Covid test  she is asymptomatic continue Ancef for her bacteremia as per ID recommendation  -10/8/2023 continue above management patient not hypoxic but she had low-grade fever otherwise asymptomatic  - 10/9/2023 continue supportive care and IV antibiotic still on isolation due to positive Covid  -10/10/2023 patient can be discharged in a.m. after 5 days post Covid need another midline or antibiotic continuation till October 22, 2023 in the past family refused anticoagulation for right upper extremity DVT discussed with ACP to perform another right upper extremity Doppler it is still positive we may call the family for prophylaxis of another midline for her before inserting  -10/11/2023 condition stable not hypoxic discharge planning after midline insertion discussed with ACP team  -10/12/2023 continue monitoring her CBC transfused for hemoglobin less than 7 discharge planning will be canceled due to recent GI bleeding  -10/13/2023 discharge planning to [facility] continue antibiotic IV until 10/22/2023  DVT PPX:    ADVANCED DIRECTIVE:    DISPOSITION:

## 2024-12-20 NOTE — DISCHARGE PLANNING
There are still not any beds at [facility], however, there is one open at [facility]. Pt states that she is in agreement to go to [facility] today. CM completed chart copy and COBRA. Richard ALARCON has set up transport via [employer] for 1600 as there is no more transport left with [employer].   NATALEE discussed with pt and she states that she has already informed her spouse and does not need this CM to call him.      no...

## 2025-01-28 NOTE — PROGRESS NOTES
Pt LVM to change upcoming INR appt. I called pt back but had to LVM.    Communicate fall risk and risk factors to all staff, patient, and family/Provide visual cue: yellow wristband, yellow gown, etc/Reinforce activity limits and safety measures with patient and family/Call bell, personal items and telephone in reach/Instruct patient to call for assistance before getting out of bed/chair/stretcher/Non-slip footwear applied when patient is off stretcher/[place] to call system/Physically safe environment - no spills, clutter or unnecessary equipment/Purposeful Proactive Rounding/Room/bathroom lighting operational, light cord in reach

## 2025-07-03 NOTE — PROGRESS NOTES
"Hospital Medicine Daily Progress Note    Date of Service  9/17/2022    Chief Complaint  Rachelle Reina is a 72 y.o. female admitted 9/16/2022 with shortness of breath    Hospital Course  \"Rachelle Reina is a 72 y.o. female with past medical history of CHF, A. Fib, DM, S/p TAVR who presented 9/16/2022 with shortness of breath and bilateral lower extremity swelling. She reports she drank a cold drink and later suffered a brain freeze and felt like she could not breath. She reports she has been non complaint with her water pills. She reports using oxygen at home 2 liters. She denies any fever or chills. No other relieving or exacerbating factors were noted.      In the ER, CXR showed stable cardiomegaly with increased interstitial markings concerning for mild fluid overload. She also has a malodorous infection beneath her large pannus.\"    Patient was admitted under observation status for management of her acute on chronic diastolic congestive heart failure.    Interval Problem Update  9/17/2022: Patient seen and examined.  Reports that is difficult to talk given shortness of breath.  Denies pain but is anxious.    -Hypotensive blood pressure 86/55 and unable to get Lasix  -On her baseline oxygen of 2 L  -Reviewed telemetry monitoring, A. fib with controlled rate  -Given her recent TAVR, subjective shortness of breath, fluid overload, muffled heart sounds, and hypotension ordered echocardiogram  -Labs reviewed, hemoglobin 10.1, BUN 45, troponin 58, BNP 2231  -Patient needs aggressive diuresis but is not tolerating 40 mg IV Lasix twice daily  -Discussed with cardiology early in the day who recommended continuing aggressive diuresis and nephrology consult for volume overload if patient can't tolerate diuresis  -Decreased losartan to 25 mg daily and metoprolol to 25 mg twice daily and added midodrine so that we can diurese  -Trial initial 10 mg of IV Lasix to see if patient tolerates, will increase " ADVOCATE INPATIENT ENCOUNTER   SURGERY CONSULT NOTE    ADMISSION DATE:  7/3/2025  CONSULTING PHYSICIAN:  Arturo Chanel MD  REFERRING PHYSICIAN:  Malvin Maldonado MD;Leslye*       Medication Dose Route Frequency Provider Negative.    Musculoskeletal: Negative.    no distension.      Palpations: Abdomen is soft.      Tenderness: There is abdominal tenderness in the right upper quadrant. Positive signs include Moore's sign.   Lymphadenopathy:      Cervical: No  slowly    2:10pm: Notified by RN that patient had 10 second sinus arrest/asystole and passed out. Came to beside, patient reports feeling much better. A&O x 4, denies CP, SOB. /40, HR 70s Afib.   -Discontinued metoprolol, ordered 20 mg IV lasix given patient tolerated earlier in the day.   -D/W Dr. Madera who has messaged cardiology. Cardiology at bedside, appreciate recommendations  -Reviewed Echo results - EF 55%, appropriate aortic valve gradient, and RVSP 55 mmHg    I have discussed this patient's plan of care and discharge plan at IDT rounds today with Case Management, Nursing, Nursing leadership, and other members of the IDT team.    Consultants/Specialty  cardiology    Code Status  DNAR/DNI    Disposition  Patient is not medically cleared for discharge.   Anticipate discharge to to home with close outpatient follow-up.  I have placed the appropriate orders for post-discharge needs.    Review of Systems  Review of Systems   Constitutional:  Positive for malaise/fatigue. Negative for chills and fever.   HENT:  Negative for hearing loss.    Eyes:  Negative for blurred vision and double vision.   Respiratory:  Positive for shortness of breath. Negative for cough.    Cardiovascular:  Positive for leg swelling. Negative for chest pain and palpitations.   Gastrointestinal:  Negative for abdominal pain and diarrhea.   Genitourinary:  Negative for dysuria and urgency.   Musculoskeletal:  Positive for back pain.   Skin:  Positive for rash.   Neurological:  Positive for weakness. Negative for dizziness and headaches.   Endo/Heme/Allergies:  Bruises/bleeds easily.   Psychiatric/Behavioral:  Negative for depression. The patient is nervous/anxious.    All other systems reviewed and are negative.     Physical Exam  Temp:  [35.7 °C (96.2 °F)-37.1 °C (98.7 °F)] 35.7 °C (96.2 °F)  Pulse:  [] 92  Resp:  [17-19] 19  BP: (101-117)/(65-82) 117/82  SpO2:  [91 %-99 %] 93 %    Physical Exam  Vitals and nursing note  "reviewed.   Constitutional:       Appearance: She is obese. She is ill-appearing.   HENT:      Head: Normocephalic and atraumatic.      Right Ear: External ear normal.      Left Ear: External ear normal.      Nose: Nose normal.      Mouth/Throat:      Mouth: Mucous membranes are dry.      Pharynx: Oropharynx is clear.   Eyes:      General: No scleral icterus.  Cardiovascular:      Rate and Rhythm: Normal rate. Rhythm irregular.      Pulses: Normal pulses.      Comments: Muffled heart sounds  Pulmonary:      Comments: Diminished with bibasilar crackles  Abdominal:      Tenderness: There is no abdominal tenderness.      Comments: Obese, taut, yeast infection under pannus   Musculoskeletal:      Cervical back: Normal range of motion and neck supple.      Comments: Edema to bilateral thigh level  Ace wraps around bilateral shins, per patient this is to manage her \"water blisters\"   Skin:     General: Skin is dry.      Capillary Refill: Capillary refill takes less than 2 seconds.      Coloration: Skin is pale.      Findings: Rash present.   Neurological:      Mental Status: She is alert and oriented to person, place, and time.   Psychiatric:         Mood and Affect: Mood is anxious.       Fluids  No intake or output data in the 24 hours ending 09/17/22 0746    Laboratory  Recent Labs     09/16/22  2310   WBC 4.8   RBC 3.99*   HEMOGLOBIN 10.1*   HEMATOCRIT 34.6*   MCV 86.7   MCH 25.3*   MCHC 29.2*   RDW 61.1*   PLATELETCT 206   MPV 10.1     Recent Labs     09/17/22  0029   SODIUM 140   POTASSIUM 4.8   CHLORIDE 107   CO2 24   GLUCOSE 118*   BUN 45*   CREATININE 1.37   CALCIUM 9.4                   Imaging  EC-ECHOCARDIOGRAM COMPLETE W/O CONT         DX-CHEST-PORTABLE (1 VIEW)   Final Result      1.  Stable cardiomegaly with pericardial effusion.   2.  Bibasilar atelectasis versus consolidations and suspected right pleural effusion.           Assessment/Plan  * Acute on chronic diastolic congestive heart failure (HCC)- " cervical adenopathy.   Skin:     General: Skin present on admission)  Assessment & Plan  Fluid overloaded on exam, chest x-ray indicative of bilateral pleural effusions, elevated BNP  Not tolerating 40 mg IV Lasix today given hypotension    -Decreased losartan and metoprolol by half the dose  -Added midodrine   -Trial 10 mg IV Lasix and increase as tolerated   -Echocardiogram pending  -Monitor urine output    Sinus pause  Assessment & Plan  10 second sinus pause this afternoon on telemetry with witnessed syncopal episode  Patient recovered, /54    -Discontinued metoprolol  -Consulted cardiology  -Continue on telemetry monitoring    Yeast infection  Assessment & Plan  In abdominal pannus  Topical nystatin TID  Wound team consulted    Hypoxia- (present on admission)  Assessment & Plan  Chronic on home oxygen 2L    Pericardial effusion- (present on admission)  Assessment & Plan  Known small pericardial effusion     S/P TAVR (transcatheter aortic valve replacement)- (present on admission)  Assessment & Plan  On 8/15/2022  On warfarin, goal INR 2.5  Scheduled to have outpatient echocardiogram for valve replacement follow-up next week  Given hospitalization, dyspnea, and hypotension echocardiogram completed today    -EF 55%, aortic gradient appropriate, RVSP 55 mmHg    Obstructive sleep apnea- (present on admission)  Assessment & Plan  Nocturnal BIPAP/CPAP    Anemia- (present on admission)  Assessment & Plan  Anemia of chronic disease  Last iron studies in April indicate mixed anemia of chronic disease and iron deficiency   No signs of bleeding    -Continue home dose of oral iron    Type 2 diabetes mellitus without complication, with long-term current use of insulin (HCC)- (present on admission)  Assessment & Plan  Long acting insulin/Lispro/SSI  Accuchecks and hypoglycemia protocol     Chronic atrial fibrillation- (present on admission)  Assessment & Plan  Atrial fibrillation rate controlled on telemetry  On metoprolol and warfarin    Chronic anticoagulation-  [id number]      Arturo Chanel MD  7/3/2025     (present on admission)  Assessment & Plan  On warfarin status post TAVR 8/15/2022  INR goal 2.5 post TAVR  INR today 3.29    -Warfarin per pharmacy    Dyslipidemia- (present on admission)  Assessment & Plan  Continue home dose of Crestor and Lopid     History of hypothyroidism- (present on admission)  Assessment & Plan  Resume Levothyroxine     Class 3 severe obesity due to excess calories with serious comorbidity and body mass index (BMI) of 40.0 to 44.9 in adult (HCC)- (present on admission)  Assessment & Plan  Educate on lifestyle and dietary modification     Benign essential hypertension- (present on admission)  Assessment & Plan  Hypotensive in the setting of fluid volume overload  Decreased dose of home ARB and BB    -We will increase as tolerated       VTE prophylaxis: therapeutic anticoagulation with warfarin    ..My total time spent caring for the patient on the day of the encounter was 49 minutes. This does not include time spent on separately billable procedures/tests.     I have performed a physical exam and reviewed and updated ROS and Plan today (9/17/2022). In review of yesterday's note (9/16/2022), there are no changes except as documented above.

## (undated) DEVICE — TIP 45 DEG KELMAN 0.9MM - (6/BX)

## (undated) DEVICE — STOPCOCK IV 400 PSI 3W ROT (50EA/BX)

## (undated) DEVICE — PACK BASIC CATARACT - (4/BX)

## (undated) DEVICE — IV TUBING HI-FLO RATE W/CLAMP (50/CA)

## (undated) DEVICE — INTRODUCER CATHETER  DILATOR PROTRUDING 8FR 2.5CM (10EA/BX)

## (undated) DEVICE — SUCTION INSTRUMENT YANKAUER BULBOUS TIP W/O VENT (50EA/CA)

## (undated) DEVICE — GUIDEWIRE STARTER STRAIGHT FIXED CORE .035 150CM 4 STRAIGHT PTFE/HEPARIN COATED (10/BX)

## (undated) DEVICE — PACK TAVR (3EA/CA)

## (undated) DEVICE — NEEDLE CYSTOTOME OPTH VSTC  0.4MM X 16MM - (10/CA)

## (undated) DEVICE — COVER FOOT UNIVERSAL DISP. - (25EA/CA)

## (undated) DEVICE — SYR ANGIO CNRST INJ HI-PRS 3W 65 - (10EA/CA)"

## (undated) DEVICE — ELECTRODE RADIOLUCNT SOLID GEL DEFIB PADS (12EA/CA)

## (undated) DEVICE — TOWEL STOP TIMEOUT SAFETY FLAG (40EA/CA)

## (undated) DEVICE — TUBING CLEARLINK DUO-VENT - C-FLO (48EA/CA)

## (undated) DEVICE — ELECTRODE DUAL RETURN W/ CORD - (50/PK)

## (undated) DEVICE — TIP POLYMER I&A

## (undated) DEVICE — GLOVE SZ 7.5 BIOGEL PI MICRO - PF LF (50PR/BX)

## (undated) DEVICE — SUTURE EYE

## (undated) DEVICE — CATHETER PIGTAIL 6FR 145 (5EA/BX)

## (undated) DEVICE — KNIFE MICROSURGICAL 15 DEGREE GREEN

## (undated) DEVICE — CANISTER SUCTION 3000ML MECHANICAL FILTER AUTO SHUTOFF MEDI-VAC NONSTERILE LF DISP  (40EA/CA)

## (undated) DEVICE — CATHETER IV 20 GA X 1-1/4 ---SURG.& SDS ONLY--- (50EA/BX)

## (undated) DEVICE — KIT RETROFIT PROBE COVERS (24EA/EA)

## (undated) DEVICE — DRAPE CLEAR W/ELASTIC BAND RAD CARM 40 X40" (20EA/CA)"

## (undated) DEVICE — GLOVE BIOGEL PI INDICATOR SZ 7.0 SURGICAL PF LF - (50/BX 4BX/CA)

## (undated) DEVICE — KIT  I.V. START (100EA/CA)

## (undated) DEVICE — ELECTRODE 850 FOAM ADHESIVE - HYDROGEL RADIOTRNSPRNT (50/PK)

## (undated) DEVICE — LACTATED RINGERS INJ 1000 ML - (14EA/CA 60CA/PF)

## (undated) DEVICE — DEVICE INFLATION ATRION NOVALFEX TRANSFEMORAL SYSTEM (1EA)

## (undated) DEVICE — TOWELS CLOTH SURGICAL - (4/PK 20PK/CA)

## (undated) DEVICE — GLIDESHEATH SLENDER NITINOL KIT .021 GW 6FR 10CM SINGLE WALL

## (undated) DEVICE — SUTURE DEVICE CLOSURE REPAIR SYSTEM PERCLOSE PROSTYLE (10EA/BX)

## (undated) DEVICE — CANNULA DIVIDED ADULT CO2 - SAMPLE W/FEMALE CONNCT (25/CA)

## (undated) DEVICE — SET LEADWIRE 5 LEAD BEDSIDE DISPOSABLE ECG (1SET OF 5/EA)

## (undated) DEVICE — TUBE CONNECTING SUCTION - CLEAR PLASTIC STERILE 72 IN (50EA/CA)

## (undated) DEVICE — BLADE SURGICAL #11 - (50/BX)

## (undated) DEVICE — SUTURE  0 ETHIBOND CT-1 30 IN (36PK/BX)

## (undated) DEVICE — GLOVE BIOGEL PI INDICATOR SZ 8.0 SURGICAL PF LF -(50/BX 4BX/CA)

## (undated) DEVICE — NEEDLE FILTER ASPIRATION 18 GA X 1 1/2 IN (100EA/BX)

## (undated) DEVICE — CHLORAPREP 26 ML APPLICATOR - ORANGE TINT(25/CA)

## (undated) DEVICE — GLOVE BIOGEL SZ 7.5 SURGICAL PF LTX - (50PR/BX 4BX/CA)

## (undated) DEVICE — CON SEDATION/>5 YR 1ST 15 MIN

## (undated) DEVICE — WIRE GUIDE AES .035 260CM WITH 3MM J TIP"

## (undated) DEVICE — GLOVE BIOGEL SZ 8.5 SURGICAL PF LTX - (50PR/BX 4BX/CA)

## (undated) DEVICE — WIRE GUIDE LUNDQST.035X180 - TSMG-35-180-4-LES ORDER BY BOX (5EA/BX)

## (undated) DEVICE — WATER IRRIGATION STERILE 1000ML (12EA/CA)

## (undated) DEVICE — DECANTER FLD BLS - (50/CA)

## (undated) DEVICE — GLOVE BIOGEL PI INDICATOR SZ 7.5 SURGICAL PF LF -(50/BX 4BX/CA)

## (undated) DEVICE — GOWN SURGICAL XX-LARGE - (28EA/CA) SIRUS NON REINFORCED

## (undated) DEVICE — DEVICE INFLATION NOVAFLEX ATRION LOCK SYRINGE 29MM 38ML (1EA)

## (undated) DEVICE — SODIUM CHL IRRIGATION 0.9% 1000ML (12EA/CA)

## (undated) DEVICE — CANISTER SUCTION RIGID RED 1500CC (40EA/CA)

## (undated) DEVICE — COVER LIGHT HANDLE FLEXIBLE - SOFT (2EA/PK 80PK/CA)

## (undated) DEVICE — CATHETER 6FR AL1 100CM (5/BX)

## (undated) DEVICE — KIT ANESTHESIA W/CIRCUIT & 3/LT BAG W/FILTER (20EA/CA)

## (undated) DEVICE — Device

## (undated) DEVICE — DRAPE MAYO STAND - (30/CA)

## (undated) DEVICE — CABLE TEMPORARY PACING

## (undated) DEVICE — KNIFE SLIT DUAL BEVEL ANGLED - (6/BX)

## (undated) DEVICE — CARTRIDGE MONARCH C - (10EA/BX)

## (undated) DEVICE — SENSOR SPO2 NEO LNCS ADHESIVE (20/BX) SEE USER NOTES

## (undated) DEVICE — SENSOR OXIMETER ADULT SPO2 RD SET (20EA/BX)

## (undated) DEVICE — SET EXTENSION WITH 2 PORTS (48EA/CA) ***PART #2C8610 IS A SUBSTITUTE*****

## (undated) DEVICE — CRIMPER CATHETER EDWARDS DISPOSABLE (1EA)

## (undated) DEVICE — INTRODUCER SHEATH 6FR 2.5CM - DILATOR PROTRUDING (10/BX)